# Patient Record
Sex: FEMALE | Race: BLACK OR AFRICAN AMERICAN | NOT HISPANIC OR LATINO | URBAN - METROPOLITAN AREA
[De-identification: names, ages, dates, MRNs, and addresses within clinical notes are randomized per-mention and may not be internally consistent; named-entity substitution may affect disease eponyms.]

---

## 2016-12-13 RX ORDER — INSULIN GLARGINE 100 [IU]/ML
33 INJECTION, SOLUTION SUBCUTANEOUS
Qty: 1 | Refills: 0 | DISCHARGE
Start: 2016-12-13 | End: 2016-12-27

## 2016-12-13 RX ORDER — INSULIN GLARGINE 100 [IU]/ML
16 INJECTION, SOLUTION SUBCUTANEOUS
Qty: 1 | Refills: 0 | DISCHARGE
Start: 2016-12-13 | End: 2016-12-27

## 2017-02-03 ENCOUNTER — OUTPATIENT (OUTPATIENT)
Dept: OUTPATIENT SERVICES | Facility: HOSPITAL | Age: 72
LOS: 1 days | End: 2017-02-03
Payer: MEDICARE

## 2017-02-03 DIAGNOSIS — I62.01 NONTRAUMATIC ACUTE SUBDURAL HEMORRHAGE: Chronic | ICD-10-CM

## 2017-02-03 DIAGNOSIS — Z96.651 PRESENCE OF RIGHT ARTIFICIAL KNEE JOINT: Chronic | ICD-10-CM

## 2017-02-03 PROCEDURE — 72141 MRI NECK SPINE W/O DYE: CPT | Mod: 26

## 2017-02-03 PROCEDURE — 71020: CPT | Mod: 26

## 2017-02-03 PROCEDURE — 72141 MRI NECK SPINE W/O DYE: CPT

## 2017-02-03 PROCEDURE — 71046 X-RAY EXAM CHEST 2 VIEWS: CPT

## 2017-03-07 ENCOUNTER — OUTPATIENT (OUTPATIENT)
Dept: OUTPATIENT SERVICES | Facility: HOSPITAL | Age: 72
LOS: 1 days | End: 2017-03-07
Payer: MEDICARE

## 2017-03-07 DIAGNOSIS — Z96.651 PRESENCE OF RIGHT ARTIFICIAL KNEE JOINT: Chronic | ICD-10-CM

## 2017-03-07 DIAGNOSIS — I62.01 NONTRAUMATIC ACUTE SUBDURAL HEMORRHAGE: Chronic | ICD-10-CM

## 2017-03-07 PROCEDURE — 72148 MRI LUMBAR SPINE W/O DYE: CPT

## 2017-03-07 PROCEDURE — 72148 MRI LUMBAR SPINE W/O DYE: CPT | Mod: 26

## 2017-03-28 ENCOUNTER — APPOINTMENT (OUTPATIENT)
Dept: ORTHOPEDIC SURGERY | Facility: CLINIC | Age: 72
End: 2017-03-28

## 2017-03-28 ENCOUNTER — OUTPATIENT (OUTPATIENT)
Dept: OUTPATIENT SERVICES | Facility: HOSPITAL | Age: 72
LOS: 1 days | End: 2017-03-28
Payer: MEDICARE

## 2017-03-28 VITALS
DIASTOLIC BLOOD PRESSURE: 80 MMHG | WEIGHT: 170 LBS | HEIGHT: 65 IN | SYSTOLIC BLOOD PRESSURE: 138 MMHG | BODY MASS INDEX: 28.32 KG/M2

## 2017-03-28 DIAGNOSIS — M54.5 LOW BACK PAIN: ICD-10-CM

## 2017-03-28 DIAGNOSIS — M43.05: ICD-10-CM

## 2017-03-28 DIAGNOSIS — I62.01 NONTRAUMATIC ACUTE SUBDURAL HEMORRHAGE: Chronic | ICD-10-CM

## 2017-03-28 DIAGNOSIS — Z96.651 PRESENCE OF RIGHT ARTIFICIAL KNEE JOINT: Chronic | ICD-10-CM

## 2017-03-28 PROCEDURE — 72082 X-RAY EXAM ENTIRE SPI 2/3 VW: CPT | Mod: 26

## 2017-03-28 PROCEDURE — 72100 X-RAY EXAM L-S SPINE 2/3 VWS: CPT | Mod: 26

## 2017-03-28 PROCEDURE — 72082 X-RAY EXAM ENTIRE SPI 2/3 VW: CPT

## 2017-03-28 PROCEDURE — 72100 X-RAY EXAM L-S SPINE 2/3 VWS: CPT

## 2017-03-28 PROCEDURE — 72050 X-RAY EXAM NECK SPINE 4/5VWS: CPT | Mod: 26

## 2017-03-28 PROCEDURE — 72050 X-RAY EXAM NECK SPINE 4/5VWS: CPT

## 2017-03-29 ENCOUNTER — APPOINTMENT (OUTPATIENT)
Dept: INTERNAL MEDICINE | Facility: CLINIC | Age: 72
End: 2017-03-29

## 2017-03-31 ENCOUNTER — INPATIENT (INPATIENT)
Facility: HOSPITAL | Age: 72
LOS: 4 days | Discharge: HOME | DRG: 191 | End: 2017-04-05
Attending: INTERNAL MEDICINE | Admitting: INTERNAL MEDICINE
Payer: MEDICARE

## 2017-03-31 VITALS
SYSTOLIC BLOOD PRESSURE: 175 MMHG | RESPIRATION RATE: 18 BRPM | TEMPERATURE: 98 F | HEART RATE: 86 BPM | DIASTOLIC BLOOD PRESSURE: 79 MMHG | OXYGEN SATURATION: 100 %

## 2017-03-31 DIAGNOSIS — I62.01 NONTRAUMATIC ACUTE SUBDURAL HEMORRHAGE: Chronic | ICD-10-CM

## 2017-03-31 DIAGNOSIS — Z96.651 PRESENCE OF RIGHT ARTIFICIAL KNEE JOINT: Chronic | ICD-10-CM

## 2017-03-31 PROCEDURE — 99285 EMERGENCY DEPT VISIT HI MDM: CPT | Mod: 25

## 2017-03-31 PROCEDURE — 71020: CPT | Mod: 26

## 2017-03-31 PROCEDURE — 93010 ELECTROCARDIOGRAM REPORT: CPT

## 2017-03-31 RX ORDER — IPRATROPIUM/ALBUTEROL SULFATE 18-103MCG
3 AEROSOL WITH ADAPTER (GRAM) INHALATION ONCE
Qty: 0 | Refills: 0 | Status: COMPLETED | OUTPATIENT
Start: 2017-03-31 | End: 2017-03-31

## 2017-03-31 RX ORDER — SODIUM CHLORIDE 9 MG/ML
1000 INJECTION INTRAMUSCULAR; INTRAVENOUS; SUBCUTANEOUS ONCE
Qty: 0 | Refills: 0 | Status: COMPLETED | OUTPATIENT
Start: 2017-03-31 | End: 2017-03-31

## 2017-03-31 RX ADMIN — Medication 125 MILLIGRAM(S): at 22:36

## 2017-03-31 RX ADMIN — SODIUM CHLORIDE 2000 MILLILITER(S): 9 INJECTION INTRAMUSCULAR; INTRAVENOUS; SUBCUTANEOUS at 22:36

## 2017-03-31 RX ADMIN — Medication 3 MILLILITER(S): at 22:10

## 2017-03-31 NOTE — ED PROVIDER NOTE - OBJECTIVE STATEMENT
70 yo female smoker in the past, with h/o asthma, HTN, DM, in the ER c/o heavy cough x 3 days, c/o asthma exacerbation since yesterday, worse today. Pt denies fever, chills, denies night sweats, denies CP. Pt called  EMS tonight as her chest was very tight, received nebulizer treatment in the ambulance and feels better.

## 2017-03-31 NOTE — ED ADULT NURSE NOTE - CHPI ED SYMPTOMS NEG
no headache/no diaphoresis/no chills/no shortness of breath/no fever/no chest pain/no hemoptysis/no edema/no body aches

## 2017-03-31 NOTE — ED PROVIDER NOTE - MEDICAL DECISION MAKING DETAILS
70 yo female with h/oHTN, DM, CAD, asthma, HLD, in the Er with persistent productive cough x 3 days, chills, and now wheezing and chest tightness. afebrile, VSS, peak flow -150, no pneumonia on CXR, not much improvement after IV steroids and duonebs treatments. case discussed with pt's PMD , admission recommended for further management.

## 2017-03-31 NOTE — ED ADULT NURSE NOTE - OBJECTIVE STATEMENT
h pt c/o cough for two day, productive with whitish sputum, denies fever, chills, denies hx of intubation, speaking in full sentences. noted mild bilat wheezing.

## 2017-03-31 NOTE — ED ADULT NURSE NOTE - PSH
Acute subdural hematoma    History of knee replacement, total, right    Other postprocedural status  S/P atrial septal defect closure, surgical  Status post tubal ligation  S/P tubal ligation

## 2017-03-31 NOTE — ED ADULT NURSE NOTE - PMH
Asthma  Asthma  Atherosclerosis of coronary artery  NOn obstructive  Atrial fibrillation  s/p ablation in 2013  Depression    Essential hypertension  HTN (hypertension)  Hyperlipidemia  Hyperlipidemia  Persistent ostium secundum  s/p repair (1989)  Subdural hemorrhage  s/p bakari hole (2013)  Type 2 diabetes mellitus  DM (diabetes mellitus)

## 2017-03-31 NOTE — ED PROVIDER NOTE - ATTENDING CONTRIBUTION TO CARE
71 yof pw cough x 3 days w/ sob.  no fc, no cp.  currently feels improved after nebs en route with EMS.  denies leg swelling.  no other complaints.  no hx of PE or recent immobilization/trauma.    agree w/ PA, rrr, speaking in full sentences, no retraction noted, soft abd, will continue w/ nebulizer, steroids, consider magnesium, xray    pt w/ mild improvement but still feels tight w/ breathing, will admit for continued respiratory care and monitoring.

## 2017-04-01 DIAGNOSIS — E11.9 TYPE 2 DIABETES MELLITUS WITHOUT COMPLICATIONS: ICD-10-CM

## 2017-04-01 DIAGNOSIS — E78.5 HYPERLIPIDEMIA, UNSPECIFIED: ICD-10-CM

## 2017-04-01 DIAGNOSIS — J44.1 CHRONIC OBSTRUCTIVE PULMONARY DISEASE WITH (ACUTE) EXACERBATION: ICD-10-CM

## 2017-04-01 DIAGNOSIS — R63.8 OTHER SYMPTOMS AND SIGNS CONCERNING FOOD AND FLUID INTAKE: ICD-10-CM

## 2017-04-01 DIAGNOSIS — D64.9 ANEMIA, UNSPECIFIED: ICD-10-CM

## 2017-04-01 DIAGNOSIS — Z29.9 ENCOUNTER FOR PROPHYLACTIC MEASURES, UNSPECIFIED: ICD-10-CM

## 2017-04-01 LAB
ALBUMIN SERPL ELPH-MCNC: 3.7 G/DL — SIGNIFICANT CHANGE UP (ref 3.4–5)
ALP SERPL-CCNC: 155 U/L — HIGH (ref 40–120)
ALT FLD-CCNC: 23 U/L — SIGNIFICANT CHANGE UP (ref 12–42)
ANION GAP SERPL CALC-SCNC: 14 MMOL/L — SIGNIFICANT CHANGE UP (ref 9–16)
ANION GAP SERPL CALC-SCNC: 15 MMOL/L — SIGNIFICANT CHANGE UP (ref 9–16)
APPEARANCE UR: CLEAR — SIGNIFICANT CHANGE UP
AST SERPL-CCNC: 20 U/L — SIGNIFICANT CHANGE UP (ref 15–37)
BASOPHILS NFR BLD AUTO: 0.2 % — SIGNIFICANT CHANGE UP (ref 0–2)
BASOPHILS NFR BLD AUTO: 0.6 % — SIGNIFICANT CHANGE UP (ref 0–2)
BILIRUB SERPL-MCNC: 0.1 MG/DL — LOW (ref 0.2–1.2)
BILIRUB UR-MCNC: NEGATIVE — SIGNIFICANT CHANGE UP
BUN SERPL-MCNC: 12 MG/DL — SIGNIFICANT CHANGE UP (ref 7–23)
BUN SERPL-MCNC: 9 MG/DL — SIGNIFICANT CHANGE UP (ref 7–23)
CALCIUM SERPL-MCNC: 8.8 MG/DL — SIGNIFICANT CHANGE UP (ref 8.5–10.5)
CALCIUM SERPL-MCNC: 9.2 MG/DL — SIGNIFICANT CHANGE UP (ref 8.5–10.5)
CHLORIDE SERPL-SCNC: 104 MMOL/L — SIGNIFICANT CHANGE UP (ref 96–108)
CHLORIDE SERPL-SCNC: 104 MMOL/L — SIGNIFICANT CHANGE UP (ref 96–108)
CHOLEST SERPL-MCNC: 196 MG/DL — SIGNIFICANT CHANGE UP
CO2 SERPL-SCNC: 16 MMOL/L — LOW (ref 22–31)
CO2 SERPL-SCNC: 22 MMOL/L — SIGNIFICANT CHANGE UP (ref 22–31)
COLOR SPEC: YELLOW — SIGNIFICANT CHANGE UP
CREAT SERPL-MCNC: 0.7 MG/DL — SIGNIFICANT CHANGE UP (ref 0.5–1.3)
CREAT SERPL-MCNC: 0.78 MG/DL — SIGNIFICANT CHANGE UP (ref 0.5–1.3)
DIFF PNL FLD: NEGATIVE — SIGNIFICANT CHANGE UP
EOSINOPHIL NFR BLD AUTO: 0.2 % — SIGNIFICANT CHANGE UP (ref 0–6)
EOSINOPHIL NFR BLD AUTO: 7.3 % — HIGH (ref 0–6)
GLUCOSE SERPL-MCNC: 216 MG/DL — HIGH (ref 70–99)
GLUCOSE SERPL-MCNC: 387 MG/DL — HIGH (ref 70–99)
GLUCOSE UR QL: >=1000
HBA1C BLD-MCNC: 9 % — HIGH (ref 4.8–5.6)
HCT VFR BLD CALC: 32.4 % — LOW (ref 34.5–45)
HCT VFR BLD CALC: 33.1 % — LOW (ref 34.5–45)
HDLC SERPL-MCNC: 103 MG/DL — SIGNIFICANT CHANGE UP
HGB BLD-MCNC: 10.8 G/DL — LOW (ref 11.5–15.5)
HGB BLD-MCNC: 10.8 G/DL — LOW (ref 11.5–15.5)
KETONES UR-MCNC: 15 MG/DL
LEUKOCYTE ESTERASE UR-ACNC: NEGATIVE — SIGNIFICANT CHANGE UP
LIPID PNL WITH DIRECT LDL SERPL: 71 MG/DL — SIGNIFICANT CHANGE UP
LYMPHOCYTES # BLD AUTO: 13.3 % — SIGNIFICANT CHANGE UP (ref 13–44)
LYMPHOCYTES # BLD AUTO: 19 % — SIGNIFICANT CHANGE UP (ref 13–44)
MAGNESIUM SERPL-MCNC: 1.9 MG/DL — SIGNIFICANT CHANGE UP (ref 1.6–2.4)
MCHC RBC-ENTMCNC: 28.1 PG — SIGNIFICANT CHANGE UP (ref 27–34)
MCHC RBC-ENTMCNC: 28.8 PG — SIGNIFICANT CHANGE UP (ref 27–34)
MCHC RBC-ENTMCNC: 32.6 G/DL — SIGNIFICANT CHANGE UP (ref 32–36)
MCHC RBC-ENTMCNC: 33.3 G/DL — SIGNIFICANT CHANGE UP (ref 32–36)
MCV RBC AUTO: 86.2 FL — SIGNIFICANT CHANGE UP (ref 80–100)
MCV RBC AUTO: 86.4 FL — SIGNIFICANT CHANGE UP (ref 80–100)
MONOCYTES NFR BLD AUTO: 2.3 % — SIGNIFICANT CHANGE UP (ref 2–14)
MONOCYTES NFR BLD AUTO: 4.2 % — SIGNIFICANT CHANGE UP (ref 2–14)
NEUTROPHILS NFR BLD AUTO: 68.9 % — SIGNIFICANT CHANGE UP (ref 43–77)
NEUTROPHILS NFR BLD AUTO: 84 % — HIGH (ref 43–77)
NITRITE UR-MCNC: NEGATIVE — SIGNIFICANT CHANGE UP
PH UR: 5 — SIGNIFICANT CHANGE UP (ref 4–8)
PLATELET # BLD AUTO: 195 K/UL — SIGNIFICANT CHANGE UP (ref 150–400)
PLATELET # BLD AUTO: 205 K/UL — SIGNIFICANT CHANGE UP (ref 150–400)
POTASSIUM SERPL-MCNC: 3.7 MMOL/L — SIGNIFICANT CHANGE UP (ref 3.5–5.3)
POTASSIUM SERPL-MCNC: 5 MMOL/L — SIGNIFICANT CHANGE UP (ref 3.5–5.3)
POTASSIUM SERPL-SCNC: 3.7 MMOL/L — SIGNIFICANT CHANGE UP (ref 3.5–5.3)
POTASSIUM SERPL-SCNC: 5 MMOL/L — SIGNIFICANT CHANGE UP (ref 3.5–5.3)
PROT SERPL-MCNC: 7.3 G/DL — SIGNIFICANT CHANGE UP (ref 6.4–8.2)
PROT UR-MCNC: NEGATIVE MG/DL — SIGNIFICANT CHANGE UP
RAPID RVP RESULT: DETECTED
RBC # BLD: 3.75 M/UL — LOW (ref 3.8–5.2)
RBC # BLD: 3.84 M/UL — SIGNIFICANT CHANGE UP (ref 3.8–5.2)
RBC # FLD: 14.8 % — SIGNIFICANT CHANGE UP (ref 10.3–16.9)
RBC # FLD: 14.9 % — SIGNIFICANT CHANGE UP (ref 10.3–16.9)
RV+EV RNA SPEC QL NAA+PROBE: DETECTED
SODIUM SERPL-SCNC: 135 MMOL/L — SIGNIFICANT CHANGE UP (ref 135–145)
SODIUM SERPL-SCNC: 140 MMOL/L — SIGNIFICANT CHANGE UP (ref 135–145)
SP GR SPEC: 1.01 — SIGNIFICANT CHANGE UP (ref 1–1.03)
TOTAL CHOLESTEROL/HDL RATIO MEASUREMENT: 1.9 RATIO — SIGNIFICANT CHANGE UP
TRIGL SERPL-MCNC: 112 MG/DL — SIGNIFICANT CHANGE UP
TSH SERPL-MCNC: 0.79 UIU/ML — SIGNIFICANT CHANGE UP (ref 0.35–4.94)
UROBILINOGEN FLD QL: 0.2 E.U./DL — SIGNIFICANT CHANGE UP
WBC # BLD: 5.1 K/UL — SIGNIFICANT CHANGE UP (ref 3.8–10.5)
WBC # BLD: 6.5 K/UL — SIGNIFICANT CHANGE UP (ref 3.8–10.5)
WBC # FLD AUTO: 5.1 K/UL — SIGNIFICANT CHANGE UP (ref 3.8–10.5)
WBC # FLD AUTO: 6.5 K/UL — SIGNIFICANT CHANGE UP (ref 3.8–10.5)

## 2017-04-01 PROCEDURE — 93010 ELECTROCARDIOGRAM REPORT: CPT

## 2017-04-01 RX ORDER — IPRATROPIUM/ALBUTEROL SULFATE 18-103MCG
3 AEROSOL WITH ADAPTER (GRAM) INHALATION EVERY 4 HOURS
Qty: 0 | Refills: 0 | Status: DISCONTINUED | OUTPATIENT
Start: 2017-04-01 | End: 2017-04-05

## 2017-04-01 RX ORDER — ACETAMINOPHEN 500 MG
650 TABLET ORAL EVERY 6 HOURS
Qty: 0 | Refills: 0 | Status: DISCONTINUED | OUTPATIENT
Start: 2017-04-01 | End: 2017-04-05

## 2017-04-01 RX ORDER — AZITHROMYCIN 500 MG/1
500 TABLET, FILM COATED ORAL EVERY 24 HOURS
Qty: 0 | Refills: 0 | Status: DISCONTINUED | OUTPATIENT
Start: 2017-04-01 | End: 2017-04-03

## 2017-04-01 RX ORDER — AMLODIPINE BESYLATE 2.5 MG/1
10 TABLET ORAL DAILY
Qty: 0 | Refills: 0 | Status: DISCONTINUED | OUTPATIENT
Start: 2017-04-01 | End: 2017-04-05

## 2017-04-01 RX ORDER — ATORVASTATIN CALCIUM 80 MG/1
20 TABLET, FILM COATED ORAL AT BEDTIME
Qty: 0 | Refills: 0 | Status: DISCONTINUED | OUTPATIENT
Start: 2017-04-01 | End: 2017-04-05

## 2017-04-01 RX ORDER — INSULIN LISPRO 100/ML
25 VIAL (ML) SUBCUTANEOUS
Qty: 0 | Refills: 0 | Status: DISCONTINUED | OUTPATIENT
Start: 2017-04-01 | End: 2017-04-02

## 2017-04-01 RX ORDER — INSULIN GLARGINE 100 [IU]/ML
34 INJECTION, SOLUTION SUBCUTANEOUS AT BEDTIME
Qty: 0 | Refills: 0 | Status: DISCONTINUED | OUTPATIENT
Start: 2017-04-01 | End: 2017-04-01

## 2017-04-01 RX ORDER — ACETAMINOPHEN 500 MG
650 TABLET ORAL EVERY 6 HOURS
Qty: 0 | Refills: 0 | Status: DISCONTINUED | OUTPATIENT
Start: 2017-04-01 | End: 2017-04-01

## 2017-04-01 RX ORDER — LISINOPRIL 2.5 MG/1
20 TABLET ORAL DAILY
Qty: 0 | Refills: 0 | Status: DISCONTINUED | OUTPATIENT
Start: 2017-04-01 | End: 2017-04-05

## 2017-04-01 RX ORDER — INSULIN LISPRO 100/ML
VIAL (ML) SUBCUTANEOUS
Qty: 0 | Refills: 0 | Status: DISCONTINUED | OUTPATIENT
Start: 2017-04-01 | End: 2017-04-01

## 2017-04-01 RX ORDER — FLUOXETINE HCL 10 MG
20 CAPSULE ORAL DAILY
Qty: 0 | Refills: 0 | Status: DISCONTINUED | OUTPATIENT
Start: 2017-04-01 | End: 2017-04-02

## 2017-04-01 RX ORDER — IPRATROPIUM/ALBUTEROL SULFATE 18-103MCG
3 AEROSOL WITH ADAPTER (GRAM) INHALATION ONCE
Qty: 0 | Refills: 0 | Status: COMPLETED | OUTPATIENT
Start: 2017-04-01 | End: 2017-04-01

## 2017-04-01 RX ORDER — INSULIN LISPRO 100/ML
VIAL (ML) SUBCUTANEOUS
Qty: 0 | Refills: 0 | Status: DISCONTINUED | OUTPATIENT
Start: 2017-04-01 | End: 2017-04-02

## 2017-04-01 RX ORDER — MAGNESIUM SULFATE 500 MG/ML
1 VIAL (ML) INJECTION ONCE
Qty: 0 | Refills: 0 | Status: COMPLETED | OUTPATIENT
Start: 2017-04-01 | End: 2017-04-01

## 2017-04-01 RX ORDER — HEPARIN SODIUM 5000 [USP'U]/ML
5000 INJECTION INTRAVENOUS; SUBCUTANEOUS EVERY 8 HOURS
Qty: 0 | Refills: 0 | Status: DISCONTINUED | OUTPATIENT
Start: 2017-04-01 | End: 2017-04-05

## 2017-04-01 RX ORDER — LANOLIN ALCOHOL/MO/W.PET/CERES
3 CREAM (GRAM) TOPICAL AT BEDTIME
Qty: 0 | Refills: 0 | Status: DISCONTINUED | OUTPATIENT
Start: 2017-04-01 | End: 2017-04-05

## 2017-04-01 RX ORDER — INSULIN GLARGINE 100 [IU]/ML
34 INJECTION, SOLUTION SUBCUTANEOUS AT BEDTIME
Qty: 0 | Refills: 0 | Status: DISCONTINUED | OUTPATIENT
Start: 2017-04-01 | End: 2017-04-02

## 2017-04-01 RX ORDER — INSULIN LISPRO 100/ML
15 VIAL (ML) SUBCUTANEOUS
Qty: 0 | Refills: 0 | Status: DISCONTINUED | OUTPATIENT
Start: 2017-04-01 | End: 2017-04-02

## 2017-04-01 RX ADMIN — AZITHROMYCIN 255 MILLIGRAM(S): 500 TABLET, FILM COATED ORAL at 04:17

## 2017-04-01 RX ADMIN — Medication 650 MILLIGRAM(S): at 03:58

## 2017-04-01 RX ADMIN — Medication 650 MILLIGRAM(S): at 19:36

## 2017-04-01 RX ADMIN — Medication 25 UNIT(S): at 13:23

## 2017-04-01 RX ADMIN — Medication 3 MILLILITER(S): at 09:19

## 2017-04-01 RX ADMIN — HEPARIN SODIUM 5000 UNIT(S): 5000 INJECTION INTRAVENOUS; SUBCUTANEOUS at 07:14

## 2017-04-01 RX ADMIN — Medication 8: at 08:59

## 2017-04-01 RX ADMIN — Medication 40 MILLIGRAM(S): at 14:52

## 2017-04-01 RX ADMIN — Medication 8: at 22:13

## 2017-04-01 RX ADMIN — Medication 3 MILLILITER(S): at 22:11

## 2017-04-01 RX ADMIN — Medication 3 MILLILITER(S): at 18:16

## 2017-04-01 RX ADMIN — Medication 3 MILLILITER(S): at 13:23

## 2017-04-01 RX ADMIN — Medication 15 UNIT(S): at 13:22

## 2017-04-01 RX ADMIN — Medication 100 MILLIGRAM(S): at 21:05

## 2017-04-01 RX ADMIN — Medication 40 MILLIGRAM(S): at 22:13

## 2017-04-01 RX ADMIN — Medication 12: at 18:17

## 2017-04-01 RX ADMIN — LISINOPRIL 20 MILLIGRAM(S): 2.5 TABLET ORAL at 07:14

## 2017-04-01 RX ADMIN — Medication 40 MILLIGRAM(S): at 07:42

## 2017-04-01 RX ADMIN — Medication 3 MILLILITER(S): at 00:01

## 2017-04-01 RX ADMIN — INSULIN GLARGINE 34 UNIT(S): 100 INJECTION, SOLUTION SUBCUTANEOUS at 22:13

## 2017-04-01 RX ADMIN — Medication 650 MILLIGRAM(S): at 04:58

## 2017-04-01 RX ADMIN — Medication 3 MILLIGRAM(S): at 23:11

## 2017-04-01 RX ADMIN — Medication 650 MILLIGRAM(S): at 20:30

## 2017-04-01 RX ADMIN — Medication 25 UNIT(S): at 18:16

## 2017-04-01 RX ADMIN — AMLODIPINE BESYLATE 10 MILLIGRAM(S): 2.5 TABLET ORAL at 07:14

## 2017-04-01 RX ADMIN — Medication 100 GRAM(S): at 01:24

## 2017-04-01 RX ADMIN — ATORVASTATIN CALCIUM 20 MILLIGRAM(S): 80 TABLET, FILM COATED ORAL at 22:12

## 2017-04-01 RX ADMIN — Medication: at 13:18

## 2017-04-01 NOTE — DIETITIAN INITIAL EVALUATION ADULT. - NS AS NUTRI INTERV ED CONTENT
Discussed high sugar foods/beverages that should be avoided while receiving steroids/Priority modifications

## 2017-04-01 NOTE — H&P ADULT - NSHPPHYSICALEXAM_GEN_ALL_CORE
General: WN/WD, NAD  Respiratory: No accessory muscle use. Speaking in full sentence. CTAB, no wheezes, rales or rhonchi  CV: Surgical scar at sternum, RRR, S1S2, no murmurs, rubs or gallops  Abdominal: Soft, NT, ND +BS  Extremities: No edema, + peripheral pulses. No clubbing or cyanosis  Neurology: A&Ox3, no focal deficit

## 2017-04-01 NOTE — H&P ADULT - ASSESSMENT
72yo woman former smoker with COPD, DMT2 here with cough, wheezing being admitted for COPD exacerbation. 70yo woman former smoker with COPD, DMT2 (Hga1c 9.0 on admission) here with cough, wheezing being admitted for COPD exacerbation.

## 2017-04-01 NOTE — H&P ADULT - NSHPSOCIALHISTORY_GEN_ALL_CORE
Former smoker, less than 10 pack year  Lives at home with  who has Alzheimer's  No EtOH or illicit drug

## 2017-04-01 NOTE — PROGRESS NOTE ADULT - PROBLEM SELECTOR PLAN 1
Baseline peak flow normally in mid 200s. s/p Duonebs and IV Solumedrol in ED. reports being around sick people. Triggered likely by + RVP   - Monitor Peak Flow  - Solumedrol 40 q8, will de-escalate tomorrow   - Azithromycin x 5 days (Day 1/5)

## 2017-04-01 NOTE — PROGRESS NOTE ADULT - PROBLEM SELECTOR PLAN 2
Poorly controlled DM (Hga1c 9.0) on Lantus 34u in AM, Humalog 18u premeal  - c/w Lantus 34u qhs and will start home pre-meal   - MISS, FSBG 4 x daily

## 2017-04-01 NOTE — DIETITIAN INITIAL EVALUATION ADULT. - OTHER INFO
Pt is a 72y/o woman admitted w/ asthma exacerbation. Hx: DM, COPD, Asthma. Pt denies SOB w/ meals. No current N/V/D/C or pain. Pt missing top dentures but states she has no difficulties chewing or swallowing. Pt declined softer foods. PO >75% of trays. Skin intact. NKFA. Pt states she follows a DM diet at home but still consumes juice almost daily. HgA1c noted to be 9.

## 2017-04-01 NOTE — H&P ADULT - HISTORY OF PRESENT ILLNESS
72yo  overweight woman with asthma and COPD (not on home O2, never intubated), poorly controlled DM T2 (Hga1c 8.1 in Jan 2016) presents with worsening cough x 3 days with wheezing. Pt reports feeling malaise last week, followed by dyspnea, chest tightness and cough productive of green sputum. No relief with home nebulizer. She visited a friend in the hospital on Tuesday. Denies any fever/chills, chest pain, N/V/D, abdominal pain, leg swelling.   Last asthma exacerbation was 3 months ago, treated outpatient. Chart review revealed a thallium stress test in 1/2016 that was normal with preserved LVEF without regional wall motion abnormalities. The EKG stress test was also normal.    ED course: VSS. Peak flow 150cc. CXR with no infiltrates. Given Duoneb twice, Solumedrol 125 x 1, Mg 1g, and 1L NS bolus.

## 2017-04-01 NOTE — H&P ADULT - FAMILY HISTORY
Father  Still living? No  Family history of ischemic heart disease, Age at diagnosis: Age Unknown     Mother  Still living? No  Family history of hypertension, Age at diagnosis: Age Unknown

## 2017-04-01 NOTE — H&P ADULT - PROBLEM SELECTOR PLAN 1
Elderly female with peak flow normally in mid 200s, now at 200s s/p nebulizer in ED being admitted for mild COPD exacerbation. Pt received Solumedrol in ED. No signs of infection, but reports sick contact  - f/u RVP sent  - Monitor Peak Flow  - Solumedrol 40 q8, can be de-escalated tomorrow if clinically better  - Azithromycin x 5 days

## 2017-04-01 NOTE — H&P ADULT - PROBLEM SELECTOR PLAN 3
Pt with known anemia, reviewed outpatient record from Jan 2016. TSH, B12, folate normal, Hg 10.7 -> 11.3, now Hg 10.8, no sign of active bleeding, HD stable  - Monitor CBC

## 2017-04-01 NOTE — H&P ADULT - NSHPLABSRESULTS_GEN_ALL_CORE
10.8   6.5   )-----------( 195      ( 01 Apr 2017 00:46 )             32.4     01 Apr 2017 00:46    140    |  104    |  9      ----------------------------<  216    3.7     |  22     |  0.78     Ca    8.8        01 Apr 2017 00:46    TPro  7.3    /  Alb  3.7    /  TBili  0.1    /  DBili  x      /  AST  20     /  ALT  23     /  AlkPhos  155    01 Apr 2017 00:46    01 Apr 2017 00:46    140    |  104    |  9      ----------------------------<  216    3.7     |  22     |  0.78     Ca    8.8        01 Apr 2017 00:46    TPro  7.3    /  Alb  3.7    /  TBili  0.1    /  DBili  x      /  AST  20     /  ALT  23     /  AlkPhos  155    01 Apr 2017 00:46    CXR: Final report pending

## 2017-04-01 NOTE — H&P ADULT - ATTENDING COMMENTS
above reviewed in detail. patient examined  asthma exacerbation in the setting of respiratory viral infection. patient with underlying diabetes.  continue iv steroids. continue zithromax. Blood sugar control, adjust insulin

## 2017-04-01 NOTE — H&P ADULT - PROBLEM SELECTOR PLAN 2
Poorly controlled DMT2 (Hga1c 8.1 in Jan 2016) on Lantus 34u in AM, Humalog 18u premeal  - Will continue home Lantus and MISS and adjust, may need additional coverage with steroid   - Carb consistent diet Poorly controlled DMT2 (Hga1c 8.1 in Jan 2016) on Lantus 34u in AM, Humalog 18u premeal  - Hga1c 9.0 on admission  - Will continue home Lantus and MISS and adjust, may need additional coverage with steroid   - Carb consistent diet

## 2017-04-01 NOTE — H&P ADULT - NSHPREVIEWOFSYSTEMS_GEN_ALL_CORE
Constitutional: feeling poorly and feeling tired, but no fever and no chills.   Eyes: no eyesight problems, no eye pain, eyes not red, no purulent discharge from the eyes, no dryness of the eyes and no itching of the eyes.   ENT: no earache, no hearing loss, no nosebleeds, no nasal discharge, no sore throat and no hoarseness.   Cardiovascular: the heart rate was not slow, the heart rate was not fast, no chest pain, no palpitations, no intermittent leg claudication and no extremity edema.   Respiratory: shortness of breath and shortness of breath during exertion, cough. No orthopnea, no wheezing and no PND.   Gastrointestinal: no abdominal pain, no constipation, no heartburn, no vomiting, no diarrhea and no melena.   Genitourinary: no nocturia, no dysuria, no incontinence and no urinary hesitancy.   Musculoskeletal: chronic joint pain. No joint swelling, no arthralgias, no joint stiffness, no limb pain and no limb swelling.   Integumentary: no skin lesions, no skin wound, no itching, no change in a mole, no dry skin and no unusual growth on the skin.   Neurological: no confusion, no convulsions, no dizziness, no fainting, no limb weakness and no difficulty walking.   Psychiatric: not suicidal, no sleep disturbances, no anxiety, no depression, no personality change and no emotional problems.   Endocrine: no proptosis, no hot flashes, no muscle weakness, no erectile dysfunction, no deepening of the voice and no feelings of weakness.   Hematologic/Lymphatic: no tendency for easy bleeding, no tendency for easy bruising, no swollen glands and no swollen glands in the neck.

## 2017-04-02 LAB
ANION GAP SERPL CALC-SCNC: 14 MMOL/L — SIGNIFICANT CHANGE UP (ref 9–16)
BUN SERPL-MCNC: 22 MG/DL — SIGNIFICANT CHANGE UP (ref 7–23)
CALCIUM SERPL-MCNC: 9.4 MG/DL — SIGNIFICANT CHANGE UP (ref 8.5–10.5)
CHLORIDE SERPL-SCNC: 102 MMOL/L — SIGNIFICANT CHANGE UP (ref 96–108)
CO2 SERPL-SCNC: 19 MMOL/L — LOW (ref 22–31)
CREAT SERPL-MCNC: 0.78 MG/DL — SIGNIFICANT CHANGE UP (ref 0.5–1.3)
GLUCOSE SERPL-MCNC: 413 MG/DL — HIGH (ref 70–99)
HCT VFR BLD CALC: 32.1 % — LOW (ref 34.5–45)
HGB BLD-MCNC: 10.6 G/DL — LOW (ref 11.5–15.5)
MAGNESIUM SERPL-MCNC: 2.1 MG/DL — SIGNIFICANT CHANGE UP (ref 1.6–2.4)
MCHC RBC-ENTMCNC: 28 PG — SIGNIFICANT CHANGE UP (ref 27–34)
MCHC RBC-ENTMCNC: 33 G/DL — SIGNIFICANT CHANGE UP (ref 32–36)
MCV RBC AUTO: 84.7 FL — SIGNIFICANT CHANGE UP (ref 80–100)
PLATELET # BLD AUTO: 248 K/UL — SIGNIFICANT CHANGE UP (ref 150–400)
POTASSIUM SERPL-MCNC: 4.3 MMOL/L — SIGNIFICANT CHANGE UP (ref 3.5–5.3)
POTASSIUM SERPL-SCNC: 4.3 MMOL/L — SIGNIFICANT CHANGE UP (ref 3.5–5.3)
RBC # BLD: 3.79 M/UL — LOW (ref 3.8–5.2)
RBC # FLD: 15.2 % — SIGNIFICANT CHANGE UP (ref 10.3–16.9)
SODIUM SERPL-SCNC: 135 MMOL/L — SIGNIFICANT CHANGE UP (ref 135–145)
WBC # BLD: 9.9 K/UL — SIGNIFICANT CHANGE UP (ref 3.8–10.5)
WBC # FLD AUTO: 9.9 K/UL — SIGNIFICANT CHANGE UP (ref 3.8–10.5)

## 2017-04-02 RX ORDER — INSULIN LISPRO 100/ML
VIAL (ML) SUBCUTANEOUS
Qty: 0 | Refills: 0 | Status: DISCONTINUED | OUTPATIENT
Start: 2017-04-02 | End: 2017-04-05

## 2017-04-02 RX ORDER — ZOLPIDEM TARTRATE 10 MG/1
5 TABLET ORAL AT BEDTIME
Qty: 0 | Refills: 0 | Status: DISCONTINUED | OUTPATIENT
Start: 2017-04-02 | End: 2017-04-05

## 2017-04-02 RX ORDER — INSULIN LISPRO 100/ML
30 VIAL (ML) SUBCUTANEOUS
Qty: 0 | Refills: 0 | Status: DISCONTINUED | OUTPATIENT
Start: 2017-04-02 | End: 2017-04-05

## 2017-04-02 RX ORDER — INSULIN GLARGINE 100 [IU]/ML
10 INJECTION, SOLUTION SUBCUTANEOUS EVERY MORNING
Qty: 0 | Refills: 0 | Status: DISCONTINUED | OUTPATIENT
Start: 2017-04-02 | End: 2017-04-05

## 2017-04-02 RX ORDER — INSULIN GLARGINE 100 [IU]/ML
10 INJECTION, SOLUTION SUBCUTANEOUS AT BEDTIME
Qty: 0 | Refills: 0 | Status: DISCONTINUED | OUTPATIENT
Start: 2017-04-02 | End: 2017-04-02

## 2017-04-02 RX ORDER — INSULIN GLARGINE 100 [IU]/ML
45 INJECTION, SOLUTION SUBCUTANEOUS AT BEDTIME
Qty: 0 | Refills: 0 | Status: DISCONTINUED | OUTPATIENT
Start: 2017-04-02 | End: 2017-04-05

## 2017-04-02 RX ORDER — INSULIN LISPRO 100/ML
VIAL (ML) SUBCUTANEOUS
Qty: 0 | Refills: 0 | Status: DISCONTINUED | OUTPATIENT
Start: 2017-04-02 | End: 2017-04-02

## 2017-04-02 RX ORDER — INSULIN LISPRO 100/ML
25 VIAL (ML) SUBCUTANEOUS
Qty: 0 | Refills: 0 | Status: DISCONTINUED | OUTPATIENT
Start: 2017-04-02 | End: 2017-04-05

## 2017-04-02 RX ORDER — SODIUM CHLORIDE 9 MG/ML
1000 INJECTION INTRAMUSCULAR; INTRAVENOUS; SUBCUTANEOUS
Qty: 0 | Refills: 0 | Status: DISCONTINUED | OUTPATIENT
Start: 2017-04-02 | End: 2017-04-05

## 2017-04-02 RX ADMIN — Medication 8: at 22:05

## 2017-04-02 RX ADMIN — Medication 3 MILLILITER(S): at 22:04

## 2017-04-02 RX ADMIN — LISINOPRIL 20 MILLIGRAM(S): 2.5 TABLET ORAL at 06:42

## 2017-04-02 RX ADMIN — Medication 100 MILLIGRAM(S): at 21:19

## 2017-04-02 RX ADMIN — Medication 30 UNIT(S): at 18:07

## 2017-04-02 RX ADMIN — Medication 3 MILLILITER(S): at 09:08

## 2017-04-02 RX ADMIN — Medication 25 UNIT(S): at 09:05

## 2017-04-02 RX ADMIN — Medication 40 MILLIGRAM(S): at 15:26

## 2017-04-02 RX ADMIN — Medication 650 MILLIGRAM(S): at 05:12

## 2017-04-02 RX ADMIN — Medication 650 MILLIGRAM(S): at 23:04

## 2017-04-02 RX ADMIN — HEPARIN SODIUM 5000 UNIT(S): 5000 INJECTION INTRAVENOUS; SUBCUTANEOUS at 22:07

## 2017-04-02 RX ADMIN — AZITHROMYCIN 255 MILLIGRAM(S): 500 TABLET, FILM COATED ORAL at 04:12

## 2017-04-02 RX ADMIN — Medication 10: at 13:21

## 2017-04-02 RX ADMIN — Medication 12: at 18:08

## 2017-04-02 RX ADMIN — Medication 650 MILLIGRAM(S): at 16:04

## 2017-04-02 RX ADMIN — SODIUM CHLORIDE 100 MILLILITER(S): 9 INJECTION INTRAMUSCULAR; INTRAVENOUS; SUBCUTANEOUS at 10:55

## 2017-04-02 RX ADMIN — Medication 650 MILLIGRAM(S): at 22:04

## 2017-04-02 RX ADMIN — Medication 3 MILLILITER(S): at 13:24

## 2017-04-02 RX ADMIN — Medication 650 MILLIGRAM(S): at 04:12

## 2017-04-02 RX ADMIN — ATORVASTATIN CALCIUM 20 MILLIGRAM(S): 80 TABLET, FILM COATED ORAL at 22:04

## 2017-04-02 RX ADMIN — INSULIN GLARGINE 45 UNIT(S): 100 INJECTION, SOLUTION SUBCUTANEOUS at 22:05

## 2017-04-02 RX ADMIN — Medication 10: at 09:05

## 2017-04-02 RX ADMIN — AMLODIPINE BESYLATE 10 MILLIGRAM(S): 2.5 TABLET ORAL at 06:42

## 2017-04-02 RX ADMIN — Medication 40 MILLIGRAM(S): at 06:43

## 2017-04-02 RX ADMIN — Medication 3 MILLILITER(S): at 04:48

## 2017-04-02 RX ADMIN — Medication 650 MILLIGRAM(S): at 15:38

## 2017-04-02 RX ADMIN — Medication 3 MILLILITER(S): at 18:08

## 2017-04-02 RX ADMIN — Medication 25 UNIT(S): at 13:21

## 2017-04-02 RX ADMIN — ZOLPIDEM TARTRATE 5 MILLIGRAM(S): 10 TABLET ORAL at 22:04

## 2017-04-03 LAB
-  AMPICILLIN/SULBACTAM: SIGNIFICANT CHANGE UP
-  AMPICILLIN: SIGNIFICANT CHANGE UP
-  CEFAZOLIN: SIGNIFICANT CHANGE UP
-  CEFTRIAXONE: SIGNIFICANT CHANGE UP
-  CIPROFLOXACIN: SIGNIFICANT CHANGE UP
-  GENTAMICIN: SIGNIFICANT CHANGE UP
-  NITROFURANTOIN: SIGNIFICANT CHANGE UP
-  PIPERACILLIN/TAZOBACTAM: SIGNIFICANT CHANGE UP
-  TOBRAMYCIN: SIGNIFICANT CHANGE UP
-  TRIMETHOPRIM/SULFAMETHOXAZOLE: SIGNIFICANT CHANGE UP
ANION GAP SERPL CALC-SCNC: 11 MMOL/L — SIGNIFICANT CHANGE UP (ref 9–16)
BUN SERPL-MCNC: 20 MG/DL — SIGNIFICANT CHANGE UP (ref 7–23)
CALCIUM SERPL-MCNC: 8.3 MG/DL — LOW (ref 8.5–10.5)
CHLORIDE SERPL-SCNC: 105 MMOL/L — SIGNIFICANT CHANGE UP (ref 96–108)
CO2 SERPL-SCNC: 21 MMOL/L — LOW (ref 22–31)
CREAT SERPL-MCNC: 0.81 MG/DL — SIGNIFICANT CHANGE UP (ref 0.5–1.3)
CULTURE RESULTS: SIGNIFICANT CHANGE UP
GLUCOSE SERPL-MCNC: 212 MG/DL — HIGH (ref 70–99)
HCT VFR BLD CALC: 29.8 % — LOW (ref 34.5–45)
HGB BLD-MCNC: 9.8 G/DL — LOW (ref 11.5–15.5)
MAGNESIUM SERPL-MCNC: 2 MG/DL — SIGNIFICANT CHANGE UP (ref 1.6–2.4)
MCHC RBC-ENTMCNC: 27.7 PG — SIGNIFICANT CHANGE UP (ref 27–34)
MCHC RBC-ENTMCNC: 32.9 G/DL — SIGNIFICANT CHANGE UP (ref 32–36)
MCV RBC AUTO: 84.2 FL — SIGNIFICANT CHANGE UP (ref 80–100)
METHOD TYPE: SIGNIFICANT CHANGE UP
ORGANISM # SPEC MICROSCOPIC CNT: SIGNIFICANT CHANGE UP
ORGANISM # SPEC MICROSCOPIC CNT: SIGNIFICANT CHANGE UP
PLATELET # BLD AUTO: 225 K/UL — SIGNIFICANT CHANGE UP (ref 150–400)
POTASSIUM SERPL-MCNC: 3.3 MMOL/L — LOW (ref 3.5–5.3)
POTASSIUM SERPL-SCNC: 3.3 MMOL/L — LOW (ref 3.5–5.3)
RBC # BLD: 3.54 M/UL — LOW (ref 3.8–5.2)
RBC # FLD: 15.1 % — SIGNIFICANT CHANGE UP (ref 10.3–16.9)
SODIUM SERPL-SCNC: 137 MMOL/L — SIGNIFICANT CHANGE UP (ref 135–145)
SPECIMEN SOURCE: SIGNIFICANT CHANGE UP
WBC # BLD: 10 K/UL — SIGNIFICANT CHANGE UP (ref 3.8–10.5)
WBC # FLD AUTO: 10 K/UL — SIGNIFICANT CHANGE UP (ref 3.8–10.5)

## 2017-04-03 PROCEDURE — 71250 CT THORAX DX C-: CPT | Mod: 26

## 2017-04-03 RX ORDER — AZITHROMYCIN 500 MG/1
500 TABLET, FILM COATED ORAL DAILY
Qty: 0 | Refills: 0 | Status: DISCONTINUED | OUTPATIENT
Start: 2017-04-04 | End: 2017-04-05

## 2017-04-03 RX ADMIN — Medication 25 UNIT(S): at 13:19

## 2017-04-03 RX ADMIN — Medication 3 MILLILITER(S): at 21:55

## 2017-04-03 RX ADMIN — Medication 3 MILLILITER(S): at 05:39

## 2017-04-03 RX ADMIN — LISINOPRIL 20 MILLIGRAM(S): 2.5 TABLET ORAL at 05:58

## 2017-04-03 RX ADMIN — INSULIN GLARGINE 10 UNIT(S): 100 INJECTION, SOLUTION SUBCUTANEOUS at 09:09

## 2017-04-03 RX ADMIN — AZITHROMYCIN 255 MILLIGRAM(S): 500 TABLET, FILM COATED ORAL at 03:49

## 2017-04-03 RX ADMIN — Medication 3 MILLILITER(S): at 09:09

## 2017-04-03 RX ADMIN — INSULIN GLARGINE 45 UNIT(S): 100 INJECTION, SOLUTION SUBCUTANEOUS at 21:57

## 2017-04-03 RX ADMIN — Medication 3 MILLILITER(S): at 13:20

## 2017-04-03 RX ADMIN — Medication 40 MILLIGRAM(S): at 05:59

## 2017-04-03 RX ADMIN — Medication 30 UNIT(S): at 18:03

## 2017-04-03 RX ADMIN — Medication 4: at 13:19

## 2017-04-03 RX ADMIN — Medication 2: at 09:08

## 2017-04-03 RX ADMIN — ZOLPIDEM TARTRATE 5 MILLIGRAM(S): 10 TABLET ORAL at 21:55

## 2017-04-03 RX ADMIN — Medication 30 UNIT(S): at 09:08

## 2017-04-03 RX ADMIN — Medication 40 MILLIGRAM(S): at 18:55

## 2017-04-03 RX ADMIN — Medication 100 MILLIGRAM(S): at 03:49

## 2017-04-03 RX ADMIN — ATORVASTATIN CALCIUM 20 MILLIGRAM(S): 80 TABLET, FILM COATED ORAL at 21:56

## 2017-04-03 RX ADMIN — SODIUM CHLORIDE 100 MILLILITER(S): 9 INJECTION INTRAMUSCULAR; INTRAVENOUS; SUBCUTANEOUS at 21:56

## 2017-04-03 RX ADMIN — HEPARIN SODIUM 5000 UNIT(S): 5000 INJECTION INTRAVENOUS; SUBCUTANEOUS at 05:58

## 2017-04-03 RX ADMIN — Medication 3 MILLILITER(S): at 18:05

## 2017-04-03 RX ADMIN — AMLODIPINE BESYLATE 10 MILLIGRAM(S): 2.5 TABLET ORAL at 05:58

## 2017-04-03 RX ADMIN — Medication 2: at 18:04

## 2017-04-03 NOTE — PROGRESS NOTE ADULT - PROBLEM SELECTOR PLAN 2
Poorly controlled DM (Hga1c 9.0) on Lantus 45u in AM, Humalog 18u premeal  - c/w Lantus 45u qhs and will start home pre-meal   - MISS, FSBG 4 x daily Poorly controlled DM (Hga1c 9.0) on Lantus 45u in AM, Humalog 18u premeal  - c/w Lantus 45u at night and 10u morning and will start home pre-meal   - MISS, FSBG 4 x daily

## 2017-04-03 NOTE — PROGRESS NOTE ADULT - PROBLEM SELECTOR PLAN 1
Baseline peak flow normally in mid 200s. s/p Duonebs and IV Solumedrol in ED. reports being around sick people. Triggered likely by + RVP   - Monitor Peak Flow  - Solumedrol 40 q12, will de-escalate tomorrow   - Azithromycin x 5 days (Day 3/5)

## 2017-04-03 NOTE — PROGRESS NOTE ADULT - ATTENDING COMMENTS
above reviewed in detail, patient examined, all data reviewed and interpreted, discussed with patient and family  asthma exacerbation with respiratory viral infection  still wheezing and bronchospasm  iv solumedrol  continue zithromax, switch to oral zithromax  sugar is poorly controlled, adjusted insulin
above reviewed, all data reviewed and interpreted.  still bronchospasm  chest pain  steroid taper  nebulizer therapy  antibiotherapy  all medication reviewed and adjusted  blood sugar still high but improved will continue to adjust insulin

## 2017-04-04 LAB
ANION GAP SERPL CALC-SCNC: 12 MMOL/L — SIGNIFICANT CHANGE UP (ref 9–16)
BUN SERPL-MCNC: 16 MG/DL — SIGNIFICANT CHANGE UP (ref 7–23)
CALCIUM SERPL-MCNC: 8.7 MG/DL — SIGNIFICANT CHANGE UP (ref 8.5–10.5)
CHLORIDE SERPL-SCNC: 109 MMOL/L — HIGH (ref 96–108)
CO2 SERPL-SCNC: 21 MMOL/L — LOW (ref 22–31)
CREAT SERPL-MCNC: 0.76 MG/DL — SIGNIFICANT CHANGE UP (ref 0.5–1.3)
GLUCOSE SERPL-MCNC: 168 MG/DL — HIGH (ref 70–99)
HCT VFR BLD CALC: 31.6 % — LOW (ref 34.5–45)
HGB BLD-MCNC: 10.5 G/DL — LOW (ref 11.5–15.5)
MAGNESIUM SERPL-MCNC: 2 MG/DL — SIGNIFICANT CHANGE UP (ref 1.6–2.4)
MCHC RBC-ENTMCNC: 27.9 PG — SIGNIFICANT CHANGE UP (ref 27–34)
MCHC RBC-ENTMCNC: 33.2 G/DL — SIGNIFICANT CHANGE UP (ref 32–36)
MCV RBC AUTO: 84 FL — SIGNIFICANT CHANGE UP (ref 80–100)
PHOSPHATE SERPL-MCNC: 3.7 MG/DL — SIGNIFICANT CHANGE UP (ref 2.5–4.5)
PLATELET # BLD AUTO: 223 K/UL — SIGNIFICANT CHANGE UP (ref 150–400)
POTASSIUM SERPL-MCNC: 3.5 MMOL/L — SIGNIFICANT CHANGE UP (ref 3.5–5.3)
POTASSIUM SERPL-SCNC: 3.5 MMOL/L — SIGNIFICANT CHANGE UP (ref 3.5–5.3)
RBC # BLD: 3.76 M/UL — LOW (ref 3.8–5.2)
RBC # FLD: 15.2 % — SIGNIFICANT CHANGE UP (ref 10.3–16.9)
SODIUM SERPL-SCNC: 142 MMOL/L — SIGNIFICANT CHANGE UP (ref 135–145)
WBC # BLD: 8.3 K/UL — SIGNIFICANT CHANGE UP (ref 3.8–10.5)
WBC # FLD AUTO: 8.3 K/UL — SIGNIFICANT CHANGE UP (ref 3.8–10.5)

## 2017-04-04 RX ORDER — POTASSIUM CHLORIDE 20 MEQ
40 PACKET (EA) ORAL ONCE
Qty: 0 | Refills: 0 | Status: COMPLETED | OUTPATIENT
Start: 2017-04-04 | End: 2017-04-04

## 2017-04-04 RX ADMIN — Medication 2: at 09:20

## 2017-04-04 RX ADMIN — ZOLPIDEM TARTRATE 5 MILLIGRAM(S): 10 TABLET ORAL at 22:32

## 2017-04-04 RX ADMIN — Medication 2: at 22:15

## 2017-04-04 RX ADMIN — Medication 6: at 13:18

## 2017-04-04 RX ADMIN — Medication 650 MILLIGRAM(S): at 15:56

## 2017-04-04 RX ADMIN — Medication 40 MILLIEQUIVALENT(S): at 09:21

## 2017-04-04 RX ADMIN — ATORVASTATIN CALCIUM 20 MILLIGRAM(S): 80 TABLET, FILM COATED ORAL at 22:15

## 2017-04-04 RX ADMIN — Medication 650 MILLIGRAM(S): at 06:34

## 2017-04-04 RX ADMIN — AMLODIPINE BESYLATE 10 MILLIGRAM(S): 2.5 TABLET ORAL at 05:29

## 2017-04-04 RX ADMIN — INSULIN GLARGINE 45 UNIT(S): 100 INJECTION, SOLUTION SUBCUTANEOUS at 22:33

## 2017-04-04 RX ADMIN — Medication 30 UNIT(S): at 09:21

## 2017-04-04 RX ADMIN — Medication 650 MILLIGRAM(S): at 14:56

## 2017-04-04 RX ADMIN — Medication 650 MILLIGRAM(S): at 05:34

## 2017-04-04 RX ADMIN — Medication 100 MILLIGRAM(S): at 05:28

## 2017-04-04 RX ADMIN — Medication 650 MILLIGRAM(S): at 22:31

## 2017-04-04 RX ADMIN — Medication 3 MILLILITER(S): at 11:59

## 2017-04-04 RX ADMIN — Medication 3 MILLILITER(S): at 17:14

## 2017-04-04 RX ADMIN — Medication 100 MILLIGRAM(S): at 11:53

## 2017-04-04 RX ADMIN — Medication 3 MILLILITER(S): at 22:14

## 2017-04-04 RX ADMIN — INSULIN GLARGINE 10 UNIT(S): 100 INJECTION, SOLUTION SUBCUTANEOUS at 07:44

## 2017-04-04 RX ADMIN — LISINOPRIL 20 MILLIGRAM(S): 2.5 TABLET ORAL at 05:29

## 2017-04-04 RX ADMIN — Medication 4: at 18:30

## 2017-04-04 RX ADMIN — Medication 3 MILLILITER(S): at 05:29

## 2017-04-04 RX ADMIN — Medication 40 MILLIGRAM(S): at 05:29

## 2017-04-04 RX ADMIN — Medication 25 UNIT(S): at 13:18

## 2017-04-04 RX ADMIN — AZITHROMYCIN 500 MILLIGRAM(S): 500 TABLET, FILM COATED ORAL at 05:29

## 2017-04-04 RX ADMIN — Medication 3 MILLILITER(S): at 09:21

## 2017-04-04 RX ADMIN — Medication 30 UNIT(S): at 18:31

## 2017-04-04 NOTE — PROGRESS NOTE ADULT - PROBLEM SELECTOR PLAN 1
Baseline peak flow normally in mid 200s. Likely trigger + RVP   - Monitor Peak Flow  - Prednisone 40mg daily, Azithromycin 500 mg once daily x 5 days (day 4/5)

## 2017-04-04 NOTE — PROGRESS NOTE ADULT - SUBJECTIVE AND OBJECTIVE BOX
INTERVAL HPI/OVERNIGHT EVENTS:  Patient seen and examined at bedside. No o/n events, patient resting comfortably. Complains of pain at former site of IV on right wrist which is improving. Patient denies fever, chills, dizziness, weakness, CP, palpitations, SOB, cough, N/V/D/C, dysuria, changes in bowel movements, LE edema.    VITAL SIGNS:  T(F): 98.3  HR: 73  BP: 157/83  RR: 19  SpO2: 96%  Wt(kg): --    PHYSICAL EXAM:    Constitutional: WDWN, NAD,   Eyes: PERRL, EOMI, sclera non-icteric  Neck: supple, no masses, no JVD  Respiratory: CTA b/l, good air entry b/l, no wheezing, rhonchi, rales, with normal respiratory effort and no intercostal retractions  Cardiovascular: RRR, normal S1S2, no M/R/G  Gastrointestinal: soft, NTND, no masses palpable, BS normal in all four quadrants, no HSM  Extremities: WWM, no c/c/e  Neurological: AAOx3  Skin: Normal temperature    MEDICATIONS  (STANDING):  heparin  Injectable 5000Unit(s) SubCutaneous every 8 hours  ALBUTerol/ipratropium for Nebulization 3milliLiter(s) Nebulizer every 4 hours  lisinopril 20milliGRAM(s) Oral daily  atorvastatin 20milliGRAM(s) Oral at bedtime  amLODIPine   Tablet 10milliGRAM(s) Oral daily  azithromycin  IVPB 500milliGRAM(s) IV Intermittent every 24 hours  sodium chloride 0.9%. 1000milliLiter(s) IV Continuous <Continuous>  insulin glargine Injectable (LANTUS) 45Unit(s) SubCutaneous at bedtime  insulin lispro Injectable (HumaLOG) 30Unit(s) SubCutaneous before breakfast  insulin lispro Injectable (HumaLOG) 25Unit(s) SubCutaneous before lunch  insulin lispro Injectable (HumaLOG) 30Unit(s) SubCutaneous before dinner  zolpidem 5milliGRAM(s) Oral at bedtime  methylPREDNISolone sodium succinate Injectable 40milliGRAM(s) IV Push every 12 hours  insulin glargine Injectable (LANTUS) 10Unit(s) SubCutaneous every morning  insulin lispro (HumaLOG) corrective regimen sliding scale  SubCutaneous Before meals and at bedtime    MEDICATIONS  (PRN):  guaiFENesin    Syrup 100milliGRAM(s) Oral every 6 hours PRN Cough  acetaminophen   Tablet. 650milliGRAM(s) Oral every 6 hours PRN Moderate Pain (4 - 6)  melatonin 3milliGRAM(s) Oral at bedtime PRN Insomnia      Allergies    adenosine (Unknown)    Intolerances    aspirin (Unknown)      LABS:                        9.8    10.0  )-----------( 225      ( 2017 06:19 )             29.8     2017 06:10    137    |  105    |  20     ----------------------------<  212    3.3     |  21     |  0.81     Ca    8.3        2017 06:10  Mg     2.0       2017 06:10        Urinalysis Basic - ( 2017 13:35 )    Color: Yellow / Appearance: Clear / S.010 / pH: x  Gluc: x / Ketone: 15 mg/dL  / Bili: NEGATIVE / Urobili: 0.2 E.U./dL   Blood: x / Protein: NEGATIVE mg/dL / Nitrite: NEGATIVE   Leuk Esterase: NEGATIVE / RBC: x / WBC x   Sq Epi: x / Non Sq Epi: x / Bacteria: x        RADIOLOGY & ADDITIONAL TESTS:
INTERVAL HPI/OVERNIGHT EVENTS:  no overnight events. patient feeling better, still with non-productive cough. speaking in full sentences. ROS otherwise negative     VITAL SIGNS:  T(F): 98.4  HR: 74  BP: 164/97  RR: 18  SpO2: 95%  Wt(kg): --    PHYSICAL EXAM:    Constitutional: WDWN, NAD,   Eyes: PERRL, EOMI, sclera non-icteric  Neck: supple, no masses, no JVD  Respiratory: CTA b/l, good air entry b/l with minimal expiratory wheezing. normal respiratory effort  Cardiovascular: RRR, normal S1S2, no M/R/G  Gastrointestinal: soft, NTND, no masses palpable, BS normal in all four quadrants, no HSM  Extremities: WWM, no c/c/e  Neurological: AAOx3  Skin: Normal temperature    MEDICATIONS  (STANDING):  heparin  Injectable 5000Unit(s) SubCutaneous every 8 hours  ALBUTerol/ipratropium for Nebulization 3milliLiter(s) Nebulizer every 4 hours  lisinopril 20milliGRAM(s) Oral daily  atorvastatin 20milliGRAM(s) Oral at bedtime  amLODIPine   Tablet 10milliGRAM(s) Oral daily  sodium chloride 0.9%. 1000milliLiter(s) IV Continuous <Continuous>  insulin glargine Injectable (LANTUS) 45Unit(s) SubCutaneous at bedtime  insulin lispro Injectable (HumaLOG) 30Unit(s) SubCutaneous before breakfast  insulin lispro Injectable (HumaLOG) 25Unit(s) SubCutaneous before lunch  insulin lispro Injectable (HumaLOG) 30Unit(s) SubCutaneous before dinner  zolpidem 5milliGRAM(s) Oral at bedtime  insulin glargine Injectable (LANTUS) 10Unit(s) SubCutaneous every morning  insulin lispro (HumaLOG) corrective regimen sliding scale  SubCutaneous Before meals and at bedtime  azithromycin   Tablet 500milliGRAM(s) Oral daily  predniSONE   Tablet 40milliGRAM(s) Oral daily  potassium chloride    Tablet ER 40milliEquivalent(s) Oral once    MEDICATIONS  (PRN):  guaiFENesin    Syrup 100milliGRAM(s) Oral every 6 hours PRN Cough  acetaminophen   Tablet. 650milliGRAM(s) Oral every 6 hours PRN Moderate Pain (4 - 6)  melatonin 3milliGRAM(s) Oral at bedtime PRN Insomnia      Allergies    adenosine (Unknown)    Intolerances    aspirin (Unknown)      LABS:                        10.5   8.3   )-----------( 223      ( 04 Apr 2017 07:13 )             31.6     04 Apr 2017 07:13    142    |  109    |  16     ----------------------------<  168    3.5     |  21     |  0.76     Ca    8.7        04 Apr 2017 07:13  Phos  3.7       04 Apr 2017 07:13  Mg     2.0       04 Apr 2017 07:13            RADIOLOGY & ADDITIONAL TESTS:
INTERVAL HPI/OVERNIGHT EVENTS: patient seen and examined. patient still with cough. respiratory distress has improved.     VITAL SIGNS:  T(F): 98.8  HR: 94  BP: 138/84  RR: 18  SpO2: 98%    PHYSICAL EXAM:    Constitutional: no acute respiratory distress, talking in full sentences   Head: NC/AT  Eyes: PERRL, EOMI, clear conjunctiva  ENT: no nasal discharge; uvula midline, no oropharyngeal erythema or exudates; MMM  Neck: supple; no JVD or thyromegaly  Respiratory: good air movement no wheezing or rhonchi   Cardiac: +S1/S2; RRR; no M/R/G; PMI non-displaced  Gastrointestinal: soft, NT/ND; no rebound or guarding; +BSx4  Extremities: WWP, no clubbing or cyanosis; no peripheral edema  Musculoskeletal: NROM x4; no joint swelling, tenderness or erythema  Vascular: 2+ radial, femoral, DP/PT pulses B/Lscars  Neurologic: AAOx3; CNII-XII grossly intact; no focal deficits    MEDICATIONS  (STANDING):  heparin  Injectable 5000Unit(s) SubCutaneous every 8 hours  ALBUTerol/ipratropium for Nebulization 3milliLiter(s) Nebulizer every 4 hours  methylPREDNISolone sodium succinate Injectable 40milliGRAM(s) IV Push every 8 hours  lisinopril 20milliGRAM(s) Oral daily  atorvastatin 20milliGRAM(s) Oral at bedtime  amLODIPine   Tablet 10milliGRAM(s) Oral daily  FLUoxetine 20milliGRAM(s) Oral daily  azithromycin  IVPB 500milliGRAM(s) IV Intermittent every 24 hours  insulin glargine Injectable (LANTUS) 34Unit(s) SubCutaneous at bedtime  insulin lispro (HumaLOG) corrective regimen sliding scale  SubCutaneous Before meals and at bedtime    MEDICATIONS  (PRN):  acetaminophen   Tablet. 650milliGRAM(s) Oral every 6 hours PRN Mild Pain (1 - 3)      Allergies    adenosine (Unknown)    Intolerances    aspirin (Unknown)      LABS:                        10.8   5.1   )-----------( 205      ( 01 Apr 2017 08:14 )             33.1     01 Apr 2017 08:14    135    |  104    |  12     ----------------------------<  387    5.0     |  16     |  0.70     Ca    9.2        01 Apr 2017 08:14  Mg     1.9       01 Apr 2017 08:14    TPro  7.3    /  Alb  3.7    /  TBili  0.1    /  DBili  x      /  AST  20     /  ALT  23     /  AlkPhos  155    01 Apr 2017 00:46          RADIOLOGY & ADDITIONAL TESTS:

## 2017-04-04 NOTE — PROGRESS NOTE ADULT - PROBLEM SELECTOR PLAN 2
Poorly controlled DM (Hga1c 9.0) on Lantus 45u in AM, Humalog 18u premeal. FSBG improved since admission.   - c/w Lantus 45u at night and 10u morning and will start home pre-meal   - MISS, FSBG 4 x daily Poorly controlled DM (Hga1c 9.0) on Lantus 45u in AM, Humalog 18u premeal. FSBG improved since admission.   - c/w Lantus 45u at night and 10u morning   - c/w Humalog 30/25/30 u premeal   - MISS, FSBG 4 x daily

## 2017-04-05 ENCOUNTER — TRANSCRIPTION ENCOUNTER (OUTPATIENT)
Age: 72
End: 2017-04-05

## 2017-04-05 ENCOUNTER — RX RENEWAL (OUTPATIENT)
Age: 72
End: 2017-04-05

## 2017-04-05 VITALS
TEMPERATURE: 100 F | HEART RATE: 98 BPM | SYSTOLIC BLOOD PRESSURE: 145 MMHG | DIASTOLIC BLOOD PRESSURE: 82 MMHG | RESPIRATION RATE: 16 BRPM | OXYGEN SATURATION: 97 %

## 2017-04-05 PROCEDURE — 84100 ASSAY OF PHOSPHORUS: CPT

## 2017-04-05 PROCEDURE — 85025 COMPLETE CBC W/AUTO DIFF WBC: CPT

## 2017-04-05 PROCEDURE — 80048 BASIC METABOLIC PNL TOTAL CA: CPT

## 2017-04-05 PROCEDURE — 81003 URINALYSIS AUTO W/O SCOPE: CPT

## 2017-04-05 PROCEDURE — 86901 BLOOD TYPING SEROLOGIC RH(D): CPT

## 2017-04-05 PROCEDURE — 80053 COMPREHEN METABOLIC PANEL: CPT

## 2017-04-05 PROCEDURE — 87633 RESP VIRUS 12-25 TARGETS: CPT

## 2017-04-05 PROCEDURE — 83735 ASSAY OF MAGNESIUM: CPT

## 2017-04-05 PROCEDURE — 96374 THER/PROPH/DIAG INJ IV PUSH: CPT

## 2017-04-05 PROCEDURE — 83036 HEMOGLOBIN GLYCOSYLATED A1C: CPT

## 2017-04-05 PROCEDURE — 93005 ELECTROCARDIOGRAM TRACING: CPT

## 2017-04-05 PROCEDURE — 86900 BLOOD TYPING SEROLOGIC ABO: CPT

## 2017-04-05 PROCEDURE — 94640 AIRWAY INHALATION TREATMENT: CPT

## 2017-04-05 PROCEDURE — 87186 SC STD MICRODIL/AGAR DIL: CPT

## 2017-04-05 PROCEDURE — 86850 RBC ANTIBODY SCREEN: CPT

## 2017-04-05 PROCEDURE — 80061 LIPID PANEL: CPT

## 2017-04-05 PROCEDURE — 71046 X-RAY EXAM CHEST 2 VIEWS: CPT

## 2017-04-05 PROCEDURE — 87798 DETECT AGENT NOS DNA AMP: CPT

## 2017-04-05 PROCEDURE — 87581 M.PNEUMON DNA AMP PROBE: CPT

## 2017-04-05 PROCEDURE — 84443 ASSAY THYROID STIM HORMONE: CPT

## 2017-04-05 PROCEDURE — 85027 COMPLETE CBC AUTOMATED: CPT

## 2017-04-05 PROCEDURE — 71250 CT THORAX DX C-: CPT

## 2017-04-05 PROCEDURE — 87486 CHLMYD PNEUM DNA AMP PROBE: CPT

## 2017-04-05 PROCEDURE — 87086 URINE CULTURE/COLONY COUNT: CPT

## 2017-04-05 PROCEDURE — 36415 COLL VENOUS BLD VENIPUNCTURE: CPT

## 2017-04-05 PROCEDURE — 99285 EMERGENCY DEPT VISIT HI MDM: CPT | Mod: 25

## 2017-04-05 RX ORDER — AZITHROMYCIN 500 MG/1
1 TABLET, FILM COATED ORAL
Qty: 2 | Refills: 0 | OUTPATIENT
Start: 2017-04-05 | End: 2017-04-07

## 2017-04-05 RX ADMIN — Medication 4: at 09:03

## 2017-04-05 RX ADMIN — Medication 40 MILLIGRAM(S): at 06:18

## 2017-04-05 RX ADMIN — Medication 4: at 13:16

## 2017-04-05 RX ADMIN — Medication 100 MILLIGRAM(S): at 13:17

## 2017-04-05 RX ADMIN — Medication 100 MILLIGRAM(S): at 07:41

## 2017-04-05 RX ADMIN — Medication 3 MILLILITER(S): at 05:41

## 2017-04-05 RX ADMIN — INSULIN GLARGINE 10 UNIT(S): 100 INJECTION, SOLUTION SUBCUTANEOUS at 07:39

## 2017-04-05 RX ADMIN — Medication 3 MILLILITER(S): at 09:03

## 2017-04-05 RX ADMIN — Medication 30 UNIT(S): at 09:03

## 2017-04-05 RX ADMIN — Medication 650 MILLIGRAM(S): at 05:42

## 2017-04-05 RX ADMIN — Medication 3 MILLILITER(S): at 13:16

## 2017-04-05 RX ADMIN — AMLODIPINE BESYLATE 10 MILLIGRAM(S): 2.5 TABLET ORAL at 06:18

## 2017-04-05 RX ADMIN — AZITHROMYCIN 500 MILLIGRAM(S): 500 TABLET, FILM COATED ORAL at 06:18

## 2017-04-05 RX ADMIN — Medication 25 UNIT(S): at 13:16

## 2017-04-05 RX ADMIN — Medication 650 MILLIGRAM(S): at 06:23

## 2017-04-05 RX ADMIN — LISINOPRIL 20 MILLIGRAM(S): 2.5 TABLET ORAL at 06:18

## 2017-04-05 RX ADMIN — Medication 650 MILLIGRAM(S): at 00:00

## 2017-04-05 NOTE — CONSULT NOTE ADULT - SUBJECTIVE AND OBJECTIVE BOX
Patient is a 71y old  Female who presents with a chief complaint of Wheezing and cough (05 Apr 2017 08:13)        HPI:  70yo  overweight woman with asthma and COPD (not on home O2, never intubated), poorly controlled DM T2 (Hga1c 8.1 in Jan 2016) presents with worsening cough x 3 days with wheezing. Pt reports feeling malaise last week, followed by dyspnea, chest tightness and cough productive of green sputum. No relief with home nebulizer. She visited a friend in the hospital on Tuesday. Denies any fever/chills, chest pain, N/V/D, abdominal pain, leg swelling.   Last asthma exacerbation was 3 months ago, treated outpatient. Chart review revealed a thallium stress test in 1/2016 that was normal with preserved LVEF without regional wall motion abnormalities. The EKG stress test was also normal.    Numbness in UE/ LE with weakness- no tingling in feet with radicular symptoms in the LE    ED course: VSS. Peak flow 150cc. CXR with no infiltrates. Given Duoneb twice, Solumedrol 125 x 1, Mg 1g, and 1L NS bolus. (01 Apr 2017 02:07)      Allergies  adenosine (Unknown)  aspirin (Unknown)      Health Issues  ASTHMA EXACERBATION ATTACKS  No h/o HF  Family history of hypertension (Mother)  Family history of ischemic heart disease (Father)  Handoff  MEWS Score  Depression  Asthma  Type 2 diabetes mellitus  Hyperlipidemia  Essential hypertension  Atrial fibrillation  Atherosclerosis of coronary artery  Subdural hemorrhage  Persistent ostium secundum  COPD exacerbation  Asthma exacerbation attacks  Anemia  Prophylactic measure  Nutrition, metabolism, and development symptoms  Hyperlipidemia  Type 2 diabetes mellitus  COPD exacerbation  Acute subdural hematoma  History of knee replacement, total, right  Other postprocedural status  Status post tubal ligation  ASTHMA  90+  Hyperlipidemia  Type 2 diabetes mellitus        FAMILY HISTORY:  Family history of hypertension (Mother)  Family history of ischemic heart disease (Father): Family history of coronary artery disease in mother      MEDICATIONS  (STANDING):  heparin  Injectable 5000Unit(s) SubCutaneous every 8 hours  ALBUTerol/ipratropium for Nebulization 3milliLiter(s) Nebulizer every 4 hours  lisinopril 20milliGRAM(s) Oral daily  atorvastatin 20milliGRAM(s) Oral at bedtime  amLODIPine   Tablet 10milliGRAM(s) Oral daily  sodium chloride 0.9%. 1000milliLiter(s) IV Continuous <Continuous>  insulin glargine Injectable (LANTUS) 45Unit(s) SubCutaneous at bedtime  insulin lispro Injectable (HumaLOG) 30Unit(s) SubCutaneous before breakfast  insulin lispro Injectable (HumaLOG) 25Unit(s) SubCutaneous before lunch  insulin lispro Injectable (HumaLOG) 30Unit(s) SubCutaneous before dinner  zolpidem 5milliGRAM(s) Oral at bedtime  insulin glargine Injectable (LANTUS) 10Unit(s) SubCutaneous every morning  insulin lispro (HumaLOG) corrective regimen sliding scale  SubCutaneous Before meals and at bedtime  azithromycin   Tablet 500milliGRAM(s) Oral daily  predniSONE   Tablet 40milliGRAM(s) Oral daily    MEDICATIONS  (PRN):  guaiFENesin    Syrup 100milliGRAM(s) Oral every 6 hours PRN Cough  acetaminophen   Tablet. 650milliGRAM(s) Oral every 6 hours PRN Moderate Pain (4 - 6)  melatonin 3milliGRAM(s) Oral at bedtime PRN Insomnia      PAST MEDICAL & SURGICAL HISTORY:  Depression  Asthma: Asthma  Type 2 diabetes mellitus: DM (diabetes mellitus)  Hyperlipidemia: Hyperlipidemia  Essential hypertension: HTN (hypertension)  Atrial fibrillation: s/p ablation in 2013  Atherosclerosis of coronary artery: NOn obstructive  Subdural hemorrhage: s/p bakari hole (2013)  Persistent ostium secundum: s/p repair (1989)  Acute subdural hematoma  History of knee replacement, total, right  Other postprocedural status: S/P atrial septal defect closure, surgical  Status post tubal ligation: S/P tubal ligation      Labs                          10.5   8.3   )-----------( 223      ( 04 Apr 2017 07:13 )             31.6     04-04    142  |  109<H>  |  16  ----------------------------<  168<H>  3.5   |  21<L>  |  0.76    Ca    8.7      04 Apr 2017 07:13  Phos  3.7     04-04  Mg     2.0     04-04        Radiology:    Physical Exam    MENTAL STATUS  -Level of Consciousness- awake    Orientation- person, place time  Language- aphasia/ dysarthria  Memory- recent and remote      Cranial Nerve 1- 12  Pupils- equal and reactive  Eye movements- full  Facial - no asymmetry   Lower CN-nl    Gait and Station- unsteady    MOTOR  Upper- mild hand weakness  Lower- no foot drop    Reflexes- decreased    Sensation- decreased     Cerebellar-intact    vascular -no bruits    Assessment- Cervical and Lumbar radiculopathy    Plan - EMG and NCV as OP

## 2017-04-05 NOTE — DISCHARGE NOTE ADULT - PLAN OF CARE
Prednisone 40mg and reduce by 5mg every other day You were admitted for a COPD exacerbation. You improved after Steroids, Advair, Azithromax, and Duoneb therapy. Please continue Prednisone taper. I sent 5 and 10 mg tablets of Prednisone to your pharmacy. Please continue with 40mg of prednisone (4 tablets) starting tomorrow, then reduce by 5mg EVERY OTHER DAY. Please also continue Azithromax for 2 more additional days. Please follow up with Dr. Cabrera within 2-3 weeks Your sugars were elevated due to the steroid use. This should normalize once you're off the steroids. Please continue your home regimen Please continue your home regimen and follow up with Dr. Cabrera within 2-3 weeks

## 2017-04-05 NOTE — DISCHARGE NOTE ADULT - ADDITIONAL INSTRUCTIONS
I sent 5 and 10 mg tablets of Prednisone to your pharmacy. Please continue with 40mg of prednisone (4 tablets) starting tomorrow, then reduce by 5mg EVERY OTHER DAY. Please also continue Azithromax for 2 more additional days. Please follow up with Dr. Cabrera within 2-3 weeks

## 2017-04-05 NOTE — DISCHARGE NOTE ADULT - CARE PLAN
Principal Discharge DX:	COPD exacerbation  Goal:	Prednisone 40mg and reduce by 5mg every other day  Instructions for follow-up, activity and diet:	You were admitted for a COPD exacerbation. You improved after Steroids, Advair, Azithromax, and Duoneb therapy. Please continue Prednisone taper. I sent 5 and 10 mg tablets of Prednisone to your pharmacy. Please continue with 40mg of prednisone (4 tablets) starting tomorrow, then reduce by 5mg EVERY OTHER DAY. Please also continue Azithromax for 2 more additional days. Please follow up with Dr. Cabrera within 2-3 weeks  Secondary Diagnosis:	Type 2 diabetes mellitus  Instructions for follow-up, activity and diet:	Your sugars were elevated due to the steroid use. This should normalize once you're off the steroids. Please continue your home regimen  Secondary Diagnosis:	Hyperlipidemia  Instructions for follow-up, activity and diet:	Please continue your home regimen and follow up with Dr. Cabrera within 2-3 weeks

## 2017-04-05 NOTE — DISCHARGE NOTE ADULT - PATIENT PORTAL LINK FT
“You can access the Follow[facility] Patient Portal, offered by [facility], by registering with the following website: [email or web address]”

## 2017-04-07 ENCOUNTER — OTHER (OUTPATIENT)
Age: 72
End: 2017-04-07

## 2017-04-07 DIAGNOSIS — Z87.891 PERSONAL HISTORY OF NICOTINE DEPENDENCE: ICD-10-CM

## 2017-04-07 DIAGNOSIS — F32.9 MAJOR DEPRESSIVE DISORDER, SINGLE EPISODE, UNSPECIFIED: ICD-10-CM

## 2017-04-07 DIAGNOSIS — J44.1 CHRONIC OBSTRUCTIVE PULMONARY DISEASE WITH (ACUTE) EXACERBATION: ICD-10-CM

## 2017-04-07 DIAGNOSIS — J45.909 UNSPECIFIED ASTHMA, UNCOMPLICATED: ICD-10-CM

## 2017-04-07 DIAGNOSIS — J45.901 UNSPECIFIED ASTHMA WITH (ACUTE) EXACERBATION: ICD-10-CM

## 2017-04-07 DIAGNOSIS — Z79.4 LONG TERM (CURRENT) USE OF INSULIN: ICD-10-CM

## 2017-04-07 DIAGNOSIS — E11.65 TYPE 2 DIABETES MELLITUS WITH HYPERGLYCEMIA: ICD-10-CM

## 2017-04-07 DIAGNOSIS — I48.91 UNSPECIFIED ATRIAL FIBRILLATION: ICD-10-CM

## 2017-04-07 DIAGNOSIS — I25.10 ATHEROSCLEROTIC HEART DISEASE OF NATIVE CORONARY ARTERY WITHOUT ANGINA PECTORIS: ICD-10-CM

## 2017-04-07 DIAGNOSIS — J22 UNSPECIFIED ACUTE LOWER RESPIRATORY INFECTION: ICD-10-CM

## 2017-04-07 DIAGNOSIS — D64.9 ANEMIA, UNSPECIFIED: ICD-10-CM

## 2017-04-07 DIAGNOSIS — Z88.6 ALLERGY STATUS TO ANALGESIC AGENT: ICD-10-CM

## 2017-04-07 DIAGNOSIS — I10 ESSENTIAL (PRIMARY) HYPERTENSION: ICD-10-CM

## 2017-04-07 DIAGNOSIS — E78.5 HYPERLIPIDEMIA, UNSPECIFIED: ICD-10-CM

## 2017-04-10 ENCOUNTER — OTHER (OUTPATIENT)
Age: 72
End: 2017-04-10

## 2017-04-14 ENCOUNTER — OTHER (OUTPATIENT)
Age: 72
End: 2017-04-14

## 2017-04-16 ENCOUNTER — INPATIENT (INPATIENT)
Facility: HOSPITAL | Age: 72
LOS: 3 days | Discharge: HOME HEALTH | DRG: 690 | End: 2017-04-20
Attending: INTERNAL MEDICINE | Admitting: INTERNAL MEDICINE
Payer: MEDICARE

## 2017-04-16 VITALS
WEIGHT: 175.71 LBS | TEMPERATURE: 99 F | SYSTOLIC BLOOD PRESSURE: 149 MMHG | RESPIRATION RATE: 16 BRPM | OXYGEN SATURATION: 98 % | DIASTOLIC BLOOD PRESSURE: 75 MMHG | HEART RATE: 74 BPM

## 2017-04-16 DIAGNOSIS — I62.01 NONTRAUMATIC ACUTE SUBDURAL HEMORRHAGE: Chronic | ICD-10-CM

## 2017-04-16 DIAGNOSIS — Z96.651 PRESENCE OF RIGHT ARTIFICIAL KNEE JOINT: Chronic | ICD-10-CM

## 2017-04-16 LAB
ALBUMIN SERPL ELPH-MCNC: 4.1 G/DL — SIGNIFICANT CHANGE UP (ref 3.4–5)
ALP SERPL-CCNC: 115 U/L — SIGNIFICANT CHANGE UP (ref 40–120)
ALT FLD-CCNC: 37 U/L — SIGNIFICANT CHANGE UP (ref 12–42)
ANION GAP SERPL CALC-SCNC: 11 MMOL/L — SIGNIFICANT CHANGE UP (ref 9–16)
APPEARANCE UR: SIGNIFICANT CHANGE UP
AST SERPL-CCNC: 31 U/L — SIGNIFICANT CHANGE UP (ref 15–37)
BACTERIA # UR AUTO: (no result) /HPF
BASOPHILS NFR BLD AUTO: 0.3 % — SIGNIFICANT CHANGE UP (ref 0–2)
BILIRUB SERPL-MCNC: 0.7 MG/DL — SIGNIFICANT CHANGE UP (ref 0.2–1.2)
BILIRUB UR-MCNC: NEGATIVE — SIGNIFICANT CHANGE UP
BUN SERPL-MCNC: 31 MG/DL — HIGH (ref 7–23)
CALCIUM SERPL-MCNC: 9.4 MG/DL — SIGNIFICANT CHANGE UP (ref 8.5–10.5)
CHLORIDE SERPL-SCNC: 104 MMOL/L — SIGNIFICANT CHANGE UP (ref 96–108)
CO2 SERPL-SCNC: 21 MMOL/L — LOW (ref 22–31)
COLOR SPEC: YELLOW — SIGNIFICANT CHANGE UP
CREAT SERPL-MCNC: 1.01 MG/DL — SIGNIFICANT CHANGE UP (ref 0.5–1.3)
DIFF PNL FLD: NEGATIVE — SIGNIFICANT CHANGE UP
EOSINOPHIL NFR BLD AUTO: 0.5 % — SIGNIFICANT CHANGE UP (ref 0–6)
GLUCOSE SERPL-MCNC: 188 MG/DL — HIGH (ref 70–99)
GLUCOSE UR QL: NEGATIVE — SIGNIFICANT CHANGE UP
HCT VFR BLD CALC: 38.3 % — SIGNIFICANT CHANGE UP (ref 34.5–45)
HGB BLD-MCNC: 13.1 G/DL — SIGNIFICANT CHANGE UP (ref 11.5–15.5)
KETONES UR-MCNC: NEGATIVE — SIGNIFICANT CHANGE UP
LEUKOCYTE ESTERASE UR-ACNC: (no result)
LYMPHOCYTES # BLD AUTO: 32.8 % — SIGNIFICANT CHANGE UP (ref 13–44)
MCHC RBC-ENTMCNC: 28.4 PG — SIGNIFICANT CHANGE UP (ref 27–34)
MCHC RBC-ENTMCNC: 34.2 G/DL — SIGNIFICANT CHANGE UP (ref 32–36)
MCV RBC AUTO: 83.1 FL — SIGNIFICANT CHANGE UP (ref 80–100)
MONOCYTES NFR BLD AUTO: 6.2 % — SIGNIFICANT CHANGE UP (ref 2–14)
NEUTROPHILS NFR BLD AUTO: 60.2 % — SIGNIFICANT CHANGE UP (ref 43–77)
NITRITE UR-MCNC: POSITIVE
PH UR: 5.5 — SIGNIFICANT CHANGE UP (ref 4–8)
PLATELET # BLD AUTO: 326 K/UL — SIGNIFICANT CHANGE UP (ref 150–400)
POTASSIUM SERPL-MCNC: 4.1 MMOL/L — SIGNIFICANT CHANGE UP (ref 3.5–5.3)
POTASSIUM SERPL-SCNC: 4.1 MMOL/L — SIGNIFICANT CHANGE UP (ref 3.5–5.3)
PROT SERPL-MCNC: 8.2 G/DL — SIGNIFICANT CHANGE UP (ref 6.4–8.2)
PROT UR-MCNC: NEGATIVE MG/DL — SIGNIFICANT CHANGE UP
RBC # BLD: 4.61 M/UL — SIGNIFICANT CHANGE UP (ref 3.8–5.2)
RBC # FLD: 15 % — SIGNIFICANT CHANGE UP (ref 10.3–16.9)
RBC CASTS # UR COMP ASSIST: < 5 /HPF — SIGNIFICANT CHANGE UP
SODIUM SERPL-SCNC: 136 MMOL/L — SIGNIFICANT CHANGE UP (ref 135–145)
SP GR SPEC: 1.01 — SIGNIFICANT CHANGE UP (ref 1–1.03)
UROBILINOGEN FLD QL: 0.2 E.U./DL — SIGNIFICANT CHANGE UP
WBC # BLD: 8.8 K/UL — SIGNIFICANT CHANGE UP (ref 3.8–10.5)
WBC # FLD AUTO: 8.8 K/UL — SIGNIFICANT CHANGE UP (ref 3.8–10.5)
WBC UR QL: (no result) /HPF

## 2017-04-16 PROCEDURE — 74176 CT ABD & PELVIS W/O CONTRAST: CPT | Mod: 26

## 2017-04-16 PROCEDURE — 99285 EMERGENCY DEPT VISIT HI MDM: CPT

## 2017-04-16 RX ORDER — SODIUM CHLORIDE 9 MG/ML
1000 INJECTION INTRAMUSCULAR; INTRAVENOUS; SUBCUTANEOUS
Qty: 0 | Refills: 0 | Status: DISCONTINUED | OUTPATIENT
Start: 2017-04-16 | End: 2017-04-16

## 2017-04-16 RX ORDER — HEPARIN SODIUM 5000 [USP'U]/ML
5000 INJECTION INTRAVENOUS; SUBCUTANEOUS EVERY 8 HOURS
Qty: 0 | Refills: 0 | Status: DISCONTINUED | OUTPATIENT
Start: 2017-04-16 | End: 2017-04-20

## 2017-04-16 RX ORDER — CEFTRIAXONE 500 MG/1
2 INJECTION, POWDER, FOR SOLUTION INTRAMUSCULAR; INTRAVENOUS ONCE
Qty: 0 | Refills: 0 | Status: COMPLETED | OUTPATIENT
Start: 2017-04-16 | End: 2017-04-16

## 2017-04-16 RX ORDER — SODIUM CHLORIDE 9 MG/ML
1000 INJECTION INTRAMUSCULAR; INTRAVENOUS; SUBCUTANEOUS
Qty: 0 | Refills: 0 | Status: DISCONTINUED | OUTPATIENT
Start: 2017-04-16 | End: 2017-04-20

## 2017-04-16 RX ORDER — FLUOXETINE HCL 10 MG
1 CAPSULE ORAL
Qty: 0 | Refills: 0 | COMMUNITY

## 2017-04-16 RX ORDER — SODIUM CHLORIDE 9 MG/ML
3 INJECTION INTRAMUSCULAR; INTRAVENOUS; SUBCUTANEOUS ONCE
Qty: 0 | Refills: 0 | Status: COMPLETED | OUTPATIENT
Start: 2017-04-16 | End: 2017-04-16

## 2017-04-16 RX ADMIN — SODIUM CHLORIDE 3 MILLILITER(S): 9 INJECTION INTRAMUSCULAR; INTRAVENOUS; SUBCUTANEOUS at 19:55

## 2017-04-16 RX ADMIN — SODIUM CHLORIDE 125 MILLILITER(S): 9 INJECTION INTRAMUSCULAR; INTRAVENOUS; SUBCUTANEOUS at 21:01

## 2017-04-16 RX ADMIN — CEFTRIAXONE 100 GRAM(S): 500 INJECTION, POWDER, FOR SOLUTION INTRAMUSCULAR; INTRAVENOUS at 20:57

## 2017-04-16 NOTE — H&P ADULT - PROBLEM SELECTOR PLAN 1
Pt now voiding without high post-void residual (80 cc on bladder scan).  Likely was retaining in setting increased abdominal girth.  Uncontrolled type 2 DM (last A1C 9.0) is also a predisposition for UTI and pseudomonal risk factor.  Afebrile, does not meet SIRS.  Last urine cx grew Klebsiella pneumoniae sensitive.  However, will need to cover for pseudomonas until urine cx obtained  -zosyn 3.375 q 6 for 7 d (tx as complicated UTI due to anatomic issue)  -f.u urine cx  -blood cx  -monitor outpt and residuals  -workup for abdominal distention below

## 2017-04-16 NOTE — ED ADULT TRIAGE NOTE - CHIEF COMPLAINT QUOTE
Pt with PMH of asthma, copd and DM presents c/o painful ans scant urination, Rt flank pain and abdom swelling for the past two days. She denies fevers or chills.

## 2017-04-16 NOTE — ED ADULT NURSE NOTE - OBJECTIVE STATEMENT
2 weeks , Urge of urination but no urine yesterday. no  fevers. + swatting. d/ c from hospital 1 week ago from Asthma, Vomited x 1 yesterday , No ABD pain LBM hard 72 y/o female patient c/o right lower back pain right lower ABD pain started 2 weeks ago . Have  Urge of urination but no normal urination till  yesterday. No  fevers. + sweating. Pt was d/ c from hospital 1 week ago for Asthma, Vomited x 1 yesterday , No ABD pain at this time LBM hard. COPD / Asthma history SOB as usual per patient, No CP, no dizziness , general weakness.

## 2017-04-16 NOTE — H&P ADULT - NSHPLABSRESULTS_GEN_ALL_CORE
CBC Full  -  ( 2017 20:07 )  WBC Count : 8.8 K/uL  Hemoglobin : 13.1 g/dL  Hematocrit : 38.3 %  Platelet Count - Automated : 326 K/uL  Mean Cell Volume : 83.1 fL  Mean Cell Hemoglobin : 28.4 pg  Mean Cell Hemoglobin Concentration : 34.2 g/dL  Auto Neutrophil # : x  Auto Lymphocyte # : x  Auto Monocyte # : x  Auto Eosinophil # : x  Auto Basophil # : x  Auto Neutrophil % : 60.2 %  Auto Lymphocyte % : 32.8 %  Auto Monocyte % : 6.2 %  Auto Eosinophil % : 0.5 %  Auto Basophil % : 0.3 %        136  |  104  |  31<H>  ----------------------------<  188<H>  4.1   |  21<L>  |  1.01    Ca    9.4      2017 20:07    TPro  8.2  /  Alb  4.1  /  TBili  0.7  /  DBili  x   /  AST  31  /  ALT  37  /  AlkPhos  115      Urinalysis Basic - ( 2017 19:45 )    Color: Yellow / Appearance: SL CLOUDY / S.015 / pH: x  Gluc: x / Ketone: NEGATIVE  / Bili: NEGATIVE / Urobili: 0.2 E.U./dL   Blood: x / Protein: NEGATIVE mg/dL / Nitrite: POSITIVE   Leuk Esterase: Moderate / RBC: < 5 /HPF / WBC Many /HPF   Sq Epi: x / Non Sq Epi: x / Bacteria: Many /HPF    CT a/p no cont  IMPRESSION:  1.  0.2 cm nonobstructing left renal stone.  2.  Hepatic steatosis.  3.  Cholelithiasis.

## 2017-04-16 NOTE — H&P ADULT - NSHPSOCIALHISTORY_GEN_ALL_CORE
former smoker (yrs ago for 3-4 yrs, smoked 1 pack per week)  rare ETOH use  no drug use  lives w/   uses walker.  has HHA to help bath

## 2017-04-16 NOTE — H&P ADULT - PROBLEM SELECTOR PLAN 6
uncontrolled diabetes with nausea at baseline.  vomited once yesterday.  Pt may have developed gastroparesis 2/2 DM.  -can start reglan if needed  -will dose 25 U lantus (80% of home lantus), ISS, FSG qid, diabetes education

## 2017-04-16 NOTE — H&P ADULT - ASSESSMENT
71 F PMH COPD, HTN, Afib, CAD, DM2, depression, subdural hemorrhage s/p bakari hole p/w 71 F PMH COPD, HTN, Afib, CAD, DM2, depression, subdural hemorrhage s/p bakari hole p/w difficulty urinating 2/2 UTI, likely in setting of increased abdominal girth. 71 F PMH asthma, HTN, Afib, CAD, DM2, depression, subdural hemorrhage s/p bakari hole p/w difficulty urinating 2/2 UTI, likely in setting of increased abdominal girth.

## 2017-04-16 NOTE — H&P ADULT - PROBLEM SELECTOR PLAN 3
Baseline Cre usually 0.7.  Pt has been taking in fluids but not much other PO intake.  Hb also concentrated and appears slightly dehydrated.  CT scan a/p did not show obstructive stone or hydro.  Likely prerenal vs. intrinsic 2/2 UTI.   -will trend BMP  -check urine electrolytes  -gentle IVF in setting of CHF (last EF 40-45%)  -hold lisinopril until Cre improves

## 2017-04-16 NOTE — H&P ADULT - NSHPPHYSICALEXAM_GEN_ALL_CORE
General:  NAD, lying in bed comfortably  HEENT:  no scleral icterus, MM slightly dry, no JVD  CV:  RRR, no murmurs, rubs, or gallops  Lungs:  CTAB  Abdomen:  soft, slighly distended, puffy, suprapubic tenderness, no CVA tenderness  Extremities:  no edema, wwp

## 2017-04-16 NOTE — ED PROVIDER NOTE - OBJECTIVE STATEMENT
72 yo female with hx of COPD DM HTN Afib recent dc for copd exacerbation 11 days ago now with right flank pain and poor oral intake x 2 days no chills slight nausea dec po intake - no vomiting or diarrhea no sob or cough no leg swelling or calf tenderness

## 2017-04-16 NOTE — H&P ADULT - PROBLEM SELECTOR PLAN 2
unclear etiology at this point, as abdominal swelling started before starting prednisone taper.  May be related to her baseline nausea.  She may have diabetic gastroparesis.  Other possibility is metabolic slowing due to age.  Diff also included hepatic steatosis, though pt does not have any decompensation with ascites on exam.  Does not drink ETOH.  TSH wnl.  -will check hep panel and work as below for hepatic steatosis  -will check B12, folate to check if pt may be deficient from gastroparesis  -verify if pt had prior EGD-can be done outpt if not

## 2017-04-16 NOTE — ED ADULT NURSE NOTE - PT NEEDS ASSIST
yes Bi-Rhombic Flap Text: The defect edges were debeveled with a #15 scalpel blade.  Given the location of the defect and the proximity to free margins a bi-rhombic flap was deemed most appropriate.  Using a sterile surgical marker, an appropriate rhombic flap was drawn incorporating the defect. The area thus outlined was incised deep to adipose tissue with a #15 scalpel blade.  The skin margins were undermined to an appropriate distance in all directions utilizing iris scissors.

## 2017-04-16 NOTE — ED PROVIDER NOTE - MEDICAL DECISION MAKING DETAILS
72 yo female with afib HTN DM with right flank pain malaise poor oral intake  recent admission will admit for iv abx and iv hydration

## 2017-04-16 NOTE — H&P ADULT - PROBLEM SELECTOR PLAN 4
Unclear etiology at this point.  Hep C neg on prior adm.  Lipid panel unremarkable on last adm.  Could be related to her abdominal distention.  -will check Hepatitis A IgG, Hepatitis B surface antigen, surface antibody, and core antibody.  -Plasma iron, ferritin, and total iron binding capacity, Serum gammaglobulin level, antinuclear antibody, antismooth muscle antibody, and anti-liver/kidney microsomal antibody-1

## 2017-04-16 NOTE — H&P ADULT - PROBLEM SELECTOR PLAN 5
no h.o intubations.  admitted earlier this month for asthma exacerbation.  absolutely declines using advair, as a doctor a long time ago told her it can cause death.  -c/w prednisone taper  -ventolin prn  -Symbicort would be an option after prednisone taper, as pt unwilling to use advair

## 2017-04-16 NOTE — H&P ADULT - HISTORY OF PRESENT ILLNESS
71 F PMH COPD, HTN, Afib, CAD, DM2, depression, subdural hemorrhage s/p bakari hole p/w      In the ED, VSS.  UA positive.  Hb 13.1 from baseline around 10.  CT a/p revealed non-obstructive 0.3 cm renal stone, hepatic steatosis.  Admitted for IV atx for UTI. 71 F PMH COPD, HTN, Afib, CAD, DM2, depression, subdural hemorrhage s/p bakari hole p/w difficulty urinating and heaviness around bladder for the past 2 d.  Reports subjective fever and chills at home.  Was un    In the ED, VSS.  UA positive.  Hb 13.1 from baseline around 10.  CT a/p revealed non-obstructive 0.3 cm renal stone, hepatic steatosis.  Admitted for IV atx for UTI. 71 F PMH COPD, HTN, Afib, CAD, DM2, depression, subdural hemorrhage s/p bakari hole p/w difficulty urinating and heaviness around bladder for the past 2 d.  Reports subjective fever and chills at home.  Was unable to urinate yesterday all day when she drank lemon water.  Now reports she is able to urinate.  Denies any dysuria.  Worried about her belly getting bigger in the past few weeks before starting her prednisone taper, which she was dc'd on April 5, 2017 for a COPD exacerbation.  Has had difficulty wearing her pants.  Has a BM every other day and denies straining or constipation.  Reports that she had an episode of shaking sweats last week, call EMS, was told her FSG and VS were stable.  Reports feeling nauseous at baseline and vomiting yesterday.   In the ED, VSS.  UA positive.  Hb 13.1 from baseline around 10.  CT a/p revealed non-obstructive 0.3 cm renal stone, hepatic steatosis.  Admitted for IV atx for UTI. 71 F PMH COPD, HTN, Afib, CAD, DM2, depression, subdural hemorrhage s/p bakari hole p/w difficulty urinating and heaviness around bladder for the past 2 d.  Reports subjective fever and chills at home.  Was unable to urinate yesterday all day when she drank lemon water.  Now reports she is able to urinate.  Denies any dysuria.  Worried about her belly getting bigger in the past few weeks before starting her prednisone taper, which she was dc'd on April 5, 2017 for a COPD exacerbation.  Has had difficulty wearing her pants.  Has a BM every other day and denies straining or constipation.  Reports that she had an episode of shaking sweats last week, call EMS, was told her FSG and VS were stable.  Reports feeling nauseous at baseline and vomiting yesterday.   In the ED, VSS.  UA positive.  Hb 13.1 from baseline around 10.  CT a/p revealed non-obstructive 0.3 cm renal stone, hepatic steatosis.  Was given 2 g ceftiaxone IV x 1 and started on NS at 125 cc/hr.  Admitted for IV atx for UTI. 71 F PMH COPD, HTN, Afib s/p ablation 2 yrs ago, ASD s/p repair, CAD, DM2, depression, subdural hemorrhage s/p bakari hole p/w difficulty urinating and heaviness around bladder for the past 2 d.  Reports subjective fever and chills at home.  Was unable to urinate yesterday all day when she drank lemon water.  Now reports she is able to urinate.  Denies any dysuria.  Worried about her belly getting bigger in the past few weeks before starting her prednisone taper, which she was dc'd on April 5, 2017 for a COPD exacerbation.  Has had difficulty wearing her pants.  Has a BM every other day and denies straining or constipation.  Reports that she had an episode of shaking sweats last week, call EMS, was told her FSG and VS were stable.  Reports feeling nauseous at baseline and vomiting yesterday.   In the ED, VSS.  UA positive.  Hb 13.1 from baseline around 10.  CT a/p revealed non-obstructive 0.3 cm renal stone, hepatic steatosis.  Was given 2 g ceftiaxone IV x 1 and started on NS at 125 cc/hr.  Admitted for IV atx for UTI. 71 F PMH asthma, HTN, Afib s/p ablation 2 yrs ago, ASD s/p repair, CAD, DM2, depression, subdural hemorrhage s/p bakari hole p/w difficulty urinating and heaviness around bladder for the past 2 d.  Reports subjective fever and chills at home.  Was unable to urinate yesterday all day when she drank lemon water.  Now reports she is able to urinate.  Denies any dysuria.  Worried about her belly getting bigger in the past few weeks before starting her prednisone taper, which she was dc'd on April 5, 2017 for a COPD exacerbation.  Has had difficulty wearing her pants.  Has a BM every other day and denies straining or constipation.  Reports that she had an episode of shaking sweats last week, call EMS, was told her FSG and VS were stable.  Reports feeling nauseous at baseline and vomiting yesterday.   In the ED, VSS.  UA positive.  Hb 13.1 from baseline around 10.  CT a/p revealed non-obstructive 0.3 cm renal stone, hepatic steatosis.  Was given 2 g ceftiaxone IV x 1 and started on NS at 125 cc/hr.  Admitted for IV atx for UTI.

## 2017-04-17 DIAGNOSIS — K76.0 FATTY (CHANGE OF) LIVER, NOT ELSEWHERE CLASSIFIED: ICD-10-CM

## 2017-04-17 DIAGNOSIS — R19.8 OTHER SPECIFIED SYMPTOMS AND SIGNS INVOLVING THE DIGESTIVE SYSTEM AND ABDOMEN: ICD-10-CM

## 2017-04-17 DIAGNOSIS — Z29.9 ENCOUNTER FOR PROPHYLACTIC MEASURES, UNSPECIFIED: ICD-10-CM

## 2017-04-17 DIAGNOSIS — N17.9 ACUTE KIDNEY FAILURE, UNSPECIFIED: ICD-10-CM

## 2017-04-17 DIAGNOSIS — I10 ESSENTIAL (PRIMARY) HYPERTENSION: ICD-10-CM

## 2017-04-17 DIAGNOSIS — E78.5 HYPERLIPIDEMIA, UNSPECIFIED: ICD-10-CM

## 2017-04-17 DIAGNOSIS — N30.00 ACUTE CYSTITIS WITHOUT HEMATURIA: ICD-10-CM

## 2017-04-17 DIAGNOSIS — R63.8 OTHER SYMPTOMS AND SIGNS CONCERNING FOOD AND FLUID INTAKE: ICD-10-CM

## 2017-04-17 DIAGNOSIS — E11.8 TYPE 2 DIABETES MELLITUS WITH UNSPECIFIED COMPLICATIONS: ICD-10-CM

## 2017-04-17 DIAGNOSIS — J45.909 UNSPECIFIED ASTHMA, UNCOMPLICATED: ICD-10-CM

## 2017-04-17 LAB
24R-OH-CALCIDIOL SERPL-MCNC: 17.9 NG/ML — LOW (ref 30–100)
ANION GAP SERPL CALC-SCNC: 8 MMOL/L — LOW (ref 9–16)
BASOPHILS NFR BLD AUTO: 0.8 % — SIGNIFICANT CHANGE UP (ref 0–2)
BUN SERPL-MCNC: 23 MG/DL — SIGNIFICANT CHANGE UP (ref 7–23)
CALCIUM SERPL-MCNC: 9 MG/DL — SIGNIFICANT CHANGE UP (ref 8.5–10.5)
CALCIUM SERPL-MCNC: 9.9 MG/DL — SIGNIFICANT CHANGE UP (ref 8.4–10.5)
CHLORIDE SERPL-SCNC: 104 MMOL/L — SIGNIFICANT CHANGE UP (ref 96–108)
CO2 SERPL-SCNC: 24 MMOL/L — SIGNIFICANT CHANGE UP (ref 22–31)
CREAT ?TM UR-MCNC: 31.2 MG/DL — SIGNIFICANT CHANGE UP
CREAT SERPL-MCNC: 0.89 MG/DL — SIGNIFICANT CHANGE UP (ref 0.5–1.3)
EOSINOPHIL NFR BLD AUTO: 1.7 % — SIGNIFICANT CHANGE UP (ref 0–6)
FERRITIN SERPL-MCNC: 18.7 NG/ML — SIGNIFICANT CHANGE UP (ref 8–252)
FOLATE SERPL-MCNC: 17 NG/ML — SIGNIFICANT CHANGE UP (ref 4.8–24.2)
GLUCOSE SERPL-MCNC: 192 MG/DL — HIGH (ref 70–99)
HAV IGM SER-ACNC: SIGNIFICANT CHANGE UP
HBV CORE IGM SER-ACNC: SIGNIFICANT CHANGE UP
HBV SURFACE AB SER-ACNC: SIGNIFICANT CHANGE UP
HBV SURFACE AG SER-ACNC: SIGNIFICANT CHANGE UP
HCT VFR BLD CALC: 37.4 % — SIGNIFICANT CHANGE UP (ref 34.5–45)
HCV AB S/CO SERPL IA: 0.11 S/CO — SIGNIFICANT CHANGE UP
HCV AB SERPL-IMP: SIGNIFICANT CHANGE UP
HGB BLD-MCNC: 12.6 G/DL — SIGNIFICANT CHANGE UP (ref 11.5–15.5)
IRON SATN MFR SERPL: 139 UG/DL — SIGNIFICANT CHANGE UP (ref 50–170)
IRON SATN MFR SERPL: 26 % — SIGNIFICANT CHANGE UP (ref 20–38)
LYMPHOCYTES # BLD AUTO: 36.8 % — SIGNIFICANT CHANGE UP (ref 13–44)
MAGNESIUM SERPL-MCNC: 2.1 MG/DL — SIGNIFICANT CHANGE UP (ref 1.6–2.4)
MCHC RBC-ENTMCNC: 28.5 PG — SIGNIFICANT CHANGE UP (ref 27–34)
MCHC RBC-ENTMCNC: 33.7 G/DL — SIGNIFICANT CHANGE UP (ref 32–36)
MCV RBC AUTO: 84.6 FL — SIGNIFICANT CHANGE UP (ref 80–100)
MONOCYTES NFR BLD AUTO: 6.9 % — SIGNIFICANT CHANGE UP (ref 2–14)
NEUTROPHILS NFR BLD AUTO: 53.8 % — SIGNIFICANT CHANGE UP (ref 43–77)
OSMOLALITY UR: 372 MOSMOL/KG — SIGNIFICANT CHANGE UP (ref 100–650)
PLATELET # BLD AUTO: 318 K/UL — SIGNIFICANT CHANGE UP (ref 150–400)
POTASSIUM SERPL-MCNC: 4.1 MMOL/L — SIGNIFICANT CHANGE UP (ref 3.5–5.3)
POTASSIUM SERPL-SCNC: 4.1 MMOL/L — SIGNIFICANT CHANGE UP (ref 3.5–5.3)
PTH-INTACT FLD-MCNC: 77 PG/ML — HIGH (ref 15–65)
RBC # BLD: 4.42 M/UL — SIGNIFICANT CHANGE UP (ref 3.8–5.2)
RBC # BLD: 4.42 M/UL — SIGNIFICANT CHANGE UP (ref 3.8–5.2)
RBC # FLD: 15.5 % — SIGNIFICANT CHANGE UP (ref 10.3–16.9)
RETICS/RBC NFR: 1.3 % — SIGNIFICANT CHANGE UP (ref 0.5–2.5)
SODIUM SERPL-SCNC: 136 MMOL/L — SIGNIFICANT CHANGE UP (ref 135–145)
SODIUM UR-SCNC: 84 MMOL/L — SIGNIFICANT CHANGE UP
TIBC SERPL-MCNC: 530 UG/DL — HIGH (ref 250–450)
TRANSFERRIN SERPL-MCNC: 412 MG/DL — HIGH (ref 200–360)
VIT B12 SERPL-MCNC: 495 PG/ML — SIGNIFICANT CHANGE UP (ref 243–894)
WBC # BLD: 8.3 K/UL — SIGNIFICANT CHANGE UP (ref 3.8–10.5)
WBC # FLD AUTO: 8.3 K/UL — SIGNIFICANT CHANGE UP (ref 3.8–10.5)

## 2017-04-17 PROCEDURE — 71020: CPT | Mod: 26

## 2017-04-17 RX ORDER — LISINOPRIL 2.5 MG/1
20 TABLET ORAL DAILY
Qty: 0 | Refills: 0 | Status: DISCONTINUED | OUTPATIENT
Start: 2017-04-17 | End: 2017-04-17

## 2017-04-17 RX ORDER — DEXTROSE 50 % IN WATER 50 %
1 SYRINGE (ML) INTRAVENOUS ONCE
Qty: 0 | Refills: 0 | Status: DISCONTINUED | OUTPATIENT
Start: 2017-04-17 | End: 2017-04-20

## 2017-04-17 RX ORDER — SODIUM CHLORIDE 9 MG/ML
1000 INJECTION, SOLUTION INTRAVENOUS
Qty: 0 | Refills: 0 | Status: DISCONTINUED | OUTPATIENT
Start: 2017-04-17 | End: 2017-04-20

## 2017-04-17 RX ORDER — LISINOPRIL 2.5 MG/1
10 TABLET ORAL DAILY
Qty: 0 | Refills: 0 | Status: DISCONTINUED | OUTPATIENT
Start: 2017-04-17 | End: 2017-04-18

## 2017-04-17 RX ORDER — INSULIN LISPRO 100/ML
VIAL (ML) SUBCUTANEOUS
Qty: 0 | Refills: 0 | Status: DISCONTINUED | OUTPATIENT
Start: 2017-04-17 | End: 2017-04-17

## 2017-04-17 RX ORDER — ATORVASTATIN CALCIUM 80 MG/1
20 TABLET, FILM COATED ORAL AT BEDTIME
Qty: 0 | Refills: 0 | Status: DISCONTINUED | OUTPATIENT
Start: 2017-04-17 | End: 2017-04-20

## 2017-04-17 RX ORDER — ZOLPIDEM TARTRATE 10 MG/1
5 TABLET ORAL AT BEDTIME
Qty: 0 | Refills: 0 | Status: DISCONTINUED | OUTPATIENT
Start: 2017-04-17 | End: 2017-04-20

## 2017-04-17 RX ORDER — INSULIN GLARGINE 100 [IU]/ML
25 INJECTION, SOLUTION SUBCUTANEOUS AT BEDTIME
Qty: 0 | Refills: 0 | Status: DISCONTINUED | OUTPATIENT
Start: 2017-04-17 | End: 2017-04-17

## 2017-04-17 RX ORDER — ASPIRIN/CALCIUM CARB/MAGNESIUM 324 MG
81 TABLET ORAL DAILY
Qty: 0 | Refills: 0 | Status: DISCONTINUED | OUTPATIENT
Start: 2017-04-17 | End: 2017-04-20

## 2017-04-17 RX ORDER — INSULIN LISPRO 100/ML
VIAL (ML) SUBCUTANEOUS
Qty: 0 | Refills: 0 | Status: DISCONTINUED | OUTPATIENT
Start: 2017-04-17 | End: 2017-04-20

## 2017-04-17 RX ORDER — DEXTROSE 50 % IN WATER 50 %
12.5 SYRINGE (ML) INTRAVENOUS ONCE
Qty: 0 | Refills: 0 | Status: DISCONTINUED | OUTPATIENT
Start: 2017-04-17 | End: 2017-04-20

## 2017-04-17 RX ORDER — GLUCAGON INJECTION, SOLUTION 0.5 MG/.1ML
1 INJECTION, SOLUTION SUBCUTANEOUS ONCE
Qty: 0 | Refills: 0 | Status: DISCONTINUED | OUTPATIENT
Start: 2017-04-17 | End: 2017-04-20

## 2017-04-17 RX ORDER — PIPERACILLIN AND TAZOBACTAM 4; .5 G/20ML; G/20ML
3.38 INJECTION, POWDER, LYOPHILIZED, FOR SOLUTION INTRAVENOUS EVERY 6 HOURS
Qty: 0 | Refills: 0 | Status: DISCONTINUED | OUTPATIENT
Start: 2017-04-17 | End: 2017-04-18

## 2017-04-17 RX ORDER — INSULIN GLARGINE 100 [IU]/ML
30 INJECTION, SOLUTION SUBCUTANEOUS AT BEDTIME
Qty: 0 | Refills: 0 | Status: DISCONTINUED | OUTPATIENT
Start: 2017-04-17 | End: 2017-04-20

## 2017-04-17 RX ORDER — DEXTROSE 50 % IN WATER 50 %
25 SYRINGE (ML) INTRAVENOUS ONCE
Qty: 0 | Refills: 0 | Status: DISCONTINUED | OUTPATIENT
Start: 2017-04-17 | End: 2017-04-20

## 2017-04-17 RX ORDER — TRAMADOL HYDROCHLORIDE 50 MG/1
50 TABLET ORAL THREE TIMES A DAY
Qty: 0 | Refills: 0 | Status: DISCONTINUED | OUTPATIENT
Start: 2017-04-17 | End: 2017-04-20

## 2017-04-17 RX ORDER — AMLODIPINE BESYLATE 2.5 MG/1
10 TABLET ORAL DAILY
Qty: 0 | Refills: 0 | Status: DISCONTINUED | OUTPATIENT
Start: 2017-04-17 | End: 2017-04-20

## 2017-04-17 RX ORDER — ALBUTEROL 90 UG/1
2 AEROSOL, METERED ORAL EVERY 6 HOURS
Qty: 0 | Refills: 0 | Status: DISCONTINUED | OUTPATIENT
Start: 2017-04-17 | End: 2017-04-20

## 2017-04-17 RX ORDER — ACETAMINOPHEN 500 MG
650 TABLET ORAL EVERY 6 HOURS
Qty: 0 | Refills: 0 | Status: DISCONTINUED | OUTPATIENT
Start: 2017-04-17 | End: 2017-04-20

## 2017-04-17 RX ORDER — SENNA PLUS 8.6 MG/1
2 TABLET ORAL AT BEDTIME
Qty: 0 | Refills: 0 | Status: DISCONTINUED | OUTPATIENT
Start: 2017-04-17 | End: 2017-04-20

## 2017-04-17 RX ADMIN — PIPERACILLIN AND TAZOBACTAM 200 GRAM(S): 4; .5 INJECTION, POWDER, LYOPHILIZED, FOR SOLUTION INTRAVENOUS at 01:27

## 2017-04-17 RX ADMIN — TRAMADOL HYDROCHLORIDE 50 MILLIGRAM(S): 50 TABLET ORAL at 23:30

## 2017-04-17 RX ADMIN — SODIUM CHLORIDE 80 MILLILITER(S): 9 INJECTION INTRAMUSCULAR; INTRAVENOUS; SUBCUTANEOUS at 11:21

## 2017-04-17 RX ADMIN — Medication 2: at 09:13

## 2017-04-17 RX ADMIN — INSULIN GLARGINE 25 UNIT(S): 100 INJECTION, SOLUTION SUBCUTANEOUS at 02:18

## 2017-04-17 RX ADMIN — Medication 10 MILLIGRAM(S): at 06:11

## 2017-04-17 RX ADMIN — HEPARIN SODIUM 5000 UNIT(S): 5000 INJECTION INTRAVENOUS; SUBCUTANEOUS at 22:28

## 2017-04-17 RX ADMIN — Medication 8: at 12:49

## 2017-04-17 RX ADMIN — ZOLPIDEM TARTRATE 5 MILLIGRAM(S): 10 TABLET ORAL at 22:44

## 2017-04-17 RX ADMIN — PIPERACILLIN AND TAZOBACTAM 200 GRAM(S): 4; .5 INJECTION, POWDER, LYOPHILIZED, FOR SOLUTION INTRAVENOUS at 06:10

## 2017-04-17 RX ADMIN — Medication 2: at 18:03

## 2017-04-17 RX ADMIN — HEPARIN SODIUM 5000 UNIT(S): 5000 INJECTION INTRAVENOUS; SUBCUTANEOUS at 14:38

## 2017-04-17 RX ADMIN — ZOLPIDEM TARTRATE 5 MILLIGRAM(S): 10 TABLET ORAL at 01:27

## 2017-04-17 RX ADMIN — ATORVASTATIN CALCIUM 20 MILLIGRAM(S): 80 TABLET, FILM COATED ORAL at 22:27

## 2017-04-17 RX ADMIN — PIPERACILLIN AND TAZOBACTAM 200 GRAM(S): 4; .5 INJECTION, POWDER, LYOPHILIZED, FOR SOLUTION INTRAVENOUS at 18:04

## 2017-04-17 RX ADMIN — Medication 4: at 22:26

## 2017-04-17 RX ADMIN — PIPERACILLIN AND TAZOBACTAM 200 GRAM(S): 4; .5 INJECTION, POWDER, LYOPHILIZED, FOR SOLUTION INTRAVENOUS at 11:21

## 2017-04-17 RX ADMIN — INSULIN GLARGINE 30 UNIT(S): 100 INJECTION, SOLUTION SUBCUTANEOUS at 22:29

## 2017-04-17 RX ADMIN — TRAMADOL HYDROCHLORIDE 50 MILLIGRAM(S): 50 TABLET ORAL at 22:31

## 2017-04-17 RX ADMIN — HEPARIN SODIUM 5000 UNIT(S): 5000 INJECTION INTRAVENOUS; SUBCUTANEOUS at 06:11

## 2017-04-17 RX ADMIN — LISINOPRIL 10 MILLIGRAM(S): 2.5 TABLET ORAL at 16:41

## 2017-04-17 RX ADMIN — Medication 81 MILLIGRAM(S): at 11:21

## 2017-04-17 RX ADMIN — SENNA PLUS 2 TABLET(S): 8.6 TABLET ORAL at 22:29

## 2017-04-17 RX ADMIN — AMLODIPINE BESYLATE 10 MILLIGRAM(S): 2.5 TABLET ORAL at 06:11

## 2017-04-17 RX ADMIN — SODIUM CHLORIDE 80 MILLILITER(S): 9 INJECTION INTRAMUSCULAR; INTRAVENOUS; SUBCUTANEOUS at 01:29

## 2017-04-17 NOTE — PROGRESS NOTE ADULT - PROBLEM SELECTOR PLAN 3
Baseline Cre usually 0.7.  Pt has been taking in fluids but not much other PO intake.  Hb also concentrated and appears slightly dehydrated.  CT scan a/p did not show obstructive stone or hydro.  Likely prerenal vs. intrinsic 2/2 UTI.   -will trend BMP  -check urine electrolytes  -gentle IVF in setting of CHF (last EF 40-45%)  -hold lisinopril until Cre improves Resolved - Baseline Cr 0.7.  Pt has been taking in fluids but not much other PO intake.  Hb also concentrated and appears slightly dehydrated.  CT scan a/p did not show obstructive stone or hydro.  Likely prerenal vs. intrinsic 2/2 UTI.   - c/w IVF in setting of CHF (last EF 40-45%)  - holding Lisinopril in the setting of infection - pt normotensive at this time.

## 2017-04-17 NOTE — DIETITIAN INITIAL EVALUATION ADULT. - NS AS NUTRI INTERV ED CONTENT
Encouraged increased intake, as tolerated, to better meet estimated nutrient needs.  Encouraged small, frequent meal to promote oral intake and tolerance.

## 2017-04-17 NOTE — PROGRESS NOTE ADULT - PROBLEM SELECTOR PLAN 2
unclear etiology at this point, as abdominal swelling started before starting prednisone taper.  May be related to her baseline nausea.  She may have diabetic gastroparesis.  Other possibility is metabolic slowing due to age.  Diff also included hepatic steatosis, though pt does not have any decompensation with ascites on exam.  Does not drink ETOH.  TSH wnl.  -will check hep panel and work as below for hepatic steatosis  -will check B12, folate to check if pt may be deficient from gastroparesis  -verify if pt had prior EGD-can be done outpt if not Unclear etiology at this point, w/o fluid shift, abd exam otherwise unremarkable. CT Abd/Pelvis unremarkable for any intra-abdominal pathology, but showing hepatic steatosis, cholelithiasis, and a 2mm renal stone. Pt w/ hx of hyperlipidemia, hepatic steatosis, and cholelithiasis - suggestive of obesity.

## 2017-04-17 NOTE — DIETITIAN INITIAL EVALUATION ADULT. - OTHER INFO
Pt admitted with difficulty urinating secondary to UTI.  Pt with DM2 with possibly associated gastroparesis.  Pt endorses fair appetite at present; consuming <50% of meals.  Pt denies GI distress; no pain.  NKFA.  Skin: intact.

## 2017-04-17 NOTE — PROGRESS NOTE ADULT - ASSESSMENT
72 y/o F w/ PMHx of asthma, HTN, Afib, CAD, DM2, depression, and subdural hemorrhage s/p bakari hole p/w difficulty urinating and abdominal distension, with suspected UTI.

## 2017-04-17 NOTE — PROGRESS NOTE ADULT - PROBLEM SELECTOR PLAN 4
Unclear etiology at this point.  Hep C neg on prior adm.  Lipid panel unremarkable on last adm.  Could be related to her abdominal distention.  -will check Hepatitis A IgG, Hepatitis B surface antigen, surface antibody, and core antibody.  -Plasma iron, ferritin, and total iron binding capacity, Serum gammaglobulin level, antinuclear antibody, antismooth muscle antibody, and anti-liver/kidney microsomal antibody-1 Unclear etiology at this point.  Hep C neg on prior adm.  Lipid panel unremarkable on last adm.  Could be related to her abdominal distention. Hepatitis panel negative for infectious etiology. Iron panel wnl except elevated Transferrin.    - f/u serum gammaglobulin level, antinuclear antibody, antismooth muscle antibody, and anti-liver/kidney microsomal antibody-1

## 2017-04-17 NOTE — PROGRESS NOTE ADULT - PROBLEM SELECTOR PLAN 5
no h.o intubations.  admitted earlier this month for asthma exacerbation.  absolutely declines using advair, as a doctor a long time ago told her it can cause death.  -c/w prednisone taper  -ventolin prn  -Symbicort would be an option after prednisone taper, as pt unwilling to use advair Pt w/o any h/o intubations, however, multuple admission for asthma exacerbation - most recently earlier this month. Pt refusing Advair stating her doctor for a long time o told her it can cause death.  - c/w Prednisone taper  - c/w Ventolin prn  - Will consider Symbicort as pt is refusing Advair

## 2017-04-17 NOTE — PROGRESS NOTE ADULT - PROBLEM SELECTOR PLAN 1
Pt now voiding without high post-void residual (80 cc on bladder scan).  Likely was retaining in setting increased abdominal girth.  Uncontrolled type 2 DM (last A1C 9.0) is also a predisposition for UTI and pseudomonal risk factor.  Afebrile, does not meet SIRS.  Last urine cx grew Klebsiella pneumoniae sensitive.  However, will need to cover for pseudomonas until urine cx obtained  -zosyn 3.375 q 6 for 7 d (tx as complicated UTI due to anatomic issue)  -f.u urine cx  -blood cx  -monitor outpt and residuals  -workup for abdominal distention below Pt initially presented w/ urinary retention and suprapubic discomfort. Found to have high post-void residual (80 cc on bladder scan). Pt w/ hx of uncontrolled type 2 DM (last A1C 9.0) predisposing to UTI and pseudomonal risk factor. Pt w/ subjective chills at home, however, afebrile thus far. Pt's past urine cx grew Klebsiella pneumoniae sensitive.  However, will need to cover for pseudomonas until urine cx obtained  - c/w Zosyn 3.375 q6h 2/7 days (tx as complicated UTI due to anatomic issue)  - f/u Urine cx - growing > 100K GNR  - f/u Blood cx - NGTD  - Will c/t monitor for urine outpt and residuals

## 2017-04-17 NOTE — PROGRESS NOTE ADULT - PROBLEM SELECTOR PLAN 9
cardiac, diabetic diet.  replete lytes prn -  #FLUIDS: c/w gentle hydration w./ NS    #ELECTROLYTES: Replete electrolytes PRN    #NUTRITION: c/w cardiac, diabetic diet w/ Glucerna BID

## 2017-04-17 NOTE — PROGRESS NOTE ADULT - PROBLEM SELECTOR PLAN 6
uncontrolled diabetes with nausea at baseline.  vomited once yesterday.  Pt may have developed gastroparesis 2/2 DM.  -can start reglan if needed  -will dose 25 U lantus (80% of home lantus), ISS, FSG qid, diabetes education Uncontrolled diabetes with nausea at baseline.  HgbA1c > 10.   - Will dose 25U lantus (80% of home lantus), ISS, FSG qid, diabetes education

## 2017-04-17 NOTE — PROGRESS NOTE ADULT - PROBLEM SELECTOR PLAN 7
does not meet sepsis criteria.   -will hold lisinopril due to BEBE and restart amlodipine Normotensive overnight.   - Will hold lisinopril due to BEBE and restart amlodipine

## 2017-04-17 NOTE — PROGRESS NOTE ADULT - SUBJECTIVE AND OBJECTIVE BOX
OVERNIGHT EVENTS: AFSANEH    SUBJECTIVE: Denied any CP, SOB, N/V.     VITAL SIGNS:   Vital Signs Last 24 Hrs  T(F): 98, Max: 98.6 (04-16 @ 19:24)  HR: 74 (71 - 78)  BP: 128/72 (128/72 - 149/75)  RR: 15 (15 - 17)  SpO2: 98% (97% - 98%)    PHYSICAL EXAM:  General:  NAD, WDWN  HEENT: NC/AT, PERRL-A, EOMI, no scleral icterus, MMM, no JVD  CV:  RRR, S1, S2  Lungs: CTAB, no wheezes, no rhonchi  Abdomen: soft, mildly distended, +BS, no rebound, no guarding, +ve suprapubic tenderness, no CVA tenderness  Extremities: WWP, no edema    MEDICATIONS  (STANDING):  piperacillin/tazobactam IVPB. 3.375Gram(s) IV Intermittent every 6 hours  aspirin enteric coated 81milliGRAM(s) Oral daily  atorvastatin 20milliGRAM(s) Oral at bedtime  amLODIPine   Tablet 10milliGRAM(s) Oral daily  insulin glargine Injectable (LANTUS) 25Unit(s) SubCutaneous at bedtime  insulin lispro (HumaLOG) corrective regimen sliding scale  SubCutaneous Before meals and at bedtime  heparin  Injectable 5000Unit(s) SubCutaneous every 8 hours  sodium chloride 0.9%. 1000milliLiter(s) IV Continuous <Continuous>    MEDICATIONS  (PRN):  zolpidem 5milliGRAM(s) Oral at bedtime PRN Insomnia  ALBUTerol    90 MICROgram(s) HFA Inhaler 2Puff(s) Inhalation every 6 hours PRN Shortness of Breath and/or Wheezing    ALLERGIES: adenosine (Unknown)  aspirin (Unknown)    INTOLERANCES: aspirin (Unknown)    LABS:   ( 2017 07:09 )                        12.6   8.3   )-----------( 318                 37.4     136  |  104  |  23  ----------------------------<  192<H>  4.1   |  24  |  0.89    Ca    9.0      2017 07:09  Mg     2.1     04-17    TPro  8.2  /  Alb  4.1  /  TBili  0.7  /  DBili  x   /  AST  31  /  ALT  37  /  AlkPhos  115  04-16    Urinalysis Basic - ( 2017 19:45 )    Color: Yellow / Appearance: SL CLOUDY / S.015 / pH: x  Gluc: x / Ketone: NEGATIVE  / Bili: NEGATIVE / Urobili: 0.2 E.U./dL   Blood: x / Protein: NEGATIVE mg/dL / Nitrite: POSITIVE   Leuk Esterase: Moderate / RBC: < 5 /HPF / WBC Many /HPF   Sq Epi: x / Non Sq Epi: x / Bacteria: Many /HPF      CT-Abdomen/Pelvis: 1.  0.2 cm nonobstructing left renal stone. 2.  Hepatic steatosis. 3.  Cholelithiasis.

## 2017-04-18 ENCOUNTER — TRANSCRIPTION ENCOUNTER (OUTPATIENT)
Age: 72
End: 2017-04-18

## 2017-04-18 DIAGNOSIS — E21.3 HYPERPARATHYROIDISM, UNSPECIFIED: ICD-10-CM

## 2017-04-18 LAB
-  AMPICILLIN/SULBACTAM: SIGNIFICANT CHANGE UP
-  AMPICILLIN: SIGNIFICANT CHANGE UP
-  CEFAZOLIN: SIGNIFICANT CHANGE UP
-  CEFTRIAXONE: SIGNIFICANT CHANGE UP
-  CIPROFLOXACIN: SIGNIFICANT CHANGE UP
-  GENTAMICIN: SIGNIFICANT CHANGE UP
-  NITROFURANTOIN: SIGNIFICANT CHANGE UP
-  PIPERACILLIN/TAZOBACTAM: SIGNIFICANT CHANGE UP
-  TOBRAMYCIN: SIGNIFICANT CHANGE UP
-  TRIMETHOPRIM/SULFAMETHOXAZOLE: SIGNIFICANT CHANGE UP
ANION GAP SERPL CALC-SCNC: 8 MMOL/L — LOW (ref 9–16)
BUN SERPL-MCNC: 17 MG/DL — SIGNIFICANT CHANGE UP (ref 7–23)
CALCIUM SERPL-MCNC: 8.4 MG/DL — LOW (ref 8.5–10.5)
CHLORIDE SERPL-SCNC: 109 MMOL/L — HIGH (ref 96–108)
CO2 SERPL-SCNC: 24 MMOL/L — SIGNIFICANT CHANGE UP (ref 22–31)
CREAT SERPL-MCNC: 0.82 MG/DL — SIGNIFICANT CHANGE UP (ref 0.5–1.3)
CULTURE RESULTS: SIGNIFICANT CHANGE UP
GLUCOSE SERPL-MCNC: 162 MG/DL — HIGH (ref 70–99)
HCT VFR BLD CALC: 34.4 % — LOW (ref 34.5–45)
HGB BLD-MCNC: 11.3 G/DL — LOW (ref 11.5–15.5)
IGG SERPL-MCNC: 972 MG/DL — SIGNIFICANT CHANGE UP (ref 767–1590)
IGG1 SER-MCNC: 467 MG/DL — SIGNIFICANT CHANGE UP (ref 341–894)
IGG2 SER-MCNC: 409 MG/DL — SIGNIFICANT CHANGE UP (ref 171–632)
IGG3 SER-MCNC: 61.4 MG/DL — SIGNIFICANT CHANGE UP (ref 18.4–106)
IGG4 SER-MCNC: 62.9 MG/DL — SIGNIFICANT CHANGE UP (ref 2.4–121)
MAGNESIUM SERPL-MCNC: 1.8 MG/DL — SIGNIFICANT CHANGE UP (ref 1.6–2.4)
MCHC RBC-ENTMCNC: 27.9 PG — SIGNIFICANT CHANGE UP (ref 27–34)
MCHC RBC-ENTMCNC: 32.8 G/DL — SIGNIFICANT CHANGE UP (ref 32–36)
MCV RBC AUTO: 84.9 FL — SIGNIFICANT CHANGE UP (ref 80–100)
METHOD TYPE: SIGNIFICANT CHANGE UP
ORGANISM # SPEC MICROSCOPIC CNT: SIGNIFICANT CHANGE UP
ORGANISM # SPEC MICROSCOPIC CNT: SIGNIFICANT CHANGE UP
PHOSPHATE SERPL-MCNC: 3.2 MG/DL — SIGNIFICANT CHANGE UP (ref 2.5–4.5)
PLATELET # BLD AUTO: 253 K/UL — SIGNIFICANT CHANGE UP (ref 150–400)
POTASSIUM SERPL-MCNC: 4.1 MMOL/L — SIGNIFICANT CHANGE UP (ref 3.5–5.3)
POTASSIUM SERPL-SCNC: 4.1 MMOL/L — SIGNIFICANT CHANGE UP (ref 3.5–5.3)
RBC # BLD: 4.05 M/UL — SIGNIFICANT CHANGE UP (ref 3.8–5.2)
RBC # FLD: 15.4 % — SIGNIFICANT CHANGE UP (ref 10.3–16.9)
SMOOTH MUSCLE AB SER-ACNC: SIGNIFICANT CHANGE UP
SODIUM SERPL-SCNC: 141 MMOL/L — SIGNIFICANT CHANGE UP (ref 135–145)
SPECIMEN SOURCE: SIGNIFICANT CHANGE UP
WBC # BLD: 6.6 K/UL — SIGNIFICANT CHANGE UP (ref 3.8–10.5)
WBC # FLD AUTO: 6.6 K/UL — SIGNIFICANT CHANGE UP (ref 3.8–10.5)

## 2017-04-18 RX ORDER — LISINOPRIL 2.5 MG/1
20 TABLET ORAL DAILY
Qty: 0 | Refills: 0 | Status: DISCONTINUED | OUTPATIENT
Start: 2017-04-19 | End: 2017-04-20

## 2017-04-18 RX ORDER — MAGNESIUM SULFATE 500 MG/ML
1 VIAL (ML) INJECTION ONCE
Qty: 0 | Refills: 0 | Status: COMPLETED | OUTPATIENT
Start: 2017-04-18 | End: 2017-04-18

## 2017-04-18 RX ORDER — LISINOPRIL 2.5 MG/1
10 TABLET ORAL ONCE
Qty: 0 | Refills: 0 | Status: COMPLETED | OUTPATIENT
Start: 2017-04-18 | End: 2017-04-18

## 2017-04-18 RX ORDER — DOCUSATE SODIUM 100 MG
100 CAPSULE ORAL DAILY
Qty: 0 | Refills: 0 | Status: DISCONTINUED | OUTPATIENT
Start: 2017-04-18 | End: 2017-04-20

## 2017-04-18 RX ORDER — PIPERACILLIN AND TAZOBACTAM 4; .5 G/20ML; G/20ML
4.5 INJECTION, POWDER, LYOPHILIZED, FOR SOLUTION INTRAVENOUS EVERY 6 HOURS
Qty: 0 | Refills: 0 | Status: DISCONTINUED | OUTPATIENT
Start: 2017-04-18 | End: 2017-04-18

## 2017-04-18 RX ORDER — CHOLECALCIFEROL (VITAMIN D3) 125 MCG
1000 CAPSULE ORAL DAILY
Qty: 0 | Refills: 0 | Status: DISCONTINUED | OUTPATIENT
Start: 2017-04-18 | End: 2017-04-20

## 2017-04-18 RX ORDER — CIPROFLOXACIN LACTATE 400MG/40ML
VIAL (ML) INTRAVENOUS
Qty: 0 | Refills: 0 | Status: DISCONTINUED | OUTPATIENT
Start: 2017-04-19 | End: 2017-04-20

## 2017-04-18 RX ADMIN — INSULIN GLARGINE 30 UNIT(S): 100 INJECTION, SOLUTION SUBCUTANEOUS at 22:19

## 2017-04-18 RX ADMIN — Medication 100 GRAM(S): at 09:45

## 2017-04-18 RX ADMIN — Medication 1000 UNIT(S): at 11:17

## 2017-04-18 RX ADMIN — HEPARIN SODIUM 5000 UNIT(S): 5000 INJECTION INTRAVENOUS; SUBCUTANEOUS at 22:13

## 2017-04-18 RX ADMIN — Medication 100 MILLIGRAM(S): at 16:53

## 2017-04-18 RX ADMIN — LISINOPRIL 10 MILLIGRAM(S): 2.5 TABLET ORAL at 09:33

## 2017-04-18 RX ADMIN — PIPERACILLIN AND TAZOBACTAM 200 GRAM(S): 4; .5 INJECTION, POWDER, LYOPHILIZED, FOR SOLUTION INTRAVENOUS at 00:10

## 2017-04-18 RX ADMIN — TRAMADOL HYDROCHLORIDE 50 MILLIGRAM(S): 50 TABLET ORAL at 03:35

## 2017-04-18 RX ADMIN — Medication 2: at 09:12

## 2017-04-18 RX ADMIN — Medication 81 MILLIGRAM(S): at 11:16

## 2017-04-18 RX ADMIN — PIPERACILLIN AND TAZOBACTAM 200 GRAM(S): 4; .5 INJECTION, POWDER, LYOPHILIZED, FOR SOLUTION INTRAVENOUS at 18:10

## 2017-04-18 RX ADMIN — Medication 2: at 22:12

## 2017-04-18 RX ADMIN — Medication 650 MILLIGRAM(S): at 02:20

## 2017-04-18 RX ADMIN — PIPERACILLIN AND TAZOBACTAM 200 GRAM(S): 4; .5 INJECTION, POWDER, LYOPHILIZED, FOR SOLUTION INTRAVENOUS at 11:17

## 2017-04-18 RX ADMIN — PIPERACILLIN AND TAZOBACTAM 200 GRAM(S): 4; .5 INJECTION, POWDER, LYOPHILIZED, FOR SOLUTION INTRAVENOUS at 06:32

## 2017-04-18 RX ADMIN — TRAMADOL HYDROCHLORIDE 50 MILLIGRAM(S): 50 TABLET ORAL at 19:50

## 2017-04-18 RX ADMIN — Medication 2: at 17:45

## 2017-04-18 RX ADMIN — TRAMADOL HYDROCHLORIDE 50 MILLIGRAM(S): 50 TABLET ORAL at 14:02

## 2017-04-18 RX ADMIN — HEPARIN SODIUM 5000 UNIT(S): 5000 INJECTION INTRAVENOUS; SUBCUTANEOUS at 14:07

## 2017-04-18 RX ADMIN — SENNA PLUS 2 TABLET(S): 8.6 TABLET ORAL at 22:14

## 2017-04-18 RX ADMIN — SODIUM CHLORIDE 80 MILLILITER(S): 9 INJECTION INTRAMUSCULAR; INTRAVENOUS; SUBCUTANEOUS at 02:21

## 2017-04-18 RX ADMIN — TRAMADOL HYDROCHLORIDE 50 MILLIGRAM(S): 50 TABLET ORAL at 02:35

## 2017-04-18 RX ADMIN — ZOLPIDEM TARTRATE 5 MILLIGRAM(S): 10 TABLET ORAL at 23:10

## 2017-04-18 RX ADMIN — Medication 650 MILLIGRAM(S): at 01:17

## 2017-04-18 RX ADMIN — HEPARIN SODIUM 5000 UNIT(S): 5000 INJECTION INTRAVENOUS; SUBCUTANEOUS at 06:31

## 2017-04-18 RX ADMIN — ATORVASTATIN CALCIUM 20 MILLIGRAM(S): 80 TABLET, FILM COATED ORAL at 22:13

## 2017-04-18 RX ADMIN — Medication 6: at 12:55

## 2017-04-18 RX ADMIN — TRAMADOL HYDROCHLORIDE 50 MILLIGRAM(S): 50 TABLET ORAL at 20:45

## 2017-04-18 RX ADMIN — TRAMADOL HYDROCHLORIDE 50 MILLIGRAM(S): 50 TABLET ORAL at 11:53

## 2017-04-18 RX ADMIN — LISINOPRIL 10 MILLIGRAM(S): 2.5 TABLET ORAL at 06:32

## 2017-04-18 RX ADMIN — AMLODIPINE BESYLATE 10 MILLIGRAM(S): 2.5 TABLET ORAL at 06:31

## 2017-04-18 NOTE — PROGRESS NOTE ADULT - PROBLEM SELECTOR PLAN 3
Unclear etiology at this point.  Hep C neg on prior adm.  Lipid panel unremarkable on last adm.  Could be related to her abdominal distention. Hepatitis panel negative for infectious etiology. Iron panel wnl except elevated Transferrin.    - f/u serum gammaglobulin level, antinuclear antibody, antismooth muscle antibody, and anti-liver/kidney microsomal antibody-1 Pt w/o any h/o intubations, however, multuple admission for asthma exacerbation - most recently earlier this month. Pt refusing Advair stating her doctor for a long time o told her it can cause death.  - c/w Prednisone taper  - c/w Ventolin prn  - Will consider Symbicort as pt is refusing Advair

## 2017-04-18 NOTE — PROGRESS NOTE ADULT - PROBLEM SELECTOR PLAN 7
c/w home Statin Normotensive overnight.   - Restarted Lisinopril now that BEBE resolved  - c/w Amlodipine daily

## 2017-04-18 NOTE — PROGRESS NOTE ADULT - PROBLEM SELECTOR PROBLEM 6
Essential hypertension Type 2 diabetes mellitus with complication, with long-term current use of insulin

## 2017-04-18 NOTE — DISCHARGE NOTE ADULT - PROVIDER TOKENS
TOKEN:'[id number]' TOKEN:'[id number]',FREE:[LAST:[[facility] - Dr. Chuyita Cueto],PHONE:[phone]:[(   [phone]    -],ADDRESS:[[facility] [address] [place] [address]]]

## 2017-04-18 NOTE — PROGRESS NOTE ADULT - PROBLEM SELECTOR PLAN 5
Uncontrolled diabetes with nausea at baseline.  HgbA1c > 10.   - Will dose 25U lantus (80% of home lantus), ISS, FSG qid, diabetes education Unclear etiology at this point.  Hep C neg on prior adm.  Lipid panel unremarkable on last adm.  Could be related to her abdominal distention. Hepatitis panel negative for infectious etiology. Iron panel wnl except elevated Transferrin.    - f/u serum gammaglobulin level, antinuclear antibody, antismooth muscle antibody, and anti-liver/kidney microsomal antibody-1

## 2017-04-18 NOTE — PROGRESS NOTE ADULT - PROBLEM SELECTOR PLAN 4
Pt w/o any h/o intubations, however, multuple admission for asthma exacerbation - most recently earlier this month. Pt refusing Advair stating her doctor for a long time o told her it can cause death.  - c/w Prednisone taper  - c/w Ventolin prn  - Will consider Symbicort as pt is refusing Advair Resolved - Baseline Cr 0.7.  Pt has been taking in fluids but not much other PO intake.  Hb also concentrated and appears slightly dehydrated.  CT scan a/p did not show obstructive stone or hydro.  FeNa of 1.8 suggestive of intrinsic 2/2 UTI and 2mm renal stone seen on CT Abd/Pelvis.  - c/w IVF in setting of CHF (last EF 40-45%)  - Restarted home Lisinopril - pt normotensive at this time.

## 2017-04-18 NOTE — PROGRESS NOTE ADULT - PROBLEM SELECTOR PLAN 6
Normotensive overnight.   - Restarted Lisinopril now that BEBE resolved  - c/w Amlodipine daily Uncontrolled diabetes with nausea at baseline.  HgbA1c > 10.   - Will dose 25U lantus (80% of home lantus), ISS, FSG qid, diabetes education

## 2017-04-18 NOTE — PROGRESS NOTE ADULT - PROBLEM SELECTOR PLAN 9
-  #FLUIDS: None    #ELECTROLYTES: Replete electrolytes PRN    #NUTRITION: c/w cardiac, diabetic diet w/ Glucerna BID, started Vit D supplement

## 2017-04-18 NOTE — DISCHARGE NOTE ADULT - PLAN OF CARE
Improvement of your symptoms You presented to us with urinary symptoms, and were found to have an UTI. Your symptoms improved with IV antibiotics. You need to take antibiotics for few more days as instructed for further management. You have a small stone in your kidney. There is no intervention necessary at this time, and we expect the stone to pass on its own. You have stones in your gall bladder, there is not intervention necessary at this time, and we expect the stones to pass on it's own. You have elevated levels of a hormone called parathyroid hormone. You were also found with low levels of vitamin D. We suspect that your hormone level was high as a result of your vitamin D deficiency. We started you on vitamin D supplement. Please continue taking Vitamin D and follow up with your primary care doctor for further evaluation. You have enlarged and fatty liver. We recommend that you increase your daily activity and avoid fatty foods, and follow up with your primary care doctor for further evaluation. You presented to us with urinary symptoms, and were found to have an UTI. Your symptoms improved with IV antibiotics. You need to take antibiotics for few more days as instructed for further management. Please take ciprofloxacin 500mg one tab twice a day for a total of 7 more days. Please follow up with your primary care provider Dr. Cueto at your scheduled appointment on 5/3/17. You have a small stone in your kidney. There is no intervention necessary at this time, and we expect the stone to pass on its own. You may discuss the benefit of establishing care with a urologist when you see your primary care doctor on 5/3/17.

## 2017-04-18 NOTE — DISCHARGE NOTE ADULT - ADDITIONAL INSTRUCTIONS
Please follow up with your primary care physician (Dr. Chuyita Cueto) at [facility] at [address] [facility] at your scheduled appointment on 5/3/17.

## 2017-04-18 NOTE — DISCHARGE NOTE ADULT - HOSPITAL COURSE
Pt is a 70 y/o F w/ a PMHx of asthma, HTN, Afib, CAD, DM2, depression, and subdural hemorrhage s/p bakari hole who initially presented on 4/16 w/ difficulty urinating and increased abdominal distension. Pt was admitted for acute cystitis, and was continued w/ Zosyn for UTI. Pt had a CT abd/pelvis which was remarkable for hepatic steatosis, cholelithiasis, and a 2mm renal stone. Pt's labs were remarkable for vitamin D deficiency and elevated PTH levels. Pt's urine culture was remarkable for pan-sensitive Klebsiella pneumoniae, upon which pt's abx was changed to (...). At this time, pt is being discharged to home with the instruction to follow up with pt's PCP and complete a (...) day antibiotics course. Pt is a 72 y/o F w/ a PMHx of asthma, HTN, Afib, CAD, DM2, depression, and subdural hemorrhage s/p bakari hole who initially presented on 4/16 w/ difficulty urinating and increased abdominal distension. Pt was admitted for acute cystitis, and was continued w/ Zosyn for UTI. Pt had a CT abd/pelvis which was remarkable for hepatic steatosis, cholelithiasis, and a 2mm renal stone. Pt's labs were remarkable for vitamin D deficiency and elevated PTH levels. She was started on vitamin D supplementation Pt's urine culture was remarkable for pan-sensitive Klebsiella pneumoniae, upon which pt's abx was changed to IV ciprofloxacin x1 day and will be transitioned to oral ciprofloxacin when she is discharged from the hospital. At this time, pt is being discharged to home with the instruction to follow up with pt's PCP Dr. Cueto (at [facility]) and complete a 7 day antibiotics course with cipro 500mg PO BID. She has an appointment scheduled at [facility] with Dr. Cueto on 5/3/17. She will also be discharged with a prescription for atrovent per pt request she does not want to continue fluticasone-salmeterol; as well as a prescription for vitamin D 2000 units daily.

## 2017-04-18 NOTE — PROGRESS NOTE ADULT - PROBLEM SELECTOR PLAN 8
Pt w/ elevated PTH levels - calcium and phos wnl - however, 25-Vit D is low  - f/u thyroid U/S  - f/u Repeat Phos  - Started Vit D supplement c/w home Statin

## 2017-04-18 NOTE — PROGRESS NOTE ADULT - PROBLEM SELECTOR PLAN 2
Resolved - Baseline Cr 0.7.  Pt has been taking in fluids but not much other PO intake.  Hb also concentrated and appears slightly dehydrated.  CT scan a/p did not show obstructive stone or hydro.  FeNa of 1.8 suggestive of intrinsic 2/2 UTI and 2mm renal stone seen on CT Abd/Pelvis.  - c/w IVF in setting of CHF (last EF 40-45%)  - Restarted home Lisinopril - pt normotensive at this time. Pt w/ elevated PTH levels - calcium and phos wnl - however, 25-Vit D is low  - f/u thyroid U/S  - f/u Repeat Phos  - Started Vit D supplement

## 2017-04-18 NOTE — DISCHARGE NOTE ADULT - PATIENT PORTAL LINK FT
“You can access the Follow[facility] Patient Portal, offered by [facility], by registering with the following website: [email or web address]”

## 2017-04-18 NOTE — DISCHARGE NOTE ADULT - MEDICATION SUMMARY - MEDICATIONS TO STOP TAKING
I will STOP taking the medications listed below when I get home from the hospital:    fluticasone-salmeterol 100 mcg-50 mcg inhalation powder  -- 1 puff(s) inhaled 2 times a day    predniSONE 10 mg oral tablet  -- Take 40mg (4 tablets) once a day  Reduce by 5mg EVERY OTHER day     -- It is very important that you take or use this exactly as directed.  Do not skip doses or discontinue unless directed by your doctor.  Obtain medical advice before taking any non-prescription drugs as some may affect the action of this medication.  Take with food or milk.    predniSONE 5 mg oral tablet  -- 1 tab(s) by mouth once a day  -- It is very important that you take or use this exactly as directed.  Do not skip doses or discontinue unless directed by your doctor.  Obtain medical advice before taking any non-prescription drugs as some may affect the action of this medication.  Take with food or milk.

## 2017-04-18 NOTE — PROGRESS NOTE ADULT - PROBLEM SELECTOR PLAN 1
Pt initially presented w/ urinary retention and suprapubic discomfort. Found to have high post-void residual (80 cc on bladder scan). Pt w/ hx of uncontrolled type 2 DM (last A1C 9.0) predisposing to UTI and pseudomonal risk factor. Pt w/ subjective chills at home, however, afebrile thus far. Pt's past urine cx grew Klebsiella pneumoniae sensitive.  However, will need to cover for pseudomonas until urine cx obtained  - c/w Zosyn 3.375 q6h 3/7 days (tx as complicated UTI due to anatomic issue)  - f/u Urine cx - growing > 100K GNR - will follow sensitivity today  - f/u Blood cx - NGTD  - Will c/t monitor for urine outpt and residuals

## 2017-04-18 NOTE — DISCHARGE NOTE ADULT - CARE PLAN
Principal Discharge DX:	Acute cystitis without hematuria  Goal:	Improvement of your symptoms  Instructions for follow-up, activity and diet:	You presented to us with urinary symptoms, and were found to have an UTI. Your symptoms improved with IV antibiotics. You need to take antibiotics for few more days as instructed for further management.  Secondary Diagnosis:	Hepatic steatosis  Instructions for follow-up, activity and diet:	You have enlarged and fatty liver. We recommend that you increase your daily activity and avoid fatty foods, and follow up with your primary care doctor for further evaluation.  Secondary Diagnosis:	Hyperparathyroidism  Instructions for follow-up, activity and diet:	You have elevated levels of a hormone called parathyroid hormone. You were also found with low levels of vitamin D. We suspect that your hormone level was high as a result of your vitamin D deficiency. We started you on vitamin D supplement. Please continue taking Vitamin D and follow up with your primary care doctor for further evaluation.  Secondary Diagnosis:	Cholelithiasis  Instructions for follow-up, activity and diet:	You have stones in your gall bladder, there is not intervention necessary at this time, and we expect the stones to pass on it's own.  Secondary Diagnosis:	Kidney stone  Instructions for follow-up, activity and diet:	You have a small stone in your kidney. There is no intervention necessary at this time, and we expect the stone to pass on its own. Principal Discharge DX:	Acute cystitis without hematuria  Goal:	Improvement of your symptoms  Instructions for follow-up, activity and diet:	You presented to us with urinary symptoms, and were found to have an UTI. Your symptoms improved with IV antibiotics. You need to take antibiotics for few more days as instructed for further management. Please take ciprofloxacin 500mg one tab twice a day for a total of 7 more days. Please follow up with your primary care provider Dr. Cueto at your scheduled appointment on 5/3/17.  Secondary Diagnosis:	Hepatic steatosis  Instructions for follow-up, activity and diet:	You have enlarged and fatty liver. We recommend that you increase your daily activity and avoid fatty foods, and follow up with your primary care doctor for further evaluation.  Secondary Diagnosis:	Hyperparathyroidism  Instructions for follow-up, activity and diet:	You have elevated levels of a hormone called parathyroid hormone. You were also found with low levels of vitamin D. We suspect that your hormone level was high as a result of your vitamin D deficiency. We started you on vitamin D supplement. Please continue taking Vitamin D and follow up with your primary care doctor for further evaluation.  Secondary Diagnosis:	Cholelithiasis  Instructions for follow-up, activity and diet:	You have stones in your gall bladder, there is not intervention necessary at this time, and we expect the stones to pass on it's own.  Secondary Diagnosis:	Kidney stone  Instructions for follow-up, activity and diet:	You have a small stone in your kidney. There is no intervention necessary at this time, and we expect the stone to pass on its own. You may discuss the benefit of establishing care with a urologist when you see your primary care doctor on 5/3/17.

## 2017-04-18 NOTE — DISCHARGE NOTE ADULT - MEDICATION SUMMARY - MEDICATIONS TO TAKE
I will START or STAY ON the medications listed below when I get home from the hospital:    aspirin 81 mg oral delayed release tablet  -- 1 tab(s) by mouth once a day  -- Indication: For Essential hypertension    lisinopril 20 mg oral tablet  -- 1 tab(s) by mouth once a day  -- Indication: For Essential hypertension    Lantus 100 units/mL subcutaneous solution  -- 34 unit(s) subcutaneous once a day (at bedtime)  -- Indication: For Type 2 diabetes mellitus with complication, with long-term current use of insulin    atorvastatin 20 mg oral tablet  -- 1 tab(s) by mouth once a day (at bedtime)  -- Indication: For Hyperlipidemia, unspecified hyperlipidemia type    Ambien 5 mg oral tablet  -- 1 tab(s) by mouth once a day (at bedtime), As Needed Insomnia  -- Indication: For Insomnia    ProAir HFA 90 mcg/inh inhalation aerosol  -- 2 puff(s) inhaled 4 times a day, As Needed  -- Indication: For Uncomplicated asthma, unspecified asthma severity    Atrovent HFA 17 mcg/inh inhalation aerosol  -- 2 puff(s) inhaled every 6 hours  -- For inhalation only.  It is very important that you take or use this exactly as directed.  Do not skip doses or discontinue unless directed by your doctor.    -- Indication: For Uncomplicated asthma, unspecified asthma severity    amLODIPine 10 mg oral tablet  -- 1 tab(s) by mouth once a day  -- Indication: For Essential hypertension    Cipro 500 mg oral tablet  -- 1 tab(s) by mouth every 12 hours  -- Indication: For Urinary tract infection    Vitamin D3 2000 intl units oral capsule  -- 1 cap(s) by mouth once a day  -- Indication: For Vitamin D deficiency

## 2017-04-18 NOTE — PROGRESS NOTE ADULT - SUBJECTIVE AND OBJECTIVE BOX
OVERNIGHT EVENTS:    VITAL SIGNS:   Vital Signs Last 24 Hrs  T(F): 97.7, Max: 99.2 (04-17 @ 18:02)  HR: 62 (62 - 77)  BP: 144/82 (132/84 - 156/78)  RR: 14 (14 - 18)  SpO2: 96% (96% - 100%)  Wt(kg): --    CAPILLARY BLOOD GLUCOSE  153 (07:30)  213 (20:50)  179 (16:51)  345 (12:22)      I&O's Summary      PHYSICAL EXAM:  Constitutional: well appearing, WDWN, NAD  HEENT: NCAT, PEERL, sclera non-icteric, no conjunctival pallor  Neck: supple, no JVD  Respiratory: CTA b/l, no wheezes, No rales, No rhonchi  Cardiovascular: RRR, normal s1/s2, no MRG  Gastrointestinal: soft, NTND, +BS, no guarding, no organomegaly  Extremities: WWP, DP/radial pulses 2+ b/l, no edema  Neurological: AAOx 3, responds to commands, moves all extremities, CN 2-12 intact  Musculoskeletal: 5/5 strength throughout    MEDICATIONS  (STANDING):  heparin  Injectable 5000Unit(s) SubCutaneous every 8 hours  sodium chloride 0.9%. 1000milliLiter(s) IV Continuous <Continuous>  piperacillin/tazobactam IVPB. 3.375Gram(s) IV Intermittent every 6 hours  aspirin enteric coated 81milliGRAM(s) Oral daily  atorvastatin 20milliGRAM(s) Oral at bedtime  amLODIPine   Tablet 10milliGRAM(s) Oral daily  dextrose 5%. 1000milliLiter(s) IV Continuous <Continuous>  dextrose 50% Injectable 12.5Gram(s) IV Push once  dextrose 50% Injectable 25Gram(s) IV Push once  dextrose 50% Injectable 25Gram(s) IV Push once  insulin glargine Injectable (LANTUS) 30Unit(s) SubCutaneous at bedtime  insulin lispro (HumaLOG) corrective regimen sliding scale  SubCutaneous Before meals and at bedtime  lisinopril 10milliGRAM(s) Oral daily  senna 2Tablet(s) Oral at bedtime  cholecalciferol 1000Unit(s) Oral daily    MEDICATIONS  (PRN):  zolpidem 5milliGRAM(s) Oral at bedtime PRN Insomnia  ALBUTerol    90 MICROgram(s) HFA Inhaler 2Puff(s) Inhalation every 6 hours PRN Shortness of Breath and/or Wheezing  dextrose Gel 1Dose(s) Oral once PRN Blood Glucose LESS THAN 70 milliGRAM(s)/deciliter  glucagon  Injectable 1milliGRAM(s) IntraMuscular once PRN Glucose LESS THAN 70 milligrams/deciliter  traMADol 50milliGRAM(s) Oral three times a day PRN Severe Pain (7 - 10)  acetaminophen   Tablet. 650milliGRAM(s) Oral every 6 hours PRN Moderate Pain (4 - 6)      ALLERGIES: adenosine (Unknown)  aspirin (Unknown)      INTOLERANCES: aspirin (Unknown)    LABS:                        11.3   6.6   )-----------( 253      ( 2017 07:35 )             34.4         136  |  104  |  23  ----------------------------<  192<H>  4.1   |  24  |  0.89    Ca    9.0      2017 07:09  Mg     2.1         TPro  8.2  /  Alb  4.1  /  TBili  0.7  /  DBili  x   /  AST  31  /  ALT  37  /  AlkPhos  115  -      Urinalysis Basic - ( 2017 19:45 )    Color: Yellow / Appearance: SL CLOUDY / S.015 / pH: x  Gluc: x / Ketone: NEGATIVE  / Bili: NEGATIVE / Urobili: 0.2 E.U./dL   Blood: x / Protein: NEGATIVE mg/dL / Nitrite: POSITIVE   Leuk Esterase: Moderate / RBC: < 5 /HPF / WBC Many /HPF   Sq Epi: x / Non Sq Epi: x / Bacteria: Many /HPF      RADIOLOGY & ADDITIONAL TESTS: OVERNIGHT EVENTS: AFSANEH    SUBJECTIVE: NAD, denied any CP, SOB. Complained of back generalized body ache.     VITAL SIGNS:  Vital Signs Last 24 Hrs  T(F): 97.7, Max: 99.2 (04-17 @ 18:02)  HR: 62 (62 - 77)  BP: 144/82 (132/84 - 156/78)  RR: 14 (14 - 18)  SpO2: 96% (96% - 100%)    CAPILLARY BLOOD GLUCOSE  153 (07:30)  213 (20:50)  179 (16:51)  345 (12:22)    PHYSICAL EXAM:  General:  NAD, WDWN  HEENT: NC/AT, PERRL-A, no scleral icterus, MMM, no JVD  CV:  RRR, S1, S2, 2+ b/l radial and DP pulses  Lungs: CTAB, no wheezes, no rhonchi  Abdomen: soft, obese, mildly distended, +BS, no rebound, no guarding, no suprapubic tenderness, no CVA tenderness  Extremities: WWP, no edema    MEDICATIONS  (STANDING):  heparin  Injectable 5000Unit(s) SubCutaneous every 8 hours  sodium chloride 0.9%. 1000milliLiter(s) IV Continuous <Continuous>  piperacillin/tazobactam IVPB. 3.375Gram(s) IV Intermittent every 6 hours  aspirin enteric coated 81milliGRAM(s) Oral daily  atorvastatin 20milliGRAM(s) Oral at bedtime  amLODIPine   Tablet 10milliGRAM(s) Oral daily  insulin glargine Injectable (LANTUS) 30Unit(s) SubCutaneous at bedtime  insulin lispro (HumaLOG) corrective regimen sliding scale  SubCutaneous Before meals and at bedtime  lisinopril 10milliGRAM(s) Oral daily  senna 2Tablet(s) Oral at bedtime  cholecalciferol 1000Unit(s) Oral daily    MEDICATIONS  (PRN):  zolpidem 5milliGRAM(s) Oral at bedtime PRN Insomnia  ALBUTerol    90 MICROgram(s) HFA Inhaler 2Puff(s) Inhalation every 6 hours PRN Shortness of Breath and/or Wheezing  traMADol 50milliGRAM(s) Oral three times a day PRN Severe Pain (7 - 10)  acetaminophen   Tablet. 650milliGRAM(s) Oral every 6 hours PRN Moderate Pain (4 - 6)    ALLERGIES: adenosine (Unknown)  aspirin (Unknown)    INTOLERANCES: aspirin (Unknown)    LABS: ( 18 Apr 2017 07:35 )                        11.3   6.6   )-----------( 253                   34.4

## 2017-04-18 NOTE — PROGRESS NOTE ADULT - ATTENDING COMMENTS
above reviewed. patient examined, all data reviewed and interpreted.  pyelonephritis  asthma exacerbation  diabetes  continue IV zosyn  iv fluid  blood sugar control   steroid taper  nebulizer therapy  awaiting culture results
above reviewed, patient examined , data reviewed and interpreted  pyelonephritis  renal colic  diabetes type1  asthma exacerbation  continue IV antibiotics. switch to IV cipro  nebulizer therapy   evaluation  blood sugar control

## 2017-04-18 NOTE — DISCHARGE NOTE ADULT - CARE PROVIDER_API CALL
Brea Cabrera), Medicine  [address] [address]  Phone: [phone]  Fax: [phone] Brea Cabrera), Medicine  [address] [address]  Phone: [phone]  Fax: [phone]    [facility] - Dr. Chuyita Cueto,   [address] [place] [address]  Phone: [phone]  Fax: (   )    -

## 2017-04-19 LAB
ANION GAP SERPL CALC-SCNC: 11 MMOL/L — SIGNIFICANT CHANGE UP (ref 9–16)
BUN SERPL-MCNC: 14 MG/DL — SIGNIFICANT CHANGE UP (ref 7–23)
CALCIUM SERPL-MCNC: 8.9 MG/DL — SIGNIFICANT CHANGE UP (ref 8.5–10.5)
CHLORIDE SERPL-SCNC: 106 MMOL/L — SIGNIFICANT CHANGE UP (ref 96–108)
CO2 SERPL-SCNC: 22 MMOL/L — SIGNIFICANT CHANGE UP (ref 22–31)
CREAT SERPL-MCNC: 0.78 MG/DL — SIGNIFICANT CHANGE UP (ref 0.5–1.3)
GLUCOSE SERPL-MCNC: 181 MG/DL — HIGH (ref 70–99)
HCT VFR BLD CALC: 36.6 % — SIGNIFICANT CHANGE UP (ref 34.5–45)
HGB BLD-MCNC: 11.9 G/DL — SIGNIFICANT CHANGE UP (ref 11.5–15.5)
MAGNESIUM SERPL-MCNC: 1.9 MG/DL — SIGNIFICANT CHANGE UP (ref 1.6–2.4)
MCHC RBC-ENTMCNC: 27.7 PG — SIGNIFICANT CHANGE UP (ref 27–34)
MCHC RBC-ENTMCNC: 32.5 G/DL — SIGNIFICANT CHANGE UP (ref 32–36)
MCV RBC AUTO: 85.1 FL — SIGNIFICANT CHANGE UP (ref 80–100)
PHOSPHATE SERPL-MCNC: 3 MG/DL — SIGNIFICANT CHANGE UP (ref 2.5–4.5)
PLATELET # BLD AUTO: 279 K/UL — SIGNIFICANT CHANGE UP (ref 150–400)
POTASSIUM SERPL-MCNC: 4 MMOL/L — SIGNIFICANT CHANGE UP (ref 3.5–5.3)
POTASSIUM SERPL-SCNC: 4 MMOL/L — SIGNIFICANT CHANGE UP (ref 3.5–5.3)
RBC # BLD: 4.3 M/UL — SIGNIFICANT CHANGE UP (ref 3.8–5.2)
RBC # FLD: 15.3 % — SIGNIFICANT CHANGE UP (ref 10.3–16.9)
SODIUM SERPL-SCNC: 139 MMOL/L — SIGNIFICANT CHANGE UP (ref 135–145)
WBC # BLD: 7.6 K/UL — SIGNIFICANT CHANGE UP (ref 3.8–10.5)
WBC # FLD AUTO: 7.6 K/UL — SIGNIFICANT CHANGE UP (ref 3.8–10.5)

## 2017-04-19 RX ORDER — CIPROFLOXACIN LACTATE 400MG/40ML
400 VIAL (ML) INTRAVENOUS EVERY 12 HOURS
Qty: 0 | Refills: 0 | Status: DISCONTINUED | OUTPATIENT
Start: 2017-04-19 | End: 2017-04-20

## 2017-04-19 RX ORDER — CIPROFLOXACIN LACTATE 400MG/40ML
400 VIAL (ML) INTRAVENOUS ONCE
Qty: 0 | Refills: 0 | Status: COMPLETED | OUTPATIENT
Start: 2017-04-19 | End: 2017-04-19

## 2017-04-19 RX ADMIN — SENNA PLUS 2 TABLET(S): 8.6 TABLET ORAL at 21:24

## 2017-04-19 RX ADMIN — LISINOPRIL 20 MILLIGRAM(S): 2.5 TABLET ORAL at 06:44

## 2017-04-19 RX ADMIN — INSULIN GLARGINE 30 UNIT(S): 100 INJECTION, SOLUTION SUBCUTANEOUS at 21:23

## 2017-04-19 RX ADMIN — AMLODIPINE BESYLATE 10 MILLIGRAM(S): 2.5 TABLET ORAL at 06:41

## 2017-04-19 RX ADMIN — Medication 5 MILLIGRAM(S): at 06:44

## 2017-04-19 RX ADMIN — HEPARIN SODIUM 5000 UNIT(S): 5000 INJECTION INTRAVENOUS; SUBCUTANEOUS at 06:43

## 2017-04-19 RX ADMIN — TRAMADOL HYDROCHLORIDE 50 MILLIGRAM(S): 50 TABLET ORAL at 16:33

## 2017-04-19 RX ADMIN — HEPARIN SODIUM 5000 UNIT(S): 5000 INJECTION INTRAVENOUS; SUBCUTANEOUS at 13:49

## 2017-04-19 RX ADMIN — SODIUM CHLORIDE 80 MILLILITER(S): 9 INJECTION INTRAMUSCULAR; INTRAVENOUS; SUBCUTANEOUS at 10:49

## 2017-04-19 RX ADMIN — Medication 1000 UNIT(S): at 11:32

## 2017-04-19 RX ADMIN — Medication 2: at 09:24

## 2017-04-19 RX ADMIN — Medication 6: at 11:58

## 2017-04-19 RX ADMIN — Medication 200 MILLIGRAM(S): at 00:28

## 2017-04-19 RX ADMIN — TRAMADOL HYDROCHLORIDE 50 MILLIGRAM(S): 50 TABLET ORAL at 01:04

## 2017-04-19 RX ADMIN — TRAMADOL HYDROCHLORIDE 50 MILLIGRAM(S): 50 TABLET ORAL at 14:24

## 2017-04-19 RX ADMIN — Medication 81 MILLIGRAM(S): at 11:32

## 2017-04-19 RX ADMIN — ZOLPIDEM TARTRATE 5 MILLIGRAM(S): 10 TABLET ORAL at 23:54

## 2017-04-19 RX ADMIN — Medication 100 MILLIGRAM(S): at 11:32

## 2017-04-19 RX ADMIN — Medication 200 MILLIGRAM(S): at 17:32

## 2017-04-19 RX ADMIN — TRAMADOL HYDROCHLORIDE 50 MILLIGRAM(S): 50 TABLET ORAL at 22:13

## 2017-04-19 RX ADMIN — Medication 4: at 17:41

## 2017-04-19 RX ADMIN — ATORVASTATIN CALCIUM 20 MILLIGRAM(S): 80 TABLET, FILM COATED ORAL at 21:23

## 2017-04-19 RX ADMIN — HEPARIN SODIUM 5000 UNIT(S): 5000 INJECTION INTRAVENOUS; SUBCUTANEOUS at 21:24

## 2017-04-19 RX ADMIN — Medication 6: at 21:24

## 2017-04-19 RX ADMIN — TRAMADOL HYDROCHLORIDE 50 MILLIGRAM(S): 50 TABLET ORAL at 02:00

## 2017-04-19 RX ADMIN — Medication 650 MILLIGRAM(S): at 19:40

## 2017-04-19 RX ADMIN — TRAMADOL HYDROCHLORIDE 50 MILLIGRAM(S): 50 TABLET ORAL at 22:40

## 2017-04-19 RX ADMIN — Medication 650 MILLIGRAM(S): at 19:09

## 2017-04-19 NOTE — PROGRESS NOTE ADULT - PROBLEM SELECTOR PLAN 5
Uncontrolled diabetes with nausea at baseline.  HgbA1c > 10.   - Will dose 25U lantus (80% of home lantus), ISS, FSG qid, diabetes education

## 2017-04-19 NOTE — PROGRESS NOTE ADULT - ASSESSMENT
72yo F w/ PMH asthma, HTN, Afib, CAD, DM2, depression, and subdural hemorrhage s/p bakari hole p/w difficulty urinating and abdominal distension, with UTI. Now improving and transitioned from zosyn to ciprofloxacin.

## 2017-04-19 NOTE — PROGRESS NOTE ADULT - PROBLEM SELECTOR PLAN 3
Pt w/o any h/o intubations, however, multuple admission for asthma exacerbation - most recently earlier this month. Pt refusing Advair stating her doctor for a long time o told her it can cause death.  - no longer on prednisone  - c/w Ventolin prn

## 2017-04-19 NOTE — PROGRESS NOTE ADULT - PROBLEM SELECTOR PLAN 2
Rule out; pt with low vitamin D and elevated PTH  - Started Vit D supplementation  - monitor calcium/phos  - will need re-checked as outpatient after VDD corrected

## 2017-04-19 NOTE — PROGRESS NOTE ADULT - PROBLEM SELECTOR PLAN 4
Unclear etiology at this point.  Hep C neg on prior adm.  Lipid panel unremarkable on last adm.  Could be related to her abdominal distention. Hepatitis panel negative for infectious etiology. Iron panel wnl except elevated Transferrin.    - f/u serum gammaglobulin level, antinuclear antibody, antismooth muscle antibody, and anti-liver/kidney microsomal antibody-1

## 2017-04-19 NOTE — PROGRESS NOTE ADULT - SUBJECTIVE AND OBJECTIVE BOX
OVERNIGHT EVENTS:    SUBJECTIVE / INTERVAL HPI: Patient seen and examined at bedside.     VITAL SIGNS:  Vital Signs Last 24 Hrs  T(C): 35.6, Max: 36.9 (04-18 @ 09:36)  T(F): 96, Max: 98.4 (04-18 @ 09:36)  HR: 90 (60 - 90)  BP: 140/75 (132/83 - 168/72)  BP(mean): --  RR: 16 (14 - 16)  SpO2: 96% (96% - 99%)    PHYSICAL EXAM:    General: WDWN  HEENT: NC/AT; PERRL, anicteric sclera; MMM  Neck: supple  Cardiovascular: +S1/S2; RRR  Respiratory: CTA B/L; no W/R/R  Gastrointestinal: soft, NT/ND; +BSx4  Extremities: WWP; no edema, clubbing or cyanosis  Vascular: 2+ radial, DP/PT pulses B/L  Neurological: AAOx3; no focal deficits    MEDICATIONS:  MEDICATIONS  (STANDING):  heparin  Injectable 5000Unit(s) SubCutaneous every 8 hours  sodium chloride 0.9%. 1000milliLiter(s) IV Continuous <Continuous>  aspirin enteric coated 81milliGRAM(s) Oral daily  atorvastatin 20milliGRAM(s) Oral at bedtime  amLODIPine   Tablet 10milliGRAM(s) Oral daily  dextrose 5%. 1000milliLiter(s) IV Continuous <Continuous>  dextrose 50% Injectable 12.5Gram(s) IV Push once  dextrose 50% Injectable 25Gram(s) IV Push once  dextrose 50% Injectable 25Gram(s) IV Push once  insulin glargine Injectable (LANTUS) 30Unit(s) SubCutaneous at bedtime  insulin lispro (HumaLOG) corrective regimen sliding scale  SubCutaneous Before meals and at bedtime  senna 2Tablet(s) Oral at bedtime  cholecalciferol 1000Unit(s) Oral daily  lisinopril 20milliGRAM(s) Oral daily  docusate sodium 100milliGRAM(s) Oral daily  ciprofloxacin   IVPB  IV Intermittent   ciprofloxacin   IVPB 400milliGRAM(s) IV Intermittent every 12 hours    MEDICATIONS  (PRN):  zolpidem 5milliGRAM(s) Oral at bedtime PRN Insomnia  ALBUTerol    90 MICROgram(s) HFA Inhaler 2Puff(s) Inhalation every 6 hours PRN Shortness of Breath and/or Wheezing  dextrose Gel 1Dose(s) Oral once PRN Blood Glucose LESS THAN 70 milliGRAM(s)/deciliter  glucagon  Injectable 1milliGRAM(s) IntraMuscular once PRN Glucose LESS THAN 70 milligrams/deciliter  traMADol 50milliGRAM(s) Oral three times a day PRN Severe Pain (7 - 10)  acetaminophen   Tablet. 650milliGRAM(s) Oral every 6 hours PRN Moderate Pain (4 - 6)      ALLERGIES:  Allergies    adenosine (Unknown)    Intolerances    aspirin (Unknown)      LABS:                        11.9   7.6   )-----------( 279      ( 19 Apr 2017 08:04 )             36.6     04-19    139  |  106  |  14  ----------------------------<  181<H>  4.0   |  22  |  0.78    Ca    8.9      19 Apr 2017 08:04  Phos  3.0     04-19  Mg     1.9     04-19          CAPILLARY BLOOD GLUCOSE  186 (19 Apr 2017 07:28)      RADIOLOGY & ADDITIONAL TESTS: Reviewed.    ASSESSMENT:    PLAN: OVERNIGHT EVENTS: no acute events overnight.    SUBJECTIVE / INTERVAL HPI: Patient seen and examined at bedside. No new complaints this morning other than still feeling a bit achy in her back.    VITAL SIGNS:  Vital Signs Last 24 Hrs  T(C): 35.6, Max: 36.9 (04-18 @ 09:36)  T(F): 96, Max: 98.4 (04-18 @ 09:36)  HR: 90 (60 - 90)  BP: 140/75 (132/83 - 168/72)  BP(mean): --  RR: 16 (14 - 16)  SpO2: 96% (96% - 99%)    PHYSICAL EXAM:    General: WDWN female resting comfortably in bed; NAD  HEENT: NC/AT; PERRL, anicteric sclera; MMM  Neck: supple, no JVD  Cardiovascular: +S1/S2; RRR  Respiratory: CTA B/L; no W/R/R  Gastrointestinal: obese and soft, NT/ND; +BSx4  Genitourinary: no CVAT  Extremities: WWP; no edema  Vascular: 2+ radial, DP/PT pulses B/L  Neurological: A&O; conversive and follows commands    MEDICATIONS:  MEDICATIONS  (STANDING):  heparin  Injectable 5000Unit(s) SubCutaneous every 8 hours  sodium chloride 0.9%. 1000milliLiter(s) IV Continuous <Continuous>  aspirin enteric coated 81milliGRAM(s) Oral daily  atorvastatin 20milliGRAM(s) Oral at bedtime  amLODIPine   Tablet 10milliGRAM(s) Oral daily  dextrose 5%. 1000milliLiter(s) IV Continuous <Continuous>  dextrose 50% Injectable 12.5Gram(s) IV Push once  dextrose 50% Injectable 25Gram(s) IV Push once  dextrose 50% Injectable 25Gram(s) IV Push once  insulin glargine Injectable (LANTUS) 30Unit(s) SubCutaneous at bedtime  insulin lispro (HumaLOG) corrective regimen sliding scale  SubCutaneous Before meals and at bedtime  senna 2Tablet(s) Oral at bedtime  cholecalciferol 1000Unit(s) Oral daily  lisinopril 20milliGRAM(s) Oral daily  docusate sodium 100milliGRAM(s) Oral daily  ciprofloxacin   IVPB  IV Intermittent   ciprofloxacin   IVPB 400milliGRAM(s) IV Intermittent every 12 hours    MEDICATIONS  (PRN):  zolpidem 5milliGRAM(s) Oral at bedtime PRN Insomnia  ALBUTerol    90 MICROgram(s) HFA Inhaler 2Puff(s) Inhalation every 6 hours PRN Shortness of Breath and/or Wheezing  dextrose Gel 1Dose(s) Oral once PRN Blood Glucose LESS THAN 70 milliGRAM(s)/deciliter  glucagon  Injectable 1milliGRAM(s) IntraMuscular once PRN Glucose LESS THAN 70 milligrams/deciliter  traMADol 50milliGRAM(s) Oral three times a day PRN Severe Pain (7 - 10)  acetaminophen   Tablet. 650milliGRAM(s) Oral every 6 hours PRN Moderate Pain (4 - 6)      ALLERGIES:  Allergies    adenosine (Unknown)    Intolerances    aspirin (Unknown)      LABS:                        11.9   7.6   )-----------( 279      ( 19 Apr 2017 08:04 )             36.6     04-19    139  |  106  |  14  ----------------------------<  181<H>  4.0   |  22  |  0.78    Ca    8.9      19 Apr 2017 08:04  Phos  3.0     04-19  Mg     1.9     04-19          CAPILLARY BLOOD GLUCOSE  186 (19 Apr 2017 07:28)      RADIOLOGY & ADDITIONAL TESTS: Reviewed.    ASSESSMENT:    PLAN:

## 2017-04-19 NOTE — PROGRESS NOTE ADULT - PROBLEM SELECTOR PLAN 1
Improving now with Ucx resulting to sensitive klebsiella; off zosyn  - transitioned from zosyn to ciprofloxacin 400mg IV q12h (day 4/7 - tx complicated UTI)   - bcx (NGTD)  - monitor UOP

## 2017-04-20 VITALS
SYSTOLIC BLOOD PRESSURE: 143 MMHG | OXYGEN SATURATION: 98 % | DIASTOLIC BLOOD PRESSURE: 82 MMHG | TEMPERATURE: 99 F | RESPIRATION RATE: 18 BRPM | HEART RATE: 69 BPM

## 2017-04-20 PROCEDURE — 83735 ASSAY OF MAGNESIUM: CPT

## 2017-04-20 PROCEDURE — 83970 ASSAY OF PARATHORMONE: CPT

## 2017-04-20 PROCEDURE — 36415 COLL VENOUS BLD VENIPUNCTURE: CPT

## 2017-04-20 PROCEDURE — 82570 ASSAY OF URINE CREATININE: CPT

## 2017-04-20 PROCEDURE — 87186 SC STD MICRODIL/AGAR DIL: CPT

## 2017-04-20 PROCEDURE — 85025 COMPLETE CBC W/AUTO DIFF WBC: CPT

## 2017-04-20 PROCEDURE — 71046 X-RAY EXAM CHEST 2 VIEWS: CPT

## 2017-04-20 PROCEDURE — 84100 ASSAY OF PHOSPHORUS: CPT

## 2017-04-20 PROCEDURE — 96374 THER/PROPH/DIAG INJ IV PUSH: CPT

## 2017-04-20 PROCEDURE — 84300 ASSAY OF URINE SODIUM: CPT

## 2017-04-20 PROCEDURE — 81001 URINALYSIS AUTO W/SCOPE: CPT

## 2017-04-20 PROCEDURE — 82787 IGG 1 2 3 OR 4 EACH: CPT

## 2017-04-20 PROCEDURE — 82306 VITAMIN D 25 HYDROXY: CPT

## 2017-04-20 PROCEDURE — 80048 BASIC METABOLIC PNL TOTAL CA: CPT

## 2017-04-20 PROCEDURE — 86255 FLUORESCENT ANTIBODY SCREEN: CPT

## 2017-04-20 PROCEDURE — 80074 ACUTE HEPATITIS PANEL: CPT

## 2017-04-20 PROCEDURE — 82746 ASSAY OF FOLIC ACID SERUM: CPT

## 2017-04-20 PROCEDURE — 99285 EMERGENCY DEPT VISIT HI MDM: CPT | Mod: 25

## 2017-04-20 PROCEDURE — 83550 IRON BINDING TEST: CPT

## 2017-04-20 PROCEDURE — 82607 VITAMIN B-12: CPT

## 2017-04-20 PROCEDURE — 97161 PT EVAL LOW COMPLEX 20 MIN: CPT

## 2017-04-20 PROCEDURE — 84466 ASSAY OF TRANSFERRIN: CPT

## 2017-04-20 PROCEDURE — 80053 COMPREHEN METABOLIC PANEL: CPT

## 2017-04-20 PROCEDURE — 85045 AUTOMATED RETICULOCYTE COUNT: CPT

## 2017-04-20 PROCEDURE — 85027 COMPLETE CBC AUTOMATED: CPT

## 2017-04-20 PROCEDURE — 87086 URINE CULTURE/COLONY COUNT: CPT

## 2017-04-20 PROCEDURE — 82310 ASSAY OF CALCIUM: CPT

## 2017-04-20 PROCEDURE — 86706 HEP B SURFACE ANTIBODY: CPT

## 2017-04-20 PROCEDURE — 83935 ASSAY OF URINE OSMOLALITY: CPT

## 2017-04-20 PROCEDURE — 82728 ASSAY OF FERRITIN: CPT

## 2017-04-20 PROCEDURE — 74176 CT ABD & PELVIS W/O CONTRAST: CPT

## 2017-04-20 RX ORDER — MOXIFLOXACIN HYDROCHLORIDE TABLETS, 400 MG 400 MG/1
1 TABLET, FILM COATED ORAL
Qty: 14 | Refills: 0 | OUTPATIENT
Start: 2017-04-20 | End: 2017-04-27

## 2017-04-20 RX ORDER — CHOLECALCIFEROL (VITAMIN D3) 125 MCG
1 CAPSULE ORAL
Qty: 30 | Refills: 0
Start: 2017-04-20 | End: 2017-05-20

## 2017-04-20 RX ORDER — IPRATROPIUM BROMIDE 0.2 MG/ML
2 SOLUTION, NON-ORAL INHALATION
Qty: 1 | Refills: 0 | OUTPATIENT
Start: 2017-04-20

## 2017-04-20 RX ADMIN — TRAMADOL HYDROCHLORIDE 50 MILLIGRAM(S): 50 TABLET ORAL at 06:24

## 2017-04-20 RX ADMIN — Medication 100 MILLIGRAM(S): at 11:32

## 2017-04-20 RX ADMIN — AMLODIPINE BESYLATE 10 MILLIGRAM(S): 2.5 TABLET ORAL at 06:24

## 2017-04-20 RX ADMIN — TRAMADOL HYDROCHLORIDE 50 MILLIGRAM(S): 50 TABLET ORAL at 15:15

## 2017-04-20 RX ADMIN — Medication 2: at 07:23

## 2017-04-20 RX ADMIN — TRAMADOL HYDROCHLORIDE 50 MILLIGRAM(S): 50 TABLET ORAL at 07:18

## 2017-04-20 RX ADMIN — Medication 1000 UNIT(S): at 11:32

## 2017-04-20 RX ADMIN — LISINOPRIL 20 MILLIGRAM(S): 2.5 TABLET ORAL at 06:24

## 2017-04-20 RX ADMIN — TRAMADOL HYDROCHLORIDE 50 MILLIGRAM(S): 50 TABLET ORAL at 14:32

## 2017-04-20 RX ADMIN — Medication 81 MILLIGRAM(S): at 11:32

## 2017-04-20 RX ADMIN — Medication 200 MILLIGRAM(S): at 06:24

## 2017-04-20 RX ADMIN — Medication 6: at 12:57

## 2017-04-20 RX ADMIN — HEPARIN SODIUM 5000 UNIT(S): 5000 INJECTION INTRAVENOUS; SUBCUTANEOUS at 13:02

## 2017-04-20 RX ADMIN — HEPARIN SODIUM 5000 UNIT(S): 5000 INJECTION INTRAVENOUS; SUBCUTANEOUS at 06:24

## 2017-04-20 NOTE — PHYSICAL THERAPY INITIAL EVALUATION ADULT - ADDITIONAL COMMENTS
Patient lives with spouse who has AD but is functionally independent. Patient receives 5 hours/ 5x/ week. Patient states she ambulates with rollator and uses wheelchair for community ambulation. Patient requires assist with food shopping and showering which the aide assists with.

## 2017-04-20 NOTE — PHYSICAL THERAPY INITIAL EVALUATION ADULT - PERTINENT HX OF CURRENT PROBLEM, REHAB EVAL
Patient is a 71 year old female p/w difficulty urinating and heaviness around bladder for the past 2 days.

## 2017-04-24 DIAGNOSIS — Z79.52 LONG TERM (CURRENT) USE OF SYSTEMIC STEROIDS: ICD-10-CM

## 2017-04-24 DIAGNOSIS — N39.0 URINARY TRACT INFECTION, SITE NOT SPECIFIED: ICD-10-CM

## 2017-04-24 DIAGNOSIS — R33.9 RETENTION OF URINE, UNSPECIFIED: ICD-10-CM

## 2017-04-24 DIAGNOSIS — E86.0 DEHYDRATION: ICD-10-CM

## 2017-04-24 DIAGNOSIS — Z79.4 LONG TERM (CURRENT) USE OF INSULIN: ICD-10-CM

## 2017-04-24 DIAGNOSIS — K76.0 FATTY (CHANGE OF) LIVER, NOT ELSEWHERE CLASSIFIED: ICD-10-CM

## 2017-04-24 DIAGNOSIS — K80.20 CALCULUS OF GALLBLADDER WITHOUT CHOLECYSTITIS WITHOUT OBSTRUCTION: ICD-10-CM

## 2017-04-24 DIAGNOSIS — B96.1 KLEBSIELLA PNEUMONIAE [K. PNEUMONIAE] AS THE CAUSE OF DISEASES CLASSIFIED ELSEWHERE: ICD-10-CM

## 2017-04-24 DIAGNOSIS — N30.00 ACUTE CYSTITIS WITHOUT HEMATURIA: ICD-10-CM

## 2017-04-24 DIAGNOSIS — I10 ESSENTIAL (PRIMARY) HYPERTENSION: ICD-10-CM

## 2017-04-24 DIAGNOSIS — E55.9 VITAMIN D DEFICIENCY, UNSPECIFIED: ICD-10-CM

## 2017-04-24 DIAGNOSIS — I48.91 UNSPECIFIED ATRIAL FIBRILLATION: ICD-10-CM

## 2017-04-24 DIAGNOSIS — Z79.82 LONG TERM (CURRENT) USE OF ASPIRIN: ICD-10-CM

## 2017-04-24 DIAGNOSIS — E78.5 HYPERLIPIDEMIA, UNSPECIFIED: ICD-10-CM

## 2017-04-24 DIAGNOSIS — E21.3 HYPERPARATHYROIDISM, UNSPECIFIED: ICD-10-CM

## 2017-04-24 DIAGNOSIS — I25.10 ATHEROSCLEROTIC HEART DISEASE OF NATIVE CORONARY ARTERY WITHOUT ANGINA PECTORIS: ICD-10-CM

## 2017-04-24 DIAGNOSIS — E11.65 TYPE 2 DIABETES MELLITUS WITH HYPERGLYCEMIA: ICD-10-CM

## 2017-04-24 DIAGNOSIS — N20.0 CALCULUS OF KIDNEY: ICD-10-CM

## 2017-04-24 DIAGNOSIS — J45.909 UNSPECIFIED ASTHMA, UNCOMPLICATED: ICD-10-CM

## 2017-04-24 DIAGNOSIS — F32.9 MAJOR DEPRESSIVE DISORDER, SINGLE EPISODE, UNSPECIFIED: ICD-10-CM

## 2017-04-24 DIAGNOSIS — Z79.01 LONG TERM (CURRENT) USE OF ANTICOAGULANTS: ICD-10-CM

## 2017-04-30 ENCOUNTER — INPATIENT (INPATIENT)
Facility: HOSPITAL | Age: 72
LOS: 2 days | Discharge: HOME | DRG: 552 | End: 2017-05-03
Attending: INTERNAL MEDICINE | Admitting: INTERNAL MEDICINE
Payer: MEDICARE

## 2017-04-30 VITALS
TEMPERATURE: 98 F | RESPIRATION RATE: 17 BRPM | OXYGEN SATURATION: 983 % | SYSTOLIC BLOOD PRESSURE: 144 MMHG | HEART RATE: 101 BPM | DIASTOLIC BLOOD PRESSURE: 75 MMHG

## 2017-04-30 DIAGNOSIS — I25.10 ATHEROSCLEROTIC HEART DISEASE OF NATIVE CORONARY ARTERY WITHOUT ANGINA PECTORIS: ICD-10-CM

## 2017-04-30 DIAGNOSIS — I48.91 UNSPECIFIED ATRIAL FIBRILLATION: ICD-10-CM

## 2017-04-30 DIAGNOSIS — I62.01 NONTRAUMATIC ACUTE SUBDURAL HEMORRHAGE: Chronic | ICD-10-CM

## 2017-04-30 DIAGNOSIS — I10 ESSENTIAL (PRIMARY) HYPERTENSION: ICD-10-CM

## 2017-04-30 DIAGNOSIS — E11.9 TYPE 2 DIABETES MELLITUS WITHOUT COMPLICATIONS: ICD-10-CM

## 2017-04-30 DIAGNOSIS — M54.5 LOW BACK PAIN: ICD-10-CM

## 2017-04-30 DIAGNOSIS — R63.8 OTHER SYMPTOMS AND SIGNS CONCERNING FOOD AND FLUID INTAKE: ICD-10-CM

## 2017-04-30 DIAGNOSIS — Z29.9 ENCOUNTER FOR PROPHYLACTIC MEASURES, UNSPECIFIED: ICD-10-CM

## 2017-04-30 DIAGNOSIS — J45.909 UNSPECIFIED ASTHMA, UNCOMPLICATED: ICD-10-CM

## 2017-04-30 DIAGNOSIS — Z96.651 PRESENCE OF RIGHT ARTIFICIAL KNEE JOINT: Chronic | ICD-10-CM

## 2017-04-30 LAB
APPEARANCE UR: CLEAR — SIGNIFICANT CHANGE UP
BILIRUB UR-MCNC: NEGATIVE — SIGNIFICANT CHANGE UP
COLOR SPEC: YELLOW — SIGNIFICANT CHANGE UP
DIFF PNL FLD: NEGATIVE — SIGNIFICANT CHANGE UP
GLUCOSE UR QL: NEGATIVE — SIGNIFICANT CHANGE UP
KETONES UR-MCNC: NEGATIVE — SIGNIFICANT CHANGE UP
LEUKOCYTE ESTERASE UR-ACNC: (no result)
NITRITE UR-MCNC: NEGATIVE — SIGNIFICANT CHANGE UP
PH UR: 6 — SIGNIFICANT CHANGE UP (ref 5–8)
PROT UR-MCNC: NEGATIVE MG/DL — SIGNIFICANT CHANGE UP
SP GR SPEC: 1.02 — SIGNIFICANT CHANGE UP (ref 1–1.03)
UROBILINOGEN FLD QL: 0.2 E.U./DL — SIGNIFICANT CHANGE UP

## 2017-04-30 PROCEDURE — 93010 ELECTROCARDIOGRAM REPORT: CPT

## 2017-04-30 PROCEDURE — 99285 EMERGENCY DEPT VISIT HI MDM: CPT | Mod: 25

## 2017-04-30 PROCEDURE — 74176 CT ABD & PELVIS W/O CONTRAST: CPT | Mod: 26

## 2017-04-30 PROCEDURE — 72110 X-RAY EXAM L-2 SPINE 4/>VWS: CPT | Mod: 26

## 2017-04-30 RX ORDER — INSULIN GLARGINE 100 [IU]/ML
16 INJECTION, SOLUTION SUBCUTANEOUS AT BEDTIME
Qty: 0 | Refills: 0 | Status: DISCONTINUED | OUTPATIENT
Start: 2017-04-30 | End: 2017-05-02

## 2017-04-30 RX ORDER — SODIUM CHLORIDE 9 MG/ML
1000 INJECTION, SOLUTION INTRAVENOUS
Qty: 0 | Refills: 0 | Status: DISCONTINUED | OUTPATIENT
Start: 2017-04-30 | End: 2017-05-02

## 2017-04-30 RX ORDER — TIOTROPIUM BROMIDE 18 UG/1
1 CAPSULE ORAL; RESPIRATORY (INHALATION) DAILY
Qty: 0 | Refills: 0 | Status: DISCONTINUED | OUTPATIENT
Start: 2017-04-30 | End: 2017-05-03

## 2017-04-30 RX ORDER — INSULIN LISPRO 100/ML
VIAL (ML) SUBCUTANEOUS
Qty: 0 | Refills: 0 | Status: DISCONTINUED | OUTPATIENT
Start: 2017-04-30 | End: 2017-05-02

## 2017-04-30 RX ORDER — MORPHINE SULFATE 50 MG/1
2 CAPSULE, EXTENDED RELEASE ORAL ONCE
Qty: 0 | Refills: 0 | Status: DISCONTINUED | OUTPATIENT
Start: 2017-04-30 | End: 2017-04-30

## 2017-04-30 RX ORDER — ACETAMINOPHEN 500 MG
650 TABLET ORAL EVERY 6 HOURS
Qty: 0 | Refills: 0 | Status: DISCONTINUED | OUTPATIENT
Start: 2017-04-30 | End: 2017-05-03

## 2017-04-30 RX ORDER — LISINOPRIL 2.5 MG/1
20 TABLET ORAL DAILY
Qty: 0 | Refills: 0 | Status: DISCONTINUED | OUTPATIENT
Start: 2017-04-30 | End: 2017-05-03

## 2017-04-30 RX ORDER — ATORVASTATIN CALCIUM 80 MG/1
20 TABLET, FILM COATED ORAL AT BEDTIME
Qty: 0 | Refills: 0 | Status: DISCONTINUED | OUTPATIENT
Start: 2017-04-30 | End: 2017-05-03

## 2017-04-30 RX ORDER — ONDANSETRON 8 MG/1
4 TABLET, FILM COATED ORAL ONCE
Qty: 0 | Refills: 0 | Status: COMPLETED | OUTPATIENT
Start: 2017-04-30 | End: 2017-04-30

## 2017-04-30 RX ORDER — BUDESONIDE AND FORMOTEROL FUMARATE DIHYDRATE 160; 4.5 UG/1; UG/1
2 AEROSOL RESPIRATORY (INHALATION)
Qty: 0 | Refills: 0 | Status: DISCONTINUED | OUTPATIENT
Start: 2017-04-30 | End: 2017-05-03

## 2017-04-30 RX ORDER — AMLODIPINE BESYLATE 2.5 MG/1
10 TABLET ORAL DAILY
Qty: 0 | Refills: 0 | Status: DISCONTINUED | OUTPATIENT
Start: 2017-04-30 | End: 2017-05-03

## 2017-04-30 RX ORDER — DEXTROSE 50 % IN WATER 50 %
12.5 SYRINGE (ML) INTRAVENOUS ONCE
Qty: 0 | Refills: 0 | Status: DISCONTINUED | OUTPATIENT
Start: 2017-04-30 | End: 2017-05-02

## 2017-04-30 RX ORDER — HEPARIN SODIUM 5000 [USP'U]/ML
5000 INJECTION INTRAVENOUS; SUBCUTANEOUS EVERY 8 HOURS
Qty: 0 | Refills: 0 | Status: DISCONTINUED | OUTPATIENT
Start: 2017-04-30 | End: 2017-05-03

## 2017-04-30 RX ORDER — GABAPENTIN 400 MG/1
300 CAPSULE ORAL AT BEDTIME
Qty: 0 | Refills: 0 | Status: DISCONTINUED | OUTPATIENT
Start: 2017-04-30 | End: 2017-05-03

## 2017-04-30 RX ORDER — ONDANSETRON 8 MG/1
4 TABLET, FILM COATED ORAL EVERY 6 HOURS
Qty: 0 | Refills: 0 | Status: DISCONTINUED | OUTPATIENT
Start: 2017-04-30 | End: 2017-05-03

## 2017-04-30 RX ORDER — CHOLECALCIFEROL (VITAMIN D3) 125 MCG
2000 CAPSULE ORAL DAILY
Qty: 0 | Refills: 0 | Status: DISCONTINUED | OUTPATIENT
Start: 2017-04-30 | End: 2017-05-03

## 2017-04-30 RX ORDER — DEXTROSE 50 % IN WATER 50 %
25 SYRINGE (ML) INTRAVENOUS ONCE
Qty: 0 | Refills: 0 | Status: DISCONTINUED | OUTPATIENT
Start: 2017-04-30 | End: 2017-05-02

## 2017-04-30 RX ORDER — ZOLPIDEM TARTRATE 10 MG/1
5 TABLET ORAL AT BEDTIME
Qty: 0 | Refills: 0 | Status: DISCONTINUED | OUTPATIENT
Start: 2017-04-30 | End: 2017-04-30

## 2017-04-30 RX ORDER — ALBUTEROL 90 UG/1
2 AEROSOL, METERED ORAL EVERY 6 HOURS
Qty: 0 | Refills: 0 | Status: DISCONTINUED | OUTPATIENT
Start: 2017-04-30 | End: 2017-05-03

## 2017-04-30 RX ORDER — ZALEPLON 10 MG
5 CAPSULE ORAL AT BEDTIME
Qty: 0 | Refills: 0 | Status: DISCONTINUED | OUTPATIENT
Start: 2017-04-30 | End: 2017-05-03

## 2017-04-30 RX ORDER — DEXTROSE 50 % IN WATER 50 %
1 SYRINGE (ML) INTRAVENOUS ONCE
Qty: 0 | Refills: 0 | Status: DISCONTINUED | OUTPATIENT
Start: 2017-04-30 | End: 2017-05-02

## 2017-04-30 RX ORDER — TRAMADOL HYDROCHLORIDE 50 MG/1
50 TABLET ORAL EVERY 12 HOURS
Qty: 0 | Refills: 0 | Status: DISCONTINUED | OUTPATIENT
Start: 2017-04-30 | End: 2017-05-03

## 2017-04-30 RX ORDER — ASPIRIN/CALCIUM CARB/MAGNESIUM 324 MG
81 TABLET ORAL DAILY
Qty: 0 | Refills: 0 | Status: DISCONTINUED | OUTPATIENT
Start: 2017-04-30 | End: 2017-05-03

## 2017-04-30 RX ORDER — GLUCAGON INJECTION, SOLUTION 0.5 MG/.1ML
1 INJECTION, SOLUTION SUBCUTANEOUS ONCE
Qty: 0 | Refills: 0 | Status: DISCONTINUED | OUTPATIENT
Start: 2017-04-30 | End: 2017-05-02

## 2017-04-30 RX ORDER — CEFTRIAXONE 500 MG/1
1 INJECTION, POWDER, FOR SOLUTION INTRAMUSCULAR; INTRAVENOUS ONCE
Qty: 0 | Refills: 0 | Status: COMPLETED | OUTPATIENT
Start: 2017-04-30 | End: 2017-04-30

## 2017-04-30 RX ADMIN — MORPHINE SULFATE 2 MILLIGRAM(S): 50 CAPSULE, EXTENDED RELEASE ORAL at 01:49

## 2017-04-30 RX ADMIN — ONDANSETRON 4 MILLIGRAM(S): 8 TABLET, FILM COATED ORAL at 01:49

## 2017-04-30 RX ADMIN — TIOTROPIUM BROMIDE 1 CAPSULE(S): 18 CAPSULE ORAL; RESPIRATORY (INHALATION) at 17:19

## 2017-04-30 RX ADMIN — BUDESONIDE AND FORMOTEROL FUMARATE DIHYDRATE 2 PUFF(S): 160; 4.5 AEROSOL RESPIRATORY (INHALATION) at 17:18

## 2017-04-30 RX ADMIN — AMLODIPINE BESYLATE 10 MILLIGRAM(S): 2.5 TABLET ORAL at 11:45

## 2017-04-30 RX ADMIN — Medication 4: at 21:17

## 2017-04-30 RX ADMIN — INSULIN GLARGINE 16 UNIT(S): 100 INJECTION, SOLUTION SUBCUTANEOUS at 21:17

## 2017-04-30 RX ADMIN — ATORVASTATIN CALCIUM 20 MILLIGRAM(S): 80 TABLET, FILM COATED ORAL at 21:16

## 2017-04-30 RX ADMIN — CEFTRIAXONE 100 GRAM(S): 500 INJECTION, POWDER, FOR SOLUTION INTRAMUSCULAR; INTRAVENOUS at 05:36

## 2017-04-30 RX ADMIN — Medication 1 TABLET(S): at 11:45

## 2017-04-30 RX ADMIN — LISINOPRIL 20 MILLIGRAM(S): 2.5 TABLET ORAL at 11:45

## 2017-04-30 RX ADMIN — Medication 81 MILLIGRAM(S): at 11:45

## 2017-04-30 RX ADMIN — HEPARIN SODIUM 5000 UNIT(S): 5000 INJECTION INTRAVENOUS; SUBCUTANEOUS at 13:45

## 2017-04-30 RX ADMIN — ALBUTEROL 2 PUFF(S): 90 AEROSOL, METERED ORAL at 22:34

## 2017-04-30 RX ADMIN — Medication 10: at 17:30

## 2017-04-30 RX ADMIN — GABAPENTIN 300 MILLIGRAM(S): 400 CAPSULE ORAL at 21:20

## 2017-04-30 RX ADMIN — HEPARIN SODIUM 5000 UNIT(S): 5000 INJECTION INTRAVENOUS; SUBCUTANEOUS at 21:17

## 2017-04-30 RX ADMIN — TRAMADOL HYDROCHLORIDE 50 MILLIGRAM(S): 50 TABLET ORAL at 15:23

## 2017-04-30 RX ADMIN — TRAMADOL HYDROCHLORIDE 50 MILLIGRAM(S): 50 TABLET ORAL at 16:40

## 2017-04-30 RX ADMIN — Medication 2000 UNIT(S): at 13:36

## 2017-04-30 NOTE — H&P ADULT - PROBLEM SELECTOR PLAN 2
Hb A1C 9.0. Patient reports she is prescribed Lantus 34u and Humalog 15, 15, 25 pre-meal but has been taking half of the prescribed doses because of symptomatic hyperglycemia. Hb A1C 9.0. Patient reports she is prescribed Lantus 34u and Humalog 15, 15, 25 pre-meal but has been taking half of the prescribed doses because of symptomatic hyperglycemia.  -will monitor FS throughout the day and dose bedtime Lantus as needed  -c/w Gabapentin for peripheral neuropathy

## 2017-04-30 NOTE — H&P ADULT - NSHPLABSRESULTS_GEN_ALL_CORE
.  LABS:                         11.5   6.1   )-----------( 284      ( 2017 01:56 )             34.2         139  |  104  |  15  ----------------------------<  57<L>  3.9   |  26  |  0.80    Ca    9.0      2017 03:00    TPro  7.4  /  Alb  3.6  /  TBili  0.6  /  DBili  x   /  AST  33  /  ALT  37  /  AlkPhos  107  04-30    PT/INR - ( 2017 01:56 )   PT: 11.2 sec;   INR: 1.01          PTT - ( 2017 01:56 )  PTT:30.3 sec  Urinalysis Basic - ( 2017 03:09 )    Color: Yellow / Appearance: Clear / S.020 / pH: x  Gluc: x / Ketone: NEGATIVE  / Bili: NEGATIVE / Urobili: 0.2 E.U./dL   Blood: x / Protein: NEGATIVE mg/dL / Nitrite: NEGATIVE   Leuk Esterase: Small / RBC: < 5 /HPF / WBC < 5 /HPF   Sq Epi: x / Non Sq Epi: Moderate /HPF / Bacteria: Present /HPF    CT abdomen/pelvis no contrast: No hydronephrosis. No obstructing renal or ureteral calculi. Cholelithiasis without surrounding inflammatory change.    RADIOLOGY, EKG & ADDITIONAL TESTS: Reviewed.

## 2017-04-30 NOTE — H&P ADULT - HISTORY OF PRESENT ILLNESS
72yo F with PMHx asthma, HTN, Afib s/p ablation 2 yrs ago not on AC, ASD s/p repair, CAD, IDDM, depression, subdural hemorrhage s/p bakari hole, recently admitted 4/16/17 - 4/20/17 for UTI presents with lower back pain. Patient reports back pain ongoing for the past few months, described as lower back pain on the right side of the spine in the lumbar area, sharp, stabbing, non-radiating, 6-10 out of 10 in severity, with no exacerbating factors and somewhat relieved by Tramadol. Patient feels symptoms are related to the kidney stone that she was told she had on the prior admission. Patient was recently here 10 days ago, diagnosed with a Klebsiella UTI and had a CT a/p which showed 0.2cm non-obstructing left renal stone. Additionally, patient reports chills, states she is always cold, and is also nauseous with decreased appetite. Denies fever, abdominal pain, vomiting, diarrhea, dyrusia, increased frequency, CP, SOB, sick contacts or recent travel. At baseline, could walk 1/2 block with walker before she gets SOB.     In the ED, VS: 98.0 144/75 101 17 98% RA  Given Ceftriaxone 1g IV, Morphine 2mg IV, Zofran 4mg IV

## 2017-04-30 NOTE — H&P ADULT - ASSESSMENT
72yo F with PMHx asthma, HTN, Afib s/p ablation 2 yrs ago not on AC, ASD s/p repair, CAD, IDDM, depression, subdural hemorrhage s/p bakari hole, recently admitted 4/16/17 - 4/20/17 for UTI presents with lower back pain.

## 2017-04-30 NOTE — H&P ADULT - PROBLEM SELECTOR PLAN 1
Back pain in the right iliac crest region, appears chronic in nature. Likely musculoskeletal vs. 2/2 disc herniation as seen on recent MRI. Followed by Dr. Sandra. Unlikely "flank pain" 2/2 UTI/pyelo/kidney stones as CT does not show any kidney stones, patient completed course of Abx, UA not indicative of infection, and no urinary symptoms.   -Xray L-spine  -Tramadol PRN and Tylenol PRN for back pain  -will continue to follow with Dr. Sandra

## 2017-04-30 NOTE — H&P ADULT - PROBLEM SELECTOR PLAN 6
Asthma/COPD. No signs of exacerbation. Lungs CTA, saturating well on RA.  -c/w Albuterol HFA PRN Asthma/COPD. No signs of exacerbation. Lungs CTA, saturating well on RA.  -c/w Symbicort, Spiriva, Albuterol HFA PRN

## 2017-04-30 NOTE — H&P ADULT - NSHPREVIEWOFSYSTEMS_GEN_ALL_CORE
CONSTITUTIONAL: No weakness, fevers + chills  EYES/ENT: No visual changes;  No vertigo or throat pain   NECK: No pain or stiffness  RESPIRATORY: No cough, wheezing, hemoptysis; No shortness of breath  CARDIOVASCULAR: No chest pain or palpitations  GASTROINTESTINAL: No abdominal or epigastric pain. No nausea, + vomiting, or hematemesis; No diarrhea or constipation. No melena or hematochezia.  GENITOURINARY: No dysuria, frequency or hematuria  NEUROLOGICAL: No numbness or weakness  SKIN: No itching, burning, rashes, or lesions   All other review of systems is negative unless indicated above.

## 2017-04-30 NOTE — H&P ADULT - NSHPSOCIALHISTORY_GEN_ALL_CORE
Patient lives at home with .   Walks with a walker.   Denies alcohol use, tobacco use or illicit drug use.

## 2017-04-30 NOTE — ED PROVIDER NOTE - PROGRESS NOTE DETAILS
pt with persistent R flank pain. spoke with Dr. Cabrera - recommend repeat CT no acute abnormalities on CT - pt with pain does not feel comfortable going home. will give ceftriaxone as urine culture sensitive to it during last admission. initial metabolic panel extremely abnormal - lab was difficult to obtain - repeat lab sent electrolytes WNL.  pt ate sandwich.    pt with persistent R flank pain. spoke with Dr. Cabrera - recommend repeat CT

## 2017-04-30 NOTE — ED ADULT NURSE REASSESSMENT NOTE - NS ED NURSE REASSESS COMMENT FT1
Received patient from [facility]. Patient sleeping with family at bedside. Patient admitted, awaiting bed and sign out. Patient receiving IV abx.

## 2017-04-30 NOTE — ED ADULT NURSE NOTE - OBJECTIVE STATEMENT
73 y/o female with hx of kidney stones, asthma, and HTN was BIBA for increased right flank pain accompanied with nausea and decreased PO intake since last Thursday. Pt verbalized that she was evaluated recently and she was diagnose with a kidney stones and was told to come back to the ER if pain intensifies. Upon assessment, abdomen soft, no visible injuries noted, lung fields WNL, breathing is equal and unlabored, pulses palpable. Pt denies chest pain, dizziness, headache, blurred vision, slurred speech, nausea, vomiting, diarrhea, chills, LOC, SOB, weakness, dysuria, fatigue, recent travel, recent injury, and palpitations. Care in progress. Awaiting disposition

## 2017-04-30 NOTE — ED PROVIDER NOTE - OBJECTIVE STATEMENT
72F htn, dm, afib, SDH, high chol, asthma, c/o R flank pain. pt states has had intermittent pain since admission. was recently admitted for UTI, treated with cipro. no vomiting, +nausea. no fevers. no chest pain, no SOB. taking tramadol with only minimal relief. had CT during recent admission showing 0.2cm nonobstructing L renal stone. no dysuria.

## 2017-04-30 NOTE — H&P ADULT - NSHPPHYSICALEXAM_GEN_ALL_CORE
T(C): 37.1, Max: 37.1 (04-30 @ 10:12)  T(F): 98.8, Max: 98.8 (04-30 @ 10:12)  HR: 84 (84 - 101)  BP: 137/83 (133/83 - 144/75)  BP(mean): --  RR: 18 (17 - 18)  SpO2: 96% (96% - 983%)  Wt(kg): --    PHYSICAL EXAM:    Constitutional: WDWN resting comfortably in bed; NAD  Head: NC/AT  Eyes: PERRL, EOMI, anicteric sclera  ENT: no nasal discharge; uvula midline, no oropharyngeal erythema or exudates; MMM  Neck: supple; no JVD or thyromegaly  Respiratory: CTA B/L; no W/R/R, no retractions  Cardiac: +S1/S2; RRR; no M/R/G  Gastrointestinal: soft, NT/ND; no rebound or guarding; +BSx4  Genitourinary: normal external genitalia  Back: spine midline, no bony tenderness or step-offs; no CVAT B/L, pain to palpation in right iliac crest  Extremities: WWP, no clubbing or cyanosis; no peripheral edema  Musculoskeletal: NROM x4; no joint swelling, tenderness or erythema  Vascular: 2+ radial, DP/PT pulses B/L  Dermatologic: skin warm, dry and intact; no rashes, wounds, or scars  Lymphatic: no submandibular or cervical LAD  Neurologic: AAOx3; CNII-XII grossly intact; no focal deficits  Psychiatric: affect and characteristics of appearance, verbalizations, behaviors are appropriate

## 2017-05-01 ENCOUNTER — TRANSCRIPTION ENCOUNTER (OUTPATIENT)
Age: 72
End: 2017-05-01

## 2017-05-01 LAB
APPEARANCE UR: CLEAR — SIGNIFICANT CHANGE UP
BILIRUB UR-MCNC: NEGATIVE — SIGNIFICANT CHANGE UP
COLOR SPEC: SIGNIFICANT CHANGE UP
DIFF PNL FLD: NEGATIVE — SIGNIFICANT CHANGE UP
GLUCOSE UR QL: 250
HCT VFR BLD CALC: 33.3 % — LOW (ref 34.5–45)
HGB BLD-MCNC: 11.3 G/DL — LOW (ref 11.5–15.5)
KETONES UR-MCNC: (no result) MG/DL
LEUKOCYTE ESTERASE UR-ACNC: NEGATIVE — SIGNIFICANT CHANGE UP
MCHC RBC-ENTMCNC: 28.7 PG — SIGNIFICANT CHANGE UP (ref 27–34)
MCHC RBC-ENTMCNC: 33.9 G/DL — SIGNIFICANT CHANGE UP (ref 32–36)
MCV RBC AUTO: 84.5 FL — SIGNIFICANT CHANGE UP (ref 80–100)
NITRITE UR-MCNC: POSITIVE
PH UR: 5 — SIGNIFICANT CHANGE UP (ref 5–8)
PLATELET # BLD AUTO: 267 K/UL — SIGNIFICANT CHANGE UP (ref 150–400)
PROT UR-MCNC: (no result) MG/DL
RBC # BLD: 3.94 M/UL — SIGNIFICANT CHANGE UP (ref 3.8–5.2)
RBC # FLD: 14.5 % — SIGNIFICANT CHANGE UP (ref 10.3–16.9)
SP GR SPEC: 1.02 — SIGNIFICANT CHANGE UP (ref 1–1.03)
UROBILINOGEN FLD QL: 2 E.U./DL
WBC # BLD: 4.7 K/UL — SIGNIFICANT CHANGE UP (ref 3.8–10.5)
WBC # FLD AUTO: 4.7 K/UL — SIGNIFICANT CHANGE UP (ref 3.8–10.5)

## 2017-05-01 RX ORDER — TRAMADOL HYDROCHLORIDE 50 MG/1
50 TABLET ORAL ONCE
Qty: 0 | Refills: 0 | Status: DISCONTINUED | OUTPATIENT
Start: 2017-05-01 | End: 2017-05-01

## 2017-05-01 RX ORDER — ZOLPIDEM TARTRATE 10 MG/1
5 TABLET ORAL AT BEDTIME
Qty: 0 | Refills: 0 | Status: DISCONTINUED | OUTPATIENT
Start: 2017-05-01 | End: 2017-05-03

## 2017-05-01 RX ORDER — PHENAZOPYRIDINE HCL 100 MG
100 TABLET ORAL EVERY 8 HOURS
Qty: 0 | Refills: 0 | Status: DISCONTINUED | OUTPATIENT
Start: 2017-05-01 | End: 2017-05-03

## 2017-05-01 RX ADMIN — TRAMADOL HYDROCHLORIDE 50 MILLIGRAM(S): 50 TABLET ORAL at 02:20

## 2017-05-01 RX ADMIN — BUDESONIDE AND FORMOTEROL FUMARATE DIHYDRATE 2 PUFF(S): 160; 4.5 AEROSOL RESPIRATORY (INHALATION) at 18:34

## 2017-05-01 RX ADMIN — ATORVASTATIN CALCIUM 20 MILLIGRAM(S): 80 TABLET, FILM COATED ORAL at 21:55

## 2017-05-01 RX ADMIN — HEPARIN SODIUM 5000 UNIT(S): 5000 INJECTION INTRAVENOUS; SUBCUTANEOUS at 21:54

## 2017-05-01 RX ADMIN — Medication 1 TABLET(S): at 11:43

## 2017-05-01 RX ADMIN — Medication 4: at 18:34

## 2017-05-01 RX ADMIN — Medication: at 13:10

## 2017-05-01 RX ADMIN — Medication 6: at 21:54

## 2017-05-01 RX ADMIN — TRAMADOL HYDROCHLORIDE 50 MILLIGRAM(S): 50 TABLET ORAL at 01:50

## 2017-05-01 RX ADMIN — Medication 81 MILLIGRAM(S): at 11:43

## 2017-05-01 RX ADMIN — AMLODIPINE BESYLATE 10 MILLIGRAM(S): 2.5 TABLET ORAL at 06:56

## 2017-05-01 RX ADMIN — HEPARIN SODIUM 5000 UNIT(S): 5000 INJECTION INTRAVENOUS; SUBCUTANEOUS at 14:49

## 2017-05-01 RX ADMIN — INSULIN GLARGINE 16 UNIT(S): 100 INJECTION, SOLUTION SUBCUTANEOUS at 21:54

## 2017-05-01 RX ADMIN — TRAMADOL HYDROCHLORIDE 50 MILLIGRAM(S): 50 TABLET ORAL at 14:50

## 2017-05-01 RX ADMIN — TRAMADOL HYDROCHLORIDE 50 MILLIGRAM(S): 50 TABLET ORAL at 18:40

## 2017-05-01 RX ADMIN — Medication 100 MILLIGRAM(S): at 14:49

## 2017-05-01 RX ADMIN — LISINOPRIL 20 MILLIGRAM(S): 2.5 TABLET ORAL at 06:56

## 2017-05-01 RX ADMIN — Medication 2000 UNIT(S): at 11:43

## 2017-05-01 RX ADMIN — BUDESONIDE AND FORMOTEROL FUMARATE DIHYDRATE 2 PUFF(S): 160; 4.5 AEROSOL RESPIRATORY (INHALATION) at 06:58

## 2017-05-01 RX ADMIN — HEPARIN SODIUM 5000 UNIT(S): 5000 INJECTION INTRAVENOUS; SUBCUTANEOUS at 06:58

## 2017-05-01 NOTE — DIETITIAN INITIAL EVALUATION ADULT. - NS AS NUTRI INTERV ED CONTENT
Purpose of the nutrition education/Nutrition relationship to health/disease/admits to nutrition education  past admissions

## 2017-05-01 NOTE — DIETITIAN INITIAL EVALUATION ADULT. - OTHER INFO
risk - high , po < 50 ,pain comes and goes, no n/v/d/c , skin - intact , no noted food allergys  weight hx vague , pta - no diet followed strictly
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
## 2017-05-01 NOTE — CONSULT NOTE ADULT - SUBJECTIVE AND OBJECTIVE BOX
Patient is a 72y old  Female who presents with a chief complaint of right-sided lower back pain (2017 10:34)      HPI:  70yo F with PMHx asthma, HTN, Afib s/p ablation 2 yrs ago not on AC, ASD s/p repair, CAD, IDDM, depression, subdural hemorrhage s/p bakari hole, recently admitted 17 - 17 for UTI presents with lower back pain. Patient reports back pain ongoing for the past few months, described as lower back pain on the right side of the spine in the lumbar area, sharp, stabbing, non-radiating, 6-10 out of 10 in severity, with no exacerbating factors and somewhat relieved by Tramadol. Patient feels symptoms are related to the kidney stone that she was told she had on the prior admission. Patient was recently here 10 days ago, diagnosed with a Klebsiella UTI and had a CT a/p which showed 0.2cm non-obstructing left renal stone. Additionally, patient reports chills, states she is always cold, and is also nauseous with decreased appetite. Denies fever, abdominal pain, vomiting, diarrhea, dyrusia, increased frequency, CP, SOB, sick contacts or recent travel. At baseline, could walk 1/2 block with walker before she gets SOB.     In the ED, VS: 98.0 144/75 101 17 98% RA  Given Ceftriaxone 1g IV, Morphine 2mg IV, Zofran 4mg IV (2017 10:34)      PAST MEDICAL & SURGICAL HISTORY:  Depression  Asthma: Asthma  Type 2 diabetes mellitus: DM (diabetes mellitus)  Hyperlipidemia: Hyperlipidemia  Essential hypertension: HTN (hypertension)  Atrial fibrillation: s/p ablation in   Atherosclerosis of coronary artery: NOn obstructive  Subdural hemorrhage: s/p bakari hole ()  Persistent ostium secundum: s/p repair ()  Acute subdural hematoma  History of knee replacement, total, right  Other postprocedural status: S/P atrial septal defect closure, surgical  Status post tubal ligation: S/P tubal ligation      MEDICATIONS  (STANDING):  aspirin enteric coated 81milliGRAM(s) Oral daily  lisinopril 20milliGRAM(s) Oral daily  atorvastatin 20milliGRAM(s) Oral at bedtime  amLODIPine   Tablet 10milliGRAM(s) Oral daily  multivitamin 1Tablet(s) Oral daily  cholecalciferol 2000Unit(s) Oral daily  insulin lispro (HumaLOG) corrective regimen sliding scale  SubCutaneous Before meals and at bedtime  dextrose 5%. 1000milliLiter(s) IV Continuous <Continuous>  dextrose 50% Injectable 12.5Gram(s) IV Push once  dextrose 50% Injectable 25Gram(s) IV Push once  dextrose 50% Injectable 25Gram(s) IV Push once  heparin  Injectable 5000Unit(s) SubCutaneous every 8 hours  gabapentin 300milliGRAM(s) Oral at bedtime  tiotropium 18 MICROgram(s) Capsule 1Capsule(s) Inhalation daily  buDESOnide 160 MICROgram(s)/formoterol 4.5 MICROgram(s) Inhaler 2Puff(s) Inhalation two times a day  insulin glargine Injectable (LANTUS) 16Unit(s) SubCutaneous at bedtime    MEDICATIONS  (PRN):  ondansetron Injectable 4milliGRAM(s) IV Push every 6 hours PRN Nausea and/or Vomiting  ALBUTerol    90 MICROgram(s) HFA Inhaler 2Puff(s) Inhalation every 6 hours PRN Shortness of Breath and/or Wheezing  zaleplon 5milliGRAM(s) Oral at bedtime PRN Insomnia  dextrose Gel 1Dose(s) Oral once PRN Blood Glucose LESS THAN 70 milliGRAM(s)/deciliter  glucagon  Injectable 1milliGRAM(s) IntraMuscular once PRN Glucose LESS THAN 70 milligrams/deciliter  acetaminophen   Tablet. 650milliGRAM(s) Oral every 6 hours PRN Moderate Pain (4 - 6)  traMADol 50milliGRAM(s) Oral every 12 hours PRN Severe Pain (7 - 10)  phenazopyridine 100milliGRAM(s) Oral every 8 hours PRN dysuria      Social History: lives with spouse in an elevator accessible apartment building, no home care services    Functional Level Prior to Admission: reports AD independent, walks with a walker    FAMILY HISTORY:  Family history of hypertension (Mother)  Family history of ischemic heart disease (Father): Family history of coronary artery disease in mother      CBC Full  -  ( 2017 01:56 )  WBC Count : 6.1 K/uL  Hemoglobin : 11.5 g/dL  Hematocrit : 34.2 %  Platelet Count - Automated : 284 K/uL  Mean Cell Volume : 84.2 fL  Mean Cell Hemoglobin : 28.3 pg  Mean Cell Hemoglobin Concentration : 33.6 g/dL  Auto Neutrophil # : x  Auto Lymphocyte # : x  Auto Monocyte # : x  Auto Eosinophil # : x  Auto Basophil # : x  Auto Neutrophil % : 59.2 %  Auto Lymphocyte % : 27.2 %  Auto Monocyte % : 7.3 %  Auto Eosinophil % : 5.6 %  Auto Basophil % : 0.7 %          139  |  104  |  15  ----------------------------<  57<L>  3.9   |  26  |  0.80    Ca    9.0      2017 03:00    TPro  7.4  /  Alb  3.6  /  TBili  0.6  /  DBili  x   /  AST  33  /  ALT  37  /  AlkPhos  107        Urinalysis Basic - ( 2017 03:09 )    Color: Yellow / Appearance: Clear / S.020 / pH: x  Gluc: x / Ketone: NEGATIVE  / Bili: NEGATIVE / Urobili: 0.2 E.U./dL   Blood: x / Protein: NEGATIVE mg/dL / Nitrite: NEGATIVE   Leuk Esterase: Small / RBC: < 5 /HPF / WBC < 5 /HPF   Sq Epi: x / Non Sq Epi: Moderate /HPF / Bacteria: Present /HPF          Radiology:        Vital Signs Last 24 Hrs  T(C): 36.8, Max: 37.1 ( @ 10:12)  T(F): 98.3, Max: 98.8 ( @ 10:12)  HR: 65 (65 - 84)  BP: 129/77 (129/77 - 143/87)  BP(mean): --  RR: 18 (16 - 18)  SpO2: 95% (95% - 98%)    REVIEW OF SYSTEMS:    CONSTITUTIONAL: No fever, weight loss, or fatigue  EYES: No eye pain, visual disturbances, or discharge  ENMT:  No difficulty hearing, tinnitus, vertigo; No sinus or throat pain  NECK: No pain or stiffness  BREASTS: No pain, masses, or nipple discharge  RESPIRATORY: No cough, wheezing, chills or hemoptysis; No shortness of breath  CARDIOVASCULAR: No chest pain, palpitations, dizziness, or leg swelling  GASTROINTESTINAL: No abdominal or epigastric pain. No nausea, vomiting, or hematemesis; No diarrhea or constipation. No melena or hematochezia.  GENITOURINARY: No dysuria, frequency, hematuria, or incontinence  NEUROLOGICAL: No headaches, memory loss, loss of strength, numbness, or tremors  SKIN: No itching, burning, rashes, or lesions   LYMPH NODES: No enlarged glands  ENDOCRINE: No heat or cold intolerance; No hair loss  MUSCULOSKELETAL: low back pain , right sciatica  PSYCHIATRIC: No depression, anxiety, mood swings, or difficulty sleeping  HEME/LYMPH: No easy bruising, or bleeding gums  ALLERGY AND IMMUNOLOGIC: No hives or eczema      Physical Exam: WDWN AA woman lying in bed, c/o intermittent Right sciatica/ numbness right leg    HEENT: normocephalic/ atraumatic, anicteric    Neck: supple, negative JVD, negative carotid bruits,    Chest: CTA bilaterally, neg wheeze, rhonchi, rales, crackles, egophany    Cardiovascular: regular rate and rhythm, neg murmurs/rubs/gallops    Abdomen: soft, non tender, negative rebound/guarding, non distended, normal bowel sounds, neg hepatosplenomegaly    Extremities: WWP, neg cyanosis/clubbing/edema, negative calf tenderness to palpation, negative Erika's sign      Spine: normal curvature, neg ecchymosis, roxana L4-S1 paraspinal tenderness, neg spasms, neg straight leg raise/ Lasegue's/ Kris's sign        Neurologic Exam:    Alert and oriented to person, place, date/year, speech fluent w/o dysarthria, repetition intact, comprehension intact,     Cranial Nerves:     II:                       pupils equal, round and reactive to light, visual fields intact   III/ IV/VI:             extraocular movements intact, neg nystagmus, ptosis  V:                      facial sensation intact, V1-3 normal  VII:                     face symmetric, no droop, normal eye closure and smile  VIII:                    hearing intact to finger rub bilaterally  IX/ X:                  soft palate rise symmetrical  XI:                      head turning, shoulder shrug normal  XII:                     tongue midline    Motor Exam:    Bilateral UE:        5/5 /intrinsics                            5/5 biceps/triceps/wrist extensors-flexors/deltoid                            negative pronator drift      Bilateral LE:        4+/5 hip flexors/adductors/abductors                            5/5 quadriceps/hamstrings                            5/5 dorsiflexors/plantar flexors/invertors-evertors    Sensory: intact to LT/PP in all UE/LE dermatomes    DTR:       =  biceps/     triceps/     brachioradialis                =  patella/   medial hamstring/    ankle                 neg clonus                 neg Babinski                 neg Hoffmans    Finger to Nose: wnl    Heel to Shin:      wnl    Rapid Alternating movements:  wnl    Joint Position Sense:  intact    Romberg: NT    Tandem Walking: NT    Gait:  NT        PM&R Impression:    1) Lumbar myofascial pain/ lumbar DDD/ spinal stenosis  2) deconditioned  3) gait dysfunction      Recommendations:    1) Physical therapy focusing on therapeutic exercises, bed mobility/transfer out of bed evaluation, progressive ambulation with assistive devices.    2) Disposition Plan: d/c home, f/u as outpatient 1 week

## 2017-05-01 NOTE — DISCHARGE NOTE ADULT - MEDICATION SUMMARY - MEDICATIONS TO TAKE
I will START or STAY ON the medications listed below when I get home from the hospital:    aspirin 81 mg oral delayed release tablet  -- 1 tab(s) by mouth once a day  -- Indication: For Atherosclerosis of coronary artery    lisinopril 20 mg oral tablet  -- 1 tab(s) by mouth once a day  -- Indication: For Essential hypertension    gabapentin 300 mg oral capsule  -- 1 cap(s) by mouth once a day (at bedtime)  -- Indication: For Peripheral neuropahy     HumaLOG 100 units/mL subcutaneous solution  --  subcutaneous 3 times a day  -- Indication: For Diabetes     Lantus 100 units/mL subcutaneous solution  -- 16 unit(s) subcutaneous once a day (at bedtime)  -- Indication: For Diabetes     atorvastatin 20 mg oral tablet  -- 1 tab(s) by mouth once a day (at bedtime)  -- Indication: For Atherosclerosis of coronary artery    Ambien 5 mg oral tablet  -- 1 tab(s) by mouth once a day (at bedtime), As Needed Insomnia  -- Indication: For Insomnia    ProAir HFA 90 mcg/inh inhalation aerosol  -- 2 puff(s) inhaled 4 times a day, As Needed  -- Indication: For Asthma     Spiriva 18 mcg inhalation capsule  -- 1 cap(s) inhaled once a day  -- Indication: For Asthma    Symbicort 160 mcg-4.5 mcg/inh inhalation aerosol  -- 2 puff(s) inhaled 2 times a day  -- Indication: For Asthma    amLODIPine 10 mg oral tablet  -- 1 tab(s) by mouth once a day  -- Indication: For Hypertension     multivitamin  -- 1 tab(s) by mouth once a day  -- Indication: For Vitamin    Vitamin D3 2000 intl units oral capsule  -- 1 cap(s) by mouth once a day  -- Indication: For Vitamin

## 2017-05-01 NOTE — DISCHARGE NOTE ADULT - PLAN OF CARE
Please continue your Symbicort, Spiriva, and albuterol inhaler as needed. Please continue aspirin and lipitor. Please continue lisinopril and norvasc Treatment of symptoms You presented with right sided lower back pain. You underwent a CT scan which as negative for kidney stones. Your spine XR was negative for fracture. You had your urine tested, which was negative for infection. You were seen by Dr. Sandra and Dr. Amador, who concluded you have lumbar radiculopathy (pinched nerve in lumbar spine) that is likely causing your symptoms. Continue to use tylenol and tramadol at home for back pain. Please make an appointment to follow up with Dr. Sandra in 1 week. Please make an appointment to follow up with Dr. Amador within 3-4 weeks. Please continue your home insulin regimen and follow up with Dr. Cabrera in 3-4 weeks. Please continue lisinopril and norvasc. You presented with right sided lower back pain. You underwent a CT scan which as negative for kidney stones. Your spine XR was negative for fracture. You had your urine tested, which was negative for infection. You were seen by Dr. Sandra and Dr. Amador, who concluded you have lumbar radiculopathy (pinched nerve in lumbar spine) that is likely causing your symptoms. Continue to use tylenol and tramadol at home for back pain. Please make an appointment to follow up with Dr. Sandra in 1 week. Please make an appointment to follow up with Dr. Amador within 3-4 weeks. You will likely have MRI as an outpatient. Please continue aspirin and Lipitor. Please continue lisinopril and Norvasc. You presented with right sided lower back pain. You underwent a CT scan which was negative for kidney stones. Your spine XR was negative for fracture. You had your urine tested, which was negative for infection. You were seen by Dr. Sandra and Dr. Amador, who concluded you have lumbar radiculopathy (pinched nerve in lumbar spine) that is likely causing your symptoms. Continue to use tylenol and tramadol at home for back pain. Please make an appointment to follow up with Dr. Sandra in 1 week. Please make an appointment to follow up with Dr. Amador within 3-4 weeks. You will likely have MRI as an outpatient.

## 2017-05-01 NOTE — CONSULT NOTE ADULT - ASSESSMENT
70yo F with PMHx asthma, HTN, Afib s/p ablation 2 yrs ago not on AC, ASD s/p repair, CAD, IDDM, depression, subdural hemorrhage s/p bakari hole, recently admitted 4/16/17 - 4/20/17 for UTI presents with lower back pain.    Problem/Plan - 1:  ·  Problem: Low back pain.  Plan: Back pain in the right iliac crest region, appears chronic in nature. Likely musculoskeletal vs. 2/2 disc herniation as seen on recent MRI. Followed by Dr. Sandra. Unlikely "flank pain" 2/2 UTI/pyelo/kidney stones as CT does not show any kidney stones, patient completed course of Abx, UA not indicative of infection, and no urinary symptoms.   -Xray L-spine  -Tramadol PRN and Tylenol PRN for back pain

## 2017-05-01 NOTE — DISCHARGE NOTE ADULT - PATIENT PORTAL LINK FT
“You can access the [facility] Patient Portal, offered by [facility], by registering with the following website: [email or web address]”

## 2017-05-01 NOTE — DISCHARGE NOTE ADULT - PROVIDER TOKENS
TOKEN:'[id number]:'[id number] TOKEN:'[id number]:'[id number]:'[id number]'

## 2017-05-01 NOTE — CONSULT NOTE ADULT - CONSULT REASON
PM&R evaluation: low back pain, patient is well known to me as outpatient with h/o low back pain/ right sciatica, h/o lumbar degenerative disc disease/spinal stenosis

## 2017-05-01 NOTE — PROGRESS NOTE ADULT - SUBJECTIVE AND OBJECTIVE BOX
INTERVAL HPI/OVERNIGHT EVENTS:  Patient seen and examined at bedside. No o/n events, patient resting comfortably. No complaints at this time. Patient denies fever, chills, dizziness, weakness, CP, palpitations, SOB, cough, N/V/D/C, dysuria, changes in bowel movements, LE edema.    VITAL SIGNS:  T(F): 98.3  HR: 65  BP: 129/77  RR: 18  SpO2: 95%  Wt(kg): --    PHYSICAL EXAM:    Constitutional: WDWN, NAD,   Eyes: PERRL, EOMI, sclera non-icteric  Neck: supple, no masses, no JVD  Respiratory: CTA b/l, good air entry b/l, no wheezing, rhonchi, rales, with normal respiratory effort and no intercostal retractions  Cardiovascular: RRR, normal S1S2, no M/R/G  Gastrointestinal: soft, NTND, no masses palpable, BS normal in all four quadrants, no HSM  Extremities: WWM, no c/c/e  Neurological: AAOx3  Skin: Normal temperature    MEDICATIONS  (STANDING):  aspirin enteric coated 81milliGRAM(s) Oral daily  lisinopril 20milliGRAM(s) Oral daily  atorvastatin 20milliGRAM(s) Oral at bedtime  amLODIPine   Tablet 10milliGRAM(s) Oral daily  multivitamin 1Tablet(s) Oral daily  cholecalciferol 2000Unit(s) Oral daily  insulin lispro (HumaLOG) corrective regimen sliding scale  SubCutaneous Before meals and at bedtime  dextrose 5%. 1000milliLiter(s) IV Continuous <Continuous>  dextrose 50% Injectable 12.5Gram(s) IV Push once  dextrose 50% Injectable 25Gram(s) IV Push once  dextrose 50% Injectable 25Gram(s) IV Push once  heparin  Injectable 5000Unit(s) SubCutaneous every 8 hours  gabapentin 300milliGRAM(s) Oral at bedtime  tiotropium 18 MICROgram(s) Capsule 1Capsule(s) Inhalation daily  buDESOnide 160 MICROgram(s)/formoterol 4.5 MICROgram(s) Inhaler 2Puff(s) Inhalation two times a day  insulin glargine Injectable (LANTUS) 16Unit(s) SubCutaneous at bedtime    MEDICATIONS  (PRN):  ondansetron Injectable 4milliGRAM(s) IV Push every 6 hours PRN Nausea and/or Vomiting  ALBUTerol    90 MICROgram(s) HFA Inhaler 2Puff(s) Inhalation every 6 hours PRN Shortness of Breath and/or Wheezing  zaleplon 5milliGRAM(s) Oral at bedtime PRN Insomnia  dextrose Gel 1Dose(s) Oral once PRN Blood Glucose LESS THAN 70 milliGRAM(s)/deciliter  glucagon  Injectable 1milliGRAM(s) IntraMuscular once PRN Glucose LESS THAN 70 milligrams/deciliter  acetaminophen   Tablet. 650milliGRAM(s) Oral every 6 hours PRN Moderate Pain (4 - 6)  traMADol 50milliGRAM(s) Oral every 12 hours PRN Severe Pain (7 - 10)      Allergies    adenosine (Unknown)    Intolerances        LABS:                        11.5   6.1   )-----------( 284      ( 2017 01:56 )             34.2         139  |  104  |  15  ----------------------------<  57<L>  3.9   |  26  |  0.80    Ca    9.0      2017 03:00    TPro  7.4  /  Alb  3.6  /  TBili  0.6  /  DBili  x   /  AST  33  /  ALT  37  /  AlkPhos  107  04-30    PT/INR - ( 2017 01:56 )   PT: 11.2 sec;   INR: 1.01          PTT - ( 2017 01:56 )  PTT:30.3 sec  Urinalysis Basic - ( 2017 03:09 )    Color: Yellow / Appearance: Clear / S.020 / pH: x  Gluc: x / Ketone: NEGATIVE  / Bili: NEGATIVE / Urobili: 0.2 E.U./dL   Blood: x / Protein: NEGATIVE mg/dL / Nitrite: NEGATIVE   Leuk Esterase: Small / RBC: < 5 /HPF / WBC < 5 /HPF   Sq Epi: x / Non Sq Epi: Moderate /HPF / Bacteria: Present /HPF        RADIOLOGY & ADDITIONAL TESTS: INTERVAL HPI/OVERNIGHT EVENTS:  Patient seen and examined at bedside. No o/n events, patient resting comfortably. She complains of dysuria and urinary retention this AM. She also notes she feels unsteady on her feet/      VITAL SIGNS:  T(F): 98.3  HR: 65  BP: 129/77  RR: 18  SpO2: 95%  Wt(kg): --    PHYSICAL EXAM:    Constitutional: WDWN, NAD,   Eyes: PERRL, EOMI, sclera non-icteric  Neck: supple, no masses, no JVD  Respiratory: CTA b/l, good air entry b/l, no wheezing, rhonchi, rales, with normal respiratory effort and no intercostal retractions  Cardiovascular: RRR, normal S1S2, no M/R/G  Gastrointestinal: soft, NTND, no masses palpable, BS normal in all four quadrants, no HSM  Extremities: WWM, no c/c/e  Neurological: AAOx3  Skin: Normal temperature    MEDICATIONS  (STANDING):  aspirin enteric coated 81milliGRAM(s) Oral daily  lisinopril 20milliGRAM(s) Oral daily  atorvastatin 20milliGRAM(s) Oral at bedtime  amLODIPine   Tablet 10milliGRAM(s) Oral daily  multivitamin 1Tablet(s) Oral daily  cholecalciferol 2000Unit(s) Oral daily  insulin lispro (HumaLOG) corrective regimen sliding scale  SubCutaneous Before meals and at bedtime  dextrose 5%. 1000milliLiter(s) IV Continuous <Continuous>  dextrose 50% Injectable 12.5Gram(s) IV Push once  dextrose 50% Injectable 25Gram(s) IV Push once  dextrose 50% Injectable 25Gram(s) IV Push once  heparin  Injectable 5000Unit(s) SubCutaneous every 8 hours  gabapentin 300milliGRAM(s) Oral at bedtime  tiotropium 18 MICROgram(s) Capsule 1Capsule(s) Inhalation daily  buDESOnide 160 MICROgram(s)/formoterol 4.5 MICROgram(s) Inhaler 2Puff(s) Inhalation two times a day  insulin glargine Injectable (LANTUS) 16Unit(s) SubCutaneous at bedtime    MEDICATIONS  (PRN):  ondansetron Injectable 4milliGRAM(s) IV Push every 6 hours PRN Nausea and/or Vomiting  ALBUTerol    90 MICROgram(s) HFA Inhaler 2Puff(s) Inhalation every 6 hours PRN Shortness of Breath and/or Wheezing  zaleplon 5milliGRAM(s) Oral at bedtime PRN Insomnia  dextrose Gel 1Dose(s) Oral once PRN Blood Glucose LESS THAN 70 milliGRAM(s)/deciliter  glucagon  Injectable 1milliGRAM(s) IntraMuscular once PRN Glucose LESS THAN 70 milligrams/deciliter  acetaminophen   Tablet. 650milliGRAM(s) Oral every 6 hours PRN Moderate Pain (4 - 6)  traMADol 50milliGRAM(s) Oral every 12 hours PRN Severe Pain (7 - 10)      Allergies    adenosine (Unknown)    Intolerances        LABS:                        11.5   6.1   )-----------( 284      ( 2017 01:56 )             34.2         139  |  104  |  15  ----------------------------<  57<L>  3.9   |  26  |  0.80    Ca    9.0      2017 03:00    TPro  7.4  /  Alb  3.6  /  TBili  0.6  /  DBili  x   /  AST  33  /  ALT  37  /  AlkPhos  107  04-30    PT/INR - ( 2017 01:56 )   PT: 11.2 sec;   INR: 1.01          PTT - ( 2017 01:56 )  PTT:30.3 sec  Urinalysis Basic - ( 2017 03:09 )    Color: Yellow / Appearance: Clear / S.020 / pH: x  Gluc: x / Ketone: NEGATIVE  / Bili: NEGATIVE / Urobili: 0.2 E.U./dL   Blood: x / Protein: NEGATIVE mg/dL / Nitrite: NEGATIVE   Leuk Esterase: Small / RBC: < 5 /HPF / WBC < 5 /HPF   Sq Epi: x / Non Sq Epi: Moderate /HPF / Bacteria: Present /HPF        RADIOLOGY & ADDITIONAL TESTS: INTERVAL HPI/OVERNIGHT EVENTS:  Patient seen and examined at bedside. No o/n events, patient resting comfortably. She complains of dysuria and urinary retention this AM. She also notes she feels unsteady on her feet. She still has mild lower R sided back pain.      VITAL SIGNS:  T(F): 98.3  HR: 65  BP: 129/77  RR: 18  SpO2: 95%  Wt(kg): --    PHYSICAL EXAM:    Constitutional: WDWN resting comfortably in bed; NAD  Head: NC/AT  HEENT: PERRL, EOMI, MMM   Respiratory: CTA B/L; no W/R/R, no retractions  Cardiac: +S1/S2; RRR; no M/R/G  Gastrointestinal: soft, NT/ND; no rebound or guarding; +BS  Back: spine midline, no bony tenderness or step-offs; no CVAT B/L, pain to palpation in right iliac crest  Extremities: WWP, no clubbing or cyanosis; no peripheral edema  Musculoskeletal: NROM x4; no joint swelling, tenderness or erythema  Vascular: 2+ radial, DP/PT pulses B/L  Neurologic: AAOx3; CNII-XII grossly intact; no focal deficits    MEDICATIONS  (STANDING):  aspirin enteric coated 81milliGRAM(s) Oral daily  lisinopril 20milliGRAM(s) Oral daily  atorvastatin 20milliGRAM(s) Oral at bedtime  amLODIPine   Tablet 10milliGRAM(s) Oral daily  multivitamin 1Tablet(s) Oral daily  cholecalciferol 2000Unit(s) Oral daily  insulin lispro (HumaLOG) corrective regimen sliding scale  SubCutaneous Before meals and at bedtime  dextrose 5%. 1000milliLiter(s) IV Continuous <Continuous>  dextrose 50% Injectable 12.5Gram(s) IV Push once  dextrose 50% Injectable 25Gram(s) IV Push once  dextrose 50% Injectable 25Gram(s) IV Push once  heparin  Injectable 5000Unit(s) SubCutaneous every 8 hours  gabapentin 300milliGRAM(s) Oral at bedtime  tiotropium 18 MICROgram(s) Capsule 1Capsule(s) Inhalation daily  buDESOnide 160 MICROgram(s)/formoterol 4.5 MICROgram(s) Inhaler 2Puff(s) Inhalation two times a day  insulin glargine Injectable (LANTUS) 16Unit(s) SubCutaneous at bedtime    MEDICATIONS  (PRN):  ondansetron Injectable 4milliGRAM(s) IV Push every 6 hours PRN Nausea and/or Vomiting  ALBUTerol    90 MICROgram(s) HFA Inhaler 2Puff(s) Inhalation every 6 hours PRN Shortness of Breath and/or Wheezing  zaleplon 5milliGRAM(s) Oral at bedtime PRN Insomnia  dextrose Gel 1Dose(s) Oral once PRN Blood Glucose LESS THAN 70 milliGRAM(s)/deciliter  glucagon  Injectable 1milliGRAM(s) IntraMuscular once PRN Glucose LESS THAN 70 milligrams/deciliter  acetaminophen   Tablet. 650milliGRAM(s) Oral every 6 hours PRN Moderate Pain (4 - 6)  traMADol 50milliGRAM(s) Oral every 12 hours PRN Severe Pain (7 - 10)      Allergies    adenosine (Unknown)    Intolerances        LABS:                        11.5   6.1   )-----------( 284      ( 2017 01:56 )             34.2         139  |  104  |  15  ----------------------------<  57<L>  3.9   |  26  |  0.80    Ca    9.0      2017 03:00    TPro  7.4  /  Alb  3.6  /  TBili  0.6  /  DBili  x   /  AST  33  /  ALT  37  /  AlkPhos  107  04-30    PT/INR - ( 2017 01:56 )   PT: 11.2 sec;   INR: 1.01          PTT - ( 2017 01:56 )  PTT:30.3 sec  Urinalysis Basic - ( 2017 03:09 )    Color: Yellow / Appearance: Clear / S.020 / pH: x  Gluc: x / Ketone: NEGATIVE  / Bili: NEGATIVE / Urobili: 0.2 E.U./dL   Blood: x / Protein: NEGATIVE mg/dL / Nitrite: NEGATIVE   Leuk Esterase: Small / RBC: < 5 /HPF / WBC < 5 /HPF   Sq Epi: x / Non Sq Epi: Moderate /HPF / Bacteria: Present /HPF INTERVAL HPI/OVERNIGHT EVENTS:  Patient seen and examined at bedside. No o/n events, patient resting comfortably. She complains of dysuria and urinary retention this AM. She also notes she feels unsteady on her feet. She still has mild lower R sided back pain.      VITAL SIGNS:  T(F): 98.3  HR: 65  BP: 129/77  RR: 18  SpO2: 95%  Wt(kg): --    PHYSICAL EXAM:    Constitutional: WDWN resting comfortably in bed; NAD  Head: NC/AT  HEENT: PERRL, EOMI, MMM   Respiratory: CTA B/L; no W/R/R, no retractions  Cardiac: +S1/S2; RRR; no M/R/G  Gastrointestinal: soft, NT/ND; no rebound or guarding; +BS, no suprapubic tenderness  Back: spine midline, no bony tenderness or step-offs; no CVAT B/L, pain to palpation in right iliac crest  Extremities: WWP, no clubbing or cyanosis; no peripheral edema  Musculoskeletal: NROM x4; no joint swelling, tenderness or erythema  Vascular: 2+ radial, DP/PT pulses B/L  Neurologic: AAOx3; CNII-XII grossly intact; no focal deficits    MEDICATIONS  (STANDING):  aspirin enteric coated 81milliGRAM(s) Oral daily  lisinopril 20milliGRAM(s) Oral daily  atorvastatin 20milliGRAM(s) Oral at bedtime  amLODIPine   Tablet 10milliGRAM(s) Oral daily  multivitamin 1Tablet(s) Oral daily  cholecalciferol 2000Unit(s) Oral daily  insulin lispro (HumaLOG) corrective regimen sliding scale  SubCutaneous Before meals and at bedtime  dextrose 5%. 1000milliLiter(s) IV Continuous <Continuous>  dextrose 50% Injectable 12.5Gram(s) IV Push once  dextrose 50% Injectable 25Gram(s) IV Push once  dextrose 50% Injectable 25Gram(s) IV Push once  heparin  Injectable 5000Unit(s) SubCutaneous every 8 hours  gabapentin 300milliGRAM(s) Oral at bedtime  tiotropium 18 MICROgram(s) Capsule 1Capsule(s) Inhalation daily  buDESOnide 160 MICROgram(s)/formoterol 4.5 MICROgram(s) Inhaler 2Puff(s) Inhalation two times a day  insulin glargine Injectable (LANTUS) 16Unit(s) SubCutaneous at bedtime    MEDICATIONS  (PRN):  ondansetron Injectable 4milliGRAM(s) IV Push every 6 hours PRN Nausea and/or Vomiting  ALBUTerol    90 MICROgram(s) HFA Inhaler 2Puff(s) Inhalation every 6 hours PRN Shortness of Breath and/or Wheezing  zaleplon 5milliGRAM(s) Oral at bedtime PRN Insomnia  dextrose Gel 1Dose(s) Oral once PRN Blood Glucose LESS THAN 70 milliGRAM(s)/deciliter  glucagon  Injectable 1milliGRAM(s) IntraMuscular once PRN Glucose LESS THAN 70 milligrams/deciliter  acetaminophen   Tablet. 650milliGRAM(s) Oral every 6 hours PRN Moderate Pain (4 - 6)  traMADol 50milliGRAM(s) Oral every 12 hours PRN Severe Pain (7 - 10)      Allergies    adenosine (Unknown)    Intolerances        LABS:                        11.5   6.1   )-----------( 284      ( 2017 01:56 )             34.2         139  |  104  |  15  ----------------------------<  57<L>  3.9   |  26  |  0.80    Ca    9.0      2017 03:00    TPro  7.4  /  Alb  3.6  /  TBili  0.6  /  DBili  x   /  AST  33  /  ALT  37  /  AlkPhos  107  04-30    PT/INR - ( 2017 01:56 )   PT: 11.2 sec;   INR: 1.01          PTT - ( 2017 01:56 )  PTT:30.3 sec  Urinalysis Basic - ( 2017 03:09 )    Color: Yellow / Appearance: Clear / S.020 / pH: x  Gluc: x / Ketone: NEGATIVE  / Bili: NEGATIVE / Urobili: 0.2 E.U./dL   Blood: x / Protein: NEGATIVE mg/dL / Nitrite: NEGATIVE   Leuk Esterase: Small / RBC: < 5 /HPF / WBC < 5 /HPF   Sq Epi: x / Non Sq Epi: Moderate /HPF / Bacteria: Present /HPF
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
## 2017-05-01 NOTE — PROGRESS NOTE ADULT - PROBLEM SELECTOR PLAN 1
Back pain in the right iliac crest region, appears chronic in nature. Likely musculoskeletal vs. 2/2 disc herniation as seen on recent MRI. Followed by Dr. Sandra. Unlikely "flank pain" 2/2 UTI/pyelo/kidney stones as CT does not show any kidney stones, patient completed course of Abx, UA not indicative of infection.  -f/u Xray L-spine  -  -Tramadol PRN and Tylenol PRN for back pain  -will continue to follow with Dr. Sandra Back pain in the right iliac crest region, appears chronic in nature. Likely musculoskeletal vs. 2/2 disc herniation as seen on recent MRI. Followed by Dr. Sandra. Unlikely "flank pain" 2/2 UTI/pyelo/kidney stones as CT does not show any kidney stones, patient completed course of Abx, UA not indicative of infection.  -f/u Xray L-spine  -given dysuria and negative UA, will give pyrimidine   -Tramadol PRN and Tylenol PRN for back pain  -will continue to follow with Dr. Sandra Back pain in the right iliac crest region, appears chronic in nature. Likely musculoskeletal vs. 2/2 disc herniation as seen on recent MRI. Followed by Dr. Sandra. Unlikely "flank pain" 2/2 UTI/pyelo/kidney stones as CT does not show any kidney stones, patient completed course of Abx, UA not indicative of infection.  -f/u Xray L-spine  -given dysuria, will give pyrimidine and repeat UA and UCx.  -Tramadol PRN and Tylenol PRN for back pain  -MRI spine if possible and consult Dr. Amador, per Dr. Cabrera  -will continue to follow with Dr. Sandra

## 2017-05-01 NOTE — PHYSICAL THERAPY INITIAL EVALUATION ADULT - PERTINENT HX OF CURRENT PROBLEM, REHAB EVAL
Pt. is a 72 y.o. female admitted with increased R sided low back pain, chills, nausea, decreased appetite.

## 2017-05-01 NOTE — DIETITIAN INITIAL EVALUATION ADULT. - NS AS NUTRI INTERV MEALS SNACK
continue glucerna bid secondary to po fluctuation    8 oz 220 kcal , 9.9 gr / protein ,  29.3 gr / cho

## 2017-05-01 NOTE — DISCHARGE NOTE ADULT - CARE PLAN
Principal Discharge DX:	Low back pain  Goal:	Treatment of symptoms  Secondary Diagnosis:	Type 2 diabetes mellitus  Secondary Diagnosis:	Essential hypertension  Instructions for follow-up, activity and diet:	Please continue lisinopril and norvasc  Secondary Diagnosis:	Atherosclerosis of coronary artery  Instructions for follow-up, activity and diet:	Please continue aspirin and lipitor.  Secondary Diagnosis:	Asthma  Instructions for follow-up, activity and diet:	Please continue your Symbicort, Spiriva, and albuterol inhaler as needed. Principal Discharge DX:	Low back pain  Goal:	Treatment of symptoms  Instructions for follow-up, activity and diet:	You presented with right sided lower back pain. You underwent a CT scan which as negative for kidney stones. Your spine XR was negative for fracture. You had your urine tested, which was negative for infection. You were seen by Dr. Sandra and Dr. Amador, who concluded you have lumbar radiculopathy (pinched nerve in lumbar spine) that is likely causing your symptoms. Continue to use tylenol and tramadol at home for back pain. Please make an appointment to follow up with Dr. Sandra in 1 week. Please make an appointment to follow up with Dr. Amador within 3-4 weeks.  Secondary Diagnosis:	Type 2 diabetes mellitus  Instructions for follow-up, activity and diet:	Please continue your home insulin regimen and follow up with Dr. Cabrera in 3-4 weeks.  Secondary Diagnosis:	Essential hypertension  Instructions for follow-up, activity and diet:	Please continue lisinopril and norvasc.  Secondary Diagnosis:	Atherosclerosis of coronary artery  Instructions for follow-up, activity and diet:	Please continue aspirin and lipitor.  Secondary Diagnosis:	Asthma  Instructions for follow-up, activity and diet:	Please continue your Symbicort, Spiriva, and albuterol inhaler as needed. Principal Discharge DX:	Low back pain  Goal:	Treatment of symptoms  Instructions for follow-up, activity and diet:	You presented with right sided lower back pain. You underwent a CT scan which as negative for kidney stones. Your spine XR was negative for fracture. You had your urine tested, which was negative for infection. You were seen by Dr. Sandra and Dr. Amador, who concluded you have lumbar radiculopathy (pinched nerve in lumbar spine) that is likely causing your symptoms. Continue to use tylenol and tramadol at home for back pain. Please make an appointment to follow up with Dr. Sandra in 1 week. Please make an appointment to follow up with Dr. Amador within 3-4 weeks. You will likely have MRI as an outpatient.  Secondary Diagnosis:	Type 2 diabetes mellitus  Instructions for follow-up, activity and diet:	Please continue your home insulin regimen and follow up with Dr. Cabrera in 3-4 weeks.  Secondary Diagnosis:	Essential hypertension  Instructions for follow-up, activity and diet:	Please continue lisinopril and norvasc.  Secondary Diagnosis:	Atherosclerosis of coronary artery  Instructions for follow-up, activity and diet:	Please continue aspirin and lipitor.  Secondary Diagnosis:	Asthma  Instructions for follow-up, activity and diet:	Please continue your Symbicort, Spiriva, and albuterol inhaler as needed. Principal Discharge DX:	Low back pain  Goal:	Treatment of symptoms  Instructions for follow-up, activity and diet:	You presented with right sided lower back pain. You underwent a CT scan which was negative for kidney stones. Your spine XR was negative for fracture. You had your urine tested, which was negative for infection. You were seen by Dr. Sandra and Dr. Amador, who concluded you have lumbar radiculopathy (pinched nerve in lumbar spine) that is likely causing your symptoms. Continue to use tylenol and tramadol at home for back pain. Please make an appointment to follow up with Dr. Sandra in 1 week. Please make an appointment to follow up with Dr. Amador within 3-4 weeks. You will likely have MRI as an outpatient.  Secondary Diagnosis:	Type 2 diabetes mellitus  Instructions for follow-up, activity and diet:	Please continue your home insulin regimen and follow up with Dr. Cabrera in 3-4 weeks.  Secondary Diagnosis:	Essential hypertension  Instructions for follow-up, activity and diet:	Please continue lisinopril and Norvasc.  Secondary Diagnosis:	Atherosclerosis of coronary artery  Instructions for follow-up, activity and diet:	Please continue aspirin and Lipitor.  Secondary Diagnosis:	Asthma  Instructions for follow-up, activity and diet:	Please continue your Symbicort, Spiriva, and albuterol inhaler as needed.

## 2017-05-01 NOTE — DISCHARGE NOTE ADULT - ADDITIONAL INSTRUCTIONS
Please make an appointment to follow up with Dr. Sandra in 1 week. Make appointments to follow up with Lynda Cabrera and Perry in 3-4 weeks Please make an appointment to follow up with Dr. Sandra in 1 week. Make appointments to follow up with Lynda Cabrera and Perry in 3-4 weeks.

## 2017-05-01 NOTE — DISCHARGE NOTE ADULT - CARE PROVIDER_API CALL
Brea Cabrera), Medicine  [address] [address]  Phone: [phone]  Fax: [phone]    Manjinder Sandra), Medicine  [address] [address]  Phone: [phone]  Fax: [phone] Brea Cabrera), Medicine  [address] [address]  Phone: [phone]  Fax: [phone]    Majninder Sandra (MD), Medicine  [address] [address]  Phone: [phone]  Fax: [phone]    Keivn Amador), Electrodiagnostic Medicine; Neurology  [address] [place] [address]  Phone: [phone]  Fax: [phone]

## 2017-05-01 NOTE — DISCHARGE NOTE ADULT - INSTRUCTIONS
You should continue a diabetic cardiac diet as recommended by the american diabetes association. This includes a maximum of 2grams of salt (sodium) per day. Limit your carbohydrate intake including things like soda, juice, white rice, white grains/wheat, pasta.     Please  see the following website for more information:  http://www.diabetes.org/food-and-fitness/

## 2017-05-02 ENCOUNTER — APPOINTMENT (OUTPATIENT)
Dept: ORTHOPEDIC SURGERY | Facility: CLINIC | Age: 72
End: 2017-05-02

## 2017-05-02 LAB
ANION GAP SERPL CALC-SCNC: 10 MMOL/L — SIGNIFICANT CHANGE UP (ref 9–16)
BUN SERPL-MCNC: 12 MG/DL — SIGNIFICANT CHANGE UP (ref 7–23)
CALCIUM SERPL-MCNC: 8.7 MG/DL — SIGNIFICANT CHANGE UP (ref 8.5–10.5)
CHLORIDE SERPL-SCNC: 104 MMOL/L — SIGNIFICANT CHANGE UP (ref 96–108)
CO2 SERPL-SCNC: 21 MMOL/L — LOW (ref 22–31)
CREAT SERPL-MCNC: 0.72 MG/DL — SIGNIFICANT CHANGE UP (ref 0.5–1.3)
GLUCOSE SERPL-MCNC: 184 MG/DL — HIGH (ref 70–99)
HCT VFR BLD CALC: 32.3 % — LOW (ref 34.5–45)
HCT VFR BLD CALC: 34.3 % — LOW (ref 34.5–45)
HGB BLD-MCNC: 10.7 G/DL — LOW (ref 11.5–15.5)
HGB BLD-MCNC: 11.5 G/DL — SIGNIFICANT CHANGE UP (ref 11.5–15.5)
MAGNESIUM SERPL-MCNC: 1.7 MG/DL — SIGNIFICANT CHANGE UP (ref 1.6–2.4)
MCHC RBC-ENTMCNC: 27.9 PG — SIGNIFICANT CHANGE UP (ref 27–34)
MCHC RBC-ENTMCNC: 28.1 PG — SIGNIFICANT CHANGE UP (ref 27–34)
MCHC RBC-ENTMCNC: 33.1 G/DL — SIGNIFICANT CHANGE UP (ref 32–36)
MCHC RBC-ENTMCNC: 33.5 G/DL — SIGNIFICANT CHANGE UP (ref 32–36)
MCV RBC AUTO: 83.9 FL — SIGNIFICANT CHANGE UP (ref 80–100)
MCV RBC AUTO: 84.1 FL — SIGNIFICANT CHANGE UP (ref 80–100)
PLATELET # BLD AUTO: 255 K/UL — SIGNIFICANT CHANGE UP (ref 150–400)
PLATELET # BLD AUTO: 286 K/UL — SIGNIFICANT CHANGE UP (ref 150–400)
POTASSIUM SERPL-MCNC: 4 MMOL/L — SIGNIFICANT CHANGE UP (ref 3.5–5.3)
POTASSIUM SERPL-SCNC: 4 MMOL/L — SIGNIFICANT CHANGE UP (ref 3.5–5.3)
RBC # BLD: 3.84 M/UL — SIGNIFICANT CHANGE UP (ref 3.8–5.2)
RBC # BLD: 4.09 M/UL — SIGNIFICANT CHANGE UP (ref 3.8–5.2)
RBC # FLD: 14.4 % — SIGNIFICANT CHANGE UP (ref 10.3–16.9)
RBC # FLD: 14.7 % — SIGNIFICANT CHANGE UP (ref 10.3–16.9)
SODIUM SERPL-SCNC: 135 MMOL/L — SIGNIFICANT CHANGE UP (ref 135–145)
WBC # BLD: 4 K/UL — SIGNIFICANT CHANGE UP (ref 3.8–10.5)
WBC # BLD: 4.4 K/UL — SIGNIFICANT CHANGE UP (ref 3.8–10.5)
WBC # FLD AUTO: 4 K/UL — SIGNIFICANT CHANGE UP (ref 3.8–10.5)
WBC # FLD AUTO: 4.4 K/UL — SIGNIFICANT CHANGE UP (ref 3.8–10.5)

## 2017-05-02 RX ORDER — GLUCAGON INJECTION, SOLUTION 0.5 MG/.1ML
1 INJECTION, SOLUTION SUBCUTANEOUS ONCE
Qty: 0 | Refills: 0 | Status: DISCONTINUED | OUTPATIENT
Start: 2017-05-02 | End: 2017-05-03

## 2017-05-02 RX ORDER — DOCUSATE SODIUM 100 MG
200 CAPSULE ORAL ONCE
Qty: 0 | Refills: 0 | Status: COMPLETED | OUTPATIENT
Start: 2017-05-02 | End: 2017-05-02

## 2017-05-02 RX ORDER — SODIUM CHLORIDE 9 MG/ML
1000 INJECTION, SOLUTION INTRAVENOUS
Qty: 0 | Refills: 0 | Status: DISCONTINUED | OUTPATIENT
Start: 2017-05-02 | End: 2017-05-03

## 2017-05-02 RX ORDER — DEXTROSE 50 % IN WATER 50 %
12.5 SYRINGE (ML) INTRAVENOUS ONCE
Qty: 0 | Refills: 0 | Status: DISCONTINUED | OUTPATIENT
Start: 2017-05-02 | End: 2017-05-03

## 2017-05-02 RX ORDER — DEXTROSE 50 % IN WATER 50 %
25 SYRINGE (ML) INTRAVENOUS ONCE
Qty: 0 | Refills: 0 | Status: DISCONTINUED | OUTPATIENT
Start: 2017-05-02 | End: 2017-05-03

## 2017-05-02 RX ORDER — INSULIN LISPRO 100/ML
VIAL (ML) SUBCUTANEOUS
Qty: 0 | Refills: 0 | Status: DISCONTINUED | OUTPATIENT
Start: 2017-05-02 | End: 2017-05-03

## 2017-05-02 RX ORDER — DEXTROSE 50 % IN WATER 50 %
1 SYRINGE (ML) INTRAVENOUS ONCE
Qty: 0 | Refills: 0 | Status: DISCONTINUED | OUTPATIENT
Start: 2017-05-02 | End: 2017-05-03

## 2017-05-02 RX ORDER — INSULIN LISPRO 100/ML
2 VIAL (ML) SUBCUTANEOUS ONCE
Qty: 0 | Refills: 0 | Status: COMPLETED | OUTPATIENT
Start: 2017-05-02 | End: 2017-05-02

## 2017-05-02 RX ORDER — INSULIN GLARGINE 100 [IU]/ML
20 INJECTION, SOLUTION SUBCUTANEOUS AT BEDTIME
Qty: 0 | Refills: 0 | Status: DISCONTINUED | OUTPATIENT
Start: 2017-05-02 | End: 2017-05-03

## 2017-05-02 RX ADMIN — AMLODIPINE BESYLATE 10 MILLIGRAM(S): 2.5 TABLET ORAL at 06:47

## 2017-05-02 RX ADMIN — ATORVASTATIN CALCIUM 20 MILLIGRAM(S): 80 TABLET, FILM COATED ORAL at 21:38

## 2017-05-02 RX ADMIN — HEPARIN SODIUM 5000 UNIT(S): 5000 INJECTION INTRAVENOUS; SUBCUTANEOUS at 13:30

## 2017-05-02 RX ADMIN — Medication 2 UNIT(S): at 09:44

## 2017-05-02 RX ADMIN — HEPARIN SODIUM 5000 UNIT(S): 5000 INJECTION INTRAVENOUS; SUBCUTANEOUS at 21:38

## 2017-05-02 RX ADMIN — INSULIN GLARGINE 20 UNIT(S): 100 INJECTION, SOLUTION SUBCUTANEOUS at 22:10

## 2017-05-02 RX ADMIN — Medication 6: at 12:36

## 2017-05-02 RX ADMIN — TRAMADOL HYDROCHLORIDE 50 MILLIGRAM(S): 50 TABLET ORAL at 02:57

## 2017-05-02 RX ADMIN — ZOLPIDEM TARTRATE 5 MILLIGRAM(S): 10 TABLET ORAL at 00:01

## 2017-05-02 RX ADMIN — ALBUTEROL 2 PUFF(S): 90 AEROSOL, METERED ORAL at 17:52

## 2017-05-02 RX ADMIN — TIOTROPIUM BROMIDE 1 CAPSULE(S): 18 CAPSULE ORAL; RESPIRATORY (INHALATION) at 12:40

## 2017-05-02 RX ADMIN — ALBUTEROL 2 PUFF(S): 90 AEROSOL, METERED ORAL at 02:58

## 2017-05-02 RX ADMIN — Medication 1 TABLET(S): at 12:36

## 2017-05-02 RX ADMIN — Medication 4: at 22:09

## 2017-05-02 RX ADMIN — TRAMADOL HYDROCHLORIDE 50 MILLIGRAM(S): 50 TABLET ORAL at 21:38

## 2017-05-02 RX ADMIN — Medication 2000 UNIT(S): at 12:36

## 2017-05-02 RX ADMIN — BUDESONIDE AND FORMOTEROL FUMARATE DIHYDRATE 2 PUFF(S): 160; 4.5 AEROSOL RESPIRATORY (INHALATION) at 17:49

## 2017-05-02 RX ADMIN — TRAMADOL HYDROCHLORIDE 50 MILLIGRAM(S): 50 TABLET ORAL at 03:57

## 2017-05-02 RX ADMIN — Medication 650 MILLIGRAM(S): at 16:15

## 2017-05-02 RX ADMIN — HEPARIN SODIUM 5000 UNIT(S): 5000 INJECTION INTRAVENOUS; SUBCUTANEOUS at 06:48

## 2017-05-02 RX ADMIN — LISINOPRIL 20 MILLIGRAM(S): 2.5 TABLET ORAL at 06:48

## 2017-05-02 RX ADMIN — Medication 200 MILLIGRAM(S): at 15:15

## 2017-05-02 RX ADMIN — Medication 81 MILLIGRAM(S): at 12:36

## 2017-05-02 RX ADMIN — BUDESONIDE AND FORMOTEROL FUMARATE DIHYDRATE 2 PUFF(S): 160; 4.5 AEROSOL RESPIRATORY (INHALATION) at 06:48

## 2017-05-02 RX ADMIN — Medication 650 MILLIGRAM(S): at 15:15

## 2017-05-02 RX ADMIN — Medication 2: at 17:49

## 2017-05-02 RX ADMIN — TRAMADOL HYDROCHLORIDE 50 MILLIGRAM(S): 50 TABLET ORAL at 22:38

## 2017-05-02 NOTE — PROGRESS NOTE ADULT - SUBJECTIVE AND OBJECTIVE BOX
INTERVAL HPI/OVERNIGHT EVENTS:  Patient seen and examined at bedside. No o/n events, patient resting comfortably. No complaints at this time. Patient denies fever, chills, dizziness, weakness, CP, palpitations, SOB, cough, N/V/D/C, dysuria, changes in bowel movements, LE edema.    VITAL SIGNS:  T(F): 98.9  HR: 66  BP: 158/87  RR: 20  SpO2: 96%  Wt(kg): --    PHYSICAL EXAM:    Constitutional: WDWN, NAD,   Eyes: PERRL, EOMI, sclera non-icteric  Neck: supple, no masses, no JVD  Respiratory: CTA b/l, good air entry b/l, no wheezing, rhonchi, rales, with normal respiratory effort and no intercostal retractions  Cardiovascular: RRR, normal S1S2, no M/R/G  Gastrointestinal: soft, NTND, no masses palpable, BS normal in all four quadrants, no HSM  Extremities: WWM, no c/c/e  Neurological: AAOx3  Skin: Normal temperature    MEDICATIONS  (STANDING):  aspirin enteric coated 81milliGRAM(s) Oral daily  lisinopril 20milliGRAM(s) Oral daily  atorvastatin 20milliGRAM(s) Oral at bedtime  amLODIPine   Tablet 10milliGRAM(s) Oral daily  multivitamin 1Tablet(s) Oral daily  cholecalciferol 2000Unit(s) Oral daily  insulin lispro (HumaLOG) corrective regimen sliding scale  SubCutaneous Before meals and at bedtime  dextrose 5%. 1000milliLiter(s) IV Continuous <Continuous>  dextrose 50% Injectable 12.5Gram(s) IV Push once  dextrose 50% Injectable 25Gram(s) IV Push once  dextrose 50% Injectable 25Gram(s) IV Push once  heparin  Injectable 5000Unit(s) SubCutaneous every 8 hours  gabapentin 300milliGRAM(s) Oral at bedtime  tiotropium 18 MICROgram(s) Capsule 1Capsule(s) Inhalation daily  buDESOnide 160 MICROgram(s)/formoterol 4.5 MICROgram(s) Inhaler 2Puff(s) Inhalation two times a day  insulin glargine Injectable (LANTUS) 16Unit(s) SubCutaneous at bedtime    MEDICATIONS  (PRN):  ondansetron Injectable 4milliGRAM(s) IV Push every 6 hours PRN Nausea and/or Vomiting  ALBUTerol    90 MICROgram(s) HFA Inhaler 2Puff(s) Inhalation every 6 hours PRN Shortness of Breath and/or Wheezing  zaleplon 5milliGRAM(s) Oral at bedtime PRN Insomnia  dextrose Gel 1Dose(s) Oral once PRN Blood Glucose LESS THAN 70 milliGRAM(s)/deciliter  glucagon  Injectable 1milliGRAM(s) IntraMuscular once PRN Glucose LESS THAN 70 milligrams/deciliter  acetaminophen   Tablet. 650milliGRAM(s) Oral every 6 hours PRN Moderate Pain (4 - 6)  traMADol 50milliGRAM(s) Oral every 12 hours PRN Severe Pain (7 - 10)  phenazopyridine 100milliGRAM(s) Oral every 8 hours PRN dysuria  zolpidem 5milliGRAM(s) Oral at bedtime PRN Insomnia      Allergies    adenosine (Unknown)    Intolerances        LABS:                        10.7   4.4   )-----------( 255      ( 02 May 2017 03:14 )             32.3             Urinalysis Basic - ( 01 May 2017 23:09 )    Color: Noreen / Appearance: Clear / S.025 / pH: x  Gluc: x / Ketone: Trace mg/dL  / Bili: NEGATIVE / Urobili: 2.0 E.U./dL   Blood: x / Protein: Trace mg/dL / Nitrite: POSITIVE   Leuk Esterase: NEGATIVE / RBC: x / WBC < 5 /HPF   Sq Epi: x / Non Sq Epi: Few /HPF / Bacteria: Present /HPF        RADIOLOGY & ADDITIONAL TESTS: INTERVAL HPI/OVERNIGHT EVENTS:  Patient seen and examined at bedside. No o/n events, patient resting comfortably. She has unchanged lower back pain. She states dysuria has resolved. She denies fevers, chills, chest pain, dyspnea, abdominal pain.     VITAL SIGNS:  T(F): 98.9  HR: 66  BP: 158/87  RR: 20  SpO2: 96%  Wt(kg): --    PHYSICAL EXAM:    Constitutional: WDWN resting comfortably in bed; NAD  Head: NC/AT  HEENT: PERRL, EOMI, MMM   Respiratory: CTA B/L; no W/R/R, no retractions  Cardiac: +S1/S2; RRR; no M/R/G  Gastrointestinal: soft, NT/ND; no rebound or guarding; +BS, no suprapubic tenderness  Back: spine midline, no bony tenderness or step-offs; no CVAT B/L, pain to palpation in right iliac crest  Extremities: WWP, no clubbing or cyanosis; no peripheral edema  Musculoskeletal: NROM x4; no joint swelling, tenderness or erythema  Vascular: 2+ radial, DP/PT pulses B/L  Neurologic: AAOx3; CNII-XII grossly intact; no focal deficits    MEDICATIONS  (STANDING):  aspirin enteric coated 81milliGRAM(s) Oral daily  lisinopril 20milliGRAM(s) Oral daily  atorvastatin 20milliGRAM(s) Oral at bedtime  amLODIPine   Tablet 10milliGRAM(s) Oral daily  multivitamin 1Tablet(s) Oral daily  cholecalciferol 2000Unit(s) Oral daily  insulin lispro (HumaLOG) corrective regimen sliding scale  SubCutaneous Before meals and at bedtime  dextrose 5%. 1000milliLiter(s) IV Continuous <Continuous>  dextrose 50% Injectable 12.5Gram(s) IV Push once  dextrose 50% Injectable 25Gram(s) IV Push once  dextrose 50% Injectable 25Gram(s) IV Push once  heparin  Injectable 5000Unit(s) SubCutaneous every 8 hours  gabapentin 300milliGRAM(s) Oral at bedtime  tiotropium 18 MICROgram(s) Capsule 1Capsule(s) Inhalation daily  buDESOnide 160 MICROgram(s)/formoterol 4.5 MICROgram(s) Inhaler 2Puff(s) Inhalation two times a day  insulin glargine Injectable (LANTUS) 16Unit(s) SubCutaneous at bedtime    MEDICATIONS  (PRN):  ondansetron Injectable 4milliGRAM(s) IV Push every 6 hours PRN Nausea and/or Vomiting  ALBUTerol    90 MICROgram(s) HFA Inhaler 2Puff(s) Inhalation every 6 hours PRN Shortness of Breath and/or Wheezing  zaleplon 5milliGRAM(s) Oral at bedtime PRN Insomnia  dextrose Gel 1Dose(s) Oral once PRN Blood Glucose LESS THAN 70 milliGRAM(s)/deciliter  glucagon  Injectable 1milliGRAM(s) IntraMuscular once PRN Glucose LESS THAN 70 milligrams/deciliter  acetaminophen   Tablet. 650milliGRAM(s) Oral every 6 hours PRN Moderate Pain (4 - 6)  traMADol 50milliGRAM(s) Oral every 12 hours PRN Severe Pain (7 - 10)  phenazopyridine 100milliGRAM(s) Oral every 8 hours PRN dysuria  zolpidem 5milliGRAM(s) Oral at bedtime PRN Insomnia      Allergies    adenosine (Unknown)    Intolerances        LABS:                        10.7   4.4   )-----------( 255      ( 02 May 2017 03:14 )             32.3             Urinalysis Basic - ( 01 May 2017 23:09 )    Color: Noreen / Appearance: Clear / S.025 / pH: x  Gluc: x / Ketone: Trace mg/dL  / Bili: NEGATIVE / Urobili: 2.0 E.U./dL   Blood: x / Protein: Trace mg/dL / Nitrite: POSITIVE   Leuk Esterase: NEGATIVE / RBC: x / WBC < 5 /HPF   Sq Epi: x / Non Sq Epi: Few /HPF / Bacteria: Present /HPF        RADIOLOGY & ADDITIONAL TESTS: INTERVAL HPI/OVERNIGHT EVENTS:  Patient seen and examined at bedside. No o/n events, patient resting comfortably. She has unchanged lower back pain. She states dysuria has resolved. She denies fevers, chills, chest pain, dyspnea, abdominal pain.     VITAL SIGNS:  T(F): 98.9  HR: 66  BP: 158/87  RR: 20  SpO2: 96%  Wt(kg): --    PHYSICAL EXAM:    Constitutional: WDWN resting comfortably in bed; NAD  Head: NC/AT  HEENT: PERRL, EOMI, MMM   Respiratory: CTA B/L; no W/R/R, no retractions  Cardiac: +S1/S2; RRR; no M/R/G  Gastrointestinal: soft, NT/ND; no rebound or guarding; +BS, no suprapubic tenderness  Back: spine midline, no bony tenderness or step-offs; no CVAT B/L, pain to palpation in right iliac crest  Extremities: WWP, no clubbing or cyanosis; no peripheral edema  Musculoskeletal: NROM x4; no joint swelling, tenderness or erythema  Vascular: 2+ radial, DP/PT pulses B/L  Neurologic: AAOx3; CNII-XII grossly intact; no focal deficits    MEDICATIONS  (STANDING):  aspirin enteric coated 81milliGRAM(s) Oral daily  lisinopril 20milliGRAM(s) Oral daily  atorvastatin 20milliGRAM(s) Oral at bedtime  amLODIPine   Tablet 10milliGRAM(s) Oral daily  multivitamin 1Tablet(s) Oral daily  cholecalciferol 2000Unit(s) Oral daily  insulin lispro (HumaLOG) corrective regimen sliding scale  SubCutaneous Before meals and at bedtime  dextrose 5%. 1000milliLiter(s) IV Continuous <Continuous>  dextrose 50% Injectable 12.5Gram(s) IV Push once  dextrose 50% Injectable 25Gram(s) IV Push once  dextrose 50% Injectable 25Gram(s) IV Push once  heparin  Injectable 5000Unit(s) SubCutaneous every 8 hours  gabapentin 300milliGRAM(s) Oral at bedtime  tiotropium 18 MICROgram(s) Capsule 1Capsule(s) Inhalation daily  buDESOnide 160 MICROgram(s)/formoterol 4.5 MICROgram(s) Inhaler 2Puff(s) Inhalation two times a day  insulin glargine Injectable (LANTUS) 16Unit(s) SubCutaneous at bedtime    MEDICATIONS  (PRN):  ondansetron Injectable 4milliGRAM(s) IV Push every 6 hours PRN Nausea and/or Vomiting  ALBUTerol    90 MICROgram(s) HFA Inhaler 2Puff(s) Inhalation every 6 hours PRN Shortness of Breath and/or Wheezing  zaleplon 5milliGRAM(s) Oral at bedtime PRN Insomnia  dextrose Gel 1Dose(s) Oral once PRN Blood Glucose LESS THAN 70 milliGRAM(s)/deciliter  glucagon  Injectable 1milliGRAM(s) IntraMuscular once PRN Glucose LESS THAN 70 milligrams/deciliter  acetaminophen   Tablet. 650milliGRAM(s) Oral every 6 hours PRN Moderate Pain (4 - 6)  traMADol 50milliGRAM(s) Oral every 12 hours PRN Severe Pain (7 - 10)  phenazopyridine 100milliGRAM(s) Oral every 8 hours PRN dysuria  zolpidem 5milliGRAM(s) Oral at bedtime PRN Insomnia      Allergies    adenosine (Unknown)    Intolerances        LABS:                        10.7   4.4   )-----------( 255      ( 02 May 2017 03:14 )             32.3             Urinalysis Basic - ( 01 May 2017 23:09 )    Color: Noreen / Appearance: Clear / S.025 / pH: x  Gluc: x / Ketone: Trace mg/dL  / Bili: NEGATIVE / Urobili: 2.0 E.U./dL   Blood: x / Protein: Trace mg/dL / Nitrite: POSITIVE   Leuk Esterase: NEGATIVE / RBC: x / WBC < 5 /HPF   Sq Epi: x / Non Sq Epi: Few /HPF / Bacteria: Present /HPF

## 2017-05-02 NOTE — PROGRESS NOTE ADULT - PROBLEM SELECTOR PLAN 1
Back pain in the right iliac crest region, appears chronic in nature. Likely musculoskeletal vs. 2/2 disc bulging as seen on recent MRI. Followed by Dr. Sandra. Unlikely "flank pain" 2/2 UTI/pyelo/kidney stones as CT does not show any kidney stones, patient completed course of Abx, UA not indicative of infection.  -given dysuria yesterday, and given pyrimidine. Repeat UA with <5 wbc, pos nitrite, negative LE. Now asymptomatic.   -Tramadol PRN and Tylenol PRN for back pain  -MRI spine per Dr. Cabrera  -Seen by Dr. Amador, who recommends pain control and rehab  -will continue to follow with Dr. Sandra

## 2017-05-02 NOTE — CONSULT NOTE ADULT - SUBJECTIVE AND OBJECTIVE BOX
Patient is a 72y old  Female who presents with a chief complaint of right-sided lower back pain (01 May 2017 11:06)        HPI:  72yo F with PMHx asthma, HTN, Afib s/p ablation 2 yrs ago not on AC, ASD s/p repair, CAD, IDDM, depression, subdural hemorrhage s/p bakari hole, recently admitted 4/16/17 - 4/20/17 for UTI presents with lower back pain. Patient reports back pain ongoing for the past few months, described as lower back pain on the right side of the spine in the lumbar area, sharp, stabbing, non-radiating, 6-10 out of 10 in severity, with no exacerbating factors and somewhat relieved by Tramadol. Patient feels symptoms are related to the kidney stone that she was told she had on the prior admission. Patient was recently here 10 days ago, diagnosed with a Klebsiella UTI and had a CT a/p which showed 0.2cm non-obstructing left renal stone. Additionally, patient reports chills, states she is always cold, and is also nauseous with decreased appetite. Denies fever, abdominal pain, vomiting, diarrhea, dyrusia, increased frequency, CP, SOB, sick contacts or recent travel. At baseline, could walk 1/2 block with walker before she gets SOB.     Back pain and radicular symptoms in the LE    In the ED, VS: 98.0 144/75 101 17 98% RA  Given Ceftriaxone 1g IV, Morphine 2mg IV, Zofran 4mg IV (30 Apr 2017 10:34)      Allergies  adenosine (Unknown)      Health Issues  FLANK PAIN  No h/o HF  Family history of hypertension (Mother)  Family history of ischemic heart disease (Father)  Handoff  MEWS Score  Depression  Asthma  Type 2 diabetes mellitus  Hyperlipidemia  Essential hypertension  Atrial fibrillation  Atherosclerosis of coronary artery  Subdural hemorrhage  Persistent ostium secundum  Low back pain  Flank pain  Need for prophylactic measure  Nutrition, metabolism, and development symptoms  Asthma  Atrial fibrillation  Atherosclerosis of coronary artery  Essential hypertension  Type 2 diabetes mellitus  Low back pain  Acute subdural hematoma  History of knee replacement, total, right  Other postprocedural status  Status post tubal ligation  RIGHT FLANK PAIN  9  Asthma  Atherosclerosis of coronary artery  Essential hypertension  Type 2 diabetes mellitus        FAMILY HISTORY:  Family history of hypertension (Mother)  Family history of ischemic heart disease (Father): Family history of coronary artery disease in mother      MEDICATIONS  (STANDING):  aspirin enteric coated 81milliGRAM(s) Oral daily  lisinopril 20milliGRAM(s) Oral daily  atorvastatin 20milliGRAM(s) Oral at bedtime  amLODIPine   Tablet 10milliGRAM(s) Oral daily  multivitamin 1Tablet(s) Oral daily  cholecalciferol 2000Unit(s) Oral daily  insulin lispro (HumaLOG) corrective regimen sliding scale  SubCutaneous Before meals and at bedtime  dextrose 5%. 1000milliLiter(s) IV Continuous <Continuous>  dextrose 50% Injectable 12.5Gram(s) IV Push once  dextrose 50% Injectable 25Gram(s) IV Push once  dextrose 50% Injectable 25Gram(s) IV Push once  heparin  Injectable 5000Unit(s) SubCutaneous every 8 hours  gabapentin 300milliGRAM(s) Oral at bedtime  tiotropium 18 MICROgram(s) Capsule 1Capsule(s) Inhalation daily  buDESOnide 160 MICROgram(s)/formoterol 4.5 MICROgram(s) Inhaler 2Puff(s) Inhalation two times a day  insulin glargine Injectable (LANTUS) 16Unit(s) SubCutaneous at bedtime    MEDICATIONS  (PRN):  ondansetron Injectable 4milliGRAM(s) IV Push every 6 hours PRN Nausea and/or Vomiting  ALBUTerol    90 MICROgram(s) HFA Inhaler 2Puff(s) Inhalation every 6 hours PRN Shortness of Breath and/or Wheezing  zaleplon 5milliGRAM(s) Oral at bedtime PRN Insomnia  dextrose Gel 1Dose(s) Oral once PRN Blood Glucose LESS THAN 70 milliGRAM(s)/deciliter  glucagon  Injectable 1milliGRAM(s) IntraMuscular once PRN Glucose LESS THAN 70 milligrams/deciliter  acetaminophen   Tablet. 650milliGRAM(s) Oral every 6 hours PRN Moderate Pain (4 - 6)  traMADol 50milliGRAM(s) Oral every 12 hours PRN Severe Pain (7 - 10)  phenazopyridine 100milliGRAM(s) Oral every 8 hours PRN dysuria  zolpidem 5milliGRAM(s) Oral at bedtime PRN Insomnia      PAST MEDICAL & SURGICAL HISTORY:  Depression  Asthma: Asthma  Type 2 diabetes mellitus: DM (diabetes mellitus)  Hyperlipidemia: Hyperlipidemia  Essential hypertension: HTN (hypertension)  Atrial fibrillation: s/p ablation in 2013  Atherosclerosis of coronary artery: NOn obstructive  Subdural hemorrhage: s/p bakari hole (2013)  Persistent ostium secundum: s/p repair (1989)  Acute subdural hematoma  History of knee replacement, total, right  Other postprocedural status: S/P atrial septal defect closure, surgical  Status post tubal ligation: S/P tubal ligation      Labs                          10.7   4.4   )-----------( 255      ( 02 May 2017 03:14 )             32.3             Radiology:    Physical Exam    MENTAL STATUS  -Level of Consciousness- awake    Orientation- person, place time  Language- aphasia/ dysarthria  Memory- recent and remote      Cranial Nerve 1- 12  Pupils- equal and reactive  Eye movements-full  Facial - no asymmetry   Lower CN-nl    Gait and Station-no foot drop    MOTOR  Upper-nl  Lower-nl    Reflexes- decreased    Sensation- decreased    Cerebellar- no tremors    vascular -    Assessment- Lumbar radiculopathy    Plan Rehab, Pain

## 2017-05-02 NOTE — PROGRESS NOTE ADULT - SUBJECTIVE AND OBJECTIVE BOX
Physical Medicine and Rehabilitation Progress Note:    Patient is a 72y old  Female who presents with a chief complaint of right-sided lower back pain (01 May 2017 11:06)      HPI:  70yo F with PMHx asthma, HTN, Afib s/p ablation 2 yrs ago not on AC, ASD s/p repair, CAD, IDDM, depression, subdural hemorrhage s/p bakari hole, recently admitted 4/16/17 - 4/20/17 for UTI presents with lower back pain. Patient reports back pain ongoing for the past few months, described as lower back pain on the right side of the spine in the lumbar area, sharp, stabbing, non-radiating, 6-10 out of 10 in severity, with no exacerbating factors and somewhat relieved by Tramadol. Patient feels symptoms are related to the kidney stone that she was told she had on the prior admission. Patient was recently here 10 days ago, diagnosed with a Klebsiella UTI and had a CT a/p which showed 0.2cm non-obstructing left renal stone. Additionally, patient reports chills, states she is always cold, and is also nauseous with decreased appetite. Denies fever, abdominal pain, vomiting, diarrhea, dyrusia, increased frequency, CP, SOB, sick contacts or recent travel. At baseline, could walk 1/2 block with walker before she gets SOB.     In the ED, VS: 98.0 144/75 101 17 98% RA  Given Ceftriaxone 1g IV, Morphine 2mg IV, Zofran 4mg IV (30 Apr 2017 10:34)                            11.5   4.0   )-----------( 286      ( 02 May 2017 08:01 )             34.3       05-02    135  |  104  |  12  ----------------------------<  184<H>  4.0   |  21<L>  |  0.72    Ca    8.7      02 May 2017 08:01  Mg     1.7     05-02      Vital Signs Last 24 Hrs  T(C): 37.3, Max: 37.3 (05-02 @ 09:20)  T(F): 99.2, Max: 99.2 (05-02 @ 09:20)  HR: 88 (61 - 88)  BP: 132/76 (125/74 - 158/87)  BP(mean): --  RR: 16 (16 - 20)  SpO2: 98% (96% - 98%)    MEDICATIONS  (STANDING):  aspirin enteric coated 81milliGRAM(s) Oral daily  lisinopril 20milliGRAM(s) Oral daily  atorvastatin 20milliGRAM(s) Oral at bedtime  amLODIPine   Tablet 10milliGRAM(s) Oral daily  multivitamin 1Tablet(s) Oral daily  cholecalciferol 2000Unit(s) Oral daily  heparin  Injectable 5000Unit(s) SubCutaneous every 8 hours  gabapentin 300milliGRAM(s) Oral at bedtime  tiotropium 18 MICROgram(s) Capsule 1Capsule(s) Inhalation daily  buDESOnide 160 MICROgram(s)/formoterol 4.5 MICROgram(s) Inhaler 2Puff(s) Inhalation two times a day  insulin glargine Injectable (LANTUS) 20Unit(s) SubCutaneous at bedtime  insulin lispro (HumaLOG) corrective regimen sliding scale  SubCutaneous Before meals and at bedtime  dextrose 5%. 1000milliLiter(s) IV Continuous <Continuous>  dextrose 50% Injectable 12.5Gram(s) IV Push once  dextrose 50% Injectable 25Gram(s) IV Push once  dextrose 50% Injectable 25Gram(s) IV Push once    MEDICATIONS  (PRN):  ondansetron Injectable 4milliGRAM(s) IV Push every 6 hours PRN Nausea and/or Vomiting  ALBUTerol    90 MICROgram(s) HFA Inhaler 2Puff(s) Inhalation every 6 hours PRN Shortness of Breath and/or Wheezing  zaleplon 5milliGRAM(s) Oral at bedtime PRN Insomnia  acetaminophen   Tablet. 650milliGRAM(s) Oral every 6 hours PRN Moderate Pain (4 - 6)  traMADol 50milliGRAM(s) Oral every 12 hours PRN Severe Pain (7 - 10)  phenazopyridine 100milliGRAM(s) Oral every 8 hours PRN dysuria  zolpidem 5milliGRAM(s) Oral at bedtime PRN Insomnia  dextrose Gel 1Dose(s) Oral once PRN Blood Glucose LESS THAN 70 milliGRAM(s)/deciliter  glucagon  Injectable 1milliGRAM(s) IntraMuscular once PRN Glucose LESS THAN 70 milligrams/deciliter    Currently Undergoing Physical Therapy at bedside.    Initial Functional Status Assessment:    Previous Level of Function:   · Ambulation Skills	needs device	  · Transfer Skills	needs device	  · ADL Skills	independent	  · Additional Comments	Pt. lives in an elevator building, no steps to enter, has been using a Rollator,	    Cognitive Status Examination:   · Orientation	oriented to person, place, time and situation	  · Level of Consciousness	alert	  · Follows Commands and Answers Questions	100% of the time	    Range of Motion Exam:   · Range of Motion Examination	bilateral upper extremity ROM was WFL (within functional limits); bilateral lower extremity ROM was WFL (within functional limits)	    Bed Mobility: Rolling/Turning:   · Level of [place]	TBA	    Bed Mobility: Scooting/Bridging:   · Level of [place]	TBA	    Bed Mobility: Sit to Supine:   · Level of [place]	TBA, pt. was left sitting.	    Bed Mobility: Supine to Sit:   · Level of [place]	TBA - pt. was received sitting edge of bed.	    Transfer: Sit to Stand:   · Level of [place]	supervision; contact guard	  · Weight-Bearing Restrictions	full weight-bearing	  · Assistive Device	rolling walker	    Transfer: Stand to Sit:   · Level of [place]	supervision	  · Physical Assist/Nonphysical Assist	1 person assist	  · Weight-Bearing Restrictions	full weight-bearing	  · Assistive Device	rolling walker	    Gait Skills:   · Level of [place]	supervision; contact guard	  · Physical Assist/Nonphysical Assist	1 person assist	  · Weight-Bearing Restrictions	full weight-bearing	  · Assistive Device	rolling walker	  · Gait Distance	75 feet; x2	    Gait Analysis:   · Gait Deviations Noted	decreased tricia; decreased step length	  · Impairments Contributing to Gait Deviations	mildly unsteady, L knee; pain	    Stair Negotiation:   · Level of [place]	TBA	    Balance Skills Assessment:   · Sitting Balance: Static	good balance	  · Sitting Balance: Dynamic	good balance	  · Sit-to-Stand Balance	good minus	  · Standing Balance: Static	good minus	  · Standing Balance: Dynamic	fair plus	    Sensory Examination:   Sensory Examination:  Grossly Intact:   · Gross Sensory Examination	Grossly Intact; to light touch throughout.	      Treatment Location:   · Comments	Strength: > 3+/5 throughout, grossly assessed against gravity.	    Clinical Impressions:   · Criteria for Skilled Therapeutic Interventions	impairments found; functional limitations in following categories	  · Impairments Found (describe specific impairments)	gait, locomotion, and balance; aerobic capacity/endurance	  · Functional Limitations in Following Categories (describe specific limitations)	home management; community/leisure	  · Rehab Potential	good, to achieve stated therapy goals	  · Therapy Frequency	2-3x/week	  · Predicted Duration of Therapy Intervention (days/wks)	Pt. will benefit from further PT follow up to improve gait, transfers, endurance, balance,	  · Anticipated Equipment Needs at Discharge	Pt. already owns a rollator	    PM&R Impression: as above    Disposition Plan Recommendations:  d/c home, home care services
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
## 2017-05-03 ENCOUNTER — APPOINTMENT (OUTPATIENT)
Dept: INTERNAL MEDICINE | Facility: CLINIC | Age: 72
End: 2017-05-03

## 2017-05-03 VITALS
SYSTOLIC BLOOD PRESSURE: 143 MMHG | OXYGEN SATURATION: 99 % | TEMPERATURE: 99 F | DIASTOLIC BLOOD PRESSURE: 79 MMHG | HEART RATE: 69 BPM | RESPIRATION RATE: 19 BRPM

## 2017-05-03 LAB
CULTURE RESULTS: SIGNIFICANT CHANGE UP
SPECIMEN SOURCE: SIGNIFICANT CHANGE UP

## 2017-05-03 PROCEDURE — 83690 ASSAY OF LIPASE: CPT

## 2017-05-03 PROCEDURE — 36415 COLL VENOUS BLD VENIPUNCTURE: CPT

## 2017-05-03 PROCEDURE — 96374 THER/PROPH/DIAG INJ IV PUSH: CPT

## 2017-05-03 PROCEDURE — 81001 URINALYSIS AUTO W/SCOPE: CPT

## 2017-05-03 PROCEDURE — 85027 COMPLETE CBC AUTOMATED: CPT

## 2017-05-03 PROCEDURE — 93005 ELECTROCARDIOGRAM TRACING: CPT

## 2017-05-03 PROCEDURE — 85610 PROTHROMBIN TIME: CPT

## 2017-05-03 PROCEDURE — 72110 X-RAY EXAM L-2 SPINE 4/>VWS: CPT

## 2017-05-03 PROCEDURE — 80053 COMPREHEN METABOLIC PANEL: CPT

## 2017-05-03 PROCEDURE — 85730 THROMBOPLASTIN TIME PARTIAL: CPT

## 2017-05-03 PROCEDURE — 96375 TX/PRO/DX INJ NEW DRUG ADDON: CPT

## 2017-05-03 PROCEDURE — 87086 URINE CULTURE/COLONY COUNT: CPT

## 2017-05-03 PROCEDURE — 85025 COMPLETE CBC W/AUTO DIFF WBC: CPT

## 2017-05-03 PROCEDURE — 97161 PT EVAL LOW COMPLEX 20 MIN: CPT

## 2017-05-03 PROCEDURE — 99285 EMERGENCY DEPT VISIT HI MDM: CPT | Mod: 25

## 2017-05-03 PROCEDURE — 83735 ASSAY OF MAGNESIUM: CPT

## 2017-05-03 PROCEDURE — 80048 BASIC METABOLIC PNL TOTAL CA: CPT

## 2017-05-03 PROCEDURE — 94640 AIRWAY INHALATION TREATMENT: CPT

## 2017-05-03 PROCEDURE — 74176 CT ABD & PELVIS W/O CONTRAST: CPT

## 2017-05-03 RX ORDER — ATORVASTATIN CALCIUM 80 MG/1
1 TABLET, FILM COATED ORAL
Qty: 30 | Refills: 0
Start: 2017-05-03 | End: 2017-06-02

## 2017-05-03 RX ADMIN — Medication 2: at 09:57

## 2017-05-03 RX ADMIN — Medication 4: at 14:15

## 2017-05-03 RX ADMIN — AMLODIPINE BESYLATE 10 MILLIGRAM(S): 2.5 TABLET ORAL at 06:59

## 2017-05-03 RX ADMIN — LISINOPRIL 20 MILLIGRAM(S): 2.5 TABLET ORAL at 06:59

## 2017-05-03 RX ADMIN — Medication 81 MILLIGRAM(S): at 12:35

## 2017-05-03 RX ADMIN — HEPARIN SODIUM 5000 UNIT(S): 5000 INJECTION INTRAVENOUS; SUBCUTANEOUS at 06:59

## 2017-05-03 RX ADMIN — BUDESONIDE AND FORMOTEROL FUMARATE DIHYDRATE 2 PUFF(S): 160; 4.5 AEROSOL RESPIRATORY (INHALATION) at 06:59

## 2017-05-03 RX ADMIN — HEPARIN SODIUM 5000 UNIT(S): 5000 INJECTION INTRAVENOUS; SUBCUTANEOUS at 14:15

## 2017-05-03 RX ADMIN — ZOLPIDEM TARTRATE 5 MILLIGRAM(S): 10 TABLET ORAL at 00:34

## 2017-05-03 RX ADMIN — Medication 2000 UNIT(S): at 15:02

## 2017-05-03 RX ADMIN — TIOTROPIUM BROMIDE 1 CAPSULE(S): 18 CAPSULE ORAL; RESPIRATORY (INHALATION) at 12:35

## 2017-05-03 RX ADMIN — Medication 1 TABLET(S): at 12:35

## 2017-05-03 RX ADMIN — TRAMADOL HYDROCHLORIDE 50 MILLIGRAM(S): 50 TABLET ORAL at 10:55

## 2017-05-03 RX ADMIN — TRAMADOL HYDROCHLORIDE 50 MILLIGRAM(S): 50 TABLET ORAL at 09:58

## 2017-05-03 RX ADMIN — ALBUTEROL 2 PUFF(S): 90 AEROSOL, METERED ORAL at 07:02

## 2017-05-03 NOTE — PROGRESS NOTE ADULT - PROBLEM SELECTOR PLAN 6
Asthma/COPD. No signs of exacerbation. Lungs CTA, saturating well on RA.  - c/w Symbicort, Spiriva, Albuterol HFA PRN
Asthma/COPD. No signs of exacerbation. Lungs CTA, saturating well on RA.  -c/w Symbicort, Spiriva, Albuterol HFA PRN
Asthma/COPD. No signs of exacerbation. Lungs CTA, saturating well on RA.  -c/w Symbicort, Spiriva, Albuterol HFA PRN

## 2017-05-03 NOTE — PROGRESS NOTE ADULT - PROBLEM SELECTOR PROBLEM 4
Atherosclerosis of coronary artery

## 2017-05-03 NOTE — PROGRESS NOTE ADULT - PROBLEM SELECTOR PLAN 7
- carb consistent diet  - replete lytes if Mg<2, K<4
-carb consistent diet  -replete lytes if Mg<2, K<4
-carb consistent diet  -replete lytes if Mg<2, K<4

## 2017-05-03 NOTE — PROGRESS NOTE ADULT - PROBLEM SELECTOR PLAN 5
Now in NSR s/p ablation 2 years ago. Not on AC or BB per patient.  - continue to monitor
s/p ablation. Not on AC or BB as per patient. In NSR  -will continue to monitor
s/p ablation. Not on AC or BB as per patient. In NSR  -will continue to monitor

## 2017-05-03 NOTE — PROGRESS NOTE ADULT - SUBJECTIVE AND OBJECTIVE BOX
Neurology Follow up note    Name  TJ MCMANUS    HPI:  70yo F with PMHx asthma, HTN, Afib s/p ablation 2 yrs ago not on AC, ASD s/p repair, CAD, IDDM, depression, subdural hemorrhage s/p bakari hole, recently admitted 4/16/17 - 4/20/17 for UTI presents with lower back pain. Patient reports back pain ongoing for the past few months, described as lower back pain on the right side of the spine in the lumbar area, sharp, stabbing, non-radiating, 6-10 out of 10 in severity, with no exacerbating factors and somewhat relieved by Tramadol. Patient feels symptoms are related to the kidney stone that she was told she had on the prior admission. Patient was recently here 10 days ago, diagnosed with a Klebsiella UTI and had a CT a/p which showed 0.2cm non-obstructing left renal stone. Additionally, patient reports chills, states she is always cold, and is also nauseous with decreased appetite. Denies fever, abdominal pain, vomiting, diarrhea, dyrusia, increased frequency, CP, SOB, sick contacts or recent travel. At baseline, could walk 1/2 block with walker before she gets SOB.     In the ED, VS: 98.0 144/75 101 17 98% RA  Given Ceftriaxone 1g IV, Morphine 2mg IV, Zofran 4mg IV (30 Apr 2017 10:34)      Interval History - back and radicular symptoms in the LE- no foot drop        REVIEW OF SYSTEMS    Vital Signs Last 24 Hrs  T(C): 37.1, Max: 37.4 (05-02 @ 20:47)  T(F): 98.8, Max: 99.3 (05-02 @ 20:47)  HR: 69 (66 - 69)  BP: 143/79 (124/73 - 152/72)  BP(mean): --  RR: 19 (16 - 20)  SpO2: 99% (96% - 99%)    Physical Exam-     Mental Status- awake and alert    Cranial Nerves-nl    Gait and station- no foot drop    Motor- bilateral LE weakness    Reflexes- decreased    Sensation- no sensory level    Coordination- no tremors    Vascular -    Medications  ondansetron Injectable 4milliGRAM(s) IV Push every 6 hours PRN  aspirin enteric coated 81milliGRAM(s) Oral daily  lisinopril 20milliGRAM(s) Oral daily  atorvastatin 20milliGRAM(s) Oral at bedtime  ALBUTerol    90 MICROgram(s) HFA Inhaler 2Puff(s) Inhalation every 6 hours PRN  amLODIPine   Tablet 10milliGRAM(s) Oral daily  multivitamin 1Tablet(s) Oral daily  cholecalciferol 2000Unit(s) Oral daily  zaleplon 5milliGRAM(s) Oral at bedtime PRN  acetaminophen   Tablet. 650milliGRAM(s) Oral every 6 hours PRN  traMADol 50milliGRAM(s) Oral every 12 hours PRN  heparin  Injectable 5000Unit(s) SubCutaneous every 8 hours  gabapentin 300milliGRAM(s) Oral at bedtime  tiotropium 18 MICROgram(s) Capsule 1Capsule(s) Inhalation daily  buDESOnide 160 MICROgram(s)/formoterol 4.5 MICROgram(s) Inhaler 2Puff(s) Inhalation two times a day  phenazopyridine 100milliGRAM(s) Oral every 8 hours PRN  zolpidem 5milliGRAM(s) Oral at bedtime PRN  insulin glargine Injectable (LANTUS) 20Unit(s) SubCutaneous at bedtime  insulin lispro (HumaLOG) corrective regimen sliding scale  SubCutaneous Before meals and at bedtime  dextrose 5%. 1000milliLiter(s) IV Continuous <Continuous>  dextrose Gel 1Dose(s) Oral once PRN  dextrose 50% Injectable 12.5Gram(s) IV Push once  dextrose 50% Injectable 25Gram(s) IV Push once  dextrose 50% Injectable 25Gram(s) IV Push once  glucagon  Injectable 1milliGRAM(s) IntraMuscular once PRN      Lab      Radiology    Assessment- Lumbar radiculopathy- cervical myelopathy    Plan- Rehab alone- avoid injections and surgery

## 2017-05-03 NOTE — PROGRESS NOTE ADULT - PROBLEM SELECTOR PLAN 2
HbA1c 9. On Lantus 34 units and pre-meal Humalog 15/15/25 at home, but has been halving doses 2/2 symptomatic hypoglycemia.  - c/w Lantus 20 units QHS, ISS  - c/w gabapentin for peripheral neuropathy
Hb A1C 9.0. Patient reports she is prescribed Lantus 34u and Humalog 15, 15, 25 pre-meal but has been taking half of the prescribed doses because of symptomatic hyperglycemia.  -lantus increased to 20U given elevated finger sticks. Continue ISS.   -c/w Gabapentin for peripheral neuropathy
Hb A1C 9.0. Patient reports she is prescribed Lantus 34u and Humalog 15, 15, 25 pre-meal but has been taking half of the prescribed doses because of symptomatic hyperglycemia.  -will monitor FS throughout the day and dose bedtime Lantus as needed  -c/w Gabapentin for peripheral neuropathy

## 2017-05-03 NOTE — PROGRESS NOTE ADULT - ASSESSMENT
71F PMH asthma, HTN, Afib s/p ablation 2 yrs ago not on AC, ASD s/p repair, CAD, IDDM, depression, subdural hemorrhage s/p bakari hole, recent admission in 4/2017 for UTI admitted 4/30 for lower back pain, likely 2/2 lumbar radiculopathy, ruled out for recurrent UTI, medically stable for discharge with 24 hour notice given on 5/2, currently appealing discharge.
72yo F with PMHx asthma, HTN, Afib s/p ablation 2 yrs ago not on AC, ASD s/p repair, CAD, IDDM, depression, subdural hemorrhage s/p bakari hole, recently admitted 4/16/17 - 4/20/17 for UTI presents with lower back pain.    Problem/Plan - 1:  ·  Problem: Low back pain.  Plan: Back pain in the right iliac crest region, appears chronic in nature. Likely musculoskeletal vs. 2/2 disc bulging as seen on recent MRI. Followed by Dr. Sandra. Unlikely "flank pain" 2/2 UTI/pyelo/kidney stones as CT does not show any kidney stones, patient completed course of Abx, UA not indicative of infection.  -given dysuria yesterday, and given pyrimidine. Repeat UA with <5 wbc, pos nitrite, negative LE. Now asymptomatic.   -Tramadol PRN and Tylenol PRN for back pain  -MRI spine per Dr. Cabrera  -Seen by Dr. Amador, who recommends pain control and rehab
70yo F with PMHx asthma, HTN, Afib s/p ablation 2 yrs ago not on AC, ASD s/p repair, CAD, IDDM, depression, subdural hemorrhage s/p bakari hole, recently admitted 4/16/17 - 4/20/17 for UTI presents with lower back pain.
72yo F with PMHx asthma, HTN, Afib s/p ablation 2 yrs ago not on AC, ASD s/p repair, CAD, IDDM, depression, subdural hemorrhage s/p bakari hole, recently admitted 4/16/17 - 4/20/17 for UTI presents with lower back pain.

## 2017-05-03 NOTE — PROGRESS NOTE ADULT - PROBLEM SELECTOR PLAN 1
2/2 lumbar radiculopathy per Dr. Sandra and Dr. Mendel. Despite recent UTI, unlikely flank pain 2/2 UTI or kidney stone as CT neg and UA and UCx neg x2.   - Tramadol PRN and Tylenol PRN for back pain  - medically ready for discharge with outpatient follow up with Dr. Sandra and Dr. Mendel

## 2017-05-03 NOTE — PROGRESS NOTE ADULT - SUBJECTIVE AND OBJECTIVE BOX
Overnight Events: AFSANEH    Subjective: Patient seen and examined at bedside. No complaints. Was scheduled for pickup at 10am today, but appealed discharge.    [OBJECTIVE]:    Vital Signs:  T(F): , Max: 99.3 (05-02 @ 20:47)  HR:  (66 - 69)  BP:  (124/73 - 152/72)  BP(mean): --  RR:  (16 - 20)  SpO2:  (96% - 99%) RA    CAPILLARY BLOOD GLUCOSE  245 (03 May 2017 12:58)    Physical Exam:  T(F): 98.8  HR: 69  BP: 143/79  RR: 19  SpO2: 99% RA    Constitutional: WDWN resting comfortably in bed; NAD  Head: NC/AT  HEENT: PERRL, EOMI, MMM   Respiratory: CTA B/L; no W/R/R, no retractions  Cardiac: +S1/S2; RRR; no M/R/G  Gastrointestinal: soft, NT/ND; no rebound or guarding; +BS, no suprapubic tenderness  Back: spine midline, no bony tenderness or step-offs; no CVAT B/L, pain to palpation in right iliac crest  Extremities: WWP, no clubbing or cyanosis; no peripheral edema  Musculoskeletal: NROM x4; no joint swelling, tenderness or erythema  Vascular: 2+ radial, DP/PT pulses B/L  Neurologic: AAOx3; CNII-XII grossly intact; no focal deficits    Medications:  MEDICATIONS  (STANDING):  aspirin enteric coated 81milliGRAM(s) Oral daily  lisinopril 20milliGRAM(s) Oral daily  atorvastatin 20milliGRAM(s) Oral at bedtime  amLODIPine   Tablet 10milliGRAM(s) Oral daily  multivitamin 1Tablet(s) Oral daily  cholecalciferol 2000Unit(s) Oral daily  heparin  Injectable 5000Unit(s) SubCutaneous every 8 hours  gabapentin 300milliGRAM(s) Oral at bedtime  tiotropium 18 MICROgram(s) Capsule 1Capsule(s) Inhalation daily  buDESOnide 160 MICROgram(s)/formoterol 4.5 MICROgram(s) Inhaler 2Puff(s) Inhalation two times a day  insulin glargine Injectable (LANTUS) 20Unit(s) SubCutaneous at bedtime  insulin lispro (HumaLOG) corrective regimen sliding scale  SubCutaneous Before meals and at bedtime  dextrose 5%. 1000milliLiter(s) IV Continuous <Continuous>  dextrose 50% Injectable 12.5Gram(s) IV Push once  dextrose 50% Injectable 25Gram(s) IV Push once  dextrose 50% Injectable 25Gram(s) IV Push once    MEDICATIONS  (PRN):  ondansetron Injectable 4milliGRAM(s) IV Push every 6 hours PRN Nausea and/or Vomiting  ALBUTerol    90 MICROgram(s) HFA Inhaler 2Puff(s) Inhalation every 6 hours PRN Shortness of Breath and/or Wheezing  zaleplon 5milliGRAM(s) Oral at bedtime PRN Insomnia  acetaminophen   Tablet. 650milliGRAM(s) Oral every 6 hours PRN Moderate Pain (4 - 6)  traMADol 50milliGRAM(s) Oral every 12 hours PRN Severe Pain (7 - 10)  phenazopyridine 100milliGRAM(s) Oral every 8 hours PRN dysuria  zolpidem 5milliGRAM(s) Oral at bedtime PRN Insomnia  dextrose Gel 1Dose(s) Oral once PRN Blood Glucose LESS THAN 70 milliGRAM(s)/deciliter  glucagon  Injectable 1milliGRAM(s) IntraMuscular once PRN Glucose LESS THAN 70 milligrams/deciliter      Allergies:  Allergies    adenosine (Unknown)    Intolerances        Labs:                        11.5   4.0   )-----------( 286      ( 02 May 2017 08:01 )             34.3     05-02    135  |  104  |  12  ----------------------------<  184<H>  4.0   |  21<L>  |  0.72    Ca    8.7      02 May 2017 08:01  Mg     1.7     05-02
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
## 2017-05-05 ENCOUNTER — OTHER (OUTPATIENT)
Age: 72
End: 2017-05-05

## 2017-05-08 DIAGNOSIS — Z98.51 TUBAL LIGATION STATUS: ICD-10-CM

## 2017-05-08 DIAGNOSIS — N20.0 CALCULUS OF KIDNEY: ICD-10-CM

## 2017-05-08 DIAGNOSIS — I10 ESSENTIAL (PRIMARY) HYPERTENSION: ICD-10-CM

## 2017-05-08 DIAGNOSIS — Z96.651 PRESENCE OF RIGHT ARTIFICIAL KNEE JOINT: ICD-10-CM

## 2017-05-08 DIAGNOSIS — I25.10 ATHEROSCLEROTIC HEART DISEASE OF NATIVE CORONARY ARTERY WITHOUT ANGINA PECTORIS: ICD-10-CM

## 2017-05-08 DIAGNOSIS — I48.91 UNSPECIFIED ATRIAL FIBRILLATION: ICD-10-CM

## 2017-05-08 DIAGNOSIS — E11.42 TYPE 2 DIABETES MELLITUS WITH DIABETIC POLYNEUROPATHY: ICD-10-CM

## 2017-05-08 DIAGNOSIS — J45.909 UNSPECIFIED ASTHMA, UNCOMPLICATED: ICD-10-CM

## 2017-05-08 DIAGNOSIS — F32.9 MAJOR DEPRESSIVE DISORDER, SINGLE EPISODE, UNSPECIFIED: ICD-10-CM

## 2017-05-08 DIAGNOSIS — M54.16 RADICULOPATHY, LUMBAR REGION: ICD-10-CM

## 2017-05-08 DIAGNOSIS — Z79.82 LONG TERM (CURRENT) USE OF ASPIRIN: ICD-10-CM

## 2017-05-08 DIAGNOSIS — Z87.440 PERSONAL HISTORY OF URINARY (TRACT) INFECTIONS: ICD-10-CM

## 2017-05-08 DIAGNOSIS — M54.9 DORSALGIA, UNSPECIFIED: ICD-10-CM

## 2017-05-13 PROBLEM — M43.05: Status: ACTIVE | Noted: 2017-05-13

## 2017-05-13 PROBLEM — M54.5 PAIN OF LUMBAR FACET JOINT: Status: ACTIVE | Noted: 2017-05-13

## 2017-05-17 ENCOUNTER — APPOINTMENT (OUTPATIENT)
Dept: ORTHOPEDIC SURGERY | Facility: CLINIC | Age: 72
End: 2017-05-17

## 2017-05-17 VITALS — WEIGHT: 170 LBS | BODY MASS INDEX: 28.32 KG/M2 | HEIGHT: 65 IN

## 2017-05-17 DIAGNOSIS — G89.29 LOW BACK PAIN: ICD-10-CM

## 2017-05-17 DIAGNOSIS — M48.02 SPINAL STENOSIS, CERVICAL REGION: ICD-10-CM

## 2017-05-17 DIAGNOSIS — M54.5 LOW BACK PAIN: ICD-10-CM

## 2017-05-18 PROBLEM — M54.5 CHRONIC BILATERAL LOW BACK PAIN WITHOUT SCIATICA: Status: ACTIVE | Noted: 2017-05-18

## 2017-05-18 PROBLEM — M48.02 DEGENERATIVE CERVICAL SPINAL STENOSIS: Status: ACTIVE | Noted: 2017-05-18

## 2017-05-22 ENCOUNTER — APPOINTMENT (OUTPATIENT)
Dept: INTERNAL MEDICINE | Facility: CLINIC | Age: 72
End: 2017-05-22

## 2017-05-22 VITALS
OXYGEN SATURATION: 98 % | SYSTOLIC BLOOD PRESSURE: 133 MMHG | HEART RATE: 72 BPM | WEIGHT: 156 LBS | HEIGHT: 65 IN | TEMPERATURE: 98.6 F | BODY MASS INDEX: 25.99 KG/M2 | RESPIRATION RATE: 16 BRPM | DIASTOLIC BLOOD PRESSURE: 74 MMHG

## 2017-05-22 DIAGNOSIS — L60.8 OTHER NAIL DISORDERS: ICD-10-CM

## 2017-05-22 DIAGNOSIS — J30.2 OTHER SEASONAL ALLERGIC RHINITIS: ICD-10-CM

## 2017-05-30 ENCOUNTER — OTHER (OUTPATIENT)
Age: 72
End: 2017-05-30

## 2017-05-30 LAB
ALBUMIN SERPL ELPH-MCNC: 4.8 G/DL
ALP BLD-CCNC: 111 U/L
ALT SERPL-CCNC: 20 U/L
ANION GAP SERPL CALC-SCNC: 17 MMOL/L
AST SERPL-CCNC: 26 U/L
BILIRUB SERPL-MCNC: 0.4 MG/DL
BUN SERPL-MCNC: 18 MG/DL
CALCIUM SERPL-MCNC: 9.8 MG/DL
CHLORIDE SERPL-SCNC: 106 MMOL/L
CO2 SERPL-SCNC: 18 MMOL/L
CREAT SERPL-MCNC: 0.98 MG/DL
GLUCOSE SERPL-MCNC: 120 MG/DL
HBA1C MFR BLD HPLC: 7.7 %
POTASSIUM SERPL-SCNC: 4.9 MMOL/L
PROT SERPL-MCNC: 7.4 G/DL
SODIUM SERPL-SCNC: 141 MMOL/L

## 2017-06-07 ENCOUNTER — RECORD ABSTRACTING (OUTPATIENT)
Age: 72
End: 2017-06-07

## 2017-06-07 DIAGNOSIS — Z72.3 LACK OF PHYSICAL EXERCISE: ICD-10-CM

## 2017-06-07 DIAGNOSIS — Z82.49 FAMILY HISTORY OF ISCHEMIC HEART DISEASE AND OTHER DISEASES OF THE CIRCULATORY SYSTEM: ICD-10-CM

## 2017-06-07 DIAGNOSIS — Z78.9 OTHER SPECIFIED HEALTH STATUS: ICD-10-CM

## 2017-06-07 DIAGNOSIS — F15.90 OTHER STIMULANT USE, UNSPECIFIED, UNCOMPLICATED: ICD-10-CM

## 2017-06-21 ENCOUNTER — EMERGENCY (EMERGENCY)
Facility: HOSPITAL | Age: 72
LOS: 1 days | Discharge: HOME | End: 2017-06-21
Attending: EMERGENCY MEDICINE | Admitting: EMERGENCY MEDICINE
Payer: MEDICARE

## 2017-06-21 VITALS
TEMPERATURE: 98 F | WEIGHT: 154.32 LBS | DIASTOLIC BLOOD PRESSURE: 70 MMHG | RESPIRATION RATE: 16 BRPM | OXYGEN SATURATION: 98 % | HEART RATE: 68 BPM | SYSTOLIC BLOOD PRESSURE: 115 MMHG

## 2017-06-21 DIAGNOSIS — M79.1 MYALGIA: ICD-10-CM

## 2017-06-21 DIAGNOSIS — E78.5 HYPERLIPIDEMIA, UNSPECIFIED: ICD-10-CM

## 2017-06-21 DIAGNOSIS — Z96.651 PRESENCE OF RIGHT ARTIFICIAL KNEE JOINT: Chronic | ICD-10-CM

## 2017-06-21 DIAGNOSIS — M25.562 PAIN IN LEFT KNEE: ICD-10-CM

## 2017-06-21 DIAGNOSIS — R26.2 DIFFICULTY IN WALKING, NOT ELSEWHERE CLASSIFIED: ICD-10-CM

## 2017-06-21 DIAGNOSIS — F41.9 ANXIETY DISORDER, UNSPECIFIED: ICD-10-CM

## 2017-06-21 DIAGNOSIS — I62.01 NONTRAUMATIC ACUTE SUBDURAL HEMORRHAGE: Chronic | ICD-10-CM

## 2017-06-21 DIAGNOSIS — I25.10 ATHEROSCLEROTIC HEART DISEASE OF NATIVE CORONARY ARTERY WITHOUT ANGINA PECTORIS: ICD-10-CM

## 2017-06-21 DIAGNOSIS — Z79.82 LONG TERM (CURRENT) USE OF ASPIRIN: ICD-10-CM

## 2017-06-21 DIAGNOSIS — M25.561 PAIN IN RIGHT KNEE: ICD-10-CM

## 2017-06-21 DIAGNOSIS — E11.9 TYPE 2 DIABETES MELLITUS WITHOUT COMPLICATIONS: ICD-10-CM

## 2017-06-21 DIAGNOSIS — J45.909 UNSPECIFIED ASTHMA, UNCOMPLICATED: ICD-10-CM

## 2017-06-21 DIAGNOSIS — M54.9 DORSALGIA, UNSPECIFIED: ICD-10-CM

## 2017-06-21 DIAGNOSIS — Z79.4 LONG TERM (CURRENT) USE OF INSULIN: ICD-10-CM

## 2017-06-21 PROCEDURE — 99283 EMERGENCY DEPT VISIT LOW MDM: CPT

## 2017-06-21 NOTE — ED PROVIDER NOTE - OBJECTIVE STATEMENT
orthopaedic pain reported particularly at knees + has Pain Medical Appt today but feels could not make it without breakthrough medications Denies acute change + baseline back pain , joint pain and ambulates with walker

## 2017-06-21 NOTE — ED PROVIDER NOTE - MEDICAL DECISION MAKING DETAILS
difficult to assess reasoning for visit + takes Q 12 oxycontin at home but only tramadol for break through out of meds ) + seeking breakthrough med here and such given + has pain appt today and after receiving  meds asked to be sent home and as no acute change realized such granted

## 2017-06-21 NOTE — ED PROVIDER NOTE - PHYSICAL EXAMINATION
moves extremities well but tenderness noted globally especially LE + R knee well healed surgical scar + able to independently SLR and PROM 0-> 120 at knees

## 2017-06-29 ENCOUNTER — EMERGENCY (EMERGENCY)
Facility: HOSPITAL | Age: 72
LOS: 1 days | Discharge: HOME | End: 2017-06-29
Admitting: EMERGENCY MEDICINE
Payer: MEDICARE

## 2017-06-29 VITALS
SYSTOLIC BLOOD PRESSURE: 123 MMHG | HEART RATE: 86 BPM | OXYGEN SATURATION: 100 % | TEMPERATURE: 98 F | RESPIRATION RATE: 16 BRPM | DIASTOLIC BLOOD PRESSURE: 70 MMHG | WEIGHT: 139.99 LBS

## 2017-06-29 VITALS
DIASTOLIC BLOOD PRESSURE: 78 MMHG | SYSTOLIC BLOOD PRESSURE: 115 MMHG | OXYGEN SATURATION: 99 % | TEMPERATURE: 99 F | HEART RATE: 79 BPM | RESPIRATION RATE: 18 BRPM

## 2017-06-29 DIAGNOSIS — M54.5 LOW BACK PAIN: ICD-10-CM

## 2017-06-29 DIAGNOSIS — I62.01 NONTRAUMATIC ACUTE SUBDURAL HEMORRHAGE: Chronic | ICD-10-CM

## 2017-06-29 DIAGNOSIS — E11.9 TYPE 2 DIABETES MELLITUS WITHOUT COMPLICATIONS: ICD-10-CM

## 2017-06-29 DIAGNOSIS — Z88.8 ALLERGY STATUS TO OTHER DRUGS, MEDICAMENTS AND BIOLOGICAL SUBSTANCES STATUS: ICD-10-CM

## 2017-06-29 DIAGNOSIS — M25.562 PAIN IN LEFT KNEE: ICD-10-CM

## 2017-06-29 DIAGNOSIS — M25.561 PAIN IN RIGHT KNEE: ICD-10-CM

## 2017-06-29 DIAGNOSIS — G89.29 OTHER CHRONIC PAIN: ICD-10-CM

## 2017-06-29 DIAGNOSIS — I10 ESSENTIAL (PRIMARY) HYPERTENSION: ICD-10-CM

## 2017-06-29 DIAGNOSIS — Z79.4 LONG TERM (CURRENT) USE OF INSULIN: ICD-10-CM

## 2017-06-29 DIAGNOSIS — Z96.651 PRESENCE OF RIGHT ARTIFICIAL KNEE JOINT: Chronic | ICD-10-CM

## 2017-06-29 DIAGNOSIS — M79.672 PAIN IN LEFT FOOT: ICD-10-CM

## 2017-06-29 DIAGNOSIS — M79.671 PAIN IN RIGHT FOOT: ICD-10-CM

## 2017-06-29 DIAGNOSIS — Z79.82 LONG TERM (CURRENT) USE OF ASPIRIN: ICD-10-CM

## 2017-06-29 DIAGNOSIS — E78.5 HYPERLIPIDEMIA, UNSPECIFIED: ICD-10-CM

## 2017-06-29 PROCEDURE — 99283 EMERGENCY DEPT VISIT LOW MDM: CPT

## 2017-06-29 PROCEDURE — 73630 X-RAY EXAM OF FOOT: CPT

## 2017-06-29 PROCEDURE — 73630 X-RAY EXAM OF FOOT: CPT | Mod: 26,LT

## 2017-06-29 PROCEDURE — 99283 EMERGENCY DEPT VISIT LOW MDM: CPT | Mod: 25

## 2017-06-29 NOTE — ED PROVIDER NOTE - PHYSICAL EXAMINATION
back: + tenderness to lower lumbar region. no deformity. no erythema or bruising. strength 5/5 b/l LE. distal sensation intact  knee: well healed scar to anterior right knee. FROM. no edema. no deformity. mild anterior tenderness. distal NVI. left knee: FROM. + anterior tenderness. no swelling or erythema. distal NVI. no deformity  foot: + callous to left foot with ? healing puncture wound. no erythema. no d/c. + tenderness. no FB palpable. distal NVI. remainder of foot and ankle non tender.

## 2017-06-29 NOTE — ED ADULT NURSE NOTE - OBJECTIVE STATEMENT
pt prestents to ED c/o bilateral Knee pain and R sided back pain. Pt states she takes oxycodone for pain but as ran out. Last taken last night.
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
## 2017-06-29 NOTE — ED PROVIDER NOTE - CARE PLAN
Principal Discharge DX:	Foot pain  Secondary Diagnosis:	Chronic knee pain  Secondary Diagnosis:	Chronic back pain

## 2017-06-29 NOTE — ED ADULT NURSE NOTE - CHPI ED SYMPTOMS NEG
no nausea/no decreased eating/drinking/no numbness/no tingling/no fever/no dizziness/no vomiting/no chills/no weakness

## 2017-06-29 NOTE — ED ADULT NURSE NOTE - MUSCULOSKELETAL WDL
Full range of motion of upper and lower extremities, no joint tenderness/swelling, pain with movement of knees

## 2017-06-29 NOTE — ED PROVIDER NOTE - MEDICAL DECISION MAKING DETAILS
chronic back and knee pain. pt reports out of pain meds. neurovascular intact. no evidence of infection. pain meds given in ED. foot pain after stepping in water. neurovascular intact. x-ray  + heel spur otherwise no acute pathology. d/w pt will need to obtain pain med rx from her pain mgt MD. f/u with pain mgt and podiatry.

## 2017-06-29 NOTE — ED ADULT TRIAGE NOTE - CHIEF COMPLAINT QUOTE
back pain , left knee pain since this morning back pain ,left foot,  left knee pain since this morning

## 2017-06-29 NOTE — ED ADULT NURSE NOTE - ED STAT RN HANDOFF DETAILS
patient received from night RN in stable condition. she states that her pain is relieved with the percocet. waiting for x-ray will continue to monitor

## 2017-06-29 NOTE — ED ADULT NURSE NOTE - PMH
Asthma  Asthma  Atherosclerosis of coronary artery  NOn obstructive  Atrial fibrillation  s/p ablation in 2013  Back pain    Depression    Essential hypertension  HTN (hypertension)  Hyperlipidemia  Hyperlipidemia  Persistent ostium secundum  s/p repair (1989)  Subdural hemorrhage  s/p bakari hole (2013)  Type 2 diabetes mellitus  DM (diabetes mellitus)

## 2017-07-04 ENCOUNTER — EMERGENCY (EMERGENCY)
Facility: HOSPITAL | Age: 72
LOS: 1 days | Discharge: HOME | End: 2017-07-04
Attending: EMERGENCY MEDICINE | Admitting: EMERGENCY MEDICINE
Payer: MEDICARE

## 2017-07-04 VITALS
RESPIRATION RATE: 18 BRPM | OXYGEN SATURATION: 99 % | TEMPERATURE: 99 F | HEART RATE: 85 BPM | SYSTOLIC BLOOD PRESSURE: 118 MMHG | DIASTOLIC BLOOD PRESSURE: 69 MMHG

## 2017-07-04 VITALS
RESPIRATION RATE: 18 BRPM | DIASTOLIC BLOOD PRESSURE: 79 MMHG | OXYGEN SATURATION: 96 % | HEART RATE: 71 BPM | TEMPERATURE: 98 F | SYSTOLIC BLOOD PRESSURE: 148 MMHG

## 2017-07-04 DIAGNOSIS — S39.92XA UNSPECIFIED INJURY OF LOWER BACK, INITIAL ENCOUNTER: ICD-10-CM

## 2017-07-04 DIAGNOSIS — I62.01 NONTRAUMATIC ACUTE SUBDURAL HEMORRHAGE: Chronic | ICD-10-CM
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
## 2017-07-04 DIAGNOSIS — J45.909 UNSPECIFIED ASTHMA, UNCOMPLICATED: ICD-10-CM

## 2017-07-04 DIAGNOSIS — I25.10 ATHEROSCLEROTIC HEART DISEASE OF NATIVE CORONARY ARTERY WITHOUT ANGINA PECTORIS: ICD-10-CM

## 2017-07-04 DIAGNOSIS — Z79.4 LONG TERM (CURRENT) USE OF INSULIN: ICD-10-CM

## 2017-07-04 DIAGNOSIS — M25.512 PAIN IN LEFT SHOULDER: ICD-10-CM

## 2017-07-04 DIAGNOSIS — E11.9 TYPE 2 DIABETES MELLITUS WITHOUT COMPLICATIONS: ICD-10-CM

## 2017-07-04 DIAGNOSIS — I10 ESSENTIAL (PRIMARY) HYPERTENSION: ICD-10-CM

## 2017-07-04 DIAGNOSIS — S39.012A STRAIN OF MUSCLE, FASCIA AND TENDON OF LOWER BACK, INITIAL ENCOUNTER: ICD-10-CM

## 2017-07-04 DIAGNOSIS — Z88.8 ALLERGY STATUS TO OTHER DRUGS, MEDICAMENTS AND BIOLOGICAL SUBSTANCES STATUS: ICD-10-CM

## 2017-07-04 DIAGNOSIS — Z96.651 PRESENCE OF RIGHT ARTIFICIAL KNEE JOINT: Chronic | ICD-10-CM

## 2017-07-04 DIAGNOSIS — M25.511 PAIN IN RIGHT SHOULDER: ICD-10-CM

## 2017-07-04 DIAGNOSIS — E78.5 HYPERLIPIDEMIA, UNSPECIFIED: ICD-10-CM
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
## 2017-07-04 PROCEDURE — 73030 X-RAY EXAM OF SHOULDER: CPT

## 2017-07-04 PROCEDURE — 99284 EMERGENCY DEPT VISIT MOD MDM: CPT

## 2017-07-04 PROCEDURE — 73030 X-RAY EXAM OF SHOULDER: CPT | Mod: 26,RT

## 2017-07-04 PROCEDURE — 71020: CPT | Mod: 26

## 2017-07-04 PROCEDURE — 99284 EMERGENCY DEPT VISIT MOD MDM: CPT | Mod: 25

## 2017-07-04 PROCEDURE — 71046 X-RAY EXAM CHEST 2 VIEWS: CPT

## 2017-07-04 PROCEDURE — 72100 X-RAY EXAM L-S SPINE 2/3 VWS: CPT | Mod: 26

## 2017-07-04 PROCEDURE — 72100 X-RAY EXAM L-S SPINE 2/3 VWS: CPT

## 2017-07-04 RX ORDER — IBUPROFEN 200 MG
600 TABLET ORAL ONCE
Qty: 0 | Refills: 0 | Status: COMPLETED | OUTPATIENT
Start: 2017-07-04 | End: 2017-07-04

## 2017-07-04 RX ADMIN — Medication 600 MILLIGRAM(S): at 23:17

## 2017-07-04 NOTE — ED PROVIDER NOTE - DIAGNOSTIC INTERPRETATION
CXR: no focal consolidation, normal bones, normal cardiac contour.  read by Dr. Lopez  lumbar xray: no fracture or dislocation read by Dr. Lopez  shoulder xray: no fracture or dislocation read by Dr. Lopez

## 2017-07-04 NOTE — ED PROVIDER NOTE - MUSCULOSKELETAL, MLM
Spine appears normal, range of motion is not limited, tenderness right shoulder posteriorly.  No visible swelling/contusion
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
## 2017-07-04 NOTE — ED ADULT NURSE NOTE - CHPI ED SYMPTOMS NEG
no weakness/no loss of consciousness/no tingling/no confusion/no bleeding/no deformity/no numbness/no abrasion/no fever/no vomiting
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
## 2017-07-06 ENCOUNTER — EMERGENCY (EMERGENCY)
Facility: HOSPITAL | Age: 72
LOS: 1 days | Discharge: HOME | End: 2017-07-06
Attending: EMERGENCY MEDICINE | Admitting: EMERGENCY MEDICINE
Payer: MEDICARE

## 2017-07-06 VITALS
OXYGEN SATURATION: 99 % | HEART RATE: 81 BPM | DIASTOLIC BLOOD PRESSURE: 81 MMHG | SYSTOLIC BLOOD PRESSURE: 137 MMHG | RESPIRATION RATE: 16 BRPM | TEMPERATURE: 99 F

## 2017-07-06 DIAGNOSIS — M25.562 PAIN IN LEFT KNEE: ICD-10-CM

## 2017-07-06 DIAGNOSIS — Z88.8 ALLERGY STATUS TO OTHER DRUGS, MEDICAMENTS AND BIOLOGICAL SUBSTANCES STATUS: ICD-10-CM

## 2017-07-06 DIAGNOSIS — I10 ESSENTIAL (PRIMARY) HYPERTENSION: ICD-10-CM

## 2017-07-06 DIAGNOSIS — Z79.899 OTHER LONG TERM (CURRENT) DRUG THERAPY: ICD-10-CM

## 2017-07-06 DIAGNOSIS — G89.29 OTHER CHRONIC PAIN: ICD-10-CM

## 2017-07-06 DIAGNOSIS — J45.909 UNSPECIFIED ASTHMA, UNCOMPLICATED: ICD-10-CM

## 2017-07-06 DIAGNOSIS — Z96.651 PRESENCE OF RIGHT ARTIFICIAL KNEE JOINT: Chronic | ICD-10-CM

## 2017-07-06 DIAGNOSIS — M54.9 DORSALGIA, UNSPECIFIED: ICD-10-CM

## 2017-07-06 DIAGNOSIS — E11.9 TYPE 2 DIABETES MELLITUS WITHOUT COMPLICATIONS: ICD-10-CM

## 2017-07-06 DIAGNOSIS — I25.10 ATHEROSCLEROTIC HEART DISEASE OF NATIVE CORONARY ARTERY WITHOUT ANGINA PECTORIS: ICD-10-CM

## 2017-07-06 DIAGNOSIS — Z79.4 LONG TERM (CURRENT) USE OF INSULIN: ICD-10-CM

## 2017-07-06 DIAGNOSIS — I62.01 NONTRAUMATIC ACUTE SUBDURAL HEMORRHAGE: Chronic | ICD-10-CM

## 2017-07-06 DIAGNOSIS — E78.5 HYPERLIPIDEMIA, UNSPECIFIED: ICD-10-CM

## 2017-07-06 DIAGNOSIS — Z79.82 LONG TERM (CURRENT) USE OF ASPIRIN: ICD-10-CM

## 2017-07-06 PROCEDURE — 99283 EMERGENCY DEPT VISIT LOW MDM: CPT

## 2017-07-06 NOTE — ED ADULT NURSE NOTE - CHPI ED SYMPTOMS NEG
no abrasion/no vomiting/no numbness/no loss of consciousness/no confusion/no tingling/no bleeding/no fever/no weakness/no deformity

## 2017-07-08 NOTE — ED PROVIDER NOTE - MEDICAL DECISION MAKING DETAILS
71 yo F with left knee pain x 1 week- atraumatic NVI  nl cap refill no evidence of cellulitis or deformity ? prog DJD-  I ordered an x ray- but pt eloped per RN  ambulated out of ED per

## 2017-07-08 NOTE — ED PROVIDER NOTE - OBJECTIVE STATEMENT
71 yo female with HTN DM CAD  Afib  chronic left knee pain and back pain - c/o of increased pain x 1 week no calf tenderness no sob or cp-  no recent injury or fall- no skin lesions or erythema noted per pt-

## 2017-08-16 ENCOUNTER — OTHER (OUTPATIENT)
Age: 72
End: 2017-08-16

## 2017-08-22 ENCOUNTER — APPOINTMENT (OUTPATIENT)
Dept: HEART AND VASCULAR | Facility: CLINIC | Age: 72
End: 2017-08-22

## 2017-08-23 ENCOUNTER — APPOINTMENT (OUTPATIENT)
Dept: INTERNAL MEDICINE | Facility: CLINIC | Age: 72
End: 2017-08-23
Payer: MEDICARE

## 2017-08-23 VITALS
HEART RATE: 70 BPM | TEMPERATURE: 98.7 F | BODY MASS INDEX: 23.66 KG/M2 | OXYGEN SATURATION: 98 % | HEIGHT: 65 IN | WEIGHT: 142 LBS | SYSTOLIC BLOOD PRESSURE: 106 MMHG | DIASTOLIC BLOOD PRESSURE: 64 MMHG | RESPIRATION RATE: 16 BRPM

## 2017-08-23 PROCEDURE — 36415 COLL VENOUS BLD VENIPUNCTURE: CPT

## 2017-08-23 PROCEDURE — 99214 OFFICE O/P EST MOD 30 MIN: CPT | Mod: 25

## 2017-08-28 ENCOUNTER — APPOINTMENT (OUTPATIENT)
Dept: INTERNAL MEDICINE | Facility: CLINIC | Age: 72
End: 2017-08-28

## 2017-08-29 ENCOUNTER — OTHER (OUTPATIENT)
Age: 72
End: 2017-08-29

## 2017-08-29 LAB
ADJUSTED MITOGEN: >10 IU/ML
ADJUSTED TB AG: 0.01 IU/ML
ANION GAP SERPL CALC-SCNC: 19 MMOL/L
BUN SERPL-MCNC: 24 MG/DL
CALCIUM SERPL-MCNC: 9.1 MG/DL
CHLORIDE SERPL-SCNC: 103 MMOL/L
CO2 SERPL-SCNC: 17 MMOL/L
CREAT SERPL-MCNC: 1.21 MG/DL
GLUCOSE SERPL-MCNC: 227 MG/DL
HBA1C MFR BLD HPLC: 8.3 %
HIV1+2 AB SPEC QL IA.RAPID: NONREACTIVE
M TB IFN-G BLD-IMP: NEGATIVE
POTASSIUM SERPL-SCNC: 4.4 MMOL/L
QUANTIFERON GOLD NIL: 0.04 IU/ML
SODIUM SERPL-SCNC: 139 MMOL/L

## 2017-09-19 ENCOUNTER — OTHER (OUTPATIENT)
Age: 72
End: 2017-09-19

## 2017-10-04 ENCOUNTER — EMERGENCY (EMERGENCY)
Facility: HOSPITAL | Age: 72
LOS: 1 days | Discharge: HOME | End: 2017-10-04
Attending: EMERGENCY MEDICINE | Admitting: EMERGENCY MEDICINE
Payer: MEDICARE

## 2017-10-04 VITALS
WEIGHT: 160.06 LBS | RESPIRATION RATE: 16 BRPM | SYSTOLIC BLOOD PRESSURE: 137 MMHG | HEART RATE: 56 BPM | DIASTOLIC BLOOD PRESSURE: 77 MMHG | OXYGEN SATURATION: 96 %

## 2017-10-04 DIAGNOSIS — E11.649 TYPE 2 DIABETES MELLITUS WITH HYPOGLYCEMIA WITHOUT COMA: ICD-10-CM

## 2017-10-04 DIAGNOSIS — Z96.651 PRESENCE OF RIGHT ARTIFICIAL KNEE JOINT: Chronic | ICD-10-CM

## 2017-10-04 DIAGNOSIS — Z79.899 OTHER LONG TERM (CURRENT) DRUG THERAPY: ICD-10-CM

## 2017-10-04 DIAGNOSIS — J45.909 UNSPECIFIED ASTHMA, UNCOMPLICATED: ICD-10-CM

## 2017-10-04 DIAGNOSIS — Z79.82 LONG TERM (CURRENT) USE OF ASPIRIN: ICD-10-CM

## 2017-10-04 DIAGNOSIS — E78.5 HYPERLIPIDEMIA, UNSPECIFIED: ICD-10-CM

## 2017-10-04 DIAGNOSIS — I62.01 NONTRAUMATIC ACUTE SUBDURAL HEMORRHAGE: Chronic | ICD-10-CM

## 2017-10-04 DIAGNOSIS — I10 ESSENTIAL (PRIMARY) HYPERTENSION: ICD-10-CM

## 2017-10-04 LAB
APPEARANCE UR: CLEAR — SIGNIFICANT CHANGE UP
BASOPHILS NFR BLD AUTO: 0.6 % — SIGNIFICANT CHANGE UP (ref 0–2)
BILIRUB UR-MCNC: NEGATIVE — SIGNIFICANT CHANGE UP
COLOR SPEC: YELLOW — SIGNIFICANT CHANGE UP
DIFF PNL FLD: NEGATIVE — SIGNIFICANT CHANGE UP
EOSINOPHIL NFR BLD AUTO: 2.2 % — SIGNIFICANT CHANGE UP (ref 0–6)
GLUCOSE UR QL: 100
HCT VFR BLD CALC: 36.1 % — SIGNIFICANT CHANGE UP (ref 34.5–45)
HGB BLD-MCNC: 12.1 G/DL — SIGNIFICANT CHANGE UP (ref 11.5–15.5)
KETONES UR-MCNC: NEGATIVE — SIGNIFICANT CHANGE UP
LEUKOCYTE ESTERASE UR-ACNC: (no result)
LYMPHOCYTES # BLD AUTO: 13.9 % — SIGNIFICANT CHANGE UP (ref 13–44)
MCHC RBC-ENTMCNC: 29.8 PG — SIGNIFICANT CHANGE UP (ref 27–34)
MCHC RBC-ENTMCNC: 33.5 G/DL — SIGNIFICANT CHANGE UP (ref 32–36)
MCV RBC AUTO: 88.9 FL — SIGNIFICANT CHANGE UP (ref 80–100)
MONOCYTES NFR BLD AUTO: 5.9 % — SIGNIFICANT CHANGE UP (ref 2–14)
NEUTROPHILS NFR BLD AUTO: 77.4 % — HIGH (ref 43–77)
NITRITE UR-MCNC: NEGATIVE — SIGNIFICANT CHANGE UP
PH UR: 7 — SIGNIFICANT CHANGE UP (ref 5–8)
PLATELET # BLD AUTO: 230 K/UL — SIGNIFICANT CHANGE UP (ref 150–400)
PROT UR-MCNC: NEGATIVE MG/DL — SIGNIFICANT CHANGE UP
RBC # BLD: 4.06 M/UL — SIGNIFICANT CHANGE UP (ref 3.8–5.2)
RBC # FLD: 13.3 % — SIGNIFICANT CHANGE UP (ref 10.3–16.9)
SP GR SPEC: <=1.005 — SIGNIFICANT CHANGE UP (ref 1–1.03)
UROBILINOGEN FLD QL: 0.2 E.U./DL — SIGNIFICANT CHANGE UP
WBC # BLD: 6.8 K/UL — SIGNIFICANT CHANGE UP (ref 3.8–10.5)
WBC # FLD AUTO: 6.8 K/UL — SIGNIFICANT CHANGE UP (ref 3.8–10.5)

## 2017-10-04 PROCEDURE — 93010 ELECTROCARDIOGRAM REPORT: CPT

## 2017-10-04 PROCEDURE — 99284 EMERGENCY DEPT VISIT MOD MDM: CPT | Mod: 25

## 2017-10-04 NOTE — ED ADULT TRIAGE NOTE - CHIEF COMPLAINT QUOTE
hx DM, c/o hypoglycemia, pt states she took her insulin but didn't eat. in filed FS 37, given one amp dextrose, in triage FS 88.  pt denies chest pain, no SOB

## 2017-10-04 NOTE — ED ADULT NURSE NOTE - OBJECTIVE STATEMENT
73 y/o female BIBEMS for hypoglycemia, as per EMS report on the field FS 37 and 1 amp of d50 given, FS on ER 88 1 cup of orange juice and a peanut butter and jelly sandwich given.  Pt A&O x3. Pending initial evaluation.

## 2017-10-04 NOTE — ED PROVIDER NOTE - OBJECTIVE STATEMENT
72F hx htn, dm, afib, BIBEMS for hypoglycemia.  per pt's family pt seemed confused and foggy tonight. states she was slow to answer questions and not moving.  upon EMS arrival pt FS 37.  pt given amp D50 with improvement in symptoms. pt drank juice and ate a sandwich after being triaged.  per family pt is now back to baseline.  pt states she took her usual lantus 15U at 3PM. states did not eat dinner tonight, states she was not hungry. no chest pain. no SOb, no n/v/d. no fevers.

## 2017-10-04 NOTE — ED PROVIDER NOTE - PROGRESS NOTE DETAILS
no further hypoglycemia while in ED, FS at 12hrs after lantus 102.  recommend f/u with PMD. advised to check FS throughout the morning today.  steady gait, discharged with  and daughter at bedside.  I have discussed the discharge plan with the patient. The patient agrees with the plan, as discussed.  The patient understands Emergency Department diagnosis is a preliminary diagnosis often based on limited information and that the patient must adhere to the follow-up plan as discussed.  The patient understands that if the symptoms worsen the patient may return to the Emergency Department at any time for further evaluation and treatment.

## 2017-10-05 VITALS
OXYGEN SATURATION: 97 % | SYSTOLIC BLOOD PRESSURE: 130 MMHG | TEMPERATURE: 98 F | HEART RATE: 96 BPM | RESPIRATION RATE: 18 BRPM | DIASTOLIC BLOOD PRESSURE: 80 MMHG

## 2017-10-05 LAB
ALBUMIN SERPL ELPH-MCNC: 4.2 G/DL — SIGNIFICANT CHANGE UP (ref 3.3–5)
ALP SERPL-CCNC: 95 U/L — SIGNIFICANT CHANGE UP (ref 40–120)
ALT FLD-CCNC: 16 U/L — SIGNIFICANT CHANGE UP (ref 10–45)
ANION GAP SERPL CALC-SCNC: 15 MMOL/L — SIGNIFICANT CHANGE UP (ref 5–17)
AST SERPL-CCNC: 22 U/L — SIGNIFICANT CHANGE UP (ref 10–40)
BILIRUB SERPL-MCNC: 0.3 MG/DL — SIGNIFICANT CHANGE UP (ref 0.2–1.2)
BUN SERPL-MCNC: 10 MG/DL — SIGNIFICANT CHANGE UP (ref 7–23)
CALCIUM SERPL-MCNC: 10 MG/DL — SIGNIFICANT CHANGE UP (ref 8.4–10.5)
CHLORIDE SERPL-SCNC: 102 MMOL/L — SIGNIFICANT CHANGE UP (ref 96–108)
CO2 SERPL-SCNC: 22 MMOL/L — SIGNIFICANT CHANGE UP (ref 22–31)
CREAT SERPL-MCNC: 0.7 MG/DL — SIGNIFICANT CHANGE UP (ref 0.5–1.3)
GLUCOSE SERPL-MCNC: 149 MG/DL — HIGH (ref 70–99)
MAGNESIUM SERPL-MCNC: 1.8 MG/DL — SIGNIFICANT CHANGE UP (ref 1.6–2.6)
POTASSIUM SERPL-MCNC: 4.5 MMOL/L — SIGNIFICANT CHANGE UP (ref 3.5–5.3)
POTASSIUM SERPL-SCNC: 4.5 MMOL/L — SIGNIFICANT CHANGE UP (ref 3.5–5.3)
PROT SERPL-MCNC: 7.2 G/DL — SIGNIFICANT CHANGE UP (ref 6–8.3)
SODIUM SERPL-SCNC: 139 MMOL/L — SIGNIFICANT CHANGE UP (ref 135–145)

## 2017-10-05 PROCEDURE — 99284 EMERGENCY DEPT VISIT MOD MDM: CPT | Mod: 25

## 2017-10-05 PROCEDURE — 83735 ASSAY OF MAGNESIUM: CPT

## 2017-10-05 PROCEDURE — 85025 COMPLETE CBC W/AUTO DIFF WBC: CPT

## 2017-10-05 PROCEDURE — 70450 CT HEAD/BRAIN W/O DYE: CPT | Mod: 26

## 2017-10-05 PROCEDURE — 80053 COMPREHEN METABOLIC PANEL: CPT

## 2017-10-05 PROCEDURE — 81001 URINALYSIS AUTO W/SCOPE: CPT

## 2017-10-05 PROCEDURE — 36415 COLL VENOUS BLD VENIPUNCTURE: CPT

## 2017-10-05 PROCEDURE — 70450 CT HEAD/BRAIN W/O DYE: CPT

## 2017-10-05 PROCEDURE — 93005 ELECTROCARDIOGRAM TRACING: CPT

## 2017-10-10 ENCOUNTER — APPOINTMENT (OUTPATIENT)
Dept: INTERNAL MEDICINE | Facility: CLINIC | Age: 72
End: 2017-10-10
Payer: MEDICARE

## 2017-10-10 VITALS
HEIGHT: 65 IN | DIASTOLIC BLOOD PRESSURE: 80 MMHG | HEART RATE: 87 BPM | SYSTOLIC BLOOD PRESSURE: 145 MMHG | WEIGHT: 142 LBS | OXYGEN SATURATION: 98 % | RESPIRATION RATE: 14 BRPM | BODY MASS INDEX: 23.66 KG/M2 | TEMPERATURE: 98.6 F

## 2017-10-10 PROCEDURE — 99214 OFFICE O/P EST MOD 30 MIN: CPT | Mod: 25

## 2017-10-10 PROCEDURE — 36415 COLL VENOUS BLD VENIPUNCTURE: CPT

## 2017-10-12 ENCOUNTER — EMERGENCY (EMERGENCY)
Facility: HOSPITAL | Age: 72
LOS: 1 days | Discharge: HOME | End: 2017-10-12
Attending: EMERGENCY MEDICINE | Admitting: EMERGENCY MEDICINE
Payer: MEDICARE

## 2017-10-12 VITALS
SYSTOLIC BLOOD PRESSURE: 115 MMHG | RESPIRATION RATE: 16 BRPM | TEMPERATURE: 98 F | OXYGEN SATURATION: 98 % | HEART RATE: 85 BPM | DIASTOLIC BLOOD PRESSURE: 76 MMHG

## 2017-10-12 VITALS
HEART RATE: 81 BPM | SYSTOLIC BLOOD PRESSURE: 100 MMHG | RESPIRATION RATE: 16 BRPM | OXYGEN SATURATION: 99 % | TEMPERATURE: 98 F | DIASTOLIC BLOOD PRESSURE: 65 MMHG

## 2017-10-12 DIAGNOSIS — Z79.4 LONG TERM (CURRENT) USE OF INSULIN: ICD-10-CM
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
## 2017-10-12 DIAGNOSIS — Z79.82 LONG TERM (CURRENT) USE OF ASPIRIN: ICD-10-CM

## 2017-10-12 DIAGNOSIS — I10 ESSENTIAL (PRIMARY) HYPERTENSION: ICD-10-CM

## 2017-10-12 DIAGNOSIS — I62.01 NONTRAUMATIC ACUTE SUBDURAL HEMORRHAGE: Chronic | ICD-10-CM

## 2017-10-12 DIAGNOSIS — E11.9 TYPE 2 DIABETES MELLITUS WITHOUT COMPLICATIONS: ICD-10-CM
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
## 2017-10-12 DIAGNOSIS — J45.909 UNSPECIFIED ASTHMA, UNCOMPLICATED: ICD-10-CM

## 2017-10-12 DIAGNOSIS — Z79.899 OTHER LONG TERM (CURRENT) DRUG THERAPY: ICD-10-CM

## 2017-10-12 DIAGNOSIS — Z88.8 ALLERGY STATUS TO OTHER DRUGS, MEDICAMENTS AND BIOLOGICAL SUBSTANCES: ICD-10-CM

## 2017-10-12 DIAGNOSIS — R07.89 OTHER CHEST PAIN: ICD-10-CM

## 2017-10-12 DIAGNOSIS — Z96.651 PRESENCE OF RIGHT ARTIFICIAL KNEE JOINT: Chronic | ICD-10-CM

## 2017-10-12 DIAGNOSIS — E78.5 HYPERLIPIDEMIA, UNSPECIFIED: ICD-10-CM
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
## 2017-10-12 LAB
ALBUMIN SERPL ELPH-MCNC: 4 G/DL
ALBUMIN SERPL ELPH-MCNC: 4.2 G/DL — SIGNIFICANT CHANGE UP (ref 3.3–5)
ALP BLD-CCNC: 93 U/L
ALP SERPL-CCNC: 91 U/L — SIGNIFICANT CHANGE UP (ref 40–120)
ALT FLD-CCNC: 21 U/L — SIGNIFICANT CHANGE UP (ref 10–45)
ALT SERPL-CCNC: 19 U/L
ANION GAP SERPL CALC-SCNC: 16 MMOL/L — SIGNIFICANT CHANGE UP (ref 5–17)
ANION GAP SERPL CALC-SCNC: 25 MMOL/L
APPEARANCE UR: CLEAR — SIGNIFICANT CHANGE UP
AST SERPL-CCNC: 23 U/L
AST SERPL-CCNC: 30 U/L — SIGNIFICANT CHANGE UP (ref 10–40)
BASOPHILS NFR BLD AUTO: 0.7 % — SIGNIFICANT CHANGE UP (ref 0–2)
BILIRUB SERPL-MCNC: 0.3 MG/DL
BILIRUB SERPL-MCNC: 0.4 MG/DL — SIGNIFICANT CHANGE UP (ref 0.2–1.2)
BILIRUB UR-MCNC: NEGATIVE — SIGNIFICANT CHANGE UP
BUN SERPL-MCNC: 10 MG/DL
BUN SERPL-MCNC: 9 MG/DL — SIGNIFICANT CHANGE UP (ref 7–23)
CALCIUM SERPL-MCNC: 9.4 MG/DL
CALCIUM SERPL-MCNC: 9.5 MG/DL — SIGNIFICANT CHANGE UP (ref 8.4–10.5)
CHLORIDE SERPL-SCNC: 100 MMOL/L — SIGNIFICANT CHANGE UP (ref 96–108)
CHLORIDE SERPL-SCNC: 102 MMOL/L
CO2 SERPL-SCNC: 15 MMOL/L
CO2 SERPL-SCNC: 23 MMOL/L — SIGNIFICANT CHANGE UP (ref 22–31)
COLOR SPEC: YELLOW — SIGNIFICANT CHANGE UP
CREAT SERPL-MCNC: 0.75 MG/DL — SIGNIFICANT CHANGE UP (ref 0.5–1.3)
CREAT SERPL-MCNC: 0.8 MG/DL
DIFF PNL FLD: NEGATIVE — SIGNIFICANT CHANGE UP
EOSINOPHIL NFR BLD AUTO: 2.3 % — SIGNIFICANT CHANGE UP (ref 0–6)
GLUCOSE SERPL-MCNC: 168 MG/DL
GLUCOSE SERPL-MCNC: 76 MG/DL — SIGNIFICANT CHANGE UP (ref 70–99)
GLUCOSE UR QL: NEGATIVE — SIGNIFICANT CHANGE UP
HCT VFR BLD CALC: 38.6 % — SIGNIFICANT CHANGE UP (ref 34.5–45)
HGB BLD-MCNC: 12.9 G/DL — SIGNIFICANT CHANGE UP (ref 11.5–15.5)
KETONES UR-MCNC: NEGATIVE — SIGNIFICANT CHANGE UP
LEUKOCYTE ESTERASE UR-ACNC: NEGATIVE — SIGNIFICANT CHANGE UP
LYMPHOCYTES # BLD AUTO: 21.4 % — SIGNIFICANT CHANGE UP (ref 13–44)
MAGNESIUM SERPL-MCNC: 1.9 MG/DL
MCHC RBC-ENTMCNC: 29.4 PG — SIGNIFICANT CHANGE UP (ref 27–34)
MCHC RBC-ENTMCNC: 33.4 G/DL — SIGNIFICANT CHANGE UP (ref 32–36)
MCV RBC AUTO: 87.9 FL — SIGNIFICANT CHANGE UP (ref 80–100)
MONOCYTES NFR BLD AUTO: 7.5 % — SIGNIFICANT CHANGE UP (ref 2–14)
NEUTROPHILS NFR BLD AUTO: 68.1 % — SIGNIFICANT CHANGE UP (ref 43–77)
NITRITE UR-MCNC: NEGATIVE — SIGNIFICANT CHANGE UP
PH UR: 6 — SIGNIFICANT CHANGE UP (ref 5–8)
PLATELET # BLD AUTO: 272 K/UL — SIGNIFICANT CHANGE UP (ref 150–400)
POTASSIUM SERPL-MCNC: 4.6 MMOL/L — SIGNIFICANT CHANGE UP (ref 3.5–5.3)
POTASSIUM SERPL-SCNC: 4.6 MMOL/L — SIGNIFICANT CHANGE UP (ref 3.5–5.3)
POTASSIUM SERPL-SCNC: 4.9 MMOL/L
PROT SERPL-MCNC: 7.2 G/DL
PROT SERPL-MCNC: 7.4 G/DL — SIGNIFICANT CHANGE UP (ref 6–8.3)
PROT UR-MCNC: NEGATIVE MG/DL — SIGNIFICANT CHANGE UP
RBC # BLD: 4.39 M/UL — SIGNIFICANT CHANGE UP (ref 3.8–5.2)
RBC # FLD: 13.3 % — SIGNIFICANT CHANGE UP (ref 10.3–16.9)
SODIUM SERPL-SCNC: 139 MMOL/L — SIGNIFICANT CHANGE UP (ref 135–145)
SODIUM SERPL-SCNC: 142 MMOL/L
SP GR SPEC: <=1.005 — SIGNIFICANT CHANGE UP (ref 1–1.03)
TROPONIN T SERPL-MCNC: 0.06 NG/ML — CRITICAL HIGH (ref 0–0.01)
TROPONIN T SERPL-MCNC: 0.07 NG/ML — CRITICAL HIGH (ref 0–0.01)
UROBILINOGEN FLD QL: 0.2 E.U./DL — SIGNIFICANT CHANGE UP
VIT B12 SERPL-MCNC: 392 PG/ML
WBC # BLD: 8.6 K/UL — SIGNIFICANT CHANGE UP (ref 3.8–10.5)
WBC # FLD AUTO: 8.6 K/UL — SIGNIFICANT CHANGE UP (ref 3.8–10.5)

## 2017-10-12 PROCEDURE — 99285 EMERGENCY DEPT VISIT HI MDM: CPT | Mod: 25

## 2017-10-12 PROCEDURE — 84484 ASSAY OF TROPONIN QUANT: CPT

## 2017-10-12 PROCEDURE — 81003 URINALYSIS AUTO W/O SCOPE: CPT

## 2017-10-12 PROCEDURE — 80053 COMPREHEN METABOLIC PANEL: CPT

## 2017-10-12 PROCEDURE — 93005 ELECTROCARDIOGRAM TRACING: CPT | Mod: XU

## 2017-10-12 PROCEDURE — 71046 X-RAY EXAM CHEST 2 VIEWS: CPT

## 2017-10-12 PROCEDURE — 85025 COMPLETE CBC W/AUTO DIFF WBC: CPT

## 2017-10-12 PROCEDURE — 36415 COLL VENOUS BLD VENIPUNCTURE: CPT

## 2017-10-12 PROCEDURE — 71020: CPT | Mod: 26

## 2017-10-12 PROCEDURE — 99284 EMERGENCY DEPT VISIT MOD MDM: CPT | Mod: 25

## 2017-10-12 PROCEDURE — 51701 INSERT BLADDER CATHETER: CPT

## 2017-10-12 PROCEDURE — 82962 GLUCOSE BLOOD TEST: CPT

## 2017-10-12 PROCEDURE — 93010 ELECTROCARDIOGRAM REPORT: CPT

## 2017-10-12 RX ORDER — IBUPROFEN 200 MG
600 TABLET ORAL ONCE
Qty: 0 | Refills: 0 | Status: COMPLETED | OUTPATIENT
Start: 2017-10-12 | End: 2017-10-12

## 2017-10-12 RX ADMIN — Medication 600 MILLIGRAM(S): at 14:26

## 2017-10-12 NOTE — ED ADULT NURSE REASSESSMENT NOTE - NS ED NURSE REASSESS COMMENT FT1
Bladder scan showed urine in bladder, patient straight catheterized, 800 ml retrieved.
Pt assisted to the bathroom x 2 but she is unable to urinate. Primary RN aware. Fall and safety precautions maintained.

## 2017-10-12 NOTE — ED PROVIDER NOTE - OBJECTIVE STATEMENT
73yo F hx of afib, CAD, HTN, HLD, DM, asthma, hx subdural s/p intervention, here today with fall last night. Unclear if tripped, pt denies syncope, but isnt sure why she fell.  witnessed, endorses no LOC, but also states did not notice any tripping, unclear to him why she fell. Sugars have been checked bid at home by sons, had recent ed visit for hypoglycemia, no further episodes.  alone when this happened, struggled to get pt up but was able to. HHA came this am, heard story, rec come to the ED as pt states having L chest pain where she fell. No other complaints. HHA states that pt getting 5 hrs a day, however  also frail, feels like pt not safe, needs more hours. Was rejected for 24/7 care, her pcp actively appealing. feels like she manages very well however when hha there,  also in agreement, thinks just needs more hours. sons come by twice a day but too much time when they are both alone.

## 2017-10-12 NOTE — ED PROVIDER NOTE - MEDICAL DECISION MAKING DETAILS
here w/ trauma to L chest wall and chest pain. troponin stable x2, has been that high in past, EKG with no ischemic changes. UA neg. CXR with no rib fx or PTX. pt ambulatory in ED. UA neg for infection. case management involved, sent letter as I do believe pt will benefit from more HHA hours at home, and GOC consistent with keeping pt home as much as possible. DC home in NAD with strict return precautions given.

## 2017-10-12 NOTE — ED ADULT NURSE NOTE - OBJECTIVE STATEMENT
71 y/o female c/o right knee pain s/p mechanical fall at home witnessed by her  today. Patient reports she fell onto the right side, unable to bear weight right leg or walk. Denies anti coagulant use. Denies LOC, dizziness. No bruising, active bleeding, obvious deformity to site.

## 2017-10-12 NOTE — ED ADULT TRIAGE NOTE - CHIEF COMPLAINT QUOTE
Pt witnessed fall last noght at home, denies LOC, c/o R knee pain, hx R TKR. Pt witnessed fall last night at home, denies LOC, c/o R knee pain, hx R TKR. Pt reports only taking Aspirin and no other ACs.

## 2017-10-14 ENCOUNTER — EMERGENCY (EMERGENCY)
Facility: HOSPITAL | Age: 72
LOS: 1 days | Discharge: HOME | End: 2017-10-14
Attending: EMERGENCY MEDICINE | Admitting: EMERGENCY MEDICINE
Payer: MEDICARE

## 2017-10-14 VITALS
HEART RATE: 80 BPM | OXYGEN SATURATION: 97 % | WEIGHT: 134.92 LBS | TEMPERATURE: 98 F | SYSTOLIC BLOOD PRESSURE: 108 MMHG | DIASTOLIC BLOOD PRESSURE: 64 MMHG | RESPIRATION RATE: 20 BRPM

## 2017-10-14 VITALS
OXYGEN SATURATION: 97 % | DIASTOLIC BLOOD PRESSURE: 77 MMHG | SYSTOLIC BLOOD PRESSURE: 112 MMHG | TEMPERATURE: 99 F | HEART RATE: 96 BPM | RESPIRATION RATE: 18 BRPM

## 2017-10-14 DIAGNOSIS — Z96.651 PRESENCE OF RIGHT ARTIFICIAL KNEE JOINT: Chronic | ICD-10-CM

## 2017-10-14 DIAGNOSIS — E78.5 HYPERLIPIDEMIA, UNSPECIFIED: ICD-10-CM

## 2017-10-14 DIAGNOSIS — I10 ESSENTIAL (PRIMARY) HYPERTENSION: ICD-10-CM

## 2017-10-14 DIAGNOSIS — Z79.899 OTHER LONG TERM (CURRENT) DRUG THERAPY: ICD-10-CM

## 2017-10-14 DIAGNOSIS — R53.1 WEAKNESS: ICD-10-CM

## 2017-10-14 DIAGNOSIS — J45.909 UNSPECIFIED ASTHMA, UNCOMPLICATED: ICD-10-CM

## 2017-10-14 DIAGNOSIS — E11.649 TYPE 2 DIABETES MELLITUS WITH HYPOGLYCEMIA WITHOUT COMA: ICD-10-CM

## 2017-10-14 DIAGNOSIS — Z79.82 LONG TERM (CURRENT) USE OF ASPIRIN: ICD-10-CM

## 2017-10-14 DIAGNOSIS — Z98.890 OTHER SPECIFIED POSTPROCEDURAL STATES: ICD-10-CM

## 2017-10-14 DIAGNOSIS — Z79.4 LONG TERM (CURRENT) USE OF INSULIN: ICD-10-CM

## 2017-10-14 DIAGNOSIS — I62.01 NONTRAUMATIC ACUTE SUBDURAL HEMORRHAGE: Chronic | ICD-10-CM

## 2017-10-14 LAB
ALBUMIN SERPL ELPH-MCNC: 3.4 G/DL — SIGNIFICANT CHANGE UP (ref 3.3–5)
ALP SERPL-CCNC: 75 U/L — SIGNIFICANT CHANGE UP (ref 40–120)
ALT FLD-CCNC: 18 U/L — SIGNIFICANT CHANGE UP (ref 10–45)
ANION GAP SERPL CALC-SCNC: 16 MMOL/L — SIGNIFICANT CHANGE UP (ref 5–17)
APPEARANCE UR: (no result)
AST SERPL-CCNC: 23 U/L — SIGNIFICANT CHANGE UP (ref 10–40)
BASOPHILS NFR BLD AUTO: 0.5 % — SIGNIFICANT CHANGE UP (ref 0–2)
BILIRUB SERPL-MCNC: 0.4 MG/DL — SIGNIFICANT CHANGE UP (ref 0.2–1.2)
BILIRUB UR-MCNC: NEGATIVE — SIGNIFICANT CHANGE UP
BUN SERPL-MCNC: 15 MG/DL — SIGNIFICANT CHANGE UP (ref 7–23)
CALCIUM SERPL-MCNC: 9.3 MG/DL — SIGNIFICANT CHANGE UP (ref 8.4–10.5)
CHLORIDE SERPL-SCNC: 103 MMOL/L — SIGNIFICANT CHANGE UP (ref 96–108)
CO2 SERPL-SCNC: 20 MMOL/L — LOW (ref 22–31)
COLOR SPEC: YELLOW — SIGNIFICANT CHANGE UP
CREAT SERPL-MCNC: 0.66 MG/DL — SIGNIFICANT CHANGE UP (ref 0.5–1.3)
DIFF PNL FLD: (no result)
EOSINOPHIL NFR BLD AUTO: 0.8 % — SIGNIFICANT CHANGE UP (ref 0–6)
GLUCOSE SERPL-MCNC: 61 MG/DL — LOW (ref 70–99)
GLUCOSE UR QL: NEGATIVE — SIGNIFICANT CHANGE UP
HCT VFR BLD CALC: 37.1 % — SIGNIFICANT CHANGE UP (ref 34.5–45)
HGB BLD-MCNC: 13 G/DL — SIGNIFICANT CHANGE UP (ref 11.5–15.5)
KETONES UR-MCNC: NEGATIVE — SIGNIFICANT CHANGE UP
LEUKOCYTE ESTERASE UR-ACNC: (no result)
LYMPHOCYTES # BLD AUTO: 22.6 % — SIGNIFICANT CHANGE UP (ref 13–44)
MCHC RBC-ENTMCNC: 30.4 PG — SIGNIFICANT CHANGE UP (ref 27–34)
MCHC RBC-ENTMCNC: 35 G/DL — SIGNIFICANT CHANGE UP (ref 32–36)
MCV RBC AUTO: 86.7 FL — SIGNIFICANT CHANGE UP (ref 80–100)
MONOCYTES NFR BLD AUTO: 4.7 % — SIGNIFICANT CHANGE UP (ref 2–14)
NEUTROPHILS NFR BLD AUTO: 71.4 % — SIGNIFICANT CHANGE UP (ref 43–77)
NITRITE UR-MCNC: NEGATIVE — SIGNIFICANT CHANGE UP
PH UR: 6 — SIGNIFICANT CHANGE UP (ref 5–8)
PLATELET # BLD AUTO: 234 K/UL — SIGNIFICANT CHANGE UP (ref 150–400)
POTASSIUM SERPL-MCNC: 4 MMOL/L — SIGNIFICANT CHANGE UP (ref 3.5–5.3)
POTASSIUM SERPL-SCNC: 4 MMOL/L — SIGNIFICANT CHANGE UP (ref 3.5–5.3)
PROT SERPL-MCNC: 6.8 G/DL — SIGNIFICANT CHANGE UP (ref 6–8.3)
PROT UR-MCNC: NEGATIVE MG/DL — SIGNIFICANT CHANGE UP
RBC # BLD: 4.28 M/UL — SIGNIFICANT CHANGE UP (ref 3.8–5.2)
RBC # FLD: 13.7 % — SIGNIFICANT CHANGE UP (ref 10.3–16.9)
SODIUM SERPL-SCNC: 139 MMOL/L — SIGNIFICANT CHANGE UP (ref 135–145)
SP GR SPEC: 1.01 — SIGNIFICANT CHANGE UP (ref 1–1.03)
UROBILINOGEN FLD QL: 1 E.U./DL — SIGNIFICANT CHANGE UP
WBC # BLD: 8.8 K/UL — SIGNIFICANT CHANGE UP (ref 3.8–10.5)
WBC # FLD AUTO: 8.8 K/UL — SIGNIFICANT CHANGE UP (ref 3.8–10.5)

## 2017-10-14 PROCEDURE — 36415 COLL VENOUS BLD VENIPUNCTURE: CPT

## 2017-10-14 PROCEDURE — 80053 COMPREHEN METABOLIC PANEL: CPT

## 2017-10-14 PROCEDURE — 99284 EMERGENCY DEPT VISIT MOD MDM: CPT | Mod: 25

## 2017-10-14 PROCEDURE — 81001 URINALYSIS AUTO W/SCOPE: CPT

## 2017-10-14 PROCEDURE — 87086 URINE CULTURE/COLONY COUNT: CPT

## 2017-10-14 PROCEDURE — 71010: CPT | Mod: 26

## 2017-10-14 PROCEDURE — 85025 COMPLETE CBC W/AUTO DIFF WBC: CPT

## 2017-10-14 PROCEDURE — 82962 GLUCOSE BLOOD TEST: CPT

## 2017-10-14 PROCEDURE — 71045 X-RAY EXAM CHEST 1 VIEW: CPT

## 2017-10-14 PROCEDURE — 99284 EMERGENCY DEPT VISIT MOD MDM: CPT

## 2017-10-14 NOTE — ED PROVIDER NOTE - PHYSICAL EXAMINATION
VITAL SIGNS: I have reviewed nursing notes and confirm.  CONSTITUTIONAL: Well-developed; well-nourished; in no acute distress.  SKIN: Agree with RN documentation regarding decubitus evaluation. Remainder of skin exam is warm and dry, no acute rash.  HEAD: Normocephalic; atraumatic.  EYES: PERRL, EOM intact; conjunctiva and sclera clear.  ENT: No nasal discharge; airway clear.  NECK: Supple; non tender.  CARD: S1, S2 normal; no murmurs, gallops, or rubs. Regular rate and rhythm.  RESP: No wheezes, rales or rhonchi.  ABD: Normal bowel sounds; soft; non-distended; non-tender  EXT: Normal ROM. No clubbing, cyanosis or edema, non-ttp, no erythema/warmth  LYMPH: No acute cervical adenopathy.  NEURO: Alert, oriented. Grossly unremarkable. CN 2-12 intact b/l, 5/5 strength all ext, no sensory deficits, gait shuffling though intact, speech intact  PSYCH: Cooperative, appropriate.

## 2017-10-14 NOTE — ED ADULT NURSE NOTE - NS PRO PASSIVE SMOKE EXP
received patient ambulatory, alert and oriented x4, accompanied by her young daughter. Patient states that she woke up this morning, 7/31/17, complaining of  headache and chest tightness and was advised by her PCP over the phone  to seek medical attention at an Urgent care center or emergency room .Patient states that the headache persists but the chest tightness is no longer present.
Unknown

## 2017-10-14 NOTE — ED PROVIDER NOTE - MEDICAL DECISION MAKING DETAILS
+ hypogylcemia, corrected in ED, ambulating around ED walking to and from bathroom several times steadily and unassisted, states that she feels well. Established plan for juice/food/rest at home should she feel hypoglycemic again, to f/u with PMD regarding insulin regimen, not to skip meals, and to call 911 should sugar fall below 60 again with symptoms.

## 2017-10-14 NOTE — ED PROVIDER NOTE - OBJECTIVE STATEMENT
73yo F hx of afib, CAD, HTN, HLD, DM, asthma, hx subdural s/p intervention, visited [facility] yesterday for fall w/negative workup, also visited about 1 wk ago for hypoglycemia/fatigue, now p/w global fatigue, no new trauma. No fever/chills. Denies pain of any kind. No urinary sx or CP/SOB/palpitations.

## 2017-10-15 LAB
CULTURE RESULTS: SIGNIFICANT CHANGE UP
SPECIMEN SOURCE: SIGNIFICANT CHANGE UP

## 2017-11-27 ENCOUNTER — APPOINTMENT (OUTPATIENT)
Dept: INTERNAL MEDICINE | Facility: CLINIC | Age: 72
End: 2017-11-27
Payer: MEDICARE

## 2017-11-27 VITALS
DIASTOLIC BLOOD PRESSURE: 88 MMHG | TEMPERATURE: 98 F | BODY MASS INDEX: 20.33 KG/M2 | RESPIRATION RATE: 14 BRPM | WEIGHT: 122 LBS | OXYGEN SATURATION: 98 % | HEIGHT: 65 IN | SYSTOLIC BLOOD PRESSURE: 124 MMHG | HEART RATE: 106 BPM

## 2017-11-27 DIAGNOSIS — R63.4 ABNORMAL WEIGHT LOSS: ICD-10-CM

## 2017-11-27 PROCEDURE — 36415 COLL VENOUS BLD VENIPUNCTURE: CPT

## 2017-11-27 PROCEDURE — 99214 OFFICE O/P EST MOD 30 MIN: CPT | Mod: 25

## 2017-11-30 ENCOUNTER — OTHER (OUTPATIENT)
Age: 72
End: 2017-11-30

## 2017-11-30 LAB
BASOPHILS # BLD AUTO: 0.09 K/UL
BASOPHILS NFR BLD AUTO: 1.4 %
EOSINOPHIL # BLD AUTO: 0.21 K/UL
EOSINOPHIL NFR BLD AUTO: 3.4
HBA1C MFR BLD HPLC: 6.1 %
HCT VFR BLD CALC: 40.9 %
HGB BLD-MCNC: 13.1 G/DL
IMM GRANULOCYTES NFR BLD AUTO: 0.2 %
LYMPHOCYTES # BLD AUTO: 1.78 K/UL
LYMPHOCYTES NFR BLD AUTO: 28.4 %
MAN DIFF?: NORMAL
MCHC RBC-ENTMCNC: 29.8 PG
MCHC RBC-ENTMCNC: 32 GM/DL
MCV RBC AUTO: 93.2 FL
MONOCYTES # BLD AUTO: 0.5 K/UL
MONOCYTES NFR BLD AUTO: 8 %
NEUTROPHILS # BLD AUTO: 3.67 K/UL
NEUTROPHILS NFR BLD AUTO: 58.6 %
PLATELET # BLD AUTO: 277 K/UL
RBC # BLD: 4.39 M/UL
RBC # FLD: 14 %
TSH SERPL-ACNC: 0.85 UIU/ML
VIT B12 SERPL-MCNC: 318 PG/ML
WBC # FLD AUTO: 6.26 K/UL

## 2017-12-06 LAB
ALBUMIN SERPL ELPH-MCNC: 4.4 G/DL
ALP BLD-CCNC: 86 U/L
ALT SERPL-CCNC: 11 U/L
ANION GAP SERPL CALC-SCNC: 20 MMOL/L
AST SERPL-CCNC: 17 U/L
BILIRUB SERPL-MCNC: 0.5 MG/DL
BUN SERPL-MCNC: 15 MG/DL
CALCIUM SERPL-MCNC: 10 MG/DL
CHLORIDE SERPL-SCNC: 98 MMOL/L
CO2 SERPL-SCNC: 20 MMOL/L
CREAT SERPL-MCNC: 1.02 MG/DL
GLUCOSE SERPL-MCNC: 154 MG/DL
POTASSIUM SERPL-SCNC: 4.7 MMOL/L
PROT SERPL-MCNC: 7.2 G/DL
SODIUM SERPL-SCNC: 138 MMOL/L

## 2017-12-19 ENCOUNTER — OTHER (OUTPATIENT)
Age: 72
End: 2017-12-19

## 2017-12-29 ENCOUNTER — FORM ENCOUNTER (OUTPATIENT)
Age: 72
End: 2017-12-29

## 2017-12-30 ENCOUNTER — OUTPATIENT (OUTPATIENT)
Dept: OUTPATIENT SERVICES | Facility: HOSPITAL | Age: 72
LOS: 1 days | End: 2017-12-30
Payer: MEDICARE

## 2017-12-30 DIAGNOSIS — Z96.651 PRESENCE OF RIGHT ARTIFICIAL KNEE JOINT: Chronic | ICD-10-CM

## 2017-12-30 DIAGNOSIS — I62.01 NONTRAUMATIC ACUTE SUBDURAL HEMORRHAGE: Chronic | ICD-10-CM

## 2017-12-30 PROCEDURE — 70450 CT HEAD/BRAIN W/O DYE: CPT

## 2017-12-30 PROCEDURE — 70450 CT HEAD/BRAIN W/O DYE: CPT | Mod: 26

## 2018-01-24 ENCOUNTER — APPOINTMENT (OUTPATIENT)
Dept: INTERNAL MEDICINE | Facility: CLINIC | Age: 73
End: 2018-01-24
Payer: MEDICARE

## 2018-01-24 VITALS
SYSTOLIC BLOOD PRESSURE: 126 MMHG | RESPIRATION RATE: 14 BRPM | BODY MASS INDEX: 21.33 KG/M2 | WEIGHT: 128 LBS | OXYGEN SATURATION: 98 % | TEMPERATURE: 98.8 F | HEART RATE: 90 BPM | HEIGHT: 65 IN | DIASTOLIC BLOOD PRESSURE: 78 MMHG

## 2018-01-24 PROCEDURE — 99214 OFFICE O/P EST MOD 30 MIN: CPT

## 2018-01-26 ENCOUNTER — OTHER (OUTPATIENT)
Age: 73
End: 2018-01-26

## 2018-02-01 ENCOUNTER — EMERGENCY (EMERGENCY)
Facility: HOSPITAL | Age: 73
LOS: 1 days | Discharge: HOME | End: 2018-02-01
Attending: EMERGENCY MEDICINE | Admitting: EMERGENCY MEDICINE
Payer: MEDICARE

## 2018-02-01 DIAGNOSIS — Z96.651 PRESENCE OF RIGHT ARTIFICIAL KNEE JOINT: Chronic | ICD-10-CM

## 2018-02-01 DIAGNOSIS — E11.9 TYPE 2 DIABETES MELLITUS WITHOUT COMPLICATIONS: ICD-10-CM

## 2018-02-01 DIAGNOSIS — Z79.899 OTHER LONG TERM (CURRENT) DRUG THERAPY: ICD-10-CM

## 2018-02-01 DIAGNOSIS — E78.5 HYPERLIPIDEMIA, UNSPECIFIED: ICD-10-CM

## 2018-02-01 DIAGNOSIS — Z88.8 ALLERGY STATUS TO OTHER DRUGS, MEDICAMENTS AND BIOLOGICAL SUBSTANCES STATUS: ICD-10-CM

## 2018-02-01 DIAGNOSIS — Z79.4 LONG TERM (CURRENT) USE OF INSULIN: ICD-10-CM

## 2018-02-01 DIAGNOSIS — I10 ESSENTIAL (PRIMARY) HYPERTENSION: ICD-10-CM

## 2018-02-01 DIAGNOSIS — G89.29 OTHER CHRONIC PAIN: ICD-10-CM

## 2018-02-01 DIAGNOSIS — J45.909 UNSPECIFIED ASTHMA, UNCOMPLICATED: ICD-10-CM

## 2018-02-01 DIAGNOSIS — R06.02 SHORTNESS OF BREATH: ICD-10-CM

## 2018-02-01 DIAGNOSIS — I25.10 ATHEROSCLEROTIC HEART DISEASE OF NATIVE CORONARY ARTERY WITHOUT ANGINA PECTORIS: ICD-10-CM

## 2018-02-01 DIAGNOSIS — Z96.651 PRESENCE OF RIGHT ARTIFICIAL KNEE JOINT: ICD-10-CM

## 2018-02-01 DIAGNOSIS — I62.01 NONTRAUMATIC ACUTE SUBDURAL HEMORRHAGE: Chronic | ICD-10-CM

## 2018-02-01 DIAGNOSIS — M25.561 PAIN IN RIGHT KNEE: ICD-10-CM

## 2018-02-01 PROCEDURE — 99283 EMERGENCY DEPT VISIT LOW MDM: CPT

## 2018-02-02 VITALS
HEART RATE: 99 BPM | SYSTOLIC BLOOD PRESSURE: 148 MMHG | OXYGEN SATURATION: 97 % | WEIGHT: 119.93 LBS | RESPIRATION RATE: 18 BRPM | TEMPERATURE: 98 F | DIASTOLIC BLOOD PRESSURE: 80 MMHG

## 2018-02-02 PROCEDURE — 71046 X-RAY EXAM CHEST 2 VIEWS: CPT

## 2018-02-02 PROCEDURE — 71046 X-RAY EXAM CHEST 2 VIEWS: CPT | Mod: 26

## 2018-02-02 PROCEDURE — 73560 X-RAY EXAM OF KNEE 1 OR 2: CPT

## 2018-02-02 PROCEDURE — 73560 X-RAY EXAM OF KNEE 1 OR 2: CPT | Mod: 26,RT

## 2018-02-02 PROCEDURE — 99284 EMERGENCY DEPT VISIT MOD MDM: CPT | Mod: 25

## 2018-02-02 NOTE — ED PROVIDER NOTE - ATTENDING CONTRIBUTION TO CARE
72 yof pw R knee pain though no trauma noted, hx of remote R tka.  pt states asthma flare w/ sob.    agree w/ PA, nad, clear speech, no tachypnea, speaking in full sentences, no deformity noted, moving all extremities, ambulating in ED

## 2018-02-02 NOTE — ED PROVIDER NOTE - PHYSICAL EXAMINATION
gait steady + age indeterminate right knee surgical scar + ROM 0-125 no overt instability skin intact compartments soft pulses 2+ + sensation intact LT calves supple cap refill brisk

## 2018-02-02 NOTE — ED ADULT TRIAGE NOTE - CHIEF COMPLAINT QUOTE
pt BIBA complaining of right knee pain denies new trauma/ injury. Per EMS pt complained of SOB enroute hx asthma

## 2018-02-02 NOTE — ED PROVIDER NOTE - CPE EDP SKIN NORM
Controlled Substance Refill Request for ZOLPIDEM 5 MG  Problem List Complete:  No     PROVIDER TO CONSIDER COMPLETION OF PROBLEM LIST AND OVERVIEW/CONTROLLED SUBSTANCE AGREEMENT    Medication Detail      Disp Refills Start End TIMOTHY   zolpidem (AMBIEN) 5 MG tablet 30 tablet 0 12/13/2016  No   Sig: Take 0.5-1 tablets (2.5-5 mg) by mouth nightly as needed for sleep . Add'l refills to be approved by PCP         Last Office Visit with [facility] primary care provider: 3-24-17    Future Office visit:     Controlled substance agreement on file: No.     Processing:  ?   checked in past 6 months?  No, route to RN    
[facility]     Andrew Nair   
Walked RX to [facility]  [place]     
normal...

## 2018-02-02 NOTE — ED ADULT NURSE NOTE - OBJECTIVE STATEMENT
Received pt AxO3 family member at bedside. AxO3 in no apparent distress. Seen and eexamined by MD. Xray done. Reevaluated by MD. No external injury noted. NO SOB> No CP>

## 2018-02-02 NOTE — ED PROVIDER NOTE - MEDICAL DECISION MAKING DETAILS
no overt acute process realized + breathing easily and knee with out acute change realized +  here to support home

## 2018-02-02 NOTE — ED PROVIDER NOTE - OBJECTIVE STATEMENT
71yo F hx of afib, CAD, HTN, HLD, DM, asthma remote R TKA complains of pain at knee albeit no trauma and to ED for evaluation on route with EMS states asthma " kicking up " with sob no CP

## 2018-02-08 ENCOUNTER — APPOINTMENT (OUTPATIENT)
Dept: NEUROLOGY | Facility: CLINIC | Age: 73
End: 2018-02-08
Payer: MEDICARE

## 2018-02-08 VITALS
HEART RATE: 93 BPM | TEMPERATURE: 98.8 F | HEIGHT: 67 IN | SYSTOLIC BLOOD PRESSURE: 121 MMHG | BODY MASS INDEX: 18.83 KG/M2 | OXYGEN SATURATION: 100 % | WEIGHT: 120 LBS | DIASTOLIC BLOOD PRESSURE: 76 MMHG

## 2018-02-08 PROCEDURE — 99205 OFFICE O/P NEW HI 60 MIN: CPT

## 2018-02-08 RX ORDER — HYDROCHLOROTHIAZIDE 25 MG/1
25 TABLET ORAL TWICE DAILY
Refills: 0 | Status: DISCONTINUED | COMMUNITY
End: 2018-02-08

## 2018-02-15 ENCOUNTER — EMERGENCY (EMERGENCY)
Facility: HOSPITAL | Age: 73
LOS: 1 days | Discharge: HOME | End: 2018-02-15
Admitting: EMERGENCY MEDICINE
Payer: MEDICARE

## 2018-02-15 VITALS
OXYGEN SATURATION: 100 % | SYSTOLIC BLOOD PRESSURE: 116 MMHG | WEIGHT: 130.73 LBS | DIASTOLIC BLOOD PRESSURE: 74 MMHG | HEART RATE: 104 BPM | TEMPERATURE: 99 F | RESPIRATION RATE: 18 BRPM

## 2018-02-15 DIAGNOSIS — Z88.8 ALLERGY STATUS TO OTHER DRUGS, MEDICAMENTS AND BIOLOGICAL SUBSTANCES STATUS: ICD-10-CM

## 2018-02-15 DIAGNOSIS — Z79.4 LONG TERM (CURRENT) USE OF INSULIN: ICD-10-CM

## 2018-02-15 DIAGNOSIS — L03.012 CELLULITIS OF LEFT FINGER: ICD-10-CM

## 2018-02-15 DIAGNOSIS — Z96.651 PRESENCE OF RIGHT ARTIFICIAL KNEE JOINT: Chronic | ICD-10-CM

## 2018-02-15 DIAGNOSIS — Z79.2 LONG TERM (CURRENT) USE OF ANTIBIOTICS: ICD-10-CM

## 2018-02-15 DIAGNOSIS — M79.645 PAIN IN LEFT FINGER(S): ICD-10-CM

## 2018-02-15 DIAGNOSIS — E11.9 TYPE 2 DIABETES MELLITUS WITHOUT COMPLICATIONS: ICD-10-CM

## 2018-02-15 DIAGNOSIS — I62.01 NONTRAUMATIC ACUTE SUBDURAL HEMORRHAGE: Chronic | ICD-10-CM

## 2018-02-15 DIAGNOSIS — I10 ESSENTIAL (PRIMARY) HYPERTENSION: ICD-10-CM

## 2018-02-15 DIAGNOSIS — Z79.82 LONG TERM (CURRENT) USE OF ASPIRIN: ICD-10-CM

## 2018-02-15 PROCEDURE — 99284 EMERGENCY DEPT VISIT MOD MDM: CPT | Mod: 25

## 2018-02-15 PROCEDURE — 87205 SMEAR GRAM STAIN: CPT

## 2018-02-15 PROCEDURE — 11730 AVULSION NAIL PLATE SIMPLE 1: CPT | Mod: LT

## 2018-02-15 PROCEDURE — 99283 EMERGENCY DEPT VISIT LOW MDM: CPT

## 2018-02-15 PROCEDURE — 87075 CULTR BACTERIA EXCEPT BLOOD: CPT

## 2018-02-15 PROCEDURE — 87070 CULTURE OTHR SPECIMN AEROBIC: CPT

## 2018-02-15 RX ORDER — DICLOXACILLIN SODIUM 500 MG/1
1 CAPSULE ORAL
Qty: 28 | Refills: 0
Start: 2018-02-15 | End: 2018-02-21

## 2018-02-15 NOTE — ED PROVIDER NOTE - MEDICAL DECISION MAKING DETAILS
73 y/o female with paronychia on left distal finger. Does not penetrate through - no felon. Dr. Guillory consulted and I&D. Will start on abx and fu with him on Tuesday.

## 2018-02-16 LAB
GRAM STN FLD: SIGNIFICANT CHANGE UP
SPECIMEN SOURCE: SIGNIFICANT CHANGE UP

## 2018-02-18 LAB
CULTURE RESULTS: NO GROWTH — SIGNIFICANT CHANGE UP
SPECIMEN SOURCE: SIGNIFICANT CHANGE UP

## 2018-02-22 ENCOUNTER — APPOINTMENT (OUTPATIENT)
Dept: NEUROLOGY | Facility: CLINIC | Age: 73
End: 2018-02-22
Payer: MEDICARE

## 2018-02-22 PROCEDURE — 95816 EEG AWAKE AND DROWSY: CPT

## 2018-02-23 ENCOUNTER — APPOINTMENT (OUTPATIENT)
Dept: NEUROLOGY | Facility: CLINIC | Age: 73
End: 2018-02-23

## 2018-02-23 PROCEDURE — 95953: CPT

## 2018-02-28 ENCOUNTER — APPOINTMENT (OUTPATIENT)
Dept: INTERNAL MEDICINE | Facility: CLINIC | Age: 73
End: 2018-02-28
Payer: MEDICARE

## 2018-02-28 VITALS
DIASTOLIC BLOOD PRESSURE: 83 MMHG | SYSTOLIC BLOOD PRESSURE: 134 MMHG | HEIGHT: 67 IN | OXYGEN SATURATION: 98 % | BODY MASS INDEX: 18.83 KG/M2 | HEART RATE: 109 BPM | WEIGHT: 120 LBS | TEMPERATURE: 98.7 F

## 2018-02-28 DIAGNOSIS — M79.646 PAIN IN UNSPECIFIED FINGER(S): ICD-10-CM

## 2018-02-28 PROCEDURE — 99214 OFFICE O/P EST MOD 30 MIN: CPT

## 2018-04-25 ENCOUNTER — EMERGENCY (EMERGENCY)
Facility: HOSPITAL | Age: 73
LOS: 1 days | Discharge: HOME | End: 2018-04-25
Attending: EMERGENCY MEDICINE | Admitting: EMERGENCY MEDICINE
Payer: MEDICARE

## 2018-04-25 ENCOUNTER — APPOINTMENT (OUTPATIENT)
Dept: INTERNAL MEDICINE | Facility: CLINIC | Age: 73
End: 2018-04-25
Payer: MEDICARE

## 2018-04-25 VITALS
HEART RATE: 73 BPM | OXYGEN SATURATION: 100 % | RESPIRATION RATE: 16 BRPM | TEMPERATURE: 99 F | SYSTOLIC BLOOD PRESSURE: 130 MMHG | DIASTOLIC BLOOD PRESSURE: 70 MMHG

## 2018-04-25 VITALS
SYSTOLIC BLOOD PRESSURE: 114 MMHG | RESPIRATION RATE: 16 BRPM | OXYGEN SATURATION: 98 % | TEMPERATURE: 98 F | HEART RATE: 69 BPM | DIASTOLIC BLOOD PRESSURE: 64 MMHG

## 2018-04-25 VITALS
OXYGEN SATURATION: 96 % | SYSTOLIC BLOOD PRESSURE: 137 MMHG | HEART RATE: 84 BPM | HEIGHT: 67 IN | DIASTOLIC BLOOD PRESSURE: 82 MMHG | WEIGHT: 141 LBS | BODY MASS INDEX: 22.13 KG/M2 | TEMPERATURE: 98.6 F

## 2018-04-25 DIAGNOSIS — Z87.891 PERSONAL HISTORY OF NICOTINE DEPENDENCE: ICD-10-CM

## 2018-04-25 DIAGNOSIS — Z79.82 LONG TERM (CURRENT) USE OF ASPIRIN: ICD-10-CM

## 2018-04-25 DIAGNOSIS — Z96.651 PRESENCE OF RIGHT ARTIFICIAL KNEE JOINT: Chronic | ICD-10-CM

## 2018-04-25 DIAGNOSIS — E78.5 HYPERLIPIDEMIA, UNSPECIFIED: ICD-10-CM

## 2018-04-25 DIAGNOSIS — E11.9 TYPE 2 DIABETES MELLITUS WITHOUT COMPLICATIONS: ICD-10-CM

## 2018-04-25 DIAGNOSIS — R07.89 OTHER CHEST PAIN: ICD-10-CM

## 2018-04-25 DIAGNOSIS — Z79.2 LONG TERM (CURRENT) USE OF ANTIBIOTICS: ICD-10-CM

## 2018-04-25 DIAGNOSIS — I62.01 NONTRAUMATIC ACUTE SUBDURAL HEMORRHAGE: Chronic | ICD-10-CM

## 2018-04-25 DIAGNOSIS — I10 ESSENTIAL (PRIMARY) HYPERTENSION: ICD-10-CM

## 2018-04-25 DIAGNOSIS — Z88.8 ALLERGY STATUS TO OTHER DRUGS, MEDICAMENTS AND BIOLOGICAL SUBSTANCES STATUS: ICD-10-CM

## 2018-04-25 DIAGNOSIS — J45.909 UNSPECIFIED ASTHMA, UNCOMPLICATED: ICD-10-CM

## 2018-04-25 DIAGNOSIS — Z79.899 OTHER LONG TERM (CURRENT) DRUG THERAPY: ICD-10-CM

## 2018-04-25 DIAGNOSIS — Z79.4 LONG TERM (CURRENT) USE OF INSULIN: ICD-10-CM

## 2018-04-25 LAB
ALBUMIN SERPL ELPH-MCNC: 4.5 G/DL — SIGNIFICANT CHANGE UP (ref 3.3–5)
ALP SERPL-CCNC: 164 U/L — HIGH (ref 40–120)
ALT FLD-CCNC: 10 U/L — SIGNIFICANT CHANGE UP (ref 10–45)
ANION GAP SERPL CALC-SCNC: 13 MMOL/L — SIGNIFICANT CHANGE UP (ref 5–17)
APTT BLD: 21.8 SEC — LOW (ref 27.5–37.4)
AST SERPL-CCNC: 17 U/L — SIGNIFICANT CHANGE UP (ref 10–40)
BASOPHILS NFR BLD AUTO: 1.1 % — SIGNIFICANT CHANGE UP (ref 0–2)
BILIRUB SERPL-MCNC: 0.3 MG/DL — SIGNIFICANT CHANGE UP (ref 0.2–1.2)
BUN SERPL-MCNC: 12 MG/DL — SIGNIFICANT CHANGE UP (ref 7–23)
CALCIUM SERPL-MCNC: 9.1 MG/DL — SIGNIFICANT CHANGE UP (ref 8.4–10.5)
CHLORIDE SERPL-SCNC: 103 MMOL/L — SIGNIFICANT CHANGE UP (ref 96–108)
CK MB CFR SERPL CALC: 2.7 NG/ML — SIGNIFICANT CHANGE UP (ref 0–6.7)
CK SERPL-CCNC: 170 U/L — SIGNIFICANT CHANGE UP (ref 25–170)
CO2 SERPL-SCNC: 22 MMOL/L — SIGNIFICANT CHANGE UP (ref 22–31)
CREAT SERPL-MCNC: 0.7 MG/DL — SIGNIFICANT CHANGE UP (ref 0.5–1.3)
EOSINOPHIL NFR BLD AUTO: 5.3 % — SIGNIFICANT CHANGE UP (ref 0–6)
GLUCOSE SERPL-MCNC: 130 MG/DL — HIGH (ref 70–99)
HCT VFR BLD CALC: 34.9 % — SIGNIFICANT CHANGE UP (ref 34.5–45)
HGB BLD-MCNC: 11.6 G/DL — SIGNIFICANT CHANGE UP (ref 11.5–15.5)
INR BLD: 0.94 — SIGNIFICANT CHANGE UP (ref 0.88–1.16)
LIDOCAIN IGE QN: 39 U/L — SIGNIFICANT CHANGE UP (ref 7–60)
LYMPHOCYTES # BLD AUTO: 31.4 % — SIGNIFICANT CHANGE UP (ref 13–44)
MCHC RBC-ENTMCNC: 28.3 PG — SIGNIFICANT CHANGE UP (ref 27–34)
MCHC RBC-ENTMCNC: 33.2 G/DL — SIGNIFICANT CHANGE UP (ref 32–36)
MCV RBC AUTO: 85.1 FL — SIGNIFICANT CHANGE UP (ref 80–100)
MONOCYTES NFR BLD AUTO: 8.4 % — SIGNIFICANT CHANGE UP (ref 2–14)
NEUTROPHILS NFR BLD AUTO: 53.8 % — SIGNIFICANT CHANGE UP (ref 43–77)
PLATELET # BLD AUTO: 249 K/UL — SIGNIFICANT CHANGE UP (ref 150–400)
POTASSIUM SERPL-MCNC: 4.5 MMOL/L — SIGNIFICANT CHANGE UP (ref 3.5–5.3)
POTASSIUM SERPL-SCNC: 4.5 MMOL/L — SIGNIFICANT CHANGE UP (ref 3.5–5.3)
PROT SERPL-MCNC: 7.6 G/DL — SIGNIFICANT CHANGE UP (ref 6–8.3)
PROTHROM AB SERPL-ACNC: 10.4 SEC — SIGNIFICANT CHANGE UP (ref 9.8–12.7)
RBC # BLD: 4.1 M/UL — SIGNIFICANT CHANGE UP (ref 3.8–5.2)
RBC # FLD: 13.7 % — SIGNIFICANT CHANGE UP (ref 10.3–16.9)
SODIUM SERPL-SCNC: 138 MMOL/L — SIGNIFICANT CHANGE UP (ref 135–145)
TROPONIN T SERPL-MCNC: <0.01 NG/ML — SIGNIFICANT CHANGE UP (ref 0–0.01)
WBC # BLD: 6.4 K/UL — SIGNIFICANT CHANGE UP (ref 3.8–10.5)
WBC # FLD AUTO: 6.4 K/UL — SIGNIFICANT CHANGE UP (ref 3.8–10.5)

## 2018-04-25 PROCEDURE — 85730 THROMBOPLASTIN TIME PARTIAL: CPT

## 2018-04-25 PROCEDURE — 93000 ELECTROCARDIOGRAM COMPLETE: CPT

## 2018-04-25 PROCEDURE — 83690 ASSAY OF LIPASE: CPT

## 2018-04-25 PROCEDURE — 85025 COMPLETE CBC W/AUTO DIFF WBC: CPT

## 2018-04-25 PROCEDURE — 71045 X-RAY EXAM CHEST 1 VIEW: CPT | Mod: 26

## 2018-04-25 PROCEDURE — 82550 ASSAY OF CK (CPK): CPT

## 2018-04-25 PROCEDURE — 93010 ELECTROCARDIOGRAM REPORT: CPT

## 2018-04-25 PROCEDURE — 82553 CREATINE MB FRACTION: CPT

## 2018-04-25 PROCEDURE — 36415 COLL VENOUS BLD VENIPUNCTURE: CPT

## 2018-04-25 PROCEDURE — 93005 ELECTROCARDIOGRAM TRACING: CPT

## 2018-04-25 PROCEDURE — 84484 ASSAY OF TROPONIN QUANT: CPT

## 2018-04-25 PROCEDURE — 99214 OFFICE O/P EST MOD 30 MIN: CPT | Mod: 25

## 2018-04-25 PROCEDURE — 99285 EMERGENCY DEPT VISIT HI MDM: CPT | Mod: 25,GC

## 2018-04-25 PROCEDURE — 85610 PROTHROMBIN TIME: CPT

## 2018-04-25 PROCEDURE — 71045 X-RAY EXAM CHEST 1 VIEW: CPT

## 2018-04-25 PROCEDURE — 80053 COMPREHEN METABOLIC PANEL: CPT

## 2018-04-25 PROCEDURE — 99283 EMERGENCY DEPT VISIT LOW MDM: CPT | Mod: 25

## 2018-04-25 NOTE — ED ADULT NURSE NOTE - PMH
Asthma  Asthma  Atherosclerosis of coronary artery  NOn obstructive  Atrial fibrillation  s/p ablation in 2013  Back pain    Depression    Epigastric pain    Essential hypertension  HTN (hypertension)  Hyperlipidemia  Hyperlipidemia  Persistent ostium secundum  s/p repair (1989)  Subdural hemorrhage  s/p bakari hole (2013)  Type 2 diabetes mellitus  DM (diabetes mellitus)

## 2018-04-25 NOTE — ED ADULT NURSE NOTE - CAS DISCH CONDITION
1) Take ibuprofen 200mg tablet, take 3 tablets every 6-8 hours as needed for pain  2) Take macrobid as prescribed  3) Follow up with your OBGYN in 2-3 days if you cannot make an appointment Follow up with OB/GYN clinic in 2-3 days, call [phone] for an appointment   4) You were given a copy of your results, please show them to your doctor for review.  5) return to the ED for worsening pain, nausea, vomiting, burning with urination, fever greater than 100.4, chest pain, shortness of breath, diarrhea, vaginal bleeding, blood in stool, or if you have any other new, worsening or concerning symptoms.
Stable

## 2018-04-25 NOTE — ED PROVIDER NOTE - OBJECTIVE STATEMENT
Pt is a 74yo F w/ PMH asthma, HTN, Afib s/p ablation 2 yrs ago, ASD s/p repair, CAD, DM2, depression, subdural hemorrhage presenting with intermittent left-side chest pain for 2 weeks. The pain is located just below the left Pt is a 72yo F w/ PMH asthma, HTN, Afib s/p ablation 2 yrs ago, ASD s/p repair, CAD, DM2, depression, subdural hemorrhage presenting with intermittent left-side chest pain for 2 weeks. The pain is located just below the left breast and involves the left shoulder as well. She states that she also gets intermittent numbness of the left shoulder. Her symptoms improve with use of her albuterol inhaler. They resolve typically within 2-3 minutes. She reports constant chronic nausea that is unchanged by this chest pain, and dyspnea on exertion that is unchanged from her usual level of dyspnea. She sleeps comfortably on 2 pillows at home. She denies any dizziness or LOC, cough, congestion, abdominal pain, LE edema, or difficulty walking with her rollator. She went to see Dr. Cueto, her PMD, in her office today for routine follow up, and was told to go to the ED for evaluation of her chest pain. She used to have a cardiologist, but has not followed with one recently.

## 2018-04-25 NOTE — ED ADULT NURSE NOTE - OBJECTIVE STATEMENT
epigastric pain with nausea for a few weeks - intermittent - went to MD and sent here for evaluation

## 2018-04-25 NOTE — ED PROVIDER NOTE - PROGRESS NOTE DETAILS
Initial troponin negative. Spoke with patient's PCP, agreed ACS suspicion is low given past caths and nuclear stress. As pain has been present for 2 weeks, unlikely that second troponin will change. Will discharge home with referral for cardiology for further outpatient workup.

## 2018-04-25 NOTE — ED ADULT TRIAGE NOTE - CHIEF COMPLAINT QUOTE
Pt BIBA from doctor's office c/o epigastric pain for two weeks. Hx of HTN, hypercholesterol, and CABG

## 2018-04-25 NOTE — ED PROVIDER NOTE - ATTENDING CONTRIBUTION TO CARE
74 y/o f with PMH of HTN, HLD, DM presents to ED with two week hx of left sided chest pain with radiation to arm with associated symptoms of shortness of breath.  Symptoms worse with exertion.  Pt sent from Rom's office for further evaluation.  In ED pt denies cp.  EKG nonspecific.  Will get cardiac enzymes and ? nuclear stress. CTA coronary most likely will be nondiagnostic with high calcium score.

## 2018-04-25 NOTE — ED PROVIDER NOTE - MEDICAL DECISION MAKING DETAILS
72yo F w/ PMH memory loss, asthma, HTN, Afib s/p ablation 2 yrs ago, ASD s/p repair, CAD, DM2, depression, subdural hemorrhage presenting with intermittent left-side chest pain for 2 weeks. Prior cardiac catheterization from 2014 with luminal irregularities and 50% ostial lesion of LAD. Nuclear stress test 12/2016 WNL. EKG unchanged from prior. CXR clear. Will check cardiac enzymes.

## 2018-05-01 ENCOUNTER — APPOINTMENT (OUTPATIENT)
Dept: INTERNAL MEDICINE | Facility: CLINIC | Age: 73
End: 2018-05-01

## 2018-05-03 ENCOUNTER — APPOINTMENT (OUTPATIENT)
Dept: HEART AND VASCULAR | Facility: CLINIC | Age: 73
End: 2018-05-03
Payer: MEDICARE

## 2018-05-03 VITALS
DIASTOLIC BLOOD PRESSURE: 60 MMHG | BODY MASS INDEX: 22.29 KG/M2 | SYSTOLIC BLOOD PRESSURE: 124 MMHG | OXYGEN SATURATION: 98 % | WEIGHT: 142 LBS | TEMPERATURE: 98.3 F | HEART RATE: 63 BPM | HEIGHT: 67 IN

## 2018-05-03 PROCEDURE — 99214 OFFICE O/P EST MOD 30 MIN: CPT | Mod: 25

## 2018-05-03 PROCEDURE — 93000 ELECTROCARDIOGRAM COMPLETE: CPT

## 2018-05-03 RX ORDER — AMLODIPINE BESYLATE 5 MG/1
TABLET ORAL
Refills: 0 | Status: DISCONTINUED | COMMUNITY
End: 2018-05-03

## 2018-05-03 RX ORDER — ACETAMINOPHEN 500 MG
500 TABLET ORAL
Refills: 0 | Status: DISCONTINUED | COMMUNITY
End: 2018-05-03

## 2018-05-08 ENCOUNTER — APPOINTMENT (OUTPATIENT)
Dept: INTERNAL MEDICINE | Facility: CLINIC | Age: 73
End: 2018-05-08
Payer: MEDICARE

## 2018-05-08 VITALS
HEART RATE: 65 BPM | DIASTOLIC BLOOD PRESSURE: 77 MMHG | OXYGEN SATURATION: 100 % | SYSTOLIC BLOOD PRESSURE: 132 MMHG | BODY MASS INDEX: 22.29 KG/M2 | HEIGHT: 67 IN | TEMPERATURE: 98.4 F | WEIGHT: 142 LBS

## 2018-05-08 PROCEDURE — 99214 OFFICE O/P EST MOD 30 MIN: CPT | Mod: 25

## 2018-05-08 PROCEDURE — 36415 COLL VENOUS BLD VENIPUNCTURE: CPT

## 2018-05-10 LAB — HBA1C MFR BLD HPLC: 6.2 %

## 2018-05-22 ENCOUNTER — APPOINTMENT (OUTPATIENT)
Dept: HEART AND VASCULAR | Facility: CLINIC | Age: 73
End: 2018-05-22
Payer: MEDICARE

## 2018-05-22 VITALS — BODY MASS INDEX: 22.29 KG/M2 | WEIGHT: 142 LBS | HEIGHT: 67 IN

## 2018-05-22 PROCEDURE — 93015 CV STRESS TEST SUPVJ I&R: CPT

## 2018-05-22 PROCEDURE — 78452 HT MUSCLE IMAGE SPECT MULT: CPT

## 2018-05-22 PROCEDURE — A9500: CPT

## 2018-05-22 PROCEDURE — 93306 TTE W/DOPPLER COMPLETE: CPT

## 2018-05-31 ENCOUNTER — APPOINTMENT (OUTPATIENT)
Dept: HEART AND VASCULAR | Facility: CLINIC | Age: 73
End: 2018-05-31

## 2018-06-25 ENCOUNTER — APPOINTMENT (OUTPATIENT)
Dept: NEUROLOGY | Facility: CLINIC | Age: 73
End: 2018-06-25
Payer: MEDICARE

## 2018-06-25 PROCEDURE — 96118: CPT

## 2018-06-25 PROCEDURE — 90791 PSYCH DIAGNOSTIC EVALUATION: CPT

## 2018-06-27 ENCOUNTER — OTHER (OUTPATIENT)
Age: 73
End: 2018-06-27

## 2018-06-28 ENCOUNTER — APPOINTMENT (OUTPATIENT)
Dept: HEART AND VASCULAR | Facility: CLINIC | Age: 73
End: 2018-06-28
Payer: MEDICARE

## 2018-06-28 VITALS
DIASTOLIC BLOOD PRESSURE: 70 MMHG | HEIGHT: 67 IN | WEIGHT: 130 LBS | BODY MASS INDEX: 20.4 KG/M2 | SYSTOLIC BLOOD PRESSURE: 152 MMHG | HEART RATE: 60 BPM

## 2018-06-28 PROCEDURE — 99214 OFFICE O/P EST MOD 30 MIN: CPT

## 2018-07-10 ENCOUNTER — OUTPATIENT (OUTPATIENT)
Dept: OUTPATIENT SERVICES | Facility: HOSPITAL | Age: 73
LOS: 1 days | Discharge: HOME | End: 2018-07-10
Payer: MEDICARE

## 2018-07-10 DIAGNOSIS — I62.01 NONTRAUMATIC ACUTE SUBDURAL HEMORRHAGE: Chronic | ICD-10-CM

## 2018-07-10 DIAGNOSIS — Z96.651 PRESENCE OF RIGHT ARTIFICIAL KNEE JOINT: Chronic | ICD-10-CM

## 2018-07-10 PROCEDURE — C1764: CPT

## 2018-07-10 PROCEDURE — 33282: CPT

## 2018-07-10 NOTE — PROGRESS NOTE ADULT - SUBJECTIVE AND OBJECTIVE BOX
EPS Progress Note    S: 74 yo f with history of atrial fibrillation, s/p ablation by Dr. Ramirez presents for ILR implant     MEDICATIONS  (STANDING):                 General:  NAD         Chest:  CTA B/L       Cardiac:  RRR      s1/s2         Abdomen:  +BS      Soft      Non-Tender      Non-Distended       Extremities:  no edema      Labs:                                                     Assessment/Plan:  74 yo f with history of atrial fibrillation, s/p ablation by Dr. Ramirez presents for ILR implant EPS Progress Note    S: 74 yo f with history of atrial fibrillation, s/p ablation by Dr. Ramirez presents for ILR implant     MEDICATIONS  (home):  · 	atorvastatin 20 mg oral tablet: Last Dose Taken:  , 1 tab(s) orally once a day (at bedtime)  · 	Lantus 100 units/mL subcutaneous solution: 16 unit(s) subcutaneous once a day (at bedtime)  · 	aspirin 81 mg oral delayed release tablet: Last Dose Taken:  , 1 tab(s) orally once a day  · 	ProAir HFA 90 mcg/inh inhalation aerosol: 2 puff(s) inhaled 4 times a day, As Needed  · 	amLODIPine 10 mg oral tablet: Last Dose Taken:  , 1 tab(s) orally once a day  · 	lisinopril 20 mg oral tablet: 1 tab(s) orally once a day  · 	Ambien 5 mg oral tablet: Last Dose Taken:  , 1 tab(s) orally once a day (at bedtime), As Needed Insomnia  · 	multivitamin: 1 tab(s) orally once a day  · 	HumaLOG 100 units/mL subcutaneous solution:  subcutaneous 3 times a day               General:  NAD         Chest:  CTA B/L       Cardiac:  RRR      s1/s2         Abdomen:  +BS      Soft      Non-Tender      Non-Distended       Extremities:  no edema      Labs:                                             Assessment/Plan:  74 yo f with history of atrial fibrillation, s/p ablation by Dr. Ramirez presents for ILR implant  will discharge home today if stable.

## 2018-07-26 PROBLEM — J30.2 SEASONAL ALLERGIES: Status: ACTIVE | Noted: 2017-05-22

## 2018-07-30 ENCOUNTER — OTHER (OUTPATIENT)
Age: 73
End: 2018-07-30

## 2018-07-30 ENCOUNTER — APPOINTMENT (OUTPATIENT)
Dept: HEART AND VASCULAR | Facility: CLINIC | Age: 73
End: 2018-07-30

## 2018-08-01 ENCOUNTER — RX RENEWAL (OUTPATIENT)
Age: 73
End: 2018-08-01

## 2018-08-26 ENCOUNTER — OTHER (OUTPATIENT)
Age: 73
End: 2018-08-26

## 2018-08-28 ENCOUNTER — OTHER (OUTPATIENT)
Age: 73
End: 2018-08-28

## 2018-09-05 ENCOUNTER — APPOINTMENT (OUTPATIENT)
Dept: HEART AND VASCULAR | Facility: CLINIC | Age: 73
End: 2018-09-05
Payer: MEDICARE

## 2018-09-05 VITALS
SYSTOLIC BLOOD PRESSURE: 126 MMHG | BODY MASS INDEX: 21.97 KG/M2 | WEIGHT: 140 LBS | HEART RATE: 90 BPM | HEIGHT: 67 IN | DIASTOLIC BLOOD PRESSURE: 68 MMHG

## 2018-09-05 PROCEDURE — 93291 INTERROG DEV EVAL SCRMS IP: CPT

## 2018-10-01 NOTE — ED ADULT TRIAGE NOTE - MEANS OF ARRIVAL
He has an appt 10/5/18 and thought francois wanted him to have labs drawn before his appt .  pls call pt to let him know if he needs labs
wheelchair

## 2018-10-15 ENCOUNTER — APPOINTMENT (OUTPATIENT)
Dept: INTERNAL MEDICINE | Facility: CLINIC | Age: 73
End: 2018-10-15
Payer: MEDICARE

## 2018-10-15 VITALS
SYSTOLIC BLOOD PRESSURE: 147 MMHG | TEMPERATURE: 98.7 F | OXYGEN SATURATION: 99 % | DIASTOLIC BLOOD PRESSURE: 88 MMHG | HEART RATE: 77 BPM | HEIGHT: 67 IN | BODY MASS INDEX: 21.97 KG/M2 | WEIGHT: 140 LBS

## 2018-10-15 VITALS — SYSTOLIC BLOOD PRESSURE: 132 MMHG | DIASTOLIC BLOOD PRESSURE: 80 MMHG

## 2018-10-15 PROCEDURE — 36415 COLL VENOUS BLD VENIPUNCTURE: CPT

## 2018-10-15 PROCEDURE — 99214 OFFICE O/P EST MOD 30 MIN: CPT | Mod: 25

## 2018-10-15 NOTE — ASSESSMENT
[FreeTextEntry1] : 74 yo female here for f/u\par \par 1) DM2 - chronic, stable, c/w lantus 15u qhs and metformin, counseled diet.  check a1c and cmp today.\par 2) HTN -c hronic, stable, c/w amlodipine, check cmp today\par 3) afib - s/p ablation with ILR in place, counseled to f/u cards.  no a/c given hx SDH.\par 4) HPL - chronic, stable, c/w statin, refilled today, check lipids\par 5) insomnia - chronic, stable, c/w trazodone, refilled today

## 2018-10-15 NOTE — HISTORY OF PRESENT ILLNESS
[de-identified] : 74 yo female with hx HTN, HPL, DM2, afib s/p ablation, ILR in place, SDH (a/c held) here for f/u.  Reports she feels generally well.  States she has been sleeping better on trazodone.  REports she does not eat particularly healthy diet but is compliant with lantus 15u at night and lantus.  reports  compliance with all other medications.

## 2018-10-15 NOTE — PHYSICAL EXAM
[No Acute Distress] : no acute distress [Normal Sclera/Conjunctiva] : normal sclera/conjunctiva [EOMI] : extraocular movements intact [Normal Outer Ear/Nose] : the outer ears and nose were normal in appearance [No JVD] : no jugular venous distention [Supple] : supple [No Respiratory Distress] : no respiratory distress  [Clear to Auscultation] : lungs were clear to auscultation bilaterally [No Accessory Muscle Use] : no accessory muscle use [Normal Rate] : normal rate  [Regular Rhythm] : with a regular rhythm [Normal S1, S2] : normal S1 and S2 [No Edema] : there was no peripheral edema [Grossly Normal Strength/Tone] : grossly normal strength/tone [No Rash] : no rash [Coordination Grossly Intact] : coordination grossly intact [No Focal Deficits] : no focal deficits [Alert and Oriented x3] : oriented to person, place, and time

## 2018-10-15 NOTE — REVIEW OF SYSTEMS
[Fever] : no fever [Chest Pain] : no chest pain [Shortness Of Breath] : no shortness of breath [Abdominal Pain] : no abdominal pain

## 2018-10-19 ENCOUNTER — OTHER (OUTPATIENT)
Age: 73
End: 2018-10-19

## 2018-10-19 LAB
ALBUMIN SERPL ELPH-MCNC: 4.3 G/DL
ALP BLD-CCNC: 119 U/L
ALT SERPL-CCNC: 17 U/L
ANION GAP SERPL CALC-SCNC: 16 MMOL/L
AST SERPL-CCNC: 19 U/L
BILIRUB SERPL-MCNC: 0.6 MG/DL
BUN SERPL-MCNC: 14 MG/DL
CALCIUM SERPL-MCNC: 9.1 MG/DL
CHLORIDE SERPL-SCNC: 102 MMOL/L
CHOLEST SERPL-MCNC: 160 MG/DL
CHOLEST/HDLC SERPL: 1.6 RATIO
CO2 SERPL-SCNC: 23 MMOL/L
CREAT SERPL-MCNC: 0.9 MG/DL
GLUCOSE SERPL-MCNC: 317 MG/DL
HBA1C MFR BLD HPLC: 9 %
HDLC SERPL-MCNC: 103 MG/DL
LDLC SERPL CALC-MCNC: 44 MG/DL
POTASSIUM SERPL-SCNC: 4.7 MMOL/L
PROT SERPL-MCNC: 7 G/DL
SODIUM SERPL-SCNC: 141 MMOL/L
TRIGL SERPL-MCNC: 67 MG/DL

## 2018-11-21 ENCOUNTER — APPOINTMENT (OUTPATIENT)
Dept: INTERNAL MEDICINE | Facility: CLINIC | Age: 73
End: 2018-11-21
Payer: MEDICARE

## 2018-11-21 VITALS
WEIGHT: 153 LBS | TEMPERATURE: 98.4 F | OXYGEN SATURATION: 98 % | BODY MASS INDEX: 30.04 KG/M2 | HEIGHT: 60 IN | DIASTOLIC BLOOD PRESSURE: 78 MMHG | HEART RATE: 76 BPM | SYSTOLIC BLOOD PRESSURE: 125 MMHG

## 2018-11-21 DIAGNOSIS — F32.9 MAJOR DEPRESSIVE DISORDER, SINGLE EPISODE, UNSPECIFIED: ICD-10-CM

## 2018-11-21 DIAGNOSIS — R11.10 VOMITING, UNSPECIFIED: ICD-10-CM

## 2018-11-21 PROCEDURE — 99214 OFFICE O/P EST MOD 30 MIN: CPT | Mod: 25

## 2018-11-21 PROCEDURE — 36415 COLL VENOUS BLD VENIPUNCTURE: CPT

## 2018-11-23 ENCOUNTER — OTHER (OUTPATIENT)
Age: 73
End: 2018-11-23

## 2018-11-23 LAB
ALBUMIN SERPL ELPH-MCNC: 4.5 G/DL
ALP BLD-CCNC: 76 U/L
ALT SERPL-CCNC: 16 U/L
ANION GAP SERPL CALC-SCNC: 15 MMOL/L
AST SERPL-CCNC: 20 U/L
BASOPHILS # BLD AUTO: 0.04 K/UL
BASOPHILS NFR BLD AUTO: 0.6 %
BILIRUB SERPL-MCNC: 0.4 MG/DL
BUN SERPL-MCNC: 10 MG/DL
CALCIUM SERPL-MCNC: 9.3 MG/DL
CHLORIDE SERPL-SCNC: 105 MMOL/L
CO2 SERPL-SCNC: 20 MMOL/L
CREAT SERPL-MCNC: 0.9 MG/DL
EOSINOPHIL # BLD AUTO: 0.56 K/UL
EOSINOPHIL NFR BLD AUTO: 8.8 %
GLUCOSE SERPL-MCNC: 112 MG/DL
HCT VFR BLD CALC: 34.7 %
HGB BLD-MCNC: 11.6 G/DL
IMM GRANULOCYTES NFR BLD AUTO: 0.2 %
LYMPHOCYTES # BLD AUTO: 2.07 K/UL
LYMPHOCYTES NFR BLD AUTO: 32.4 %
MAN DIFF?: NORMAL
MCHC RBC-ENTMCNC: 29.3 PG
MCHC RBC-ENTMCNC: 33.4 GM/DL
MCV RBC AUTO: 87.6 FL
MONOCYTES # BLD AUTO: 0.44 K/UL
MONOCYTES NFR BLD AUTO: 6.9 %
NEUTROPHILS # BLD AUTO: 3.27 K/UL
NEUTROPHILS NFR BLD AUTO: 51.1 %
PLATELET # BLD AUTO: 203 K/UL
POTASSIUM SERPL-SCNC: 4.8 MMOL/L
PROT SERPL-MCNC: 6.9 G/DL
RBC # BLD: 3.96 M/UL
RBC # FLD: 15 %
SODIUM SERPL-SCNC: 140 MMOL/L
WBC # FLD AUTO: 6.39 K/UL

## 2018-12-17 ENCOUNTER — RX RENEWAL (OUTPATIENT)
Age: 73
End: 2018-12-17

## 2019-01-02 ENCOUNTER — OTHER (OUTPATIENT)
Age: 74
End: 2019-01-02

## 2019-01-08 ENCOUNTER — OTHER (OUTPATIENT)
Age: 74
End: 2019-01-08

## 2019-01-17 ENCOUNTER — APPOINTMENT (OUTPATIENT)
Dept: GASTROENTEROLOGY | Facility: CLINIC | Age: 74
End: 2019-01-17
Payer: MEDICARE

## 2019-01-17 VITALS
SYSTOLIC BLOOD PRESSURE: 150 MMHG | RESPIRATION RATE: 16 BRPM | WEIGHT: 154 LBS | HEART RATE: 108 BPM | DIASTOLIC BLOOD PRESSURE: 90 MMHG | BODY MASS INDEX: 30.08 KG/M2 | OXYGEN SATURATION: 98 %

## 2019-01-17 DIAGNOSIS — R11.2 NAUSEA WITH VOMITING, UNSPECIFIED: ICD-10-CM

## 2019-01-17 PROCEDURE — 99204 OFFICE O/P NEW MOD 45 MIN: CPT

## 2019-01-17 NOTE — REASON FOR VISIT
[Initial Consultation] : an initial consultation for the evaluation of [Nausea] : nausea [Vomiting] : vomiting [Spouse] : spouse

## 2019-02-12 ENCOUNTER — APPOINTMENT (OUTPATIENT)
Dept: GASTROENTEROLOGY | Facility: HOSPITAL | Age: 74
End: 2019-02-12

## 2019-02-20 ENCOUNTER — OTHER (OUTPATIENT)
Age: 74
End: 2019-02-20

## 2019-02-20 ENCOUNTER — APPOINTMENT (OUTPATIENT)
Dept: INTERNAL MEDICINE | Facility: CLINIC | Age: 74
End: 2019-02-20
Payer: MEDICARE

## 2019-02-20 VITALS
SYSTOLIC BLOOD PRESSURE: 128 MMHG | BODY MASS INDEX: 31.22 KG/M2 | WEIGHT: 159 LBS | HEIGHT: 60 IN | OXYGEN SATURATION: 96 % | DIASTOLIC BLOOD PRESSURE: 83 MMHG | HEART RATE: 87 BPM | TEMPERATURE: 98.8 F

## 2019-02-20 DIAGNOSIS — M79.2 NEURALGIA AND NEURITIS, UNSPECIFIED: ICD-10-CM

## 2019-02-20 PROCEDURE — 36415 COLL VENOUS BLD VENIPUNCTURE: CPT

## 2019-02-20 PROCEDURE — 99214 OFFICE O/P EST MOD 30 MIN: CPT | Mod: 25

## 2019-02-22 ENCOUNTER — OTHER (OUTPATIENT)
Age: 74
End: 2019-02-22

## 2019-02-22 LAB
ALBUMIN SERPL ELPH-MCNC: 4.8 G/DL
ALP BLD-CCNC: 139 U/L
ALT SERPL-CCNC: 13 U/L
ANION GAP SERPL CALC-SCNC: 24 MMOL/L
AST SERPL-CCNC: 14 U/L
BILIRUB SERPL-MCNC: 0.2 MG/DL
BUN SERPL-MCNC: 11 MG/DL
CALCIUM SERPL-MCNC: 9.7 MG/DL
CHLORIDE SERPL-SCNC: 105 MMOL/L
CO2 SERPL-SCNC: 13 MMOL/L
CREAT SERPL-MCNC: 0.78 MG/DL
GLUCOSE SERPL-MCNC: 250 MG/DL
HBA1C MFR BLD HPLC: 7.5 %
POTASSIUM SERPL-SCNC: 4.6 MMOL/L
PROT SERPL-MCNC: 7.3 G/DL
SODIUM SERPL-SCNC: 142 MMOL/L

## 2019-03-06 ENCOUNTER — APPOINTMENT (OUTPATIENT)
Dept: HEART AND VASCULAR | Facility: CLINIC | Age: 74
End: 2019-03-06
Payer: MEDICARE

## 2019-03-06 VITALS
DIASTOLIC BLOOD PRESSURE: 71 MMHG | HEIGHT: 60 IN | HEART RATE: 97 BPM | WEIGHT: 150 LBS | SYSTOLIC BLOOD PRESSURE: 142 MMHG | BODY MASS INDEX: 29.45 KG/M2

## 2019-03-06 PROCEDURE — 93291 INTERROG DEV EVAL SCRMS IP: CPT

## 2019-03-06 PROCEDURE — 99213 OFFICE O/P EST LOW 20 MIN: CPT | Mod: 25

## 2019-03-19 NOTE — PHYSICAL EXAM
[General Appearance - Well Developed] : well developed [Normal Appearance] : normal appearance [Well Groomed] : well groomed [General Appearance - Well Nourished] : well nourished [No Deformities] : no deformities [General Appearance - In No Acute Distress] : no acute distress [Heart Rate And Rhythm] : heart rate and rhythm were normal [Heart Sounds] : normal S1 and S2 [Respiration, Rhythm And Depth] : normal respiratory rhythm and effort [Exaggerated Use Of Accessory Muscles For Inspiration] : no accessory muscle use [Clean] : clean [Dry] : dry [Well-Healed] : well-healed [] : no ischemic changes [Normal Oral Mucosa] : normal oral mucosa [Normal Jugular Venous V Waves Present] : normal jugular venous V waves present [Abnormal Walk] : normal gait [Gait - Sufficient For Exercise Testing] : the gait was sufficient for exercise testing [Normal Oropharynx] : normal oropharynx [Palpable Crepitus] : no palpable crepitus [Foul Odor] : no foul smell [Bleeding] : no active bleeding [Serosanguineous Drainage] : no serosanquineous drainage [Purulent Drainage] : no purulent drainage [Erythema] : not erythematous [Serous Drainage] : no serous drainage [Warm] : not warm [Tender] : not tender [Fluctuant] : not fluctuant [Indurated] : not indurated [FreeTextEntry1] : mid L chest

## 2019-03-19 NOTE — DISCUSSION/SUMMARY
[FreeTextEntry1] : Ms. Ambrosio is a 73 year-old female with hypertension, hyperlipidemia, ASD s/p surgical repair, type II DM (insulin-dependent), atrial fibrillation s/p PVI, and spontaneous SDH (AC now being held) s/p ILR , who presents for follow up. ILR incision sit is well healed.  Interrogation reveals no recurrent afib since implant  She will remain off oral anticoagulation.  If she has recurrent events she may be a candidate for a WATCHMAN procedure.  She has atypical R sided chest pain that is likely musculoskeletal in nature and I have given her a referral for a cardiologist as she should have routine follow up.  She will utilize remote monitoring and follow up with Dr. Salas in 4-6 months.  SHe knows to call with any questions or concerns.

## 2019-03-19 NOTE — HISTORY OF PRESENT ILLNESS
[FreeTextEntry1] : Ms. Ambrosio is a 73 year-old female with hypertension, hyperlipidemia, ASD s/p surgical repair, type II DM (insulin-dependent), atrial fibrillation s/p PVI, and spontaneous SDH (AC now being held) s/p ILR , who presents for follow up.\par \par  She had an ILR implanted for surveillance of afib as anticoagulation is being held secondary to history of a subdural hematoma.  Since device implant she has been doing well.  She denies any MCDONNELL palpitations, syncope, near syncope.  She does complain of a R sided chest pain that is worse with movement of her arm.  she does not follow up with her cardiologist.  This is non exertional.  No other associated symptoms.  No device related complaints.

## 2019-03-19 NOTE — PROCEDURE
[de-identified] : medtronic reveal LINQ [id number]\par 7/10/18\par good battery \par good sensing \par no events or afib

## 2019-03-21 ENCOUNTER — APPOINTMENT (OUTPATIENT)
Age: 74
End: 2019-03-21
Payer: MEDICARE

## 2019-03-21 VITALS
HEART RATE: 97 BPM | SYSTOLIC BLOOD PRESSURE: 130 MMHG | OXYGEN SATURATION: 97 % | BODY MASS INDEX: 30.43 KG/M2 | HEIGHT: 60 IN | RESPIRATION RATE: 16 BRPM | DIASTOLIC BLOOD PRESSURE: 80 MMHG | WEIGHT: 155 LBS

## 2019-03-21 PROCEDURE — 99214 OFFICE O/P EST MOD 30 MIN: CPT

## 2019-03-21 NOTE — PHYSICAL EXAM
[General Appearance - Alert] : alert [General Appearance - Well Nourished] : well nourished [Outer Ear] : the ears and nose were normal in appearance [Neck Appearance] : the appearance of the neck was normal [Respiration, Rhythm And Depth] : normal respiratory rhythm and effort [Heart Rate And Rhythm] : heart rate was normal and rhythm regular [Edema] : there was no peripheral edema [Bowel Sounds] : normal bowel sounds [Abdomen Soft] : soft [Abdomen Tenderness] : non-tender [Abnormal Walk] : normal gait [Skin Color & Pigmentation] : normal skin color and pigmentation [] : no rash [Oriented To Time, Place, And Person] : oriented to person, place, and time

## 2019-03-22 NOTE — ASSESSMENT
[FreeTextEntry1] : 74 yo female with hx HTN, HPL, DM2, afib s/p ablation, ILR in place, SDH (a/c held) here for f/u. \par \par Nausea and vomiting, significantly resolved\par -encouraged patient to return to clinic if symptoms arise again\par \par Colon cancer screening\par -Patient agreed to cologuard, order placed\par -Instructions given to patient on this test\par \par F/U when cologuard results are retrieved\par \par Noreen Gil NP \par

## 2019-03-22 NOTE — HISTORY OF PRESENT ILLNESS
[de-identified] : 72 yo female with hx HTN, HLD, DM2, afib s/p ablation, ILR in place, SDH (a/c held) here for f/u. \par \par 3/21/19\par Pt was suffering from vomiting which has subsided. She still has occasional nausea. Denies all other GI symptoms at this time. She has not had any symptoms of vomiting for a month now. Overall is feeling well and denies any weight change.\par Has not had any testing done since last visit and refuses any invasive testing at this time. \par Colonoscopy done last in 2014, does not want colonoscopy at this time but is willing to do a stool test. \par \par Previous hx:\par Pt was suffering from vomiting and nausea, mainly nausea that she had for years. Prior vomiting episode was in November 2018 which occurred 3 times within a 3 week span. No hematochezia at that time. During this time was recommended to patient to get gastric emptying study and EGD.

## 2019-04-01 ENCOUNTER — OTHER (OUTPATIENT)
Age: 74
End: 2019-04-01

## 2019-05-08 ENCOUNTER — MOBILE ON CALL (OUTPATIENT)
Age: 74
End: 2019-05-08

## 2019-05-21 ENCOUNTER — APPOINTMENT (OUTPATIENT)
Dept: INTERNAL MEDICINE | Facility: CLINIC | Age: 74
End: 2019-05-21
Payer: MEDICARE

## 2019-05-21 VITALS
WEIGHT: 164 LBS | OXYGEN SATURATION: 100 % | HEART RATE: 98 BPM | DIASTOLIC BLOOD PRESSURE: 88 MMHG | BODY MASS INDEX: 32.2 KG/M2 | HEIGHT: 60 IN | SYSTOLIC BLOOD PRESSURE: 144 MMHG

## 2019-05-21 PROCEDURE — 99214 OFFICE O/P EST MOD 30 MIN: CPT | Mod: 25

## 2019-05-21 PROCEDURE — 36415 COLL VENOUS BLD VENIPUNCTURE: CPT

## 2019-05-23 ENCOUNTER — OTHER (OUTPATIENT)
Age: 74
End: 2019-05-23

## 2019-05-23 LAB
ALBUMIN SERPL ELPH-MCNC: 4.8 G/DL
ALP BLD-CCNC: 130 U/L
ALT SERPL-CCNC: 14 U/L
ANION GAP SERPL CALC-SCNC: 19 MMOL/L
AST SERPL-CCNC: 19 U/L
BILIRUB SERPL-MCNC: 0.3 MG/DL
BUN SERPL-MCNC: 11 MG/DL
CALCIUM SERPL-MCNC: 9.6 MG/DL
CHLORIDE SERPL-SCNC: 106 MMOL/L
CO2 SERPL-SCNC: 15 MMOL/L
CREAT SERPL-MCNC: 0.67 MG/DL
ESTIMATED AVERAGE GLUCOSE: 183 MG/DL
GLUCOSE SERPL-MCNC: 196 MG/DL
HBA1C MFR BLD HPLC: 8 %
POTASSIUM SERPL-SCNC: 4.2 MMOL/L
PROT SERPL-MCNC: 7.7 G/DL
SODIUM SERPL-SCNC: 140 MMOL/L

## 2019-05-29 ENCOUNTER — INPATIENT (INPATIENT)
Facility: HOSPITAL | Age: 74
LOS: 1 days | Discharge: HOME HEALTH | DRG: 313 | End: 2019-05-31
Attending: INTERNAL MEDICINE | Admitting: INTERNAL MEDICINE
Payer: MEDICARE

## 2019-05-29 ENCOUNTER — APPOINTMENT (OUTPATIENT)
Dept: HEART AND VASCULAR | Facility: CLINIC | Age: 74
End: 2019-05-29
Payer: MEDICARE

## 2019-05-29 VITALS
TEMPERATURE: 99 F | DIASTOLIC BLOOD PRESSURE: 78 MMHG | SYSTOLIC BLOOD PRESSURE: 128 MMHG | HEART RATE: 84 BPM | RESPIRATION RATE: 18 BRPM | OXYGEN SATURATION: 99 %

## 2019-05-29 VITALS
DIASTOLIC BLOOD PRESSURE: 68 MMHG | TEMPERATURE: 99.5 F | WEIGHT: 165.99 LBS | HEART RATE: 108 BPM | BODY MASS INDEX: 27.65 KG/M2 | OXYGEN SATURATION: 98 % | SYSTOLIC BLOOD PRESSURE: 130 MMHG | HEIGHT: 65 IN

## 2019-05-29 DIAGNOSIS — I62.01 NONTRAUMATIC ACUTE SUBDURAL HEMORRHAGE: Chronic | ICD-10-CM

## 2019-05-29 DIAGNOSIS — Z96.651 PRESENCE OF RIGHT ARTIFICIAL KNEE JOINT: Chronic | ICD-10-CM

## 2019-05-29 LAB
ALBUMIN SERPL ELPH-MCNC: 4.6 G/DL — SIGNIFICANT CHANGE UP (ref 3.3–5)
ALP SERPL-CCNC: 164 U/L — HIGH (ref 40–120)
ALT FLD-CCNC: 15 U/L — SIGNIFICANT CHANGE UP (ref 10–45)
ANION GAP SERPL CALC-SCNC: 15 MMOL/L — SIGNIFICANT CHANGE UP (ref 5–17)
AST SERPL-CCNC: 19 U/L — SIGNIFICANT CHANGE UP (ref 10–40)
BASOPHILS # BLD AUTO: 0.05 K/UL — SIGNIFICANT CHANGE UP (ref 0–0.2)
BASOPHILS NFR BLD AUTO: 0.8 % — SIGNIFICANT CHANGE UP (ref 0–2)
BILIRUB SERPL-MCNC: 0.4 MG/DL — SIGNIFICANT CHANGE UP (ref 0.2–1.2)
BUN SERPL-MCNC: 11 MG/DL — SIGNIFICANT CHANGE UP (ref 7–23)
CALCIUM SERPL-MCNC: 9.7 MG/DL — SIGNIFICANT CHANGE UP (ref 8.4–10.5)
CHLORIDE SERPL-SCNC: 102 MMOL/L — SIGNIFICANT CHANGE UP (ref 96–108)
CO2 SERPL-SCNC: 21 MMOL/L — LOW (ref 22–31)
CREAT SERPL-MCNC: 0.76 MG/DL — SIGNIFICANT CHANGE UP (ref 0.5–1.3)
EOSINOPHIL # BLD AUTO: 0.25 K/UL — SIGNIFICANT CHANGE UP (ref 0–0.5)
EOSINOPHIL NFR BLD AUTO: 3.8 % — SIGNIFICANT CHANGE UP (ref 0–6)
GLUCOSE SERPL-MCNC: 431 MG/DL — HIGH (ref 70–99)
HCT VFR BLD CALC: 32.5 % — LOW (ref 34.5–45)
HGB BLD-MCNC: 10.7 G/DL — LOW (ref 11.5–15.5)
IMM GRANULOCYTES NFR BLD AUTO: 0.8 % — SIGNIFICANT CHANGE UP (ref 0–1.5)
LYMPHOCYTES # BLD AUTO: 1.96 K/UL — SIGNIFICANT CHANGE UP (ref 1–3.3)
LYMPHOCYTES # BLD AUTO: 29.6 % — SIGNIFICANT CHANGE UP (ref 13–44)
MCHC RBC-ENTMCNC: 28.5 PG — SIGNIFICANT CHANGE UP (ref 27–34)
MCHC RBC-ENTMCNC: 32.9 GM/DL — SIGNIFICANT CHANGE UP (ref 32–36)
MCV RBC AUTO: 86.7 FL — SIGNIFICANT CHANGE UP (ref 80–100)
MONOCYTES # BLD AUTO: 0.46 K/UL — SIGNIFICANT CHANGE UP (ref 0–0.9)
MONOCYTES NFR BLD AUTO: 6.9 % — SIGNIFICANT CHANGE UP (ref 2–14)
NEUTROPHILS # BLD AUTO: 3.86 K/UL — SIGNIFICANT CHANGE UP (ref 1.8–7.4)
NEUTROPHILS NFR BLD AUTO: 58.1 % — SIGNIFICANT CHANGE UP (ref 43–77)
NRBC # BLD: 0 /100 WBCS — SIGNIFICANT CHANGE UP (ref 0–0)
PLATELET # BLD AUTO: 244 K/UL — SIGNIFICANT CHANGE UP (ref 150–400)
POTASSIUM SERPL-MCNC: 4.1 MMOL/L — SIGNIFICANT CHANGE UP (ref 3.5–5.3)
POTASSIUM SERPL-SCNC: 4.1 MMOL/L — SIGNIFICANT CHANGE UP (ref 3.5–5.3)
PROT SERPL-MCNC: 7.4 G/DL — SIGNIFICANT CHANGE UP (ref 6–8.3)
RBC # BLD: 3.75 M/UL — LOW (ref 3.8–5.2)
RBC # FLD: 14.5 % — SIGNIFICANT CHANGE UP (ref 10.3–14.5)
SODIUM SERPL-SCNC: 138 MMOL/L — SIGNIFICANT CHANGE UP (ref 135–145)
TROPONIN T SERPL-MCNC: <0.01 NG/ML — SIGNIFICANT CHANGE UP (ref 0–0.01)
WBC # BLD: 6.63 K/UL — SIGNIFICANT CHANGE UP (ref 3.8–10.5)
WBC # FLD AUTO: 6.63 K/UL — SIGNIFICANT CHANGE UP (ref 3.8–10.5)

## 2019-05-29 PROCEDURE — 71046 X-RAY EXAM CHEST 2 VIEWS: CPT | Mod: 26

## 2019-05-29 PROCEDURE — 99215 OFFICE O/P EST HI 40 MIN: CPT

## 2019-05-29 PROCEDURE — 99285 EMERGENCY DEPT VISIT HI MDM: CPT | Mod: 25

## 2019-05-29 PROCEDURE — 93000 ELECTROCARDIOGRAM COMPLETE: CPT

## 2019-05-29 RX ORDER — ZOLPIDEM TARTRATE 10 MG/1
5 TABLET ORAL AT BEDTIME
Refills: 0 | Status: DISCONTINUED | OUTPATIENT
Start: 2019-05-29 | End: 2019-05-30

## 2019-05-29 RX ORDER — DEXTROSE 50 % IN WATER 50 %
12.5 SYRINGE (ML) INTRAVENOUS ONCE
Refills: 0 | Status: DISCONTINUED | OUTPATIENT
Start: 2019-05-29 | End: 2019-05-31

## 2019-05-29 RX ORDER — INSULIN GLARGINE 100 [IU]/ML
10 INJECTION, SOLUTION SUBCUTANEOUS EVERY MORNING
Refills: 0 | Status: DISCONTINUED | OUTPATIENT
Start: 2019-05-29 | End: 2019-05-30

## 2019-05-29 RX ORDER — LISINOPRIL 2.5 MG/1
20 TABLET ORAL DAILY
Refills: 0 | Status: DISCONTINUED | OUTPATIENT
Start: 2019-05-29 | End: 2019-05-31

## 2019-05-29 RX ORDER — INSULIN LISPRO 100/ML
VIAL (ML) SUBCUTANEOUS
Refills: 0 | Status: DISCONTINUED | OUTPATIENT
Start: 2019-05-29 | End: 2019-05-31

## 2019-05-29 RX ORDER — DEXTROSE 50 % IN WATER 50 %
25 SYRINGE (ML) INTRAVENOUS ONCE
Refills: 0 | Status: DISCONTINUED | OUTPATIENT
Start: 2019-05-29 | End: 2019-05-31

## 2019-05-29 RX ORDER — BUDESONIDE AND FORMOTEROL FUMARATE DIHYDRATE 160; 4.5 UG/1; UG/1
2 AEROSOL RESPIRATORY (INHALATION)
Refills: 0 | Status: DISCONTINUED | OUTPATIENT
Start: 2019-05-29 | End: 2019-05-31

## 2019-05-29 RX ORDER — GLUCAGON INJECTION, SOLUTION 0.5 MG/.1ML
1 INJECTION, SOLUTION SUBCUTANEOUS ONCE
Refills: 0 | Status: DISCONTINUED | OUTPATIENT
Start: 2019-05-29 | End: 2019-05-31

## 2019-05-29 RX ORDER — TIOTROPIUM BROMIDE 18 UG/1
1 CAPSULE ORAL; RESPIRATORY (INHALATION) DAILY
Refills: 0 | Status: DISCONTINUED | OUTPATIENT
Start: 2019-05-29 | End: 2019-05-31

## 2019-05-29 RX ORDER — DEXTROSE 50 % IN WATER 50 %
15 SYRINGE (ML) INTRAVENOUS ONCE
Refills: 0 | Status: DISCONTINUED | OUTPATIENT
Start: 2019-05-29 | End: 2019-05-31

## 2019-05-29 RX ORDER — GABAPENTIN 400 MG/1
300 CAPSULE ORAL AT BEDTIME
Refills: 0 | Status: DISCONTINUED | OUTPATIENT
Start: 2019-05-29 | End: 2019-05-31

## 2019-05-29 RX ORDER — ASPIRIN/CALCIUM CARB/MAGNESIUM 324 MG
325 TABLET ORAL ONCE
Refills: 0 | Status: COMPLETED | OUTPATIENT
Start: 2019-05-29 | End: 2019-05-29

## 2019-05-29 RX ORDER — ATORVASTATIN CALCIUM 80 MG/1
20 TABLET, FILM COATED ORAL AT BEDTIME
Refills: 0 | Status: DISCONTINUED | OUTPATIENT
Start: 2019-05-29 | End: 2019-05-31

## 2019-05-29 RX ORDER — INSULIN GLARGINE 100 [IU]/ML
25 INJECTION, SOLUTION SUBCUTANEOUS AT BEDTIME
Refills: 0 | Status: DISCONTINUED | OUTPATIENT
Start: 2019-05-29 | End: 2019-05-31

## 2019-05-29 RX ORDER — AMLODIPINE BESYLATE 2.5 MG/1
10 TABLET ORAL DAILY
Refills: 0 | Status: DISCONTINUED | OUTPATIENT
Start: 2019-05-29 | End: 2019-05-31

## 2019-05-29 RX ORDER — ALBUTEROL 90 UG/1
2 AEROSOL, METERED ORAL EVERY 6 HOURS
Refills: 0 | Status: DISCONTINUED | OUTPATIENT
Start: 2019-05-29 | End: 2019-05-31

## 2019-05-29 RX ORDER — ASPIRIN/CALCIUM CARB/MAGNESIUM 324 MG
81 TABLET ORAL DAILY
Refills: 0 | Status: DISCONTINUED | OUTPATIENT
Start: 2019-05-29 | End: 2019-05-31

## 2019-05-29 RX ORDER — SODIUM CHLORIDE 9 MG/ML
1000 INJECTION, SOLUTION INTRAVENOUS
Refills: 0 | Status: DISCONTINUED | OUTPATIENT
Start: 2019-05-29 | End: 2019-05-31

## 2019-05-29 RX ORDER — CHOLECALCIFEROL (VITAMIN D3) 125 MCG
400 CAPSULE ORAL DAILY
Refills: 0 | Status: DISCONTINUED | OUTPATIENT
Start: 2019-05-29 | End: 2019-05-31

## 2019-05-29 RX ADMIN — Medication 325 MILLIGRAM(S): at 20:43

## 2019-05-29 NOTE — ASSESSMENT
[FreeTextEntry1] : chest pain in an insulin dependant diabetic some atypical features will send to ED for troponin if negative will do ccta in am \par htn controlled\par afib not on ac very high risk for stroke will refer to eps for possible watchman device had loop recorder placed to evaluate for AF and need for watchman \par fu after testing

## 2019-05-29 NOTE — H&P ADULT - NSHPLABSRESULTS_GEN_ALL_CORE
Troponin T, Serum (05.29.19 @ 20:02)    Troponin T, Serum: <0.01: Reference interval for troponin T is </= 0.01 ng/mL which includes the  99th percentile of a healthy population. Troponin T results are not  interchangeable with troponin I results. ng/mL

## 2019-05-29 NOTE — H&P ADULT - NSICDXPASTSURGICALHX_GEN_ALL_CORE_FT
PAST SURGICAL HISTORY:  Acute subdural hematoma     History of knee replacement, total, right     Other postprocedural status S/P atrial septal defect closure, surgical    Status post tubal ligation S/P tubal ligation

## 2019-05-29 NOTE — H&P ADULT - ASSESSMENT
Patient is a 74 year old female with PMHx of ASD repair, PAF S/P RFA, S/p ILR, Subdural hematoma s/p [place] hole evacuation, not a candidate for oral AC, DM, HTN, HLD who presents to [facility] ED with complaints of chest pain. Patient is experiencing episodes of left chest wall pain which is worse when exertion and relieved by rest for the past few months. Patient is now getting pain at rest. She is also experiencing SOB when she walks. There is no orthopnea, pnd, dizziness, le edema, syncope. In ED 12 lead EKG reveals SR with inverted T waves in v5-v6. Troponin negative x1. She is being admitted to R/O ACS    Impression  1. Chest Pain, No ACS  2. H/o PAF S/P  RFA  3. H/O Subdural hematoma s/p [place] hole evacuation  4. HTN  5. HLD  6. DM  7. Abnormal EKG  8. H/O ASD Repair  9. Asthma    Recommendations  1. Serial Trops  2. CCTA in AM  3. Check 2D echo if not done as out patient  4. Check Labs  5. Check BNP  6. Hgb 10.7 repeat in AM  7. No AC 2/2 hx of subdural  8. Insulin Coverage  9. Not on BB likely 2/2 Asthma

## 2019-05-29 NOTE — H&P ADULT - NSICDXFAMILYHX_GEN_ALL_CORE_FT
FAMILY HISTORY:  Father  Still living? No  Family history of ischemic heart disease, Age at diagnosis: Age Unknown    Mother  Still living? No  Family history of hypertension, Age at diagnosis: Age Unknown

## 2019-05-29 NOTE — ED ADULT NURSE NOTE - NSIMPLEMENTINTERV_GEN_ALL_ED
Implemented All Fall Risk Interventions:  [place] to call system. Call bell, personal items and telephone within reach. Instruct patient to call for assistance. Room bathroom lighting operational. Non-slip footwear when patient is off stretcher. Physically safe environment: no spills, clutter or unnecessary equipment. Stretcher in lowest position, wheels locked, appropriate side rails in place. Provide visual cue, wrist band, yellow gown, etc. Monitor gait and stability. Monitor for mental status changes and reorient to person, place, and time. Review medications for side effects contributing to fall risk. Reinforce activity limits and safety measures with patient and family.

## 2019-05-29 NOTE — H&P ADULT - RS GEN PE MLT RESP DETAILS PC
no subcutaneous emphysema/no chest wall tenderness/airway patent/no rales/breath sounds equal/no rhonchi

## 2019-05-29 NOTE — ED PROVIDER NOTE - OBJECTIVE STATEMENT
74F pmh asthma, HTN, Afib s/p ablation, 1990's ASD s/p repair, CAD, DM2, depression, subdural hemorrhage p/w intermittent L sided cp, philip, cp on exertion, rad to neck & L arm for past month. Seen by her cardiologist today and sent to ED for eval. No n/v, no sob at rest. Last episode of CP was at arrival to ED, 5/10, resolved spontaneously. Seen by Dr. Allen today, sent to ed for eval ACS, trop, CCTA in am.

## 2019-05-29 NOTE — H&P ADULT - HISTORY OF PRESENT ILLNESS
Patient is a 74 year old female with PMHx of ASD repair, PAF S/P RFA, S/p ILR, Subdural hematoma s/p [place] hole evacuation, not a candidate for oral AC, DM, HTN, HLD who presents to [facility] ED with complaints of chest pain. Patient is experiencing episodes of left chest wall pain which is worse when exertion and relieved by rest for the past few months. Patient is now getting pain at rest. She is also experiencing SOB when she walks. There is no orthopnea, pnd, dizziness, le edema, syncope. In ED 12 lead EKG reveals SR with inverted T waves in v5-v6. Troponin negative x1. She is being admitted to R/O ACS

## 2019-05-29 NOTE — PROCEDURE
[Lexiscan] : Lexiscan [Tc-99m, sestamibi-Cardiolite, to 40mCi, dose-Radionuclides-Gatheredtable code--v.1-Active-Trade] : Tc-99m, sestamibi-Cardiolite, to 40mCi, dose-Radionuclides-Gatheredtable code--v.1-Active-Trade [Normal] : no significant [de-identified] : Dr. Willy Santos     Performing Provider: Dr. Willy Santos [de-identified] : CP, DM, HTN, Abnormal ECG.     Gender: Female [de-identified] : Khoi Sun [de-identified] : Given via the IV route.    1 Day Protocol. [de-identified] : Given via the IV route [de-identified] : 9.5 [de-identified] : 28.7 [de-identified] : 61 [de-identified] : 69 [de-identified] : 130/70 [FreeTextEntry1] : image quality is good\par resting ef 66%\par post stress ef 69%\par prior study NA\par artifacts None\par blood pressure remained stable at 120/70 mmHg heart rate increased to 103 bpm

## 2019-05-29 NOTE — ED PROVIDER NOTE - DIAGNOSTIC INTERPRETATION
Chest x-ray interpreted by ER Physician Dr. Ramon  Findings: heart size normal, no infiltrates, lungs fully expanded, no e/o pna. Discussed w/ Rads, agrees likely atelectasis on R Lower lobe.

## 2019-05-29 NOTE — H&P ADULT - NSICDXPASTMEDICALHX_GEN_ALL_CORE_FT
PAST MEDICAL HISTORY:  Asthma Asthma    Atherosclerosis of coronary artery NOn obstructive    Atrial fibrillation s/p ablation in 2013    Depression     Essential hypertension HTN (hypertension)    Hyperlipidemia Hyperlipidemia    Persistent ostium secundum s/p repair (1989)    Subdural hemorrhage s/p bakari hole (2013)    Type 2 diabetes mellitus DM (diabetes mellitus)

## 2019-05-29 NOTE — HISTORY OF PRESENT ILLNESS
[FreeTextEntry1] : 73 f with chest pain history of asd repaired surgically IDDM atrial fibrillation s/p ablation by dr nia iverson ac due to spontaneous SDH Loop recorder per patient cp started a month ago left sided substernal comes and go worse when she walks feels like someone is sticking something in her chest lasts five minutes worse when she takes a breath in will occur at rest and with exertion \par \par ecg sb septal infarct old no change from 2017\par normal nuclear stress test 5/22/2018\par

## 2019-05-29 NOTE — H&P ADULT - MUSCULOSKELETAL
detailed exam no joint swelling/no joint erythema/no joint warmth/no calf tenderness/ROM intact/normal strength

## 2019-05-29 NOTE — ED PROVIDER NOTE - CLINICAL SUMMARY MEDICAL DECISION MAKING FREE TEXT BOX
74F pmh asthma, HTN, Afib s/p ablation, 1990's ASD s/p repair, CAD, DM2, depression, subdural hemorrhage p/w intermittent L sided cp, philip, cp on exertion, rad to neck & L arm for past month. Seen by her cardiologist today and sent to ED for eval. No n/v, no sob at rest. Last episode of CP was at arrival to ED, 5/10, resolved spontaneously. Exam w/o acute findings. EKG notes lateral TWI V5/V6 new compared w/ prior EKG. Seen by Dr. Allen today, sent to ed for eval ACS, trop, CCTA in am. 74F pmh asthma, HTN, Afib s/p ablation, 1990's ASD s/p repair, CAD, DM2, depression, subdural hemorrhage p/w intermittent L sided cp, philip, cp on exertion, rad to neck & L arm for past month. Seen by her cardiologist today and sent to ED for eval. No n/v, no sob at rest. Last episode of CP was at arrival to ED, 5/10, resolved spontaneously. Exam w/o acute findings. EKG notes lateral TWI V5/V6 new compared w/ prior EKG. Seen by Dr. Allen today, sent to ed for eval ACS, trop, CCTA in am. Discussed w/ Trey, recs admit. Given is leonidas's outpt, he recs admit under her name.

## 2019-05-29 NOTE — ED ADULT NURSE NOTE - CHPI ED NUR SYMPTOMS NEG
no vomiting/no congestion/no dizziness/no fever/no shortness of breath/no nausea/no syncope/no chills/no diaphoresis/no back pain

## 2019-05-29 NOTE — PHYSICAL EXAM
[General Appearance - Well Developed] : well developed [Normal Appearance] : normal appearance [Well Groomed] : well groomed [General Appearance - Well Nourished] : well nourished [No Deformities] : no deformities [General Appearance - In No Acute Distress] : no acute distress [Normal Conjunctiva] : the conjunctiva exhibited no abnormalities [Eyelids - No Xanthelasma] : the eyelids demonstrated no xanthelasmas [Normal Oral Mucosa] : normal oral mucosa [No Oral Pallor] : no oral pallor [No Oral Cyanosis] : no oral cyanosis [Normal Jugular Venous A Waves Present] : normal jugular venous A waves present [Normal Jugular Venous V Waves Present] : normal jugular venous V waves present [No Jugular Venous Marinelli A Waves] : no jugular venous marinelli A waves [Respiration, Rhythm And Depth] : normal respiratory rhythm and effort [Exaggerated Use Of Accessory Muscles For Inspiration] : no accessory muscle use [Auscultation Breath Sounds / Voice Sounds] : lungs were clear to auscultation bilaterally [Heart Rate And Rhythm] : heart rate and rhythm were normal [Heart Sounds] : normal S1 and S2 [Murmurs] : no murmurs present [Abdomen Soft] : soft [Abdomen Tenderness] : non-tender [Abdomen Mass (___ Cm)] : no abdominal mass palpated [Abnormal Walk] : normal gait [Gait - Sufficient For Exercise Testing] : the gait was sufficient for exercise testing [Nail Clubbing] : no clubbing of the fingernails [Cyanosis, Localized] : no localized cyanosis [Petechial Hemorrhages (___cm)] : no petechial hemorrhages [] : no rash [No Venous Stasis] : no venous stasis [Skin Color & Pigmentation] : normal skin color and pigmentation [Skin Lesions] : no skin lesions [No Skin Ulcers] : no skin ulcer [No Xanthoma] : no  xanthoma was observed [Oriented To Time, Place, And Person] : oriented to person, place, and time [Affect] : the affect was normal [Mood] : the mood was normal [No Anxiety] : not feeling anxious

## 2019-05-29 NOTE — ED ADULT TRIAGE NOTE - ARRIVAL INFO ADDITIONAL COMMENTS
pt c.o chest pain for "several months". as per ems, fs was "high" on glucometer. fs in triage 427 mg/dl. pt denies any chest pain at this time, denies any sob, cough, fever/chills, dizziness. states she takes lantus for DM.

## 2019-05-29 NOTE — H&P ADULT - GASTROINTESTINAL DETAILS
bowel sounds normal/soft/nontender/no distention/no guarding/no organomegaly/no masses palpable/no bruit/no rebound tenderness/no rigidity

## 2019-05-30 DIAGNOSIS — E11.9 TYPE 2 DIABETES MELLITUS WITHOUT COMPLICATIONS: ICD-10-CM

## 2019-05-30 DIAGNOSIS — E78.5 HYPERLIPIDEMIA, UNSPECIFIED: ICD-10-CM

## 2019-05-30 DIAGNOSIS — I10 ESSENTIAL (PRIMARY) HYPERTENSION: ICD-10-CM

## 2019-05-30 DIAGNOSIS — R07.9 CHEST PAIN, UNSPECIFIED: ICD-10-CM

## 2019-05-30 DIAGNOSIS — I48.91 UNSPECIFIED ATRIAL FIBRILLATION: ICD-10-CM

## 2019-05-30 DIAGNOSIS — J45.909 UNSPECIFIED ASTHMA, UNCOMPLICATED: ICD-10-CM

## 2019-05-30 LAB
ANION GAP SERPL CALC-SCNC: 14 MMOL/L — SIGNIFICANT CHANGE UP (ref 5–17)
BUN SERPL-MCNC: 13 MG/DL — SIGNIFICANT CHANGE UP (ref 7–23)
CALCIUM SERPL-MCNC: 9 MG/DL — SIGNIFICANT CHANGE UP (ref 8.4–10.5)
CHLORIDE SERPL-SCNC: 104 MMOL/L — SIGNIFICANT CHANGE UP (ref 96–108)
CHOLEST SERPL-MCNC: 132 MG/DL — SIGNIFICANT CHANGE UP (ref 10–199)
CO2 SERPL-SCNC: 21 MMOL/L — LOW (ref 22–31)
CREAT SERPL-MCNC: 0.83 MG/DL — SIGNIFICANT CHANGE UP (ref 0.5–1.3)
CRP SERPL-MCNC: 0.48 MG/DL — HIGH (ref 0–0.4)
ERYTHROCYTE [SEDIMENTATION RATE] IN BLOOD: 5 MM/HR — SIGNIFICANT CHANGE UP
GLUCOSE BLDC GLUCOMTR-MCNC: 243 MG/DL — HIGH (ref 70–99)
GLUCOSE BLDC GLUCOMTR-MCNC: 300 MG/DL — HIGH (ref 70–99)
GLUCOSE BLDC GLUCOMTR-MCNC: 310 MG/DL — HIGH (ref 70–99)
GLUCOSE BLDC GLUCOMTR-MCNC: 318 MG/DL — HIGH (ref 70–99)
GLUCOSE BLDC GLUCOMTR-MCNC: 323 MG/DL — HIGH (ref 70–99)
GLUCOSE BLDC GLUCOMTR-MCNC: 346 MG/DL — HIGH (ref 70–99)
GLUCOSE SERPL-MCNC: 356 MG/DL — HIGH (ref 70–99)
HBA1C BLD-MCNC: 8.6 % — HIGH (ref 4–5.6)
HCT VFR BLD CALC: 30.9 % — LOW (ref 34.5–45)
HDLC SERPL-MCNC: 77 MG/DL — SIGNIFICANT CHANGE UP
HGB BLD-MCNC: 10.1 G/DL — LOW (ref 11.5–15.5)
LIPID PNL WITH DIRECT LDL SERPL: 40 MG/DL — SIGNIFICANT CHANGE UP
MCHC RBC-ENTMCNC: 28.9 PG — SIGNIFICANT CHANGE UP (ref 27–34)
MCHC RBC-ENTMCNC: 32.7 GM/DL — SIGNIFICANT CHANGE UP (ref 32–36)
MCV RBC AUTO: 88.3 FL — SIGNIFICANT CHANGE UP (ref 80–100)
NRBC # BLD: 0 /100 WBCS — SIGNIFICANT CHANGE UP (ref 0–0)
NT-PROBNP SERPL-SCNC: 94 PG/ML — SIGNIFICANT CHANGE UP (ref 0–300)
PLATELET # BLD AUTO: 206 K/UL — SIGNIFICANT CHANGE UP (ref 150–400)
POTASSIUM SERPL-MCNC: 4.1 MMOL/L — SIGNIFICANT CHANGE UP (ref 3.5–5.3)
POTASSIUM SERPL-SCNC: 4.1 MMOL/L — SIGNIFICANT CHANGE UP (ref 3.5–5.3)
RBC # BLD: 3.5 M/UL — LOW (ref 3.8–5.2)
RBC # FLD: 14.6 % — HIGH (ref 10.3–14.5)
SODIUM SERPL-SCNC: 139 MMOL/L — SIGNIFICANT CHANGE UP (ref 135–145)
TOTAL CHOLESTEROL/HDL RATIO MEASUREMENT: 1.7 RATIO — LOW (ref 3.3–7.1)
TRIGL SERPL-MCNC: 74 MG/DL — SIGNIFICANT CHANGE UP (ref 10–149)
TROPONIN T SERPL-MCNC: <0.01 NG/ML — SIGNIFICANT CHANGE UP (ref 0–0.01)
TSH SERPL-MCNC: 1.26 UIU/ML — SIGNIFICANT CHANGE UP (ref 0.35–4.94)
WBC # BLD: 5.96 K/UL — SIGNIFICANT CHANGE UP (ref 3.8–10.5)
WBC # FLD AUTO: 5.96 K/UL — SIGNIFICANT CHANGE UP (ref 3.8–10.5)

## 2019-05-30 PROCEDURE — 93016 CV STRESS TEST SUPVJ ONLY: CPT

## 2019-05-30 PROCEDURE — 93018 CV STRESS TEST I&R ONLY: CPT

## 2019-05-30 PROCEDURE — 93306 TTE W/DOPPLER COMPLETE: CPT | Mod: 26

## 2019-05-30 PROCEDURE — 78452 HT MUSCLE IMAGE SPECT MULT: CPT | Mod: 26

## 2019-05-30 PROCEDURE — 99222 1ST HOSP IP/OBS MODERATE 55: CPT

## 2019-05-30 PROCEDURE — 75571 CT HRT W/O DYE W/CA TEST: CPT | Mod: 26

## 2019-05-30 RX ORDER — ZALEPLON 10 MG
5 CAPSULE ORAL AT BEDTIME
Refills: 0 | Status: DISCONTINUED | OUTPATIENT
Start: 2019-05-30 | End: 2019-05-31

## 2019-05-30 RX ORDER — INSULIN LISPRO 100/ML
7 VIAL (ML) SUBCUTANEOUS
Refills: 0 | Status: DISCONTINUED | OUTPATIENT
Start: 2019-05-30 | End: 2019-05-31

## 2019-05-30 RX ADMIN — BUDESONIDE AND FORMOTEROL FUMARATE DIHYDRATE 2 PUFF(S): 160; 4.5 AEROSOL RESPIRATORY (INHALATION) at 17:03

## 2019-05-30 RX ADMIN — LISINOPRIL 20 MILLIGRAM(S): 2.5 TABLET ORAL at 05:24

## 2019-05-30 RX ADMIN — Medication 4: at 06:52

## 2019-05-30 RX ADMIN — INSULIN GLARGINE 25 UNIT(S): 100 INJECTION, SOLUTION SUBCUTANEOUS at 22:36

## 2019-05-30 RX ADMIN — Medication 81 MILLIGRAM(S): at 17:03

## 2019-05-30 RX ADMIN — Medication 4: at 11:55

## 2019-05-30 RX ADMIN — TIOTROPIUM BROMIDE 1 CAPSULE(S): 18 CAPSULE ORAL; RESPIRATORY (INHALATION) at 17:03

## 2019-05-30 RX ADMIN — Medication 7 UNIT(S): at 17:25

## 2019-05-30 RX ADMIN — Medication 2: at 17:03

## 2019-05-30 RX ADMIN — Medication 5 MILLIGRAM(S): at 22:35

## 2019-05-30 RX ADMIN — ATORVASTATIN CALCIUM 20 MILLIGRAM(S): 80 TABLET, FILM COATED ORAL at 22:35

## 2019-05-30 RX ADMIN — Medication 1 TABLET(S): at 17:03

## 2019-05-30 RX ADMIN — Medication 400 UNIT(S): at 17:03

## 2019-05-30 RX ADMIN — INSULIN GLARGINE 25 UNIT(S): 100 INJECTION, SOLUTION SUBCUTANEOUS at 01:06

## 2019-05-30 RX ADMIN — Medication 4: at 22:36

## 2019-05-30 RX ADMIN — AMLODIPINE BESYLATE 10 MILLIGRAM(S): 2.5 TABLET ORAL at 05:24

## 2019-05-30 RX ADMIN — GABAPENTIN 300 MILLIGRAM(S): 400 CAPSULE ORAL at 22:35

## 2019-05-30 NOTE — PROGRESS NOTE ADULT - ASSESSMENT
Patient is a 74 year old female with PMHx of ASD repair, PAF S/P RFA, S/p ILR, Subdural hematoma s/p [place] hole evacuation, not a candidate for oral AC, DM, HTN, HLD who presents to [facility] ED with complaints of chest pain. Patient is experiencing episodes of left chest wall pain which is worse when exertion and relieved by rest for the past few months. Patient is now getting pain at rest. She is also experiencing SOB when she walks. There is no orthopnea, pnd, dizziness, le edema, syncope. In ED 12 lead EKG reveals SR with inverted T waves in v5-v6. Troponin negative x1. She is being admitted to R/O ACS

## 2019-05-30 NOTE — PROGRESS NOTE ADULT - PROBLEM SELECTOR PLAN 6
-On Tiotropium 18mcg, Albuterol PRN and Budesonide/Fermoterol 160/4.5mcg BID.    -DVT:  ambulatory\  -Diet: DASH    Dispo:  likely D/C in tomorrow is stable.

## 2019-05-30 NOTE — PROGRESS NOTE ADULT - PROBLEM SELECTOR PLAN 5
-HgAIc >8., BG >300  -Dr. Ocasio consulted. Started on Lispro 7 units TID with meals.  -Continue Lantus 25 units SQ QHS and FS qid.

## 2019-05-30 NOTE — PROGRESS NOTE ADULT - SUBJECTIVE AND OBJECTIVE BOX
Interventional Cardiology PA Adult Progress Note    Subjective Assessment: Pt seen, examined, found NAD, HD stable and VSS. Pt denies CP, SOB or palpitations at this time    Neg 12 point ROS  	  MEDICATIONS:  amLODIPine   Tablet 10 milliGRAM(s) Oral daily  lisinopril 20 milliGRAM(s) Oral daily  ALBUTerol    90 MICROgram(s) HFA Inhaler 2 Puff(s) Inhalation every 6 hours PRN  buDESOnide 160 MICROgram(s)/formoterol 4.5 MICROgram(s) Inhaler 2 Puff(s) Inhalation two times a day  tiotropium 18 MICROgram(s) Capsule 1 Capsule(s) Inhalation daily  gabapentin 300 milliGRAM(s) Oral at bedtime  zaleplon 5 milliGRAM(s) Oral at bedtime PRN  atorvastatin 20 milliGRAM(s) Oral at bedtime  dextrose 40% Gel 15 Gram(s) Oral once PRN  dextrose 50% Injectable 12.5 Gram(s) IV Push once  dextrose 50% Injectable 25 Gram(s) IV Push once  dextrose 50% Injectable 25 Gram(s) IV Push once  glucagon  Injectable 1 milliGRAM(s) IntraMuscular once PRN  insulin glargine Injectable (LANTUS) 25 Unit(s) SubCutaneous at bedtime  insulin lispro (HumaLOG) corrective regimen sliding scale   SubCutaneous Before meals and at bedtime  insulin lispro Injectable (HumaLOG) 7 Unit(s) SubCutaneous three times a day with meals    aspirin enteric coated 81 milliGRAM(s) Oral daily  cholecalciferol 400 Unit(s) Oral daily  dextrose 5%. 1000 milliLiter(s) IV Continuous <Continuous>  multivitamin 1 Tablet(s) Oral daily  	    [PHYSICAL EXAM:  TELEMETRY:  T(C): 36.8 (05-30-19 @ 12:50), Max: 37.2 (05-29-19 @ 19:14)  HR: 88 (05-30-19 @ 17:24) (56 - 94)  BP: 136/67 (05-30-19 @ 17:24) (119/80 - 154/73)  RR: 16 (05-30-19 @ 17:24) (14 - 18)  SpO2: 96% (05-30-19 @ 11:47) (96% - 99%)  Wt(kg): --  I&O's Summary    29 May 2019 07:01  -  30 May 2019 07:00  --------------------------------------------------------  IN: 150 mL / OUT: 300 mL / NET: -150 mL      Height (cm): 170.18 (05-29 @ 23:48)  Weight (kg): 76.9 (05-29 @ 23:48)  BMI (kg/m2): 26.6 (05-29 @ 23:48)  BSA (m2): 1.88 (05-29 @ 23:48)  [place]:  Central/PICC/Mid Line:                                         Appearance: Normal	  Neck: Supple, - JVD;- Carotid Bruit   Cardiovascular: S1S2,  RRR  Respiratory: CTA B/L, no RRW B/L   Gastrointestinal:  Soft, Non-tender, + BS	  Skin: No rashes, No ecchymoses, No cyanosis  Extremities: Normal range of motion, No clubbing, cyanosis or edema  Vascular: Peripheral pulses palpable 2+ bilaterally  Neurologic: Non-focal  Psychiatry: A & O x 3, Mood & affect appropriate    LABS:	 	  CARDIAC MARKERS:                        10.1   5.96  )-----------( 206      ( 30 May 2019 07:32 )             30.9     05-30    139  |  104  |  13  ----------------------------<  356<H>  4.1   |  21<L>  |  0.83    Ca    9.0      30 May 2019 07:32    TPro  7.4  /  Alb  4.6  /  TBili  0.4  /  DBili  x   /  AST  19  /  ALT  15  /  AlkPhos  164<H>  05-29    proBNP: Serum Pro-Brain Natriuretic Peptide: 94 pg/mL (05-30 @ 07:32)    Lipid Profile:   HgA1c: Hemoglobin A1C, Whole Blood: 8.6 % (05-30 @ 07:32)    TSH: Thyroid Stimulating Hormone, Serum: 1.256 uIU/mL (05-30 @ 07:32)        ASSESSMENT/PLAN: 	        DVT ppx:  Dispo:

## 2019-05-30 NOTE — PROGRESS NOTE ADULT - PROBLEM SELECTOR PLAN 1
R/O ACS with negative CE. CP free  -ECHO 5/30/19:  Mild AV thickening,  EF 55-60%.   -NST 5/30/19:  Myocardial perfusion imaging is normal. Overall left ventricular systolic function is normal without regional wall motion abnormalities, EF 63%.   The EKG stress test is normal.  -CCTA cancelled, unable to control HR with Metoprolol ('s), due to history of Asthma.  -Continue current cardiac medication ASA 81mg daily, Lisinopril 20mg daily, Norvasc 10mg daily and Lipitor 20mg daily

## 2019-05-30 NOTE — CONSULT NOTE ADULT - SUBJECTIVE AND OBJECTIVE BOX
HPI: 74yFemale    PMH & Surgical Hx:CHEST PAIN  Complex Care  Family history of hypertension (Mother)  Family history of ischemic heart disease (Father)  Handoff  MEWS Score  Epigastric pain  Back pain  Depression  Asthma  Type 2 diabetes mellitus  Hyperlipidemia  Essential hypertension  Atrial fibrillation  Atherosclerosis of coronary artery  Subdural hemorrhage  Persistent ostium secundum  Chest pain, unspecified, other  Asthma  Type 2 diabetes mellitus  Hyperlipidemia  Essential hypertension  Atrial fibrillation  Chest pain, unspecified, other  Acute subdural hematoma  History of knee replacement, total, right  Other postprocedural status  Status post tubal ligation  CHEST PAIN  90+      FH:  DM:  Thyroid:  Autoimmune:  Other:    SH:  Smoking:  Etoh:  Recreational Drugs:  Social Life:    Current Meds:  ALBUTerol    90 MICROgram(s) HFA Inhaler 2 Puff(s) Inhalation every 6 hours PRN  amLODIPine   Tablet 10 milliGRAM(s) Oral daily  aspirin enteric coated 81 milliGRAM(s) Oral daily  atorvastatin 20 milliGRAM(s) Oral at bedtime  buDESOnide 160 MICROgram(s)/formoterol 4.5 MICROgram(s) Inhaler 2 Puff(s) Inhalation two times a day  cholecalciferol 400 Unit(s) Oral daily  dextrose 40% Gel 15 Gram(s) Oral once PRN  dextrose 5%. 1000 milliLiter(s) IV Continuous <Continuous>  dextrose 50% Injectable 12.5 Gram(s) IV Push once  dextrose 50% Injectable 25 Gram(s) IV Push once  dextrose 50% Injectable 25 Gram(s) IV Push once  gabapentin 300 milliGRAM(s) Oral at bedtime  glucagon  Injectable 1 milliGRAM(s) IntraMuscular once PRN  insulin glargine Injectable (LANTUS) 25 Unit(s) SubCutaneous at bedtime  insulin lispro (HumaLOG) corrective regimen sliding scale   SubCutaneous Before meals and at bedtime  insulin lispro Injectable (HumaLOG) 7 Unit(s) SubCutaneous three times a day with meals  lisinopril 20 milliGRAM(s) Oral daily  multivitamin 1 Tablet(s) Oral daily  tiotropium 18 MICROgram(s) Capsule 1 Capsule(s) Inhalation daily  zaleplon 5 milliGRAM(s) Oral at bedtime PRN      Allergies:  adenosine (Unknown)      ROS:  Denies the following except as indicated.    General: weight loss/weight gain, decreased appetite, fatigue  Eyes: Blurry vision, double vision, visual changes  ENT: Throat pain, changes in voice,   CV: palpitations, SOB, CP, cough  GI: NVD, difficulty swallowing, abdominal pain  : polyuria, dysuria  Endo: abnormal menses, temperature intolerance, decreased libido  MSK: weakness, joint pain  Skin: rash, dryness, diaphoresis  Heme: Easy bruising,bleeding  Neuro: HA, dizziness, lightheadedness, numbness tingling  Psych: Anxiety, Depression    Vital Signs Last 24 Hrs  T(C): 36.7 (30 May 2019 22:01), Max: 37 (29 May 2019 23:37)  T(F): 98 (30 May 2019 22:01), Max: 98.6 (29 May 2019 23:37)  HR: 88 (30 May 2019 17:24) (56 - 94)  BP: 136/67 (30 May 2019 17:24) (119/80 - 154/73)  BP(mean): 93 (29 May 2019 23:37) (93 - 93)  RR: 16 (30 May 2019 17:24) (14 - 18)  SpO2: 96% (30 May 2019 11:47) (96% - 98%)  Height (cm): 170.18 (05-29 @ 23:48)  Weight (kg): 76.9 (05-29 @ 23:48)  BMI (kg/m2): 26.6 (05-29 @ 23:48)    Constitutional: wn/wd in NAD.   HEENT: NCAT, MMM, OP clear, EOMI, , no proptosis or lid retraction  Neck: no thyromegaly or palpable thyroid nodules   Respiratory: lungs CTAB.  Cardiovascular: regular rhythm, normal S1 and S2, no audible murmurs, no peripheral edema  GI: soft, NT/ND, no masses/HSM appreciated.  Neurology: no tremors, DTR 2+  Skin: no visible rashes/lesions  Psychiatric: AAO x 3, normal affect/mood.  Ext: radial pulses intact, DP pulses intact, extremities warm, no cyanosis, clubbing or edema.       LABS:                        10.1   5.96  )-----------( 206      ( 30 May 2019 07:32 )             30.9     05-30    139  |  104  |  13  ----------------------------<  356<H>  4.1   |  21<L>  |  0.83    Ca    9.0      30 May 2019 07:32    TPro  7.4  /  Alb  4.6  /  TBili  0.4  /  DBili  x   /  AST  19  /  ALT  15  /  AlkPhos  164<H>  05-29        Hemoglobin A1C, Whole Blood: 8.6 (05-30 @ 07:32)    Thyroid Stimulating Hormone, Serum: 1.256 (05-30 @ 07:32)    Cholesterol, Serum: 132 mg/dL (05-30-19 @ 07:32)  HDL Cholesterol, Serum: 77 mg/dL (05-30-19 @ 07:32)  Triglycerides, Serum: 74 mg/dL (05-30-19 @ 07:32)  Direct LDL: 40 mg/dL (05-30-19 @ 07:32)      CAPILLARY BLOOD GLUCOSE      POCT Blood Glucose.: 300 mg/dL (30 May 2019 21:15)  POCT Blood Glucose.: 243 mg/dL (30 May 2019 16:52)  POCT Blood Glucose.: 310 mg/dL (30 May 2019 11:39)  POCT Blood Glucose.: 346 mg/dL (30 May 2019 06:46)  POCT Blood Glucose.: 318 mg/dL (30 May 2019 00:07)      Echocardiogram:   EXAM:  ECHOCARDIOGRAM (CARDIOL)                          PROCEDURE DATE:  05/30/2019          INTERPRETATION:  Patient Height: 170.0 cm  Patient Weight: 76.0 kg  Heart Rate: 60 bpm  Systolic Pressure: 165 mmHg  Diastolic Pressure: 74 mmHg  BSA: 1.9m^2  Interpretation Summary  The left atrial size is normal. Right atrial size is normal.There is mild   aortic valve thickening. No aortic regurgitation noted. No   hemodynamically   significant valvular aortic stenosis.  There is mild mitral valve   thickening.   There is trace mitral regurgitation.Structurally normal tricuspid valve.   There   is trace to mild tricuspid regurgitation.  The pulmonary artery systolic   pressure is estimated to be 34 mmHg.The pulmonic valve is not well   visualized.   There is trace pulmonic regurgitation.  The right ventricle is normal in   size   and function.There is mild concentric left ventricular hypertrophy. Poor   endocardial delineation - Probably normal left ventricular wall motion.   The   left ventricular ejection fraction is estimated to be 50-55%  No aortic   root   dilatation.There is no pericardial effusion.  Procedure Details  A complete two-dimensional transthoracic echocardiogram was performed (2D,  M-mode, spectral and color flow doppler).  Study Quality: Fair.  Left Ventricle  There is mild concentric left ventricular hypertrophy.  Probably normal left ventricular wall motion.  The left ventricular ejection fraction is estimated to be 50-55%  Left Atrium  The left atrial size is normal.  Right Atrium  Right atrial size is normal.  Right Ventricle  The right ventricle is normal in size and function.  Aortic Valve  There is mild aortic valve thickening.  No aortic regurgitation noted.  No hemodynamically significant valvular aortic stenosis.  Mitral Valve  There is mild mitral valve thickening.  There is trace mitral regurgitation.  Tricuspid Valve  Structurally normal tricuspid valve.  There is trace to mild tricuspid regurgitation.  The pulmonary artery systolic pressure is estimated to be 34 mmHg.  Pulmonic Valve  The pulmonic valve is not well visualized.  There is trace pulmonic regurgitation.  Arteries and Venous System  No aortic root dilatation.  The inferior vena cava is normal in size (<2.1 cm) with normal inspiratory  collapse (>50%) consistent with normal right atrial pressure.  Pericardium / Pleura  There is no pericardial effusion.  Interpreting Physician:Devi Aguero MD electronically signed on 30-   17:43:49                "Thank you for the opportunity to participate in the care of this   patient."        DEVI AGUERO   This document has been electronically signed. May 30 2019  5:43PM               (05-30-19 @ 15:54)

## 2019-05-30 NOTE — PROGRESS NOTE ADULT - PROBLEM SELECTOR PLAN 2
-history of pAF s/p RFA, now in NSR  -not on any BBlockers due to history of Asthma.  -History of Subdural Hematoma, s/p Spray hole, not a candidate for AC.

## 2019-05-31 ENCOUNTER — TRANSCRIPTION ENCOUNTER (OUTPATIENT)
Age: 74
End: 2019-05-31

## 2019-05-31 VITALS — TEMPERATURE: 98 F

## 2019-05-31 LAB
ANION GAP SERPL CALC-SCNC: 13 MMOL/L — SIGNIFICANT CHANGE UP (ref 5–17)
BUN SERPL-MCNC: 12 MG/DL — SIGNIFICANT CHANGE UP (ref 7–23)
CALCIUM SERPL-MCNC: 8.7 MG/DL — SIGNIFICANT CHANGE UP (ref 8.4–10.5)
CHLORIDE SERPL-SCNC: 104 MMOL/L — SIGNIFICANT CHANGE UP (ref 96–108)
CO2 SERPL-SCNC: 21 MMOL/L — LOW (ref 22–31)
CREAT SERPL-MCNC: 0.83 MG/DL — SIGNIFICANT CHANGE UP (ref 0.5–1.3)
GLUCOSE BLDC GLUCOMTR-MCNC: 292 MG/DL — HIGH (ref 70–99)
GLUCOSE BLDC GLUCOMTR-MCNC: 297 MG/DL — HIGH (ref 70–99)
GLUCOSE BLDC GLUCOMTR-MCNC: 329 MG/DL — HIGH (ref 70–99)
GLUCOSE BLDC GLUCOMTR-MCNC: 356 MG/DL — HIGH (ref 70–99)
GLUCOSE SERPL-MCNC: 322 MG/DL — HIGH (ref 70–99)
HCT VFR BLD CALC: 31.3 % — LOW (ref 34.5–45)
HGB BLD-MCNC: 10.4 G/DL — LOW (ref 11.5–15.5)
MAGNESIUM SERPL-MCNC: 1.6 MG/DL — SIGNIFICANT CHANGE UP (ref 1.6–2.6)
MCHC RBC-ENTMCNC: 28.9 PG — SIGNIFICANT CHANGE UP (ref 27–34)
MCHC RBC-ENTMCNC: 33.2 GM/DL — SIGNIFICANT CHANGE UP (ref 32–36)
MCV RBC AUTO: 86.9 FL — SIGNIFICANT CHANGE UP (ref 80–100)
NRBC # BLD: 0 /100 WBCS — SIGNIFICANT CHANGE UP (ref 0–0)
PLATELET # BLD AUTO: 218 K/UL — SIGNIFICANT CHANGE UP (ref 150–400)
POTASSIUM SERPL-MCNC: 4.1 MMOL/L — SIGNIFICANT CHANGE UP (ref 3.5–5.3)
POTASSIUM SERPL-SCNC: 4.1 MMOL/L — SIGNIFICANT CHANGE UP (ref 3.5–5.3)
RBC # BLD: 3.6 M/UL — LOW (ref 3.8–5.2)
RBC # FLD: 14.4 % — SIGNIFICANT CHANGE UP (ref 10.3–14.5)
SODIUM SERPL-SCNC: 138 MMOL/L — SIGNIFICANT CHANGE UP (ref 135–145)
WBC # BLD: 5.21 K/UL — SIGNIFICANT CHANGE UP (ref 3.8–10.5)
WBC # FLD AUTO: 5.21 K/UL — SIGNIFICANT CHANGE UP (ref 3.8–10.5)

## 2019-05-31 PROCEDURE — 83880 ASSAY OF NATRIURETIC PEPTIDE: CPT

## 2019-05-31 PROCEDURE — 93017 CV STRESS TEST TRACING ONLY: CPT

## 2019-05-31 PROCEDURE — 71046 X-RAY EXAM CHEST 2 VIEWS: CPT

## 2019-05-31 PROCEDURE — 84443 ASSAY THYROID STIM HORMONE: CPT

## 2019-05-31 PROCEDURE — 83735 ASSAY OF MAGNESIUM: CPT

## 2019-05-31 PROCEDURE — 80061 LIPID PANEL: CPT

## 2019-05-31 PROCEDURE — 99238 HOSP IP/OBS DSCHRG MGMT 30/<: CPT

## 2019-05-31 PROCEDURE — 80048 BASIC METABOLIC PNL TOTAL CA: CPT

## 2019-05-31 PROCEDURE — 86140 C-REACTIVE PROTEIN: CPT

## 2019-05-31 PROCEDURE — 93306 TTE W/DOPPLER COMPLETE: CPT

## 2019-05-31 PROCEDURE — 78452 HT MUSCLE IMAGE SPECT MULT: CPT

## 2019-05-31 PROCEDURE — A9500: CPT

## 2019-05-31 PROCEDURE — 99285 EMERGENCY DEPT VISIT HI MDM: CPT

## 2019-05-31 PROCEDURE — 75571 CT HRT W/O DYE W/CA TEST: CPT

## 2019-05-31 PROCEDURE — 85027 COMPLETE CBC AUTOMATED: CPT

## 2019-05-31 PROCEDURE — 82962 GLUCOSE BLOOD TEST: CPT

## 2019-05-31 PROCEDURE — 85025 COMPLETE CBC W/AUTO DIFF WBC: CPT

## 2019-05-31 PROCEDURE — A9505: CPT

## 2019-05-31 PROCEDURE — 80053 COMPREHEN METABOLIC PANEL: CPT

## 2019-05-31 PROCEDURE — 36415 COLL VENOUS BLD VENIPUNCTURE: CPT

## 2019-05-31 PROCEDURE — 83036 HEMOGLOBIN GLYCOSYLATED A1C: CPT

## 2019-05-31 PROCEDURE — 85652 RBC SED RATE AUTOMATED: CPT

## 2019-05-31 PROCEDURE — 84484 ASSAY OF TROPONIN QUANT: CPT

## 2019-05-31 PROCEDURE — 94640 AIRWAY INHALATION TREATMENT: CPT

## 2019-05-31 RX ORDER — INSULIN LISPRO 100/ML
6 VIAL (ML) SUBCUTANEOUS
Qty: 0 | Refills: 3 | DISCHARGE
Start: 2019-05-31 | End: 2019-09-27

## 2019-05-31 RX ORDER — MAGNESIUM OXIDE 400 MG ORAL TABLET 241.3 MG
400 TABLET ORAL ONCE
Refills: 0 | Status: COMPLETED | OUTPATIENT
Start: 2019-05-31 | End: 2019-05-31

## 2019-05-31 RX ORDER — EMPAGLIFLOZIN 10 MG/1
1 TABLET, FILM COATED ORAL
Qty: 30 | Refills: 3
Start: 2019-05-31 | End: 2019-09-27

## 2019-05-31 RX ORDER — INSULIN GLARGINE 100 [IU]/ML
22 INJECTION, SOLUTION SUBCUTANEOUS
Qty: 0 | Refills: 3 | DISCHARGE
Start: 2019-05-31 | End: 2019-09-27

## 2019-05-31 RX ORDER — METFORMIN HYDROCHLORIDE 850 MG/1
1 TABLET ORAL
Qty: 60 | Refills: 3
Start: 2019-05-31 | End: 2019-09-27

## 2019-05-31 RX ORDER — INSULIN GLARGINE 100 [IU]/ML
33 INJECTION, SOLUTION SUBCUTANEOUS
Qty: 2 | Refills: 3
Start: 2019-05-31 | End: 2019-09-27

## 2019-05-31 RX ORDER — INSULIN LISPRO 100/ML
12 VIAL (ML) SUBCUTANEOUS
Qty: 2 | Refills: 3
Start: 2019-05-31 | End: 2019-09-27

## 2019-05-31 RX ORDER — INSULIN LISPRO 100/ML
0 VIAL (ML) SUBCUTANEOUS
Qty: 0 | Refills: 0 | DISCHARGE

## 2019-05-31 RX ADMIN — Medication 7 UNIT(S): at 11:22

## 2019-05-31 RX ADMIN — LISINOPRIL 20 MILLIGRAM(S): 2.5 TABLET ORAL at 06:11

## 2019-05-31 RX ADMIN — Medication 5: at 07:45

## 2019-05-31 RX ADMIN — Medication 81 MILLIGRAM(S): at 11:18

## 2019-05-31 RX ADMIN — Medication 4: at 11:17

## 2019-05-31 RX ADMIN — AMLODIPINE BESYLATE 10 MILLIGRAM(S): 2.5 TABLET ORAL at 06:11

## 2019-05-31 RX ADMIN — MAGNESIUM OXIDE 400 MG ORAL TABLET 400 MILLIGRAM(S): 241.3 TABLET ORAL at 07:47

## 2019-05-31 RX ADMIN — Medication 7 UNIT(S): at 07:45

## 2019-05-31 RX ADMIN — Medication 1 TABLET(S): at 11:17

## 2019-05-31 RX ADMIN — Medication 400 UNIT(S): at 11:18

## 2019-05-31 RX ADMIN — BUDESONIDE AND FORMOTEROL FUMARATE DIHYDRATE 2 PUFF(S): 160; 4.5 AEROSOL RESPIRATORY (INHALATION) at 11:18

## 2019-05-31 RX ADMIN — Medication 3: at 16:42

## 2019-05-31 RX ADMIN — Medication 7 UNIT(S): at 16:42

## 2019-05-31 RX ADMIN — TIOTROPIUM BROMIDE 1 CAPSULE(S): 18 CAPSULE ORAL; RESPIRATORY (INHALATION) at 11:18

## 2019-05-31 NOTE — DISCHARGE NOTE PROVIDER - NSDCCPCAREPLAN_GEN_ALL_CORE_FT
PRINCIPAL DISCHARGE DIAGNOSIS  Diagnosis: Chest pain, unspecified, other  Assessment and Plan of Treatment:       SECONDARY DISCHARGE DIAGNOSES  Diagnosis: HTN (hypertension)  Assessment and Plan of Treatment:     Diagnosis: HLD (hyperlipidemia)  Assessment and Plan of Treatment:     Diagnosis: Diabetes mellitus with hyperglycemia  Assessment and Plan of Treatment: PRINCIPAL DISCHARGE DIAGNOSIS  Diagnosis: Chest pain, unspecified, other  Assessment and Plan of Treatment: You had chest pain and had a normal Stress Test and Echocardiogram. Please follow up with Dr Santos (general cardiologist) in 1-2 weeks. There was concern from your family that the chest pain may be caused by the LOOP RECORDER that was implanted at [facility] in 7/2018. Please follow up with Dr LAMB (heart electrical doctor) to see if it is worthwhile to remove it. You have an appointment on 9/11/19 at 11:15am. Please call his office at [phone] to reschedule if you want it sooner.      SECONDARY DISCHARGE DIAGNOSES  Diagnosis: HTN (hypertension)  Assessment and Plan of Treatment: Continue taking AMLODIPINE 10mg daily and LISINOPRIL 20mg daily    Diagnosis: HLD (hyperlipidemia)  Assessment and Plan of Treatment: CONTINUE taking ATORVASTATIN 20mg daily    Diagnosis: Diabetes mellitus with hyperglycemia  Assessment and Plan of Treatment: TAKE LANTUS 33 units at bedtime. Take LISPRO (SHORT ACTING INSULIN) 12 units three times a day with meals ONLY WHEN YOU EAT FOOD. CONTINUE taking METFORMIN 1000mg twice a day. START taking JARDIANCE 10mg daily. Follow up with DR BRANDI LOPEZ in 1-2 weeks.

## 2019-05-31 NOTE — DISCHARGE NOTE PROVIDER - HOSPITAL COURSE
Patient is a 74 year old female with PMHx of ASD repair, PAF S/P RFA, S/p ILR, Subdural hematoma s/p [place] hole evacuation, not a candidate for oral AC, DM, HTN, HLD who presents to [facility] ED with complaints of chest pain. Patient is experiencing episodes of left chest wall pain which is worse when exertion and relieved by rest for the past few months. Patient is now getting pain at rest. She is also experiencing SOB when she walks. There is no orthopnea, pnd, dizziness, le edema, syncope. In ED 12 lead EKG reveals SR with inverted T waves in v5-v6. Pt  r/o for ACS with negative cardiac enzymes x 3.  NST done showed Patient is a 74 year old female with PMHx of ASD repair, PAF S/P RFA, S/p ILR, Subdural hematoma s/p [place] hole evacuation, not a candidate for oral AC, DM, HTN, HLD who presents to [facility] ED with complaints of chest pain. Patient is experiencing episodes of left chest wall pain which is worse when exertion and relieved by rest for the past few months. Patient is now getting pain at rest. She is also experiencing SOB when she walks. There is no orthopnea, pnd, dizziness, le edema, syncope. In ED 12 lead EKG reveals SR with inverted T waves in v5-v6; however, tt  r/o for ACS with negative cardiac enzymes x 3.      ECHO 5/30/19:  Mild AV thickening,  EF 55-60%.  -NST 5/30/19:  Myocardial perfusion imaging is normal. Overall left ventricular systolic function is normal without regional wall motion abnormalities, EF 63%.     The EKG stress test is normal.  CCTA cancelled, unable to control HR with Metoprolol ('s), due to history of Asthma.  Continue current cardiac medication ASA 81mg daily, Lisinopril 20mg daily, Norvasc 10mg daily and Lipitor 20mg daily.      Pt with history of paroxysmal Atrial fibrillation, s/p RFA, now in NSR.  Not on any BBlockers due to history of Asthma.    -History of Subdural Hematoma, s/p [place] hole, not a candidate for AC.          Problem/Plan - 3:    ·  Problem: Essential hypertension.  Plan: BP stable, continue Norvasc and Lisinopril.          Problem/Plan - 4:    ·  Problem: Hyperlipidemia.  Plan: -Continue Lipitor 20mg daily.          Problem/Plan - 5:    ·  Problem: Type 2 diabetes mellitus.  Plan: -HgAIc >8., BG >300    -Dr. Ocasio consulted. Started on Lispro 7 units TID with meals.    -Continue Lantus 25 units SQ QHS and FS qid.          Problem/Plan - 6:    Problem: Asthma. Plan: -On Tiotropium 18mcg, Albuterol PRN and Budesonide/Fermoterol 160/4.5mcg BID.        -DVT:  ambulatory\    -Diet: DASH Patient is a 74 year old female with PMHx of ASD repair, PAF S/P RFA, S/p ILR, Subdural hematoma s/p [place] hole evacuation, not a candidate for oral AC, DM, HTN, HLD who presents to [facility] ED with complaints of chest pain. Patient is experiencing episodes of left chest wall pain which is worse when exertion and relieved by rest for the past few months. Patient is now getting pain at rest. She is also experiencing SOB when she walks. There is no orthopnea, pnd, dizziness, le edema, syncope. In ED 12 lead EKG reveals SR with inverted T waves in v5-v6; however, tt  r/o for ACS with negative cardiac enzymes x 3.      ECHO 5/30/19:  Mild AV thickening,  EF 55-60%.  -NST 5/30/19:  Myocardial perfusion imaging is normal. Overall left ventricular systolic function is normal without regional wall motion abnormalities, EF 63%.     The EKG stress test is normal.  CCTA cancelled, unable to control HR with Metoprolol ('s), due to history of Asthma.  Continue current cardiac medication ASA 81mg daily, Lisinopril 20mg daily, Norvasc 10mg daily and Lipitor 20mg daily.      Pt with history of paroxysmal Atrial fibrillation, s/p RFA, now in NSR.  Not on any BBlockers due to history of Asthma.  History of Subdural Hematoma, s/p [place] hole, not a candidate for AC.  Pt with history of Diabetes, now with Hyperglycemia: Blood cl 74 year old female with PMHx of ASD repair, PAF S/P RFA, S/p ILR, Subdural hematoma s/p [place] hole evacuation, not a candidate for oral AC, DM, HTN, HLD who presents to [facility] ED with complaints of chest pain. Patient is experiencing episodes of left chest wall pain which is worse when exertion and relieved by rest for the past few months. Patient is now getting pain at rest. She is also experiencing SOB when she walks. There is no orthopnea, pnd, dizziness, le edema, syncope. In ED 12 lead EKG reveals SR with inverted T waves in v5-v6; however, tt  r/o for ACS with negative cardiac enzymes x 3.  ECHO 5/30/19:  Mild AV thickening,  EF 55-60%.  -NST 5/30/19:  Myocardial perfusion imaging is normal. Overall left ventricular systolic function is normal without regional wall motion abnormalities, EF 63%. The EKG stress test is normal.  CCTA cancelled, unable to control HR with Metoprolol ('s), due to history of Asthma.  Continue current cardiac medication ASA 81mg daily, Lisinopril 20mg daily, Norvasc 10mg daily and Lipitor 20mg daily.  Pt with history of paroxysmal Atrial fibrillation, s/p RFA, now in NSR.  Not on any BBlockers due to history of Asthma.  History of Subdural Hematoma, s/p [place] hole, not a candidate for AC.  Pt is to be discharged on Lantus 33 units at bedtime and Lispro 12 units three times a day before meals. Pt to be discharged on Lantus 33 qhs and Lispro 12 units TID before meals. Restarting Metformin 1000mg BID and starting Jardiance 10mg daily. 74 year old female with PMHx of ASD repair, PAF S/P RFA, S/p ILR, Subdural hematoma s/p [place] hole evacuation, not a candidate for oral AC, DM, HTN, HLD who presents to [facility] ED with complaints of chest pain. Patient is experiencing episodes of left chest wall pain which is worse when exertion and relieved by rest for the past few months. Patient is now getting pain at rest. She is also experiencing SOB when she walks. There is no orthopnea, pnd, dizziness, le edema, syncope. In ED 12 lead EKG reveals SR with inverted T waves in v5-v6; however, tt  r/o for ACS with negative cardiac enzymes x 3.  ECHO 5/30/19:  Mild AV thickening,  EF 55-60%.  -NST 5/30/19:  Myocardial perfusion imaging is normal. Overall left ventricular systolic function is normal without regional wall motion abnormalities, EF 63%. The EKG stress test is normal.  CCTA cancelled, unable to control HR with Metoprolol ('s), due to history of Asthma.  Continue current cardiac medication ASA 81mg daily, Lisinopril 20mg daily, Norvasc 10mg daily and Lipitor 20mg daily.  Pt with history of paroxysmal Atrial fibrillation, s/p RFA, now in NSR.  Not on any BBlockers due to history of Asthma.  History of Subdural Hematoma, s/p [place] hole, not a candidate for AC.  Pt is to be discharged on Lantus 33 units at bedtime and Lispro 12 units three times a day before meals. Pt to be discharged on Lantus 33 qhs and Lispro 12 units TID before meals. Restarting Metformin 1000mg BID and starting Jardiance 10mg daily. Pt was admitted with chest pain of  unknown origin. Pt's daughter was concerned that the chest pain could be caused by pt's loop recorder. EP consulted and stated pt can follow up with Dr Downing for possible removal in the near future. No signs of infection of loop recorder site. Pt stable for discharge as per Dr Box

## 2019-05-31 NOTE — DISCHARGE NOTE PROVIDER - PROVIDER TOKENS
PROVIDER:[TOKEN:[[id number]]],PROVIDER:[TOKEN:[[id number]]],PROVIDER:[TOKEN:[[id number]]]

## 2019-05-31 NOTE — PROGRESS NOTE ADULT - ATTENDING COMMENTS
A/P 74yFemale with hx of DM presenting for management of CAD with continued hyperglycemia.     1.  DM: type 2, uncontrolled, complicated.   would continue 33 units lantus at bedtime 12 units lispro TID with meals.   Continue lispro moderate dose scale with meals and at bedtime.   Continue consistent carb diet  FSG Goal 100-180    2.  Hyperlipidemia - goal LDL < 70  at goal, continue statin therapy    3.  Obesity - outpatient medical management.      For discharge:   continue current insulin regimen and restart home metformin.  can also start jardiance 10mg daily  Pt is advised to follow up with me at discharge by calling .

## 2019-05-31 NOTE — DISCHARGE NOTE PROVIDER - CARE PROVIDERS DIRECT ADDRESSES
[email or web address]

## 2019-05-31 NOTE — DISCHARGE NOTE PROVIDER - CARE PROVIDER_API CALL
Willy Santos (MD)  Cardiology; Internal Medicine  [address] [address]  Phone: [phone]  Fax: [phone]  Follow Up Time:     Donavon Downing (MD)  Cardiac Electrophysiology; Cardiology; Cardiovascular Disease  [address], [address] [place] [address]  Phone: [phone]  Fax: [phone]  Follow Up Time:     Iris Ocasio; PhD)  Internal Medicine  [address], [facility] [address]  [place] [address]  Phone: [phone]  Fax: [phone]  Follow Up Time:

## 2019-05-31 NOTE — DISCHARGE NOTE NURSING/CASE MANAGEMENT/SOCIAL WORK - NSDCDPATPORTLINK_GEN_ALL_CORE
You can access the [email or web address] Patient Portal, offered by [facility], by registering with the following website: [email or web address]

## 2019-05-31 NOTE — PROGRESS NOTE ADULT - SUBJECTIVE AND OBJECTIVE BOX
INTERVAL HPI/OVERNIGHT EVENTS:    Patient is a 74y old  Female who presents with a chief complaint of Chest Pain (31 May 2019 12:31)  Pt seen at bedside today  no acute overnight events  insulin administration reviewed.   pt eating well  denies nausea, vomiting, abdominal pain, SOB, chest pain palpitations, urinary sx, headache, dizziness, subjective fever, chills      Pt reports the following symptoms:    CONSTITUTIONAL:  Negative fever or chills, feels well, good appetite  EYES:  Negative  blurry vision or double vision  CARDIOVASCULAR:  Negative for chest pain or palpitations  RESPIRATORY:  Negative for cough, wheezing, or SOB   GASTROINTESTINAL:  Negative for nausea, vomiting, diarrhea, constipation, or abdominal pain  GENITOURINARY:  Negative frequency, urgency or dysuria  NEUROLOGIC:  No headache, confusion, dizziness, lightheadedness    MEDICATIONS  (STANDING):  amLODIPine   Tablet 10 milliGRAM(s) Oral daily  aspirin enteric coated 81 milliGRAM(s) Oral daily  atorvastatin 20 milliGRAM(s) Oral at bedtime  buDESOnide 160 MICROgram(s)/formoterol 4.5 MICROgram(s) Inhaler 2 Puff(s) Inhalation two times a day  cholecalciferol 400 Unit(s) Oral daily  dextrose 5%. 1000 milliLiter(s) (50 mL/Hr) IV Continuous <Continuous>  dextrose 50% Injectable 12.5 Gram(s) IV Push once  dextrose 50% Injectable 25 Gram(s) IV Push once  dextrose 50% Injectable 25 Gram(s) IV Push once  gabapentin 300 milliGRAM(s) Oral at bedtime  insulin glargine Injectable (LANTUS) 25 Unit(s) SubCutaneous at bedtime  insulin lispro (HumaLOG) corrective regimen sliding scale   SubCutaneous Before meals and at bedtime  insulin lispro Injectable (HumaLOG) 7 Unit(s) SubCutaneous three times a day with meals  lisinopril 20 milliGRAM(s) Oral daily  multivitamin 1 Tablet(s) Oral daily  tiotropium 18 MICROgram(s) Capsule 1 Capsule(s) Inhalation daily    MEDICATIONS  (PRN):  ALBUTerol    90 MICROgram(s) HFA Inhaler 2 Puff(s) Inhalation every 6 hours PRN Bronchospasm  dextrose 40% Gel 15 Gram(s) Oral once PRN Blood Glucose LESS THAN 70 milliGRAM(s)/deciliter  glucagon  Injectable 1 milliGRAM(s) IntraMuscular once PRN Glucose LESS THAN 70 milligrams/deciliter  zaleplon 5 milliGRAM(s) Oral at bedtime PRN Insomnia      Past medical history reviewed  Family history reviewed  Social history reviewed    PHYSICAL EXAM  Vital Signs Last 24 Hrs  T(C): 36.9 (31 May 2019 10:25), Max: 37.3 (31 May 2019 06:13)  T(F): 98.5 (31 May 2019 10:25), Max: 99.1 (31 May 2019 06:13)  HR: 72 (31 May 2019 11:35) (62 - 88)  BP: 116/62 (31 May 2019 11:35) (115/74 - 136/67)  BP(mean): --  RR: 16 (31 May 2019 11:35) (16 - 16)  SpO2: 98% (31 May 2019 08:20) (98% - 98%)    Constitutional: wn/wd in NAD.   HEENT: NCAT, MMM, OP clear, EOMI, no proptosis or lid retraction  Neck: no thyromegaly or palpable thyroid nodules   Respiratory: lungs CTAB.  Cardiovascular: regular rhythm, normal S1 and S2, no audible murmurs, no peripheral edema  GI: soft, NT/ND, no masses/HSM appreciated.  Neurology: no tremors, DTR 2+  Skin: no visible rashes/lesions  Psychiatric: AAO x 3, normal affect/mood.    LABS:                        10.4   5.21  )-----------( 218      ( 31 May 2019 06:28 )             31.3     05-31    138  |  104  |  12  ----------------------------<  322<H>  4.1   |  21<L>  |  0.83    Ca    8.7      31 May 2019 06:28  Mg     1.6     05-31    TPro  7.4  /  Alb  4.6  /  TBili  0.4  /  DBili  x   /  AST  19  /  ALT  15  /  AlkPhos  164<H>  05-29        Thyroid Stimulating Hormone, Serum: 1.256 uIU/mL (05-30 @ 07:32)      HbA1C: 8.6 % (05-30 @ 07:32)    CAPILLARY BLOOD GLUCOSE      POCT Blood Glucose.: 329 mg/dL (31 May 2019 11:11)  POCT Blood Glucose.: 356 mg/dL (31 May 2019 07:40)  POCT Blood Glucose.: 297 mg/dL (31 May 2019 06:29)  POCT Blood Glucose.: 323 mg/dL (30 May 2019 22:34)  POCT Blood Glucose.: 300 mg/dL (30 May 2019 21:15)  POCT Blood Glucose.: 243 mg/dL (30 May 2019 16:52)      Direct LDL: 40 mg/dL (05-30-19 @ 07:32)

## 2019-06-04 DIAGNOSIS — I10 ESSENTIAL (PRIMARY) HYPERTENSION: ICD-10-CM

## 2019-06-04 DIAGNOSIS — Z79.4 LONG TERM (CURRENT) USE OF INSULIN: ICD-10-CM

## 2019-06-04 DIAGNOSIS — R07.9 CHEST PAIN, UNSPECIFIED: ICD-10-CM

## 2019-06-04 DIAGNOSIS — Z96.651 PRESENCE OF RIGHT ARTIFICIAL KNEE JOINT: ICD-10-CM

## 2019-06-04 DIAGNOSIS — Z79.82 LONG TERM (CURRENT) USE OF ASPIRIN: ICD-10-CM

## 2019-06-04 DIAGNOSIS — J45.909 UNSPECIFIED ASTHMA, UNCOMPLICATED: ICD-10-CM

## 2019-06-04 DIAGNOSIS — I25.10 ATHEROSCLEROTIC HEART DISEASE OF NATIVE CORONARY ARTERY WITHOUT ANGINA PECTORIS: ICD-10-CM

## 2019-06-04 DIAGNOSIS — Z98.890 OTHER SPECIFIED POSTPROCEDURAL STATES: ICD-10-CM

## 2019-06-04 DIAGNOSIS — F32.9 MAJOR DEPRESSIVE DISORDER, SINGLE EPISODE, UNSPECIFIED: ICD-10-CM

## 2019-06-04 DIAGNOSIS — I48.91 UNSPECIFIED ATRIAL FIBRILLATION: ICD-10-CM

## 2019-06-04 DIAGNOSIS — E11.65 TYPE 2 DIABETES MELLITUS WITH HYPERGLYCEMIA: ICD-10-CM

## 2019-06-04 DIAGNOSIS — Z88.8 ALLERGY STATUS TO OTHER DRUGS, MEDICAMENTS AND BIOLOGICAL SUBSTANCES: ICD-10-CM

## 2019-06-04 DIAGNOSIS — Z82.49 FAMILY HISTORY OF ISCHEMIC HEART DISEASE AND OTHER DISEASES OF THE CIRCULATORY SYSTEM: ICD-10-CM

## 2019-06-04 DIAGNOSIS — E78.5 HYPERLIPIDEMIA, UNSPECIFIED: ICD-10-CM

## 2019-06-07 ENCOUNTER — APPOINTMENT (OUTPATIENT)
Dept: HEART AND VASCULAR | Facility: CLINIC | Age: 74
End: 2019-06-07
Payer: MEDICARE

## 2019-06-07 VITALS
HEIGHT: 65 IN | DIASTOLIC BLOOD PRESSURE: 74 MMHG | SYSTOLIC BLOOD PRESSURE: 118 MMHG | WEIGHT: 167.99 LBS | TEMPERATURE: 98.9 F | HEART RATE: 94 BPM | OXYGEN SATURATION: 98 % | BODY MASS INDEX: 27.99 KG/M2

## 2019-06-07 PROCEDURE — 93000 ELECTROCARDIOGRAM COMPLETE: CPT

## 2019-06-07 PROCEDURE — 99214 OFFICE O/P EST MOD 30 MIN: CPT

## 2019-06-07 NOTE — ASSESSMENT
[FreeTextEntry1] : chest pain in an insulin dependant diabetic some atypical features normal nuclear stress test will add metoprolol\par htn controlled\par afib not on ac very high risk for stroke will refer to eps for possible watchman device had loop recorder placed to evaluate for AF and need for watchman \par fu in one week

## 2019-06-07 NOTE — PROCEDURE
[Lexiscan] : Lexiscan [Tc-99m, sestamibi-Cardiolite, to 40mCi, dose-Radionuclides-[employer] code--v.1-Active-Trade] : Tc-99m, sestamibi-Cardiolite, to 40mCi, dose-Radionuclides-[employer] code--v.1-Active-Trade [Normal] : no significant [de-identified] : Dr. Willy Santos     Performing Provider: Dr. Willy Santos [de-identified] : Khoi Sun [de-identified] : Given via the IV route.    1 Day Protocol. [de-identified] : CP, DM, HTN, Abnormal ECG.     Gender: Female [de-identified] : 9.5 [de-identified] : 28.7 [de-identified] : Given via the IV route [de-identified] : 61 [de-identified] : 130/70 [de-identified] : 69 [FreeTextEntry1] : image quality is good\par resting ef 66%\par post stress ef 69%\par prior study NA\par artifacts None\par blood pressure remained stable at 120/70 mmHg heart rate increased to 103 bpm

## 2019-06-07 NOTE — HISTORY OF PRESENT ILLNESS
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

## 2019-06-07 NOTE — PHYSICAL EXAM
[General Appearance - Well Developed] : well developed [Normal Appearance] : normal appearance [Well Groomed] : well groomed [General Appearance - Well Nourished] : well nourished [No Deformities] : no deformities [General Appearance - In No Acute Distress] : no acute distress [Normal Conjunctiva] : the conjunctiva exhibited no abnormalities [Eyelids - No Xanthelasma] : the eyelids demonstrated no xanthelasmas [Normal Oral Mucosa] : normal oral mucosa [No Oral Pallor] : no oral pallor [No Oral Cyanosis] : no oral cyanosis [Normal Jugular Venous A Waves Present] : normal jugular venous A waves present [Normal Jugular Venous V Waves Present] : normal jugular venous V waves present [No Jugular Venous Marinelli A Waves] : no jugular venous marinelli A waves [Respiration, Rhythm And Depth] : normal respiratory rhythm and effort [Exaggerated Use Of Accessory Muscles For Inspiration] : no accessory muscle use [Auscultation Breath Sounds / Voice Sounds] : lungs were clear to auscultation bilaterally [Heart Rate And Rhythm] : heart rate and rhythm were normal [Heart Sounds] : normal S1 and S2 [Murmurs] : no murmurs present [Abdomen Soft] : soft [Abdomen Tenderness] : non-tender [Abdomen Mass (___ Cm)] : no abdominal mass palpated [Abnormal Walk] : normal gait [Gait - Sufficient For Exercise Testing] : the gait was sufficient for exercise testing [Nail Clubbing] : no clubbing of the fingernails [Cyanosis, Localized] : no localized cyanosis [Petechial Hemorrhages (___cm)] : no petechial hemorrhages [Skin Color & Pigmentation] : normal skin color and pigmentation [No Venous Stasis] : no venous stasis [] : no rash [Skin Lesions] : no skin lesions [No Skin Ulcers] : no skin ulcer [No Xanthoma] : no  xanthoma was observed [Oriented To Time, Place, And Person] : oriented to person, place, and time [Affect] : the affect was normal [Mood] : the mood was normal [No Anxiety] : not feeling anxious

## 2019-06-12 ENCOUNTER — APPOINTMENT (OUTPATIENT)
Dept: HEART AND VASCULAR | Facility: CLINIC | Age: 74
End: 2019-06-12
Payer: MEDICARE

## 2019-06-12 VITALS
WEIGHT: 167 LBS | HEIGHT: 65 IN | BODY MASS INDEX: 27.82 KG/M2 | HEART RATE: 102 BPM | DIASTOLIC BLOOD PRESSURE: 71 MMHG | SYSTOLIC BLOOD PRESSURE: 134 MMHG

## 2019-06-12 PROCEDURE — 93291 INTERROG DEV EVAL SCRMS IP: CPT

## 2019-06-12 PROCEDURE — 99214 OFFICE O/P EST MOD 30 MIN: CPT | Mod: 25

## 2019-06-13 NOTE — PROCEDURE
[de-identified] : medtronic reveal LINQ [id number]\par 7/10/18\par good battery \par good sensing \par no events or afib

## 2019-06-13 NOTE — HISTORY OF PRESENT ILLNESS
[Palpitations] : no palpitations [SOB] : no dyspnea [Syncope] : no syncope [Chest Pain] : no chest pain or discomfort [Dizziness] : no dizziness [Pain at Site] : no pain at device site [Erythema at Site] : no erythema at device site [Swelling at Site] : no swelling at device site [FreeTextEntry1] : Ms. Ambrosio is a 74 year-old female with hypertension, hyperlipidemia, ASD s/p surgical repair, type II DM (insulin-dependent), atrial fibrillation s/p PVI, and spontaneous SDH (AC now being held) s/p ILR , who presents for follow up.\par \par  She had an ILR implanted for surveillance of afib as anticoagulation is being held secondary to history of a subdural hematoma.  Since device implant she has been doing well.  She denies any MCDONNELL palpitations, syncope, near syncope or chest pain. No device related complaints.

## 2019-06-13 NOTE — DISCUSSION/SUMMARY
[FreeTextEntry1] : Ms. Ambrosio is a 74 year-old female with hypertension, hyperlipidemia, ASD s/p surgical repair, type II DM , atrial fibrillation s/p PVI at OSH and spontaneous SDH (AC now being held) s/p ILR , who presents for follow up. ILR interrogation reveals no recurrent afib since implant  She will remain off oral anticoagulation.  We discussed that she is a candidate for a WATCHMAN procedure.  She has a history of a SDH and we discussed that she will need to be on coumadin for 6 weeks post WATCHMAN.  I discussed this with her cardiologist Dr. Santos who she is seeing this week.  She will set her up with a neurologist to see if she is cleared for short term anticoagulation use.  If so we can proceed with a TEO and CT scan (watchman protocol).  She knows to call with any questions or concerns.

## 2019-06-13 NOTE — PHYSICAL EXAM
[General Appearance - Well Developed] : well developed [Normal Appearance] : normal appearance [Well Groomed] : well groomed [General Appearance - Well Nourished] : well nourished [No Deformities] : no deformities [General Appearance - In No Acute Distress] : no acute distress [Heart Rate And Rhythm] : heart rate and rhythm were normal [Heart Sounds] : normal S1 and S2 [Respiration, Rhythm And Depth] : normal respiratory rhythm and effort [Exaggerated Use Of Accessory Muscles For Inspiration] : no accessory muscle use [Clean] : clean [Dry] : dry [Well-Healed] : well-healed [] : no ischemic changes [Normal Oral Mucosa] : normal oral mucosa [Normal Oropharynx] : normal oropharynx [Normal Jugular Venous V Waves Present] : normal jugular venous V waves present [Abnormal Walk] : normal gait [Gait - Sufficient For Exercise Testing] : the gait was sufficient for exercise testing [Foul Odor] : no foul smell [Palpable Crepitus] : no palpable crepitus [Bleeding] : no active bleeding [Serosanguineous Drainage] : no serosanquineous drainage [Purulent Drainage] : no purulent drainage [Warm] : not warm [Serous Drainage] : no serous drainage [Erythema] : not erythematous [Tender] : not tender [Fluctuant] : not fluctuant [Indurated] : not indurated [FreeTextEntry1] : mid L chest

## 2019-06-14 ENCOUNTER — APPOINTMENT (OUTPATIENT)
Dept: HEART AND VASCULAR | Facility: CLINIC | Age: 74
End: 2019-06-14
Payer: MEDICARE

## 2019-06-14 VITALS
WEIGHT: 164 LBS | DIASTOLIC BLOOD PRESSURE: 86 MMHG | SYSTOLIC BLOOD PRESSURE: 128 MMHG | HEIGHT: 65 IN | HEART RATE: 81 BPM | BODY MASS INDEX: 27.32 KG/M2 | OXYGEN SATURATION: 99 % | TEMPERATURE: 99.2 F

## 2019-06-14 DIAGNOSIS — E78.00 PURE HYPERCHOLESTEROLEMIA, UNSPECIFIED: ICD-10-CM

## 2019-06-14 PROCEDURE — 99214 OFFICE O/P EST MOD 30 MIN: CPT

## 2019-06-14 PROCEDURE — 93000 ELECTROCARDIOGRAM COMPLETE: CPT

## 2019-06-14 NOTE — HISTORY OF PRESENT ILLNESS
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

## 2019-06-14 NOTE — PROCEDURE
[Lexiscan] : Lexiscan [Tc-99m, sestamibi-Cardiolite, to 40mCi, dose-Radionuclides-[employer] code--v.1-Active-Trade] : Tc-99m, sestamibi-Cardiolite, to 40mCi, dose-Radionuclides-[employer] code--v.1-Active-Trade [Normal] : no significant [de-identified] : Khoi Sun [de-identified] : CP, DM, HTN, Abnormal ECG.     Gender: Female [de-identified] : Dr. Willy Santos     Performing Provider: Dr. Willy Santos [de-identified] : Given via the IV route.    1 Day Protocol. [de-identified] : 9.5 [de-identified] : Given via the IV route [de-identified] : 130/70 [de-identified] : 61 [de-identified] : 69 [de-identified] : 28.7 [FreeTextEntry1] : image quality is good\par resting ef 66%\par post stress ef 69%\par prior study NA\par artifacts None\par blood pressure remained stable at 120/70 mmHg heart rate increased to 103 bpm

## 2019-06-14 NOTE — PROCEDURE
[Lexiscan] : Lexiscan [Tc-99m, sestamibi-Cardiolite, to 40mCi, dose-Radionuclides-[employer] code--v.1-Active-Trade] : Tc-99m, sestamibi-Cardiolite, to 40mCi, dose-Radionuclides-[employer] code--v.1-Active-Trade [Normal] : no significant [de-identified] : Dr. Willy Santos     Performing Provider: Dr. Willy Santos [de-identified] : Khoi Sun [de-identified] : CP, DM, HTN, Abnormal ECG.     Gender: Female [de-identified] : 9.5 [de-identified] : Given via the IV route [de-identified] : Given via the IV route.    1 Day Protocol. [de-identified] : 28.7 [de-identified] : 130/70 [de-identified] : 69 [de-identified] : 61 [FreeTextEntry1] : image quality is good\par resting ef 66%\par post stress ef 69%\par prior study NA\par artifacts None\par blood pressure remained stable at 120/70 mmHg heart rate increased to 103 bpm

## 2019-06-14 NOTE — PHYSICAL EXAM
[General Appearance - Well Developed] : well developed [Well Groomed] : well groomed [Normal Appearance] : normal appearance [No Deformities] : no deformities [General Appearance - Well Nourished] : well nourished [Normal Conjunctiva] : the conjunctiva exhibited no abnormalities [General Appearance - In No Acute Distress] : no acute distress [Eyelids - No Xanthelasma] : the eyelids demonstrated no xanthelasmas [Normal Oral Mucosa] : normal oral mucosa [Normal Jugular Venous A Waves Present] : normal jugular venous A waves present [No Oral Pallor] : no oral pallor [No Oral Cyanosis] : no oral cyanosis [Normal Jugular Venous V Waves Present] : normal jugular venous V waves present [No Jugular Venous Marinelli A Waves] : no jugular venous marinelli A waves [Respiration, Rhythm And Depth] : normal respiratory rhythm and effort [Exaggerated Use Of Accessory Muscles For Inspiration] : no accessory muscle use [Auscultation Breath Sounds / Voice Sounds] : lungs were clear to auscultation bilaterally [Heart Rate And Rhythm] : heart rate and rhythm were normal [Heart Sounds] : normal S1 and S2 [Murmurs] : no murmurs present [Abdomen Tenderness] : non-tender [Abdomen Soft] : soft [Abdomen Mass (___ Cm)] : no abdominal mass palpated [Abnormal Walk] : normal gait [Gait - Sufficient For Exercise Testing] : the gait was sufficient for exercise testing [Nail Clubbing] : no clubbing of the fingernails [Petechial Hemorrhages (___cm)] : no petechial hemorrhages [Cyanosis, Localized] : no localized cyanosis [Skin Color & Pigmentation] : normal skin color and pigmentation [] : no rash [No Venous Stasis] : no venous stasis [No Skin Ulcers] : no skin ulcer [Skin Lesions] : no skin lesions [No Xanthoma] : no  xanthoma was observed [Oriented To Time, Place, And Person] : oriented to person, place, and time [Mood] : the mood was normal [Affect] : the affect was normal [No Anxiety] : not feeling anxious

## 2019-06-14 NOTE — ASSESSMENT
[FreeTextEntry1] : chest pain in an insulin dependant diabetic some atypical features normal nuclear stress test chest pain is better on metoprolol \par htn controlled\par afib being considered for watchman need Neuro clearance for short term anticoagulation\par fu in three months

## 2019-06-14 NOTE — PHYSICAL EXAM
[General Appearance - Well Developed] : well developed [Well Groomed] : well groomed [Normal Appearance] : normal appearance [General Appearance - Well Nourished] : well nourished [No Deformities] : no deformities [Normal Conjunctiva] : the conjunctiva exhibited no abnormalities [General Appearance - In No Acute Distress] : no acute distress [Eyelids - No Xanthelasma] : the eyelids demonstrated no xanthelasmas [Normal Oral Mucosa] : normal oral mucosa [No Oral Cyanosis] : no oral cyanosis [No Oral Pallor] : no oral pallor [Normal Jugular Venous A Waves Present] : normal jugular venous A waves present [No Jugular Venous Marinelli A Waves] : no jugular venous marinelli A waves [Normal Jugular Venous V Waves Present] : normal jugular venous V waves present [Respiration, Rhythm And Depth] : normal respiratory rhythm and effort [Exaggerated Use Of Accessory Muscles For Inspiration] : no accessory muscle use [Auscultation Breath Sounds / Voice Sounds] : lungs were clear to auscultation bilaterally [Heart Rate And Rhythm] : heart rate and rhythm were normal [Murmurs] : no murmurs present [Heart Sounds] : normal S1 and S2 [Abdomen Tenderness] : non-tender [Abdomen Soft] : soft [Abnormal Walk] : normal gait [Abdomen Mass (___ Cm)] : no abdominal mass palpated [Gait - Sufficient For Exercise Testing] : the gait was sufficient for exercise testing [Nail Clubbing] : no clubbing of the fingernails [Cyanosis, Localized] : no localized cyanosis [Petechial Hemorrhages (___cm)] : no petechial hemorrhages [Skin Color & Pigmentation] : normal skin color and pigmentation [] : no rash [No Venous Stasis] : no venous stasis [Skin Lesions] : no skin lesions [No Skin Ulcers] : no skin ulcer [No Xanthoma] : no  xanthoma was observed [Oriented To Time, Place, And Person] : oriented to person, place, and time [Mood] : the mood was normal [Affect] : the affect was normal [No Anxiety] : not feeling anxious

## 2019-07-16 ENCOUNTER — OTHER (OUTPATIENT)
Age: 74
End: 2019-07-16

## 2019-07-24 ENCOUNTER — APPOINTMENT (OUTPATIENT)
Dept: HEART AND VASCULAR | Facility: CLINIC | Age: 74
End: 2019-07-24
Payer: MEDICARE

## 2019-07-24 VITALS
HEART RATE: 73 BPM | SYSTOLIC BLOOD PRESSURE: 124 MMHG | OXYGEN SATURATION: 97 % | WEIGHT: 159.99 LBS | DIASTOLIC BLOOD PRESSURE: 60 MMHG | TEMPERATURE: 99.3 F | BODY MASS INDEX: 26.66 KG/M2 | HEIGHT: 64.96 IN

## 2019-07-24 DIAGNOSIS — Z87.891 PERSONAL HISTORY OF NICOTINE DEPENDENCE: ICD-10-CM

## 2019-07-24 DIAGNOSIS — R29.898 OTHER SYMPTOMS AND SIGNS INVOLVING THE MUSCULOSKELETAL SYSTEM: ICD-10-CM

## 2019-07-24 PROCEDURE — 93923 UPR/LXTR ART STDY 3+ LVLS: CPT

## 2019-07-24 PROCEDURE — 93978 VASCULAR STUDY: CPT

## 2019-07-24 PROCEDURE — 99214 OFFICE O/P EST MOD 30 MIN: CPT

## 2019-07-24 NOTE — ASSESSMENT
[FreeTextEntry1] : 74 F\par \par Assessment:\par 1. Insulin Dependent Diabetes\par 2. HTN - controlled\par 3. Afib - not on AC due to hx of subdural hematoma.  Seen by EP, possible candidate for WATCHMAN device. \par 4. SOB - normal nuc stress May 2018\par 5. Former Smoker\par 6. HLD\par \par Plan: \par - AAA screening given age and smoking history\par - FRANKLYN/PVR's given risk factors (DM, smoking hx, HTN/HLD) and questionable symptoms of leg fatigue.  Pt very deconditioned, able to walk only 1 block.  Distal pulses are palpable.  \par - cont high dose lipitor 40mg, aspirin 81mg\par - cont aggressive BP and sugar control\par

## 2019-07-24 NOTE — REASON FOR VISIT
[FreeTextEntry1] : 75 yo F with afib s/p ablation not on AC (on baby aspirin) due to remote history of subdural hematoma s/p right frontal bakari hole, s/p surgical ASD repair, IDDM, HTN, HLD, former smoker presents for vascular evaluation.  Referred by endocrinology Dr. Ocasio.  \par \par Pt with no known hx of PAD/CAD.   She is only able to walk ~ 1 block before getting SOB.  Notes some leg fatigue.  No leg pain, cramping, or burning with exertion.  No skin ulcerations/wounds.     Had normal nuclear stress May 2018.  No hx of aneurysms in family or sudden cardiac death.  Mother and grandmother  of heart disease ~ age 60.  \par \par MRA head/neck  showed normal carotid/vertebral arteries and normal intracranial vessels.\par \par FHx:  mother and grandmother both  ~ 60 of heart disease\par \par SHx: former smoker, quit many years ago

## 2019-07-24 NOTE — PHYSICAL EXAM
[Normal Oral Mucosa] : normal oral mucosa [Heart Rate And Rhythm] : heart rate and rhythm were normal [Heart Sounds] : normal S1 and S2 [Murmurs] : no murmurs present [Edema] : no peripheral edema present [Veins - Varicosity Changes] : no varicosital changes were noted in the lower extremities [] : no respiratory distress [Respiration, Rhythm And Depth] : normal respiratory rhythm and effort [Abdomen Soft] : soft [Abdomen Mass (___ Cm)] : no abdominal mass palpated [Cyanosis, Localized] : no localized cyanosis [FreeTextEntry1] : palpable DP/PT, pop, and fem artery bilaterally [No Venous Stasis] : no venous stasis [No Skin Ulcers] : no skin ulcer [Oriented To Time, Place, And Person] : oriented to person, place, and time

## 2019-08-07 ENCOUNTER — APPOINTMENT (OUTPATIENT)
Dept: GASTROENTEROLOGY | Facility: HOSPITAL | Age: 74
End: 2019-08-07

## 2019-08-07 ENCOUNTER — OUTPATIENT (OUTPATIENT)
Dept: OUTPATIENT SERVICES | Facility: HOSPITAL | Age: 74
LOS: 1 days | Discharge: HOME | End: 2019-08-07
Payer: MEDICARE

## 2019-08-07 DIAGNOSIS — Z96.651 PRESENCE OF RIGHT ARTIFICIAL KNEE JOINT: Chronic | ICD-10-CM

## 2019-08-07 DIAGNOSIS — I62.01 NONTRAUMATIC ACUTE SUBDURAL HEMORRHAGE: Chronic | ICD-10-CM

## 2019-08-07 LAB — GLUCOSE BLDC GLUCOMTR-MCNC: 112 MG/DL — HIGH (ref 70–99)

## 2019-08-07 PROCEDURE — 45378 DIAGNOSTIC COLONOSCOPY: CPT

## 2019-08-07 PROCEDURE — 82962 GLUCOSE BLOOD TEST: CPT

## 2019-08-07 PROCEDURE — G0121: CPT

## 2019-08-29 ENCOUNTER — APPOINTMENT (OUTPATIENT)
Dept: NEUROLOGY | Facility: CLINIC | Age: 74
End: 2019-08-29
Payer: MEDICARE

## 2019-08-29 VITALS
DIASTOLIC BLOOD PRESSURE: 85 MMHG | OXYGEN SATURATION: 97 % | HEIGHT: 64 IN | HEART RATE: 105 BPM | SYSTOLIC BLOOD PRESSURE: 134 MMHG | WEIGHT: 146 LBS | BODY MASS INDEX: 24.92 KG/M2

## 2019-08-29 PROCEDURE — 99215 OFFICE O/P EST HI 40 MIN: CPT

## 2019-08-29 NOTE — ASSESSMENT
[FreeTextEntry1] : No neurologic contraindication to short-term anticoagulation\par If there is an acute change in neurologic status, go to ER immediately for STAT head CT\par Return as needed

## 2019-08-29 NOTE — PHYSICAL EXAM
[FreeTextEntry1] : Gen: appears well, well-nourished, no acute distress\par \par MS: awake, alert, oriented, speech fluent, comprehension intact, good fund of knowledge, recent and remote memory intact, attention intact\par \par CN: PERRL, EOMI, visual fields full, facial strength and sensation intact and symmetric, palate elevation symmetric, tongue midline, no tongue atrophy or fasciculations\par \par Motor: normal bulk and tone, 5/5 strength throughout, no abnormal movements\par \par Sensory: light touch intact and symmetric throughout\par \par Reflexes: 1+ symmetric UE and patellar, absent achilles b/l\par \par Coordination: no dysmetria on finger to nose\par \par Gait: normal\par

## 2019-08-29 NOTE — CONSULT LETTER
[Dear  ___] : Dear  [unfilled], [Consult Letter:] : I had the pleasure of evaluating your patient, [unfilled]. [Please see my note below.] : Please see my note below. [Consult Closing:] : Thank you very much for allowing me to participate in the care of this patient.  If you have any questions, please do not hesitate to contact me. [Sincerely,] : Sincerely, [FreeTextEntry3] : Nestor Pereira M.D.\par Neurology, Electromyography and Neuromuscular Medicine\par [facility]\par \par  of Neurology\par [facility] [employer]

## 2019-08-29 NOTE — HISTORY OF PRESENT ILLNESS
[FreeTextEntry1] : Previously seen by Dr. Bruce for cognitive impairment \par Neuropsych testing showed likely vascular dementia\par Has a history of reportedly spontaneous R subdural hematoma requiring evacuation \par Deemed not a candidate for long term anticoagulation for Afib due to that\par Now considering watchman trial requiring short term 6 week anticoagulation \par \par Pt reports no recent falls, gait is stable at baseline, she uses walker when out on the street and nothing at home. \par \par 13 pt review of systems performed and reviewed with patient (General, Eyes, Ears, Cardiovascular, Respiratory, Gastrointestinal, Genitourinary, Musculoskeletal, Skin, Endocrine, Hematologic, Psychiatric, Neurologic)\par Past medical history, surgical history, social history, and family history reviewed with patient\par See scanned document for details

## 2019-09-11 ENCOUNTER — APPOINTMENT (OUTPATIENT)
Dept: HEART AND VASCULAR | Facility: CLINIC | Age: 74
End: 2019-09-11
Payer: MEDICARE

## 2019-09-11 VITALS
HEART RATE: 84 BPM | HEIGHT: 64 IN | DIASTOLIC BLOOD PRESSURE: 68 MMHG | WEIGHT: 146 LBS | SYSTOLIC BLOOD PRESSURE: 109 MMHG | BODY MASS INDEX: 24.92 KG/M2

## 2019-09-11 PROCEDURE — 99213 OFFICE O/P EST LOW 20 MIN: CPT | Mod: 25

## 2019-09-11 PROCEDURE — 93291 INTERROG DEV EVAL SCRMS IP: CPT

## 2019-09-13 ENCOUNTER — APPOINTMENT (OUTPATIENT)
Dept: HEART AND VASCULAR | Facility: CLINIC | Age: 74
End: 2019-09-13
Payer: MEDICARE

## 2019-09-13 VITALS
OXYGEN SATURATION: 99 % | DIASTOLIC BLOOD PRESSURE: 80 MMHG | WEIGHT: 137.99 LBS | BODY MASS INDEX: 23.56 KG/M2 | HEART RATE: 96 BPM | SYSTOLIC BLOOD PRESSURE: 120 MMHG | TEMPERATURE: 98.3 F | HEIGHT: 64 IN

## 2019-09-13 PROCEDURE — 99214 OFFICE O/P EST MOD 30 MIN: CPT

## 2019-09-13 NOTE — ASSESSMENT
[FreeTextEntry1] : chest pain in an insulin dependant diabetic some atypical features normal nuclear stress test chest pain is better on metoprolol \par htn controlled\par afib candidate for watchman she will have it done when she gets back from vacation\par confirm med list with daughter\par fu in four months

## 2019-09-13 NOTE — PHYSICAL EXAM
[General Appearance - Well Developed] : well developed [Normal Appearance] : normal appearance [Well Groomed] : well groomed [General Appearance - Well Nourished] : well nourished [No Deformities] : no deformities [General Appearance - In No Acute Distress] : no acute distress [Normal Conjunctiva] : the conjunctiva exhibited no abnormalities [Eyelids - No Xanthelasma] : the eyelids demonstrated no xanthelasmas [Normal Oral Mucosa] : normal oral mucosa [No Oral Pallor] : no oral pallor [No Oral Cyanosis] : no oral cyanosis [Normal Jugular Venous A Waves Present] : normal jugular venous A waves present [Normal Jugular Venous V Waves Present] : normal jugular venous V waves present [No Jugular Venous Marinelli A Waves] : no jugular venous marinelli A waves [Respiration, Rhythm And Depth] : normal respiratory rhythm and effort [Exaggerated Use Of Accessory Muscles For Inspiration] : no accessory muscle use [Auscultation Breath Sounds / Voice Sounds] : lungs were clear to auscultation bilaterally [Heart Rate And Rhythm] : heart rate and rhythm were normal [Heart Sounds] : normal S1 and S2 [Murmurs] : no murmurs present [Abdomen Soft] : soft [Abdomen Tenderness] : non-tender [Abdomen Mass (___ Cm)] : no abdominal mass palpated [Abnormal Walk] : normal gait [Gait - Sufficient For Exercise Testing] : the gait was sufficient for exercise testing [Nail Clubbing] : no clubbing of the fingernails [Cyanosis, Localized] : no localized cyanosis [Petechial Hemorrhages (___cm)] : no petechial hemorrhages [Skin Color & Pigmentation] : normal skin color and pigmentation [] : no rash [No Venous Stasis] : no venous stasis [Skin Lesions] : no skin lesions [No Skin Ulcers] : no skin ulcer [No Xanthoma] : no  xanthoma was observed [Oriented To Time, Place, And Person] : oriented to person, place, and time [Affect] : the affect was normal [Mood] : the mood was normal [No Anxiety] : not feeling anxious

## 2019-09-13 NOTE — PROCEDURE
[Lexiscan] : Lexiscan [Tc-99m, sestamibi-Cardiolite, to 40mCi, dose-Radionuclides-Wordeo code--v.1-Active-Trade] : Tc-99m, sestamibi-Cardiolite, to 40mCi, dose-Radionuclides-Wordeo code--v.1-Active-Trade [Normal] : no significant [de-identified] : Dr. Willy Santos     Performing Provider: Dr. Willy Santos [de-identified] : Khoi Sun [de-identified] : CP, DM, HTN, Abnormal ECG.     Gender: Female [de-identified] : Given via the IV route.    1 Day Protocol. [de-identified] : 9.5 [de-identified] : 28.7 [de-identified] : Given via the IV route [de-identified] : 69 [de-identified] : 130/70 [de-identified] : 61 [TWNoteComboBox1] : JIM [FreeTextEntry1] : image quality is good\par resting ef 66%\par post stress ef 69%\par prior study NA\par artifacts None\par blood pressure remained stable at 120/70 mmHg heart rate increased to 103 bpm

## 2019-09-16 ENCOUNTER — APPOINTMENT (OUTPATIENT)
Dept: INTERNAL MEDICINE | Facility: CLINIC | Age: 74
End: 2019-09-16

## 2019-09-25 ENCOUNTER — APPOINTMENT (OUTPATIENT)
Dept: INTERNAL MEDICINE | Facility: CLINIC | Age: 74
End: 2019-09-25

## 2019-10-11 ENCOUNTER — OTHER (OUTPATIENT)
Age: 74
End: 2019-10-11

## 2019-10-18 ENCOUNTER — OTHER (OUTPATIENT)
Age: 74
End: 2019-10-18

## 2019-10-21 NOTE — DISCUSSION/SUMMARY
[FreeTextEntry1] : Ms. Ambrosio is a 74 year-old female with hypertension, hyperlipidemia, ASD s/p surgical repair, type II DM , atrial fibrillation s/p PVI at OSH and spontaneous SDH (AC now being held) s/p ILR , who presents for follow up. ILR interrogation reveals no recurrent afib since implant   We discussed that she is a candidate for a WATCHMAN procedure.  She has a history of a SDH and has been cleared for short term anticoagulation post WATCHMAN.  She currently does not want to proceed with this but agrees to talk about this in more detail with her cardiologist to get another opinion.  If she agrees to proceed we will need a TEO and CT scan (watchman protocol).  She knows to call with any questions or concerns.

## 2019-10-21 NOTE — REVIEW OF SYSTEMS
[see HPI] : see HPI [Negative] : Gastrointestinal [Fever] : no fever [Chills] : no chills [Feeling Fatigued] : not feeling fatigued

## 2019-10-21 NOTE — PROCEDURE
[de-identified] : medtronic reveal LINQ [id number]\par 7/10/18\par good battery \par good sensing \par no events or afib

## 2019-10-21 NOTE — HISTORY OF PRESENT ILLNESS
[SOB] : no dyspnea [Palpitations] : no palpitations [Syncope] : no syncope [Dizziness] : no dizziness [Pain at Site] : no pain at device site [Chest Pain] : no chest pain or discomfort [Erythema at Site] : no erythema at device site [Swelling at Site] : no swelling at device site [FreeTextEntry1] : Ms. Ambrosio is a 74 year-old female with hypertension, hyperlipidemia, ASD s/p surgical repair, type II DM (insulin-dependent), atrial fibrillation s/p PVI, and spontaneous SDH (AC now being held) s/p ILR , who presents for follow up.\par \par  She had an ILR implanted for surveillance of afib as anticoagulation is being held secondary to history of a subdural hematoma.  Since device implant she has been doing well.  She denies any MCDONNELL palpitations, syncope, near syncope or chest pain. No device related complaints.    Since her last visit she saw a neurologist and was cleared for short term anticoagulation.

## 2019-10-21 NOTE — PHYSICAL EXAM
[General Appearance - Well Developed] : well developed [Normal Appearance] : normal appearance [Well Groomed] : well groomed [General Appearance - Well Nourished] : well nourished [No Deformities] : no deformities [General Appearance - In No Acute Distress] : no acute distress [Heart Rate And Rhythm] : heart rate and rhythm were normal [Heart Sounds] : normal S1 and S2 [Exaggerated Use Of Accessory Muscles For Inspiration] : no accessory muscle use [Respiration, Rhythm And Depth] : normal respiratory rhythm and effort [Clean] : clean [Dry] : dry [Well-Healed] : well-healed [] : no ischemic changes [Normal Oral Mucosa] : normal oral mucosa [Normal Oropharynx] : normal oropharynx [Normal Jugular Venous V Waves Present] : normal jugular venous V waves present [Abnormal Walk] : normal gait [Edema] : no peripheral edema present [Palpable Crepitus] : no palpable crepitus [Foul Odor] : no foul smell [Bleeding] : no active bleeding [Serosanguineous Drainage] : no serosanquineous drainage [Purulent Drainage] : no purulent drainage [Serous Drainage] : no serous drainage [Erythema] : not erythematous [Tender] : not tender [Warm] : not warm [Indurated] : not indurated [Fluctuant] : not fluctuant [FreeTextEntry1] : mid L chest

## 2019-10-23 ENCOUNTER — APPOINTMENT (OUTPATIENT)
Dept: INTERNAL MEDICINE | Facility: CLINIC | Age: 74
End: 2019-10-23
Payer: MEDICARE

## 2019-10-23 VITALS
DIASTOLIC BLOOD PRESSURE: 79 MMHG | WEIGHT: 139.5 LBS | HEIGHT: 64 IN | OXYGEN SATURATION: 99 % | HEART RATE: 99 BPM | TEMPERATURE: 98.9 F | SYSTOLIC BLOOD PRESSURE: 124 MMHG | BODY MASS INDEX: 23.82 KG/M2

## 2019-10-23 PROCEDURE — 99214 OFFICE O/P EST MOD 30 MIN: CPT | Mod: 25

## 2019-10-23 PROCEDURE — 36415 COLL VENOUS BLD VENIPUNCTURE: CPT

## 2019-10-24 ENCOUNTER — OTHER (OUTPATIENT)
Age: 74
End: 2019-10-24

## 2019-10-24 LAB
ALBUMIN SERPL ELPH-MCNC: 4.6 G/DL
ALP BLD-CCNC: 79 U/L
ALT SERPL-CCNC: 14 U/L
ANION GAP SERPL CALC-SCNC: 17 MMOL/L
AST SERPL-CCNC: 17 U/L
BASOPHILS # BLD AUTO: 0.06 K/UL
BASOPHILS NFR BLD AUTO: 0.9 %
BILIRUB SERPL-MCNC: 0.5 MG/DL
BUN SERPL-MCNC: 16 MG/DL
CALCIUM SERPL-MCNC: 10 MG/DL
CHLORIDE SERPL-SCNC: 103 MMOL/L
CHOLEST SERPL-MCNC: 196 MG/DL
CHOLEST/HDLC SERPL: 1.9 RATIO
CO2 SERPL-SCNC: 19 MMOL/L
CREAT SERPL-MCNC: 0.77 MG/DL
EOSINOPHIL # BLD AUTO: 0.26 K/UL
EOSINOPHIL NFR BLD AUTO: 3.8 %
ESTIMATED AVERAGE GLUCOSE: 103 MG/DL
GLUCOSE SERPL-MCNC: 115 MG/DL
HBA1C MFR BLD HPLC: 5.2 %
HCT VFR BLD CALC: 38.9 %
HDLC SERPL-MCNC: 105 MG/DL
HGB BLD-MCNC: 11.9 G/DL
IMM GRANULOCYTES NFR BLD AUTO: 0.4 %
LDLC SERPL CALC-MCNC: 70 MG/DL
LYMPHOCYTES # BLD AUTO: 1.91 K/UL
LYMPHOCYTES NFR BLD AUTO: 27.8 %
MAN DIFF?: NORMAL
MCHC RBC-ENTMCNC: 28.1 PG
MCHC RBC-ENTMCNC: 30.6 GM/DL
MCV RBC AUTO: 92 FL
MONOCYTES # BLD AUTO: 0.46 K/UL
MONOCYTES NFR BLD AUTO: 6.7 %
NEUTROPHILS # BLD AUTO: 4.14 K/UL
NEUTROPHILS NFR BLD AUTO: 60.4 %
PLATELET # BLD AUTO: 334 K/UL
POTASSIUM SERPL-SCNC: 4.4 MMOL/L
PROT SERPL-MCNC: 7.4 G/DL
RBC # BLD: 4.23 M/UL
RBC # FLD: 19.3 %
SODIUM SERPL-SCNC: 139 MMOL/L
TRIGL SERPL-MCNC: 107 MG/DL
WBC # FLD AUTO: 6.86 K/UL

## 2019-10-25 ENCOUNTER — OTHER (OUTPATIENT)
Age: 74
End: 2019-10-25

## 2019-10-28 ENCOUNTER — OTHER (OUTPATIENT)
Age: 74
End: 2019-10-28

## 2019-11-06 ENCOUNTER — OTHER (OUTPATIENT)
Age: 74
End: 2019-11-06

## 2019-11-07 ENCOUNTER — OTHER (OUTPATIENT)
Age: 74
End: 2019-11-07

## 2019-12-20 ENCOUNTER — OTHER (OUTPATIENT)
Age: 74
End: 2019-12-20

## 2020-01-22 ENCOUNTER — NON-APPOINTMENT (OUTPATIENT)
Age: 75
End: 2020-01-22

## 2020-01-22 ENCOUNTER — APPOINTMENT (OUTPATIENT)
Dept: HEART AND VASCULAR | Facility: CLINIC | Age: 75
End: 2020-01-22
Payer: MEDICARE

## 2020-01-22 VITALS
WEIGHT: 145 LBS | OXYGEN SATURATION: 99 % | TEMPERATURE: 99.3 F | DIASTOLIC BLOOD PRESSURE: 78 MMHG | HEART RATE: 94 BPM | BODY MASS INDEX: 24.75 KG/M2 | SYSTOLIC BLOOD PRESSURE: 150 MMHG | HEIGHT: 64 IN

## 2020-01-22 PROCEDURE — 99214 OFFICE O/P EST MOD 30 MIN: CPT

## 2020-01-22 PROCEDURE — 93000 ELECTROCARDIOGRAM COMPLETE: CPT

## 2020-01-22 NOTE — ASSESSMENT
[FreeTextEntry1] : chest pain in an insulin dependant diabetic some atypical features normal nuclear stress test chest pain is better on metoprolol \par htn controlled\par afib candidate for watchman she will have it done when she gets back from vacation\par cough has green sputum obtain cxr

## 2020-01-22 NOTE — HISTORY OF PRESENT ILLNESS
[FreeTextEntry1] : 73 f with chest pain history of asd repaired surgically IDDM atrial fibrillation s/p ablation by dr kramer no ac due to spontaneous SDH ILR coughing with green sputum no fever started few days \par \par ecg sb septal infarct old no change from 2017\par normal nuclear stress test 5/2019\par

## 2020-01-22 NOTE — PROCEDURE
[Lexiscan] : Lexiscan [Tc-99m, sestamibi-Cardiolite, to 40mCi, dose-Radionuclides-CENX code--v.1-Active-Trade] : Tc-99m, sestamibi-Cardiolite, to 40mCi, dose-Radionuclides-CENX code--v.1-Active-Trade [Normal] : no significant [de-identified] : Dr. Willy Santos     Performing Provider: Dr. Willy Santos [de-identified] : Khoi Sun [de-identified] : CP, DM, HTN, Abnormal ECG.     Gender: Female [de-identified] : Given via the IV route.    1 Day Protocol. [de-identified] : 9.5 [de-identified] : Given via the IV route [de-identified] : 28.7 [de-identified] : 61 [de-identified] : 69 [de-identified] : 130/70 [TWNoteComboBox1] : JIM [FreeTextEntry1] : image quality is good\par resting ef 66%\par post stress ef 69%\par prior study NA\par artifacts None\par blood pressure remained stable at 120/70 mmHg heart rate increased to 103 bpm

## 2020-01-26 ENCOUNTER — MOBILE ON CALL (OUTPATIENT)
Age: 75
End: 2020-01-26

## 2020-01-27 ENCOUNTER — APPOINTMENT (OUTPATIENT)
Dept: INTERNAL MEDICINE | Facility: CLINIC | Age: 75
End: 2020-01-27
Payer: MEDICARE

## 2020-01-27 ENCOUNTER — RX RENEWAL (OUTPATIENT)
Age: 75
End: 2020-01-27

## 2020-01-27 VITALS
WEIGHT: 150 LBS | HEIGHT: 64 IN | SYSTOLIC BLOOD PRESSURE: 140 MMHG | HEART RATE: 84 BPM | TEMPERATURE: 98.6 F | BODY MASS INDEX: 25.61 KG/M2 | DIASTOLIC BLOOD PRESSURE: 85 MMHG | OXYGEN SATURATION: 98 %

## 2020-01-27 PROCEDURE — 99214 OFFICE O/P EST MOD 30 MIN: CPT | Mod: 25

## 2020-01-27 PROCEDURE — 36415 COLL VENOUS BLD VENIPUNCTURE: CPT

## 2020-01-29 ENCOUNTER — OTHER (OUTPATIENT)
Age: 75
End: 2020-01-29

## 2020-01-29 LAB
ALBUMIN SERPL ELPH-MCNC: 4.3 G/DL
ALP BLD-CCNC: 112 U/L
ALT SERPL-CCNC: 11 U/L
ANION GAP SERPL CALC-SCNC: 18 MMOL/L
AST SERPL-CCNC: 15 U/L
BILIRUB SERPL-MCNC: 0.3 MG/DL
BUN SERPL-MCNC: 23 MG/DL
CALCIUM SERPL-MCNC: 9.5 MG/DL
CHLORIDE SERPL-SCNC: 105 MMOL/L
CO2 SERPL-SCNC: 20 MMOL/L
CREAT SERPL-MCNC: 0.85 MG/DL
ESTIMATED AVERAGE GLUCOSE: 140 MG/DL
GLUCOSE SERPL-MCNC: 138 MG/DL
HBA1C MFR BLD HPLC: 6.5 %
POTASSIUM SERPL-SCNC: 4.6 MMOL/L
PROT SERPL-MCNC: 7.4 G/DL
SODIUM SERPL-SCNC: 142 MMOL/L

## 2020-02-03 ENCOUNTER — RX RENEWAL (OUTPATIENT)
Age: 75
End: 2020-02-03

## 2020-02-11 ENCOUNTER — EMERGENCY (EMERGENCY)
Facility: HOSPITAL | Age: 75
LOS: 1 days | Discharge: HOME | End: 2020-02-11
Attending: EMERGENCY MEDICINE | Admitting: EMERGENCY MEDICINE
Payer: MEDICARE

## 2020-02-11 VITALS
RESPIRATION RATE: 18 BRPM | SYSTOLIC BLOOD PRESSURE: 120 MMHG | OXYGEN SATURATION: 98 % | TEMPERATURE: 99 F | HEART RATE: 94 BPM | DIASTOLIC BLOOD PRESSURE: 71 MMHG

## 2020-02-11 VITALS
OXYGEN SATURATION: 99 % | RESPIRATION RATE: 16 BRPM | DIASTOLIC BLOOD PRESSURE: 83 MMHG | HEART RATE: 116 BPM | SYSTOLIC BLOOD PRESSURE: 147 MMHG | WEIGHT: 149.91 LBS | TEMPERATURE: 99 F

## 2020-02-11 DIAGNOSIS — N76.4 ABSCESS OF VULVA: ICD-10-CM

## 2020-02-11 DIAGNOSIS — Z96.651 PRESENCE OF RIGHT ARTIFICIAL KNEE JOINT: Chronic | ICD-10-CM

## 2020-02-11 DIAGNOSIS — R10.2 PELVIC AND PERINEAL PAIN: ICD-10-CM

## 2020-02-11 DIAGNOSIS — I62.01 NONTRAUMATIC ACUTE SUBDURAL HEMORRHAGE: Chronic | ICD-10-CM

## 2020-02-11 LAB
ALBUMIN SERPL ELPH-MCNC: 3.7 G/DL — SIGNIFICANT CHANGE UP (ref 3.3–5)
ALP SERPL-CCNC: 99 U/L — SIGNIFICANT CHANGE UP (ref 40–120)
ALT FLD-CCNC: 13 U/L — SIGNIFICANT CHANGE UP (ref 10–45)
ANION GAP SERPL CALC-SCNC: 12 MMOL/L — SIGNIFICANT CHANGE UP (ref 5–17)
APPEARANCE UR: ABNORMAL
AST SERPL-CCNC: 16 U/L — SIGNIFICANT CHANGE UP (ref 10–40)
BASOPHILS # BLD AUTO: 0.03 K/UL — SIGNIFICANT CHANGE UP (ref 0–0.2)
BASOPHILS NFR BLD AUTO: 0.3 % — SIGNIFICANT CHANGE UP (ref 0–2)
BILIRUB SERPL-MCNC: 0.3 MG/DL — SIGNIFICANT CHANGE UP (ref 0.2–1.2)
BILIRUB UR-MCNC: NEGATIVE — SIGNIFICANT CHANGE UP
BUN SERPL-MCNC: 17 MG/DL — SIGNIFICANT CHANGE UP (ref 7–23)
CALCIUM SERPL-MCNC: 9.1 MG/DL — SIGNIFICANT CHANGE UP (ref 8.4–10.5)
CHLORIDE SERPL-SCNC: 106 MMOL/L — SIGNIFICANT CHANGE UP (ref 96–108)
CO2 SERPL-SCNC: 22 MMOL/L — SIGNIFICANT CHANGE UP (ref 22–31)
COLOR SPEC: YELLOW — SIGNIFICANT CHANGE UP
CREAT SERPL-MCNC: 0.82 MG/DL — SIGNIFICANT CHANGE UP (ref 0.5–1.3)
DIFF PNL FLD: ABNORMAL
EOSINOPHIL # BLD AUTO: 0.26 K/UL — SIGNIFICANT CHANGE UP (ref 0–0.5)
EOSINOPHIL NFR BLD AUTO: 2.9 % — SIGNIFICANT CHANGE UP (ref 0–6)
GLUCOSE SERPL-MCNC: 145 MG/DL — HIGH (ref 70–99)
GLUCOSE UR QL: 100
HCT VFR BLD CALC: 30.2 % — LOW (ref 34.5–45)
HGB BLD-MCNC: 9.8 G/DL — LOW (ref 11.5–15.5)
IMM GRANULOCYTES NFR BLD AUTO: 0.6 % — SIGNIFICANT CHANGE UP (ref 0–1.5)
KETONES UR-MCNC: NEGATIVE — SIGNIFICANT CHANGE UP
LEUKOCYTE ESTERASE UR-ACNC: ABNORMAL
LYMPHOCYTES # BLD AUTO: 1.56 K/UL — SIGNIFICANT CHANGE UP (ref 1–3.3)
LYMPHOCYTES # BLD AUTO: 17.2 % — SIGNIFICANT CHANGE UP (ref 13–44)
MCHC RBC-ENTMCNC: 24.9 PG — LOW (ref 27–34)
MCHC RBC-ENTMCNC: 32.5 GM/DL — SIGNIFICANT CHANGE UP (ref 32–36)
MCV RBC AUTO: 76.6 FL — LOW (ref 80–100)
MONOCYTES # BLD AUTO: 0.5 K/UL — SIGNIFICANT CHANGE UP (ref 0–0.9)
MONOCYTES NFR BLD AUTO: 5.5 % — SIGNIFICANT CHANGE UP (ref 2–14)
NEUTROPHILS # BLD AUTO: 6.65 K/UL — SIGNIFICANT CHANGE UP (ref 1.8–7.4)
NEUTROPHILS NFR BLD AUTO: 73.5 % — SIGNIFICANT CHANGE UP (ref 43–77)
NITRITE UR-MCNC: NEGATIVE — SIGNIFICANT CHANGE UP
NRBC # BLD: 0 /100 WBCS — SIGNIFICANT CHANGE UP (ref 0–0)
PH UR: 8 — SIGNIFICANT CHANGE UP (ref 5–8)
PLATELET # BLD AUTO: 287 K/UL — SIGNIFICANT CHANGE UP (ref 150–400)
POTASSIUM SERPL-MCNC: 4.3 MMOL/L — SIGNIFICANT CHANGE UP (ref 3.5–5.3)
POTASSIUM SERPL-SCNC: 4.3 MMOL/L — SIGNIFICANT CHANGE UP (ref 3.5–5.3)
PROT SERPL-MCNC: 7.3 G/DL — SIGNIFICANT CHANGE UP (ref 6–8.3)
PROT UR-MCNC: 30 MG/DL
RBC # BLD: 3.94 M/UL — SIGNIFICANT CHANGE UP (ref 3.8–5.2)
RBC # FLD: 18.8 % — HIGH (ref 10.3–14.5)
SODIUM SERPL-SCNC: 140 MMOL/L — SIGNIFICANT CHANGE UP (ref 135–145)
SP GR SPEC: 1.02 — SIGNIFICANT CHANGE UP (ref 1–1.03)
UROBILINOGEN FLD QL: 0.2 E.U./DL — SIGNIFICANT CHANGE UP
WBC # BLD: 9.05 K/UL — SIGNIFICANT CHANGE UP (ref 3.8–10.5)
WBC # FLD AUTO: 9.05 K/UL — SIGNIFICANT CHANGE UP (ref 3.8–10.5)

## 2020-02-11 PROCEDURE — 96374 THER/PROPH/DIAG INJ IV PUSH: CPT

## 2020-02-11 PROCEDURE — 81001 URINALYSIS AUTO W/SCOPE: CPT

## 2020-02-11 PROCEDURE — 99284 EMERGENCY DEPT VISIT MOD MDM: CPT | Mod: 25

## 2020-02-11 PROCEDURE — 87086 URINE CULTURE/COLONY COUNT: CPT

## 2020-02-11 PROCEDURE — 36415 COLL VENOUS BLD VENIPUNCTURE: CPT

## 2020-02-11 PROCEDURE — 80053 COMPREHEN METABOLIC PANEL: CPT

## 2020-02-11 PROCEDURE — 99284 EMERGENCY DEPT VISIT MOD MDM: CPT

## 2020-02-11 PROCEDURE — 85025 COMPLETE CBC W/AUTO DIFF WBC: CPT

## 2020-02-11 RX ORDER — ACETAMINOPHEN 500 MG
650 TABLET ORAL ONCE
Refills: 0 | Status: COMPLETED | OUTPATIENT
Start: 2020-02-11 | End: 2020-02-11

## 2020-02-11 RX ADMIN — Medication 100 MILLIGRAM(S): at 18:01

## 2020-02-11 RX ADMIN — Medication 650 MILLIGRAM(S): at 18:01

## 2020-02-11 NOTE — ED PROVIDER NOTE - PMH
Asthma  Asthma  Atherosclerosis of coronary artery  NOn obstructive  Atrial fibrillation  s/p ablation in 2013  Depression    Essential hypertension  HTN (hypertension)  Hyperlipidemia  Hyperlipidemia  Persistent ostium secundum  s/p repair (1989)  Subdural hemorrhage  s/p bakari hole (2013)  Type 2 diabetes mellitus  DM (diabetes mellitus) Type 2 diabetes mellitus with hyperglycemia, without long-term current use of insulin

## 2020-02-11 NOTE — ED ADULT NURSE NOTE - NSIMPLEMENTINTERV_GEN_ALL_ED
Implemented All Universal Safety Interventions:  [place] to call system. Call bell, personal items and telephone within reach. Instruct patient to call for assistance. Room bathroom lighting operational. Non-slip footwear when patient is off stretcher. Physically safe environment: no spills, clutter or unnecessary equipment. Stretcher in lowest position, wheels locked, appropriate side rails in place.

## 2020-02-11 NOTE — ED ADULT NURSE NOTE - OBJECTIVE STATEMENT
patient complains of pain in her vaginal area. she states " I got feces in there" denies fever, chills, dysuria. no acute distress patient complains of pain in her vaginal area. she states " I got feces in there" denies fever, chills, dysuria. no acute distress. abscess noted to left labia states it as like this for 2 days

## 2020-02-11 NOTE — ED PROVIDER NOTE - PHYSICAL EXAMINATION
Vitals reviewed  Gen: well appearing, nad, speaking in full sentences  Skin: wwp   HEENT: ncat, eomi, mmm  CV: rrr, no audible m/r/g  Resp: symmetrical expansion, ctab, no w/r/r  Abd: soft/nt  : small scabbed over pustule lateral L labia majora w/ indurated and tender L labia majora- no fluctuance or crepitus, no extending erythema;  no vaginal bleeding/discharge  Ext: FROM throughout, no peripheral edema  Neuro: alert/oriented, no focal deficits, steady gait Vitals reviewed  Gen: well appearing, nad, speaking in full sentences  Skin: wwp   HEENT: ncat, eomi, mmm  CV: rrr, no audible m/r/g  Resp: symmetrical expansion, ctab, no w/r/r  Abd: soft/nt  : small scabbed over pustule lateral L labia majora w/ 3x1cm area induration and tender L labia majora- no fluctuance or crepitus, no extending erythema;  no vaginal bleeding/discharge  Ext: FROM throughout, no peripheral edema  Neuro: alert/oriented, no focal deficits, steady gait

## 2020-02-11 NOTE — ED PROVIDER NOTE - OBJECTIVE STATEMENT
74 F pmh ASD repair, afib s/p ablation, SDH s/p bakari hole evacuation, dm, htn, hld p/w vaginal pain/swelling since Sunday.  She reports accidently scratching her vaginal area then started to experiencing pain/swelling on the L side, applied vasoline with improvement.  She denies any discharge from area, vaginal bleeding, rash/lesions, fever, chills, dizziness, chest pain, sob, abd pain, nvd, urinary symptoms

## 2020-02-11 NOTE — ED PROVIDER NOTE - ATTENDING CONTRIBUTION TO CARE
73 yo f w hx of DM, HTN, HLD, CAD presents to ED with concern for pelvic discomfort x several days.  Patient states she scratched the vaginal area and now feels pain and swelling.  She is applying vasoline without improvement.  No fever or chills, no urinary symptoms.  No vag bleeding or urinary symptoms.  On my face to face ED eval, patient is non toxic in appearance.  HRRR, lungs clear.  ABd soft, NT/ND.  + Scabbed over pustule with overlying induration to labia majora on left; induration measuring apx 3cm x 1 cm, no overlying crepitance, no fluctuance.  + Mild ttp over area.  No erythema.  Will given initial dose of IV abd in ED, send labs and dispo accordingly.

## 2020-02-11 NOTE — ED PROVIDER NOTE - PATIENT PORTAL LINK FT
You can access the FollowMyHealth Patient Portal offered by [facility] by registering at the following website: [email or web address]. By joining [employer]’s FollowMyHealth portal, you will also be able to view your health information using other applications (apps) compatible with our system.

## 2020-02-11 NOTE — ED PROVIDER NOTE - CLINICAL SUMMARY MEDICAL DECISION MAKING FREE TEXT BOX
74 F pmh ASD repair, afib s/p ablation, SDH s/p bakari hole evacuation, dm, htn, hld p/w vaginal pain/swelling since Sunday;  indurated L labia majora consistent with early abscess; no extending erythema/no crepitus. pt afebrile.  will obtain labs and give dose clindamycin, likely d/c with PO abx 74 F pmh ASD repair, afib s/p ablation, SDH s/p bakari hole evacuation, dm, htn, hld p/w vaginal pain/swelling since Sunday;  indurated L labia majora consistent with early abscess; no extending erythema/no crepitus. labs reviewed; no leukocytosis.  UA +LE nitrate negative and no urinary sxs; culture sent and will hold on tx as likely contaminated from abscess/skin sondra.  will d/c with clinda and outpt follow up.  educated on warm compresses and strict return parameters.  d/w attending

## 2020-02-12 LAB
CULTURE RESULTS: SIGNIFICANT CHANGE UP
SPECIMEN SOURCE: SIGNIFICANT CHANGE UP

## 2020-02-21 NOTE — ED PROVIDER NOTE - GASTROINTESTINAL [-], MLM
This is a recent snapshot of the patient's [facility] medical record.  For current drug dose and complete information and questions, call [phone] or In Basket pool, [facility] home [facility] ([id number])  CSN Number:  [id number]       no vomiting/no diarrhea

## 2020-02-24 ENCOUNTER — APPOINTMENT (OUTPATIENT)
Dept: INTERNAL MEDICINE | Facility: CLINIC | Age: 75
End: 2020-02-24
Payer: MEDICARE

## 2020-02-24 VITALS
SYSTOLIC BLOOD PRESSURE: 155 MMHG | HEIGHT: 64 IN | OXYGEN SATURATION: 100 % | HEART RATE: 94 BPM | WEIGHT: 150 LBS | DIASTOLIC BLOOD PRESSURE: 83 MMHG | BODY MASS INDEX: 25.61 KG/M2 | TEMPERATURE: 98.6 F

## 2020-02-24 DIAGNOSIS — L73.9 FOLLICULAR DISORDER, UNSPECIFIED: ICD-10-CM

## 2020-02-24 PROCEDURE — 36415 COLL VENOUS BLD VENIPUNCTURE: CPT

## 2020-02-24 PROCEDURE — 99214 OFFICE O/P EST MOD 30 MIN: CPT | Mod: 25

## 2020-02-25 LAB
BASOPHILS # BLD AUTO: 0.08 K/UL
BASOPHILS NFR BLD AUTO: 1.1 %
EOSINOPHIL # BLD AUTO: 0.37 K/UL
EOSINOPHIL NFR BLD AUTO: 5 %
HCT VFR BLD CALC: 36.8 %
HGB BLD-MCNC: 10.7 G/DL
IMM GRANULOCYTES NFR BLD AUTO: 0.3 %
LYMPHOCYTES # BLD AUTO: 2.04 K/UL
LYMPHOCYTES NFR BLD AUTO: 27.3 %
MAN DIFF?: NORMAL
MCHC RBC-ENTMCNC: 24.4 PG
MCHC RBC-ENTMCNC: 29.1 GM/DL
MCV RBC AUTO: 84 FL
MONOCYTES # BLD AUTO: 0.42 K/UL
MONOCYTES NFR BLD AUTO: 5.6 %
NEUTROPHILS # BLD AUTO: 4.53 K/UL
NEUTROPHILS NFR BLD AUTO: 60.7 %
PLATELET # BLD AUTO: 310 K/UL
RBC # BLD: 4.38 M/UL
RBC # FLD: 23.6 %
WBC # FLD AUTO: 7.46 K/UL

## 2020-03-12 ENCOUNTER — MED ADMIN CHARGE (OUTPATIENT)
Age: 75
End: 2020-03-12

## 2020-03-12 ENCOUNTER — APPOINTMENT (OUTPATIENT)
Dept: PULMONOLOGY | Facility: CLINIC | Age: 75
End: 2020-03-12
Payer: MEDICARE

## 2020-03-12 VITALS
HEART RATE: 95 BPM | DIASTOLIC BLOOD PRESSURE: 80 MMHG | HEIGHT: 64 IN | SYSTOLIC BLOOD PRESSURE: 120 MMHG | TEMPERATURE: 98.1 F | BODY MASS INDEX: 25.61 KG/M2 | WEIGHT: 150 LBS | OXYGEN SATURATION: 97 %

## 2020-03-12 PROCEDURE — 99204 OFFICE O/P NEW MOD 45 MIN: CPT | Mod: 25

## 2020-03-12 PROCEDURE — ZZZZZ: CPT

## 2020-03-12 NOTE — PHYSICAL EXAM
[No Acute Distress] : no acute distress [Normal Oropharynx] : normal oropharynx [Normal Appearance] : normal appearance [Normal Rate/Rhythm] : normal rate/rhythm [Normal S1, S2] : normal s1, s2 [No Murmurs] : no murmurs [No Resp Distress] : no resp distress [Clear to Auscultation Bilaterally] : clear to auscultation bilaterally [Benign] : benign [No Clubbing] : no clubbing [No Edema] : no edema [Normal Affect] : normal affect

## 2020-03-12 NOTE — ASSESSMENT
[FreeTextEntry1] : Data reviewed:\par \par Eos 370/ul 2/20\par \par Cardiac CT 5/2019 personally reviewed: small peripheral nodule subpleural R; atx or scar at bases\par \par Kai in lab 03/12/2020 : mild restriction w sig response to BD in FVC / couldn't do FENO\par \par Impression:\par Intermittent cough - stated hx asthma\par \par Plan:\par There is no evidence for asthma today. There is certainly no process that requires prednisone, which is what she wants. Her Pulmicort technique is fine - I watched her. I would recommend keeping her on the Pulmicort and observing her. She should not be on chronic steroids. I did give her a flu vaccine. She and her  were very courteous but she was clearly disappointed not to receive prednisone. Of course she's welcome to get a 2nd opinion. And she is welcome to follow w me for any worsening of symptoms.

## 2020-03-12 NOTE — CONSULT LETTER
[Dear  ___] : Dear  [unfilled], [( Thank you for referring [unfilled] for consultation for _____ )] : Thank you for referring [unfilled] for consultation for [unfilled] [Please see my note below.] : Please see my note below. [Consult Closing:] : Thank you very much for allowing me to participate in the care of this patient.  If you have any questions, please do not hesitate to contact me. [Sincerely,] : Sincerely, [FreeTextEntry2] : Chuyita Cueto, DO\par [address], [address]nd floor\[place] [address] [FreeTextEntry3] : Migdalia Sarabia MD, FCCP\par

## 2020-03-12 NOTE — HISTORY OF PRESENT ILLNESS
[TextBox_4] : 03/12/2020: Asked to evaluate patient by Dr Cueto for asthma. Here w . Previously under care of Dr Cabrera but he's away. Asthma since age 20. Under Dr Cabrera's care she was typically on prednisone more days than not, and she used albuterol as needed. Has been awhile since she was on prednisone. Dr Cueto gave her Pulmicort in Feb but she doesn't know if she's doing it right. She takes 180mcg daily. Takes Ventolin 3-4x a day. Yesterday had a bad cough. Has dyspnea and wheeze. Currently having nocturnal awakenings. Hospitalized for asthma but can't recall when - not in past year. No prednisone in past year.

## 2020-04-16 ENCOUNTER — APPOINTMENT (OUTPATIENT)
Dept: NEUROLOGY | Facility: CLINIC | Age: 75
End: 2020-04-16

## 2020-05-02 ENCOUNTER — RX RENEWAL (OUTPATIENT)
Age: 75
End: 2020-05-02

## 2020-05-13 ENCOUNTER — APPOINTMENT (OUTPATIENT)
Dept: HEART AND VASCULAR | Facility: CLINIC | Age: 75
End: 2020-05-13
Payer: MEDICARE

## 2020-05-13 PROCEDURE — 99442: CPT

## 2020-05-15 ENCOUNTER — LABORATORY RESULT (OUTPATIENT)
Age: 75
End: 2020-05-15

## 2020-05-15 ENCOUNTER — APPOINTMENT (OUTPATIENT)
Dept: HEART AND VASCULAR | Facility: CLINIC | Age: 75
End: 2020-05-15
Payer: MEDICARE

## 2020-05-15 ENCOUNTER — NON-APPOINTMENT (OUTPATIENT)
Age: 75
End: 2020-05-15

## 2020-05-15 VITALS
BODY MASS INDEX: 24.07 KG/M2 | HEART RATE: 143 BPM | WEIGHT: 140.99 LBS | HEIGHT: 64 IN | OXYGEN SATURATION: 98 % | DIASTOLIC BLOOD PRESSURE: 78 MMHG | TEMPERATURE: 99.2 F | SYSTOLIC BLOOD PRESSURE: 130 MMHG

## 2020-05-15 PROCEDURE — 99215 OFFICE O/P EST HI 40 MIN: CPT

## 2020-05-15 PROCEDURE — 93000 ELECTROCARDIOGRAM COMPLETE: CPT

## 2020-05-15 RX ORDER — ASPIRIN ENTERIC COATED TABLETS 81 MG 81 MG/1
81 TABLET, DELAYED RELEASE ORAL DAILY
Qty: 90 | Refills: 0 | Status: DISCONTINUED | COMMUNITY
Start: 2017-08-29 | End: 2020-05-15

## 2020-05-15 NOTE — HISTORY OF PRESENT ILLNESS
[FreeTextEntry1] : 75 f with chest pain history of asd repaired surgically IDDM atrial fibrillation s/p ablation by dr nia gregory due to spontaneous SDH ILR c/o shortness of breath for 1-2 weeks was off her metoprolol using her inhalers a lot \par \par ecg atrial tachycardia nsst changes \par normal nuclear stress test 5/2019\par

## 2020-05-15 NOTE — PHYSICAL EXAM
[General Appearance - Well Developed] : well developed [Normal Appearance] : normal appearance [Well Groomed] : well groomed [No Deformities] : no deformities [General Appearance - Well Nourished] : well nourished [General Appearance - In No Acute Distress] : no acute distress [Normal Conjunctiva] : the conjunctiva exhibited no abnormalities [Eyelids - No Xanthelasma] : the eyelids demonstrated no xanthelasmas [No Oral Pallor] : no oral pallor [Normal Oral Mucosa] : normal oral mucosa [Normal Jugular Venous A Waves Present] : normal jugular venous A waves present [No Oral Cyanosis] : no oral cyanosis [No Jugular Venous Marinelli A Waves] : no jugular venous marinelli A waves [Normal Jugular Venous V Waves Present] : normal jugular venous V waves present [Exaggerated Use Of Accessory Muscles For Inspiration] : no accessory muscle use [Respiration, Rhythm And Depth] : normal respiratory rhythm and effort [Auscultation Breath Sounds / Voice Sounds] : lungs were clear to auscultation bilaterally [Heart Rate And Rhythm] : heart rate and rhythm were normal [Abdomen Soft] : soft [Murmurs] : no murmurs present [Heart Sounds] : normal S1 and S2 [Abdomen Mass (___ Cm)] : no abdominal mass palpated [Abdomen Tenderness] : non-tender [Abnormal Walk] : normal gait [Nail Clubbing] : no clubbing of the fingernails [Gait - Sufficient For Exercise Testing] : the gait was sufficient for exercise testing [Cyanosis, Localized] : no localized cyanosis [Petechial Hemorrhages (___cm)] : no petechial hemorrhages [] : no rash [Skin Color & Pigmentation] : normal skin color and pigmentation [No Venous Stasis] : no venous stasis [No Xanthoma] : no  xanthoma was observed [No Skin Ulcers] : no skin ulcer [Skin Lesions] : no skin lesions [Oriented To Time, Place, And Person] : oriented to person, place, and time [Affect] : the affect was normal [Mood] : the mood was normal [No Anxiety] : not feeling anxious

## 2020-05-15 NOTE — ASSESSMENT
[FreeTextEntry1] : she is in atrial tachycardia resume toprol xl 25 mg po bid\par she will return monday if still in atach will cardiovert her\par home covid swab\par htn controlled\par will need echo and stress test when this has resolved\par \par time spent talking to daughter and EPS

## 2020-05-18 ENCOUNTER — APPOINTMENT (OUTPATIENT)
Dept: HEART AND VASCULAR | Facility: CLINIC | Age: 75
End: 2020-05-18
Payer: MEDICARE

## 2020-05-18 ENCOUNTER — LABORATORY RESULT (OUTPATIENT)
Age: 75
End: 2020-05-18

## 2020-05-18 LAB
ALBUMIN SERPL ELPH-MCNC: 4.4 G/DL
ALP BLD-CCNC: 97 U/L
ALT SERPL-CCNC: 17 U/L
ANION GAP SERPL CALC-SCNC: 19 MMOL/L
AST SERPL-CCNC: 35 U/L
BASOPHILS # BLD AUTO: 0 K/UL
BASOPHILS NFR BLD AUTO: 0 %
BILIRUB SERPL-MCNC: 0.4 MG/DL
BUN SERPL-MCNC: 22 MG/DL
CALCIUM SERPL-MCNC: 9.6 MG/DL
CHLORIDE SERPL-SCNC: 101 MMOL/L
CHOLEST SERPL-MCNC: 160 MG/DL
CHOLEST/HDLC SERPL: 1.5 RATIO
CO2 SERPL-SCNC: 20 MMOL/L
CREAT SERPL-MCNC: 0.8 MG/DL
EOSINOPHIL # BLD AUTO: 0.44 K/UL
EOSINOPHIL NFR BLD AUTO: 4.4 %
GLUCOSE SERPL-MCNC: 107 MG/DL
HCT VFR BLD CALC: 34.6 %
HDLC SERPL-MCNC: 105 MG/DL
HGB BLD-MCNC: 10.3 G/DL
LDLC SERPL CALC-MCNC: 43 MG/DL
LYMPHOCYTES # BLD AUTO: 0.89 K/UL
LYMPHOCYTES NFR BLD AUTO: 8.9 %
MAN DIFF?: NORMAL
MCHC RBC-ENTMCNC: 26 PG
MCHC RBC-ENTMCNC: 29.8 GM/DL
MCV RBC AUTO: 87.4 FL
MONOCYTES # BLD AUTO: 0.35 K/UL
MONOCYTES NFR BLD AUTO: 3.5 %
NEUTROPHILS # BLD AUTO: 8.36 K/UL
NEUTROPHILS NFR BLD AUTO: 83.2 %
NT-PROBNP SERPL-MCNC: 159 PG/ML
PLATELET # BLD AUTO: 340 K/UL
POTASSIUM SERPL-SCNC: 5.1 MMOL/L
PROT SERPL-MCNC: 8.8 G/DL
RBC # BLD: 3.96 M/UL
RBC # FLD: 20.2 %
SODIUM SERPL-SCNC: 140 MMOL/L
TRIGL SERPL-MCNC: 59 MG/DL
WBC # FLD AUTO: 10.05 K/UL

## 2020-05-18 PROCEDURE — 99214 OFFICE O/P EST MOD 30 MIN: CPT | Mod: 95

## 2020-05-20 ENCOUNTER — APPOINTMENT (OUTPATIENT)
Dept: INTERNAL MEDICINE | Facility: CLINIC | Age: 75
End: 2020-05-20
Payer: MEDICARE

## 2020-05-20 PROCEDURE — 99214 OFFICE O/P EST MOD 30 MIN: CPT | Mod: 95

## 2020-05-22 ENCOUNTER — APPOINTMENT (OUTPATIENT)
Dept: HEART AND VASCULAR | Facility: CLINIC | Age: 75
End: 2020-05-22
Payer: MEDICARE

## 2020-05-22 PROCEDURE — 99214 OFFICE O/P EST MOD 30 MIN: CPT | Mod: 95

## 2020-05-22 RX ORDER — HYDROXYZINE PAMOATE 50 MG/1
50 CAPSULE ORAL
Qty: 60 | Refills: 0 | Status: DISCONTINUED | COMMUNITY
Start: 2018-12-17 | End: 2020-05-22

## 2020-05-22 RX ORDER — HYDROXYZINE HYDROCHLORIDE 25 MG/1
25 TABLET ORAL
Qty: 30 | Refills: 0 | Status: DISCONTINUED | COMMUNITY
Start: 2019-05-03 | End: 2020-05-22

## 2020-05-22 RX ORDER — BUDESONIDE 90 UG/1
90 AEROSOL, POWDER RESPIRATORY (INHALATION)
Qty: 1 | Refills: 2 | Status: DISCONTINUED | COMMUNITY
Start: 2020-02-24 | End: 2020-05-22

## 2020-05-22 RX ORDER — DONEPEZIL HYDROCHLORIDE 5 MG/1
5 TABLET ORAL
Qty: 30 | Refills: 2 | Status: DISCONTINUED | COMMUNITY
Start: 2018-03-27 | End: 2020-05-22

## 2020-05-27 ENCOUNTER — APPOINTMENT (OUTPATIENT)
Dept: INTERNAL MEDICINE | Facility: CLINIC | Age: 75
End: 2020-05-27

## 2020-06-11 ENCOUNTER — APPOINTMENT (OUTPATIENT)
Dept: INTERNAL MEDICINE | Facility: CLINIC | Age: 75
End: 2020-06-11
Payer: MEDICARE
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
## 2020-06-11 PROCEDURE — 99213 OFFICE O/P EST LOW 20 MIN: CPT | Mod: 95

## 2020-06-12 ENCOUNTER — NON-APPOINTMENT (OUTPATIENT)
Age: 75
End: 2020-06-12

## 2020-06-12 ENCOUNTER — APPOINTMENT (OUTPATIENT)
Dept: HEART AND VASCULAR | Facility: CLINIC | Age: 75
End: 2020-06-12
Payer: MEDICARE

## 2020-06-12 VITALS
TEMPERATURE: 100 F | DIASTOLIC BLOOD PRESSURE: 78 MMHG | HEIGHT: 64 IN | WEIGHT: 142.99 LBS | HEART RATE: 85 BPM | BODY MASS INDEX: 24.41 KG/M2 | OXYGEN SATURATION: 97 % | SYSTOLIC BLOOD PRESSURE: 150 MMHG

## 2020-06-12 PROCEDURE — 36415 COLL VENOUS BLD VENIPUNCTURE: CPT

## 2020-06-12 PROCEDURE — 99214 OFFICE O/P EST MOD 30 MIN: CPT

## 2020-06-12 PROCEDURE — 93000 ELECTROCARDIOGRAM COMPLETE: CPT

## 2020-06-12 NOTE — PHYSICAL EXAM
[General Appearance - Well Developed] : well developed [Normal Appearance] : normal appearance [Well Groomed] : well groomed [General Appearance - Well Nourished] : well nourished [No Deformities] : no deformities [Normal Conjunctiva] : the conjunctiva exhibited no abnormalities [General Appearance - In No Acute Distress] : no acute distress [Eyelids - No Xanthelasma] : the eyelids demonstrated no xanthelasmas [Normal Oral Mucosa] : normal oral mucosa [No Oral Pallor] : no oral pallor [No Oral Cyanosis] : no oral cyanosis [Normal Jugular Venous A Waves Present] : normal jugular venous A waves present [No Jugular Venous Marinelli A Waves] : no jugular venous marinelli A waves [Normal Jugular Venous V Waves Present] : normal jugular venous V waves present [Respiration, Rhythm And Depth] : normal respiratory rhythm and effort [Exaggerated Use Of Accessory Muscles For Inspiration] : no accessory muscle use [Auscultation Breath Sounds / Voice Sounds] : lungs were clear to auscultation bilaterally [Heart Rate And Rhythm] : heart rate and rhythm were normal [Heart Sounds] : normal S1 and S2 [Murmurs] : no murmurs present [Abdomen Soft] : soft [Abdomen Tenderness] : non-tender [Abdomen Mass (___ Cm)] : no abdominal mass palpated [Abnormal Walk] : normal gait [Gait - Sufficient For Exercise Testing] : the gait was sufficient for exercise testing [Nail Clubbing] : no clubbing of the fingernails [Petechial Hemorrhages (___cm)] : no petechial hemorrhages [Cyanosis, Localized] : no localized cyanosis [] : no ischemic changes [Skin Color & Pigmentation] : normal skin color and pigmentation [No Skin Ulcers] : no skin ulcer [Skin Lesions] : no skin lesions [No Venous Stasis] : no venous stasis [No Xanthoma] : no  xanthoma was observed [Oriented To Time, Place, And Person] : oriented to person, place, and time [No Anxiety] : not feeling anxious [Mood] : the mood was normal [Affect] : the affect was normal

## 2020-06-12 NOTE — ASSESSMENT
[FreeTextEntry1] : no further atrial tach\par htn controlled\par will need echo and stress test still with exertional sob\par \par time spent talking to daughter \par \par fu after testing

## 2020-06-12 NOTE — PROCEDURE
[Lexiscan] : Lexiscan [Tc-99m, sestamibi-Cardiolite, to 40mCi, dose-Radionuclides-[facility]mg[facility] code--v.1-Active-Trade] : Tc-99m, sestamibi-Cardiolite, to 40mCi, dose-Radionuclides-Omg[employer] code--v.1-Active-Trade [Normal] : no significant [de-identified] : Dr. Willy Santos     Performing Provider: Dr. Willy Santos [de-identified] : CP, DM, HTN, Abnormal ECG.     Gender: Female [de-identified] : Khoi Sun [de-identified] : Given via the IV route [de-identified] : Given via the IV route.    1 Day Protocol. [de-identified] : 28.7 [de-identified] : 9.5 [de-identified] : 61 [de-identified] : 130/70 [TWNoteComboBox1] : JIM [de-identified] : 69 [FreeTextEntry1] : image quality is good\par resting ef 66%\par post stress ef 69%\par prior study NA\par artifacts None\par blood pressure remained stable at 120/70 mmHg heart rate increased to 103 bpm

## 2020-06-14 LAB
SARS-COV-2 IGG SERPL IA-ACNC: 21.7 INDEX
SARS-COV-2 IGG SERPL QL IA: POSITIVE

## 2020-06-24 ENCOUNTER — APPOINTMENT (OUTPATIENT)
Dept: HEART AND VASCULAR | Facility: CLINIC | Age: 75
End: 2020-06-24
Payer: MEDICARE

## 2020-06-24 ENCOUNTER — NON-APPOINTMENT (OUTPATIENT)
Age: 75
End: 2020-06-24

## 2020-06-24 VITALS
OXYGEN SATURATION: 98 % | DIASTOLIC BLOOD PRESSURE: 78 MMHG | SYSTOLIC BLOOD PRESSURE: 150 MMHG | BODY MASS INDEX: 24.58 KG/M2 | HEART RATE: 123 BPM | WEIGHT: 143.98 LBS | HEIGHT: 64 IN | TEMPERATURE: 99.5 F

## 2020-06-24 VITALS — WEIGHT: 142.99 LBS | BODY MASS INDEX: 24.41 KG/M2 | HEIGHT: 64 IN

## 2020-06-24 DIAGNOSIS — R94.31 ABNORMAL ELECTROCARDIOGRAM [ECG] [EKG]: ICD-10-CM

## 2020-06-24 PROCEDURE — 78452 HT MUSCLE IMAGE SPECT MULT: CPT

## 2020-06-24 PROCEDURE — ZZZZZ: CPT

## 2020-06-24 PROCEDURE — 99214 OFFICE O/P EST MOD 30 MIN: CPT

## 2020-06-24 PROCEDURE — A9500: CPT

## 2020-06-24 PROCEDURE — 93015 CV STRESS TEST SUPVJ I&R: CPT

## 2020-06-24 NOTE — HISTORY OF PRESENT ILLNESS
[FreeTextEntry1] : 75 F with chest pain history of asd repaired surgically IDDM atrial fibrillation s/p ablation by dr nia iverson ac due to spontaneous SDH ILR now with continued shortness of breath stress test today is normal is back in an atrial tachycardia \par \par ecg atrial tachycardia nsst changes \par normal nuclear stress test 6/2020\par

## 2020-06-24 NOTE — PROCEDURE
[Lexiscan] : Lexiscan [Tc-99m, sestamibi-Cardiolite, to 40mCi, dose-Radionuclides-[employer] code--v.1-Active-Trade] : Tc-99m, sestamibi-Cardiolite, to 40mCi, dose-Radionuclides-[employer] code--v.1-Active-Trade [Normal] : no significant [de-identified] : Dr. Willy Santos     Performing Provider: Dr. Willy Santos [de-identified] : Khoi Sun [de-identified] : CP, DM, HTN, Abnormal ECG.     Gender: Female [de-identified] : 9.5 [de-identified] : Given via the IV route.    1 Day Protocol. [de-identified] : Given via the IV route [de-identified] : 28.7 [de-identified] : 61 [de-identified] : 69 [TWNoteComboBox1] : JIM [de-identified] : 130/70 [FreeTextEntry1] : image quality is good\par resting ef 66%\par post stress ef 69%\par prior study NA\par artifacts None\par blood pressure remained stable at 120/70 mmHg heart rate increased to 103 bpm

## 2020-06-24 NOTE — ASSESSMENT
[FreeTextEntry1] : she is in atrial tachycardia resume toprol xl 50 mg po bid\par has leg edema spoke to EPS normal EF on stress testing needs cardioversion ? possible ablation \par htn controlled\par fu in one week \par \par time spent talking to daughter and EPS

## 2020-06-24 NOTE — PHYSICAL EXAM
[Normal Appearance] : normal appearance [General Appearance - Well Developed] : well developed [Well Groomed] : well groomed [General Appearance - Well Nourished] : well nourished [No Deformities] : no deformities [Normal Conjunctiva] : the conjunctiva exhibited no abnormalities [General Appearance - In No Acute Distress] : no acute distress [Normal Oral Mucosa] : normal oral mucosa [No Oral Pallor] : no oral pallor [Eyelids - No Xanthelasma] : the eyelids demonstrated no xanthelasmas [No Oral Cyanosis] : no oral cyanosis [Normal Jugular Venous A Waves Present] : normal jugular venous A waves present [No Jugular Venous Marinelli A Waves] : no jugular venous marinelli A waves [Normal Jugular Venous V Waves Present] : normal jugular venous V waves present [Respiration, Rhythm And Depth] : normal respiratory rhythm and effort [Exaggerated Use Of Accessory Muscles For Inspiration] : no accessory muscle use [Heart Rate And Rhythm] : heart rate and rhythm were normal [Heart Sounds] : normal S1 and S2 [Auscultation Breath Sounds / Voice Sounds] : lungs were clear to auscultation bilaterally [Murmurs] : no murmurs present [Abdomen Soft] : soft [Abdomen Mass (___ Cm)] : no abdominal mass palpated [Abdomen Tenderness] : non-tender [Gait - Sufficient For Exercise Testing] : the gait was sufficient for exercise testing [Abnormal Walk] : normal gait [Nail Clubbing] : no clubbing of the fingernails [Cyanosis, Localized] : no localized cyanosis [Petechial Hemorrhages (___cm)] : no petechial hemorrhages [Skin Color & Pigmentation] : normal skin color and pigmentation [] : no rash [No Venous Stasis] : no venous stasis [Skin Lesions] : no skin lesions [No Skin Ulcers] : no skin ulcer [Oriented To Time, Place, And Person] : oriented to person, place, and time [No Xanthoma] : no  xanthoma was observed [Affect] : the affect was normal [No Anxiety] : not feeling anxious [Mood] : the mood was normal [FreeTextEntry1] : left leg edema

## 2020-06-29 ENCOUNTER — APPOINTMENT (OUTPATIENT)
Dept: HEART AND VASCULAR | Facility: CLINIC | Age: 75
End: 2020-06-29
Payer: MEDICARE

## 2020-06-29 ENCOUNTER — NON-APPOINTMENT (OUTPATIENT)
Age: 75
End: 2020-06-29

## 2020-06-29 VITALS
BODY MASS INDEX: 23.9 KG/M2 | WEIGHT: 140 LBS | TEMPERATURE: 99 F | SYSTOLIC BLOOD PRESSURE: 144 MMHG | HEART RATE: 85 BPM | DIASTOLIC BLOOD PRESSURE: 87 MMHG | HEIGHT: 64 IN | OXYGEN SATURATION: 99 %

## 2020-06-29 PROCEDURE — 93000 ELECTROCARDIOGRAM COMPLETE: CPT | Mod: 59

## 2020-06-29 PROCEDURE — 93291 INTERROG DEV EVAL SCRMS IP: CPT

## 2020-06-29 PROCEDURE — 99214 OFFICE O/P EST MOD 30 MIN: CPT

## 2020-06-29 RX ORDER — INSULIN GLARGINE 100 [IU]/ML
100 INJECTION, SOLUTION SUBCUTANEOUS
Qty: 45 | Refills: 0 | Status: DISCONTINUED | COMMUNITY
Start: 2019-03-07 | End: 2020-06-29

## 2020-07-13 ENCOUNTER — APPOINTMENT (OUTPATIENT)
Dept: HEART AND VASCULAR | Facility: CLINIC | Age: 75
End: 2020-07-13
Payer: MEDICARE

## 2020-07-13 PROCEDURE — 99214 OFFICE O/P EST MOD 30 MIN: CPT | Mod: 95

## 2020-07-13 RX ORDER — TORSEMIDE 5 MG/1
5 TABLET ORAL
Qty: 30 | Refills: 2 | Status: DISCONTINUED | COMMUNITY
Start: 2020-07-13 | End: 2020-07-13

## 2020-07-14 RX ORDER — SITAGLIPTIN 100 MG/1
100 TABLET, FILM COATED ORAL
Qty: 90 | Refills: 3 | Status: DISCONTINUED | COMMUNITY
Start: 2020-05-22 | End: 2020-07-14

## 2020-07-14 NOTE — PROCEDURE
[de-identified] : medtronic reveal LINQ [id number]\par 7/10/18\par good battery \par good sensing \par episodes At \par rates 50-100s

## 2020-07-14 NOTE — HISTORY OF PRESENT ILLNESS
[Palpitations] : no palpitations [SOB] : no dyspnea [Syncope] : no syncope [Dizziness] : no dizziness [Chest Pain] : no chest pain or discomfort [Pain at Site] : no pain at device site [Erythema at Site] : no erythema at device site [Swelling at Site] : no swelling at device site [FreeTextEntry1] : Ms. Ambrosio is a 75 year-old female with hypertension, hyperlipidemia, ASD s/p surgical repair, type II DM (insulin-dependent), atrial fibrillation s/p PVI, and spontaneous SDH (AC now being held) s/p ILR , who was recently found to be in atrial tachycardia and presents for follow up.\par \par She had an ILR implanted for surveillance of afib as anticoagulation is being held secondary to history of a subdural hematoma.  Since device implant she has been doing well until recently when she noted more fatigue and MCDONNELL.  No palpitations, syncope, near syncope or chest pain. No device related complaints.    Since her last visit she saw Dr. Valenzuela and was found to be in atrial tachycardia.  Her Metoprolol was increased.

## 2020-07-14 NOTE — REVIEW OF SYSTEMS
[see HPI] : see HPI [Negative] : Heme/Lymph [Feeling Fatigued] : feeling fatigued [Fever] : no fever [Recent Weight Gain (___ Lbs)] : no recent weight gain [Chills] : no chills [Recent Weight Loss (___ Lbs)] : no recent weight loss

## 2020-07-14 NOTE — DISCUSSION/SUMMARY
[FreeTextEntry1] : Ms. Ambrosio is a 75 year-old female with hypertension, hyperlipidemia, ASD s/p surgical repair, type II DM (insulin-dependent), atrial fibrillation s/p PVI, and spontaneous SDH (AC now being held) s/p ILR , who was recently found to be in atrial tachycardia and presents for follow up.  She was in atrial tachycardia and is now in normal sinus rhythm.  We discussed that we can proceed with an EP study but can only do an ablation if it is a atrial tachycardia on the right side.  SHe is now in NSR and doing well.  WE will check a remote transmission in a few weeks and she will follow up with her cardiologist.  If she has more events we will consider proceeding with an EP study.  Of note in the past she was cleared for short term anticoagulation and was offered a WATCHMAN procedure, which she deferred.  She will follow up in 5-6 weeks or sooner if needed.  She knows to call with any questions or concerns.

## 2020-07-14 NOTE — PHYSICAL EXAM
[General Appearance - Well Developed] : well developed [Normal Appearance] : normal appearance [Well Groomed] : well groomed [General Appearance - Well Nourished] : well nourished [No Deformities] : no deformities [Heart Sounds] : normal S1 and S2 [Heart Rate And Rhythm] : heart rate and rhythm were normal [General Appearance - In No Acute Distress] : no acute distress [Respiration, Rhythm And Depth] : normal respiratory rhythm and effort [Edema] : no peripheral edema present [Clean] : clean [Exaggerated Use Of Accessory Muscles For Inspiration] : no accessory muscle use [Well-Healed] : well-healed [Dry] : dry [] : no ischemic changes [Normal Oral Mucosa] : normal oral mucosa [Normal Jugular Venous V Waves Present] : normal jugular venous V waves present [Abnormal Walk] : normal gait [Palpable Crepitus] : no palpable crepitus [Bleeding] : no active bleeding [Foul Odor] : no foul smell [Purulent Drainage] : no purulent drainage [Serosanguineous Drainage] : no serosanquineous drainage [Serous Drainage] : no serous drainage [Erythema] : not erythematous [Warm] : not warm [Tender] : not tender [Indurated] : not indurated [Fluctuant] : not fluctuant [FreeTextEntry1] : mid L chest

## 2020-07-22 ENCOUNTER — APPOINTMENT (OUTPATIENT)
Dept: HEART AND VASCULAR | Facility: CLINIC | Age: 75
End: 2020-07-22
Payer: MEDICARE

## 2020-07-22 PROCEDURE — 93306 TTE W/DOPPLER COMPLETE: CPT

## 2020-07-22 PROCEDURE — 93970 EXTREMITY STUDY: CPT

## 2020-07-22 RX ORDER — ASPIRIN 81 MG/1
81 TABLET, COATED ORAL
Qty: 90 | Refills: 0 | Status: DISCONTINUED | COMMUNITY
Start: 2020-02-03 | End: 2020-07-22

## 2020-07-22 NOTE — ED ADULT NURSE NOTE - CAS DISCH TRANSFER METHOD
Private car

## 2020-07-25 ENCOUNTER — INPATIENT (INPATIENT)
Facility: HOSPITAL | Age: 75
LOS: 1 days | Discharge: HOME | DRG: 176 | End: 2020-07-27
Attending: INTERNAL MEDICINE | Admitting: INTERNAL MEDICINE
Payer: MEDICARE

## 2020-07-25 VITALS
WEIGHT: 145.06 LBS | RESPIRATION RATE: 18 BRPM | SYSTOLIC BLOOD PRESSURE: 150 MMHG | TEMPERATURE: 98 F | DIASTOLIC BLOOD PRESSURE: 80 MMHG | HEIGHT: 67 IN | HEART RATE: 90 BPM | OXYGEN SATURATION: 98 %

## 2020-07-25 DIAGNOSIS — I25.10 ATHEROSCLEROTIC HEART DISEASE OF NATIVE CORONARY ARTERY WITHOUT ANGINA PECTORIS: ICD-10-CM

## 2020-07-25 DIAGNOSIS — Z96.651 PRESENCE OF RIGHT ARTIFICIAL KNEE JOINT: Chronic | ICD-10-CM

## 2020-07-25 DIAGNOSIS — I26.99 OTHER PULMONARY EMBOLISM WITHOUT ACUTE COR PULMONALE: ICD-10-CM

## 2020-07-25 DIAGNOSIS — Z86.79 PERSONAL HISTORY OF OTHER DISEASES OF THE CIRCULATORY SYSTEM: ICD-10-CM

## 2020-07-25 DIAGNOSIS — I80.10 PHLEBITIS AND THROMBOPHLEBITIS OF UNSPECIFIED FEMORAL VEIN: ICD-10-CM

## 2020-07-25 DIAGNOSIS — I82.409 ACUTE EMBOLISM AND THROMBOSIS OF UNSPECIFIED DEEP VEINS OF UNSPECIFIED LOWER EXTREMITY: ICD-10-CM

## 2020-07-25 DIAGNOSIS — I62.01 NONTRAUMATIC ACUTE SUBDURAL HEMORRHAGE: Chronic | ICD-10-CM

## 2020-07-25 DIAGNOSIS — J45.909 UNSPECIFIED ASTHMA, UNCOMPLICATED: ICD-10-CM

## 2020-07-25 DIAGNOSIS — E11.9 TYPE 2 DIABETES MELLITUS WITHOUT COMPLICATIONS: ICD-10-CM

## 2020-07-25 DIAGNOSIS — I26.94 MULTIPLE SUBSEGMENTAL PULMONARY EMBOLI WITHOUT ACUTE COR PULMONALE: ICD-10-CM

## 2020-07-25 DIAGNOSIS — I10 ESSENTIAL (PRIMARY) HYPERTENSION: ICD-10-CM

## 2020-07-25 LAB
GLUCOSE BLDC GLUCOMTR-MCNC: 107 MG/DL — HIGH (ref 70–99)
NT-PROBNP SERPL-SCNC: 324 PG/ML — HIGH (ref 0–300)
SARS-COV-2 RNA SPEC QL NAA+PROBE: SIGNIFICANT CHANGE UP

## 2020-07-25 PROCEDURE — 71045 X-RAY EXAM CHEST 1 VIEW: CPT | Mod: 26

## 2020-07-25 PROCEDURE — 99285 EMERGENCY DEPT VISIT HI MDM: CPT | Mod: 25

## 2020-07-25 PROCEDURE — 93971 EXTREMITY STUDY: CPT | Mod: 26,RT

## 2020-07-25 PROCEDURE — 99223 1ST HOSP IP/OBS HIGH 75: CPT | Mod: GC

## 2020-07-25 PROCEDURE — 71275 CT ANGIOGRAPHY CHEST: CPT | Mod: 26

## 2020-07-25 PROCEDURE — 93010 ELECTROCARDIOGRAM REPORT: CPT

## 2020-07-25 PROCEDURE — 99232 SBSQ HOSP IP/OBS MODERATE 35: CPT

## 2020-07-25 RX ORDER — INSULIN LISPRO 100/ML
3 VIAL (ML) SUBCUTANEOUS
Refills: 0 | Status: DISCONTINUED | OUTPATIENT
Start: 2020-07-25 | End: 2020-07-26

## 2020-07-25 RX ORDER — ENOXAPARIN SODIUM 100 MG/ML
65 INJECTION SUBCUTANEOUS ONCE
Refills: 0 | Status: COMPLETED | OUTPATIENT
Start: 2020-07-25 | End: 2020-07-25

## 2020-07-25 RX ORDER — INSULIN GLARGINE 100 [IU]/ML
33 INJECTION, SOLUTION SUBCUTANEOUS AT BEDTIME
Refills: 0 | Status: DISCONTINUED | OUTPATIENT
Start: 2020-07-25 | End: 2020-07-25

## 2020-07-25 RX ORDER — ENOXAPARIN SODIUM 100 MG/ML
65 INJECTION SUBCUTANEOUS EVERY 12 HOURS
Refills: 0 | Status: DISCONTINUED | OUTPATIENT
Start: 2020-07-26 | End: 2020-07-26

## 2020-07-25 RX ORDER — DEXTROSE 50 % IN WATER 50 %
25 SYRINGE (ML) INTRAVENOUS ONCE
Refills: 0 | Status: DISCONTINUED | OUTPATIENT
Start: 2020-07-25 | End: 2020-07-26

## 2020-07-25 RX ORDER — ACETAMINOPHEN 500 MG
650 TABLET ORAL EVERY 4 HOURS
Refills: 0 | Status: DISCONTINUED | OUTPATIENT
Start: 2020-07-25 | End: 2020-07-27

## 2020-07-25 RX ORDER — METOPROLOL TARTRATE 50 MG
50 TABLET ORAL DAILY
Refills: 0 | Status: DISCONTINUED | OUTPATIENT
Start: 2020-07-25 | End: 2020-07-27

## 2020-07-25 RX ORDER — BENZTROPINE MESYLATE 1 MG
1 TABLET ORAL
Refills: 0 | Status: DISCONTINUED | OUTPATIENT
Start: 2020-07-25 | End: 2020-07-27

## 2020-07-25 RX ORDER — HEPARIN SODIUM 5000 [USP'U]/ML
INJECTION INTRAVENOUS; SUBCUTANEOUS
Qty: 25000 | Refills: 0 | Status: DISCONTINUED | OUTPATIENT
Start: 2020-07-25 | End: 2020-07-25

## 2020-07-25 RX ORDER — INSULIN GLARGINE 100 [IU]/ML
25 INJECTION, SOLUTION SUBCUTANEOUS AT BEDTIME
Refills: 0 | Status: DISCONTINUED | OUTPATIENT
Start: 2020-07-25 | End: 2020-07-26

## 2020-07-25 RX ORDER — GLUCAGON INJECTION, SOLUTION 0.5 MG/.1ML
1 INJECTION, SOLUTION SUBCUTANEOUS ONCE
Refills: 0 | Status: DISCONTINUED | OUTPATIENT
Start: 2020-07-25 | End: 2020-07-27

## 2020-07-25 RX ORDER — DEXTROSE 50 % IN WATER 50 %
12.5 SYRINGE (ML) INTRAVENOUS ONCE
Refills: 0 | Status: DISCONTINUED | OUTPATIENT
Start: 2020-07-25 | End: 2020-07-26

## 2020-07-25 RX ORDER — INSULIN LISPRO 100/ML
VIAL (ML) SUBCUTANEOUS
Refills: 0 | Status: DISCONTINUED | OUTPATIENT
Start: 2020-07-25 | End: 2020-07-26

## 2020-07-25 RX ORDER — HEPARIN SODIUM 5000 [USP'U]/ML
5500 INJECTION INTRAVENOUS; SUBCUTANEOUS ONCE
Refills: 0 | Status: DISCONTINUED | OUTPATIENT
Start: 2020-07-25 | End: 2020-07-25

## 2020-07-25 RX ORDER — MIRTAZAPINE 45 MG/1
45 TABLET, ORALLY DISINTEGRATING ORAL DAILY
Refills: 0 | Status: DISCONTINUED | OUTPATIENT
Start: 2020-07-25 | End: 2020-07-27

## 2020-07-25 RX ORDER — SODIUM CHLORIDE 9 MG/ML
1000 INJECTION, SOLUTION INTRAVENOUS
Refills: 0 | Status: DISCONTINUED | OUTPATIENT
Start: 2020-07-25 | End: 2020-07-27

## 2020-07-25 RX ORDER — AMLODIPINE BESYLATE 2.5 MG/1
5 TABLET ORAL DAILY
Refills: 0 | Status: DISCONTINUED | OUTPATIENT
Start: 2020-07-26 | End: 2020-07-27

## 2020-07-25 RX ORDER — DEXTROSE 50 % IN WATER 50 %
15 SYRINGE (ML) INTRAVENOUS ONCE
Refills: 0 | Status: DISCONTINUED | OUTPATIENT
Start: 2020-07-25 | End: 2020-07-26

## 2020-07-25 RX ORDER — TRAZODONE HCL 50 MG
100 TABLET ORAL AT BEDTIME
Refills: 0 | Status: DISCONTINUED | OUTPATIENT
Start: 2020-07-25 | End: 2020-07-27

## 2020-07-25 RX ORDER — HEPARIN SODIUM 5000 [USP'U]/ML
1200 INJECTION INTRAVENOUS; SUBCUTANEOUS
Qty: 25000 | Refills: 0 | Status: DISCONTINUED | OUTPATIENT
Start: 2020-07-25 | End: 2020-07-25

## 2020-07-25 RX ADMIN — Medication 650 MILLIGRAM(S): at 17:54

## 2020-07-25 RX ADMIN — INSULIN GLARGINE 25 UNIT(S): 100 INJECTION, SOLUTION SUBCUTANEOUS at 22:30

## 2020-07-25 RX ADMIN — Medication 650 MILLIGRAM(S): at 18:46

## 2020-07-25 RX ADMIN — ENOXAPARIN SODIUM 65 MILLIGRAM(S): 100 INJECTION SUBCUTANEOUS at 16:15

## 2020-07-25 NOTE — ED ADULT NURSE NOTE - OBJECTIVE STATEMENT
75 y.o F a&ox4 BIBA c.o CP. pt reports 10/10 midsternal CP, radiating down L arm that woke her from her sleep at 10 am. reports episode of diarrhea during the pain. no pain currently. denies fevers, chills, cough, SOB, dizziness, numbness, tingling, weakness, n/v, abd pain. EKG complete. speaking in clear appropriate sentences airway patent. no signs of distress. placed on CCM, NSR.

## 2020-07-25 NOTE — H&P ADULT - PROBLEM SELECTOR PLAN 2
-on eliquis  -c/w lovenox Right femoral   -  -hold home eloquis  -c/w therapeutic dose lovenox Right femoral   -hold home eloquis  -c/w therapeutic dose lovenox H/o of right femoral DVT in June 2016. Presented to outpatient cardiology clinic on 7/23/2020 with right leg pain and started on eliquis. LE doppler showed new partially occlusive deep venous thrombus in right common femoral vein extending to distal femoral vein and profunda femoris, compared to U/S on [place] 10, 2016.  -hold home eliquis   -c/w therapeutic dose lovenox  -Vascular surgery consulted, appreciate recs H/o of right femoral DVT in June 2016. Presented to outpatient cardiology clinic on 7/23/2020 with right leg pain and started on eliquis. LE doppler showed new partially occlusive deep venous thrombus in right common femoral vein extending to distal femoral vein and profunda femoris, compared to U/S on [place] 10, 2016.  -hold home eliquis   -c/w therapeutic dose lovenox  -Vascular surgery consulted, appreciate recs    ADDENDUM: switched to heparin gtt, per vascular recs; if to undergo intervention, would need to coordinate  w/ pulm H/o of right femoral DVT in June 2016. Presented to outpatient cardiology clinic on 7/23/2020 with right leg pain and started on eliquis. LE doppler showed new partially occlusive deep venous thrombus in right common femoral vein extending to distal femoral vein and profunda femoris, compared to U/S on [place] 10, 2016.  -hold home eliquis   -c/w therapeutic dose lovenox  -Vascular surgery consulted, appreciate recs    ADDENDUM: reviewed doppler studies from 2016, NO DVT noted at that time; switched to heparin gtt, per vascular recs; if to undergo intervention, would need to coordinate  w/ pulm

## 2020-07-25 NOTE — CONSULT NOTE ADULT - PROBLEM SELECTOR RECOMMENDATION 9
While the relation between her initial symptoms and PE are unclear (pain was on contralateral side of emboli), she does have significant clot burden. Serological markers, EKG, and Ct scan are not suggestive of right heart strain. PA appears dilated, but this has been shown in prior CT chest and CT coronaries from years ago. Echocardiogram is still pending.  Currently she is hemodynamically stable and likely requires no additional intervention beyond anticoagulation. This likely does not illustrate Eliquis, given that this was only started days ago. Emboli likely came from the RLE, which seems to not have any inciting event aside from relative immobility. Eliquis can be used but would keep on heparin until echocardiogram is performed. If PASP is high, may consider a catheter mechanical thrombectomy. Would be wary of using tPA given her subdural hematoma hx, though this was back in 2013. Case has been discussed with [facility] interventional cardiologist (Dr. Candie Kerns).   Once PASP is determined and relatively low, can transition back to Eliquis and follow outpatient. PESI score 74; Class II, low risk    Recommend  - Echocardiogram for pulmonary pressures and RV function  - c/w heparin GTT for now; may transition back to Eliquis once echo performed and read.

## 2020-07-25 NOTE — H&P ADULT - PROBLEM SELECTOR PLAN 1
Submassive, no evidence of right heart strain on CTA. Started on eliquis 3 days ago for right femoral DVT.  -heparin ggt  -Echo requested Presented with  no evidence of right heart strain on CTA. Started on eliquis 3 days ago for right femoral DVT.  -heparin ggt  -Echo requested Presented with no evidence of right heart strain on CTA. Started on eliquis 2 days ago for right femoral DVT.  -lovenox therapeutic dose administered  -Echo requested for confirmation of no right heart strain and assessment of PA  -Pulm consulted, appreciate recs

## 2020-07-25 NOTE — H&P ADULT - PROBLEM SELECTOR PLAN 9
ADDENDUM: on monthly abilify injections per daughter; c/w cogentin, remeron; on trazodone for sleep (confirmed dose of 100mg )

## 2020-07-25 NOTE — H&P ADULT - NSICDXPASTMEDICALHX_GEN_ALL_CORE_FT
PAST MEDICAL HISTORY:  Asthma Asthma    Atherosclerosis of coronary artery NOn obstructive    Atrial fibrillation s/p ablation in 2013    Depression     Essential hypertension HTN (hypertension)    Hyperlipidemia Hyperlipidemia    Subdural hemorrhage s/p [place] hole (2013)    Type 2 diabetes mellitus DM (diabetes mellitus)

## 2020-07-25 NOTE — H&P ADULT - ATTENDING COMMENTS
patient seen and examined overnight  a/f acute PE and RLEXT DVTs; pt dxd w/ DVT by her cardiologist but found to have chest pain, brought to ED and dxd w/ PE , seen by pulm.     reviewed pertinent data, h&p    PE: eldely female, in NAD, awake ,alert, MMM, s1s2, lungs cta b/l; abdomen soft/nt/nd; +RLEXT edema (+2), larger than LLEXT.     1. PE/ DVT: s/p lovenox in ED, started on heparin gtt on RMF; followup ECHO, pulm and vascular recs re: interventions. monitor Hb and neuro status in setting of SDH hx.     rest of plan as above patient seen and examined overnight  a/f acute PE and RLEXT DVTs; pt dxd w/ DVT by her cardiologist but found to have chest pain, brought to ED and dxd w/ PE , seen by pulm.     reviewed pertinent data, h&p    PE: eldely female, in NAD, awake ,alert, MMM, s1s2, lungs cta b/l; abdomen soft/nt/nd; +RLEXT edema (+2), larger than LLEXT.     1. PE/ DVT: s/p lovenox in ED, started on heparin gtt on RMF; monitor PTT;  followup ECHO, pulm and vascular recs re: interventions. monitor Hb and neuro status in setting of SDH hx.     rest of plan as above

## 2020-07-25 NOTE — ED PROVIDER NOTE - PROGRESS NOTE DETAILS
discussed w Pulmonology , as no rt heart strain on CT and neg trop ok w floor and heparin initiation, pt with PE on CT , extensive, pt currently w no SOB or CP and reassuring vitals

## 2020-07-25 NOTE — H&P ADULT - ASSESSMENT
75yoF PMH ASD repair, CAD, pAF s/p ablation, subdural hematoma s/p [place] hole evacuation, DM, HTN, HLD presents to the ED with left sided chest pain, found to have right-sided submassive PE on CTA. 75yoF PMH ASD repair, CAD, pAF s/p ablation, subdural hematoma s/p [place] hole evacuation, DM, HTN, HLD presents to the ED with left sided chest pain, found to have right-sided PE on CTA.

## 2020-07-25 NOTE — CONSULT NOTE ADULT - ASSESSMENT
Assessment: 75y Female    Recommendations:  - Compression/elevation of RLE  - Plz obtain results of the previous duplex (intial diagnosis) to compare with inhouse exam  - Switch to heparin drip with therapeutic aptts at 4am (obtain vein access)  - Keep NPO past midnight for possible intervention in AM  - Vascular will follow   - discussed with Chief on call
75 yo F with PMHx of ASD repair, CAD, pAF s/p ablation, subdural hematoma s/p [place] hole evacuation (2013), DM, HTN, HLD presenting to the ED for complaints of left sided chest pain with radiation to the shoulder

## 2020-07-25 NOTE — H&P ADULT - PROBLEM SELECTOR PLAN 7
S/p [place] hole evacuation. Need to follow up with patient's daughter for more details. -c/w home lisinopril, amlodipine    ADDENDUM: per daughter pt on amlodipine, no longer on ACEi

## 2020-07-25 NOTE — PATIENT PROFILE ADULT - FALL HARM RISK
bones(Osteoporosis,prev fx,steroid use,metastatic bone ca)/coagulation(Bleeding disorder R/T clinical cond/anti-coags)/other/Ambulates with a cane or walker at [facility]

## 2020-07-25 NOTE — ED PROVIDER NOTE - MUSCULOSKELETAL, MLM
Spine appears normal, range of motion is not limited, no muscle or joint tenderness, DP and PT intact, edema RLE

## 2020-07-25 NOTE — PATIENT PROFILE ADULT - NSPROEXTENSIONSOFSELF_GEN_A_NUR
Medical alert necklace, 2 rings, 2 ear rings, 1 bracelet, shoes and clothing, 1 cell phone and 1 pocket book. No credit cards per patient.

## 2020-07-25 NOTE — H&P ADULT - PROBLEM SELECTOR PLAN 8
S/p [place] hole evacuation. Need to follow up with patient's daughter for more details.    ADDENDUM: per patient and patient's daughter, SDH was spontaneous not 2/2 trauma, was not on AC at the time. Fall precautions while on AC.

## 2020-07-25 NOTE — CHART NOTE - NSCHARTNOTEFT_GEN_A_CORE
Limited bedside TTE performed for segmental/subsegmental PE.    Grossly normal biventricular function.  PASP estimated to be ~28. dTAPSE>17 mm.   RV strain was not seen by me.  Please obtain official TTE and f/u attending read.    Umang Valentine MD PGY4

## 2020-07-25 NOTE — ED PROVIDER NOTE - OBJECTIVE STATEMENT
76 yo with CP , awoke w L sided chest pressure, radiating to left arm pressure like , awoke her from sleep about 6 am and lasted a few hrs now resolved, ho of CAD, DMII, athma, HTN, HLD, prior subdural hemorhage,   now resolved pain, no falls or trauma, no heavy lifting, no n/v/d/c , no urinary symptoms, no fevers, no cough, ho of " clot to her foot" w chronic rt leg swelling . 76 yo with CP , awoke w L sided chest pressure, radiating to left arm pressure like , awoke her from sleep about 6 am and lasted a few hrs now resolved, ho of CAD, DMII, asthma, HTN, HLD, prior subdural hemorrhage,   now resolved pain, no falls or trauma, no heavy lifting, no n/v/d/c , no urinary symptoms, no fevers, no cough, ho of " clot to her foot" w chronic rt leg swelling . 74 yo with CP , awoke w L sided chest pressure, radiating to left arm pressure like , awoke her from sleep about 6 am and lasted a few hrs now resolved, ho of CAD, DMII, asthma, HTN, HLD, prior subdural hemorrhage,   now resolved pain, no falls or trauma, no heavy lifting, no n/v/d/c , no urinary symptoms, no fevers, no cough, ho of DVT recently diagnosed Rt leg , on eliquis 10, given BID initially for 7 days.

## 2020-07-25 NOTE — H&P ADULT - HISTORY OF PRESENT ILLNESS
75yoF PMH ASD repair, CAD, pAF s/p ablation, subdural hematoma s/p [place] hole evacuation, DM, HTN, HLD presents to the ED with left sided chest pain. Of note, patient had a recent DVT in right lower extremity 7/23/20 that she saw her cardiologist Dr. Santos for and was put on eloquis. It is left sided, worse on inspiration, 10/10 pain.  No history of malignancy. Wells score 7.5. Patient had some diarrhea today and has right leg swelling and intermittent pain, denies SOB at rest or on exertion, wheezing or cough, f/c/n/v, headache, abdominal pain. CT PE showed segmental and subsegmental PE's in the right.

## 2020-07-25 NOTE — PATIENT PROFILE ADULT - VISION (WITH CORRECTIVE LENSES IF THE PATIENT USUALLY WEARS THEM):
Partially impaired: cannot see medication labels or newsprint, but can see obstacles in path, and the surrounding layout; can count fingers at arm's length/Wears glasses

## 2020-07-25 NOTE — CONSULT NOTE ADULT - ATTENDING COMMENTS
74 yr old lady with a H/O asthma/COPD, H/O ASD repair, CAD, Afib (paroxysmal)--not on anticoagulation, H/O sub-dural hematoma which was evacuated.  Started on Eliquis 3 days ago for Rt femoral thrombus. Woke up this AM with chest pain. EKG showed no change. CT-PE showed segmental and sub-segmental PE. PA 3.0 cm  Agree with evaluation. Pt is comfortable. She is not tachycardic. SpO2 98% on RA. Good breath sounds. Heart rate is regular. 1(+) pitting edema Rt side.   Impression:  Rt femoral DVT which most likely embolized to the PA. Currently she is stable. Agree with IV heparin for now. ECHO requested. If there is evidence of Rt heart strain, will consider other options. 74 yr old lady with a H/O asthma/COPD, H/O ASD repair, CAD, Afib (paroxysmal)--not on anticoagulation, H/O sub-dural hematoma which was evacuated.  Started on Eliquis 3 days ago for Rt femoral thrombus. Woke up this AM with chest pain. EKG showed no change. CT-PE showed segmental and sub-segmental PE. PA 3.0 cm  Agree with evaluation. Pt is comfortable. She is not tachycardic. SpO2 98% on RA. Good breath sounds. Heart rate is regular. 2(+) pitting edema Rt side and is larger than the left leg in diameter.  Impression:  Rt femoral DVT which most likely embolized to the PA. Currently she is stable. Agree with IV heparin for now. ECHO requested. If there is evidence of Rt heart strain, will consider other options.

## 2020-07-25 NOTE — H&P ADULT - PROBLEM SELECTOR PLAN 4
c/w home insulin and metformin Hold home metformin  -c/w home insulin  -Add A1c - Hold home metformin  - c/w home insulin  - f/u A1c - Hold home metformin  - c/w home insulin  - f/u A1c    ADDENDUM: spoke to patient's daughter, pt on metformin, jardiance, 22Units of lantus and takes 6Units premeal before dinner as pt does not eat much during the day; holding PO meds, c/w lantus (given 22Units prior to being made NPO per vascular recs, will half dose to 11 Units since NPO), ordered for 3 Units premeals, monitor glucose

## 2020-07-25 NOTE — ED PROVIDER NOTE - CLINICAL SUMMARY MEDICAL DECISION MAKING FREE TEXT BOX
76 yo with atypical CP, now resolved, non exertional , no acute appearing ischemic changes and neg initial trop, will need second, pt w recently diagnosed DVT, will CTA to eval for PE, pt with streaking on x-ray typical of atelectasis, DVT eval w U/S

## 2020-07-25 NOTE — CONSULT NOTE ADULT - SUBJECTIVE AND OBJECTIVE BOX
HPI:  75 yo F with PMHx of ASD repair, CAD, pAF s/p ablation, subdural hematoma s/p [place] hole evacuation, DM, HTN, HLD presenting to the ED for complaints of left sided chest pain with radiation to the shoulder. Of note, she was started on Eliquis on 7/23/2020 for a DVT in the right lower extremity.   She presents with the above complaints but without dyspnea both at rest and with exertion. She also has no wheezing, cough, or any other symptoms. A CT PE was performed in the context of a recently discovered blood clot, which showed segmental and subsegmental PE's in the right, for which we are consulted for.     All other components of the 10 point review of systems is negative aside from those mentioned above.    PAST MEDICAL & SURGICAL HISTORY:  Depression  Asthma: Asthma  Type 2 diabetes mellitus: DM (diabetes mellitus)  Hyperlipidemia: Hyperlipidemia  Essential hypertension: HTN (hypertension)  Atrial fibrillation: s/p ablation in 2013  Atherosclerosis of coronary artery: NOn obstructive  Subdural hemorrhage: s/p bakari hole (2013)  Persistent ostium secundum: s/p repair (1989)  Acute subdural hematoma  History of knee replacement, total, right  Other postprocedural status: S/P atrial septal defect closure, surgical  Status post tubal ligation: S/P tubal ligation      FAMILY HISTORY:  Family history of hypertension (Mother)  Family history of ischemic heart disease (Father): Family history of coronary artery disease in mother    Smoking history:  [ ] Current [x ] Former - <10py [ ] Never    Occupation:     VITAL SIGNS:  ICU Vital Signs Last 24 Hrs  T(C): 36.7 (25 Jul 2020 14:58), Max: 36.9 (25 Jul 2020 10:12)  T(F): 98.1 (25 Jul 2020 14:58), Max: 98.4 (25 Jul 2020 10:12)  HR: 65 (25 Jul 2020 14:58) (65 - 93)  BP: 168/80 (25 Jul 2020 14:58) (150/80 - 168/80)  BP(mean): --  ABP: --  ABP(mean): --  RR: 18 (25 Jul 2020 14:58) (16 - 18)  SpO2: 98% (25 Jul 2020 14:58) (98% - 98%)        CAPILLARY BLOOD GLUCOSE          PHYSICAL EXAM:  Constitutional: resting comfortably in bed, NAD  HEENT: NC/AT; PERRL, anicteric sclera; no oropharyngeal erythema or exudates; MMM  Neck: supple, no JVD  Respiratory: CTA B/L, no W/R/R; respirations appear non-labored, speaking full sentences  Cardiovascular: +S1/S2, RRR  Gastrointestinal: abdomen soft, NT/ND; +BS x4  Extremities: WWP; no clubbing, cyanosis or edema  Vascular: 2+ radial, DP/PT and femoral pulses B/L      MEDICATIONS:  MEDICATIONS  (STANDING):  heparin   Injectable 5500 Unit(s) IV Push once  heparin  Infusion.  Unit(s)/Hr (12 mL/Hr) IV Continuous <Continuous>    MEDICATIONS  (PRN):      ALLERGIES:  Allergies    adenosine (Unknown)    Intolerances        LABS:                        9.6    9.12  )-----------( 203      ( 25 Jul 2020 10:27 )             31.5     07-25    138  |  102  |  15  ----------------------------<  167<H>  4.6   |  22  |  0.69    Ca    9.4      25 Jul 2020 10:27    TPro  7.9  /  Alb  4.2  /  TBili  0.3  /  DBili  x   /  AST  17  /  ALT  13  /  AlkPhos  101  07-25    PT/INR - ( 25 Jul 2020 10:27 )   PT: 18.1 sec;   INR: 1.54          PTT - ( 25 Jul 2020 10:27 )  PTT:32.6 sec      RADIOLOGY & ADDITIONAL TESTS:   CT PE protocol reviewed: right sided segmental and subsegmetal PE's. Dilated PA HPI:  75 yo F with PMHx of ASD repair, CAD, pAF s/p ablation, subdural hematoma s/p [place] hole evacuation (2013), DM, HTN, HLD presenting to the ED for complaints of left sided chest pain with radiation to the shoulder. Of note, she was started on Eliquis on 7/23/2020 for a DVT in the right lower extremity.   She presents with the above complaints but without dyspnea both at rest and with exertion. She also has no wheezing, cough, or any other symptoms. A CT PE was performed in the context of a recently discovered blood clot, which showed segmental and subsegmental PE's in the right, for which we are consulted for.     All other components of the 10 point review of systems is negative aside from those mentioned above.    PAST MEDICAL & SURGICAL HISTORY:  Depression  Asthma: Asthma  Type 2 diabetes mellitus: DM (diabetes mellitus)  Hyperlipidemia: Hyperlipidemia  Essential hypertension: HTN (hypertension)  Atrial fibrillation: s/p ablation in 2013  Atherosclerosis of coronary artery: NOn obstructive  Subdural hemorrhage: s/p bakari hole (2013)  Persistent ostium secundum: s/p repair (1989)  Acute subdural hematoma  History of knee replacement, total, right  Other postprocedural status: S/P atrial septal defect closure, surgical  Status post tubal ligation: S/P tubal ligation      FAMILY HISTORY:  Family history of hypertension (Mother)  Family history of ischemic heart disease (Father): Family history of coronary artery disease in mother    Smoking history:  [ ] Current [x ] Former - <10py [ ] Never    Occupation:     VITAL SIGNS:  ICU Vital Signs Last 24 Hrs  T(C): 36.7 (25 Jul 2020 14:58), Max: 36.9 (25 Jul 2020 10:12)  T(F): 98.1 (25 Jul 2020 14:58), Max: 98.4 (25 Jul 2020 10:12)  HR: 65 (25 Jul 2020 14:58) (65 - 93)  BP: 168/80 (25 Jul 2020 14:58) (150/80 - 168/80)  BP(mean): --  ABP: --  ABP(mean): --  RR: 18 (25 Jul 2020 14:58) (16 - 18)  SpO2: 98% (25 Jul 2020 14:58) (98% - 98%)        CAPILLARY BLOOD GLUCOSE          PHYSICAL EXAM:  Constitutional: resting comfortably in bed, NAD  HEENT: NC/AT; PERRL, anicteric sclera; no oropharyngeal erythema or exudates; MMM  Neck: supple, no JVD  Respiratory: CTA B/L, no W/R/R; respirations appear non-labored, speaking full sentences  Cardiovascular: +S1/S2, RRR  Gastrointestinal: abdomen soft, NT/ND; +BS x4  Extremities: WWP; no clubbing, cyanosi; 2+ pitting edema of the RLE  Vascular: 2+ radial, DP/PT and femoral pulses B/L      MEDICATIONS:  MEDICATIONS  (STANDING):  heparin   Injectable 5500 Unit(s) IV Push once  heparin  Infusion.  Unit(s)/Hr (12 mL/Hr) IV Continuous <Continuous>    MEDICATIONS  (PRN):      ALLERGIES:  Allergies    adenosine (Unknown)    Intolerances        LABS:                        9.6    9.12  )-----------( 203      ( 25 Jul 2020 10:27 )             31.5     07-25    138  |  102  |  15  ----------------------------<  167<H>  4.6   |  22  |  0.69    Ca    9.4      25 Jul 2020 10:27    TPro  7.9  /  Alb  4.2  /  TBili  0.3  /  DBili  x   /  AST  17  /  ALT  13  /  AlkPhos  101  07-25    PT/INR - ( 25 Jul 2020 10:27 )   PT: 18.1 sec;   INR: 1.54          PTT - ( 25 Jul 2020 10:27 )  PTT:32.6 sec      RADIOLOGY & ADDITIONAL TESTS:   CT PE protocol reviewed: right sided segmental and subsegmetal PE's. Dilated PA

## 2020-07-25 NOTE — ED ADULT NURSE NOTE - CHPI ED NUR SYMPTOMS NEG
no diaphoresis/no shortness of breath/no back pain/no fever/no chills/no nausea/no dizziness/no congestion/no vomiting

## 2020-07-25 NOTE — CONSULT NOTE ADULT - SUBJECTIVE AND OBJECTIVE BOX
Attending:      HPI:  75yoF PMH ASD repair, CAD, pAF s/p ablation, subdural hematoma s/p [place] hole evacuation, DM, HTN, HLD presents to the ED with left sided chest pain. Of note, patient had a recent DVT in right lower extremity 7/23/20 that she saw her cardiologist Dr. Santos for and was put on eloquis. It is left sided, worse on inspiration, 10/10 pain.  No history of malignancy. Wells score 7.5. Patient had some diarrhea today and has right leg swelling and intermittent pain, denies SOB at rest or on exertion, wheezing or cough, f/c/n/v, headache, abdominal pain. CT PE showed segmental and subsegmental PE's in the right. (25 Jul 2020 17:13)      Vascular was consulted for acute RLE DVT diagnosed as outpatient 2 days ago and started on AC with Elqiuis BID. Symptoms were present since 7/13. She had an outpatient duplex for intial diagnosis and repeat duplex inhouse showed DVT extending for common femoral and profunda femoris vein. She initially came to the hospital today with R sided chest pain and in ED she was diagnosed with acute pulmonary embolism. No on LMWH - therapeutic dose - no access for hep drip administration.     Pt was seen at bedside. Poor historian. She admits to have chest pain and intermittent leg pain the past days. Denies SOB, abd pain, dizziness, lightheadedness. Sating well on room air.     PAST MEDICAL & SURGICAL HISTORY:  Depression  Asthma: Asthma  Type 2 diabetes mellitus: DM (diabetes mellitus)  Hyperlipidemia: Hyperlipidemia  Essential hypertension: HTN (hypertension)  Atrial fibrillation: s/p ablation in 2013  Atherosclerosis of coronary artery: NOn obstructive  Subdural hemorrhage: s/p bakari hole (2013)  Acute subdural hematoma  History of knee replacement, total, right  Other postprocedural status: S/P atrial septal defect closure, surgical  Status post tubal ligation: S/P tubal ligation      MEDICATIONS  (STANDING):  dextrose 5%. 1000 milliLiter(s) (50 mL/Hr) IV Continuous <Continuous>  dextrose 50% Injectable 12.5 Gram(s) IV Push once  dextrose 50% Injectable 25 Gram(s) IV Push once  dextrose 50% Injectable 25 Gram(s) IV Push once  insulin glargine Injectable (LANTUS) 25 Unit(s) SubCutaneous at bedtime  insulin lispro (HumaLOG) corrective regimen sliding scale   SubCutaneous Before meals and at bedtime  insulin lispro Injectable (HumaLOG) 3 Unit(s) SubCutaneous before breakfast  insulin lispro Injectable (HumaLOG) 3 Unit(s) SubCutaneous before lunch  insulin lispro Injectable (HumaLOG) 3 Unit(s) SubCutaneous before dinner    MEDICATIONS  (PRN):  acetaminophen   Tablet .. 650 milliGRAM(s) Oral every 4 hours PRN Mild Pain (1 - 3)  dextrose 40% Gel 15 Gram(s) Oral once PRN Blood Glucose LESS THAN 70 milliGRAM(s)/deciliter  glucagon  Injectable 1 milliGRAM(s) IntraMuscular once PRN Glucose LESS THAN 70 milligrams/deciliter      Allergies    adenosine (Unknown)    Intolerances        SOCIAL HISTORY:    FAMILY HISTORY:  Family history of hypertension (Mother)  Family history of ischemic heart disease (Father): Family history of coronary artery disease in mother      REVIEW OF SYSTEMS    negative except as above     Vital Signs Last 24 Hrs  T(C): 36.5 (25 Jul 2020 22:55), Max: 37.1 (25 Jul 2020 20:44)  T(F): 97.7 (25 Jul 2020 22:55), Max: 98.7 (25 Jul 2020 20:44)  HR: 52 (25 Jul 2020 22:55) (50 - 93)  BP: 145/79 (25 Jul 2020 22:55) (137/76 - 168/80)  BP(mean): --  RR: 16 (25 Jul 2020 22:55) (16 - 18)  SpO2: 96% (25 Jul 2020 22:55) (96% - 99%)    I&O's Summary      Physical Exam:  General: NAD, resting comfortably  Pulmonary: normal resp effort, RR WNL  Cardiovascular: NSR, vitals stable  Abdominal: soft, ND/NT  Extremities: WWP, normal strength, significant edema on RLE extending from thigh to calf, with tension on R thigh compared to the left, non tender to palpation.   Neuro: A/O x 3, CNs II-XII grossly intact, normal sensation, no focal deficits  Pulses: palpable distal pulses    Lines/drains/tubes:    LABS:                        9.6    9.12  )-----------( 203      ( 25 Jul 2020 10:27 )             31.5     07-25    138  |  102  |  15  ----------------------------<  167<H>  4.6   |  22  |  0.69    Ca    9.4      25 Jul 2020 10:27    TPro  7.9  /  Alb  4.2  /  TBili  0.3  /  DBili  x   /  AST  17  /  ALT  13  /  AlkPhos  101  07-25    PT/INR - ( 25 Jul 2020 10:27 )   PT: 18.1 sec;   INR: 1.54          PTT - ( 25 Jul 2020 10:27 )  PTT:32.6 sec    CAPILLARY BLOOD GLUCOSE      POCT Blood Glucose.: 107 mg/dL (25 Jul 2020 21:34)    LIVER FUNCTIONS - ( 25 Jul 2020 10:27 )  Alb: 4.2 g/dL / Pro: 7.9 g/dL / ALK PHOS: 101 U/L / ALT: 13 U/L / AST: 17 U/L / GGT: x             Cultures:      RADIOLOGY & ADDITIONAL STUDIES:

## 2020-07-25 NOTE — H&P ADULT - PROBLEM SELECTOR PLAN 6
c/w home lisinopril, amlodipine -c/w home lisinopril, amlodipine Never hospitalized or intubated.  -c/w home symbacort    ADDENDUM: on symbicort; ordered for michelle prn

## 2020-07-25 NOTE — ED PROVIDER NOTE - X-RAY INTERPRETATION
ER physician/left lower lung infiltrate vs atelectasis , heart shadow normal, no bony abnormalities , + sternal wires

## 2020-07-25 NOTE — H&P ADULT - PROBLEM SELECTOR PLAN 5
c/w home symbacort, spiriva Never hospitalized or intubated  -c/w home symbacort, spiriva Never hospitalized or intubated.  -c/w home symbacort, spiriva ADDENDUM: monitor H/H ADDENDUM: at baseline, denies rectal bleeding; monitor H/H, check iron studies

## 2020-07-25 NOTE — H&P ADULT - NSHPLABSRESULTS_GEN_ALL_CORE
< from: CT Angio Chest PE Protocol w/ IV Cont (07.25.20 @ 13:16) >    1.  Partially occlusive pulmonary thromboemboli in right main pulmonary artery and right upper lobe segmental and subsegmental branches. No evidence of right heart strain or pulmonary infarct.  2.  Since May 30, 2019, unchanged scattered bilateral linear opacities, possibly subsegmental atelectasis or fibrosis.    < end of copied text >

## 2020-07-25 NOTE — PATIENT PROFILE ADULT - STATED REASON FOR ADMISSION
Patient awoke with left sided chest pain today at 6am. Pain was 10/10, sharp and non-radiating. No pain of chest at this time. Pain level= 3/10, and achy, of right thigh at this time.

## 2020-07-25 NOTE — H&P ADULT - PROBLEM SELECTOR PLAN 3
-c/w statin Occlusion of coronary artery. No history of CABG, no history of PCI, at home takes ASA and eliquis, holding ASA and eliquis for now  -c/w statin Occlusion of coronary artery. No history of CABG, no history of PCI, at home takes ASA and eliquis, holding ASA and eliquis for now.  -c/w statin Occlusion of coronary artery. No history of CABG, no history of PCI, at home takes ASA and eliquis, holding ASA and eliquis for now.  -c/w statin    ADDENDUM: holding ASA per cards recs; started recently on increased dose of metoprolol per last cardiology note for atrial tachycardia Occlusion of coronary artery. No history of CABG, no history of PCI, at home takes ASA and eliquis, holding ASA and eliquis for now.  -c/w statin    ADDENDUM: holding ASA per cards recs; on metoprolol per cards note for atrial tachycardia

## 2020-07-25 NOTE — H&P ADULT - NSHPPHYSICALEXAM_GEN_ALL_CORE
.  VITAL SIGNS:  T(C): 36.7 (07-25-20 @ 14:58), Max: 36.9 (07-25-20 @ 10:12)  T(F): 98.1 (07-25-20 @ 14:58), Max: 98.4 (07-25-20 @ 10:12)  HR: 66 (07-25-20 @ 16:17) (65 - 93)  BP: 156/71 (07-25-20 @ 16:17) (150/80 - 168/80)  BP(mean): --  RR: 16 (07-25-20 @ 16:17) (16 - 18)  SpO2: 98% (07-25-20 @ 16:17) (98% - 98%)  Wt(kg): --    PHYSICAL EXAM:    Constitutional: NAD  ENT: no nasal discharge; uvula midline, no oropharyngeal erythema or exudates; MMM; no teeth  Neck: supple; no JVD  Respiratory: CTA B/L; no W/R/R, non-labored breathing, speaking full sentences  Cardiac: +S1/S2; RRR  Gastrointestinal: abdomen soft, NT/ND; no rebound or guarding; +BSx4  Extremities: no peripheral edema  Musculoskeletal: NROM x4; no joint swelling, tenderness or erythema  Vascular: 2+ radial, femoral, DP/PT pulses B/L  Neurologic: AAOx3  Psychiatric: affect and characteristics of appearance, verbalizations, behaviors are appropriate

## 2020-07-26 DIAGNOSIS — F25.9 SCHIZOAFFECTIVE DISORDER, UNSPECIFIED: ICD-10-CM

## 2020-07-26 DIAGNOSIS — D64.9 ANEMIA, UNSPECIFIED: ICD-10-CM

## 2020-07-26 LAB
A1C WITH ESTIMATED AVERAGE GLUCOSE RESULT: 6.1 % — HIGH (ref 4–5.6)
ALBUMIN SERPL ELPH-MCNC: 3.3 G/DL — SIGNIFICANT CHANGE UP (ref 3.3–5)
ALP SERPL-CCNC: 75 U/L — SIGNIFICANT CHANGE UP (ref 40–120)
ALT FLD-CCNC: 10 U/L — SIGNIFICANT CHANGE UP (ref 10–45)
ANION GAP SERPL CALC-SCNC: 9 MMOL/L — SIGNIFICANT CHANGE UP (ref 5–17)
APTT BLD: 100.2 SEC — HIGH (ref 27.5–35.5)
APTT BLD: 134.8 SEC — CRITICAL HIGH (ref 27.5–35.5)
APTT BLD: 144.2 SEC — CRITICAL HIGH (ref 27.5–35.5)
APTT BLD: 61.2 SEC — HIGH (ref 27.5–35.5)
AST SERPL-CCNC: 19 U/L — SIGNIFICANT CHANGE UP (ref 10–40)
BILIRUB SERPL-MCNC: 0.3 MG/DL — SIGNIFICANT CHANGE UP (ref 0.2–1.2)
BUN SERPL-MCNC: 13 MG/DL — SIGNIFICANT CHANGE UP (ref 7–23)
CALCIUM SERPL-MCNC: 8.8 MG/DL — SIGNIFICANT CHANGE UP (ref 8.4–10.5)
CHLORIDE SERPL-SCNC: 108 MMOL/L — SIGNIFICANT CHANGE UP (ref 96–108)
CO2 SERPL-SCNC: 23 MMOL/L — SIGNIFICANT CHANGE UP (ref 22–31)
CREAT SERPL-MCNC: 0.7 MG/DL — SIGNIFICANT CHANGE UP (ref 0.5–1.3)
ESTIMATED AVERAGE GLUCOSE: 128 MG/DL — HIGH (ref 68–114)
GLUCOSE BLDC GLUCOMTR-MCNC: 102 MG/DL — HIGH (ref 70–99)
GLUCOSE BLDC GLUCOMTR-MCNC: 167 MG/DL — HIGH (ref 70–99)
GLUCOSE BLDC GLUCOMTR-MCNC: 247 MG/DL — HIGH (ref 70–99)
GLUCOSE BLDC GLUCOMTR-MCNC: 91 MG/DL — SIGNIFICANT CHANGE UP (ref 70–99)
GLUCOSE SERPL-MCNC: 87 MG/DL — SIGNIFICANT CHANGE UP (ref 70–99)
HCT VFR BLD CALC: 29.5 % — LOW (ref 34.5–45)
HGB BLD-MCNC: 9.3 G/DL — LOW (ref 11.5–15.5)
INR BLD: 1.18 — HIGH (ref 0.88–1.16)
MAGNESIUM SERPL-MCNC: 1.6 MG/DL — SIGNIFICANT CHANGE UP (ref 1.6–2.6)
MCHC RBC-ENTMCNC: 22.8 PG — LOW (ref 27–34)
MCHC RBC-ENTMCNC: 31.5 GM/DL — LOW (ref 32–36)
MCV RBC AUTO: 72.3 FL — LOW (ref 80–100)
NRBC # BLD: 0 /100 WBCS — SIGNIFICANT CHANGE UP (ref 0–0)
PHOSPHATE SERPL-MCNC: 4.3 MG/DL — SIGNIFICANT CHANGE UP (ref 2.5–4.5)
PLATELET # BLD AUTO: 199 K/UL — SIGNIFICANT CHANGE UP (ref 150–400)
POTASSIUM SERPL-MCNC: 4.3 MMOL/L — SIGNIFICANT CHANGE UP (ref 3.5–5.3)
POTASSIUM SERPL-SCNC: 4.3 MMOL/L — SIGNIFICANT CHANGE UP (ref 3.5–5.3)
PROT SERPL-MCNC: 7.1 G/DL — SIGNIFICANT CHANGE UP (ref 6–8.3)
PROTHROM AB SERPL-ACNC: 14 SEC — HIGH (ref 10.6–13.6)
RBC # BLD: 4.08 M/UL — SIGNIFICANT CHANGE UP (ref 3.8–5.2)
RBC # FLD: 17.7 % — HIGH (ref 10.3–14.5)
SODIUM SERPL-SCNC: 140 MMOL/L — SIGNIFICANT CHANGE UP (ref 135–145)
WBC # BLD: 6.37 K/UL — SIGNIFICANT CHANGE UP (ref 3.8–10.5)
WBC # FLD AUTO: 6.37 K/UL — SIGNIFICANT CHANGE UP (ref 3.8–10.5)

## 2020-07-26 PROCEDURE — 74174 CTA ABD&PLVS W/CONTRAST: CPT | Mod: 26

## 2020-07-26 PROCEDURE — 99233 SBSQ HOSP IP/OBS HIGH 50: CPT | Mod: GC

## 2020-07-26 RX ORDER — TRAZODONE HCL 50 MG
0 TABLET ORAL
Qty: 0 | Refills: 0 | DISCHARGE

## 2020-07-26 RX ORDER — DEXTROSE 50 % IN WATER 50 %
12.5 SYRINGE (ML) INTRAVENOUS ONCE
Refills: 0 | Status: DISCONTINUED | OUTPATIENT
Start: 2020-07-26 | End: 2020-07-27

## 2020-07-26 RX ORDER — TIOTROPIUM BROMIDE 18 UG/1
1 CAPSULE ORAL; RESPIRATORY (INHALATION)
Qty: 0 | Refills: 0 | DISCHARGE

## 2020-07-26 RX ORDER — DEXTROSE 50 % IN WATER 50 %
25 SYRINGE (ML) INTRAVENOUS ONCE
Refills: 0 | Status: DISCONTINUED | OUTPATIENT
Start: 2020-07-26 | End: 2020-07-27

## 2020-07-26 RX ORDER — TIOTROPIUM BROMIDE 18 UG/1
1 CAPSULE ORAL; RESPIRATORY (INHALATION) DAILY
Refills: 0 | Status: DISCONTINUED | OUTPATIENT
Start: 2020-07-26 | End: 2020-07-27

## 2020-07-26 RX ORDER — IPRATROPIUM/ALBUTEROL SULFATE 18-103MCG
3 AEROSOL WITH ADAPTER (GRAM) INHALATION EVERY 6 HOURS
Refills: 0 | Status: DISCONTINUED | OUTPATIENT
Start: 2020-07-26 | End: 2020-07-26

## 2020-07-26 RX ORDER — DEXTROSE 50 % IN WATER 50 %
15 SYRINGE (ML) INTRAVENOUS ONCE
Refills: 0 | Status: DISCONTINUED | OUTPATIENT
Start: 2020-07-26 | End: 2020-07-27

## 2020-07-26 RX ORDER — METFORMIN HYDROCHLORIDE 850 MG/1
1 TABLET ORAL
Qty: 0 | Refills: 0 | DISCHARGE

## 2020-07-26 RX ORDER — INSULIN GLARGINE 100 [IU]/ML
22 INJECTION, SOLUTION SUBCUTANEOUS AT BEDTIME
Refills: 0 | Status: DISCONTINUED | OUTPATIENT
Start: 2020-07-26 | End: 2020-07-26

## 2020-07-26 RX ORDER — BUDESONIDE AND FORMOTEROL FUMARATE DIHYDRATE 160; 4.5 UG/1; UG/1
2 AEROSOL RESPIRATORY (INHALATION)
Refills: 0 | Status: DISCONTINUED | OUTPATIENT
Start: 2020-07-26 | End: 2020-07-27

## 2020-07-26 RX ORDER — HEPARIN SODIUM 5000 [USP'U]/ML
5000 INJECTION INTRAVENOUS; SUBCUTANEOUS EVERY 8 HOURS
Refills: 0 | Status: DISCONTINUED | OUTPATIENT
Start: 2020-07-26 | End: 2020-07-26

## 2020-07-26 RX ORDER — MAGNESIUM SULFATE 500 MG/ML
2 VIAL (ML) INJECTION ONCE
Refills: 0 | Status: COMPLETED | OUTPATIENT
Start: 2020-07-26 | End: 2020-07-26

## 2020-07-26 RX ORDER — AMLODIPINE BESYLATE 2.5 MG/1
1 TABLET ORAL
Qty: 0 | Refills: 0 | DISCHARGE

## 2020-07-26 RX ORDER — METOPROLOL TARTRATE 50 MG
0 TABLET ORAL
Qty: 0 | Refills: 0 | DISCHARGE

## 2020-07-26 RX ORDER — GABAPENTIN 400 MG/1
1 CAPSULE ORAL
Qty: 0 | Refills: 0 | DISCHARGE

## 2020-07-26 RX ORDER — BUDESONIDE AND FORMOTEROL FUMARATE DIHYDRATE 160; 4.5 UG/1; UG/1
2 AEROSOL RESPIRATORY (INHALATION)
Refills: 0 | Status: DISCONTINUED | OUTPATIENT
Start: 2020-07-26 | End: 2020-07-26

## 2020-07-26 RX ORDER — BENZTROPINE MESYLATE 1 MG
1 TABLET ORAL
Qty: 0 | Refills: 0 | DISCHARGE

## 2020-07-26 RX ORDER — HEPARIN SODIUM 5000 [USP'U]/ML
1000 INJECTION INTRAVENOUS; SUBCUTANEOUS
Qty: 25000 | Refills: 0 | Status: DISCONTINUED | OUTPATIENT
Start: 2020-07-26 | End: 2020-07-27

## 2020-07-26 RX ORDER — MIRTAZAPINE 45 MG/1
1 TABLET, ORALLY DISINTEGRATING ORAL
Qty: 0 | Refills: 0 | DISCHARGE

## 2020-07-26 RX ORDER — ALBUTEROL 90 UG/1
1 AEROSOL, METERED ORAL EVERY 4 HOURS
Refills: 0 | Status: DISCONTINUED | OUTPATIENT
Start: 2020-07-26 | End: 2020-07-27

## 2020-07-26 RX ORDER — BUDESONIDE AND FORMOTEROL FUMARATE DIHYDRATE 160; 4.5 UG/1; UG/1
2 AEROSOL RESPIRATORY (INHALATION)
Qty: 0 | Refills: 0 | DISCHARGE

## 2020-07-26 RX ORDER — INSULIN LISPRO 100/ML
VIAL (ML) SUBCUTANEOUS
Refills: 0 | Status: DISCONTINUED | OUTPATIENT
Start: 2020-07-26 | End: 2020-07-27

## 2020-07-26 RX ORDER — HEPARIN SODIUM 5000 [USP'U]/ML
1200 INJECTION INTRAVENOUS; SUBCUTANEOUS
Qty: 25000 | Refills: 0 | Status: DISCONTINUED | OUTPATIENT
Start: 2020-07-26 | End: 2020-07-26

## 2020-07-26 RX ORDER — INSULIN GLARGINE 100 [IU]/ML
11 INJECTION, SOLUTION SUBCUTANEOUS AT BEDTIME
Refills: 0 | Status: DISCONTINUED | OUTPATIENT
Start: 2020-07-26 | End: 2020-07-26

## 2020-07-26 RX ORDER — IPRATROPIUM/ALBUTEROL SULFATE 18-103MCG
3 AEROSOL WITH ADAPTER (GRAM) INHALATION
Refills: 0 | Status: DISCONTINUED | OUTPATIENT
Start: 2020-07-26 | End: 2020-07-27

## 2020-07-26 RX ADMIN — Medication 4: at 17:34

## 2020-07-26 RX ADMIN — BUDESONIDE AND FORMOTEROL FUMARATE DIHYDRATE 2 PUFF(S): 160; 4.5 AEROSOL RESPIRATORY (INHALATION) at 17:27

## 2020-07-26 RX ADMIN — Medication 1 MILLIGRAM(S): at 06:26

## 2020-07-26 RX ADMIN — Medication 100 MILLIGRAM(S): at 22:02

## 2020-07-26 RX ADMIN — Medication 650 MILLIGRAM(S): at 16:14

## 2020-07-26 RX ADMIN — Medication 50 GRAM(S): at 09:19

## 2020-07-26 RX ADMIN — HEPARIN SODIUM 10 UNIT(S)/HR: 5000 INJECTION INTRAVENOUS; SUBCUTANEOUS at 12:17

## 2020-07-26 RX ADMIN — HEPARIN SODIUM 12 UNIT(S)/HR: 5000 INJECTION INTRAVENOUS; SUBCUTANEOUS at 04:15

## 2020-07-26 RX ADMIN — Medication 100 MILLIGRAM(S): at 00:01

## 2020-07-26 RX ADMIN — Medication 650 MILLIGRAM(S): at 15:40

## 2020-07-26 RX ADMIN — Medication 50 MILLIGRAM(S): at 06:26

## 2020-07-26 RX ADMIN — AMLODIPINE BESYLATE 5 MILLIGRAM(S): 2.5 TABLET ORAL at 06:26

## 2020-07-26 RX ADMIN — Medication 2: at 22:29

## 2020-07-26 RX ADMIN — BUDESONIDE AND FORMOTEROL FUMARATE DIHYDRATE 2 PUFF(S): 160; 4.5 AEROSOL RESPIRATORY (INHALATION) at 06:37

## 2020-07-26 RX ADMIN — HEPARIN SODIUM 12 UNIT(S)/HR: 5000 INJECTION INTRAVENOUS; SUBCUTANEOUS at 04:16

## 2020-07-26 RX ADMIN — Medication 1 MILLIGRAM(S): at 17:26

## 2020-07-26 RX ADMIN — MIRTAZAPINE 45 MILLIGRAM(S): 45 TABLET, ORALLY DISINTEGRATING ORAL at 22:05

## 2020-07-26 NOTE — PROGRESS NOTE ADULT - PROBLEM SELECTOR PLAN 1
Presented with no evidence of right heart strain on CTA. Started on eliquis 2 days ago for right femoral DVT.  -Heparin drip therapeutic dose administered  -Echo preformed by cardiology fellow Kyle Lee for confirmation of no right heart strain and assessment of PA - No RV strain, normal LV function  Pulm consulted:  - transition to eliquis from heparin gtt if no additional interventions planned  - Should follow up with us in the outpatient setting in 2 weeks.   - OOB, PT, Incentive spirometer.   Per-vascular: Obtain CTA venogram (ordered) of abdomen/pelvis to assess for presence of clot burden proximal to the known femoral vein clot.  Vasculr/pulm - as for now, keep heparin until clear from vascular as well

## 2020-07-26 NOTE — PROGRESS NOTE ADULT - SUBJECTIVE AND OBJECTIVE BOX
*****ACCEPTANCE NOTE*****    HOSPITAL COURSE: 75yoF PMH ASD repair, CAD, pAF s/p ablation, subdural hematoma s/p [place] hole evacuation, DM, HTN, HLD presents to the ED with left sided chest pain. Of note, patient had a recent DVT in right lower extremity 7/23/20 that she saw her cardiologist Dr. Santos for and was put on Eliquis. Her pain is on the left sided, worse on inspiration, 10/10 pain. No history of malignancy. Wells score 6. Patient had some diarrhea today and has right leg swelling and intermittent pain, denies SOB at rest or exertion, wheezing or cough, f/c/n/v, headache, abdominal pain. In the ED CT PE showed segmental and subsegmental PE's in the right, patient started on heparin drip, No TPa was given due to history of intra carinal hemorrhage. LE doppler showed new partially occlusive deep venous thrombus in right common femoral vein extending to distal femoral vein and profunda femoris. Labs were significant for BNP of 324, neg trop.    SUBJECTIVE:  Patient seen and examined at bedside. Patient denies h/n/v/d, fever, chills, cp, palpitations, sob, abd pain, rashes, dysuria, and changes in BM.     Vital Signs Last 12 Hrs  T(F): 98.8 (07-26-20 @ 14:29), Max: 98.8 (07-26-20 @ 14:29)  HR: 56 (07-26-20 @ 14:29) (55 - 56)  BP: 126/77 (07-26-20 @ 14:29) (126/77 - 144/74)  BP(mean): --  RR: 17 (07-26-20 @ 14:29) (17 - 18)  SpO2: 98% (07-26-20 @ 14:29) (98% - 100%)  I&O's Summary    PHYSICAL EXAM:  Constitutional: NAD, comfortable in bed.  HEENT: NC/AT, PERRLA, EOMI, no conjunctival pallor or scleral icterus, MMM  Neck: Supple, no JVD  Respiratory: CTA B/L. No w/r/r.   Cardiovascular: RRR, normal S1 and S2, no m/r/g.   Gastrointestinal: +BS, soft NTND, no guarding or rebound tenderness, no palpable masses   Extremities: wwp; no cyanosis, clubbing. Edema +2 in R. leg.   Vascular: Pulses equal and strong throughout.   Neurological: AAOx3, no CN deficits, strength and sensation intact throughout.   Skin: No gross skin abnormalities or rashes    LABS:                        9.3    6.37  )-----------( 199      ( 26 Jul 2020 06:34 )             29.5     07-26    140  |  108  |  13  ----------------------------<  87  4.3   |  23  |  0.70    Ca    8.8      26 Jul 2020 06:34  Phos  4.3     07-26  Mg     1.6     07-26    TPro  7.1  /  Alb  3.3  /  TBili  0.3  /  DBili  x   /  AST  19  /  ALT  10  /  AlkPhos  75  07-26    PT/INR - ( 26 Jul 2020 10:36 )   PT: 14.0 sec;   INR: 1.18          PTT - ( 26 Jul 2020 10:36 )  PTT:144.2 sec        RADIOLOGY & ADDITIONAL TESTS:    MEDICATIONS  (STANDING):  ALBUTerol    90 MICROgram(s) HFA Inhaler 1 Puff(s) Inhalation every 4 hours  amLODIPine   Tablet 5 milliGRAM(s) Oral daily  benztropine 1 milliGRAM(s) Oral two times a day  budesonide  80 MICROgram(s)/formoterol 4.5 MICROgram(s) Inhaler 2 Puff(s) Inhalation two times a day  dextrose 5%. 1000 milliLiter(s) (50 mL/Hr) IV Continuous <Continuous>  dextrose 50% Injectable 12.5 Gram(s) IV Push once  dextrose 50% Injectable 25 Gram(s) IV Push once  dextrose 50% Injectable 25 Gram(s) IV Push once  heparin  Infusion 1000 Unit(s)/Hr (10 mL/Hr) IV Continuous <Continuous>  insulin glargine Injectable (LANTUS) 11 Unit(s) SubCutaneous at bedtime  insulin lispro (HumaLOG) corrective regimen sliding scale   SubCutaneous Before meals and at bedtime  insulin lispro Injectable (HumaLOG) 3 Unit(s) SubCutaneous before breakfast  insulin lispro Injectable (HumaLOG) 3 Unit(s) SubCutaneous before lunch  insulin lispro Injectable (HumaLOG) 3 Unit(s) SubCutaneous before dinner  metoprolol succinate ER 50 milliGRAM(s) Oral daily  mirtazapine 45 milliGRAM(s) Oral daily  tiotropium 18 MICROgram(s) Capsule 1 Capsule(s) Inhalation daily  traZODone 100 milliGRAM(s) Oral at bedtime    MEDICATIONS  (PRN):  acetaminophen   Tablet .. 650 milliGRAM(s) Oral every 4 hours PRN Mild Pain (1 - 3)  albuterol/ipratropium for Nebulization. 3 milliLiter(s) Nebulizer four times a day PRN Shortness of Breath and/or Wheezing  dextrose 40% Gel 15 Gram(s) Oral once PRN Blood Glucose LESS THAN 70 milliGRAM(s)/deciliter  glucagon  Injectable 1 milliGRAM(s) IntraMuscular once PRN Glucose LESS THAN 70 milligrams/deciliter

## 2020-07-26 NOTE — PROGRESS NOTE ADULT - ATTENDING COMMENTS
74 yr old lady with a H/O asthma/COPD, H/O ASD repair, CAD, Afib (paroxysmal)--not on anticoagulation, H/O sub-dural hematoma which was evacuated.  Started on Eliquis 3 days ago for Rt femoral thrombus. Woke up this AM with chest pain. EKG showed no change. CT-PE showed segmental and sub-segmental PE. PA 3.0 cm  Agree with evaluation. Pt is comfortable. She is not tachycardic, with HR 62/min and regular. SpO2 100% on RA at rest. Good breath sounds. Heart rate is regular. 2(+) pitting edema Rt side and is larger than the left leg in diameter. The patient walked from bed to bathroom today and did not experience dyspnea.  Impression:  Rt femoral DVT which most likely embolized to the PA. Currently she is stable.   We has been in touch with conventional cardiology (part of the PERT team).  Their input is deeply appreciated.  The patient had an echocardiogram done this morning.  There was no evidence of right ventricular failure.  There was no echocardiographic evidence of shift of the interventricular septum. TAPSE was normal at 19 mm.   Other markers of poor prognosis were checked.  The pro BNP is marginally elevated (in the 300s).  Troponin T is not elevated.  At this stage, there is no evidence of clinical worsening.  The patient has been observed for almost 24 hours.  I agree that no interventions such as mechanical thrombolysis or catheter directed thrombolytic therapy is indicated.  Of note the patient has a H/O subdural hematoma in the past.  Therefore the risks of these procedures outweigh the benefits.  Will continue to monitor the patient on anticoagulation for now and switch to oral meds.
Apprec Vascular and Pulm recs  F/up on CT venogram  C/w heparin for now  Rest as above

## 2020-07-26 NOTE — PROGRESS NOTE ADULT - PROBLEM SELECTOR PLAN 6
Never hospitalized or intubated.  -c/w home symbacort    ADDENDUM: on symbicort; ordered for michelle prn

## 2020-07-26 NOTE — PROGRESS NOTE ADULT - SUBJECTIVE AND OBJECTIVE BOX
INTERVAL HISTORY:  Patient seen and examined at bedside. Feels well with sustained improvement of symptoms.    VITAL SIGNS:  ICU Vital Signs Last 24 Hrs  T(C): 36.8 (26 Jul 2020 06:06), Max: 37.1 (25 Jul 2020 20:44)  T(F): 98.3 (26 Jul 2020 06:06), Max: 98.7 (25 Jul 2020 20:44)  HR: 55 (26 Jul 2020 06:06) (50 - 80)  BP: 144/74 (26 Jul 2020 06:06) (137/76 - 168/80)  BP(mean): --  ABP: --  ABP(mean): --  RR: 18 (26 Jul 2020 06:06) (16 - 18)  SpO2: 100% (26 Jul 2020 06:06) (96% - 100%)        CAPILLARY BLOOD GLUCOSE      POCT Blood Glucose.: 91 mg/dL (26 Jul 2020 11:37)      PHYSICAL EXAM:  Constitutional: resting comfortably in bed, NAD  HEENT: NC/AT; PERRL, anicteric sclera; no oropharyngeal erythema or exudates; MMM  Neck: supple, no JVD  Respiratory: CTA B/L, no W/R/R; respirations appear non-labored, speaking full sentences  Cardiovascular: +S1/S2, RRR  Gastrointestinal: abdomen soft, NT/ND; +BS x4  Extremities: WWP; no clubbing, cyanosis; unchanged RLE edema  Vascular: 2+ radial, DP/PT and femoral pulses B/L      MEDICATIONS:  MEDICATIONS  (STANDING):  ALBUTerol    90 MICROgram(s) HFA Inhaler 1 Puff(s) Inhalation every 4 hours  amLODIPine   Tablet 5 milliGRAM(s) Oral daily  benztropine 1 milliGRAM(s) Oral two times a day  budesonide  80 MICROgram(s)/formoterol 4.5 MICROgram(s) Inhaler 2 Puff(s) Inhalation two times a day  dextrose 5%. 1000 milliLiter(s) (50 mL/Hr) IV Continuous <Continuous>  dextrose 50% Injectable 12.5 Gram(s) IV Push once  dextrose 50% Injectable 25 Gram(s) IV Push once  dextrose 50% Injectable 25 Gram(s) IV Push once  heparin  Infusion 1000 Unit(s)/Hr (10 mL/Hr) IV Continuous <Continuous>  insulin glargine Injectable (LANTUS) 11 Unit(s) SubCutaneous at bedtime  insulin lispro (HumaLOG) corrective regimen sliding scale   SubCutaneous Before meals and at bedtime  insulin lispro Injectable (HumaLOG) 3 Unit(s) SubCutaneous before breakfast  insulin lispro Injectable (HumaLOG) 3 Unit(s) SubCutaneous before lunch  insulin lispro Injectable (HumaLOG) 3 Unit(s) SubCutaneous before dinner  metoprolol succinate ER 50 milliGRAM(s) Oral daily  mirtazapine 45 milliGRAM(s) Oral daily  tiotropium 18 MICROgram(s) Capsule 1 Capsule(s) Inhalation daily  traZODone 100 milliGRAM(s) Oral at bedtime    MEDICATIONS  (PRN):  acetaminophen   Tablet .. 650 milliGRAM(s) Oral every 4 hours PRN Mild Pain (1 - 3)  albuterol/ipratropium for Nebulization. 3 milliLiter(s) Nebulizer four times a day PRN Shortness of Breath and/or Wheezing  dextrose 40% Gel 15 Gram(s) Oral once PRN Blood Glucose LESS THAN 70 milliGRAM(s)/deciliter  glucagon  Injectable 1 milliGRAM(s) IntraMuscular once PRN Glucose LESS THAN 70 milligrams/deciliter      ALLERGIES:  Allergies    adenosine (Unknown)    Intolerances        LABS:                        9.3    6.37  )-----------( 199      ( 26 Jul 2020 06:34 )             29.5     07-26    140  |  108  |  13  ----------------------------<  87  4.3   |  23  |  0.70    Ca    8.8      26 Jul 2020 06:34  Phos  4.3     07-26  Mg     1.6     07-26    TPro  7.1  /  Alb  3.3  /  TBili  0.3  /  DBili  x   /  AST  19  /  ALT  10  /  AlkPhos  75  07-26    PT/INR - ( 26 Jul 2020 10:36 )   PT: 14.0 sec;   INR: 1.18          PTT - ( 26 Jul 2020 10:36 )  PTT:144.2 sec      RADIOLOGY & ADDITIONAL TESTS:   no new imaging

## 2020-07-26 NOTE — PROGRESS NOTE ADULT - PROBLEM SELECTOR PLAN 4
- Hold home metformin  - c/w home insulin  - f/u A1c    ADDENDUM: spoke to patient's daughter, pt on metformin, jardiance, 22Units of lantus and takes 6Units premeal before dinner as pt does not eat much during the day; holding PO meds, c/w lantus (given 22Units prior to being made NPO per vascular recs, will half dose to 11 Units since NPO), ordered for 3 Units premeals, monitor glucose

## 2020-07-26 NOTE — PROGRESS NOTE ADULT - ASSESSMENT
73 yo F with PMHx of ASD repair, CAD, pAF s/p ablation, subdural hematoma s/p [place] hole evacuation (2013), DM, HTN, HLD presenting to the ED for complaints of left sided chest pain with radiation to the shoulder

## 2020-07-26 NOTE — PROGRESS NOTE ADULT - ASSESSMENT
75yoF PMH ASD repair, CAD, pAF s/p ablation, subdural hematoma s/p [place] hole evacuation, DM, HTN, HLD presents to the ED with left sided chest pain, found to have right-sided PE on CTA and right leg DVT two days prior admission.

## 2020-07-26 NOTE — PROGRESS NOTE ADULT - PROBLEM SELECTOR PLAN 2
H/o of right femoral DVT in June 2016. Presented to outpatient cardiology clinic on 7/23/2020 with right leg pain and started on eliquis. LE doppler showed new partially occlusive deep venous thrombus in right common femoral vein extending to distal femoral vein and profunda femoris, compared to U/S on Lucille 10, 2016.  -hold home eliquis   -c/w therapeutic dose lovenox  -Vascular surgery consulted, as above    ADDENDUM: reviewed doppler studies from 2016, NO DVT noted at that time; switched to heparin gtt, per vascular recs; if to undergo intervention, would need to coordinate  w/ pulm

## 2020-07-26 NOTE — PROGRESS NOTE ADULT - PROBLEM SELECTOR PLAN 1
She continues to be asymptomatic and able to exert herself on room air without desaturation. Bedside echo showed no evidence of RV strain and PASP estimated of 28, and TAPSE >17. Serological markers and EKG showed no evidence of RV strain as well.   From the pulmonary standpoint, there is no need for additional intervention for her PE aside from anticoagulation. She can be transitioned to Eliquis, provided that there are no other vascular surgery interventions planned for her femoral thrombus.     Recommend  - transition to eliquis from heparin gtt if no additional interventions planned  - Should follow up with us in the outpatient setting in 2 weeks.   - OOB, PT, Incentive spirometer

## 2020-07-26 NOTE — PROGRESS NOTE ADULT - ASSESSMENT
Assessment: 75yF with PMH of CAD, pAF, HTN, HLD, T2DM presenting with left sided chest pain found to have right sided PE on CTA.    Recommendations:  1. Continue anticoagulation as well as compressing and elevating RLE.   2. Obtain CTA venogram (ordered) of abdomen/pelvis to assess for presence of clot burden proximal to the known femoral vein clot.  3. Vascular will continue to follow pending results of CT venogram today.

## 2020-07-26 NOTE — PROGRESS NOTE ADULT - PROBLEM SELECTOR PLAN 3
Occlusion of coronary artery. No history of CABG, no history of PCI, at home takes ASA and eliquis, holding ASA and eliquis for now.  -c/w statin    ADDENDUM: holding ASA per cards recs; on metoprolol per cards note for atrial tachycardia

## 2020-07-26 NOTE — PROGRESS NOTE ADULT - SUBJECTIVE AND OBJECTIVE BOX
Subjective: Ms. Ambrosio reports that she is feeling fine today. She has some discomfort in her legs but is able to move it without significant pain and feels that it has gotten better. She also notes she feels less like her leg is less swollen today. She denies any fevers, SOB, or chest pain.     amLODIPine   Tablet 5  heparin  Infusion 1000  metoprolol succinate ER 50      Allergies    adenosine (Unknown)    Intolerances        Vital Signs Last 24 Hrs  T(C): 36.8 (26 Jul 2020 06:06), Max: 37.1 (25 Jul 2020 20:44)  T(F): 98.3 (26 Jul 2020 06:06), Max: 98.7 (25 Jul 2020 20:44)  HR: 55 (26 Jul 2020 06:06) (50 - 80)  BP: 144/74 (26 Jul 2020 06:06) (137/76 - 168/80)  BP(mean): --  RR: 18 (26 Jul 2020 06:06) (16 - 18)  SpO2: 100% (26 Jul 2020 06:06) (96% - 100%)  I&O's Summary      Physical Exam  General: Elderly woman resting comfortably in NAD.  Pulmonary: Bilateral and equal chest rise. Normal respiratory effort.  Cardiovascular: NSR  Abdominal: Abdomen soft  Extremities: Normal strength of hip flexion and foot dorsiflexion/plantarflexion bilaterally. RLE appears mildly more edematous in comparison to left, extending from thigh to calf. No erythema noted of bilateral LLE. Legs are nontender to palpation bilaterally.  Pulses: Palpable DP/PT bilaterally.      LABS:                        9.3    6.37  )-----------( 199      ( 26 Jul 2020 06:34 )             29.5     07-26    140  |  108  |  13  ----------------------------<  87  4.3   |  23  |  0.70    Ca    8.8      26 Jul 2020 06:34  Phos  4.3     07-26  Mg     1.6     07-26    TPro  7.1  /  Alb  3.3  /  TBili  0.3  /  DBili  x   /  AST  19  /  ALT  10  /  AlkPhos  75  07-26    PT/INR - ( 26 Jul 2020 10:36 )   PT: 14.0 sec;   INR: 1.18          PTT - ( 26 Jul 2020 10:36 )  PTT:144.2 sec    Radiology and Additional Studies:

## 2020-07-27 ENCOUNTER — NON-APPOINTMENT (OUTPATIENT)
Age: 75
End: 2020-07-27

## 2020-07-27 ENCOUNTER — TRANSCRIPTION ENCOUNTER (OUTPATIENT)
Age: 75
End: 2020-07-27

## 2020-07-27 VITALS
TEMPERATURE: 98 F | HEART RATE: 58 BPM | RESPIRATION RATE: 18 BRPM | DIASTOLIC BLOOD PRESSURE: 62 MMHG | SYSTOLIC BLOOD PRESSURE: 105 MMHG | OXYGEN SATURATION: 100 %

## 2020-07-27 LAB
ANION GAP SERPL CALC-SCNC: 12 MMOL/L — SIGNIFICANT CHANGE UP (ref 5–17)
APTT BLD: 75.8 SEC — HIGH (ref 27.5–35.5)
BUN SERPL-MCNC: 13 MG/DL — SIGNIFICANT CHANGE UP (ref 7–23)
CALCIUM SERPL-MCNC: 9 MG/DL — SIGNIFICANT CHANGE UP (ref 8.4–10.5)
CHLORIDE SERPL-SCNC: 107 MMOL/L — SIGNIFICANT CHANGE UP (ref 96–108)
CO2 SERPL-SCNC: 24 MMOL/L — SIGNIFICANT CHANGE UP (ref 22–31)
CREAT SERPL-MCNC: 0.67 MG/DL — SIGNIFICANT CHANGE UP (ref 0.5–1.3)
GLUCOSE BLDC GLUCOMTR-MCNC: 147 MG/DL — HIGH (ref 70–99)
GLUCOSE BLDC GLUCOMTR-MCNC: 203 MG/DL — HIGH (ref 70–99)
GLUCOSE BLDC GLUCOMTR-MCNC: 247 MG/DL — HIGH (ref 70–99)
GLUCOSE SERPL-MCNC: 154 MG/DL — HIGH (ref 70–99)
HCT VFR BLD CALC: 30.9 % — LOW (ref 34.5–45)
HGB BLD-MCNC: 9.5 G/DL — LOW (ref 11.5–15.5)
MAGNESIUM SERPL-MCNC: 2.1 MG/DL — SIGNIFICANT CHANGE UP (ref 1.6–2.6)
MCHC RBC-ENTMCNC: 22.2 PG — LOW (ref 27–34)
MCHC RBC-ENTMCNC: 30.7 GM/DL — LOW (ref 32–36)
MCV RBC AUTO: 72.4 FL — LOW (ref 80–100)
NRBC # BLD: 0 /100 WBCS — SIGNIFICANT CHANGE UP (ref 0–0)
PLATELET # BLD AUTO: 214 K/UL — SIGNIFICANT CHANGE UP (ref 150–400)
POTASSIUM SERPL-MCNC: 4.2 MMOL/L — SIGNIFICANT CHANGE UP (ref 3.5–5.3)
POTASSIUM SERPL-SCNC: 4.2 MMOL/L — SIGNIFICANT CHANGE UP (ref 3.5–5.3)
RBC # BLD: 4.27 M/UL — SIGNIFICANT CHANGE UP (ref 3.8–5.2)
RBC # FLD: 18.1 % — HIGH (ref 10.3–14.5)
SODIUM SERPL-SCNC: 143 MMOL/L — SIGNIFICANT CHANGE UP (ref 135–145)
WBC # BLD: 4.75 K/UL — SIGNIFICANT CHANGE UP (ref 3.8–10.5)
WBC # FLD AUTO: 4.75 K/UL — SIGNIFICANT CHANGE UP (ref 3.8–10.5)

## 2020-07-27 PROCEDURE — 82962 GLUCOSE BLOOD TEST: CPT

## 2020-07-27 PROCEDURE — 80048 BASIC METABOLIC PNL TOTAL CA: CPT

## 2020-07-27 PROCEDURE — 93005 ELECTROCARDIOGRAM TRACING: CPT

## 2020-07-27 PROCEDURE — 83036 HEMOGLOBIN GLYCOSYLATED A1C: CPT

## 2020-07-27 PROCEDURE — 93971 EXTREMITY STUDY: CPT

## 2020-07-27 PROCEDURE — 36415 COLL VENOUS BLD VENIPUNCTURE: CPT

## 2020-07-27 PROCEDURE — 84484 ASSAY OF TROPONIN QUANT: CPT

## 2020-07-27 PROCEDURE — 85027 COMPLETE CBC AUTOMATED: CPT

## 2020-07-27 PROCEDURE — 85730 THROMBOPLASTIN TIME PARTIAL: CPT

## 2020-07-27 PROCEDURE — 99285 EMERGENCY DEPT VISIT HI MDM: CPT | Mod: 25

## 2020-07-27 PROCEDURE — 71045 X-RAY EXAM CHEST 1 VIEW: CPT

## 2020-07-27 PROCEDURE — 83880 ASSAY OF NATRIURETIC PEPTIDE: CPT

## 2020-07-27 PROCEDURE — 71275 CT ANGIOGRAPHY CHEST: CPT

## 2020-07-27 PROCEDURE — 93306 TTE W/DOPPLER COMPLETE: CPT

## 2020-07-27 PROCEDURE — 74174 CTA ABD&PLVS W/CONTRAST: CPT

## 2020-07-27 PROCEDURE — 94640 AIRWAY INHALATION TREATMENT: CPT

## 2020-07-27 PROCEDURE — 85025 COMPLETE CBC W/AUTO DIFF WBC: CPT

## 2020-07-27 PROCEDURE — 84100 ASSAY OF PHOSPHORUS: CPT

## 2020-07-27 PROCEDURE — 83735 ASSAY OF MAGNESIUM: CPT

## 2020-07-27 PROCEDURE — 87635 SARS-COV-2 COVID-19 AMP PRB: CPT

## 2020-07-27 PROCEDURE — 80053 COMPREHEN METABOLIC PANEL: CPT

## 2020-07-27 PROCEDURE — 85610 PROTHROMBIN TIME: CPT

## 2020-07-27 RX ORDER — APIXABAN 2.5 MG/1
10 TABLET, FILM COATED ORAL EVERY 12 HOURS
Refills: 0 | Status: DISCONTINUED | OUTPATIENT
Start: 2020-07-27 | End: 2020-07-27

## 2020-07-27 RX ORDER — APIXABAN 2.5 MG/1
1 TABLET, FILM COATED ORAL
Qty: 28 | Refills: 0
Start: 2020-07-27 | End: 2020-08-09

## 2020-07-27 RX ORDER — ALBUTEROL 90 UG/1
1 AEROSOL, METERED ORAL
Qty: 0 | Refills: 0 | DISCHARGE
Start: 2020-07-27

## 2020-07-27 RX ORDER — APIXABAN 2.5 MG/1
2 TABLET, FILM COATED ORAL
Qty: 12 | Refills: 0
Start: 2020-07-27 | End: 2020-07-29

## 2020-07-27 RX ORDER — ACETAMINOPHEN 500 MG
2 TABLET ORAL
Qty: 0 | Refills: 0 | DISCHARGE
Start: 2020-07-27

## 2020-07-27 RX ADMIN — Medication 50 MILLIGRAM(S): at 06:31

## 2020-07-27 RX ADMIN — APIXABAN 10 MILLIGRAM(S): 2.5 TABLET, FILM COATED ORAL at 12:08

## 2020-07-27 RX ADMIN — Medication 650 MILLIGRAM(S): at 16:30

## 2020-07-27 RX ADMIN — Medication 650 MILLIGRAM(S): at 15:32

## 2020-07-27 RX ADMIN — AMLODIPINE BESYLATE 5 MILLIGRAM(S): 2.5 TABLET ORAL at 06:31

## 2020-07-27 RX ADMIN — Medication 1 MILLIGRAM(S): at 06:31

## 2020-07-27 RX ADMIN — Medication 650 MILLIGRAM(S): at 09:30

## 2020-07-27 RX ADMIN — Medication 4: at 08:43

## 2020-07-27 RX ADMIN — BUDESONIDE AND FORMOTEROL FUMARATE DIHYDRATE 2 PUFF(S): 160; 4.5 AEROSOL RESPIRATORY (INHALATION) at 06:35

## 2020-07-27 RX ADMIN — Medication 4: at 12:08

## 2020-07-27 RX ADMIN — Medication 650 MILLIGRAM(S): at 08:44

## 2020-07-27 NOTE — PROGRESS NOTE ADULT - SUBJECTIVE AND OBJECTIVE BOX
Patient was seen and examined by me at bedside. I agree with resident's note, subjective, objective physical exam, assessment and plan with following modifications/additions.    Greater than 35 minutes spent on total encounter; more than 50% of the visit was spent counseling and/or coordinating care by the attending physician.    75yoF PMH ASD repair, CAD, pAF s/p ablation, subdural hematoma s/p [place] hole evacuation, DM, HTN, HLD presents to the ED with left sided chest pain, found to have right-sided PE on CTA and right leg DVT.   -No sig RLE clot burden needing intervention, no RH strain, ok for switch to noac for DVT/PE treatment, will ensure close PCP f/u  -Hx of CAD continue ASA  -Continue home DM rx on dc  -Dc home with HHA, preferred dc plan by patient    Yasir Delgado MD [phone]

## 2020-07-27 NOTE — DISCHARGE NOTE PROVIDER - CARE PROVIDER_API CALL
Chuyita Cueto  [place] - [address] [place] [address]  Phone: [phone]  Fax: [phone]  Follow Up Time: Chuyita Cueto  [facility] - [address]  [address] [address]  Phone: [phone]  Fax: [phone]  Follow Up Time:     Saúl Mustafa  [facility] - [address] [place]  [address] [place] [address]  Phone: [phone]  Fax: [phone]  Follow Up Time:     Hima Anderson  CARDIOLOGY  [address] [address]  Phone: [phone]  Fax: [phone]  Follow Up Time:

## 2020-07-27 NOTE — DISCHARGE NOTE PROVIDER - HOSPITAL COURSE
75yoF PMH ASD repair, CAD, pAF s/p ablation, subdural hematoma s/p [place] hole evacuation, DM, HTN, HLD presents to the ED with left sided chest pain. Of note, patient had a recent DVT in right lower extremity 7/23/20 found during clinic visit with her cardiologist Dr. Santos that started patient on Eliquis. Patient describes pain on the left sided, worse on inspiration, 10/10. No history of malignancy. Wells score 6. Patient had some diarrhea on admission, right leg swelling and intermittent pain. Patient denies SOB, wheezing or cough, f/c/n/v, headache, abdominal pain. In the ED CT PE showed segmental and subsegmental PE's on the right. patient started on heparin drip, No TPa was given due to history of intra carinal hemorrhage. LE doppler showed new partially occlusive deep venous thrombus in right common femoral vein extending to distal femoral vein and profunda femoris. CT venogram excluded thrombose proximal to common femoral. Patient was stable during her hospital course, not requiring oxygen support, saturating well on room air, chest pain was well controlled and patient ambulated well. Bedside echo showed no RV strain.         NEW MEDICATION: start Eliquis 10mg BID for 3 days (7-27 until 7/30) and then start with Eliquis 5mg BID.     FOLLOW UP EXAM/TEST: Right leg exam following right leg swelling    FOLLOW UP APPOINTMENT: Please follow up with you primary care provider

## 2020-07-27 NOTE — DISCHARGE NOTE PROVIDER - NSDCMRMEDTOKEN_GEN_ALL_CORE_FT
acetaminophen 325 mg oral tablet: 2 tab(s) orally every 4 hours, As needed, Mild Pain (1 - 3)  Albuterol (Eqv-ProAir HFA) 90 mcg/inh inhalation aerosol: 1 puff(s) inhaled every 4 hours  amLODIPine 5 mg oral tablet: 1 tab(s) orally once a day  apixaban 5 mg oral tablet: 2 tab(s) orally every 12 hours  apixaban 5 mg oral tablet: 1 tab(s) orally 2 times a day   atorvastatin 20 mg oral tablet: 1 tab(s) orally once a day (at bedtime)  benztropine 1 mg oral tablet: 1 tab(s) orally 2 times a day  HumaLOG KwikPen 100 units/mL injectable solution: 6 unit(s) injectable once a day  Lantus 100 units/mL subcutaneous solution: 22 unit(s) subcutaneous once a day (at bedtime)  metFORMIN 1000 mg oral tablet: 1 tab(s) orally 2 times a day  mirtazapine 45 mg oral tablet: 1 tab(s) orally once a day (at bedtime)  multivitamin: 1 tab(s) orally once a day  Symbicort 160 mcg-4.5 mcg/inh inhalation aerosol: 2 puff(s) inhaled 2 times a day  Toprol-XL 50 mg oral tablet, extended release: orally 2 times a day  traZODone 100 mg oral tablet: orally once a day (at bedtime)

## 2020-07-27 NOTE — DISCHARGE NOTE NURSING/CASE MANAGEMENT/SOCIAL WORK - PATIENT PORTAL LINK FT
You can access the FollowMyHealth Patient Portal offered by [facility] by registering at the following website: [email or web address]. By joining [facility]’s FollowMyHealth portal, you will also be able to view your health information using other applications (apps) compatible with our system.

## 2020-07-27 NOTE — DISCHARGE NOTE PROVIDER - NSFOLLOWUPCLINICS_GEN_ALL_ED_FT
[facility]  Pulmonary Medicine  [address] [place] [address]  Phone: [phone]  Fax: [phone]  Follow Up Time: 2 weeks

## 2020-07-27 NOTE — DISCHARGE NOTE PROVIDER - NSDCCPCAREPLAN_GEN_ALL_CORE_FT
PRINCIPAL DISCHARGE DIAGNOSIS  Diagnosis: Pulmonary embolism  Assessment and Plan of Treatment: You were hospitalized at [facility] for acute chest pain. You had chest CT scan that showed a blood clot in your lung, the marek is blocking blood supply for a segment of your right lung. The clot is not big enough to cause any respiratory problems, and you were able to breathe normally with no oxygen support. We looked at your heart with ultra sound machine called, Echocardiogram, in order to assess your heart function and to make sure the clot is no big and causing back flow of blood into your heart. You got treatment throw your vein with blood thinner called heparin, and you going to continue taking blood thinners to treat your blood clots in your lung and right leg. You will take 2 tablets of Eliquis twice a day for 3 days (until 7/30) and then you will start with one tablet of Eliquis twice a day. Please follow up with your primary care provider for physical examination for your leg swallowing in the next 2 weeks.      SECONDARY DISCHARGE DIAGNOSES  Diagnosis: DVT (deep venous thrombosis)  Assessment and Plan of Treatment: You have diagnosis of DVT (deep venous thrombosis), You had a CT scan for your venous system done, showing DVT in your right leg only. You were treated with blood thinners for that.    Diagnosis: History of subdural hematoma  Assessment and Plan of Treatment: You have History of subdural hematoma. Because there is a higher risk for having a new stroke from bleeding. You did not received a medication that actively breaks the clots, called TPa. You received a medication that prevents from new clots to be formed called Heparin and you will start a new medication at home called Eliquis.    Diagnosis: Essential hypertension  Assessment and Plan of Treatment: You have a diagnosis of Essential hypertension. You received your home medications    Diagnosis: Type 2 diabetes mellitus  Assessment and Plan of Treatment: You have a diagnosis of Type 2 diabetes mellitus. You received your home medications    Diagnosis: Asthma  Assessment and Plan of Treatment: You have a diagnosis of Asthma. You received your home medications

## 2020-07-27 NOTE — DISCHARGE NOTE PROVIDER - NSDCFUADDAPPT_GEN_ALL_CORE_FT
Please follow up with your primary care provider next appointment is on 7/29  Please follow up with Pulmonology in 2 weeks

## 2020-07-27 NOTE — CHART NOTE - NSCHARTNOTEFT_GEN_A_CORE
CT venogram reviewed by the team. Despite the distal EIV thrombus, there is no string indication for intervention (lysis-thrombectomy)     The recommendation is full anticoagulation for at least 3 months - transition to PO AC before discharge.   pulmonary embolism treatment per primary team.     Pt should follow up in one month with Dr. Retana at the office for repeat RLE vein duplex.     Vascular will sign off.

## 2020-07-27 NOTE — DISCHARGE NOTE PROVIDER - PROVIDER TOKENS
PROVIDER:[TOKEN:[[id number]]] PROVIDER:[TOKEN:[[id number]]],PROVIDER:[TOKEN:[[id number]]],PROVIDER:[TOKEN:[[id number]]]

## 2020-07-27 NOTE — DISCHARGE NOTE PROVIDER - NSDCFUSCHEDAPPT_GEN_ALL_CORE_FT
TJ MCMANUS ; 07/29/2020 ; [facility] [address] ; 09/11/2020 ; [facility] [address] TJ MCMANUS ; 07/29/2020 ; [facility] [address] ; 09/11/2020 ; [facility] [address]

## 2020-07-27 NOTE — DISCHARGE NOTE PROVIDER - CARE PROVIDERS DIRECT ADDRESSES
,[email or web address],DirectAddress_Unknown

## 2020-07-29 ENCOUNTER — APPOINTMENT (OUTPATIENT)
Dept: INTERNAL MEDICINE | Facility: CLINIC | Age: 75
End: 2020-07-29
Payer: MEDICARE

## 2020-07-29 VITALS
WEIGHT: 150.13 LBS | SYSTOLIC BLOOD PRESSURE: 130 MMHG | BODY MASS INDEX: 25.77 KG/M2 | TEMPERATURE: 98.3 F | DIASTOLIC BLOOD PRESSURE: 75 MMHG | HEART RATE: 97 BPM | OXYGEN SATURATION: 99 %

## 2020-07-29 PROCEDURE — 99496 TRANSJ CARE MGMT HIGH F2F 7D: CPT

## 2020-07-30 DIAGNOSIS — R07.9 CHEST PAIN, UNSPECIFIED: ICD-10-CM

## 2020-07-30 DIAGNOSIS — Z96.651 PRESENCE OF RIGHT ARTIFICIAL KNEE JOINT: ICD-10-CM

## 2020-07-30 DIAGNOSIS — Z79.01 LONG TERM (CURRENT) USE OF ANTICOAGULANTS: ICD-10-CM

## 2020-07-30 DIAGNOSIS — J44.9 CHRONIC OBSTRUCTIVE PULMONARY DISEASE, UNSPECIFIED: ICD-10-CM

## 2020-07-30 DIAGNOSIS — Z86.718 PERSONAL HISTORY OF OTHER VENOUS THROMBOSIS AND EMBOLISM: ICD-10-CM

## 2020-07-30 DIAGNOSIS — I10 ESSENTIAL (PRIMARY) HYPERTENSION: ICD-10-CM

## 2020-07-30 DIAGNOSIS — E11.9 TYPE 2 DIABETES MELLITUS WITHOUT COMPLICATIONS: ICD-10-CM

## 2020-07-30 DIAGNOSIS — I26.99 OTHER PULMONARY EMBOLISM WITHOUT ACUTE COR PULMONALE: ICD-10-CM

## 2020-07-30 DIAGNOSIS — Z87.74 PERSONAL HISTORY OF (CORRECTED) CONGENITAL MALFORMATIONS OF HEART AND CIRCULATORY SYSTEM: ICD-10-CM

## 2020-07-30 DIAGNOSIS — Z88.8 ALLERGY STATUS TO OTHER DRUGS, MEDICAMENTS AND BIOLOGICAL SUBSTANCES: ICD-10-CM

## 2020-07-30 DIAGNOSIS — I48.0 PAROXYSMAL ATRIAL FIBRILLATION: ICD-10-CM

## 2020-07-30 DIAGNOSIS — E78.5 HYPERLIPIDEMIA, UNSPECIFIED: ICD-10-CM

## 2020-07-30 DIAGNOSIS — I25.10 ATHEROSCLEROTIC HEART DISEASE OF NATIVE CORONARY ARTERY WITHOUT ANGINA PECTORIS: ICD-10-CM

## 2020-07-30 DIAGNOSIS — F25.9 SCHIZOAFFECTIVE DISORDER, UNSPECIFIED: ICD-10-CM

## 2020-07-30 DIAGNOSIS — D64.9 ANEMIA, UNSPECIFIED: ICD-10-CM

## 2020-07-30 DIAGNOSIS — I82.411 ACUTE EMBOLISM AND THROMBOSIS OF RIGHT FEMORAL VEIN: ICD-10-CM

## 2020-07-30 DIAGNOSIS — Z79.51 LONG TERM (CURRENT) USE OF INHALED STEROIDS: ICD-10-CM

## 2020-07-30 DIAGNOSIS — Z79.4 LONG TERM (CURRENT) USE OF INSULIN: ICD-10-CM

## 2020-07-30 RX ORDER — EMPAGLIFLOZIN 25 MG/1
25 TABLET, FILM COATED ORAL DAILY
Qty: 90 | Refills: 0 | Status: DISCONTINUED | COMMUNITY
Start: 2019-05-31 | End: 2020-07-30

## 2020-08-05 ENCOUNTER — NON-APPOINTMENT (OUTPATIENT)
Age: 75
End: 2020-08-05

## 2020-08-05 ENCOUNTER — APPOINTMENT (OUTPATIENT)
Dept: HEART AND VASCULAR | Facility: CLINIC | Age: 75
End: 2020-08-05
Payer: MEDICARE

## 2020-08-05 VITALS
SYSTOLIC BLOOD PRESSURE: 144 MMHG | OXYGEN SATURATION: 96 % | BODY MASS INDEX: 26.97 KG/M2 | WEIGHT: 157.98 LBS | TEMPERATURE: 98.9 F | HEART RATE: 86 BPM | DIASTOLIC BLOOD PRESSURE: 70 MMHG | HEIGHT: 64 IN

## 2020-08-05 DIAGNOSIS — R07.9 CHEST PAIN, UNSPECIFIED: ICD-10-CM

## 2020-08-05 DIAGNOSIS — R60.0 LOCALIZED EDEMA: ICD-10-CM

## 2020-08-05 PROCEDURE — 99215 OFFICE O/P EST HI 40 MIN: CPT

## 2020-08-05 NOTE — HISTORY OF PRESENT ILLNESS
[FreeTextEntry1] : 76 yo female with hx DM2, afib s/p ablation, asthma, SDH 30 years ago, here for initial evaluation after newly diagnosed DVT/PE on 7/22/2020 on eliquis.    \par \par Patient presented with RLE pain and pleuritic chest pain.  Noted to have extensive proximal RLE DVT and non occlusive right main PA PE.  Since dx chest discomfort has resolved.  Still has right thigh/calf pain and swelling.  No clear provoking event.  No hx of malignancy. CT abd/pel during admission revealed no mass/compression syndrome, thrombus noted in right external iliac vein.  \par \par FHx: Mother CAD\par \par \par \par

## 2020-08-05 NOTE — PHYSICAL EXAM
[General Appearance - Well Developed] : well developed [Normal Appearance] : normal appearance [Well Groomed] : well groomed [General Appearance - Well Nourished] : well nourished [General Appearance - In No Acute Distress] : no acute distress [No Deformities] : no deformities [Eyelids - No Xanthelasma] : the eyelids demonstrated no xanthelasmas [Normal Conjunctiva] : the conjunctiva exhibited no abnormalities [Normal Oral Mucosa] : normal oral mucosa [No Oral Pallor] : no oral pallor [Normal Jugular Venous A Waves Present] : normal jugular venous A waves present [No Oral Cyanosis] : no oral cyanosis [No Jugular Venous Marinelli A Waves] : no jugular venous marinelli A waves [Normal Jugular Venous V Waves Present] : normal jugular venous V waves present [Heart Rate And Rhythm] : heart rate and rhythm were normal [Heart Sounds] : normal S1 and S2 [Murmurs] : no murmurs present [Arterial Pulses Normal] : the arterial pulses were normal [Veins - Varicosity Changes] : no varicosital changes were noted in the lower extremities [Respiration, Rhythm And Depth] : normal respiratory rhythm and effort [Exaggerated Use Of Accessory Muscles For Inspiration] : no accessory muscle use [Auscultation Breath Sounds / Voice Sounds] : lungs were clear to auscultation bilaterally [Abdomen Tenderness] : non-tender [Abdomen Soft] : soft [Abdomen Mass (___ Cm)] : no abdominal mass palpated [Gait - Sufficient For Exercise Testing] : the gait was sufficient for exercise testing [Abnormal Walk] : normal gait [Cyanosis, Localized] : no localized cyanosis [Petechial Hemorrhages (___cm)] : no petechial hemorrhages [Nail Clubbing] : no clubbing of the fingernails [] : no ischemic changes [FreeTextEntry1] : 2+ pitting RLE edema up to thigh

## 2020-08-05 NOTE — ASSESSMENT
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  Indefinite AC recommended given hx of afib and unprovoked VTE. \par - no clear provoking factor. CT abd/pel unremarkable, no compression syndrome, no signs of malignancy.  \par - given advanced age will not pursue hypercoag workup\par - counseled on compression therapy\par - rtc 3 months

## 2020-08-14 ENCOUNTER — APPOINTMENT (OUTPATIENT)
Dept: HEART AND VASCULAR | Facility: CLINIC | Age: 75
End: 2020-08-14
Payer: MEDICARE

## 2020-08-14 VITALS
OXYGEN SATURATION: 90 % | WEIGHT: 157 LBS | HEIGHT: 64 IN | SYSTOLIC BLOOD PRESSURE: 136 MMHG | HEART RATE: 68 BPM | DIASTOLIC BLOOD PRESSURE: 72 MMHG | BODY MASS INDEX: 26.8 KG/M2

## 2020-08-14 PROCEDURE — 93000 ELECTROCARDIOGRAM COMPLETE: CPT

## 2020-08-14 PROCEDURE — 99214 OFFICE O/P EST MOD 30 MIN: CPT

## 2020-08-14 NOTE — ASSESSMENT
[FreeTextEntry1] : swelling in her right leg due to DVT on eliquis defer to vascular medicine \par in NSR on on toprol xl bid message left for daughter for med recc \par she has known chronic sob normal recent nuclear stress test\par htn is acceptable \par fu in three months \par \par

## 2020-08-14 NOTE — PROCEDURE
[Lexiscan] : Lexiscan [Tc-99m, sestamibi-Cardiolite, to 40mCi, dose-Radionuclides-ASOCS code--v.1-Active-Trade] : Tc-99m, sestamibi-Cardiolite, to 40mCi, dose-Radionuclides-ASOCS code--v.1-Active-Trade [Normal] : normal [de-identified] : Dr. Willy Santos     Performing Provider: Dr. Willy Santos [de-identified] : CP, DM, HTN, Abnormal ECG.     Gender: Female [de-identified] : Khoi Sun [de-identified] : Given via the IV route.    1 Day Protocol. [de-identified] : 9.5 [de-identified] : 28.7 [de-identified] : 61 [de-identified] : Given via the IV route [de-identified] : 69 [de-identified] : 130/70 [TWNoteComboBox1] : JIM [FreeTextEntry1] : image quality is good\par resting ef 66%\par post stress ef 69%\par prior study NA\par artifacts None\par blood pressure remained stable at 120/70 mmHg heart rate increased to 103 bpm

## 2020-08-14 NOTE — PHYSICAL EXAM
[General Appearance - Well Developed] : well developed [Normal Appearance] : normal appearance [Well Groomed] : well groomed [General Appearance - Well Nourished] : well nourished [No Deformities] : no deformities [General Appearance - In No Acute Distress] : no acute distress [Normal Conjunctiva] : the conjunctiva exhibited no abnormalities [Eyelids - No Xanthelasma] : the eyelids demonstrated no xanthelasmas [No Oral Pallor] : no oral pallor [Normal Oral Mucosa] : normal oral mucosa [No Oral Cyanosis] : no oral cyanosis [No Jugular Venous Marinelli A Waves] : no jugular venous marinelli A waves [Normal Jugular Venous V Waves Present] : normal jugular venous V waves present [Normal Jugular Venous A Waves Present] : normal jugular venous A waves present [Respiration, Rhythm And Depth] : normal respiratory rhythm and effort [Auscultation Breath Sounds / Voice Sounds] : lungs were clear to auscultation bilaterally [Exaggerated Use Of Accessory Muscles For Inspiration] : no accessory muscle use [Heart Sounds] : normal S1 and S2 [Heart Rate And Rhythm] : heart rate and rhythm were normal [Murmurs] : no murmurs present [Abdomen Soft] : soft [Abdomen Tenderness] : non-tender [Abdomen Mass (___ Cm)] : no abdominal mass palpated [Abnormal Walk] : normal gait [Gait - Sufficient For Exercise Testing] : the gait was sufficient for exercise testing [Nail Clubbing] : no clubbing of the fingernails [Petechial Hemorrhages (___cm)] : no petechial hemorrhages [Cyanosis, Localized] : no localized cyanosis [Skin Color & Pigmentation] : normal skin color and pigmentation [No Venous Stasis] : no venous stasis [] : no rash [Skin Lesions] : no skin lesions [No Xanthoma] : no  xanthoma was observed [No Skin Ulcers] : no skin ulcer [Mood] : the mood was normal [Oriented To Time, Place, And Person] : oriented to person, place, and time [Affect] : the affect was normal [No Anxiety] : not feeling anxious [FreeTextEntry1] : right leg edema

## 2020-08-14 NOTE — HISTORY OF PRESENT ILLNESS
[FreeTextEntry1] : 75 F with chest pain history of asd repaired surgically IDDM atrial fibrillation s/p ablation by dr kramer no ac due to spontaneous SDH ILR DVT/PE (possibly COVID related) on AC no other triggers now atrial tachycardia which manifested as sob no feels much better no further cp no sob\par \par \par ecg SB \par normal nuclear stress test 6/2020\par

## 2020-08-26 ENCOUNTER — LABORATORY RESULT (OUTPATIENT)
Age: 75
End: 2020-08-26

## 2020-08-26 ENCOUNTER — APPOINTMENT (OUTPATIENT)
Dept: HEMATOLOGY ONCOLOGY | Facility: CLINIC | Age: 75
End: 2020-08-26
Payer: MEDICARE

## 2020-08-26 VITALS
BODY MASS INDEX: 24.64 KG/M2 | TEMPERATURE: 98.9 F | WEIGHT: 157 LBS | DIASTOLIC BLOOD PRESSURE: 81 MMHG | SYSTOLIC BLOOD PRESSURE: 146 MMHG | HEART RATE: 57 BPM | OXYGEN SATURATION: 99 % | HEIGHT: 67 IN

## 2020-08-26 PROCEDURE — 36415 COLL VENOUS BLD VENIPUNCTURE: CPT

## 2020-08-26 PROCEDURE — 99204 OFFICE O/P NEW MOD 45 MIN: CPT | Mod: 25

## 2020-08-26 NOTE — HISTORY OF PRESENT ILLNESS
[de-identified] : 75 years old female history of multiple medical problems as well as subarachnoid hemorrhage requiring evacuation in the past.  Patient was found to have positive COVID antibodies in June 2020... Later on in July she was admitted to [facility] with a large femoral DVT, and she is currently on anticoagulation with Eliquis... She is here to assess the length of anticoagulation therapy...

## 2020-08-26 NOTE — ASSESSMENT
[FreeTextEntry1] : I discussed with patient in detail her condition... In view of history of subdural hematoma, her now being on anticoagulation requires special consideration... Patient understands all issues involved...\par \par At this point I would keep patient on anticoagulation for 6 months, until January 2021.\par \par I am going to do repeat blood work for CBC iron studies vitamin B12 which was low in the past, and lupus anticoagulants.... This will help determine the length of anticoagulation therapy...\par \par Discussed with patient in detail the need to be extremely careful and vigilant about not falling, and avoiding all trauma as she is high risk for bleed.

## 2020-09-03 ENCOUNTER — APPOINTMENT (OUTPATIENT)
Dept: PULMONOLOGY | Facility: CLINIC | Age: 75
End: 2020-09-03
Payer: MEDICARE

## 2020-09-03 VITALS
HEART RATE: 61 BPM | OXYGEN SATURATION: 98 % | HEIGHT: 67 IN | WEIGHT: 157 LBS | DIASTOLIC BLOOD PRESSURE: 70 MMHG | SYSTOLIC BLOOD PRESSURE: 120 MMHG | TEMPERATURE: 97.7 F | BODY MASS INDEX: 24.64 KG/M2

## 2020-09-03 DIAGNOSIS — R23.4 CHANGES IN SKIN TEXTURE: ICD-10-CM

## 2020-09-03 PROCEDURE — 36415 COLL VENOUS BLD VENIPUNCTURE: CPT

## 2020-09-03 PROCEDURE — 99215 OFFICE O/P EST HI 40 MIN: CPT | Mod: 25

## 2020-09-03 NOTE — PHYSICAL EXAM
[No Acute Distress] : no acute distress [Well Nourished] : well nourished [Normal Appearance] : normal appearance [Well Groomed] : well groomed [Well Developed] : well developed [No Focal Deficits] : no focal deficits [Normal Color/ Pigmentation] : normal color/ pigmentation [Normal Affect] : normal affect [Normal Mood] : normal mood [Oriented x3] : oriented x3

## 2020-09-03 NOTE — REASON FOR VISIT
[Follow-Up - From Hospitalization] : a follow-up visit after a recent hospitalization [Pulmonary Embolism] : pulmonary embolism [Other: _____] : [unfilled]

## 2020-09-04 LAB
CH50 SERPL-MCNC: 51 U/ML
DEPRECATED D DIMER PPP IA-ACNC: <150 NG/ML DDU
ENA SCL70 IGG SER IA-ACNC: <0.2 AL
RHEUMATOID FACT SER QL: <10 IU/ML

## 2020-09-08 LAB
ANA PAT FLD IF-IMP: ABNORMAL
ANA SER IF-ACNC: ABNORMAL

## 2020-09-11 ENCOUNTER — APPOINTMENT (OUTPATIENT)
Dept: HEART AND VASCULAR | Facility: CLINIC | Age: 75
End: 2020-09-11

## 2020-09-15 NOTE — PROCEDURE
[FreeTextEntry1] : EXAM: ECHO TTE WO CON COMP W DOPP PROCEDURE DATE: 07/26/2020 \par CONCLUSIONS: \par  1. Limited study obtained for evaluation of left ventricular function performed by cardiology fellow on call. \par  2. The right ventricle is not well visualized. \par  3. Abnormal septal motion seen due to abnormal conduction. Endocardium not well visualized. Left ventricular ejection fraction is 50-55% in limited views. \par  4. No pericardial effusion. \par  5. Recommend repeat complete study for a comprehensive evaluation of biventricular function and cardiac valve assessment. \par Right Ventricle: The right ventricle is not well visualized. \par \par EXAM: US DPLX LWR EXT VEINS LTD RT PROCEDURE DATE: 07/25/2020 \par Thrombus visualized in the right common femoral vein extending to distal femoral vein and profunda femoris. No thrombus identified in the saphenofemoral junction or popliteal vein or calf veins on grayscale and Doppler examination. Contralateral left common femoral vein is patent. Subcutaneous edema in right lower extremity \par IMPRESSION: Since Lucille 10, 2016, new partially occlusive deep venous thrombus in right common femoral vein extending to distal femoral vein and profunda femoris. \par \par EXAM: CT ANGIO CHEST PE PROTOCOL IC PROCEDURE DATE: 07/25/2020 \par Pulmonary arteries: Occlusive filling defect in right main pulmonary artery, right upper lobe segmental and subsegmental branches. No thrombus in left pulmonary artery or sagittal embolus. Unchanged main pulmonary artery, 3.0 cm. No evidence of right heart chain. \par Lungs and large airways: No focal consolidation. No evidence of pulmonary infarct. Since May 30, 2019, unchanged scattered bilateral linear opacities in both lower lobes, lingula, and right middle lobe, probably subsegmental atelectasis or fibrosis. \par Pleura: No pleural effusion. \par Mediastinum and hilar regions: No thoracic lymphadenopathy. \par Cardiovascular: Heart size is normal. No thoracic aortic aneurysm. \par Pericardial effusion: No pericardial effusion. \par Chest wall and lower neck: Loop recorder in left upper chest wall. \par Upper abdomen: Normal. \par Bones: Intact sternotomy wires. Mild degenerative changes. \par IMPRESSION: \par 1. Partially occlusive pulmonary thromboemboli in right main pulmonary artery and right upper lobe segmental and subsegmental branches. No evidence of right heart strain or pulmonary infarct. \par 2. Since May 30, 2019, unchanged scattered bilateral linear opacities, possibly subsegmental atelectasis or fibrosis.

## 2020-09-15 NOTE — REVIEW OF SYSTEMS
[SOB on Exertion] : sob on exertion [Arthralgias] : arthralgias [Diabetes] : diabetes [Negative] : Psychiatric [Fever] : no fever [Chills] : no chills [Cough] : no cough [Chest Tightness] : no chest tightness [Pleuritic Pain] : no pleuritic pain [Wheezing] : no wheezing [Chest Discomfort] : no chest discomfort [Palpitations] : no palpitations [Seasonal Allergies] : no seasonal allergies [Thyroid Problem] : no thyroid problem [TextBox_122] : h/o subdural hematoma s/p [place] hole procedure

## 2020-09-15 NOTE — ASSESSMENT
[FreeTextEntry1] : 9/1/20: \par It was a pleasure to see Esequiel today for hospital follow-up. Her respiratory issues are summarized:\par \par 1. Pulmonary embolism/DVT\par Esequiel was diagnosed with DVT in right leg 7/23/20 during visit with Dr. Santos, started on Eliquis. Prior to this, she states she did not have a history of any blood clots. She then presented to ED on 7/25/20 with chest pain.  CTA-PE demonstrated segmental and subsegmental PEs on right. tPA not given due to h/o intracranial bleed. Leg dopplers showed new partially occlusive DVT in right common femoral vein extending to distal femoral vein and profunda femoris. \par \par She was discharged on Eliquis and feels her breathing is at baseline; she will get short of breath walking 1/2 blocks, which is the same as it was prior to her diagnosis of PE. She continues with some soreness in the right leg. She reports no adverse reactions including bleeding on her Eliquis. \par \par She was evaluated by Dr. Payton Contreras on 8-26-20 who ordered a hypercoaguability workup. Of note, her anticardiolipin IgG was mildly elevated to 21. She notes that she has also been evaluated by Dr. Jony Wang who has done PFTs and a 6MWT. \par \par Plan:\par -Continue Eliquis\par -Check d-dimer\par -Order echo to assess RV and PASP (7/26 in-patient echo was done at bedside and RV was not well visualized)\par -There is still some right lower extremity swelling. We would like her to get compression stockings with 30 cmH2O pressure. \par -We will request copies of her recent PFT and 6MWT be sent to us for review.\par -We would recommend repeating beta-2-glycoprotein and anticardiolipin Abs in 3 months. \par \par 2. Skin thickening\par On exam today, the skin over the dorsum of her hands appears thickened and less mobile. She has no known history of scleroderma, and no known family history of scleroderma. She also complains of joint and stiffness in the hands.\par \par Plan:\par -We will check BRIAN, RF, CH50, and SCL70 today.\par \par 3. Asthma\par She has been diagnosed with asthma in the past and is on Symbicort 80-4.5 BID. She also has a remote smoking history. We will review her PFT from Dr. Wang's office. We have also discussed with Esequiel that she may continue follow-up of her asthma and her PE with Dr. Sarabia, whom she has seen before, if she would like. \par \par 4. Health maintenance\par She states she is up-to-date with her pneumococcal vaccination. \par \par Return to clinic: 3 months

## 2020-09-15 NOTE — CONSULT LETTER
[Consult Letter:] : I had the pleasure of evaluating your patient, [unfilled]. [( Thank you for referring [unfilled] for consultation for _____ )] : Thank you for referring [unfilled] for consultation for [unfilled] [Please see my note below.] : Please see my note below. [Consult Closing:] : Thank you very much for allowing me to participate in the care of this patient.  If you have any questions, please do not hesitate to contact me. [Sincerely,] : Sincerely, [Dear  ___] : Dear ~TABBY, [DrMerlyn  ___] : Dr. MACIAS [FreeTextEntry2] : Dr. Willy Santos [FreeTextEntry3] : Saúl Mustafa MD

## 2020-09-15 NOTE — HISTORY OF PRESENT ILLNESS
[TextBox_4] : Pt is a 76 yo F with PMH asthma/COPD, ASD repair, CAD, Afib (paroxysmal) s/p ablation, subdural hematoma s/p [place] hole evacuation, IDDM, HTN, HLD. \par \par Initial intake 9/3/20: Found to have DVT in right leg 7/23/20 during visit with Dr. Santos, started on Eliquis. Prior to this, she states she did not have a history of any blood clots. Then presented to ED on 7/25/20 with chest pain on left, worse with inspiration, pain 10/10. Wells score 6. CTA-PE with segmental and subsegmental PEs on right. tPA not given due to h/o intracranial bleed. Leg dopplers showed new partially occlusive DVT in right common femoral vein extending to distal femoral vein and profunda femoris. \par \par Echo 7/26/20: RV not well visualized. PASP was not estimated. \par \par She continues on Eliquis.  Currently denies chest pain, bleeding episodes on Eliquis, new leg pain or leg swelling. She can walk about 1/2 block, limited by SOB. This is stable as compared to before her PE diagnosis as per pt. Overall, she feels that she is at her baseline. \par \par Of note, pt had seen Dr. Sarabia in 03/2020 for evaluation of asthma. On Sybmicort 80-4.5 BID. \par Quit smoking 20-30 years ago, one pack would last her one week. \par

## 2020-09-15 NOTE — DISCUSSION/SUMMARY
[FreeTextEntry1] : 9-3-20\par -no pallor, no icterus\par -lateral lobe of thyroid palpable, no JVD sitting\par -skin thickening on dorsum of the hand\par -adequate air entry, few scattered inspiratory crackles, no wheezing or rhonchi \par -soft 1/6 systolic murmur LSB, P2 not loud or split \par -no cyanosis, grade 1 clubbing, no Raynaud's, no cords palpated, significant swelling still noted in the right leg (wearing  compression stocking)

## 2020-10-14 ENCOUNTER — FORM ENCOUNTER (OUTPATIENT)
Age: 75
End: 2020-10-14

## 2020-10-14 NOTE — PATIENT PROFILE ADULT. - MEDICATION HERBAL REMEDIES, PROFILE
----- Message from Terrence Burgos sent at 10/14/2020  4:46 PM CDT -----  Regarding: Refill  Type:  RX Refill Request    Who Called:  Pt   Refill or New Rx: Refill   RX Name and Strength: levothyroxine (SYNTHROID) 150 MCG table  How is the patient currently taking it? (ex. 1XDay): once daily   Is this a 30 day or 90 day RX: 30  day   Preferred Pharmacy with phone number:   [id number] - [place] - [facility] [facility] [address]  [facility] [place] [address]  Phone: [phone] Fax: [phone]  Local or Mail Order: local   Ordering Provider: puneet   Would the patient rather a call back or a response via [facility]? Call back   Best Call Back Number: [phone] (home)   Additional Information: The patient states that he dropped his current refill on the floor and needs temporary refill as soon as possible, please advise.         no

## 2020-10-15 ENCOUNTER — OUTPATIENT (OUTPATIENT)
Dept: OUTPATIENT SERVICES | Facility: HOSPITAL | Age: 75
LOS: 1 days | End: 2020-10-15
Payer: MEDICARE

## 2020-10-15 DIAGNOSIS — I62.01 NONTRAUMATIC ACUTE SUBDURAL HEMORRHAGE: Chronic | ICD-10-CM

## 2020-10-15 DIAGNOSIS — I26.99 OTHER PULMONARY EMBOLISM WITHOUT ACUTE COR PULMONALE: ICD-10-CM

## 2020-10-15 DIAGNOSIS — Z96.651 PRESENCE OF RIGHT ARTIFICIAL KNEE JOINT: Chronic | ICD-10-CM

## 2020-10-15 PROCEDURE — 93306 TTE W/DOPPLER COMPLETE: CPT | Mod: 26

## 2020-10-15 PROCEDURE — 93306 TTE W/DOPPLER COMPLETE: CPT

## 2020-10-19 ENCOUNTER — LABORATORY RESULT (OUTPATIENT)
Age: 75
End: 2020-10-19

## 2020-10-19 ENCOUNTER — APPOINTMENT (OUTPATIENT)
Dept: HEMATOLOGY ONCOLOGY | Facility: CLINIC | Age: 75
End: 2020-10-19
Payer: MEDICARE

## 2020-10-19 VITALS
WEIGHT: 164.4 LBS | BODY MASS INDEX: 25.8 KG/M2 | SYSTOLIC BLOOD PRESSURE: 145 MMHG | HEIGHT: 67 IN | DIASTOLIC BLOOD PRESSURE: 75 MMHG | OXYGEN SATURATION: 100 % | HEART RATE: 70 BPM | TEMPERATURE: 97.9 F

## 2020-10-19 LAB
ALBUMIN MFR SERPL ELPH: 50.3 %
ALBUMIN SERPL-MCNC: 3.9 G/DL
ALBUMIN/GLOB SERPL: 1 RATIO
ALPHA1 GLOB MFR SERPL ELPH: 3.5 %
ALPHA1 GLOB SERPL ELPH-MCNC: 0.3 G/DL
ALPHA2 GLOB MFR SERPL ELPH: 10.8 %
ALPHA2 GLOB SERPL ELPH-MCNC: 0.8 G/DL
APTT 2H P 1:4 NP PPP: 36.3 SEC
APTT 2H P INC PPP: 36.8 SEC
APTT IMM NP/PRE NP PPP: 36.3 SEC
APTT INV RATIO PPP: 38.2 SEC
B-GLOBULIN MFR SERPL ELPH: 14.5 %
B-GLOBULIN SERPL ELPH-MCNC: 1.1 G/DL
B2 GLYCOPROT1 IGA SERPL IA-ACNC: 7.2 SAU
B2 GLYCOPROT1 IGG SER-ACNC: <5 SGU
B2 GLYCOPROT1 IGM SER-ACNC: <5 SMU
BASOPHILS # BLD AUTO: 0.06 K/UL
BASOPHILS NFR BLD AUTO: 0.9 %
CARDIOLIPIN AB SER IA-ACNC: POSITIVE
CONFIRM: 67.1 SEC
DEPRECATED KAPPA LC FREE/LAMBDA SER: 1.34 RATIO
DRVVT 1H NP PPP: 36.1 SEC
DRVVT IMM 1:2 NP PPP: NORMAL
DRVVT SCREEN TO CONFIRM RATIO: 0.72 RATIO
EOSINOPHIL # BLD AUTO: 0.19 K/UL
EOSINOPHIL NFR BLD AUTO: 3 %
ERYTHROCYTE [SEDIMENTATION RATE] IN BLOOD BY WESTERGREN METHOD: 50 MM/HR
GAMMA GLOB FLD ELPH-MCNC: 1.6 G/DL
GAMMA GLOB MFR SERPL ELPH: 20.9 %
HCT VFR BLD CALC: 30.1 %
HGB BLD-MCNC: 9.1 G/DL
IGA SER QL IEP: 375 MG/DL
IGG SER QL IEP: 1598 MG/DL
IGM SER QL IEP: 79 MG/DL
IMM GRANULOCYTES NFR BLD AUTO: 0.3 %
INTERPRETATION SERPL IEP-IMP: NORMAL
KAPPA LC CSF-MCNC: 2.31 MG/DL
KAPPA LC SERPL-MCNC: 3.1 MG/DL
LYMPHOCYTES # BLD AUTO: 1.96 K/UL
LYMPHOCYTES NFR BLD AUTO: 30.5 %
M PROTEIN SPEC IFE-MCNC: NORMAL
MAN DIFF?: NORMAL
MCHC RBC-ENTMCNC: 23.1 PG
MCHC RBC-ENTMCNC: 30.2 GM/DL
MCV RBC AUTO: 76.4 FL
MONOCYTES # BLD AUTO: 0.56 K/UL
MONOCYTES NFR BLD AUTO: 8.7 %
NEUTROPHILS # BLD AUTO: 3.63 K/UL
NEUTROPHILS NFR BLD AUTO: 56.6 %
NPP NORMAL POOLED PLASMA: 34.7 SECS
PLATELET # BLD AUTO: 259 K/UL
PROT SERPL-MCNC: 7.7 G/DL
PROT SERPL-MCNC: 7.7 G/DL
RBC # BLD: 3.94 M/UL
RBC # FLD: 23.6 %
SCREEN DRVVT: 53.7 SEC
SILICA CLOTTING TIME INTERPRETATION: NORMAL
SILICA CLOTTING TIME S/C: 0.75 RATIO
WBC # FLD AUTO: 6.42 K/UL

## 2020-10-19 PROCEDURE — 99214 OFFICE O/P EST MOD 30 MIN: CPT | Mod: 25

## 2020-10-19 PROCEDURE — 36415 COLL VENOUS BLD VENIPUNCTURE: CPT

## 2020-10-19 PROCEDURE — 96372 THER/PROPH/DIAG INJ SC/IM: CPT

## 2020-10-19 RX ORDER — CYANOCOBALAMIN 1000 UG/ML
1000 INJECTION INTRAMUSCULAR; SUBCUTANEOUS
Qty: 0 | Refills: 0 | Status: COMPLETED | OUTPATIENT
Start: 2020-10-19

## 2020-10-19 RX ADMIN — CYANOCOBALAMIN 1 MCG/ML: 1000 INJECTION INTRAMUSCULAR; SUBCUTANEOUS at 00:00

## 2020-10-20 NOTE — ASSESSMENT
[FreeTextEntry1] : I discussed with patient in detail her condition... In view of history of subdural hematoma, her now being on anticoagulation requires special consideration... Patient understands all issues involved...\par \par At this point I would keep patient on anticoagulation for 6 months, until January 2021.\par \par Repeat blood work done including anticardiolipin antibodies...\par \par Patient was found to have anemia, she was referred back to GI since colonoscopy last year was poor prep... She was also referred back to PCP.\par \par Follow-up here 2 months or sooner if needed.\par \par 10/20/2020 patient was found to have TOMASA in intravenous iron was ordered

## 2020-10-20 NOTE — CONSULT LETTER
[Dear  ___] : Dear  [unfilled], [Consult Letter:] : I had the pleasure of evaluating your patient, [unfilled]. [Please see my note below.] : Please see my note below. [Consult Closing:] : Thank you very much for allowing me to participate in the care of this patient.  If you have any questions, please do not hesitate to contact me. [Sincerely,] : Sincerely, [FreeTextEntry3] : Payton Contreras MD\par

## 2020-10-20 NOTE — HISTORY OF PRESENT ILLNESS
[de-identified] : October 19, 2020 patient returns for follow-up... Her main complaint is inability to sleep well... Has not seen PCP in a while... Patient was found to have IgM anticardiolipin antibodies, and continues to be on Eliquis [de-identified] : 75 years old female history of multiple medical problems as well as subarachnoid hemorrhage requiring evacuation in the past.  Patient was found to have positive COVID antibodies in June 2020... Later on in July she was admitted to [facility] with a large femoral DVT, and she is currently on anticoagulation with Eliquis... She is here to assess the length of anticoagulation therapy...

## 2020-10-30 ENCOUNTER — EMERGENCY (EMERGENCY)
Facility: HOSPITAL | Age: 75
LOS: 1 days | Discharge: HOME | End: 2020-10-30
Attending: EMERGENCY MEDICINE | Admitting: EMERGENCY MEDICINE
Payer: MEDICARE

## 2020-10-30 VITALS
DIASTOLIC BLOOD PRESSURE: 76 MMHG | SYSTOLIC BLOOD PRESSURE: 142 MMHG | TEMPERATURE: 98 F | HEART RATE: 68 BPM | HEIGHT: 67 IN | WEIGHT: 149.91 LBS | OXYGEN SATURATION: 99 % | RESPIRATION RATE: 18 BRPM

## 2020-10-30 VITALS
RESPIRATION RATE: 18 BRPM | SYSTOLIC BLOOD PRESSURE: 139 MMHG | TEMPERATURE: 98 F | HEART RATE: 74 BPM | DIASTOLIC BLOOD PRESSURE: 69 MMHG | OXYGEN SATURATION: 100 %

## 2020-10-30 DIAGNOSIS — Z96.651 PRESENCE OF RIGHT ARTIFICIAL KNEE JOINT: Chronic | ICD-10-CM

## 2020-10-30 DIAGNOSIS — I62.01 NONTRAUMATIC ACUTE SUBDURAL HEMORRHAGE: Chronic | ICD-10-CM

## 2020-10-30 LAB
APPEARANCE UR: CLEAR — SIGNIFICANT CHANGE UP
BACTERIA # UR AUTO: ABNORMAL /HPF
BILIRUB UR-MCNC: NEGATIVE — SIGNIFICANT CHANGE UP
COLOR SPEC: YELLOW — SIGNIFICANT CHANGE UP
DIFF PNL FLD: NEGATIVE — SIGNIFICANT CHANGE UP
EPI CELLS # UR: SIGNIFICANT CHANGE UP /HPF (ref 0–5)
GLUCOSE UR QL: NEGATIVE — SIGNIFICANT CHANGE UP
KETONES UR-MCNC: NEGATIVE — SIGNIFICANT CHANGE UP
LEUKOCYTE ESTERASE UR-ACNC: ABNORMAL
NITRITE UR-MCNC: NEGATIVE — SIGNIFICANT CHANGE UP
PH UR: 7 — SIGNIFICANT CHANGE UP (ref 5–8)
PROT UR-MCNC: NEGATIVE MG/DL — SIGNIFICANT CHANGE UP
RBC CASTS # UR COMP ASSIST: < 5 /HPF — SIGNIFICANT CHANGE UP
SP GR SPEC: 1.01 — SIGNIFICANT CHANGE UP (ref 1–1.03)
UROBILINOGEN FLD QL: 0.2 E.U./DL — SIGNIFICANT CHANGE UP
WBC UR QL: ABNORMAL /HPF

## 2020-10-30 PROCEDURE — 93010 ELECTROCARDIOGRAM REPORT: CPT

## 2020-10-30 PROCEDURE — 80053 COMPREHEN METABOLIC PANEL: CPT

## 2020-10-30 PROCEDURE — 85610 PROTHROMBIN TIME: CPT

## 2020-10-30 PROCEDURE — 85730 THROMBOPLASTIN TIME PARTIAL: CPT

## 2020-10-30 PROCEDURE — 73551 X-RAY EXAM OF FEMUR 1: CPT

## 2020-10-30 PROCEDURE — 99284 EMERGENCY DEPT VISIT MOD MDM: CPT

## 2020-10-30 PROCEDURE — 73562 X-RAY EXAM OF KNEE 3: CPT

## 2020-10-30 PROCEDURE — 81001 URINALYSIS AUTO W/SCOPE: CPT

## 2020-10-30 PROCEDURE — 36415 COLL VENOUS BLD VENIPUNCTURE: CPT

## 2020-10-30 PROCEDURE — 73562 X-RAY EXAM OF KNEE 3: CPT | Mod: 26,RT

## 2020-10-30 PROCEDURE — 85025 COMPLETE CBC W/AUTO DIFF WBC: CPT

## 2020-10-30 PROCEDURE — 73551 X-RAY EXAM OF FEMUR 1: CPT | Mod: 26,RT

## 2020-10-30 PROCEDURE — 93005 ELECTROCARDIOGRAM TRACING: CPT

## 2020-10-30 PROCEDURE — 73502 X-RAY EXAM HIP UNI 2-3 VIEWS: CPT

## 2020-10-30 PROCEDURE — 73502 X-RAY EXAM HIP UNI 2-3 VIEWS: CPT | Mod: 26,RT

## 2020-10-30 RX ORDER — SODIUM CHLORIDE 9 MG/ML
500 INJECTION INTRAMUSCULAR; INTRAVENOUS; SUBCUTANEOUS ONCE
Refills: 0 | Status: DISCONTINUED | OUTPATIENT
Start: 2020-10-30 | End: 2020-11-03

## 2020-10-30 RX ORDER — ACETAMINOPHEN 500 MG
975 TABLET ORAL ONCE
Refills: 0 | Status: COMPLETED | OUTPATIENT
Start: 2020-10-30 | End: 2020-10-30

## 2020-10-30 RX ADMIN — Medication 975 MILLIGRAM(S): at 15:05

## 2020-10-30 NOTE — ED PROVIDER NOTE - CLINICAL SUMMARY MEDICAL DECISION MAKING FREE TEXT BOX
s/p fall w dizziness before, RLE pain s/p fall w dizziness before, RLE pain, states symptoms of dizziness resolving in ED. EKG w no acute ischemic process. no acute lab abnormalities. XR wnl, daughter called to pick pt up.

## 2020-10-30 NOTE — ED PROVIDER NOTE - PHYSICAL EXAMINATION

## 2020-10-30 NOTE — ED PROVIDER NOTE - OBJECTIVE STATEMENT
Requesting medication refill.  Please approve or deny this request.    Rx requested:  Requested Prescriptions     Pending Prescriptions Disp Refills    warfarin (COUMADIN) 5 MG tablet [Pharmacy Med Name: WARFARIN SODIUM 5 MG TABLET] 30 tablet 3     Sig: take 1 tablet by mouth once daily       Last Office Visit:   4/10/2019    Last Labs:      Next Visit Date:  Future Appointments   Date Time Provider José Manuel Snow   7/11/2019 10:30 AM Eleanor Harrell MD [address]   8/22/2019 10:45 AM Gabriela Monreal MD [address]   10/8/2019 10:15 AM Chapin Archer MD [address]
74 yo s/p fall, denies head injury but w R hip pain and RLE pain. still able to ambulate, stated felt dizzy getting up from bed. denies cp, Denies associated SOB, NVD, lightheaded, diaphoresis, palpitations, cough/rhinorrhea, black/bloody stool, LE pain/swelling, focal weakness/numbness,  abd pain, urinary complaints, f/c.

## 2020-10-30 NOTE — ED ADULT NURSE NOTE - NSIMPLEMENTINTERV_GEN_ALL_ED
Implemented All Fall Risk Interventions:  [place] to call system. Call bell, personal items and telephone within reach. Instruct patient to call for assistance. Room bathroom lighting operational. Non-slip footwear when patient is off stretcher. Physically safe environment: no spills, clutter or unnecessary equipment. Stretcher in lowest position, wheels locked, appropriate side rails in place. Provide visual cue, wrist band, yellow gown, etc. Monitor gait and stability. Monitor for mental status changes and reorient to person, place, and time. Review medications for side effects contributing to fall risk. Reinforce activity limits and safety measures with patient and family.

## 2020-10-30 NOTE — ED ADULT NURSE NOTE - PMH
Asthma  Asthma  Atherosclerosis of coronary artery  NOn obstructive  Atrial fibrillation  s/p ablation in 2013  Depression    Essential hypertension  HTN (hypertension)  Hyperlipidemia  Hyperlipidemia  Subdural hemorrhage  s/p [place] hole (2013)  Type 2 diabetes mellitus  DM (diabetes mellitus)

## 2020-11-03 DIAGNOSIS — Z79.899 OTHER LONG TERM (CURRENT) DRUG THERAPY: ICD-10-CM

## 2020-11-03 DIAGNOSIS — J45.909 UNSPECIFIED ASTHMA, UNCOMPLICATED: ICD-10-CM

## 2020-11-03 DIAGNOSIS — M25.551 PAIN IN RIGHT HIP: ICD-10-CM

## 2020-11-03 DIAGNOSIS — M79.604 PAIN IN RIGHT LEG: ICD-10-CM

## 2020-11-03 DIAGNOSIS — E11.9 TYPE 2 DIABETES MELLITUS WITHOUT COMPLICATIONS: ICD-10-CM

## 2020-11-03 DIAGNOSIS — Z88.8 ALLERGY STATUS TO OTHER DRUGS, MEDICAMENTS AND BIOLOGICAL SUBSTANCES: ICD-10-CM

## 2020-11-03 DIAGNOSIS — I10 ESSENTIAL (PRIMARY) HYPERTENSION: ICD-10-CM

## 2020-11-03 DIAGNOSIS — Z79.84 LONG TERM (CURRENT) USE OF ORAL HYPOGLYCEMIC DRUGS: ICD-10-CM

## 2020-11-03 DIAGNOSIS — E78.5 HYPERLIPIDEMIA, UNSPECIFIED: ICD-10-CM

## 2020-11-04 ENCOUNTER — APPOINTMENT (OUTPATIENT)
Age: 75
End: 2020-11-04

## 2020-11-04 ENCOUNTER — OUTPATIENT (OUTPATIENT)
Dept: OUTPATIENT SERVICES | Facility: HOSPITAL | Age: 75
LOS: 1 days | End: 2020-11-04
Payer: MEDICARE

## 2020-11-04 VITALS
TEMPERATURE: 98 F | HEIGHT: 67 IN | HEART RATE: 76 BPM | WEIGHT: 153 LBS | SYSTOLIC BLOOD PRESSURE: 134 MMHG | OXYGEN SATURATION: 96 % | RESPIRATION RATE: 17 BRPM | DIASTOLIC BLOOD PRESSURE: 78 MMHG

## 2020-11-04 VITALS
HEART RATE: 70 BPM | TEMPERATURE: 98 F | OXYGEN SATURATION: 96 % | RESPIRATION RATE: 16 BRPM | SYSTOLIC BLOOD PRESSURE: 138 MMHG | DIASTOLIC BLOOD PRESSURE: 74 MMHG

## 2020-11-04 DIAGNOSIS — I62.01 NONTRAUMATIC ACUTE SUBDURAL HEMORRHAGE: Chronic | ICD-10-CM

## 2020-11-04 DIAGNOSIS — D50.9 IRON DEFICIENCY ANEMIA, UNSPECIFIED: ICD-10-CM

## 2020-11-04 DIAGNOSIS — Z96.651 PRESENCE OF RIGHT ARTIFICIAL KNEE JOINT: Chronic | ICD-10-CM

## 2020-11-04 PROCEDURE — 96365 THER/PROPH/DIAG IV INF INIT: CPT

## 2020-11-04 RX ORDER — FERUMOXYTOL 510 MG/17ML
510 INJECTION INTRAVENOUS ONCE
Refills: 0 | Status: COMPLETED | OUTPATIENT
Start: 2020-11-04 | End: 2020-11-04

## 2020-11-04 RX ADMIN — FERUMOXYTOL 510 MILLIGRAM(S): 510 INJECTION INTRAVENOUS at 15:24

## 2020-11-04 RX ADMIN — FERUMOXYTOL 117 MILLIGRAM(S): 510 INJECTION INTRAVENOUS at 14:08

## 2020-11-09 ENCOUNTER — OUTPATIENT (OUTPATIENT)
Dept: OUTPATIENT SERVICES | Facility: HOSPITAL | Age: 75
LOS: 1 days | End: 2020-11-09
Payer: MEDICARE

## 2020-11-09 ENCOUNTER — APPOINTMENT (OUTPATIENT)
Age: 75
End: 2020-11-09

## 2020-11-09 VITALS
RESPIRATION RATE: 18 BRPM | TEMPERATURE: 98 F | HEART RATE: 62 BPM | SYSTOLIC BLOOD PRESSURE: 131 MMHG | OXYGEN SATURATION: 99 % | DIASTOLIC BLOOD PRESSURE: 72 MMHG

## 2020-11-09 VITALS
RESPIRATION RATE: 18 BRPM | WEIGHT: 162.92 LBS | OXYGEN SATURATION: 100 % | SYSTOLIC BLOOD PRESSURE: 127 MMHG | HEIGHT: 67 IN | HEART RATE: 58 BPM | TEMPERATURE: 98 F | DIASTOLIC BLOOD PRESSURE: 72 MMHG

## 2020-11-09 DIAGNOSIS — Z96.651 PRESENCE OF RIGHT ARTIFICIAL KNEE JOINT: Chronic | ICD-10-CM

## 2020-11-09 DIAGNOSIS — I62.01 NONTRAUMATIC ACUTE SUBDURAL HEMORRHAGE: Chronic | ICD-10-CM

## 2020-11-09 PROCEDURE — 96365 THER/PROPH/DIAG IV INF INIT: CPT

## 2020-11-09 RX ORDER — FERUMOXYTOL 510 MG/17ML
510 INJECTION INTRAVENOUS ONCE
Refills: 0 | Status: COMPLETED | OUTPATIENT
Start: 2020-11-09 | End: 2020-11-09

## 2020-11-09 RX ADMIN — FERUMOXYTOL 117 MILLIGRAM(S): 510 INJECTION INTRAVENOUS at 11:36

## 2020-11-09 RX ADMIN — FERUMOXYTOL 510 MILLIGRAM(S): 510 INJECTION INTRAVENOUS at 12:45

## 2020-11-12 ENCOUNTER — RX RENEWAL (OUTPATIENT)
Age: 75
End: 2020-11-12

## 2020-11-23 ENCOUNTER — LABORATORY RESULT (OUTPATIENT)
Age: 75
End: 2020-11-23

## 2020-11-23 ENCOUNTER — APPOINTMENT (OUTPATIENT)
Dept: HEMATOLOGY ONCOLOGY | Facility: CLINIC | Age: 75
End: 2020-11-23
Payer: MEDICARE

## 2020-11-23 VITALS
OXYGEN SATURATION: 98 % | WEIGHT: 160.2 LBS | DIASTOLIC BLOOD PRESSURE: 92 MMHG | HEART RATE: 74 BPM | TEMPERATURE: 97.9 F | HEIGHT: 67 IN | BODY MASS INDEX: 25.15 KG/M2 | SYSTOLIC BLOOD PRESSURE: 159 MMHG

## 2020-11-23 VITALS — SYSTOLIC BLOOD PRESSURE: 159 MMHG | DIASTOLIC BLOOD PRESSURE: 85 MMHG

## 2020-11-23 LAB
25(OH)D3 SERPL-MCNC: 38.3 NG/ML
ALBUMIN SERPL ELPH-MCNC: 4.3 G/DL
ALP BLD-CCNC: 95 U/L
ALT SERPL-CCNC: 13 U/L
ANION GAP SERPL CALC-SCNC: 13 MMOL/L
APTT IMM NP/PRE NP PPP: NORMAL
APTT INV RATIO PPP: 28.8 SEC
AST SERPL-CCNC: 18 U/L
B2 GLYCOPROT1 IGA SERPL IA-ACNC: 9.7 SAU
B2 GLYCOPROT1 IGG SER-ACNC: <5 SGU
B2 GLYCOPROT1 IGM SER-ACNC: <5 SMU
BASOPHILS # BLD AUTO: 0 K/UL
BASOPHILS NFR BLD AUTO: 0 %
BILIRUB SERPL-MCNC: 0.2 MG/DL
BUN SERPL-MCNC: 12 MG/DL
CALCIUM SERPL-MCNC: 9.2 MG/DL
CARDIOLIPIN AB SER IA-ACNC: POSITIVE
CHLORIDE SERPL-SCNC: 107 MMOL/L
CO2 SERPL-SCNC: 22 MMOL/L
CONFIRM: 47.3 SEC
CREAT SERPL-MCNC: 0.78 MG/DL
DRVVT IMM 1:2 NP PPP: NORMAL
DRVVT SCREEN TO CONFIRM RATIO: 0.68 RATIO
EOSINOPHIL # BLD AUTO: 0.25 K/UL
EOSINOPHIL NFR BLD AUTO: 4.4 %
ERYTHROCYTE [SEDIMENTATION RATE] IN BLOOD BY WESTERGREN METHOD: 5 MM/HR
ESTIMATED AVERAGE GLUCOSE: 174 MG/DL
FERRITIN SERPL-MCNC: 8 NG/ML
GLUCOSE SERPL-MCNC: 277 MG/DL
HAPTOGLOB SERPL-MCNC: 132 MG/DL
HBA1C MFR BLD HPLC: 7.7 %
HCT VFR BLD CALC: 29.2 %
HGB BLD-MCNC: 9 G/DL
IRON SATN MFR SERPL: 8 %
IRON SERPL-MCNC: 33 UG/DL
LDH SERPL-CCNC: 218 U/L
LYMPHOCYTES # BLD AUTO: 2.54 K/UL
LYMPHOCYTES NFR BLD AUTO: 44.2 %
MAN DIFF?: NORMAL
MCHC RBC-ENTMCNC: 24.5 PG
MCHC RBC-ENTMCNC: 30.8 GM/DL
MCV RBC AUTO: 79.6 FL
MONOCYTES # BLD AUTO: 0.1 K/UL
MONOCYTES NFR BLD AUTO: 1.8 %
NEUTROPHILS # BLD AUTO: 2.85 K/UL
NEUTROPHILS NFR BLD AUTO: 49.6 %
NPP NORMAL POOLED PLASMA: NORMAL SECS
PLATELET # BLD AUTO: 209 K/UL
POTASSIUM SERPL-SCNC: 4.2 MMOL/L
PROT SERPL-MCNC: 6.9 G/DL
RBC # BLD: 3.67 M/UL
RBC # FLD: 22.1 %
SARS-COV-2 IGG SERPL IA-ACNC: 110 INDEX
SARS-COV-2 IGG SERPL QL IA: POSITIVE
SCREEN DRVVT: 35.3 SEC
SILICA CLOTTING TIME INTERPRETATION: NORMAL
SILICA CLOTTING TIME S/C: 0.99 RATIO
SODIUM SERPL-SCNC: 142 MMOL/L
TIBC SERPL-MCNC: 437 UG/DL
UIBC SERPL-MCNC: 403 UG/DL
VIT B12 SERPL-MCNC: 228 PG/ML
WBC # FLD AUTO: 5.74 K/UL

## 2020-11-23 PROCEDURE — 99214 OFFICE O/P EST MOD 30 MIN: CPT | Mod: 25

## 2020-11-23 PROCEDURE — 36415 COLL VENOUS BLD VENIPUNCTURE: CPT

## 2020-11-24 LAB
BASOPHILS # BLD AUTO: 0.06 K/UL
BASOPHILS NFR BLD AUTO: 0.9 %
EOSINOPHIL # BLD AUTO: 0.81 K/UL
EOSINOPHIL NFR BLD AUTO: 13.1 %
ERYTHROCYTE [SEDIMENTATION RATE] IN BLOOD BY WESTERGREN METHOD: 19 MM/HR
HCT VFR BLD CALC: 37.4 %
HGB BLD-MCNC: 11.6 G/DL
LYMPHOCYTES # BLD AUTO: 1.64 K/UL
LYMPHOCYTES NFR BLD AUTO: 26.3 %
MAN DIFF?: NORMAL
MCHC RBC-ENTMCNC: 27.1 PG
MCHC RBC-ENTMCNC: 31 GM/DL
MCV RBC AUTO: 87.4 FL
MONOCYTES # BLD AUTO: 0.77 K/UL
MONOCYTES NFR BLD AUTO: 12.3 %
NEUTROPHILS # BLD AUTO: 2.73 K/UL
NEUTROPHILS NFR BLD AUTO: 43.9 %
PLATELET # BLD AUTO: 214 K/UL
RBC # BLD: 4.28 M/UL
RBC # FLD: 22.5 %
WBC # FLD AUTO: 6.22 K/UL

## 2020-11-30 NOTE — ASSESSMENT
[FreeTextEntry1] : I discussed with patient in detail her condition... In view of history of subdural hematoma, her now being on anticoagulation requires special consideration... Patient understands all issues involved...\par \par At this point I would keep patient on anticoagulation for 6 months, until January 2021.\par \par Repeat blood work done and her anemia is resolved!\par \par \par Patient received IM vitamin B12 which she tolerated well... Discussed with patient the need to take oral vitamin B12... Patient was given a referral to GI Dr. Bahena, and I reached out to Dr. Bahena also, to call patient in.\par \par Patient's blood pressure was poorly controlled, and she was advised to see her PCP Dr. Cueto... I also reached out to Dr. Cueto, to call patient and follow-up

## 2020-11-30 NOTE — HISTORY OF PRESENT ILLNESS
[de-identified] : 75 years old female history of multiple medical problems as well as subarachnoid hemorrhage requiring evacuation in the past.  Patient was found to have positive COVID antibodies in June 2020... Later on in July she was admitted to [facility] with a large femoral DVT, and she is currently on anticoagulation with Eliquis... She is here to assess the length of anticoagulation therapy... [de-identified] : October 19, 2020 patient returns for follow-up... Her main complaint is inability to sleep well... Has not seen PCP in a while... Patient was found to have IgM anticardiolipin antibodies, and continues to be on Eliquis\par \par November 23, 2020 patient returns for follow-up, she states she got IV iron x2

## 2020-12-02 ENCOUNTER — APPOINTMENT (OUTPATIENT)
Dept: HEART AND VASCULAR | Facility: CLINIC | Age: 75
End: 2020-12-02
Payer: MEDICARE

## 2020-12-02 VITALS
WEIGHT: 156 LBS | HEART RATE: 90 BPM | DIASTOLIC BLOOD PRESSURE: 74 MMHG | SYSTOLIC BLOOD PRESSURE: 160 MMHG | TEMPERATURE: 98.8 F | BODY MASS INDEX: 24.48 KG/M2 | HEIGHT: 67 IN | OXYGEN SATURATION: 100 %

## 2020-12-02 PROCEDURE — 99214 OFFICE O/P EST MOD 30 MIN: CPT

## 2020-12-02 PROCEDURE — 99072 ADDL SUPL MATRL&STAF TM PHE: CPT

## 2020-12-02 NOTE — ASSESSMENT
[FreeTextEntry1] : atrial tach on toprol xl bid\par htn uncontrolled her lisinopril was stopped unclear reason resume 10 mg daily\par on statin due to her history of DM\par fu in one month\par will call daughter to review meds\par \par \par

## 2020-12-02 NOTE — PROGRESS NOTE ADULT - PROBLEM/PLAN-10
Detail Level: Detailed Biopsy Type: H and E Biopsy Method: Dermablade Anesthesia Type: 1% lidocaine with epinephrine Anesthesia Volume In Cc: 0 Hemostasis: Electrocautery Wound Care: Mupirocin Price (Use Numbers Only, No Special Characters Or $): 957 Lab: [id number] Path Notes (To The Dermatopathologist): Please check for malignancy. Render Path Notes In Note?: No Consent: Written consent was obtained and risks were reviewed including but not limited to scarring, infection, bleeding, scabbing, incomplete removal, nerve damage and allergy to anesthesia. Post-Care Instructions: I reviewed with the patient in detail post-care instructions. Patient is to keep the biopsy site dry overnight, and then apply bacitracin twice daily until healed. Patient may apply hydrogen peroxide soaks to remove any crusting. Notification Instructions: Patient will be notified of biopsy results. However, patient instructed to call the office if not contacted within 2 weeks. Billing Type: Third-Party Bill DISPLAY PLAN FREE TEXT

## 2020-12-02 NOTE — PHYSICAL EXAM
[General Appearance - Well Developed] : well developed [Normal Appearance] : normal appearance [Well Groomed] : well groomed [General Appearance - Well Nourished] : well nourished [No Deformities] : no deformities [General Appearance - In No Acute Distress] : no acute distress [Normal Conjunctiva] : the conjunctiva exhibited no abnormalities [Eyelids - No Xanthelasma] : the eyelids demonstrated no xanthelasmas [Normal Oral Mucosa] : normal oral mucosa [No Oral Pallor] : no oral pallor [No Oral Cyanosis] : no oral cyanosis [Normal Jugular Venous A Waves Present] : normal jugular venous A waves present [Normal Jugular Venous V Waves Present] : normal jugular venous V waves present [No Jugular Venous Marinelli A Waves] : no jugular venous marinelli A waves [Respiration, Rhythm And Depth] : normal respiratory rhythm and effort [Exaggerated Use Of Accessory Muscles For Inspiration] : no accessory muscle use [Auscultation Breath Sounds / Voice Sounds] : lungs were clear to auscultation bilaterally [Heart Rate And Rhythm] : heart rate and rhythm were normal [Heart Sounds] : normal S1 and S2 [Murmurs] : no murmurs present [Abdomen Soft] : soft [Abdomen Tenderness] : non-tender [Abdomen Mass (___ Cm)] : no abdominal mass palpated [Abnormal Walk] : normal gait [Gait - Sufficient For Exercise Testing] : the gait was sufficient for exercise testing [Nail Clubbing] : no clubbing of the fingernails [Cyanosis, Localized] : no localized cyanosis [Petechial Hemorrhages (___cm)] : no petechial hemorrhages [Skin Color & Pigmentation] : normal skin color and pigmentation [] : no rash [No Venous Stasis] : no venous stasis [Skin Lesions] : no skin lesions [No Skin Ulcers] : no skin ulcer [No Xanthoma] : no  xanthoma was observed [Oriented To Time, Place, And Person] : oriented to person, place, and time [Affect] : the affect was normal [Mood] : the mood was normal [No Anxiety] : not feeling anxious [FreeTextEntry1] : right leg edema

## 2020-12-02 NOTE — HISTORY OF PRESENT ILLNESS
[FreeTextEntry1] : 75 F ASD repaired surgically IDDM atrial fibrillation s/p ablation by dr kramer  SDH ILR DVT/PE (possibly COVID related) on AC,  atrial tachycardia which manifested as sob controlled with BB here for follow up feels well no complaints \par \par \par ecg SB \par normal nuclear stress test 6/2020\par

## 2020-12-02 NOTE — PROCEDURE
[Lexiscan] : Lexiscan [Tc-99m, sestamibi-Cardiolite, to 40mCi, dose-Radionuclides-[employer] code--v.1-Active-Trade] : Tc-99m, sestamibi-Cardiolite, to 40mCi, dose-Radionuclides-[employer] code--v.1-Active-Trade [Normal] : no significant [de-identified] : Dr. Willy Santos     Performing Provider: Dr. Willy Santos [de-identified] : Khoi Sun [de-identified] : CP, DM, HTN, Abnormal ECG.     Gender: Female [de-identified] : Given via the IV route.    1 Day Protocol. [de-identified] : 9.5 [de-identified] : Given via the IV route [de-identified] : 28.7 [de-identified] : 61 [de-identified] : 130/70 [de-identified] : 69 [TWNoteComboBox1] : JIM [FreeTextEntry1] : image quality is good\par resting ef 66%\par post stress ef 69%\par prior study NA\par artifacts None\par blood pressure remained stable at 120/70 mmHg heart rate increased to 103 bpm

## 2020-12-07 ENCOUNTER — APPOINTMENT (OUTPATIENT)
Dept: INTERNAL MEDICINE | Facility: CLINIC | Age: 75
End: 2020-12-07
Payer: MEDICARE

## 2020-12-07 VITALS
DIASTOLIC BLOOD PRESSURE: 79 MMHG | SYSTOLIC BLOOD PRESSURE: 154 MMHG | BODY MASS INDEX: 24.64 KG/M2 | TEMPERATURE: 97.5 F | OXYGEN SATURATION: 97 % | HEART RATE: 58 BPM | HEIGHT: 67 IN | WEIGHT: 157 LBS

## 2020-12-07 PROCEDURE — 99072 ADDL SUPL MATRL&STAF TM PHE: CPT

## 2020-12-07 PROCEDURE — 99214 OFFICE O/P EST MOD 30 MIN: CPT | Mod: 25

## 2020-12-07 PROCEDURE — 96372 THER/PROPH/DIAG INJ SC/IM: CPT

## 2020-12-07 RX ORDER — CYANOCOBALAMIN 1000 UG/ML
1000 INJECTION INTRAMUSCULAR; SUBCUTANEOUS
Qty: 0 | Refills: 0 | Status: COMPLETED | OUTPATIENT
Start: 2020-12-07

## 2020-12-07 RX ADMIN — CYANOCOBALAMIN 0 MCG/ML: 1000 INJECTION, SOLUTION INTRAMUSCULAR at 00:00

## 2020-12-08 ENCOUNTER — APPOINTMENT (OUTPATIENT)
Dept: PULMONOLOGY | Facility: CLINIC | Age: 75
End: 2020-12-08
Payer: MEDICARE

## 2020-12-08 VITALS
HEART RATE: 75 BPM | RESPIRATION RATE: 12 BRPM | DIASTOLIC BLOOD PRESSURE: 82 MMHG | WEIGHT: 159 LBS | TEMPERATURE: 98.1 F | HEIGHT: 67 IN | SYSTOLIC BLOOD PRESSURE: 138 MMHG | BODY MASS INDEX: 24.96 KG/M2 | OXYGEN SATURATION: 98 %

## 2020-12-08 DIAGNOSIS — Z23 ENCOUNTER FOR IMMUNIZATION: ICD-10-CM

## 2020-12-08 PROCEDURE — 99072 ADDL SUPL MATRL&STAF TM PHE: CPT

## 2020-12-08 PROCEDURE — 99215 OFFICE O/P EST HI 40 MIN: CPT

## 2020-12-09 ENCOUNTER — NON-APPOINTMENT (OUTPATIENT)
Age: 75
End: 2020-12-09

## 2020-12-09 LAB
CENTROMERE IGG SER-ACNC: <0.2 CD:130001892
ENA SCL70 IGG SER IA-ACNC: <0.2 AL
NT-PROBNP SERPL-MCNC: 335 PG/ML

## 2020-12-09 NOTE — PROCEDURE
[FreeTextEntry1] : Labs 10/20/20:\par Positive COVID antibody\par Positive cardiolipin antibody level\par Negative Beta 2 Glycoprotein 1 IgG Ab and IgM ab and IgA ab\par Negative cardiolipin IgG, IgM, IgA\par CBC: Hgb 9.0\par \par Labs 9/4/20:\par Negative scleroderma antibodies\par Positive BRIAN: 1:160, SPECKLED\par Normal RF, normal hemolytic complement\par Negative D-dimer  \par Positive cardiolipin antibody level\par \par Echo 10/15/20\par CONCLUSIONS: \par  1. Normal left and right ventricular size and systolic function. \par  2. Mild symmetric left ventricular hypertrophy. \par  3. Grade II left ventricular diastolic dysfunction.\par  4. Biatrial enlargement.\par  5. Aortic sclerosis without significant stenosis. \par  6. No pericardial effusion.\par  7. Pulmonary artery systolic pressure is 35 mmHg. \par \par PFTs from 8/29/20 reviewed from Dr. Jony Wang's office. \par FVC: 1.61 L (63%) predicted pre-BD and 1.65 L (65%) post-BD\par FEV1: 1/06 L (54%) predicted pre-BD and 1.21 L (62%) predicted post-BD\par FEV1/FVC: 66% -> 73%\par TLC: 3.17 L (58%) \par DLCO: 69% \par Spirometry notable for moderately severe obstructive defect with acute response to BD. Lung volumes reveal moderately severe restrictive defect. DLCO slightly reduced. \par \par 6MWT 8/29/20: Resting O2 saturation 99%, heart rate 79.  Post exercise O2 96%, HR 97. No evidence of oxygen desaturation but does show reduced walking distance of only 88 meters with Casa scale 4.\par \par \par EXAM: ECHO TTE WO CON COMP W DOPP PROCEDURE DATE: 07/26/2020 \par CONCLUSIONS: \par  1. Limited study obtained for evaluation of left ventricular function performed by cardiology fellow on call. \par  2. The right ventricle is not well visualized. \par  3. Abnormal septal motion seen due to abnormal conduction. Endocardium not well visualized. Left ventricular ejection fraction is 50-55% in limited views. \par  4. No pericardial effusion. \par  5. Recommend repeat complete study for a comprehensive evaluation of biventricular function and cardiac valve assessment. \par Right Ventricle: The right ventricle is not well visualized. \par \par EXAM: US DPLX LWR EXT VEINS LTD RT PROCEDURE DATE: 07/25/2020 \par Thrombus visualized in the right common femoral vein extending to distal femoral vein and profunda femoris. No thrombus identified in the saphenofemoral junction or popliteal vein or calf veins on grayscale and Doppler examination. Contralateral left common femoral vein is patent. Subcutaneous edema in right lower extremity \par IMPRESSION: Since Lucille 10, 2016, new partially occlusive deep venous thrombus in right common femoral vein extending to distal femoral vein and profunda femoris. \par \par EXAM: CT ANGIO CHEST PE PROTOCOL IC PROCEDURE DATE: 07/25/2020 \par Pulmonary arteries: Occlusive filling defect in right main pulmonary artery, right upper lobe segmental and subsegmental branches. No thrombus in left pulmonary artery or sagittal embolus. Unchanged main pulmonary artery, 3.0 cm. No evidence of right heart chain. \par Lungs and large airways: No focal consolidation. No evidence of pulmonary infarct. Since May 30, 2019, unchanged scattered bilateral linear opacities in both lower lobes, lingula, and right middle lobe, probably subsegmental atelectasis or fibrosis. \par Pleura: No pleural effusion. \par Mediastinum and hilar regions: No thoracic lymphadenopathy. \par Cardiovascular: Heart size is normal. No thoracic aortic aneurysm. \par Pericardial effusion: No pericardial effusion. \par Chest wall and lower neck: Loop recorder in left upper chest wall. \par Upper abdomen: Normal. \par Bones: Intact sternotomy wires. Mild degenerative changes. \par IMPRESSION: \par 1. Partially occlusive pulmonary thromboemboli in right main pulmonary artery and right upper lobe segmental and subsegmental branches. No evidence of right heart strain or pulmonary infarct. \par 2. Since May 30, 2019, unchanged scattered bilateral linear opacities, possibly subsegmental atelectasis or fibrosis.

## 2020-12-09 NOTE — REVIEW OF SYSTEMS
[SOB on Exertion] : sob on exertion [Arthralgias] : arthralgias [Diabetes] : diabetes [Negative] : Psychiatric [Fever] : no fever [Chills] : no chills [Cough] : no cough [Chest Tightness] : no chest tightness [Pleuritic Pain] : no pleuritic pain [Wheezing] : no wheezing [Chest Discomfort] : no chest discomfort [Palpitations] : no palpitations [Seasonal Allergies] : no seasonal allergies [Thyroid Problem] : no thyroid problem [TextBox_122] : h/o subdural hematoma s/p Morrill hole procedure

## 2020-12-09 NOTE — HISTORY OF PRESENT ILLNESS
[TextBox_4] : Pt is a 74 yo F with PMH asthma/COPD, ASD repair, CAD, Afib (paroxysmal) s/p ablation, subdural hematoma s/p Nelson hole evacuation, IDDM, HTN, HLD. \par \par Initial intake 9/3/20: Found to have DVT in right leg 7/23/20 during visit with Dr. Santos, started on Eliquis. Prior to this, she states she did not have a history of any blood clots. Then presented to ED on 7/25/20 with chest pain on left, worse with inspiration, pain 10/10. Wells score 6. CTA-PE with segmental and subsegmental PEs on right. tPA not given due to h/o intracranial bleed. Leg dopplers showed new partially occlusive DVT in right common femoral vein extending to distal femoral vein and profunda femoris. \par \par Echo 7/26/20: RV not well visualized. PASP was not estimated. \par \par She continues on Eliquis.  Currently denies chest pain, bleeding episodes on Eliquis, new leg pain or leg swelling. She can walk about 1/2 block, limited by SOB. This is stable as compared to before her PE diagnosis as per pt. Overall, she feels that she is at her baseline. \par \par Of note, pt had seen Dr. Sarabia in 03/2020 for evaluation of asthma. On Sybmicort 80-4.5 BID. \par Quit smoking 20-30 years ago, one pack would last her one week. \par \par 12-8-20:\par Pernola states she is taking Albuterol every 6 hours and Symbicort as needed

## 2020-12-09 NOTE — DISCUSSION/SUMMARY
[FreeTextEntry1] : 12/8/20:\par -no pallor, no icterus\par -no cervical adenopathy\par -good air entry bilaterally, no wheezing, rhonchi or crackles \par -normal S1, S2, no murmurs, rubs, gallops \par -fingernails demonstrate grayish discoloration, there is some skin thickening on dorsum, skin is dry, no visible rash \par

## 2020-12-09 NOTE — ASSESSMENT
[FreeTextEntry1] : 12/8/20:\par It was a pleasure to see Esequiel today for follow-up. Her respiratory issues are summarized:\par \par 1. Pulmonary embolism/DVT\par Esequiel was diagnosed with DVT in right leg 7/23/20 during visit with Dr. Santos, started on Eliquis. Prior to this, she states she did not have a history of any blood clots. She then presented to ED on 7/25/20 with chest pain.  CTA-PE demonstrated segmental and subsegmental PEs on right. tPA not given due to h/o intracranial bleed. Leg dopplers showed new partially occlusive DVT in right common femoral vein extending to distal femoral vein and profunda femoris. \par \par She was discharged on Eliquis and feels her breathing is at baseline; she will get short of breath walking 1/2 blocks, which is the same as it was prior to her diagnosis of PE. She continues with some soreness in the right leg. She reports no adverse reactions including bleeding on her Eliquis. \par \par She was evaluated by Dr. Payton Contreras on 8-26-20 who ordered a hypercoaguability workup. Of note, her anticardiolipin IgG was mildly elevated to 21. D-dimer was normal on 9/3/20. Repeat Anticardiolipin IgG was normal. This was likely an acute phase reactant. Negative Beta 2 Glycoprotein 1 IgG Ab and IgM ab and IgA ab. PFTs from 8/29/20 reviewed from Dr. Jony Wang's office. FEV1/FVC 66%, FEV1 54% predicted pre-BD and 62% predicted post-BD and FVC 63% predicted pre-BD and 65% post-BD. TLC 58% predicted and DLCO 69% predicted. Spirometry notable for moderately severe obstructive defect with acute response to BD. Lung volumes reveal moderately severe restrictive defect. DLCO slightly reduced. 6MWT without evidence of oxygen desaturation but does show reduced walking distance of only 88 meters with Casa scale 4.  On 7/26 in-patient echo was done at bedside and RV was not well visualized). Echo done on 10/15/20 shows grade II left ventricular diastolic dysfunction. The pulmonary artery systolic pressure is borderline elevated, 35 mmHg. \par \par -Right lower extremity swelling has improved. We would still like her to get compression stockings with 30 cmH2O pressure. \par -We recommend she continue anticoagulation for a total of 1 year, July 2021\par - Today we will repeat D-dimer and pro-bnp\par \par 2. Skin thickening\par On exam today, the skin over the dorsum of her hands appears thickened and less mobile. She has no known history of scleroderma, and no known family history of scleroderma. She also complains of joint and stiffness in the hands. Labs 9/4/20 showed negative SCL70, Positive BRIAN: 1:160, SPECKLED, Normal RF, normal hemolytic complement.\par \par Plan:\par - Today we will repeat SCL70 and anti centomere ab.\par \par 3. Asthma\par She has been diagnosed with asthma in the past and is taking Symbicort 80-4.5. She also has a remote smoking history.  Esequiel states she is taking Albuterol every 6 hours and Symbicort as needed. We explained today that this is not correct and the Symbicort should be the inhaler taken twice a day. We reviewed her PFT from Dr. Wang's office done on 8/29/20. Spirometry notable for moderately severe obstructive defect with acute response to BD. FVC: 1.61 L (63%) predicted pre-BD and 1.65 L (65%) post-BD, FEV1: 1/06 L (54%) predicted pre-BD and 1.21 L (62%) predicted post-BD. Lung volumes reveal moderately severe restrictive defect\par TLC: 3.17 L (58%) . DLCO slightly reduced DLCO: 69%. We have also discussed with Esequiel that she may continue follow-up of her asthma and her PE with Dr. Sarabia, whom she has seen before, if she would like. \par \par Plan:\par - We discussed with Esequiel that she should take Symbicort 80-4.5, 2 puffs BID\par - Use albuterol PRN\par \par 4. R/O sleep apnea\par There is evidence of diastolic dysfunction on Esequiel's recent echo. We will order a diagnostic in lab sleep study to evaluate for sleep apnea.\par \par Plan:\par - We have ordered a diagnostic in lab sleep study\par \par 5. Health maintenance\par Flu vaccine up to date for [id number] season. She states she is up-to-date with her pneumococcal vaccination. \par \par Return to clinic: 3 months

## 2020-12-09 NOTE — PHYSICAL EXAM
[No Acute Distress] : no acute distress [Well Nourished] : well nourished [Well Groomed] : well groomed [Well Developed] : well developed [Normal Color/ Pigmentation] : normal color/ pigmentation [No Focal Deficits] : no focal deficits [Oriented x3] : oriented x3 [Normal Mood] : normal mood [Normal Affect] : normal affect [Normal Oropharynx] : normal oropharynx [Normal Appearance] : normal appearance [No Neck Mass] : no neck mass [Normal Rate/Rhythm] : normal rate/rhythm [Normal S1, S2] : normal s1, s2 [No Murmurs] : no murmurs [No Rubs] : no rubs [No Gallops] : no gallops [No Resp Distress] : no resp distress [Clear to Auscultation Bilaterally] : clear to auscultation bilaterally [No Abnormalities] : no abnormalities [Benign] : benign [Normal Gait] : normal gait [TextBox_11] : no pallor, no icterus [TextBox_105] : fingernails demonstrate grayish discoloration, there is some skin thickening on dorsum, skin is dry, no visible rash

## 2020-12-09 NOTE — CONSULT LETTER
[Dear  ___] : Dear ~TABBY, [Consult Letter:] : I had the pleasure of evaluating your patient, [unfilled]. [( Thank you for referring [unfilled] for consultation for _____ )] : Thank you for referring [unfilled] for consultation for [unfilled] [Please see my note below.] : Please see my note below. [Consult Closing:] : Thank you very much for allowing me to participate in the care of this patient.  If you have any questions, please do not hesitate to contact me. [Sincerely,] : Sincerely, [DrMerlyn  ___] : Dr. MACIAS [FreeTextEntry2] : Dr. Willy Santos [FreeTextEntry3] : Saúl Mustafa MD

## 2020-12-14 ENCOUNTER — APPOINTMENT (OUTPATIENT)
Dept: GASTROENTEROLOGY | Facility: CLINIC | Age: 75
End: 2020-12-14
Payer: MEDICARE

## 2020-12-14 VITALS
HEART RATE: 70 BPM | WEIGHT: 158 LBS | OXYGEN SATURATION: 99 % | SYSTOLIC BLOOD PRESSURE: 151 MMHG | DIASTOLIC BLOOD PRESSURE: 72 MMHG | BODY MASS INDEX: 24.8 KG/M2 | TEMPERATURE: 97.6 F | HEIGHT: 67 IN

## 2020-12-14 PROCEDURE — 99204 OFFICE O/P NEW MOD 45 MIN: CPT

## 2020-12-14 PROCEDURE — 99072 ADDL SUPL MATRL&STAF TM PHE: CPT

## 2020-12-17 DIAGNOSIS — Z11.59 ENCOUNTER FOR SCREENING FOR OTHER VIRAL DISEASES: ICD-10-CM

## 2020-12-21 ENCOUNTER — APPOINTMENT (OUTPATIENT)
Dept: INTERNAL MEDICINE | Facility: CLINIC | Age: 75
End: 2020-12-21
Payer: MEDICARE

## 2020-12-21 VITALS
OXYGEN SATURATION: 100 % | BODY MASS INDEX: 24.96 KG/M2 | TEMPERATURE: 98.1 F | SYSTOLIC BLOOD PRESSURE: 179 MMHG | HEIGHT: 67 IN | DIASTOLIC BLOOD PRESSURE: 88 MMHG | HEART RATE: 84 BPM | WEIGHT: 159 LBS

## 2020-12-21 PROCEDURE — 99072 ADDL SUPL MATRL&STAF TM PHE: CPT

## 2020-12-21 PROCEDURE — 96372 THER/PROPH/DIAG INJ SC/IM: CPT

## 2020-12-21 PROCEDURE — 99214 OFFICE O/P EST MOD 30 MIN: CPT | Mod: 25

## 2020-12-21 RX ADMIN — CYANOCOBALAMIN 0 MCG/ML: 1000 INJECTION, SOLUTION INTRAMUSCULAR at 00:00

## 2020-12-22 ENCOUNTER — APPOINTMENT (OUTPATIENT)
Dept: SLEEP CENTER | Facility: HOSPITAL | Age: 75
End: 2020-12-22

## 2020-12-22 ENCOUNTER — OUTPATIENT (OUTPATIENT)
Dept: OUTPATIENT SERVICES | Facility: HOSPITAL | Age: 75
LOS: 1 days | End: 2020-12-22
Payer: MEDICARE

## 2020-12-22 DIAGNOSIS — G47.33 OBSTRUCTIVE SLEEP APNEA (ADULT) (PEDIATRIC): ICD-10-CM

## 2020-12-22 DIAGNOSIS — I62.01 NONTRAUMATIC ACUTE SUBDURAL HEMORRHAGE: Chronic | ICD-10-CM

## 2020-12-22 DIAGNOSIS — Z96.651 PRESENCE OF RIGHT ARTIFICIAL KNEE JOINT: Chronic | ICD-10-CM

## 2020-12-22 PROCEDURE — 95810 POLYSOM 6/> YRS 4/> PARAM: CPT

## 2020-12-22 PROCEDURE — 95810 POLYSOM 6/> YRS 4/> PARAM: CPT | Mod: 26

## 2020-12-22 RX ORDER — CYANOCOBALAMIN 1000 UG/ML
1000 INJECTION INTRAMUSCULAR; SUBCUTANEOUS
Qty: 0 | Refills: 0 | Status: COMPLETED | OUTPATIENT
Start: 2020-12-21

## 2020-12-29 ENCOUNTER — NON-APPOINTMENT (OUTPATIENT)
Age: 75
End: 2020-12-29

## 2021-01-04 NOTE — ED ADULT NURSE REASSESSMENT NOTE - TEMPLATE LIST FOR HEAD TO TOE ASSESSMENT
Symptoms Opioid Pregnancy And Lactation Text: These medications can lead to premature delivery and should be avoided during pregnancy. These medications are also present in breast milk in small amounts.

## 2021-01-07 ENCOUNTER — APPOINTMENT (OUTPATIENT)
Dept: HEART AND VASCULAR | Facility: CLINIC | Age: 76
End: 2021-01-07
Payer: MEDICARE

## 2021-01-07 VITALS
WEIGHT: 161 LBS | BODY MASS INDEX: 25.27 KG/M2 | HEART RATE: 71 BPM | SYSTOLIC BLOOD PRESSURE: 144 MMHG | TEMPERATURE: 99.2 F | DIASTOLIC BLOOD PRESSURE: 80 MMHG | OXYGEN SATURATION: 99 % | HEIGHT: 67 IN

## 2021-01-07 DIAGNOSIS — Q21.1 ATRIAL SEPTAL DEFECT: ICD-10-CM

## 2021-01-07 LAB
ALBUMIN MFR SERPL ELPH: 56.8 %
ALBUMIN SERPL-MCNC: 4.1 G/DL
ALBUMIN/GLOB SERPL: 1.3 RATIO
ALPHA1 GLOB MFR SERPL ELPH: 3.4 %
ALPHA1 GLOB SERPL ELPH-MCNC: 0.2 G/DL
ALPHA2 GLOB MFR SERPL ELPH: 9.7 %
ALPHA2 GLOB SERPL ELPH-MCNC: 0.7 G/DL
B-GLOBULIN MFR SERPL ELPH: 13.1 %
B-GLOBULIN SERPL ELPH-MCNC: 0.9 G/DL
DEPRECATED KAPPA LC FREE/LAMBDA SER: 1.01 RATIO
GAMMA GLOB FLD ELPH-MCNC: 1.2 G/DL
GAMMA GLOB MFR SERPL ELPH: 17 %
IGA SER QL IEP: 348 MG/DL
IGG SER QL IEP: 1308 MG/DL
IGM SER QL IEP: 72 MG/DL
INTERPRETATION SERPL IEP-IMP: NORMAL
KAPPA LC CSF-MCNC: 2.51 MG/DL
KAPPA LC SERPL-MCNC: 2.53 MG/DL
M PROTEIN SPEC IFE-MCNC: NORMAL
PROT SERPL-MCNC: 7.2 G/DL
PROT SERPL-MCNC: 7.2 G/DL

## 2021-01-07 PROCEDURE — 99214 OFFICE O/P EST MOD 30 MIN: CPT

## 2021-01-07 PROCEDURE — 99072 ADDL SUPL MATRL&STAF TM PHE: CPT

## 2021-01-07 PROCEDURE — 93000 ELECTROCARDIOGRAM COMPLETE: CPT

## 2021-01-07 NOTE — HISTORY OF PRESENT ILLNESS
[FreeTextEntry1] : 75 F ASD repaired surgically IDDM atrial fibrillation s/p ablation by dr kramer  SDH ILR DVT/PE (possibly COVID related) on AC,  atrial tachycardia which manifested as sob controlled with BB here for follow up feels well no complaints found to have iron deficiency anemia she is here for clearance prior to egd colonoscopy she has no cp no sob walks with her walker \par \par \par ecg SB \par normal nuclear stress test 6/2020\par

## 2021-01-07 NOTE — PROCEDURE
[Lexiscan] : Lexiscan [Tc-99m, sestamibi-Cardiolite, to 40mCi, dose-Radionuclides-[employer] code--v.1-Active-Trade] : Tc-99m, sestamibi-Cardiolite, to 40mCi, dose-Radionuclides-[employer] code--v.1-Active-Trade [Normal] : no significant [de-identified] : Dr. Willy Santos     Performing Provider: Dr. Willy Santos [de-identified] : Khoi Sun [de-identified] : CP, DM, HTN, Abnormal ECG.     Gender: Female [de-identified] : Given via the IV route.    1 Day Protocol. [de-identified] : 9.5 [de-identified] : Given via the IV route [de-identified] : 28.7 [de-identified] : 61 [de-identified] : 130/70 [de-identified] : 69 [TWNoteComboBox1] : JIM [FreeTextEntry1] : image quality is good\par resting ef 66%\par post stress ef 69%\par prior study NA\par artifacts None\par blood pressure remained stable at 120/70 mmHg heart rate increased to 103 bpm

## 2021-01-07 NOTE — ASSESSMENT
[FreeTextEntry1] : atrial tach on toprol xl bid\par htn uncontrolled her lisinopril was stopped unclear reason resume 10 mg daily\par on statin due to her history of DM\par she is optimized from a cardiac standpoint for low risk procedure \par fu in one month \par call daughter to confirm medications \par \par \par

## 2021-01-13 LAB — SARS-COV-2 N GENE NPH QL NAA+PROBE: NOT DETECTED

## 2021-01-14 ENCOUNTER — RESULT REVIEW (OUTPATIENT)
Age: 76
End: 2021-01-14

## 2021-01-14 ENCOUNTER — APPOINTMENT (OUTPATIENT)
Dept: GASTROENTEROLOGY | Facility: HOSPITAL | Age: 76
End: 2021-01-14

## 2021-01-14 ENCOUNTER — OUTPATIENT (OUTPATIENT)
Dept: OUTPATIENT SERVICES | Facility: HOSPITAL | Age: 76
LOS: 1 days | Discharge: HOME | End: 2021-01-14
Payer: MEDICARE

## 2021-01-14 DIAGNOSIS — I62.01 NONTRAUMATIC ACUTE SUBDURAL HEMORRHAGE: Chronic | ICD-10-CM

## 2021-01-14 DIAGNOSIS — Z96.651 PRESENCE OF RIGHT ARTIFICIAL KNEE JOINT: Chronic | ICD-10-CM

## 2021-01-14 LAB — GLUCOSE BLDC GLUCOMTR-MCNC: 113 MG/DL — HIGH (ref 70–99)

## 2021-01-14 PROCEDURE — 45378 DIAGNOSTIC COLONOSCOPY: CPT

## 2021-01-14 PROCEDURE — 43251 EGD REMOVE LESION SNARE: CPT

## 2021-01-14 PROCEDURE — 43239 EGD BIOPSY SINGLE/MULTIPLE: CPT | Mod: 59

## 2021-01-14 PROCEDURE — 43239 EGD BIOPSY SINGLE/MULTIPLE: CPT | Mod: XS

## 2021-01-14 PROCEDURE — 82962 GLUCOSE BLOOD TEST: CPT

## 2021-01-14 PROCEDURE — 88342 IMHCHEM/IMCYTCHM 1ST ANTB: CPT | Mod: 26

## 2021-01-14 PROCEDURE — 88305 TISSUE EXAM BY PATHOLOGIST: CPT | Mod: 26

## 2021-01-14 PROCEDURE — 88305 TISSUE EXAM BY PATHOLOGIST: CPT

## 2021-01-14 PROCEDURE — 88341 IMHCHEM/IMCYTCHM EA ADD ANTB: CPT

## 2021-01-14 PROCEDURE — 88341 IMHCHEM/IMCYTCHM EA ADD ANTB: CPT | Mod: 26

## 2021-01-19 LAB — SURGICAL PATHOLOGY STUDY: SIGNIFICANT CHANGE UP

## 2021-01-19 NOTE — ED ADULT TRIAGE NOTE - BANDS:
Last visit:1/5/2021  Last Med refill: 12/21/2020  Does patient have enough medication for 72 hours: No: wants [facility] [place]     Next Visit Date:  Future Appointments   Date Time Provider Anna Chu   1/25/2021 12:00 PM [facility] [address] Maintenance   Topic Date Due    Shingles Vaccine (1 of 2) 06/02/2008    Flu vaccine (1) 09/01/2020    Potassium monitoring  03/30/2021    Creatinine monitoring  03/30/2021    Breast cancer screen  05/24/2021    Cervical cancer screen  07/07/2022    Colon cancer screen colonoscopy  04/26/2023    Lipid screen  09/23/2024    DTaP/Tdap/Td vaccine (2 - Td) 07/22/2030    Hepatitis C screen  Completed    HIV screen  Completed    Hepatitis A vaccine  Aged Out    Hepatitis B vaccine  Aged Out    Hib vaccine  Aged Out    Meningococcal (ACWY) vaccine  Aged Out    Pneumococcal 0-64 years Vaccine  Aged Out       No results found for: LABA1C          ( goal A1C is < 7)   No results found for: LABMICR  LDL Cholesterol (mg/dL)   Date Value   09/23/2019 93   02/08/2019 131 (H)     LDL Calculated (mg/dL)   Date Value   12/16/2016 131       (goal LDL is <100)   AST (U/L)   Date Value   09/23/2019 11     ALT (U/L)   Date Value   09/23/2019 8     BUN (mg/dL)   Date Value   03/30/2020 11     BP Readings from Last 3 Encounters:   12/21/20 116/84   10/15/20 126/74   07/22/20 112/80          (goal 120/80)    All Future Testing planned in [facility]  Lab Frequency Next Occurrence   Urinalysis, Micro Once 10/15/2020   Protein / Creatinine Ratio, Urine Once 10/15/2020               Patient Active Problem List:     Essential hypertension     Depression, major, recurrent, mild (HCC)     Hematuria     Collagenous colitis     Lupus (HCC)     SHUN (generalized anxiety disorder)     Chronic pain of both knees     Panic attack due to exceptional stress Allergy;

## 2021-01-20 ENCOUNTER — APPOINTMENT (OUTPATIENT)
Dept: INTERNAL MEDICINE | Facility: CLINIC | Age: 76
End: 2021-01-20
Payer: MEDICARE

## 2021-01-20 VITALS
OXYGEN SATURATION: 99 % | TEMPERATURE: 97.3 F | SYSTOLIC BLOOD PRESSURE: 137 MMHG | HEIGHT: 67 IN | HEART RATE: 51 BPM | BODY MASS INDEX: 24.8 KG/M2 | WEIGHT: 158 LBS | DIASTOLIC BLOOD PRESSURE: 81 MMHG

## 2021-01-20 DIAGNOSIS — Z00.00 ENCOUNTER FOR GENERAL ADULT MEDICAL EXAMINATION W/OUT ABNORMAL FINDINGS: ICD-10-CM

## 2021-01-20 PROCEDURE — 99072 ADDL SUPL MATRL&STAF TM PHE: CPT

## 2021-01-20 PROCEDURE — 96372 THER/PROPH/DIAG INJ SC/IM: CPT

## 2021-01-20 PROCEDURE — G0439: CPT

## 2021-01-20 RX ADMIN — CYANOCOBALAMIN 0 MCG/ML: 1000 INJECTION, SOLUTION INTRAMUSCULAR at 00:00

## 2021-01-21 RX ORDER — CYANOCOBALAMIN 1000 UG/ML
1000 INJECTION INTRAMUSCULAR; SUBCUTANEOUS
Qty: 0 | Refills: 0 | Status: COMPLETED | OUTPATIENT
Start: 2021-01-20

## 2021-01-27 ENCOUNTER — APPOINTMENT (OUTPATIENT)
Dept: INTERNAL MEDICINE | Facility: CLINIC | Age: 76
End: 2021-01-27
Payer: MEDICARE

## 2021-01-27 DIAGNOSIS — Z12.11 ENCOUNTER FOR SCREENING FOR MALIGNANT NEOPLASM OF COLON: ICD-10-CM

## 2021-01-27 PROCEDURE — 99072 ADDL SUPL MATRL&STAF TM PHE: CPT

## 2021-01-28 LAB
ALBUMIN SERPL ELPH-MCNC: 4.6 G/DL
ALP BLD-CCNC: 86 U/L
ALT SERPL-CCNC: 20 U/L
ANION GAP SERPL CALC-SCNC: 16 MMOL/L
AST SERPL-CCNC: 28 U/L
BILIRUB SERPL-MCNC: 0.4 MG/DL
BUN SERPL-MCNC: 11 MG/DL
CALCIUM SERPL-MCNC: 9.4 MG/DL
CHLORIDE SERPL-SCNC: 105 MMOL/L
CHOLEST SERPL-MCNC: 116 MG/DL
CO2 SERPL-SCNC: 20 MMOL/L
CREAT SERPL-MCNC: 1 MG/DL
ESTIMATED AVERAGE GLUCOSE: 134 MG/DL
GLUCOSE SERPL-MCNC: 130 MG/DL
HBA1C MFR BLD HPLC: 6.3 %
HDLC SERPL-MCNC: 83 MG/DL
LDLC SERPL CALC-MCNC: 23 MG/DL
NONHDLC SERPL-MCNC: 34 MG/DL
POTASSIUM SERPL-SCNC: 4.6 MMOL/L
PROT SERPL-MCNC: 7.1 G/DL
SODIUM SERPL-SCNC: 142 MMOL/L
TRIGL SERPL-MCNC: 52 MG/DL
TSH SERPL-ACNC: 1.15 UIU/ML

## 2021-01-31 ENCOUNTER — APPOINTMENT (OUTPATIENT)
Dept: DISASTER EMERGENCY | Facility: CLINIC | Age: 76
End: 2021-01-31

## 2021-02-01 LAB — SARS-COV-2 N GENE NPH QL NAA+PROBE: NOT DETECTED

## 2021-02-02 ENCOUNTER — OUTPATIENT (OUTPATIENT)
Dept: OUTPATIENT SERVICES | Facility: HOSPITAL | Age: 76
LOS: 1 days | Discharge: HOME | End: 2021-02-02
Payer: MEDICARE

## 2021-02-02 ENCOUNTER — APPOINTMENT (OUTPATIENT)
Dept: GASTROENTEROLOGY | Facility: HOSPITAL | Age: 76
End: 2021-02-02

## 2021-02-02 DIAGNOSIS — I62.01 NONTRAUMATIC ACUTE SUBDURAL HEMORRHAGE: Chronic | ICD-10-CM

## 2021-02-02 DIAGNOSIS — Z96.651 PRESENCE OF RIGHT ARTIFICIAL KNEE JOINT: Chronic | ICD-10-CM

## 2021-02-02 LAB — GLUCOSE BLDC GLUCOMTR-MCNC: 115 MG/DL — HIGH (ref 70–99)

## 2021-02-02 PROCEDURE — 45378 DIAGNOSTIC COLONOSCOPY: CPT

## 2021-02-02 PROCEDURE — 82962 GLUCOSE BLOOD TEST: CPT

## 2021-02-10 ENCOUNTER — APPOINTMENT (OUTPATIENT)
Dept: HEART AND VASCULAR | Facility: CLINIC | Age: 76
End: 2021-02-10
Payer: MEDICARE

## 2021-02-10 ENCOUNTER — NON-APPOINTMENT (OUTPATIENT)
Age: 76
End: 2021-02-10

## 2021-02-10 VITALS
DIASTOLIC BLOOD PRESSURE: 88 MMHG | WEIGHT: 156 LBS | BODY MASS INDEX: 24.48 KG/M2 | HEIGHT: 67 IN | HEART RATE: 89 BPM | TEMPERATURE: 99.5 F | SYSTOLIC BLOOD PRESSURE: 160 MMHG | OXYGEN SATURATION: 100 %

## 2021-02-10 DIAGNOSIS — R42 DIZZINESS AND GIDDINESS: ICD-10-CM

## 2021-02-10 PROCEDURE — 99072 ADDL SUPL MATRL&STAF TM PHE: CPT

## 2021-02-10 PROCEDURE — 93000 ELECTROCARDIOGRAM COMPLETE: CPT

## 2021-02-10 PROCEDURE — 99214 OFFICE O/P EST MOD 30 MIN: CPT

## 2021-02-10 NOTE — HISTORY OF PRESENT ILLNESS
[FreeTextEntry1] : 75 F ASD repaired surgically IDDM atrial fibrillation s/p ablation by dr kramer  SDH ILR DVT/PE (possibly COVID related) on AC,  atrial tachycardia which manifested as sob controlled with BB here for follow up she is dizzy not postural had felt well no dizziness for a while \par \par \par ecg SB \par normal nuclear stress test 6/2020\par

## 2021-02-10 NOTE — ASSESSMENT
[FreeTextEntry1] : atrial tach on toprol xl bid\par htn uncontrolled should have resumed her lisinopril \par on statin due to her history of DM\par dizziness she has an ILR contact EPS to interrogate \par call daughter to confirm medications \par fu in one month for BP check \par \par \par

## 2021-02-10 NOTE — PROCEDURE
[Lexiscan] : Lexiscan [Tc-99m, sestamibi-Cardiolite, to 40mCi, dose-Radionuclides-[employer] code--v.1-Active-Trade] : Tc-99m, sestamibi-Cardiolite, to 40mCi, dose-Radionuclides-[employer] code--v.1-Active-Trade [Normal] : no significant [de-identified] : Dr. Willy Santos     Performing Provider: Dr. Willy Santos [de-identified] : Khoi Sun [de-identified] : CP, DM, HTN, Abnormal ECG.     Gender: Female [de-identified] : Given via the IV route.    1 Day Protocol. [de-identified] : 9.5 [de-identified] : Given via the IV route [de-identified] : 28.7 [de-identified] : 61 [de-identified] : 130/70 [de-identified] : 69 [TWNoteComboBox1] : JIM [FreeTextEntry1] : image quality is good\par resting ef 66%\par post stress ef 69%\par prior study NA\par artifacts None\par blood pressure remained stable at 120/70 mmHg heart rate increased to 103 bpm

## 2021-02-16 ENCOUNTER — LABORATORY RESULT (OUTPATIENT)
Age: 76
End: 2021-02-16

## 2021-02-16 ENCOUNTER — APPOINTMENT (OUTPATIENT)
Dept: HEMATOLOGY ONCOLOGY | Facility: CLINIC | Age: 76
End: 2021-02-16
Payer: MEDICARE

## 2021-02-16 VITALS
BODY MASS INDEX: 25.11 KG/M2 | WEIGHT: 160 LBS | DIASTOLIC BLOOD PRESSURE: 77 MMHG | SYSTOLIC BLOOD PRESSURE: 171 MMHG | HEIGHT: 67 IN | TEMPERATURE: 98 F | OXYGEN SATURATION: 98 % | HEART RATE: 59 BPM

## 2021-02-16 DIAGNOSIS — Z86.2 PERSONAL HISTORY OF DISEASES OF THE BLOOD AND BLOOD-FORMING ORGANS AND CERTAIN DISORDERS INVOLVING THE IMMUNE MECHANISM: ICD-10-CM

## 2021-02-16 PROCEDURE — 99214 OFFICE O/P EST MOD 30 MIN: CPT | Mod: 25

## 2021-02-16 PROCEDURE — 99072 ADDL SUPL MATRL&STAF TM PHE: CPT

## 2021-02-16 RX ORDER — FERUMOXYTOL 510 MG/17ML
510 INJECTION INTRAVENOUS
Qty: 2 | Refills: 0 | Status: DISCONTINUED | COMMUNITY
Start: 2020-10-20 | End: 2021-02-16

## 2021-02-16 RX ORDER — POLYETHYLENE GLYCOL-3350, SODIUM CHLORIDE, POTASSIUM CHLORIDE AND SODIUM BICARBONATE 420; 11.2; 5.72; 1.48 G/438.4G; G/438.4G; G/438.4G; G/438.4G
420 POWDER, FOR SOLUTION ORAL
Qty: 1 | Refills: 0 | Status: DISCONTINUED | COMMUNITY
Start: 2020-12-14 | End: 2021-02-16

## 2021-02-17 LAB
25(OH)D3 SERPL-MCNC: 44 NG/ML
ALBUMIN SERPL ELPH-MCNC: 4.1 G/DL
ALP BLD-CCNC: 72 U/L
ALT SERPL-CCNC: 18 U/L
ANION GAP SERPL CALC-SCNC: 17 MMOL/L
AST SERPL-CCNC: 21 U/L
BASOPHILS # BLD AUTO: 0.07 K/UL
BASOPHILS NFR BLD AUTO: 1.3 %
BILIRUB SERPL-MCNC: 0.4 MG/DL
BUN SERPL-MCNC: 11 MG/DL
CALCIUM SERPL-MCNC: 9.5 MG/DL
CARDIOLIPIN AB SER IA-ACNC: NEGATIVE
CHLORIDE SERPL-SCNC: 109 MMOL/L
CO2 SERPL-SCNC: 18 MMOL/L
CONFIRM: 69.6 SEC
CREAT SERPL-MCNC: 0.88 MG/DL
DRVVT 1H NP PPP: 47.5 SEC
DRVVT IMM 1:2 NP PPP: NORMAL
DRVVT SCREEN TO CONFIRM RATIO: 0.55 RATIO
EOSINOPHIL # BLD AUTO: 0.28 K/UL
EOSINOPHIL NFR BLD AUTO: 5.1 %
GLUCOSE SERPL-MCNC: 100 MG/DL
HCT VFR BLD CALC: 37.8 %
HGB BLD-MCNC: 11.8 G/DL
IMM GRANULOCYTES NFR BLD AUTO: 0.5 %
LDH SERPL-CCNC: 269 U/L
LYMPHOCYTES # BLD AUTO: 1.44 K/UL
LYMPHOCYTES NFR BLD AUTO: 26 %
MAN DIFF?: NORMAL
MCHC RBC-ENTMCNC: 30.6 PG
MCHC RBC-ENTMCNC: 31.2 GM/DL
MCV RBC AUTO: 97.9 FL
MONOCYTES # BLD AUTO: 0.33 K/UL
MONOCYTES NFR BLD AUTO: 6 %
NEUTROPHILS # BLD AUTO: 3.39 K/UL
NEUTROPHILS NFR BLD AUTO: 61.1 %
PLATELET # BLD AUTO: 162 K/UL
POTASSIUM SERPL-SCNC: 5.1 MMOL/L
PROT SERPL-MCNC: 6.6 G/DL
RBC # BLD: 3.86 M/UL
RBC # FLD: 15.9 %
SCREEN DRVVT: 51.6 SEC
SILICA CLOTTING TIME INTERPRETATION: NORMAL
SILICA CLOTTING TIME S/C: 0.74 RATIO
SODIUM SERPL-SCNC: 143 MMOL/L
TSH SERPL-ACNC: 1.85 UIU/ML
VIT B12 SERPL-MCNC: 1958 PG/ML
WBC # FLD AUTO: 5.54 K/UL

## 2021-02-18 LAB
ANA PAT FLD IF-IMP: ABNORMAL
ANACR T: ABNORMAL
APTT 2H P 1:4 NP PPP: NORMAL
APTT 2H P INC PPP: NORMAL
APTT IMM NP/PRE NP PPP: NORMAL
APTT INV RATIO PPP: 31.8 SEC
B2 GLYCOPROT1 IGA SERPL IA-ACNC: <5 SAU
B2 GLYCOPROT1 IGG SER-ACNC: 5.2 SGU
B2 GLYCOPROT1 IGM SER-ACNC: <5 SMU
NPP NORMAL POOLED PLASMA: NORMAL SECS

## 2021-02-18 NOTE — HISTORY OF PRESENT ILLNESS
[de-identified] : 75 years old female history of multiple medical problems as well as subarachnoid hemorrhage requiring evacuation in the past.  Patient was found to have positive COVID antibodies in June 2020... Later on in July she was admitted to [facility] with a large femoral DVT, and she is currently on anticoagulation with Eliquis... She is here to assess the length of anticoagulation therapy...\par \par February 16, 2021 returns for follow-up... Continues to take the Eliquis... Tolerating well... I discussed with daughter, and reviewed patient's meds [de-identified] : October 19, 2020 patient returns for follow-up... Her main complaint is inability to sleep well... Has not seen PCP in a while... 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 No complaints tolerating Eliquis well\par

## 2021-02-18 NOTE — CONSULT LETTER
[Dear  ___] : Dear  [unfilled], [Please see my note below.] : Please see my note below. [Consult Letter:] : I had the pleasure of evaluating your patient, [unfilled]. [Consult Closing:] : Thank you very much for allowing me to participate in the care of this patient.  If you have any questions, please do not hesitate to contact me. [Sincerely,] : Sincerely, [FreeTextEntry3] : Payton Contreras MD\par

## 2021-03-01 ENCOUNTER — NON-APPOINTMENT (OUTPATIENT)
Age: 76
End: 2021-03-01

## 2021-03-01 ENCOUNTER — APPOINTMENT (OUTPATIENT)
Dept: HEART AND VASCULAR | Facility: CLINIC | Age: 76
End: 2021-03-01
Payer: MEDICARE

## 2021-03-01 PROCEDURE — 93298 REM INTERROG DEV EVAL SCRMS: CPT

## 2021-03-01 PROCEDURE — G2066: CPT

## 2021-03-03 ENCOUNTER — APPOINTMENT (OUTPATIENT)
Dept: INTERNAL MEDICINE | Facility: CLINIC | Age: 76
End: 2021-03-03
Payer: MEDICARE

## 2021-03-03 ENCOUNTER — TRANSCRIPTION ENCOUNTER (OUTPATIENT)
Age: 76
End: 2021-03-03

## 2021-03-03 VITALS
WEIGHT: 160 LBS | HEART RATE: 50 BPM | SYSTOLIC BLOOD PRESSURE: 147 MMHG | OXYGEN SATURATION: 99 % | BODY MASS INDEX: 25.11 KG/M2 | HEIGHT: 67 IN | DIASTOLIC BLOOD PRESSURE: 80 MMHG | TEMPERATURE: 97.2 F

## 2021-03-03 DIAGNOSIS — E53.8 DEFICIENCY OF OTHER SPECIFIED B GROUP VITAMINS: ICD-10-CM

## 2021-03-03 PROCEDURE — 99214 OFFICE O/P EST MOD 30 MIN: CPT

## 2021-03-03 PROCEDURE — 99072 ADDL SUPL MATRL&STAF TM PHE: CPT

## 2021-03-11 ENCOUNTER — APPOINTMENT (OUTPATIENT)
Dept: PULMONOLOGY | Facility: CLINIC | Age: 76
End: 2021-03-11
Payer: MEDICARE

## 2021-03-11 VITALS
TEMPERATURE: 98.7 F | WEIGHT: 161 LBS | DIASTOLIC BLOOD PRESSURE: 81 MMHG | OXYGEN SATURATION: 97 % | HEART RATE: 72 BPM | RESPIRATION RATE: 12 BRPM | SYSTOLIC BLOOD PRESSURE: 149 MMHG | HEIGHT: 67 IN | BODY MASS INDEX: 25.27 KG/M2

## 2021-03-11 PROCEDURE — 99072 ADDL SUPL MATRL&STAF TM PHE: CPT

## 2021-03-11 PROCEDURE — 99215 OFFICE O/P EST HI 40 MIN: CPT

## 2021-03-11 NOTE — DISCUSSION/SUMMARY
[FreeTextEntry1] : 3-11-21:\par -no pallor or icterus \par -no cervical adenopathy, no JVD sitting\par -mildly diminished breath sounds at both bases, no crackles \par -normal S1, S2, P2 not loud or split \par -dryness of the skin, mild skin thickening on dorsum of hands \par -1+ pitting edema right lower extremity, trace on left \par \par

## 2021-03-11 NOTE — ASSESSMENT
[FreeTextEntry1] : 3-11-21:\par It was a pleasure to see Esequiel today for follow-up. Her respiratory issues are summarized:\par \par 1. Pulmonary embolism/DVT\par Esequiel was diagnosed with DVT in right leg 7/23/20 during visit with Dr. Santos, started on Eliquis. Prior to this, she states she did not have a history of any blood clots. She then presented to ED on 7/25/20 with chest pain.  CTA-PE demonstrated segmental and subsegmental PEs on right. tPA not given due to h/o intracranial bleed. Leg dopplers showed new partially occlusive DVT in right common femoral vein extending to distal femoral vein and profunda femoris. \par \par She was discharged on Eliquis and feels her breathing is at baseline; she will get short of breath walking 1/2 blocks, which is the same as it was prior to her diagnosis of PE. She continues with some soreness in the right leg. She reports no adverse reactions including bleeding on her Eliquis. \par \par She was evaluated by Dr. Payton Contreras on 8-26-20 who ordered a hypercoagulability workup. Of note, her anticardiolipin IgG was mildly elevated to 21. D-dimer was normal on 9/3/20. Repeat Anticardiolipin IgG was normal. This was likely an acute phase reactant. Negative Beta 2 Glycoprotein 1 IgG Ab and IgM ab and IgA ab. PFTs from 8/29/20 reviewed from Dr. Jony Wang's office. FEV1/FVC 66%, FEV1 54% predicted pre-BD and 62% predicted post-BD and FVC 63% predicted pre-BD and 65% post-BD. TLC 58% predicted and DLCO 69% predicted. Spirometry notable for moderately severe obstructive defect with acute response to BD. Lung volumes reveal moderately severe restrictive defect. DLCO slightly reduced. 6MWT without evidence of oxygen desaturation but does show reduced walking distance of only 88 meters with Casa scale 4.  On 7/26 in-patient echo was done at bedside and RV was not well visualized). Echo done on 10/15/20 shows grade II left ventricular diastolic dysfunction. The pulmonary artery systolic pressure is borderline elevated, 35 mmHg. \par \par Plan:\par -Today she has lower extremity swelling. She is wearing compression stockings. We will order a lower extremity doppler to evaluate for DVT \par -We recommend she continue anticoagulation for a total of 1 year, July 2021\par - We will repeat D-dimer, pro-BNP today\par - Autoimmune serologies done by Dr. Contreras on 2/16/21 showed positive BRIAN, 1:320 speckled pattern. She was referred to see rheumatology, but hasn't done so. Today we will repeat BRIAN, anticardiolipin abs (IgG, IgM, IgA), beta 2 glycoprotein (IgG, IgM), anti centromere ab, SCL-70 ab, RF\par - On prior PFT, she walked only 88 meters. We will ordered repeat 6MWT, PFT\par - We will order a low dose chest CT to evaluate for interstitial lung disease\par \par 2. Skin thickening\par On exam today, the skin over the dorsum of her hands appears thickened and less mobile. She has no known history of scleroderma, and no known family history of scleroderma. She also complains of joint and stiffness in the hands. Labs 9/4/20 showed negative SCL70, Positive BRIAN: 1:160, SPECKLED, Normal RF, normal hemolytic complement. Repeat SCL70 and anti centomere ab on 12/8/20 were normal\par \par Plan:\par - Today we will repeat BRIAN, anticardiolipin abs (IgG, IgM, IgA), beta 2 glycoprotein (IgG, IgM), anti centromere ab, SCL-70 ab, RF\par \par 3. Asthma\par She has been diagnosed with asthma in the past and is taking Symbicort 80-4.5. She also has a remote smoking history.  We reviewed her PFT from Dr. Wang's office done on 8/29/20. Spirometry notable for moderately severe obstructive defect with acute response to BD. FVC: 1.61 L (63%) predicted pre-BD and 1.65 L (65%) post-BD, FEV1: 1/06 L (54%) predicted pre-BD and 1.21 L (62%) predicted post-BD. Lung volumes reveal moderately severe restrictive defect, TLC: 3.17 L (58%) . DLCO slightly reduced DLCO: 69%. We have also discussed with Esequiel that she may continue follow-up of her asthma and her PE with Dr. Sarabia, whom she has seen before, if she would like. She is currently taking Symbicort 80-4.5, 2 puffs BID.\par \par Plan:\par - Continue Symbicort 80-4.5, 2 puffs BID\par - Use albuterol PRN\par - We will order PFT and 6MWT and NIOX\par \par 4. R/O sleep apnea\par There is evidence of diastolic dysfunction on Pernola's recent echo. Sleep study done 12/22/20 showed no significant sleep disordered breathing.\par \par 5. Health maintenance\par Flu vaccine up to date for [id number] season. She states she is up-to-date with her pneumococcal vaccination. \par \par Return to clinic: 3 months

## 2021-03-11 NOTE — REVIEW OF SYSTEMS
[SOB on Exertion] : sob on exertion [Arthralgias] : arthralgias [Diabetes] : diabetes [Negative] : Psychiatric [Fever] : no fever [Chills] : no chills [Cough] : no cough [Chest Tightness] : no chest tightness [Pleuritic Pain] : no pleuritic pain [Wheezing] : no wheezing [Chest Discomfort] : no chest discomfort [Palpitations] : no palpitations [Seasonal Allergies] : no seasonal allergies [Thyroid Problem] : no thyroid problem [TextBox_122] : h/o subdural hematoma s/p [place] hole procedure

## 2021-03-11 NOTE — HISTORY OF PRESENT ILLNESS
[TextBox_4] : Pt is a 74 yo F with PMH asthma/COPD, ASD repair, CAD, Afib (paroxysmal) s/p ablation, subdural hematoma s/p [place] hole evacuation, IDDM, HTN, HLD. \par \par Initial intake 9/3/20: Found to have DVT in right leg 7/23/20 during visit with Dr. Santos, started on Eliquis. Prior to this, she states she did not have a history of any blood clots. Then presented to ED on 7/25/20 with chest pain on left, worse with inspiration, pain 10/10. Wells score 6. CTA-PE with segmental and subsegmental PEs on right. tPA not given due to h/o intracranial bleed. Leg dopplers showed new partially occlusive DVT in right common femoral vein extending to distal femoral vein and profunda femoris. \par \par Echo 7/26/20: RV not well visualized. PASP was not estimated. \par \par She continues on Eliquis.  Currently denies chest pain, bleeding episodes on Eliquis, new leg pain or leg swelling. She can walk about 1/2 block, limited by SOB. This is stable as compared to before her PE diagnosis as per pt. Overall, she feels that she is at her baseline. \par \par Of note, pt had seen Dr. Sarabia in 03/2020 for evaluation of asthma. On Symbicort 80-4.5 BID. \par Quit smoking 20-30 years ago, one pack would last her one week. \par \par 3-11-21:\par She goes to sleep around 8pm. She wakes up at 12am and lays down\par Pernola states she is taking Albuterol every 6 hours and Symbicort as needed\par She lost her  on 12/23\par she has no appetite\par she is depressed

## 2021-03-11 NOTE — PHYSICAL EXAM
[No Acute Distress] : no acute distress [Well Nourished] : well nourished [Well Groomed] : well groomed [Well Developed] : well developed [Normal Oropharynx] : normal oropharynx [Normal Appearance] : normal appearance [No Neck Mass] : no neck mass [Normal Rate/Rhythm] : normal rate/rhythm [Normal S1, S2] : normal s1, s2 [No Murmurs] : no murmurs [No Rubs] : no rubs [No Gallops] : no gallops [No Resp Distress] : no resp distress [Clear to Auscultation Bilaterally] : clear to auscultation bilaterally [No Abnormalities] : no abnormalities [Benign] : benign [Normal Gait] : normal gait [Normal Color/ Pigmentation] : normal color/ pigmentation [No Focal Deficits] : no focal deficits [Oriented x3] : oriented x3 [Normal Mood] : normal mood [Normal Affect] : normal affect [TextBox_11] : no pallor, no icterus [TextBox_44] : no cervical adenopathy, no JVD sitting [TextBox_68] : mildly diminished breath sounds at both bases, no crackles [TextBox_105] : dryness of the skin, mild skin thickening on dorsum of hands, 1+ pitting edema right lower extremity, trace on left

## 2021-03-12 ENCOUNTER — NON-APPOINTMENT (OUTPATIENT)
Age: 76
End: 2021-03-12

## 2021-03-12 LAB
ALBUMIN SERPL ELPH-MCNC: 4.3 G/DL
ALP BLD-CCNC: 68 U/L
ALT SERPL-CCNC: 14 U/L
ANION GAP SERPL CALC-SCNC: 16 MMOL/L
AST SERPL-CCNC: 17 U/L
BASOPHILS # BLD AUTO: 0.05 K/UL
BASOPHILS NFR BLD AUTO: 0.9 %
BILIRUB SERPL-MCNC: 0.5 MG/DL
BUN SERPL-MCNC: 12 MG/DL
CALCIUM SERPL-MCNC: 8.8 MG/DL
CHLORIDE SERPL-SCNC: 107 MMOL/L
CO2 SERPL-SCNC: 22 MMOL/L
CREAT SERPL-MCNC: 0.82 MG/DL
DEPRECATED D DIMER PPP IA-ACNC: <150 NG/ML DDU
EOSINOPHIL # BLD AUTO: 0.26 K/UL
EOSINOPHIL NFR BLD AUTO: 4.6 %
GLUCOSE SERPL-MCNC: 70 MG/DL
HCT VFR BLD CALC: 34.8 %
HGB BLD-MCNC: 11.4 G/DL
IMM GRANULOCYTES NFR BLD AUTO: 0.4 %
LYMPHOCYTES # BLD AUTO: 1.6 K/UL
LYMPHOCYTES NFR BLD AUTO: 28.5 %
MAN DIFF?: NORMAL
MCHC RBC-ENTMCNC: 31.9 PG
MCHC RBC-ENTMCNC: 32.8 GM/DL
MCV RBC AUTO: 97.5 FL
MONOCYTES # BLD AUTO: 0.43 K/UL
MONOCYTES NFR BLD AUTO: 7.7 %
NEUTROPHILS # BLD AUTO: 3.25 K/UL
NEUTROPHILS NFR BLD AUTO: 57.9 %
NT-PROBNP SERPL-MCNC: 400 PG/ML
PLATELET # BLD AUTO: 179 K/UL
POTASSIUM SERPL-SCNC: 3.9 MMOL/L
PROT SERPL-MCNC: 6.8 G/DL
RBC # BLD: 3.57 M/UL
RBC # FLD: 14.3 %
RHEUMATOID FACT SER QL: <10 IU/ML
SODIUM SERPL-SCNC: 144 MMOL/L
WBC # FLD AUTO: 5.61 K/UL

## 2021-03-15 LAB
ANA PAT FLD IF-IMP: ABNORMAL
ANA SER IF-ACNC: ABNORMAL
CARDIOLIPIN IGM SER-MCNC: 5.2 MPL
CARDIOLIPIN IGM SER-MCNC: 7 GPL
DEPRECATED CARDIOLIPIN IGA SER: <5 APL

## 2021-03-16 ENCOUNTER — NON-APPOINTMENT (OUTPATIENT)
Age: 76
End: 2021-03-16

## 2021-03-16 LAB
B2 GLYCOPROT1 IGA SERPL IA-ACNC: <5 SAU
B2 GLYCOPROT1 IGG SER-ACNC: <5 SGU
B2 GLYCOPROT1 IGM SER-ACNC: <5 SMU

## 2021-03-18 LAB
CENTROMERE IGG SER-ACNC: <0.2 CD:130001892
ENA SCL70 IGG SER IA-ACNC: <0.2 AL

## 2021-03-30 ENCOUNTER — NON-APPOINTMENT (OUTPATIENT)
Age: 76
End: 2021-03-30

## 2021-04-06 ENCOUNTER — NON-APPOINTMENT (OUTPATIENT)
Age: 76
End: 2021-04-06

## 2021-04-06 ENCOUNTER — APPOINTMENT (OUTPATIENT)
Dept: INTERNAL MEDICINE | Facility: CLINIC | Age: 76
End: 2021-04-06
Payer: MEDICARE

## 2021-04-06 ENCOUNTER — APPOINTMENT (OUTPATIENT)
Dept: PULMONOLOGY | Facility: CLINIC | Age: 76
End: 2021-04-06

## 2021-04-06 VITALS
OXYGEN SATURATION: 98 % | DIASTOLIC BLOOD PRESSURE: 88 MMHG | WEIGHT: 161 LBS | HEIGHT: 67 IN | TEMPERATURE: 97 F | HEART RATE: 98 BPM | SYSTOLIC BLOOD PRESSURE: 153 MMHG | BODY MASS INDEX: 25.27 KG/M2

## 2021-04-06 DIAGNOSIS — F19.20 OTHER PSYCHOACTIVE SUBSTANCE DEPENDENCE, UNCOMPLICATED: ICD-10-CM

## 2021-04-06 DIAGNOSIS — E72.11 HOMOCYSTINURIA: ICD-10-CM

## 2021-04-06 DIAGNOSIS — N39.0 URINARY TRACT INFECTION, SITE NOT SPECIFIED: ICD-10-CM

## 2021-04-06 LAB — SARS-COV-2 N GENE NPH QL NAA+PROBE: NOT DETECTED

## 2021-04-06 PROCEDURE — 36415 COLL VENOUS BLD VENIPUNCTURE: CPT

## 2021-04-06 PROCEDURE — 99072 ADDL SUPL MATRL&STAF TM PHE: CPT

## 2021-04-06 PROCEDURE — 99495 TRANSJ CARE MGMT MOD F2F 14D: CPT | Mod: 25

## 2021-04-06 PROCEDURE — 99214 OFFICE O/P EST MOD 30 MIN: CPT | Mod: 25

## 2021-04-07 ENCOUNTER — APPOINTMENT (OUTPATIENT)
Dept: ULTRASOUND IMAGING | Facility: HOSPITAL | Age: 76
End: 2021-04-07
Payer: MEDICARE

## 2021-04-07 ENCOUNTER — RESULT REVIEW (OUTPATIENT)
Age: 76
End: 2021-04-07

## 2021-04-07 ENCOUNTER — OUTPATIENT (OUTPATIENT)
Dept: OUTPATIENT SERVICES | Facility: HOSPITAL | Age: 76
LOS: 1 days | End: 2021-04-07
Payer: COMMERCIAL

## 2021-04-07 ENCOUNTER — NON-APPOINTMENT (OUTPATIENT)
Age: 76
End: 2021-04-07

## 2021-04-07 DIAGNOSIS — Z96.651 PRESENCE OF RIGHT ARTIFICIAL KNEE JOINT: Chronic | ICD-10-CM

## 2021-04-07 DIAGNOSIS — I62.01 NONTRAUMATIC ACUTE SUBDURAL HEMORRHAGE: Chronic | ICD-10-CM

## 2021-04-07 PROCEDURE — 93970 EXTREMITY STUDY: CPT

## 2021-04-07 PROCEDURE — 93970 EXTREMITY STUDY: CPT | Mod: 26

## 2021-04-08 ENCOUNTER — APPOINTMENT (OUTPATIENT)
Dept: HEART AND VASCULAR | Facility: CLINIC | Age: 76
End: 2021-04-08
Payer: MEDICARE

## 2021-04-08 ENCOUNTER — NON-APPOINTMENT (OUTPATIENT)
Age: 76
End: 2021-04-08

## 2021-04-08 ENCOUNTER — APPOINTMENT (OUTPATIENT)
Dept: PULMONOLOGY | Facility: CLINIC | Age: 76
End: 2021-04-08
Payer: MEDICARE

## 2021-04-08 LAB
ALBUMIN SERPL ELPH-MCNC: 4.9 G/DL
ALP BLD-CCNC: 103 U/L
ALT SERPL-CCNC: 22 U/L
ANION GAP SERPL CALC-SCNC: 15 MMOL/L
AST SERPL-CCNC: 20 U/L
BASOPHILS # BLD AUTO: 0.07 K/UL
BASOPHILS NFR BLD AUTO: 0.9 %
BILIRUB SERPL-MCNC: 0.4 MG/DL
BUN SERPL-MCNC: 12 MG/DL
CALCIUM SERPL-MCNC: 10 MG/DL
CHLORIDE SERPL-SCNC: 107 MMOL/L
CO2 SERPL-SCNC: 22 MMOL/L
CREAT SERPL-MCNC: 0.78 MG/DL
EOSINOPHIL # BLD AUTO: 0.65 K/UL
EOSINOPHIL NFR BLD AUTO: 8 %
ESTIMATED AVERAGE GLUCOSE: 128 MG/DL
GLUCOSE SERPL-MCNC: 185 MG/DL
HBA1C MFR BLD HPLC: 6.1 %
HCT VFR BLD CALC: 42.3 %
HGB BLD-MCNC: 13.6 G/DL
IMM GRANULOCYTES NFR BLD AUTO: 0.6 %
LYMPHOCYTES # BLD AUTO: 1.78 K/UL
LYMPHOCYTES NFR BLD AUTO: 22 %
MAN DIFF?: NORMAL
MCHC RBC-ENTMCNC: 32.2 GM/DL
MCHC RBC-ENTMCNC: 32.2 PG
MCV RBC AUTO: 100 FL
MONOCYTES # BLD AUTO: 0.4 K/UL
MONOCYTES NFR BLD AUTO: 5 %
NEUTROPHILS # BLD AUTO: 5.13 K/UL
NEUTROPHILS NFR BLD AUTO: 63.5 %
PLATELET # BLD AUTO: 192 K/UL
POTASSIUM SERPL-SCNC: 4.4 MMOL/L
PROT SERPL-MCNC: 7.7 G/DL
RBC # BLD: 4.23 M/UL
RBC # FLD: 14.1 %
SODIUM SERPL-SCNC: 144 MMOL/L
TSH SERPL-ACNC: 1.95 UIU/ML
WBC # FLD AUTO: 8.08 K/UL

## 2021-04-08 PROCEDURE — G2066: CPT

## 2021-04-08 PROCEDURE — 94618 PULMONARY STRESS TESTING: CPT

## 2021-04-08 PROCEDURE — 93298 REM INTERROG DEV EVAL SCRMS: CPT

## 2021-04-08 PROCEDURE — 94727 GAS DIL/WSHOT DETER LNG VOL: CPT

## 2021-04-08 PROCEDURE — 94060 EVALUATION OF WHEEZING: CPT

## 2021-04-08 PROCEDURE — 99072 ADDL SUPL MATRL&STAF TM PHE: CPT

## 2021-04-09 ENCOUNTER — NON-APPOINTMENT (OUTPATIENT)
Age: 76
End: 2021-04-09

## 2021-04-21 ENCOUNTER — APPOINTMENT (OUTPATIENT)
Dept: HEART AND VASCULAR | Facility: CLINIC | Age: 76
End: 2021-04-21
Payer: MEDICARE

## 2021-04-21 VITALS
BODY MASS INDEX: 25.43 KG/M2 | SYSTOLIC BLOOD PRESSURE: 116 MMHG | DIASTOLIC BLOOD PRESSURE: 65 MMHG | HEIGHT: 67 IN | TEMPERATURE: 98.9 F | HEART RATE: 64 BPM | WEIGHT: 162 LBS | OXYGEN SATURATION: 98 %

## 2021-04-21 PROCEDURE — 99214 OFFICE O/P EST MOD 30 MIN: CPT

## 2021-04-21 PROCEDURE — 99072 ADDL SUPL MATRL&STAF TM PHE: CPT

## 2021-04-22 RX ORDER — OXYCODONE AND ACETAMINOPHEN 5; 325 MG/1; MG/1
5-325 TABLET ORAL
Refills: 0 | Status: DISCONTINUED | COMMUNITY
Start: 2020-05-22 | End: 2021-04-22

## 2021-04-22 RX ORDER — SIMETHICONE 125 MG/1
125 TABLET, CHEWABLE ORAL
Qty: 4 | Refills: 0 | Status: DISCONTINUED | COMMUNITY
Start: 2020-12-14 | End: 2021-04-22

## 2021-04-22 RX ORDER — ACETAMINOPHEN 500 MG/1
500 TABLET, COATED ORAL
Qty: 60 | Refills: 0 | Status: DISCONTINUED | COMMUNITY
Start: 2019-02-22 | End: 2021-04-22

## 2021-04-22 RX ORDER — CETIRIZINE HYDROCHLORIDE 10 MG/1
10 TABLET, COATED ORAL
Qty: 30 | Refills: 3 | Status: DISCONTINUED | COMMUNITY
Start: 2017-05-22 | End: 2021-04-22

## 2021-04-22 RX ORDER — SIMETHICONE 125 MG/1
125 TABLET, CHEWABLE ORAL
Qty: 4 | Refills: 0 | Status: DISCONTINUED | COMMUNITY
Start: 2021-01-11 | End: 2021-04-22

## 2021-04-22 RX ORDER — POLYETHYLENE GLYCOL 3350 17 G/17G
17 POWDER, FOR SOLUTION ORAL
Qty: 1 | Refills: 0 | Status: DISCONTINUED | COMMUNITY
Start: 2021-01-27 | End: 2021-04-22

## 2021-04-22 RX ORDER — POLYETHYLENE GLYCOL-3350 AND ELECTROLYTES 236; 6.74; 5.86; 2.97; 22.74 G/274.31G; G/274.31G; G/274.31G; G/274.31G; G/274.31G
236 POWDER, FOR SOLUTION ORAL
Qty: 1 | Refills: 0 | Status: DISCONTINUED | COMMUNITY
Start: 2021-01-11 | End: 2021-04-22

## 2021-04-22 RX ORDER — ARIPIPRAZOLE 300 MG
300 KIT INTRAMUSCULAR
Qty: 1 | Refills: 0 | Status: DISCONTINUED | COMMUNITY
Start: 2019-03-07 | End: 2021-04-22

## 2021-04-22 NOTE — ASSESSMENT
[FreeTextEntry1] : atrial tach on toprol xl bid loop recorder shows two more episodes in april she has been sob should b on toprol 50 bid \par htn now controlled\par on statin due to her history of DM\par call daughter to confirm medications \par fu in three months \par \par \par

## 2021-04-22 NOTE — PROCEDURE
[Lexiscan] : Lexiscan [Tc-99m, sestamibi-Cardiolite, to 40mCi, dose-Radionuclides-NuLabel code--v.1-Active-Trade] : Tc-99m, sestamibi-Cardiolite, to 40mCi, dose-Radionuclides-NuLabel code--v.1-Active-Trade [Normal] : no significant [de-identified] : Dr. Willy Santos     Performing Provider: Dr. Willy Santos [de-identified] : Khoi Sun [de-identified] : CP, DM, HTN, Abnormal ECG.     Gender: Female [de-identified] : Given via the IV route.    1 Day Protocol. [de-identified] : 9.5 [de-identified] : Given via the IV route [de-identified] : 28.7 [de-identified] : 61 [de-identified] : 130/70 [de-identified] : 69 [TWNoteComboBox1] : JIM [FreeTextEntry1] : image quality is good\par resting ef 66%\par post stress ef 69%\par prior study NA\par artifacts None\par blood pressure remained stable at 120/70 mmHg heart rate increased to 103 bpm

## 2021-04-22 NOTE — HISTORY OF PRESENT ILLNESS
[FreeTextEntry1] : 76 F ASD repaired surgically IDDM atrial fibrillation s/p ablation by dr kramer  SDH ILR DVT/PE (possibly COVID related) on AC,  atrial tachycardia which manifested as sob controlled with BB two recent hospitalization one for "slow HR" at [facility] and another at [facility] for UTI she feels well today \par \par \par ecg SB \par normal nuclear stress test 6/2020\par

## 2021-05-12 NOTE — DISCHARGE NOTE ADULT - HOSPITAL COURSE
72 y/o F, former smoker, with COPD, DMII p/w cough, wheezing, admitted for COPD exacerbation. Patient improved after a course of Zithromax Prednisone, Duonebs, Advair. Patients peak flow increased daily. Patient to be discharged on a prednisone taper and an additional 2 days of Zithromax Patient HD stable, ready for discharge, instructed to f/u with Dr. Cabrera within 2-3 weeks Satisfactory

## 2021-05-17 ENCOUNTER — APPOINTMENT (OUTPATIENT)
Dept: HEART AND VASCULAR | Facility: CLINIC | Age: 76
End: 2021-05-17
Payer: MEDICARE

## 2021-05-17 ENCOUNTER — NON-APPOINTMENT (OUTPATIENT)
Age: 76
End: 2021-05-17

## 2021-05-17 PROCEDURE — G2066: CPT

## 2021-05-17 PROCEDURE — 93298 REM INTERROG DEV EVAL SCRMS: CPT

## 2021-05-20 ENCOUNTER — APPOINTMENT (OUTPATIENT)
Dept: PULMONOLOGY | Facility: CLINIC | Age: 76
End: 2021-05-20
Payer: MEDICARE

## 2021-05-20 VITALS
HEART RATE: 59 BPM | HEIGHT: 67 IN | OXYGEN SATURATION: 97 % | BODY MASS INDEX: 25.11 KG/M2 | WEIGHT: 160 LBS | TEMPERATURE: 98.2 F | DIASTOLIC BLOOD PRESSURE: 81 MMHG | SYSTOLIC BLOOD PRESSURE: 143 MMHG

## 2021-05-20 PROCEDURE — 99214 OFFICE O/P EST MOD 30 MIN: CPT

## 2021-05-20 NOTE — REASON FOR VISIT
[Follow-Up] : a follow-up visit [Pulmonary Embolism] : pulmonary embolism [Other: _____] : [unfilled] [Shortness of Breath] : shortness of breath

## 2021-05-25 NOTE — HISTORY OF PRESENT ILLNESS
[TextBox_4] : Pt is a 74 yo F with PMH asthma/COPD, ASD repair, CAD, Afib (paroxysmal) s/p ablation, subdural hematoma s/p [place] hole evacuation, IDDM, HTN, HLD. \par \par Initial intake 9/3/20: Found to have DVT in right leg 7/23/20 during visit with Dr. Santos, started on Eliquis. Prior to this, she states she did not have a history of any blood clots. Then presented to ED on 7/25/20 with chest pain on left, worse with inspiration, pain 10/10. Wells score 6. CTA-PE with segmental and subsegmental PEs on right. tPA not given due to h/o intracranial bleed. Leg dopplers showed new partially occlusive DVT in right common femoral vein extending to distal femoral vein and profunda femoris. \par \par Echo 7/26/20: RV not well visualized. PASP was not estimated. \par \par She continues on Eliquis.  Currently denies chest pain, bleeding episodes on Eliquis, new leg pain or leg swelling. She can walk about 1/2 block, limited by SOB. This is stable as compared to before her PE diagnosis as per pt. Overall, she feels that she is at her baseline. \par \par Of note, pt had seen Dr. Sarabia in 03/2020 for evaluation of asthma. On Symbicort 80-4.5 BID. \par Quit smoking 20-30 years ago, one pack would last her one week. \par \par 3-11-21:\par She goes to sleep around 8pm. She wakes up at 12am and lays down\par She is using Dulera 200mg bid. She was discharged on this from the hospital \par she was recently in [facility] for low heart rate.\par She lost her  on 12/23\par she has no appetite\par she is depressed

## 2021-05-25 NOTE — PHYSICAL EXAM
[No Acute Distress] : no acute distress [Well Nourished] : well nourished [Well Groomed] : well groomed [Well Developed] : well developed [Normal Oropharynx] : normal oropharynx [Normal Appearance] : normal appearance [No Neck Mass] : no neck mass [Normal Rate/Rhythm] : normal rate/rhythm [Normal S1, S2] : normal s1, s2 [No Murmurs] : no murmurs [No Rubs] : no rubs [No Gallops] : no gallops [No Resp Distress] : no resp distress [Clear to Auscultation Bilaterally] : clear to auscultation bilaterally [No Abnormalities] : no abnormalities [Benign] : benign [Normal Gait] : normal gait [Normal Color/ Pigmentation] : normal color/ pigmentation [No Focal Deficits] : no focal deficits [Oriented x3] : oriented x3 [Normal Mood] : normal mood [Normal Affect] : normal affect [No Edema] : no edema [TextBox_11] : mild pallor, no icterus [TextBox_44] : no cervical adenopathy, no JVD sitting [TextBox_68] : no wheezing, rhonchi or crackles [TextBox_105] : discoloration of the nails, blackish color more on right hand, no skin thickening, fungal infection of the nails, limitation of pronation and supination of both hands

## 2021-05-25 NOTE — CONSULT LETTER
[Dear  ___] : Dear ~TABBY, [Consult Letter:] : I had the pleasure of evaluating your patient, [unfilled]. [( Thank you for referring [unfilled] for consultation for _____ )] : Thank you for referring [unfilled] for consultation for [unfilled] [Please see my note below.] : Please see my note below. [Consult Closing:] : Thank you very much for allowing me to participate in the care of this patient.  If you have any questions, please do not hesitate to contact me. [Sincerely,] : Sincerely, [DrMerlyn  ___] : Dr. AMCIAS [FreeTextEntry2] : Dr. Willy Santos [FreeTextEntry3] : Saúl Mustafa MD

## 2021-05-25 NOTE — PROCEDURE
[FreeTextEntry1] : LE doppler 21\par IMPRESSION:\par No evidence of deep venous thrombosis in either lower extremity. Prior right common femoral vein DVT has resolved.\par \par PFT 21\par FVC: 1.89 L (74%)--> 2.08 L (81%) \par FEV1: 1.43 L (73%)--> 1.53 L (78%) \par FEV1/FVC: 76%--> 74%\par IQZ17-02%: 0.95 L/s (55%)--> 0.99 L/s (57%)\par TLC: 3.57 L (66%)\par RV/T%\par Mild restrictive lung defect. No significant bronchodilator response. Mildly decreased lung volumes\par elevated RV/TLC consistent with air trapping\par Patient could not hold her breath to do DLCO\par \par 6MWT 21 done using rolling walker chair\par Patient walked 110 meters during a 6 minute walk test.\par Resting O2 saturation was 100% on RA. Heart rate 103 bpm.\par End test O2 saturation was 99% on RA. Heart rate 111 bpm. Casa scale end of test 4 "somewhat severe"\par This signifies reduced walk distance, no significant desaturation\par \par Labs 10/20/20:\par Positive COVID antibody\par Positive cardiolipin antibody level\par Negative Beta 2 Glycoprotein 1 IgG Ab and IgM ab and IgA ab\par Negative cardiolipin IgG, IgM, IgA\par CBC: Hgb 9.0\par \par Labs 20:\par Negative scleroderma antibodies\par Positive BRIAN: 1:160, SPECKLED\par Normal RF, normal hemolytic complement\par Negative D-dimer  \par Positive cardiolipin antibody level\par \par Echo 10/15/20\par CONCLUSIONS: \par  1. Normal left and right ventricular size and systolic function. \par  2. Mild symmetric left ventricular hypertrophy. \par  3. Grade II left ventricular diastolic dysfunction.\par  4. Biatrial enlargement.\par  5. Aortic sclerosis without significant stenosis. \par  6. No pericardial effusion.\par  7. Pulmonary artery systolic pressure is 35 mmHg. \par \par PFTs from 20 reviewed from Dr. Jony Wang's office. \par FVC: 1.61 L (63%) predicted pre-BD and 1.65 L (65%) post-BD\par FEV1: 1/06 L (54%) predicted pre-BD and 1.21 L (62%) predicted post-BD\par FEV1/FVC: 66% -> 73%\par TLC: 3.17 L (58%) \par DLCO: 69% \par Spirometry notable for moderately severe obstructive defect with acute response to BD. Lung volumes reveal moderately severe restrictive defect. DLCO slightly reduced. \par \par 6MWT 20: Resting O2 saturation 99%, heart rate 79.  Post exercise O2 96%, HR 97. No evidence of oxygen desaturation but does show reduced walking distance of only 88 meters with Casa scale 4.\par \par \par EXAM: ECHO TTE WO CON COMP W DOPP PROCEDURE DATE: 2020 \par CONCLUSIONS: \par  1. Limited study obtained for evaluation of left ventricular function performed by cardiology fellow on call. \par  2. The right ventricle is not well visualized. \par  3. Abnormal septal motion seen due to abnormal conduction. Endocardium not well visualized. Left ventricular ejection fraction is 50-55% in limited views. \par  4. No pericardial effusion. \par  5. Recommend repeat complete study for a comprehensive evaluation of biventricular function and cardiac valve assessment. \par Right Ventricle: The right ventricle is not well visualized. \par \par EXAM: US DPLX LWR EXT VEINS LTD RT PROCEDURE DATE: 2020 \par Thrombus visualized in the right common femoral vein extending to distal femoral vein and profunda femoris. No thrombus identified in the saphenofemoral junction or popliteal vein or calf veins on grayscale and Doppler examination. Contralateral left common femoral vein is patent. Subcutaneous edema in right lower extremity \par IMPRESSION: Since Lucille 10, 2016, new partially occlusive deep venous thrombus in right common femoral vein extending to distal femoral vein and profunda femoris. \par \par EXAM: CT ANGIO CHEST PE PROTOCOL IC PROCEDURE DATE: 2020 \par Pulmonary arteries: Occlusive filling defect in right main pulmonary artery, right upper lobe segmental and subsegmental branches. No thrombus in left pulmonary artery or sagittal embolus. Unchanged main pulmonary artery, 3.0 cm. No evidence of right heart chain. \par Lungs and large airways: No focal consolidation. No evidence of pulmonary infarct. Since May 30, 2019, unchanged scattered bilateral linear opacities in both lower lobes, lingula, and right middle lobe, probably subsegmental atelectasis or fibrosis. \par Pleura: No pleural effusion. \par Mediastinum and hilar regions: No thoracic lymphadenopathy. \par Cardiovascular: Heart size is normal. No thoracic aortic aneurysm. \par Pericardial effusion: No pericardial effusion. \par Chest wall and lower neck: Loop recorder in left upper chest wall. \par Upper abdomen: Normal. \par Bones: Intact sternotomy wires. Mild degenerative changes. \par IMPRESSION: \par 1. Partially occlusive pulmonary thromboemboli in right main pulmonary artery and right upper lobe segmental and subsegmental branches. No evidence of right heart strain or pulmonary infarct. \par 2. Since May 30, 2019, unchanged scattered bilateral linear opacities, possibly subsegmental atelectasis or fibrosis.

## 2021-05-25 NOTE — REVIEW OF SYSTEMS
[SOB on Exertion] : sob on exertion [Diabetes] : diabetes [Arthralgias] : arthralgias [Negative] : Psychiatric [Fever] : no fever [Chills] : no chills [Cough] : no cough [Chest Tightness] : no chest tightness [Pleuritic Pain] : no pleuritic pain [Wheezing] : no wheezing [Chest Discomfort] : no chest discomfort [Palpitations] : no palpitations [Seasonal Allergies] : no seasonal allergies [Thyroid Problem] : no thyroid problem [TextBox_122] : h/o subdural hematoma s/p [place] hole procedure

## 2021-05-25 NOTE — ASSESSMENT
[FreeTextEntry1] : 5-20-21:\par It was a pleasure to see Esequiel today for follow-up. Her respiratory issues are summarized:\par \par 1. Pulmonary embolism/DVT\par Esequiel was diagnosed with DVT in right leg 7/23/20 during visit with Dr. Santos, started on Eliquis. Prior to this, she states she did not have a history of any blood clots. She then presented to ED on 7/25/20 with chest pain.  CTA-PE demonstrated segmental and subsegmental PEs on right. tPA not given due to h/o intracranial bleed. Leg dopplers showed new partially occlusive DVT in right common femoral vein extending to distal femoral vein and profunda femoris. \par \par She was discharged on Eliquis and feels her breathing is at baseline; she will get short of breath walking 1/2 blocks, which is the same as it was prior to her diagnosis of PE. She continues with some soreness in the right leg. She reports no adverse reactions including bleeding on her Eliquis. \par \par She was evaluated by Dr. Payton Contreras on 8-26-20 who ordered a hypercoagulability workup. Of note, her anticardiolipin IgG was mildly elevated to 21. D-dimer was normal on 9/3/20. Repeat Anticardiolipin IgG was normal. This was likely an acute phase reactant. Negative Beta 2 Glycoprotein 1 IgG Ab and IgM ab and IgA ab. PFTs from 8/29/20 reviewed from Dr. Jony Wang's office. FEV1/FVC 66%, FEV1 54% predicted pre-BD and 62% predicted post-BD and FVC 63% predicted pre-BD and 65% post-BD. TLC 58% predicted and DLCO 69% predicted. Spirometry notable for moderately severe obstructive defect with acute response to BD. Lung volumes reveal moderately severe restrictive defect. DLCO slightly reduced. 6MWT without evidence of oxygen desaturation but does show reduced walking distance of only 88 meters with Casa scale 4. \par \par PFT done 4/8/21 shows mild restrictive lung defect. No significant bronchodilator response. Mildly decreased lung volumes. Patient could not hold her breath to do DLCO. Compared to PFT done at Dr. Wang's office on 8/29/20, FVC, FEV1 and TLC are improved. DLCO at that time was 69%. She walked 110 meters on 6MWT 4/8/21 using rolling walker chair, SpO2 100% -> 99%. This is a slightly improved walk distance.\par \par She had LE swelling on prior visit. LE doppler 4/7/21 showed no evidence of deep venous thrombosis in either lower extremity. Prior right common femoral vein DVT has resolved.\par -Labs 3/11/21 show normal RF, D-dimer, CMP, Hgb 11.4. Autoimmune serologies were repeated: normal cardioliopin antibody IgA, IgA and IgM, Beta 2 glycoprotein 1 IgA Ab, IgG Ab, IgM Ab, repeat BRIAN 1:80, Speckled, negative scleroderma antibodies, negative centromere antibody. She does not need to see rheumatology at this time.\par \par Plan:\par -She is wearing compression stockings.\par -We recommend she continue anticoagulation for a total of 1 year, July 2021\par -We will order a low dose chest CT to evaluate for interstitial lung disease\par \par 2. Skin thickening\par On exam today, the skin over the dorsum of her hands appears thickened and less mobile. She has no known history of scleroderma, and no known family history of scleroderma. She also complains of joint and stiffness in the hands. Labs 9/4/20 showed negative SCL70, Positive BRIAN: 1:160, SPECKLED, Normal RF, normal hemolytic complement. Repeat SCL70 and anti centromere ab on 12/8/20 were normal. Repeat labs done 3/11/21 show normal RF, D-dimer, CMP, Hgb 11.4. Autoimmune serologies were repeated: normal cardioliopin antibody IgA, IgA and IgM, Beta 2 glycoprotein 1 IgA Ab, IgG Ab, IgM Ab, repeat BRIAN 1:80, Speckled, negative scleroderma antibodies, negative centromere antibody. She does not need to see rheumatology at this time.\par \par 3. Asthma\par She has been diagnosed with asthma in the past and is taking Symbicort 80-4.5. She also has a remote smoking history.  We reviewed her PFT from Dr. Wang's office done on 8/29/20. Spirometry notable for moderately severe obstructive defect with acute response to BD. FVC: 1.61 L (63%) predicted pre-BD and 1.65 L (65%) post-BD, FEV1: 1/06 L (54%) predicted pre-BD and 1.21 L (62%) predicted post-BD. Lung volumes reveal moderately severe restrictive defect, TLC: 3.17 L (58%) . DLCO slightly reduced DLCO: 69%. We have also discussed with Esequiel that she may continue follow-up of her asthma and her PE with Dr. Sarabia, whom she has seen before, if she would like. She is currently taking Symbicort 80-4.5, 2 puffs BID.\par \par PFT done 4/8/21 show mild restrictive lung defect. No significant bronchodilator response. There is evidence of small airways obstruction, [id number]%: 0.99 L/s (57%)There was elevated RV/TLC (47%) consistent with air trapping. Unfortunately, NIOX was not done. She was recently in the hospital for "low" heart rate and discharged on Dulera 200mcg, 2 puffs twice a day. She doesn't notice a change in her symptoms on higher dose of inhaled corticosteroids.\par \par Plan:\par - She should switch back to Symbicort 80-4.5, 2 puffs BID to reduce the amount of inhaled corticosteroids\par - Use albuterol PRN\par \par 4. R/O sleep apnea\par There is evidence of diastolic dysfunction on Esequiel's recent echo. Sleep study done 12/22/20 showed no significant sleep disordered breathing.\par \par 5. Diastolic Dysfunction\par On 7/26 in-patient echo was done at bedside and RV was not well visualized). Echo done on 10/15/20 shows grade II left ventricular diastolic dysfunction. The pulmonary artery systolic pressure is borderline elevated, 35 mmHg. BNP on 3/11/21 was 400, previously 335. Significant number of b-lines noted on the right side, no a-lines seen on bedside ultrasound. Similarly b-lines noted on the left side. \par \par Plan:\par - We will start her on Lasix 20mg MWF\par - She will see her PCP, Dr. Cueto for bloodwork to monitor her potassium and Creatinine on diuretic\par \par 6. Health maintenance\par Flu vaccine up to date for [id number] season. She states she is up-to-date with her pneumococcal vaccination. \par \par Return to clinic: 3 months

## 2021-05-25 NOTE — DISCUSSION/SUMMARY
[FreeTextEntry1] : 5-20-21:\par -mild pallor, no icterus \par -no cervical adenopathy, no supraclavicular adenopathy\par -no JVD at 45 degrees, no hepatojugular reflux \par -no dullness, good air entry, no wheezing, rhonchi or crackles \par -normal S1, S2, no murmurs, rubs, gallops \par -discoloration of the nails, blackish color more on right hand, no skin thickening, fungal infection of the nails, limitation of pronation and supination of both hands  \par -no pedal edema \par \par Chest ultrasound done

## 2021-06-02 ENCOUNTER — OUTPATIENT (OUTPATIENT)
Dept: OUTPATIENT SERVICES | Facility: HOSPITAL | Age: 76
LOS: 1 days | End: 2021-06-02
Payer: COMMERCIAL

## 2021-06-02 ENCOUNTER — RESULT REVIEW (OUTPATIENT)
Age: 76
End: 2021-06-02

## 2021-06-02 ENCOUNTER — NON-APPOINTMENT (OUTPATIENT)
Age: 76
End: 2021-06-02

## 2021-06-02 ENCOUNTER — APPOINTMENT (OUTPATIENT)
Dept: CT IMAGING | Facility: HOSPITAL | Age: 76
End: 2021-06-02

## 2021-06-02 DIAGNOSIS — Z96.651 PRESENCE OF RIGHT ARTIFICIAL KNEE JOINT: Chronic | ICD-10-CM

## 2021-06-02 DIAGNOSIS — I62.01 NONTRAUMATIC ACUTE SUBDURAL HEMORRHAGE: Chronic | ICD-10-CM

## 2021-06-02 PROCEDURE — 71250 CT THORAX DX C-: CPT | Mod: 26

## 2021-06-02 PROCEDURE — 71250 CT THORAX DX C-: CPT

## 2021-06-07 ENCOUNTER — APPOINTMENT (OUTPATIENT)
Dept: INTERNAL MEDICINE | Facility: CLINIC | Age: 76
End: 2021-06-07
Payer: MEDICARE

## 2021-06-07 VITALS
TEMPERATURE: 97.8 F | WEIGHT: 165 LBS | HEIGHT: 67 IN | SYSTOLIC BLOOD PRESSURE: 139 MMHG | HEART RATE: 77 BPM | OXYGEN SATURATION: 98 % | BODY MASS INDEX: 25.9 KG/M2 | DIASTOLIC BLOOD PRESSURE: 82 MMHG

## 2021-06-07 PROCEDURE — 99214 OFFICE O/P EST MOD 30 MIN: CPT | Mod: 25

## 2021-06-07 PROCEDURE — 36415 COLL VENOUS BLD VENIPUNCTURE: CPT

## 2021-06-08 ENCOUNTER — LABORATORY RESULT (OUTPATIENT)
Age: 76
End: 2021-06-08

## 2021-06-08 LAB
ALBUMIN SERPL ELPH-MCNC: 4.6 G/DL
ALP BLD-CCNC: 86 U/L
ALT SERPL-CCNC: 13 U/L
ANION GAP SERPL CALC-SCNC: 23 MMOL/L
AST SERPL-CCNC: 17 U/L
BILIRUB SERPL-MCNC: 0.3 MG/DL
BUN SERPL-MCNC: 12 MG/DL
CALCIUM SERPL-MCNC: 10 MG/DL
CHLORIDE SERPL-SCNC: 107 MMOL/L
CO2 SERPL-SCNC: 13 MMOL/L
CREAT SERPL-MCNC: 0.95 MG/DL
GLUCOSE SERPL-MCNC: 203 MG/DL
POTASSIUM SERPL-SCNC: 4 MMOL/L
PROT SERPL-MCNC: 7.1 G/DL
SODIUM SERPL-SCNC: 144 MMOL/L

## 2021-06-10 ENCOUNTER — APPOINTMENT (OUTPATIENT)
Dept: INTERNAL MEDICINE | Facility: CLINIC | Age: 76
End: 2021-06-10
Payer: MEDICARE

## 2021-06-10 PROCEDURE — 36415 COLL VENOUS BLD VENIPUNCTURE: CPT

## 2021-06-11 LAB
ANION GAP SERPL CALC-SCNC: 12 MMOL/L
BUN SERPL-MCNC: 11 MG/DL
CALCIUM SERPL-MCNC: 9.7 MG/DL
CHLORIDE SERPL-SCNC: 107 MMOL/L
CO2 SERPL-SCNC: 25 MMOL/L
CREAT SERPL-MCNC: 0.87 MG/DL
GLUCOSE SERPL-MCNC: 133 MG/DL
POTASSIUM SERPL-SCNC: 4.2 MMOL/L
SODIUM SERPL-SCNC: 144 MMOL/L

## 2021-06-15 ENCOUNTER — APPOINTMENT (OUTPATIENT)
Dept: HEMATOLOGY ONCOLOGY | Facility: CLINIC | Age: 76
End: 2021-06-15
Payer: MEDICARE

## 2021-06-15 VITALS
HEART RATE: 61 BPM | OXYGEN SATURATION: 98 % | DIASTOLIC BLOOD PRESSURE: 82 MMHG | HEIGHT: 67 IN | BODY MASS INDEX: 26.84 KG/M2 | WEIGHT: 171 LBS | TEMPERATURE: 95.7 F | SYSTOLIC BLOOD PRESSURE: 171 MMHG

## 2021-06-15 DIAGNOSIS — D51.0 VITAMIN B12 DEFICIENCY ANEMIA DUE TO INTRINSIC FACTOR DEFICIENCY: ICD-10-CM

## 2021-06-15 DIAGNOSIS — R76.8 OTHER SPECIFIED ABNORMAL IMMUNOLOGICAL FINDINGS IN SERUM: ICD-10-CM

## 2021-06-15 PROCEDURE — 36415 COLL VENOUS BLD VENIPUNCTURE: CPT

## 2021-06-15 PROCEDURE — 99214 OFFICE O/P EST MOD 30 MIN: CPT | Mod: 25

## 2021-06-15 PROCEDURE — 96372 THER/PROPH/DIAG INJ SC/IM: CPT

## 2021-06-15 RX ORDER — CYANOCOBALAMIN 1000 UG/ML
1000 INJECTION INTRAMUSCULAR; SUBCUTANEOUS
Qty: 0 | Refills: 0 | Status: COMPLETED | OUTPATIENT
Start: 2021-06-15

## 2021-06-15 RX ADMIN — CYANOCOBALAMIN 1 MCG/ML: 1000 INJECTION INTRAMUSCULAR; SUBCUTANEOUS at 00:00

## 2021-06-16 LAB
ALBUMIN SERPL ELPH-MCNC: 4.2 G/DL
ALP BLD-CCNC: 103 U/L
ALT SERPL-CCNC: 21 U/L
ANION GAP SERPL CALC-SCNC: 18 MMOL/L
APTT 2H P 1:4 NP PPP: NORMAL
APTT 2H P INC PPP: NORMAL
APTT IMM NP/PRE NP PPP: NORMAL
APTT INV RATIO PPP: 32.7 SEC
AST SERPL-CCNC: 24 U/L
BASOPHILS # BLD AUTO: 0.05 K/UL
BASOPHILS NFR BLD AUTO: 0.8 %
BILIRUB SERPL-MCNC: 0.3 MG/DL
BUN SERPL-MCNC: 9 MG/DL
CALCIUM SERPL-MCNC: 9.6 MG/DL
CHLORIDE SERPL-SCNC: 106 MMOL/L
CO2 SERPL-SCNC: 19 MMOL/L
CONFIRM: 48.8 SEC
CREAT SERPL-MCNC: 0.73 MG/DL
DRVVT IMM 1:2 NP PPP: NORMAL
DRVVT SCREEN TO CONFIRM RATIO: 0.61 RATIO
EOSINOPHIL # BLD AUTO: 0.46 K/UL
EOSINOPHIL NFR BLD AUTO: 7.2 %
ERYTHROCYTE [SEDIMENTATION RATE] IN BLOOD BY WESTERGREN METHOD: 2 MM/HR
FERRITIN SERPL-MCNC: 68 NG/ML
GLUCOSE SERPL-MCNC: 117 MG/DL
HAPTOGLOB SERPL-MCNC: 95 MG/DL
HCT VFR BLD CALC: 37.2 %
HGB BLD-MCNC: 12.4 G/DL
IMM GRANULOCYTES NFR BLD AUTO: 0.9 %
IRON SATN MFR SERPL: 33 %
IRON SERPL-MCNC: 95 UG/DL
LDH SERPL-CCNC: 244 U/L
LYMPHOCYTES # BLD AUTO: 1.71 K/UL
LYMPHOCYTES NFR BLD AUTO: 26.7 %
MAN DIFF?: NORMAL
MCHC RBC-ENTMCNC: 31.2 PG
MCHC RBC-ENTMCNC: 33.3 GM/DL
MCV RBC AUTO: 93.7 FL
MONOCYTES # BLD AUTO: 0.41 K/UL
MONOCYTES NFR BLD AUTO: 6.4 %
NEUTROPHILS # BLD AUTO: 3.71 K/UL
NEUTROPHILS NFR BLD AUTO: 58 %
NPP NORMAL POOLED PLASMA: NORMAL SECS
PLATELET # BLD AUTO: 193 K/UL
POTASSIUM SERPL-SCNC: 4.2 MMOL/L
PROT SERPL-MCNC: 6.9 G/DL
RBC # BLD: 3.97 M/UL
RBC # FLD: 13.2 %
SCREEN DRVVT: 40.1 SEC
SILICA CLOTTING TIME INTERPRETATION: NORMAL
SILICA CLOTTING TIME S/C: 0.74 RATIO
SODIUM SERPL-SCNC: 143 MMOL/L
TIBC SERPL-MCNC: 286 UG/DL
TSH SERPL-ACNC: 1.54 UIU/ML
UIBC SERPL-MCNC: 191 UG/DL
VIT B12 SERPL-MCNC: 716 PG/ML
WBC # FLD AUTO: 6.4 K/UL

## 2021-06-16 NOTE — HISTORY OF PRESENT ILLNESS
[de-identified] : 75 years old female history of multiple medical problems as well as subarachnoid hemorrhage requiring evacuation in the past.  Patient was found to have positive COVID antibodies in June 2020... Later on in July she was admitted to [facility] with a large femoral DVT, and she is currently on anticoagulation with Eliquis... She is here to assess the length of anticoagulation therapy...\par \par February 16, 2021 returns for follow-up... Continues to take the Eliquis... Tolerating well... I discussed with daughter, and reviewed patient's meds [de-identified] : October 19, 2020 patient returns for follow-up... Her main complaint is inability to sleep well... Has not seen PCP in a while... 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 No complaints tolerating Eliquis well\par \par Lucille 15, 2021 here for follow-up... Wants to know why she still needs to be on the Eliquis... Discussed with patient the fact that she has an irregular heartbeat, history of DVT, and history of presence of lupus anticoagulants... So therefore at this point the cardiologist hematologist and pulmonologist involved in her care, feel that she is better served by remaining on anticoagulation, however this decision would be revisited in the future.\par

## 2021-06-22 ENCOUNTER — APPOINTMENT (OUTPATIENT)
Dept: HEART AND VASCULAR | Facility: CLINIC | Age: 76
End: 2021-06-22
Payer: MEDICARE

## 2021-06-22 ENCOUNTER — NON-APPOINTMENT (OUTPATIENT)
Age: 76
End: 2021-06-22

## 2021-06-22 PROCEDURE — 93298 REM INTERROG DEV EVAL SCRMS: CPT

## 2021-06-22 PROCEDURE — G2066: CPT

## 2021-06-29 LAB
B2 GLYCOPROT1 IGA SERPL IA-ACNC: <5 SAU
B2 GLYCOPROT1 IGG SER-ACNC: <5 SGU
B2 GLYCOPROT1 IGM SER-ACNC: <5 SMU
CARDIOLIPIN AB SER IA-ACNC: NEGATIVE

## 2021-07-02 ENCOUNTER — NON-APPOINTMENT (OUTPATIENT)
Age: 76
End: 2021-07-02

## 2021-07-02 ENCOUNTER — APPOINTMENT (OUTPATIENT)
Dept: HEART AND VASCULAR | Facility: CLINIC | Age: 76
End: 2021-07-02
Payer: MEDICARE

## 2021-07-02 VITALS
BODY MASS INDEX: 26.99 KG/M2 | OXYGEN SATURATION: 98 % | DIASTOLIC BLOOD PRESSURE: 73 MMHG | HEART RATE: 86 BPM | SYSTOLIC BLOOD PRESSURE: 138 MMHG | HEIGHT: 67 IN | TEMPERATURE: 98.9 F | WEIGHT: 171.98 LBS

## 2021-07-02 PROCEDURE — 93000 ELECTROCARDIOGRAM COMPLETE: CPT

## 2021-07-02 PROCEDURE — 99214 OFFICE O/P EST MOD 30 MIN: CPT

## 2021-07-02 RX ORDER — FUROSEMIDE 20 MG/1
20 TABLET ORAL
Qty: 12 | Refills: 2 | Status: DISCONTINUED | COMMUNITY
Start: 2021-05-20 | End: 2021-07-02

## 2021-07-02 NOTE — ASSESSMENT
[FreeTextEntry1] : atrial tach on toprol xl bid loop recorder \par htn has leg edema likely from amlodipine decrease amlodipine to 5 mg and increase lisinopril to 40 mg once a day she will return in a month to reassess at which time may need to add a thiazide \par on statin due to her history of DM\par called daughter and discussed.\par \par

## 2021-07-02 NOTE — PHYSICAL EXAM
[Well Developed] : well developed [Well Nourished] : well nourished [No Acute Distress] : no acute distress [Normal Venous Pressure] : normal venous pressure [No Carotid Bruit] : no carotid bruit [Normal S1, S2] : normal S1, S2 [No Murmur] : no murmur [No Rub] : no rub [No Gallop] : no gallop [Clear Lung Fields] : clear lung fields [Good Air Entry] : good air entry [No Respiratory Distress] : no respiratory distress  [Soft] : abdomen soft [Non Tender] : non-tender [No Masses/organomegaly] : no masses/organomegaly [Normal Bowel Sounds] : normal bowel sounds [Normal Gait] : normal gait [No Edema] : no edema [No Cyanosis] : no cyanosis [No Clubbing] : no clubbing [No Varicosities] : no varicosities [No Rash] : no rash [No Skin Lesions] : no skin lesions [Moves all extremities] : moves all extremities [No Focal Deficits] : no focal deficits [Normal Speech] : normal speech [Alert and Oriented] : alert and oriented [Normal memory] : normal memory [General Appearance - Well Developed] : well developed [Normal Appearance] : normal appearance [Well Groomed] : well groomed [General Appearance - Well Nourished] : well nourished [No Deformities] : no deformities [General Appearance - In No Acute Distress] : no acute distress [Normal Conjunctiva] : the conjunctiva exhibited no abnormalities [Eyelids - No Xanthelasma] : the eyelids demonstrated no xanthelasmas [Normal Oral Mucosa] : normal oral mucosa [No Oral Pallor] : no oral pallor [No Oral Cyanosis] : no oral cyanosis [Normal Jugular Venous A Waves Present] : normal jugular venous A waves present [Normal Jugular Venous V Waves Present] : normal jugular venous V waves present [No Jugular Venous Marinelli A Waves] : no jugular venous marinelli A waves [Respiration, Rhythm And Depth] : normal respiratory rhythm and effort [Exaggerated Use Of Accessory Muscles For Inspiration] : no accessory muscle use [Auscultation Breath Sounds / Voice Sounds] : lungs were clear to auscultation bilaterally [Heart Rate And Rhythm] : heart rate and rhythm were normal [Heart Sounds] : normal S1 and S2 [Murmurs] : no murmurs present [Abdomen Tenderness] : non-tender [Abdomen Soft] : soft [Abdomen Mass (___ Cm)] : no abdominal mass palpated [Abnormal Walk] : normal gait [Gait - Sufficient For Exercise Testing] : the gait was sufficient for exercise testing [Nail Clubbing] : no clubbing of the fingernails [Cyanosis, Localized] : no localized cyanosis [Petechial Hemorrhages (___cm)] : no petechial hemorrhages [Skin Color & Pigmentation] : normal skin color and pigmentation [] : no rash [No Venous Stasis] : no venous stasis [Skin Lesions] : no skin lesions [No Skin Ulcers] : no skin ulcer [No Xanthoma] : no  xanthoma was observed [Oriented To Time, Place, And Person] : oriented to person, place, and time [Affect] : the affect was normal [Mood] : the mood was normal [No Anxiety] : not feeling anxious [FreeTextEntry1] : right leg edema

## 2021-07-02 NOTE — PROCEDURE
[Lexiscan] : Lexiscan [Tc-99m, sestamibi-Cardiolite, to 40mCi, dose-[employer] code--v.1-Active-Trade] : Tc-99m, sestamibi-Cardiolite, to 40mCi, dose-[employer] code--v.1-Active-Trade [Normal] : no significant [de-identified] : Dr. Willy Santos     Performing Provider: Dr. Willy Santos [de-identified] : Khoi Sun [de-identified] : CP, DM, HTN, Abnormal ECG.     Gender: Female [de-identified] : Given via the IV route.    1 Day Protocol. [de-identified] : 9.5 [de-identified] : Given via the IV route [de-identified] : 28.7 [de-identified] : 61 [de-identified] : 130/70 [de-identified] : 69 [TWNoteComboBox1] : JIM [FreeTextEntry1] : image quality is good\par resting ef 66%\par post stress ef 69%\par prior study NA\par artifacts None\par blood pressure remained stable at 120/70 mmHg heart rate increased to 103 bpm

## 2021-07-02 NOTE — HISTORY OF PRESENT ILLNESS
[FreeTextEntry1] : 76 F ASD repaired surgically IDDM atrial fibrillation s/p ablation by dr kramer  SDH ILR DVT/PE (possibly COVID related) on AC,  atrial tachycardia which manifested as sob controlled with BB two recent hospitalization one for "slow HR" at [facility] and another at [facility] for UTI she feels well today \par \par \par ecg SR septal infarct\par normal nuclear stress test 6/2020\par

## 2021-07-13 NOTE — H&P ADULT - NSHPPOADEEPVENOUSTHROMB_GEN_A_CORE
Hospitalist Discharge Summary     Patient ID:    Leigh Norwood  [id number]  71 y.o.  1951    Admit date: 7/7/2021    Discharge date : 7/13/2021    Chronic Diagnoses:    Problem List as of 7/13/2021 Date Reviewed: 1/24/2021        Codes Class Noted - Resolved    Herpes zoster with nervous system complication BMI-39-RQ: K58.84  ICD-9-CM: 053.10  7/8/2021 - Present        * (Principal) Sepsis ([facility] ICD-10-CM: A41.9  ICD-9-CM: 038.9, 995.91  7/7/2021 - Present        ESRD (end stage renal disease) on dialysis [facility]) ICD-10-CM: N18.6, Z99.2  ICD-9-CM: 585.6, V45.11  7/7/2021 - Present        Line sepsis [facility]) ICD-10-CM: T85.79XA, A41.9  ICD-9-CM: 996.62, 038.9, 995.91  7/7/2021 - Present        End stage renal disease on dialysis [facility]) ICD-10-CM: N18.6, Z99.2  ICD-9-CM: 585.6, V45.11  1/24/2021 - Present        Pneumonia due to COVID-19 virus ICD-10-CM: U07.1, J12.82  ICD-9-CM: 480.8, 079.89  1/24/2021 - Present        Essential hypertension ICD-10-CM: I10  ICD-9-CM: 401.9  1/24/2021 - Present        Cardiomyopathy ([facility] (Chronic) ICD-10-CM: I42.9  ICD-9-CM: 425.4  1/24/2021 - Present        Systolic CHF, acute on chronic (HCC) ICD-10-CM: I50.23  ICD-9-CM: 428.23, 428.0  1/24/2021 - Present        Fluid overload ICD-10-CM: E87.70  ICD-9-CM: 276.69  1/24/2021 - Present        Gastroesophageal reflux disease ICD-10-CM: K21.9  ICD-9-CM: 530.81  1/24/2021 - Present        Asthenia ICD-10-CM: R53.1  ICD-9-CM: 780.79  1/24/2021 - Present        ATN (acute tubular necrosis) ([facility] ICD-10-CM: N17.0  ICD-9-CM: 584.5  1/16/2021 - Present        RESOLVED: Acute hypoxemic respiratory failure ([facility] ICD-10-CM: J96.01  ICD-9-CM: 518.81  1/24/2021 - 1/24/2021          22    Final Diagnoses:   Principal Problem:    Sepsis ([facility] (7/7/2021)    Active Problems:    Essential hypertension (1/24/2021)      Cardiomyopathy ([facility] (1/24/2021)      ESRD (end stage renal disease) on dialysis ([facility] (7/7/2021)      Line sepsis ([facility] (7/7/2021)      Herpes zoster with nervous system complication (9/6/1697)      Hospital Course:   Berhane Gregory a 71 y. o. male who has a history of end-stage renal disease on hemodialysis, congestive heart failure, cardiomyopathy, and diabetes who came to ER with a complaint of generalized weakness and recurrent falls at home.  Patient states that he has been very weak recently. In the ER, patient was found to have fever of 103 °F. Elevated WBC, procalcitonin, and CRP on admission.    Also noted to have facial shingle crustations around left forehead with associated neuropathy pain.  Patient is noticed to have poor care of his dialysis catheter.  Hospitalist service has been requested to admit the patient for further management.  Started on IV vancomycin and cefepime. Consults placed with nephrology and infectious disease.  Started on IV Acyclovir for ophthalmic zoster. Blood cultures returned positive for gram-positive cocci in clusters. Repeat blood cultures also positive. IV Daptomycin added for additional coverage. Repeat blood cultures showing no growth. Patient cleared by ID to continue vancomycin x2 weeks. Powerline placed for continued IV antibiotics. Pharmacy to pulse dose vancomycin at dialysis. Vitals and labs stable. Discharge Medications:   Current Discharge Medication List      CONTINUE these medications which have NOT CHANGED    Details   dextran 70-hypromellose (Artificial Tears,[id number],) ophthalmic solution Administer 1 Drop to both eyes three (3) times daily as needed. acyclovir (ZOVIRAX) 800 mg tablet Take 800 mg by mouth two (2) times a day. hydrALAZINE (APRESOLINE) 100 mg tablet Take 0.5 Tabs by mouth three (3) times daily. For systolic under 217  Qty: 90 Tab, Refills: 0      albuterol (PROVENTIL HFA, VENTOLIN HFA, PROAIR HFA) 90 mcg/actuation inhaler Take 2 Puffs by inhalation every four (4) hours as needed for Wheezing or Shortness of Breath.   Qty: 1 Inhaler, Refills: 0      metoprolol succinate (TOPROL-XL) 50 mg XL tablet Take 50 mg by mouth two (2) times a day. calcitRIOL (ROCALTROL) 0.25 mcg capsule Take 0.25 mcg by mouth daily. atorvastatin (LIPITOR) 40 mg tablet Take 40 mg by mouth daily. Follow up Care:    1. Tatiana Sykes MD in 1-2 weeks. Follow-up Information     Follow up With Specialties Details Why Contact Info    Tatiana Sykes MD Internal Medicine   [facility]  [phone]      Carmine Carter MD Infectious Disease Schedule an appointment as soon as possible for a visit  [address] [facility]  [phone]              Patient Follow Up Instructions: Activity: Activity as tolerated per IRF  Diet:  Renal Diet    Condition at Discharge:  Stable  __________________________________________________________________    Disposition  Rehab Facility  ____________________________________________________________________    Code Status:  Full Code  ___________________________________________________________________    Discharge Exam:  Patient seen and examined by me on discharge day. Pertinent Findings:    Gen:    Not in distress  Skin: Crusted lesions on left side of forehead related to dermatomal zoster infection. Chest: Clear lungs. On room air. CVS:   Regular rate and rhythm. Normal S1/S2. No edema  Abd:  Soft, not distended, not tender  Neuro:  Alert and oriented.     CONSULTATIONS: ID and Nephrology       Significant Diagnostic Studies:   Recent Results (from the past 24 hour(s))   GLUCOSE, POC    Collection Time: 07/12/21 12:14 PM   Result Value Ref Range    Glucose (POC) 101 65 - 117 mg/dL    Performed by Lavonne Kapadia, POC    Collection Time: 07/12/21  4:41 PM   Result Value Ref Range    Glucose (POC) 136 (H) 65 - 117 mg/dL    Performed by Lavonne Kapadia, POC    Collection Time: 07/12/21  7:40 PM   Result Value Ref Range    Glucose (POC) 121 (H) 65 - 117 mg/dL    Performed by Juwan Levy    CREATININE    Collection Time: 07/13/21  6:19 AM   Result Value Ref Range    Creatinine 7.91 (H) 0.70 - 1.30 mg/dL   VANCOMYCIN, RANDOM    Collection Time: 07/13/21  6:19 AM   Result Value Ref Range    Vancomycin, random 19.3 ug/mL    Reported dose date Not provided      Reported dose: Not provided Units   CBC WITH AUTOMATED DIFF    Collection Time: 07/13/21  6:19 AM   Result Value Ref Range    WBC 8.1 4.1 - 11.1 K/uL    RBC 4.85 4.10 - 5.70 M/uL    HGB 12.3 12.1 - 17.0 g/dL    HCT 39.2 36.6 - 50.3 %    MCV 80.8 80.0 - 99.0 FL    MCH 25.4 (L) 26.0 - 34.0 PG    MCHC 31.4 30.0 - 36.5 g/dL    RDW 18.7 (H) 11.5 - 14.5 %    PLATELET 823 394 - 270 K/uL    MPV 10.3 8.9 - 12.9 FL    NRBC 0.0 0.0  WBC    ABSOLUTE NRBC 0.00 0.00 - 0.01 K/uL    NEUTROPHILS 58 32 - 75 %    LYMPHOCYTES 22 12 - 49 %    MONOCYTES 16 (H) 5 - 13 %    EOSINOPHILS 2 0 - 7 %    BASOPHILS 1 0 - 1 %    IMMATURE GRANULOCYTES 1 (H) 0 - 0.5 %    ABS. NEUTROPHILS 4.8 1.8 - 8.0 K/UL    ABS. LYMPHOCYTES 1.8 0.8 - 3.5 K/UL    ABS. MONOCYTES 1.3 (H) 0.0 - 1.0 K/UL    ABS. EOSINOPHILS 0.1 0.0 - 0.4 K/UL    ABS. BASOPHILS 0.1 0.0 - 0.1 K/UL    ABS. IMM.  GRANS. 0.0 0.00 - 0.04 K/UL    DF AUTOMATED     METABOLIC PANEL, BASIC    Collection Time: 07/13/21  6:19 AM   Result Value Ref Range    Sodium 134 (L) 136 - 145 mmol/L    Potassium 4.0 3.5 - 5.1 mmol/L    Chloride 102 97 - 108 mmol/L    CO2 22 21 - 32 mmol/L    Anion gap 10 5 - 15 mmol/L    Glucose 97 65 - 100 mg/dL    BUN 45 (H) 6 - 20 mg/dL    Creatinine 7.91 (H) 0.70 - 1.30 mg/dL    BUN/Creatinine ratio 6 (L) 12 - 20      GFR est AA 8 (L) >60 ml/min/1.73m2    GFR est non-AA 7 (L) >60 ml/min/1.73m2    Calcium 8.2 (L) 8.5 - 10.1 mg/dL   PROCALCITONIN    Collection Time: 07/13/21  6:19 AM   Result Value Ref Range    Procalcitonin 6.79 (H) 0 ng/mL   C REACTIVE PROTEIN, QT    Collection Time: 07/13/21  6:19 AM   Result Value Ref Range    C-Reactive protein 16.00 (H) 0.00 - 0.60 mg/dL     CT ABD PELV WO CONT   Final Result   Pronounced ascites. No evidence of a fever source. No evidence of   hydronephrosis      XR CHEST PORT   Final Result   Mild central pulmonary vascular congestion. XR CHEST PA LAT   Final Result   Lung base opacity likely atelectasis. No focal airspace   consolidation. Evidence of pulmonary vascular congestion without overt edema.                   Signed:  Louisa Lemon PA-C  7/13/2021  12:04 PM no

## 2021-07-26 ENCOUNTER — NON-APPOINTMENT (OUTPATIENT)
Age: 76
End: 2021-07-26

## 2021-07-26 ENCOUNTER — APPOINTMENT (OUTPATIENT)
Dept: HEART AND VASCULAR | Facility: CLINIC | Age: 76
End: 2021-07-26
Payer: MEDICARE

## 2021-07-26 PROCEDURE — G2066: CPT

## 2021-07-26 PROCEDURE — 93298 REM INTERROG DEV EVAL SCRMS: CPT

## 2021-08-06 ENCOUNTER — APPOINTMENT (OUTPATIENT)
Dept: HEART AND VASCULAR | Facility: CLINIC | Age: 76
End: 2021-08-06
Payer: MEDICARE

## 2021-08-06 ENCOUNTER — NON-APPOINTMENT (OUTPATIENT)
Age: 76
End: 2021-08-06

## 2021-08-06 VITALS
WEIGHT: 171.98 LBS | DIASTOLIC BLOOD PRESSURE: 78 MMHG | SYSTOLIC BLOOD PRESSURE: 164 MMHG | TEMPERATURE: 99.3 F | OXYGEN SATURATION: 97 % | HEIGHT: 67 IN | BODY MASS INDEX: 26.99 KG/M2 | HEART RATE: 61 BPM

## 2021-08-06 PROCEDURE — 99214 OFFICE O/P EST MOD 30 MIN: CPT

## 2021-08-06 NOTE — PHYSICAL EXAM
[Well Developed] : well developed [Well Nourished] : well nourished [No Acute Distress] : no acute distress [Normal Venous Pressure] : normal venous pressure [No Carotid Bruit] : no carotid bruit [Normal S1, S2] : normal S1, S2 [No Murmur] : no murmur [No Rub] : no rub [No Gallop] : no gallop [Clear Lung Fields] : clear lung fields [Good Air Entry] : good air entry [No Respiratory Distress] : no respiratory distress  [Soft] : abdomen soft [Non Tender] : non-tender [No Masses/organomegaly] : no masses/organomegaly [Normal Bowel Sounds] : normal bowel sounds [Normal Gait] : normal gait [No Edema] : no edema [No Cyanosis] : no cyanosis [No Clubbing] : no clubbing [No Varicosities] : no varicosities [No Rash] : no rash [No Skin Lesions] : no skin lesions [Moves all extremities] : moves all extremities [No Focal Deficits] : no focal deficits [Normal Speech] : normal speech [Alert and Oriented] : alert and oriented [Normal memory] : normal memory [General Appearance - Well Developed] : well developed [Normal Appearance] : normal appearance [Well Groomed] : well groomed [General Appearance - Well Nourished] : well nourished [No Deformities] : no deformities [General Appearance - In No Acute Distress] : no acute distress [Normal Conjunctiva] : the conjunctiva exhibited no abnormalities [Eyelids - No Xanthelasma] : the eyelids demonstrated no xanthelasmas [Normal Oral Mucosa] : normal oral mucosa [No Oral Pallor] : no oral pallor [No Oral Cyanosis] : no oral cyanosis [Normal Jugular Venous A Waves Present] : normal jugular venous A waves present [Normal Jugular Venous V Waves Present] : normal jugular venous V waves present [No Jugular Venous Marinelli A Waves] : no jugular venous marinelli A waves [Respiration, Rhythm And Depth] : normal respiratory rhythm and effort [Exaggerated Use Of Accessory Muscles For Inspiration] : no accessory muscle use [Auscultation Breath Sounds / Voice Sounds] : lungs were clear to auscultation bilaterally [Heart Rate And Rhythm] : heart rate and rhythm were normal [Heart Sounds] : normal S1 and S2 [Murmurs] : no murmurs present [Abdomen Soft] : soft [Abdomen Tenderness] : non-tender [Abdomen Mass (___ Cm)] : no abdominal mass palpated [Abnormal Walk] : normal gait [Gait - Sufficient For Exercise Testing] : the gait was sufficient for exercise testing [Nail Clubbing] : no clubbing of the fingernails [Cyanosis, Localized] : no localized cyanosis [Petechial Hemorrhages (___cm)] : no petechial hemorrhages [Skin Color & Pigmentation] : normal skin color and pigmentation [] : no rash [No Venous Stasis] : no venous stasis [Skin Lesions] : no skin lesions [No Skin Ulcers] : no skin ulcer [No Xanthoma] : no  xanthoma was observed [Oriented To Time, Place, And Person] : oriented to person, place, and time [Affect] : the affect was normal [Mood] : the mood was normal [No Anxiety] : not feeling anxious [FreeTextEntry1] : right leg edema

## 2021-08-06 NOTE — PROCEDURE
[Lexiscan] : Lexiscan [Tc-99m, sestamibi-Cardiolite, to 40mCi, dose-Radionuclides-[employer]et[employer] code--v.1-Active-Trade] : Tc-99m, sestamibi-Cardiolite, to 40mCi, dose-Radionuclides-Monetate code--v.1-Active-Trade [Normal] : no significant [de-identified] : Dr. Willy Santos     Performing Provider: Dr. Willy Santos [de-identified] : Khoi Sun [de-identified] : CP, DM, HTN, Abnormal ECG.     Gender: Female [de-identified] : Given via the IV route.    1 Day Protocol. [de-identified] : 9.5 [de-identified] : Given via the IV route [de-identified] : 28.7 [de-identified] : 61 [de-identified] : 130/70 [de-identified] : 69 [TWNoteComboBox1] : JIM [FreeTextEntry1] : image quality is good\par resting ef 66%\par post stress ef 69%\par prior study NA\par artifacts None\par blood pressure remained stable at 120/70 mmHg heart rate increased to 103 bpm

## 2021-08-06 NOTE — HISTORY OF PRESENT ILLNESS
[FreeTextEntry1] : 76 F ASD repaired surgically IDDM atrial fibrillation s/p ablation by dr kramer  SDH ILR DVT/PE (possibly COVID related) on AC,  atrial tachycardia which manifested as sob controlled with BB here today for BP management leg swelling is much better on lower dose amlodipine BP high at home in the 170's\par \par \par ecg SR septal infarct\par normal nuclear stress test 6/2020\par

## 2021-08-18 ENCOUNTER — EMERGENCY (EMERGENCY)
Facility: HOSPITAL | Age: 76
LOS: 1 days | Discharge: HOME | End: 2021-08-18
Attending: EMERGENCY MEDICINE | Admitting: EMERGENCY MEDICINE
Payer: COMMERCIAL

## 2021-08-18 VITALS
WEIGHT: 171.96 LBS | OXYGEN SATURATION: 95 % | HEART RATE: 72 BPM | TEMPERATURE: 98 F | SYSTOLIC BLOOD PRESSURE: 133 MMHG | HEIGHT: 67 IN | RESPIRATION RATE: 18 BRPM | DIASTOLIC BLOOD PRESSURE: 87 MMHG

## 2021-08-18 VITALS
TEMPERATURE: 99 F | HEART RATE: 80 BPM | OXYGEN SATURATION: 98 % | SYSTOLIC BLOOD PRESSURE: 135 MMHG | DIASTOLIC BLOOD PRESSURE: 84 MMHG | RESPIRATION RATE: 18 BRPM

## 2021-08-18 DIAGNOSIS — N39.0 URINARY TRACT INFECTION, SITE NOT SPECIFIED: ICD-10-CM

## 2021-08-18 DIAGNOSIS — E11.9 TYPE 2 DIABETES MELLITUS WITHOUT COMPLICATIONS: ICD-10-CM

## 2021-08-18 DIAGNOSIS — I25.10 ATHEROSCLEROTIC HEART DISEASE OF NATIVE CORONARY ARTERY WITHOUT ANGINA PECTORIS: ICD-10-CM

## 2021-08-18 DIAGNOSIS — R33.9 RETENTION OF URINE, UNSPECIFIED: ICD-10-CM

## 2021-08-18 DIAGNOSIS — Z96.651 PRESENCE OF RIGHT ARTIFICIAL KNEE JOINT: Chronic | ICD-10-CM

## 2021-08-18 DIAGNOSIS — I48.91 UNSPECIFIED ATRIAL FIBRILLATION: ICD-10-CM

## 2021-08-18 DIAGNOSIS — J45.909 UNSPECIFIED ASTHMA, UNCOMPLICATED: ICD-10-CM

## 2021-08-18 DIAGNOSIS — Z96.651 PRESENCE OF RIGHT ARTIFICIAL KNEE JOINT: ICD-10-CM

## 2021-08-18 DIAGNOSIS — Z98.51 TUBAL LIGATION STATUS: ICD-10-CM

## 2021-08-18 DIAGNOSIS — F32.9 MAJOR DEPRESSIVE DISORDER, SINGLE EPISODE, UNSPECIFIED: ICD-10-CM

## 2021-08-18 DIAGNOSIS — Z79.4 LONG TERM (CURRENT) USE OF INSULIN: ICD-10-CM

## 2021-08-18 DIAGNOSIS — Z88.8 ALLERGY STATUS TO OTHER DRUGS, MEDICAMENTS AND BIOLOGICAL SUBSTANCES: ICD-10-CM

## 2021-08-18 DIAGNOSIS — Z79.01 LONG TERM (CURRENT) USE OF ANTICOAGULANTS: ICD-10-CM

## 2021-08-18 DIAGNOSIS — I10 ESSENTIAL (PRIMARY) HYPERTENSION: ICD-10-CM

## 2021-08-18 DIAGNOSIS — I62.01 NONTRAUMATIC ACUTE SUBDURAL HEMORRHAGE: Chronic | ICD-10-CM

## 2021-08-18 LAB
ALBUMIN SERPL ELPH-MCNC: 4.4 G/DL — SIGNIFICANT CHANGE UP (ref 3.3–5)
ALP SERPL-CCNC: 90 U/L — SIGNIFICANT CHANGE UP (ref 40–120)
ALT FLD-CCNC: 19 U/L — SIGNIFICANT CHANGE UP (ref 10–45)
ANION GAP SERPL CALC-SCNC: 13 MMOL/L — SIGNIFICANT CHANGE UP (ref 5–17)
APPEARANCE UR: CLEAR — SIGNIFICANT CHANGE UP
AST SERPL-CCNC: 19 U/L — SIGNIFICANT CHANGE UP (ref 10–40)
BACTERIA # UR AUTO: PRESENT /HPF
BASOPHILS # BLD AUTO: 0.06 K/UL — SIGNIFICANT CHANGE UP (ref 0–0.2)
BASOPHILS NFR BLD AUTO: 0.8 % — SIGNIFICANT CHANGE UP (ref 0–2)
BILIRUB SERPL-MCNC: 0.5 MG/DL — SIGNIFICANT CHANGE UP (ref 0.2–1.2)
BILIRUB UR-MCNC: NEGATIVE — SIGNIFICANT CHANGE UP
BUN SERPL-MCNC: 23 MG/DL — SIGNIFICANT CHANGE UP (ref 7–23)
CALCIUM SERPL-MCNC: 9.6 MG/DL — SIGNIFICANT CHANGE UP (ref 8.4–10.5)
CHLORIDE SERPL-SCNC: 99 MMOL/L — SIGNIFICANT CHANGE UP (ref 96–108)
CO2 SERPL-SCNC: 22 MMOL/L — SIGNIFICANT CHANGE UP (ref 22–31)
COLOR SPEC: YELLOW — SIGNIFICANT CHANGE UP
COMMENT - URINE: SIGNIFICANT CHANGE UP
CREAT SERPL-MCNC: 1.21 MG/DL — SIGNIFICANT CHANGE UP (ref 0.5–1.3)
DIFF PNL FLD: NEGATIVE — SIGNIFICANT CHANGE UP
EOSINOPHIL # BLD AUTO: 0.23 K/UL — SIGNIFICANT CHANGE UP (ref 0–0.5)
EOSINOPHIL NFR BLD AUTO: 3.2 % — SIGNIFICANT CHANGE UP (ref 0–6)
EPI CELLS # UR: ABNORMAL /HPF (ref 0–5)
GLUCOSE SERPL-MCNC: 187 MG/DL — HIGH (ref 70–99)
GLUCOSE UR QL: NEGATIVE — SIGNIFICANT CHANGE UP
HCT VFR BLD CALC: 39.2 % — SIGNIFICANT CHANGE UP (ref 34.5–45)
HGB BLD-MCNC: 13.3 G/DL — SIGNIFICANT CHANGE UP (ref 11.5–15.5)
HYALINE CASTS # UR AUTO: SIGNIFICANT CHANGE UP /LPF (ref 0–2)
IMM GRANULOCYTES NFR BLD AUTO: 0.4 % — SIGNIFICANT CHANGE UP (ref 0–1.5)
KETONES UR-MCNC: NEGATIVE — SIGNIFICANT CHANGE UP
LEUKOCYTE ESTERASE UR-ACNC: ABNORMAL
LYMPHOCYTES # BLD AUTO: 2.23 K/UL — SIGNIFICANT CHANGE UP (ref 1–3.3)
LYMPHOCYTES # BLD AUTO: 31 % — SIGNIFICANT CHANGE UP (ref 13–44)
MCHC RBC-ENTMCNC: 31.8 PG — SIGNIFICANT CHANGE UP (ref 27–34)
MCHC RBC-ENTMCNC: 33.9 GM/DL — SIGNIFICANT CHANGE UP (ref 32–36)
MCV RBC AUTO: 93.8 FL — SIGNIFICANT CHANGE UP (ref 80–100)
MONOCYTES # BLD AUTO: 0.59 K/UL — SIGNIFICANT CHANGE UP (ref 0–0.9)
MONOCYTES NFR BLD AUTO: 8.2 % — SIGNIFICANT CHANGE UP (ref 2–14)
NEUTROPHILS # BLD AUTO: 4.06 K/UL — SIGNIFICANT CHANGE UP (ref 1.8–7.4)
NEUTROPHILS NFR BLD AUTO: 56.4 % — SIGNIFICANT CHANGE UP (ref 43–77)
NITRITE UR-MCNC: NEGATIVE — SIGNIFICANT CHANGE UP
NRBC # BLD: 0 /100 WBCS — SIGNIFICANT CHANGE UP (ref 0–0)
PH UR: 5.5 — SIGNIFICANT CHANGE UP (ref 5–8)
PLATELET # BLD AUTO: 188 K/UL — SIGNIFICANT CHANGE UP (ref 150–400)
POTASSIUM SERPL-MCNC: 4.4 MMOL/L — SIGNIFICANT CHANGE UP (ref 3.5–5.3)
POTASSIUM SERPL-SCNC: 4.4 MMOL/L — SIGNIFICANT CHANGE UP (ref 3.5–5.3)
PROT SERPL-MCNC: 7.2 G/DL — SIGNIFICANT CHANGE UP (ref 6–8.3)
PROT UR-MCNC: NEGATIVE MG/DL — SIGNIFICANT CHANGE UP
RBC # BLD: 4.18 M/UL — SIGNIFICANT CHANGE UP (ref 3.8–5.2)
RBC # FLD: 12.8 % — SIGNIFICANT CHANGE UP (ref 10.3–14.5)
RBC CASTS # UR COMP ASSIST: < 5 /HPF — SIGNIFICANT CHANGE UP
SODIUM SERPL-SCNC: 134 MMOL/L — LOW (ref 135–145)
SP GR SPEC: 1.02 — SIGNIFICANT CHANGE UP (ref 1–1.03)
UROBILINOGEN FLD QL: 0.2 E.U./DL — SIGNIFICANT CHANGE UP
WBC # BLD: 7.2 K/UL — SIGNIFICANT CHANGE UP (ref 3.8–10.5)
WBC # FLD AUTO: 7.2 K/UL — SIGNIFICANT CHANGE UP (ref 3.8–10.5)
WBC UR QL: > 10 /HPF

## 2021-08-18 PROCEDURE — 74176 CT ABD & PELVIS W/O CONTRAST: CPT | Mod: MA

## 2021-08-18 PROCEDURE — 87086 URINE CULTURE/COLONY COUNT: CPT

## 2021-08-18 PROCEDURE — 81001 URINALYSIS AUTO W/SCOPE: CPT

## 2021-08-18 PROCEDURE — 80053 COMPREHEN METABOLIC PANEL: CPT

## 2021-08-18 PROCEDURE — 74176 CT ABD & PELVIS W/O CONTRAST: CPT | Mod: 26,MA

## 2021-08-18 PROCEDURE — 99284 EMERGENCY DEPT VISIT MOD MDM: CPT | Mod: 25

## 2021-08-18 PROCEDURE — 99285 EMERGENCY DEPT VISIT HI MDM: CPT

## 2021-08-18 PROCEDURE — 36415 COLL VENOUS BLD VENIPUNCTURE: CPT

## 2021-08-18 PROCEDURE — 85025 COMPLETE CBC W/AUTO DIFF WBC: CPT

## 2021-08-18 RX ORDER — CEPHALEXIN 500 MG
1 CAPSULE ORAL
Qty: 14 | Refills: 0
Start: 2021-08-18 | End: 2021-08-24

## 2021-08-18 RX ORDER — SODIUM CHLORIDE 9 MG/ML
1000 INJECTION INTRAMUSCULAR; INTRAVENOUS; SUBCUTANEOUS ONCE
Refills: 0 | Status: COMPLETED | OUTPATIENT
Start: 2021-08-18 | End: 2021-08-18

## 2021-08-18 RX ORDER — CEPHALEXIN 500 MG
500 CAPSULE ORAL ONCE
Refills: 0 | Status: COMPLETED | OUTPATIENT
Start: 2021-08-18 | End: 2021-08-18

## 2021-08-18 RX ADMIN — SODIUM CHLORIDE 1000 MILLILITER(S): 9 INJECTION INTRAMUSCULAR; INTRAVENOUS; SUBCUTANEOUS at 13:16

## 2021-08-18 RX ADMIN — Medication 500 MILLIGRAM(S): at 16:06

## 2021-08-18 NOTE — ED ADULT NURSE REASSESSMENT NOTE - NS ED NURSE REASSESS COMMENT FT1
Dispo was delay due to pending transportation arrangement. PT was originally to get ambullete but pts daughter will now come to  pt.

## 2021-08-18 NOTE — ED PROVIDER NOTE - CLINICAL SUMMARY MEDICAL DECISION MAKING FREE TEXT BOX
here with reported difficulty urinating x 2 days. no fever. vitals stable, well appearing. abdomen soft/ non tender. labs/ct ordered to eval renal function, infection, stone, obstruction. ua shows mild uti. normal renal function, no leukocytosis, no obstructing stones or hydro. will treat with keflex for uti. po hydration. able to urinate in ED. discussed with daughter Franko on phone [phone]. return precautions discussed

## 2021-08-18 NOTE — ED ADULT NURSE REASSESSMENT NOTE - NS ED NURSE REASSESS COMMENT FT1
Ambulated with steady gait to restroom with cane. +void, clear yellow with normal volume per pt. Pending CT.

## 2021-08-18 NOTE — ED ADULT NURSE NOTE - OBJECTIVE STATEMENT
76y F, A&ox3, presents to ed for urinary s/s. 76y F, A&ox3, presents to ed for difficulty urinating since yesterday. Reports able to urinate but not as much volume when voiding. Reports urine is yellow. no hematuria. denies fevers nor chills, no abd pain nor distention. normal bms. No cva nor suprapubic tenderness.

## 2021-08-18 NOTE — ED PROVIDER NOTE - NSFOLLOWUPINSTRUCTIONS_ED_ALL_ED_FT
Please see your primary care provider in 2-3 days.  Call for appointment.  If you have any problems with followup, please call the ED Referral Coordinator at [phone].  Return to the ER if symptoms worsen or other concerns.    Urinary Tract Infection    A urinary tract infection (UTI) is an infection of any part of the urinary tract, which includes the kidneys, ureters, bladder, and urethra. Risk factors include ignoring your need to urinate, wiping back to front if female, being an uncircumcised male, and having diabetes or a weak immune system. Symptoms include frequent urination, pain or burning with urination, foul smelling urine, cloudy urine, pain in the lower abdomen, blood in the urine, and fever. If you were prescribed an antibiotic medicine, take it as told by your health care provider. Do not stop taking the antibiotic even if you start to feel better.    SEEK IMMEDIATE MEDICAL CARE IF YOU HAVE ANY OF THE FOLLOWING SYMPTOMS: severe back or abdominal pain, fever, inability to keep fluids or medicine down, dizziness/lightheadedness, or a change in mental status.

## 2021-08-18 NOTE — ED PROVIDER NOTE - OBJECTIVE STATEMENT
history of htn, here with decreased urination for past 2 days. Says unless she drinks lots of water not much urine comes out even though she has the urge. Denies dysuria, abdominal pain, n/v/d, fever/chills. No history of kidney problem she knows about. Notes some decreased appetite recently.

## 2021-08-18 NOTE — ED ADULT NURSE REASSESSMENT NOTE - NS ED NURSE REASSESS COMMENT FT1
Ambulated with steady gait with cane, standby assist to restroom. unable to provide specimen at this time. pending provider evaluation.

## 2021-08-18 NOTE — ED PROVIDER NOTE - PATIENT PORTAL LINK FT
You can access the FollowMyHealth Patient Portal offered by [facility] by registering at the following website: [email or web address]. By joining [employer]’s FollowMyHealth portal, you will also be able to view your health information using other applications (apps) compatible with our system.

## 2021-08-18 NOTE — ED ADULT TRIAGE NOTE - OTHER COMPLAINTS
patient BIBEMS from home c/o urinary retention x several days-- patient reports she is able to urinate but "only a little bit comes out"--denies fevers/chills/hematuria

## 2021-08-20 LAB
CULTURE RESULTS: SIGNIFICANT CHANGE UP
SPECIMEN SOURCE: SIGNIFICANT CHANGE UP

## 2021-08-31 ENCOUNTER — APPOINTMENT (OUTPATIENT)
Dept: HEART AND VASCULAR | Facility: CLINIC | Age: 76
End: 2021-08-31
Payer: MEDICARE

## 2021-08-31 ENCOUNTER — NON-APPOINTMENT (OUTPATIENT)
Age: 76
End: 2021-08-31

## 2021-08-31 PROCEDURE — 93298 REM INTERROG DEV EVAL SCRMS: CPT

## 2021-08-31 PROCEDURE — G2066: CPT

## 2021-09-03 ENCOUNTER — APPOINTMENT (OUTPATIENT)
Dept: HEART AND VASCULAR | Facility: CLINIC | Age: 76
End: 2021-09-03

## 2021-09-08 ENCOUNTER — APPOINTMENT (OUTPATIENT)
Dept: INTERNAL MEDICINE | Facility: CLINIC | Age: 76
End: 2021-09-08
Payer: MEDICARE

## 2021-09-08 VITALS
TEMPERATURE: 98.8 F | BODY MASS INDEX: 26.84 KG/M2 | WEIGHT: 171 LBS | OXYGEN SATURATION: 98 % | SYSTOLIC BLOOD PRESSURE: 151 MMHG | HEIGHT: 67 IN | DIASTOLIC BLOOD PRESSURE: 76 MMHG | HEART RATE: 60 BPM

## 2021-09-08 PROCEDURE — 36415 COLL VENOUS BLD VENIPUNCTURE: CPT

## 2021-09-08 PROCEDURE — 99214 OFFICE O/P EST MOD 30 MIN: CPT | Mod: 25

## 2021-09-09 LAB
ANION GAP SERPL CALC-SCNC: 17 MMOL/L
BUN SERPL-MCNC: 21 MG/DL
CALCIUM SERPL-MCNC: 9.5 MG/DL
CHLORIDE SERPL-SCNC: 105 MMOL/L
CO2 SERPL-SCNC: 18 MMOL/L
CREAT SERPL-MCNC: 1.11 MG/DL
ESTIMATED AVERAGE GLUCOSE: 157 MG/DL
GLUCOSE SERPL-MCNC: 179 MG/DL
HBA1C MFR BLD HPLC: 7.1 %
POTASSIUM SERPL-SCNC: 4.5 MMOL/L
SODIUM SERPL-SCNC: 140 MMOL/L

## 2021-09-20 ENCOUNTER — APPOINTMENT (OUTPATIENT)
Dept: HEMATOLOGY ONCOLOGY | Facility: CLINIC | Age: 76
End: 2021-09-20
Payer: MEDICARE

## 2021-09-20 VITALS
BODY MASS INDEX: 25.11 KG/M2 | OXYGEN SATURATION: 98 % | HEIGHT: 67 IN | TEMPERATURE: 97.2 F | DIASTOLIC BLOOD PRESSURE: 80 MMHG | SYSTOLIC BLOOD PRESSURE: 121 MMHG | WEIGHT: 160 LBS | HEART RATE: 79 BPM

## 2021-09-20 PROCEDURE — 99214 OFFICE O/P EST MOD 30 MIN: CPT | Mod: 25

## 2021-09-20 PROCEDURE — 36415 COLL VENOUS BLD VENIPUNCTURE: CPT

## 2021-09-20 NOTE — REASON FOR VISIT
[Initial Consultation] : an initial consultation for [Acute Leukemia] : acute leukemia [Blood Count Assessment] : blood count assessment [FreeTextEntry2] : DVT

## 2021-09-20 NOTE — HISTORY OF PRESENT ILLNESS
[de-identified] : 75 years old female history of multiple medical problems as well as subarachnoid hemorrhage requiring evacuation in the past.  Patient was found to have positive COVID antibodies in June 2020... Later on in July she was admitted to [facility] with a large femoral DVT, and she is currently on anticoagulation with Eliquis... She is here to assess the length of anticoagulation therapy...\par \par February 16, 2021 returns for follow-up... Continues to take the Eliquis... Tolerating well... I discussed with daughter, and reviewed patient's meds [de-identified] : October 19, 2020 patient returns for follow-up... Her main complaint is inability to sleep well... Has not seen PCP in a while... 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 No complaints tolerating Eliquis well\par \par Lucille 15, 2021 here for follow-up... Wants to know why she still needs to be on the Eliquis... Discussed with patient the fact that she has an irregular heartbeat, history of DVT, and history of presence of lupus anticoagulants... So therefore at this point the cardiologist hematologist and pulmonologist involved in her care, feel that she is better served by remaining on anticoagulation, however this decision would be revisited in the future.\par \par \par September 20, 2021 returns for follow-up... No new complaints

## 2021-09-20 NOTE — ASSESSMENT
[FreeTextEntry1] : \par Patient now states that her 60-year-old son had a stroke... Patient to remain on anticoagulation for now...\par \par Repeat blood work done... \par \par Follow-up here 6 months or sooner if needed...\par

## 2021-10-05 ENCOUNTER — NON-APPOINTMENT (OUTPATIENT)
Age: 76
End: 2021-10-05

## 2021-10-05 ENCOUNTER — APPOINTMENT (OUTPATIENT)
Dept: HEART AND VASCULAR | Facility: CLINIC | Age: 76
End: 2021-10-05
Payer: MEDICARE

## 2021-10-05 PROCEDURE — G2066: CPT

## 2021-10-05 PROCEDURE — 93298 REM INTERROG DEV EVAL SCRMS: CPT

## 2021-10-13 ENCOUNTER — APPOINTMENT (OUTPATIENT)
Dept: HEART AND VASCULAR | Facility: CLINIC | Age: 76
End: 2021-10-13
Payer: MEDICARE

## 2021-10-13 ENCOUNTER — NON-APPOINTMENT (OUTPATIENT)
Age: 76
End: 2021-10-13

## 2021-10-13 VITALS
HEIGHT: 67 IN | WEIGHT: 167 LBS | SYSTOLIC BLOOD PRESSURE: 132 MMHG | TEMPERATURE: 98.2 F | HEART RATE: 61 BPM | OXYGEN SATURATION: 98 % | BODY MASS INDEX: 26.21 KG/M2 | DIASTOLIC BLOOD PRESSURE: 75 MMHG

## 2021-10-13 PROCEDURE — 93000 ELECTROCARDIOGRAM COMPLETE: CPT

## 2021-10-13 PROCEDURE — 36415 COLL VENOUS BLD VENIPUNCTURE: CPT

## 2021-10-13 PROCEDURE — 99214 OFFICE O/P EST MOD 30 MIN: CPT

## 2021-10-13 NOTE — ASSESSMENT
[FreeTextEntry1] : junctional rhythm she is asymptomatic \par htn controlled \par DVT on eliquis\par on statin given her history of DM \par fu in three months \par \par

## 2021-10-13 NOTE — PROCEDURE
[Lexiscan] : Lexiscan [Tc-99m, sestamibi-Cardiolite, to 40mCi, dose-Radionuclides-[employer] code--v.1-Active-Trade] : Tc-99m, sestamibi-Cardiolite, to 40mCi, dose-Radionuclides-[employer] code--v.1-Active-Trade [Normal] : no significant [de-identified] : Dr. Willy Santos     Performing Provider: Dr. Willy Santos [de-identified] : Khoi Sun [de-identified] : CP, DM, HTN, Abnormal ECG.     Gender: Female [de-identified] : Given via the IV route.    1 Day Protocol. [de-identified] : 9.5 [de-identified] : Given via the IV route [de-identified] : 28.7 [de-identified] : 61 [de-identified] : 130/70 [de-identified] : 69 [TWNoteComboBox1] : JIM [FreeTextEntry1] : image quality is good\par resting ef 66%\par post stress ef 69%\par prior study NA\par artifacts None\par blood pressure remained stable at 120/70 mmHg heart rate increased to 103 bpm

## 2021-10-13 NOTE — HISTORY OF PRESENT ILLNESS
[FreeTextEntry1] : 76 F ASD repaired surgically IDDM atrial fibrillation s/p ablation by dr kramer  SDH ILR DVT/PE (possibly COVID related) on AC,  atrial tachycardia which manifested as sob controlled with BB here today for BP management she has no complaints\par \par \par ecg junctional rhythm\par normal nuclear stress test 6/2020\par

## 2021-10-14 LAB
ANION GAP SERPL CALC-SCNC: 14 MMOL/L
BUN SERPL-MCNC: 18 MG/DL
CALCIUM SERPL-MCNC: 10.3 MG/DL
CHLORIDE SERPL-SCNC: 104 MMOL/L
CO2 SERPL-SCNC: 23 MMOL/L
CREAT SERPL-MCNC: 0.97 MG/DL
GLUCOSE SERPL-MCNC: 149 MG/DL
POTASSIUM SERPL-SCNC: 4.4 MMOL/L
SODIUM SERPL-SCNC: 142 MMOL/L

## 2021-10-17 LAB
25(OH)D3 SERPL-MCNC: 52 NG/ML
ALBUMIN MFR SERPL ELPH: 58.9 %
ALBUMIN SERPL ELPH-MCNC: 4.9 G/DL
ALBUMIN SERPL-MCNC: 4.3 G/DL
ALBUMIN/GLOB SERPL: 1.4 RATIO
ALP BLD-CCNC: 75 U/L
ALPHA1 GLOB MFR SERPL ELPH: 3.4 %
ALPHA1 GLOB SERPL ELPH-MCNC: 0.2 G/DL
ALPHA2 GLOB MFR SERPL ELPH: 10.3 %
ALPHA2 GLOB SERPL ELPH-MCNC: 0.8 G/DL
ALT SERPL-CCNC: 23 U/L
ANION GAP SERPL CALC-SCNC: 17 MMOL/L
AST SERPL-CCNC: 19 U/L
B-GLOBULIN MFR SERPL ELPH: 12.9 %
B-GLOBULIN SERPL ELPH-MCNC: 0.9 G/DL
BASOPHILS # BLD AUTO: 0.05 K/UL
BASOPHILS NFR BLD AUTO: 0.7 %
BILIRUB SERPL-MCNC: 0.4 MG/DL
BUN SERPL-MCNC: 27 MG/DL
CALCIUM SERPL-MCNC: 9.7 MG/DL
CHLORIDE SERPL-SCNC: 104 MMOL/L
CO2 SERPL-SCNC: 20 MMOL/L
CREAT SERPL-MCNC: 1.34 MG/DL
DEPRECATED KAPPA LC FREE/LAMBDA SER: 0.88 RATIO
EOSINOPHIL # BLD AUTO: 0.18 K/UL
EOSINOPHIL NFR BLD AUTO: 2.4 %
ERYTHROCYTE [SEDIMENTATION RATE] IN BLOOD BY WESTERGREN METHOD: 7 MM/HR
GAMMA GLOB FLD ELPH-MCNC: 1.1 G/DL
GAMMA GLOB MFR SERPL ELPH: 14.5 %
GLUCOSE SERPL-MCNC: 148 MG/DL
HCT VFR BLD CALC: 38.9 %
HGB BLD-MCNC: 12.9 G/DL
IGA SER QL IEP: 279 MG/DL
IGG SER QL IEP: 1110 MG/DL
IGM SER QL IEP: 54 MG/DL
IMM GRANULOCYTES NFR BLD AUTO: 0.4 %
INTERPRETATION SERPL IEP-IMP: NORMAL
KAPPA LC CSF-MCNC: 2.58 MG/DL
KAPPA LC SERPL-MCNC: 2.28 MG/DL
LDH SERPL-CCNC: 217 U/L
LYMPHOCYTES # BLD AUTO: 2.25 K/UL
LYMPHOCYTES NFR BLD AUTO: 30.1 %
M PROTEIN SPEC IFE-MCNC: NORMAL
MAN DIFF?: NORMAL
MCHC RBC-ENTMCNC: 32 PG
MCHC RBC-ENTMCNC: 33.2 GM/DL
MCV RBC AUTO: 96.5 FL
MONOCYTES # BLD AUTO: 0.53 K/UL
MONOCYTES NFR BLD AUTO: 7.1 %
NEUTROPHILS # BLD AUTO: 4.43 K/UL
NEUTROPHILS NFR BLD AUTO: 59.3 %
PLATELET # BLD AUTO: 213 K/UL
POTASSIUM SERPL-SCNC: 4.4 MMOL/L
PROT SERPL-MCNC: 7.3 G/DL
RBC # BLD: 4.03 M/UL
RBC # FLD: 13.5 %
SODIUM SERPL-SCNC: 140 MMOL/L
TSH SERPL-ACNC: 1.43 UIU/ML
VIT B12 SERPL-MCNC: 1288 PG/ML
WBC # FLD AUTO: 7.47 K/UL

## 2021-10-22 ENCOUNTER — NON-APPOINTMENT (OUTPATIENT)
Age: 76
End: 2021-10-22

## 2021-10-22 RX ORDER — INSULIN GLARGINE 100 [IU]/ML
100 INJECTION, SOLUTION SUBCUTANEOUS
Qty: 2 | Refills: 3 | Status: DISCONTINUED | COMMUNITY
Start: 2021-10-22 | End: 2021-10-22

## 2021-10-28 NOTE — ED ADULT NURSE NOTE - EXTENSIONS OF SELF_ADULT
Physical Therapy  Physical Therapy  Discharge Summary    Name:Raven Bashir  PSQ:[id number]  :1942  Treatment Diagnosis: Difficulty walking, impaired endurance and safety. Diagnosis: Covid 19 virus    Restrictions/Precautions  Restrictions/Precautions: Fall Risk, General Precautions           Position Activity Restriction  Other position/activity restrictions: on 4L O2     Goals:                  Short term goals  Time Frame for Short term goals: Patient demonstrates indep in bed mobility. Short term goal 1: 10/26/2021  Short term goal 2: Patient demonstrates 150 feet with SpO2 90% or greater on O2 via nc. with rollator. GOAL MET  10/26/2021 Patient demonstrates S/SBA  150 feet with SpO2 93% on 4L O2 via nc. with rollator. Short term goal 3: Patient demonstrates 5 minutes of standing dynamic balance activities to improve dynamic balance with gait with SPO2 90% or greater. GOAL met 10/21/2021Patient demonstrates 6 minutes of standing dynamic balance activities to improve dynamic balance with gait with SPO2 90% or greater. Short term goal 4: Patient demonstrates indep in core and DBE in sitting and standing. 10/26/2021 needs continued supervision for tanding activity due to need for cues with rollator brakes. 10/27/2021 GOAL met demo indep use of rollator brakes with standing activity  and is indep in PLB in sit and stand. Patient demo side step and heel raises with UE support instanding. Short term goal 5: Patient demonstrates safe indep transfers bed<>chair, car transfer, with use of rollator . GOAL MET 10/25/2021 Patient demonstrates safe indep transfers bed<>chair, car transfer, with use of rollator . Long term goals  Time Frame for Long term goals : 2021  Long term goal 1: Patient demonstrates safe indep stoop to recover shoe from floor wtih use of rollator.  GOAL met 10/28/2021 Patient demonstrates safe indep stoop to recovertissue box (to simulate shoe)  from floor wtih use of rollator. Long term goal 2: Patiend demonstrates gait 150 feet ROSAS with use of rollator and spO2 90% or greater. GOAL MET 10/28/2021 Patiend demonstrates gait 175 feet ROSAS with use of rollator and spO2 90% or greater  Long term goal 3: Pt demo up and down 3-4 steps indep with use of cane and rail to safely enter and egress home. GOAL MET 10/28/2021 Pt demo up and down 4 steps indep with use of cane and rail (rollato at bottom of steps) to safely enter and egress home. Long term goal 4: Pt indep in  hep LE therex to increase LE endurance/functional strength with walking. GOAL met pt demo indep in LE seated and supine therex for strengthening along with standing heel raises and 1 minute of reciprocal side stepping . Pt. Met 5/5 short term goals and 4/4 long term goals. Pt. Currently ambulates 175 feet with rollator walker ROSAS with indep management of O2 tubing and brakes on turf carpet and with L/R turns. Up/down 4 steps with st cane and rail and rollator at bottom of steps. Sit to/from stand with indep  Bed mobility with indep  Recommend HHPT in order to progress to community indep mobility   Pt. Safe to return home  with assistance from family for higher level ADL and community mobility transport. Provided pt. with written hep.   Electronically signed by Bo Krause PT on 10/28/2021 at 5:31 PM None

## 2021-11-02 ENCOUNTER — APPOINTMENT (OUTPATIENT)
Dept: INTERNAL MEDICINE | Facility: CLINIC | Age: 76
End: 2021-11-02
Payer: MEDICARE

## 2021-11-02 VITALS
WEIGHT: 167 LBS | OXYGEN SATURATION: 100 % | DIASTOLIC BLOOD PRESSURE: 76 MMHG | BODY MASS INDEX: 26.21 KG/M2 | HEIGHT: 67 IN | TEMPERATURE: 98.4 F | HEART RATE: 65 BPM | SYSTOLIC BLOOD PRESSURE: 124 MMHG

## 2021-11-02 PROCEDURE — 99214 OFFICE O/P EST MOD 30 MIN: CPT

## 2021-11-08 ENCOUNTER — NON-APPOINTMENT (OUTPATIENT)
Age: 76
End: 2021-11-08

## 2021-11-08 ENCOUNTER — APPOINTMENT (OUTPATIENT)
Dept: HEART AND VASCULAR | Facility: CLINIC | Age: 76
End: 2021-11-08
Payer: MEDICARE

## 2021-11-08 PROCEDURE — G2066: CPT | Mod: NC

## 2021-11-08 PROCEDURE — 93298 REM INTERROG DEV EVAL SCRMS: CPT | Mod: NC

## 2021-12-08 ENCOUNTER — APPOINTMENT (OUTPATIENT)
Dept: INTERNAL MEDICINE | Facility: CLINIC | Age: 76
End: 2021-12-08

## 2021-12-14 ENCOUNTER — NON-APPOINTMENT (OUTPATIENT)
Age: 76
End: 2021-12-14

## 2021-12-14 ENCOUNTER — APPOINTMENT (OUTPATIENT)
Dept: HEART AND VASCULAR | Facility: CLINIC | Age: 76
End: 2021-12-14
Payer: MEDICARE

## 2021-12-14 PROCEDURE — 93298 REM INTERROG DEV EVAL SCRMS: CPT

## 2021-12-14 PROCEDURE — G2066: CPT
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
## 2022-01-01 NOTE — ED PROVIDER NOTE - NSFOLLOWUPINSTRUCTIONS_ED_ALL_ED_FT
98.7 Take full course of antibiotics and apply warm compresses and area of pain/swelling 3 times daily for 10-15 minutes.  Keep area clean and dry.  Follow up with primary doctor in 3-5 days.  Return to ED for fever, worsening pain, discharge from area, extending redness/skin changes area site, vomiting, dizziness, fainting    Abscess    An abscess is an infected area that contains a collection of pus and debris. It can occur in almost any part of the body and occurs when the tissue gets infection. Symptoms include a painful mass that is red, warm, tender that might break open and HAVE drainage. If your health care provider gave you antibiotics make sure to take the full course and do not stop even if feeling better.     SEEK IMMEDIATE MEDICAL CARE IF YOU HAVE ANY OF THE FOLLOWING SYMPTOMS: chills, fever, muscle aches, or red streaking from the area.

## 2022-01-04 ENCOUNTER — APPOINTMENT (OUTPATIENT)
Dept: PULMONOLOGY | Facility: CLINIC | Age: 77
End: 2022-01-04

## 2022-01-04 ENCOUNTER — APPOINTMENT (OUTPATIENT)
Dept: PULMONOLOGY | Facility: CLINIC | Age: 77
End: 2022-01-04
Payer: MEDICARE

## 2022-01-04 PROCEDURE — 99443: CPT

## 2022-01-04 NOTE — REASON FOR VISIT
[Home] : at home, [unfilled] , at the time of the visit. [Medical Office: ([facility])___] : at the medical office located in  [Verbal consent obtained from patient] : the patient, [unfilled] [Follow-Up] : a follow-up visit [Pulmonary Embolism] : pulmonary embolism [Shortness of Breath] : shortness of breath [Other: _____] : [unfilled]

## 2022-01-04 NOTE — PROCEDURE
[FreeTextEntry1] : Chest CT 21\par IMPRESSION: Unchanged subsegmental atelectasis versus scarring in the right middle lobe, lingula and bilateral lower lobes. No evidence of interstitial lung disease.\par \par LE doppler 21\par IMPRESSION:\par No evidence of deep venous thrombosis in either lower extremity. Prior right common femoral vein DVT has resolved.\par \par PFT 21\par FVC: 1.89 L (74%)--> 2.08 L (81%) \par FEV1: 1.43 L (73%)--> 1.53 L (78%) \par FEV1/FVC: 76%--> 74%\par BPG29-61%: 0.95 L/s (55%)--> 0.99 L/s (57%)\par TLC: 3.57 L (66%)\par RV/T%\par Mild restrictive lung defect. No significant bronchodilator response. Mildly decreased lung volumes\par elevated RV/TLC consistent with air trapping\par Patient could not hold her breath to do DLCO\par \par 6MWT 21 done using rolling walker chair\par Patient walked 110 meters during a 6 minute walk test.\par Resting O2 saturation was 100% on RA. Heart rate 103 bpm.\par End test O2 saturation was 99% on RA. Heart rate 111 bpm. Casa scale end of test 4 "somewhat severe"\par This signifies reduced walk distance, no significant desaturation\par \par Labs 10/20/20:\par Positive COVID antibody\par Positive cardiolipin antibody level\par Negative Beta 2 Glycoprotein 1 IgG Ab and IgM ab and IgA ab\par Negative cardiolipin IgG, IgM, IgA\par CBC: Hgb 9.0\par \par Labs 20:\par Negative scleroderma antibodies\par Positive BRIAN: 1:160, SPECKLED\par Normal RF, normal hemolytic complement\par Negative D-dimer  \par Positive cardiolipin antibody level\par \par Echo 10/15/20\par CONCLUSIONS: \par  1. Normal left and right ventricular size and systolic function. \par  2. Mild symmetric left ventricular hypertrophy. \par  3. Grade II left ventricular diastolic dysfunction.\par  4. Biatrial enlargement.\par  5. Aortic sclerosis without significant stenosis. \par  6. No pericardial effusion.\par  7. Pulmonary artery systolic pressure is 35 mmHg. \par \par PFTs from 20 reviewed from Dr. Jony Wang's office. \par FVC: 1.61 L (63%) predicted pre-BD and 1.65 L (65%) post-BD\par FEV1: 1/06 L (54%) predicted pre-BD and 1.21 L (62%) predicted post-BD\par FEV1/FVC: 66% -> 73%\par TLC: 3.17 L (58%) \par DLCO: 69% \par Spirometry notable for moderately severe obstructive defect with acute response to BD. Lung volumes reveal moderately severe restrictive defect. DLCO slightly reduced. \par \par 6MWT 20: Resting O2 saturation 99%, heart rate 79.  Post exercise O2 96%, HR 97. No evidence of oxygen desaturation but does show reduced walking distance of only 88 meters with Casa scale 4.\par \par \par EXAM: ECHO TTE WO CON COMP W DOPP PROCEDURE DATE: 2020 \par CONCLUSIONS: \par  1. Limited study obtained for evaluation of left ventricular function performed by cardiology fellow on call. \par  2. The right ventricle is not well visualized. \par  3. Abnormal septal motion seen due to abnormal conduction. Endocardium not well visualized. Left ventricular ejection fraction is 50-55% in limited views. \par  4. No pericardial effusion. \par  5. Recommend repeat complete study for a comprehensive evaluation of biventricular function and cardiac valve assessment. \par Right Ventricle: The right ventricle is not well visualized. \par \par EXAM: US DPLX LWR EXT VEINS LTD RT PROCEDURE DATE: 2020 \par Thrombus visualized in the right common femoral vein extending to distal femoral vein and profunda femoris. No thrombus identified in the saphenofemoral junction or popliteal vein or calf veins on grayscale and Doppler examination. Contralateral left common femoral vein is patent. Subcutaneous edema in right lower extremity \par IMPRESSION: Since Lucille 10, 2016, new partially occlusive deep venous thrombus in right common femoral vein extending to distal femoral vein and profunda femoris. \par \par EXAM: CT ANGIO CHEST PE PROTOCOL IC PROCEDURE DATE: 2020 \par Pulmonary arteries: Occlusive filling defect in right main pulmonary artery, right upper lobe segmental and subsegmental branches. No thrombus in left pulmonary artery or sagittal embolus. Unchanged main pulmonary artery, 3.0 cm. No evidence of right heart chain. \par Lungs and large airways: No focal consolidation. No evidence of pulmonary infarct. Since May 30, 2019, unchanged scattered bilateral linear opacities in both lower lobes, lingula, and right middle lobe, probably subsegmental atelectasis or fibrosis. \par Pleura: No pleural effusion. \par Mediastinum and hilar regions: No thoracic lymphadenopathy. \par Cardiovascular: Heart size is normal. No thoracic aortic aneurysm. \par Pericardial effusion: No pericardial effusion. \par Chest wall and lower neck: Loop recorder in left upper chest wall. \par Upper abdomen: Normal. \par Bones: Intact sternotomy wires. Mild degenerative changes. \par IMPRESSION: \par 1. Partially occlusive pulmonary thromboemboli in right main pulmonary artery and right upper lobe segmental and subsegmental branches. No evidence of right heart strain or pulmonary infarct. \par 2. Since May 30, 2019, unchanged scattered bilateral linear opacities, possibly subsegmental atelectasis or fibrosis.

## 2022-01-04 NOTE — REVIEW OF SYSTEMS
[SOB on Exertion] : sob on exertion [Arthralgias] : arthralgias [Diabetes] : diabetes [Negative] : Psychiatric [Fever] : no fever [Chills] : no chills [Cough] : no cough [Chest Tightness] : no chest tightness [Pleuritic Pain] : no pleuritic pain [Wheezing] : no wheezing [Chest Discomfort] : no chest discomfort [Palpitations] : no palpitations [Seasonal Allergies] : no seasonal allergies [Thyroid Problem] : no thyroid problem [TextBox_122] : h/o subdural hematoma s/p [place] hole procedure

## 2022-01-04 NOTE — ASSESSMENT
[FreeTextEntry1] : 5-20-21:\par It was a pleasure to see Esequiel today for follow-up. Her respiratory issues are summarized:\par \par 1. Pulmonary embolism/DVT\par Esequiel was diagnosed with DVT in right leg 7/23/20 during visit with Dr. Santos, started on Eliquis. Prior to this, she states she did not have a history of any blood clots. She then presented to ED on 7/25/20 with chest pain.  CTA-PE demonstrated segmental and subsegmental PEs on right. tPA not given due to h/o intracranial bleed. Leg dopplers showed new partially occlusive DVT in right common femoral vein extending to distal femoral vein and profunda femoris. \par \par She was discharged on Eliquis and feels her breathing is at baseline; she will get short of breath walking 1/2 blocks, which is the same as it was prior to her diagnosis of PE. She continues with some soreness in the right leg. She reports no adverse reactions including bleeding on her Eliquis. \par \par She was evaluated by Dr. Payton Contreras on 8-26-20 who ordered a hypercoagulability workup. Of note, her anticardiolipin IgG was mildly elevated to 21. D-dimer was normal on 9/3/20. Repeat Anticardiolipin IgG was normal. This was likely an acute phase reactant. Negative Beta 2 Glycoprotein 1 IgG Ab and IgM ab and IgA ab. PFTs from 8/29/20 reviewed from Dr. Jony Wang's office. FEV1/FVC 66%, FEV1 54% predicted pre-BD and 62% predicted post-BD and FVC 63% predicted pre-BD and 65% post-BD. TLC 58% predicted and DLCO 69% predicted. Spirometry notable for moderately severe obstructive defect with acute response to BD. Lung volumes reveal moderately severe restrictive defect. DLCO slightly reduced. 6MWT without evidence of oxygen desaturation but does show reduced walking distance of only 88 meters with Casa scale 4. \par \par PFT done 4/8/21 shows mild restrictive lung defect. No significant bronchodilator response. Mildly decreased lung volumes. Patient could not hold her breath to do DLCO. Compared to PFT done at Dr. Wang's office on 8/29/20, FVC, FEV1 and TLC are improved. DLCO at that time was 69%. She walked 110 meters on 6MWT 4/8/21 using rolling walker chair, SpO2 100% -> 99%. This is a slightly improved walk distance.\par \par She had LE swelling on prior visit. LE doppler 4/7/21 showed no evidence of deep venous thrombosis in either lower extremity. Prior right common femoral vein DVT has resolved.\par -Labs 3/11/21 show normal RF, D-dimer, CMP, Hgb 11.4. Autoimmune serologies were repeated: normal cardioliopin antibody IgA, IgA and IgM, Beta 2 glycoprotein 1 IgA Ab, IgG Ab, IgM Ab, repeat BRIAN 1:80, Speckled, negative scleroderma antibodies, negative centromere antibody. She does not need to see rheumatology at this time.\par -Chest CT done 6/2021 does not show evidence of interstitial lung disease.\par \par Plan:\par -She is wearing compression stockings.\par -She has been taking anticoagulation for over a year (since July 2020)\par -We will repeat leg dopplers, diffusion capacity, 6MWT d-dimer at this time\par -We will repeat the hypercoagulable labs when she comes for COVID swab prior to PFT\par \par 2. Skin thickening\par On exam today, the skin over the dorsum of her hands appears thickened and less mobile. She has no known history of scleroderma, and no known family history of scleroderma. She also complains of joint and stiffness in the hands. Labs 9/4/20 showed negative SCL70, Positive BRIAN: 1:160, SPECKLED, Normal RF, normal hemolytic complement. Repeat SCL70 and anti centromere ab on 12/8/20 were normal. Repeat labs done 3/11/21 show normal RF, D-dimer, CMP, Hgb 11.4. Autoimmune serologies were repeated: normal cardioliopin antibody IgA, IgA and IgM, Beta 2 glycoprotein 1 IgA Ab, IgG Ab, IgM Ab, repeat BRIAN 1:80, Speckled, negative scleroderma antibodies, negative centromere antibody. She does not need to see rheumatology at this time.\par \par 3. Asthma\par She has been diagnosed with asthma in the past and is taking Symbicort 80-4.5. She also has a remote smoking history.  We reviewed her PFT from Dr. Wang's office done on 8/29/20. Spirometry notable for moderately severe obstructive defect with acute response to BD. FVC: 1.61 L (63%) predicted pre-BD and 1.65 L (65%) post-BD, FEV1: 1/06 L (54%) predicted pre-BD and 1.21 L (62%) predicted post-BD. Lung volumes reveal moderately severe restrictive defect, TLC: 3.17 L (58%) . DLCO slightly reduced DLCO: 69%. We have also discussed with Esequiel that she may continue follow-up of her asthma and her PE with Dr. Sarabia, whom she has seen before, if she would like. She is currently taking Symbicort 80-4.5, 2 puffs BID.\par \par PFT done 4/8/21 show mild restrictive lung defect. No significant bronchodilator response. There is evidence of small airways obstruction, [id number]%: 0.99 L/s (57%)There was elevated RV/TLC (47%) consistent with air trapping. Unfortunately, NIOX was not done. She was recently in the hospital for "low" heart rate and discharged on Dulera 200mcg, 2 puffs twice a day. She doesn't notice a change in her symptoms on higher dose of inhaled corticosteroids.\par \par Plan:\par - She should switch back to Symbicort 80-4.5, 2 puffs BID to reduce the amount of inhaled corticosteroids. She should confirm that she is taking Sybmicort with her daughter.\par - Use albuterol PRN\par \par 4. R/O sleep apnea\par There is evidence of diastolic dysfunction on Esequiel's recent echo. Sleep study done 12/22/20 showed no significant sleep disordered breathing.\par \par 5. Diastolic Dysfunction\par On 7/26 in-patient echo was done at bedside and RV was not well visualized). Echo done on 10/15/20 shows grade II left ventricular diastolic dysfunction. The pulmonary artery systolic pressure is borderline elevated, 35 mmHg. BNP on 3/11/21 was 400, previously 335. Significant number of b-lines noted on the right side, no a-lines seen on bedside ultrasound. Similarly b-lines noted on the left side.  She is not sure if she is taking Lasix 20mg MWF.\par \par Plan:\par - We have asked her to check with her daughter to see if she is taking Lasix 20mg MWF\par - We will order a repeat echocardiogram\par - We will order pro-BNP\par \par 6. Health maintenance\par Flu vaccine up to date. She states she is up-to-date with her pneumococcal vaccination. \par \par Return to clinic: 3 months

## 2022-01-04 NOTE — HISTORY OF PRESENT ILLNESS
[TextBox_4] : Pt is a 76 yo F with PMH asthma/COPD, ASD repair, CAD, Afib (paroxysmal) s/p ablation, subdural hematoma s/p Martelle hole evacuation, IDDM, HTN, HLD. \par \par Initial intake 9/3/20: Found to have DVT in right leg 7/23/20 during visit with Dr. Santos, started on Eliquis. Prior to this, she states she did not have a history of any blood clots. Then presented to ED on 7/25/20 with chest pain on left, worse with inspiration, pain 10/10. Wells score 6. CTA-PE with segmental and subsegmental PEs on right. tPA not given due to h/o intracranial bleed. Leg dopplers showed new partially occlusive DVT in right common femoral vein extending to distal femoral vein and profunda femoris. \par \par Echo 7/26/20: RV not well visualized. PASP was not estimated. \par \par She continues on Eliquis.  Currently denies chest pain, bleeding episodes on Eliquis, new leg pain or leg swelling. She can walk about 1/2 block, limited by SOB. This is stable as compared to before her PE diagnosis as per pt. Overall, she feels that she is at her baseline. \par \par Of note, pt had seen Dr. Sarabia in 03/2020 for evaluation of asthma. On Symbicort 80-4.5 BID. \par Quit smoking 20-30 years ago, one pack would last her one week. \par \par 1-4-22:\par She can still walk half a block\par Before she had a blood clot, she was walking the same distance

## 2022-01-18 ENCOUNTER — APPOINTMENT (OUTPATIENT)
Dept: HEART AND VASCULAR | Facility: CLINIC | Age: 77
End: 2022-01-18
Payer: MEDICARE

## 2022-01-18 ENCOUNTER — NON-APPOINTMENT (OUTPATIENT)
Age: 77
End: 2022-01-18

## 2022-01-18 PROCEDURE — 93298 REM INTERROG DEV EVAL SCRMS: CPT

## 2022-01-18 PROCEDURE — G2066: CPT

## 2022-01-20 ENCOUNTER — NON-APPOINTMENT (OUTPATIENT)
Age: 77
End: 2022-01-20

## 2022-01-20 ENCOUNTER — APPOINTMENT (OUTPATIENT)
Dept: HEART AND VASCULAR | Facility: CLINIC | Age: 77
End: 2022-01-20
Payer: MEDICARE

## 2022-01-20 VITALS
BODY MASS INDEX: 24.64 KG/M2 | WEIGHT: 156.99 LBS | HEIGHT: 67 IN | HEART RATE: 69 BPM | SYSTOLIC BLOOD PRESSURE: 126 MMHG | OXYGEN SATURATION: 97 % | DIASTOLIC BLOOD PRESSURE: 62 MMHG | TEMPERATURE: 99 F

## 2022-01-20 PROCEDURE — 36415 COLL VENOUS BLD VENIPUNCTURE: CPT

## 2022-01-20 NOTE — ASSESSMENT
[FreeTextEntry1] : she has a loop recorder will confirm no afib if no afib should reduce eliquis dose to prophylaxis since it was unprovoked\par htn controlled \par DVT on eliquis\par on statin given her history of DM \par she has lost some weight i will reach out to her pcp\par fu in three months \par \par

## 2022-01-20 NOTE — PHYSICAL EXAM
[Well Developed] : well developed [Well Nourished] : well nourished [No Acute Distress] : no acute distress [Normal Venous Pressure] : normal venous pressure [No Carotid Bruit] : no carotid bruit [Normal S1, S2] : normal S1, S2 [No Murmur] : no murmur [No Rub] : no rub [No Gallop] : no gallop [Clear Lung Fields] : clear lung fields [Good Air Entry] : good air entry [No Respiratory Distress] : no respiratory distress  [Soft] : abdomen soft [Non Tender] : non-tender [No Masses/organomegaly] : no masses/organomegaly [Normal Bowel Sounds] : normal bowel sounds [Normal Gait] : normal gait [No Edema] : no edema [No Cyanosis] : no cyanosis [No Clubbing] : no clubbing [No Varicosities] : no varicosities [No Rash] : no rash [No Skin Lesions] : no skin lesions [Moves all extremities] : moves all extremities [No Focal Deficits] : no focal deficits [Normal Speech] : normal speech [Alert and Oriented] : alert and oriented [Normal memory] : normal memory [General Appearance - Well Developed] : well developed [Normal Appearance] : normal appearance [Well Groomed] : well groomed [General Appearance - Well Nourished] : well nourished [No Deformities] : no deformities [General Appearance - In No Acute Distress] : no acute distress [Normal Conjunctiva] : the conjunctiva exhibited no abnormalities [Eyelids - No Xanthelasma] : the eyelids demonstrated no xanthelasmas [Normal Oral Mucosa] : normal oral mucosa [No Oral Pallor] : no oral pallor [No Oral Cyanosis] : no oral cyanosis [Normal Jugular Venous A Waves Present] : normal jugular venous A waves present [Normal Jugular Venous V Waves Present] : normal jugular venous V waves present [No Jugular Venous Marinelli A Waves] : no jugular venous marinelli A waves [Respiration, Rhythm And Depth] : normal respiratory rhythm and effort [Exaggerated Use Of Accessory Muscles For Inspiration] : no accessory muscle use [Auscultation Breath Sounds / Voice Sounds] : lungs were clear to auscultation bilaterally [Heart Rate And Rhythm] : heart rate and rhythm were normal [Heart Sounds] : normal S1 and S2 [Murmurs] : no murmurs present [Abdomen Soft] : soft [Abdomen Tenderness] : non-tender [Abdomen Mass (___ Cm)] : no abdominal mass palpated [Abnormal Walk] : normal gait [Gait - Sufficient For Exercise Testing] : the gait was sufficient for exercise testing [Nail Clubbing] : no clubbing of the fingernails [Cyanosis, Localized] : no localized cyanosis [Petechial Hemorrhages (___cm)] : no petechial hemorrhages [FreeTextEntry1] : right leg edema [Skin Color & Pigmentation] : normal skin color and pigmentation [] : no rash [No Venous Stasis] : no venous stasis [Skin Lesions] : no skin lesions [No Skin Ulcers] : no skin ulcer [No Xanthoma] : no  xanthoma was observed [Oriented To Time, Place, And Person] : oriented to person, place, and time [Affect] : the affect was normal [Mood] : the mood was normal [No Anxiety] : not feeling anxious

## 2022-01-20 NOTE — PROCEDURE
[Lexiscan] : Lexiscan [Tc-99m, sestamibi-Cardiolite, to 40mCi, dose-Radionuclides-MonoSphere code--v.1-Active-Trade] : Tc-99m, sestamibi-Cardiolite, to 40mCi, dose-Radionuclides-MonoSphere code--v.1-Active-Trade [Normal] : no significant [de-identified] : Dr. Willy Santos     Performing Provider: Dr. Willy Santos [de-identified] : Khoi Sun [de-identified] : CP, DM, HTN, Abnormal ECG.     Gender: Female [de-identified] : Given via the IV route.    1 Day Protocol. [de-identified] : 9.5 [de-identified] : Given via the IV route [de-identified] : 28.7 [de-identified] : 61 [de-identified] : 130/70 [de-identified] : 69 [TWNoteComboBox1] : JIM [FreeTextEntry1] : image quality is good\par resting ef 66%\par post stress ef 69%\par prior study NA\par artifacts None\par blood pressure remained stable at 120/70 mmHg heart rate increased to 103 bpm

## 2022-01-21 LAB
ALBUMIN SERPL ELPH-MCNC: 4.9 G/DL
ALP BLD-CCNC: 65 U/L
ALT SERPL-CCNC: 13 U/L
ANION GAP SERPL CALC-SCNC: 19 MMOL/L
AST SERPL-CCNC: 16 U/L
BASOPHILS # BLD AUTO: 0.05 K/UL
BASOPHILS NFR BLD AUTO: 0.7 %
BILIRUB SERPL-MCNC: 0.2 MG/DL
BUN SERPL-MCNC: 29 MG/DL
CALCIUM SERPL-MCNC: 9.9 MG/DL
CHLORIDE SERPL-SCNC: 107 MMOL/L
CHOLEST SERPL-MCNC: 130 MG/DL
CO2 SERPL-SCNC: 19 MMOL/L
CREAT SERPL-MCNC: 1.24 MG/DL
EOSINOPHIL # BLD AUTO: 0.17 K/UL
EOSINOPHIL NFR BLD AUTO: 2.3 %
ESTIMATED AVERAGE GLUCOSE: 128 MG/DL
GLUCOSE SERPL-MCNC: 97 MG/DL
HBA1C MFR BLD HPLC: 6.1 %
HCT VFR BLD CALC: 37.8 %
HDLC SERPL-MCNC: 81 MG/DL
HGB BLD-MCNC: 12.2 G/DL
IMM GRANULOCYTES NFR BLD AUTO: 0.1 %
LDLC SERPL CALC-MCNC: 37 MG/DL
LYMPHOCYTES # BLD AUTO: 1.86 K/UL
LYMPHOCYTES NFR BLD AUTO: 25.5 %
MAN DIFF?: NORMAL
MCHC RBC-ENTMCNC: 31.9 PG
MCHC RBC-ENTMCNC: 32.3 GM/DL
MCV RBC AUTO: 99 FL
MONOCYTES # BLD AUTO: 0.4 K/UL
MONOCYTES NFR BLD AUTO: 5.5 %
NEUTROPHILS # BLD AUTO: 4.79 K/UL
NEUTROPHILS NFR BLD AUTO: 65.9 %
NONHDLC SERPL-MCNC: 49 MG/DL
NT-PROBNP SERPL-MCNC: 457 PG/ML
PLATELET # BLD AUTO: 202 K/UL
POTASSIUM SERPL-SCNC: 4.4 MMOL/L
PROT SERPL-MCNC: 7.2 G/DL
RBC # BLD: 3.82 M/UL
RBC # FLD: 13.7 %
SODIUM SERPL-SCNC: 145 MMOL/L
T4 FREE SERPL-MCNC: 1.3 NG/DL
TRIGL SERPL-MCNC: 60 MG/DL
TSH SERPL-ACNC: 1.99 UIU/ML
WBC # FLD AUTO: 7.28 K/UL

## 2022-01-26 ENCOUNTER — LABORATORY RESULT (OUTPATIENT)
Age: 77
End: 2022-01-26

## 2022-01-26 ENCOUNTER — NON-APPOINTMENT (OUTPATIENT)
Age: 77
End: 2022-01-26

## 2022-01-27 ENCOUNTER — NON-APPOINTMENT (OUTPATIENT)
Age: 77
End: 2022-01-27

## 2022-01-27 ENCOUNTER — RX RENEWAL (OUTPATIENT)
Age: 77
End: 2022-01-27

## 2022-01-28 ENCOUNTER — NON-APPOINTMENT (OUTPATIENT)
Age: 77
End: 2022-01-28

## 2022-01-31 ENCOUNTER — RX RENEWAL (OUTPATIENT)
Age: 77
End: 2022-01-31

## 2022-01-31 ENCOUNTER — NON-APPOINTMENT (OUTPATIENT)
Age: 77
End: 2022-01-31

## 2022-02-02 ENCOUNTER — APPOINTMENT (OUTPATIENT)
Dept: INTERNAL MEDICINE | Facility: CLINIC | Age: 77
End: 2022-02-02
Payer: MEDICARE

## 2022-02-02 ENCOUNTER — NON-APPOINTMENT (OUTPATIENT)
Age: 77
End: 2022-02-02

## 2022-02-02 VITALS
WEIGHT: 159 LBS | HEIGHT: 67 IN | SYSTOLIC BLOOD PRESSURE: 132 MMHG | HEART RATE: 68 BPM | OXYGEN SATURATION: 100 % | TEMPERATURE: 98.4 F | DIASTOLIC BLOOD PRESSURE: 75 MMHG | BODY MASS INDEX: 24.96 KG/M2

## 2022-02-02 DIAGNOSIS — R32 UNSPECIFIED URINARY INCONTINENCE: ICD-10-CM

## 2022-02-02 DIAGNOSIS — Z23 ENCOUNTER FOR IMMUNIZATION: ICD-10-CM

## 2022-02-02 PROCEDURE — 99214 OFFICE O/P EST MOD 30 MIN: CPT | Mod: 25

## 2022-02-02 PROCEDURE — 90662 IIV NO PRSV INCREASED AG IM: CPT

## 2022-02-02 PROCEDURE — G0008: CPT

## 2022-02-03 ENCOUNTER — APPOINTMENT (OUTPATIENT)
Dept: INTERNAL MEDICINE | Facility: CLINIC | Age: 77
End: 2022-02-03

## 2022-02-03 ENCOUNTER — NON-APPOINTMENT (OUTPATIENT)
Age: 77
End: 2022-02-03

## 2022-02-04 ENCOUNTER — NON-APPOINTMENT (OUTPATIENT)
Age: 77
End: 2022-02-04

## 2022-02-18 ENCOUNTER — NON-APPOINTMENT (OUTPATIENT)
Age: 77
End: 2022-02-18

## 2022-02-21 ENCOUNTER — LABORATORY RESULT (OUTPATIENT)
Age: 77
End: 2022-02-21

## 2022-02-22 ENCOUNTER — NON-APPOINTMENT (OUTPATIENT)
Age: 77
End: 2022-02-22

## 2022-02-22 ENCOUNTER — APPOINTMENT (OUTPATIENT)
Dept: HEART AND VASCULAR | Facility: CLINIC | Age: 77
End: 2022-02-22
Payer: MEDICARE

## 2022-02-22 PROCEDURE — G2066: CPT

## 2022-02-22 PROCEDURE — 93298 REM INTERROG DEV EVAL SCRMS: CPT

## 2022-02-24 ENCOUNTER — APPOINTMENT (OUTPATIENT)
Dept: PULMONOLOGY | Facility: CLINIC | Age: 77
End: 2022-02-24
Payer: MEDICARE

## 2022-02-24 ENCOUNTER — OUTPATIENT (OUTPATIENT)
Dept: OUTPATIENT SERVICES | Facility: HOSPITAL | Age: 77
LOS: 1 days | End: 2022-02-24

## 2022-02-24 DIAGNOSIS — Z96.651 PRESENCE OF RIGHT ARTIFICIAL KNEE JOINT: Chronic | ICD-10-CM

## 2022-02-24 DIAGNOSIS — I50.30 UNSPECIFIED DIASTOLIC (CONGESTIVE) HEART FAILURE: ICD-10-CM

## 2022-02-24 DIAGNOSIS — I62.01 NONTRAUMATIC ACUTE SUBDURAL HEMORRHAGE: Chronic | ICD-10-CM

## 2022-02-24 PROCEDURE — 94060 EVALUATION OF WHEEZING: CPT

## 2022-02-24 PROCEDURE — 94729 DIFFUSING CAPACITY: CPT

## 2022-02-28 ENCOUNTER — RX RENEWAL (OUTPATIENT)
Age: 77
End: 2022-02-28

## 2022-02-28 NOTE — DISCHARGE NOTE ADULT - MEDICATION SUMMARY - MEDICATIONS TO TAKE
Patient I will START or STAY ON the medications listed below when I get home from the hospital:    predniSONE 10 mg oral tablet  -- Take 40mg (4 tablets) once a day  Reduce by 5mg EVERY OTHER day     -- It is very important that you take or use this exactly as directed.  Do not skip doses or discontinue unless directed by your doctor.  Obtain medical advice before taking any non-prescription drugs as some may affect the action of this medication.  Take with food or milk.    -- Indication: For COPD exacerbation    predniSONE 5 mg oral tablet  -- 1 tab(s) by mouth once a day  -- It is very important that you take or use this exactly as directed.  Do not skip doses or discontinue unless directed by your doctor.  Obtain medical advice before taking any non-prescription drugs as some may affect the action of this medication.  Take with food or milk.    -- Indication: For COPD exacerbation    aspirin 81 mg oral delayed release tablet  -- 1 tab(s) by mouth once a day  -- Indication: For CAD    lisinopril 20 mg oral tablet  -- 1 tab(s) by mouth once a day  -- Indication: For HTN    FLUoxetine 20 mg oral capsule  -- 1 cap(s) by mouth once a day  -- Indication: For Nutrition, metabolism, and development symptoms    Lantus 100 units/mL subcutaneous solution  -- 34 unit(s) subcutaneous once a day (at bedtime)  -- Indication: For Type 2 diabetes mellitus    insulin lispro 100 units/mL subcutaneous solution  -- 18 unit(s) subcutaneous 3 times a day (before meals)  -- Indication: For Type 2 diabetes mellitus    atorvastatin 20 mg oral tablet  -- 1 tab(s) by mouth once a day (at bedtime)  -- Indication: For HLD    Ambien 5 mg oral tablet  -- 1 tab(s) by mouth once a day (at bedtime), As Needed Insomnia  -- Indication: For Insomnia (as needed)    fluticasone-salmeterol 100 mcg-50 mcg inhalation powder  -- 1 puff(s) inhaled 2 times a day  -- Indication: For COPD exacerbation    ProAir HFA 90 mcg/inh inhalation aerosol  -- 2 puff(s) inhaled 4 times a day, As Needed  -- Indication: For COPD exacerbation    amLODIPine 10 mg oral tablet  -- 1 tab(s) by mouth once a day  -- Indication: For HTN    Zithromax 500 mg oral tablet  -- 1 tab(s) by mouth once a day  -- Indication: For COPD exacerbation

## 2022-03-01 ENCOUNTER — NON-APPOINTMENT (OUTPATIENT)
Age: 77
End: 2022-03-01

## 2022-03-08 ENCOUNTER — APPOINTMENT (OUTPATIENT)
Dept: HEMATOLOGY ONCOLOGY | Facility: CLINIC | Age: 77
End: 2022-03-08
Payer: MEDICARE

## 2022-03-08 VITALS
SYSTOLIC BLOOD PRESSURE: 155 MMHG | OXYGEN SATURATION: 99 % | BODY MASS INDEX: 24.96 KG/M2 | DIASTOLIC BLOOD PRESSURE: 74 MMHG | WEIGHT: 159 LBS | TEMPERATURE: 97.6 F | HEART RATE: 59 BPM | HEIGHT: 67 IN

## 2022-03-08 PROCEDURE — 99213 OFFICE O/P EST LOW 20 MIN: CPT

## 2022-03-08 NOTE — HISTORY OF PRESENT ILLNESS
[de-identified] : 75 years old female history of multiple medical problems as well as subarachnoid hemorrhage requiring evacuation in the past.  Patient was found to have positive COVID antibodies in June 2020... Later on in July she was admitted to [facility] with a large femoral DVT, and she is currently on anticoagulation with Eliquis... She is here to assess the length of anticoagulation therapy...\par \par February 16, 2021 returns for follow-up... Continues to take the Eliquis... Tolerating well... I discussed with daughter, and reviewed patient's meds [de-identified] : October 19, 2020 patient returns for follow-up... Her main complaint is inability to sleep well... Has not seen PCP in a while... 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 No complaints tolerating Eliquis well\par \par Lucille 15, 2021 here for follow-up... Wants to know why she still needs to be on the Eliquis... Discussed with patient the fact that she has an irregular heartbeat, history of DVT, and history of presence of lupus anticoagulants... So therefore at this point the cardiologist hematologist and pulmonologist involved in her care, feel that she is better served by remaining on anticoagulation, however this decision would be revisited in the future.\par \par \par September 20, 2021 returns for follow-up... No new complaints\par \par March 8, 2022 returns for follow-up... no new complaints... Not even complaining about having to take the Eliquis

## 2022-03-08 NOTE — ASSESSMENT
[FreeTextEntry1] : \par Repeat blood work from Dr. Mustafa's office reviewed...\par \par CBC stable... Discussed results with patient and her home attendant.\par \par Follow-up here in 6 months or sooner if needed.

## 2022-03-09 ENCOUNTER — OUTPATIENT (OUTPATIENT)
Dept: OUTPATIENT SERVICES | Facility: HOSPITAL | Age: 77
LOS: 1 days | End: 2022-03-09
Payer: MEDICARE

## 2022-03-09 ENCOUNTER — APPOINTMENT (OUTPATIENT)
Dept: ULTRASOUND IMAGING | Facility: HOSPITAL | Age: 77
End: 2022-03-09
Payer: MEDICARE

## 2022-03-09 ENCOUNTER — NON-APPOINTMENT (OUTPATIENT)
Age: 77
End: 2022-03-09

## 2022-03-09 DIAGNOSIS — I62.01 NONTRAUMATIC ACUTE SUBDURAL HEMORRHAGE: Chronic | ICD-10-CM

## 2022-03-09 DIAGNOSIS — Z96.651 PRESENCE OF RIGHT ARTIFICIAL KNEE JOINT: Chronic | ICD-10-CM

## 2022-03-09 PROCEDURE — 93970 EXTREMITY STUDY: CPT | Mod: 26

## 2022-03-09 PROCEDURE — 93970 EXTREMITY STUDY: CPT

## 2022-03-15 ENCOUNTER — NON-APPOINTMENT (OUTPATIENT)
Age: 77
End: 2022-03-15

## 2022-03-24 NOTE — ED ADULT NURSE NOTE - HARM RISK FACTORS
Peripheral Block    Start time: 3/24/2022 8:21 AM  End time: 3/24/2022 8:23 AM  Performed by: Don Bamberger, MD  Authorized by: Don Bamberger, MD       Pre-procedure: Indications: at surgeon's request and post-op pain management    Preanesthetic Checklist: patient identified, risks and benefits discussed, site marked, timeout performed, anesthesia consent given and patient being monitored    Timeout Time: 08:20 EDT  Preanesthetic Checklist comment:  Risk of nerve damage discussed. Block Type:   Block Type: Adductor canal  Laterality:  Right  Monitoring:  Standard ASA monitoring, continuous pulse ox, frequent vital sign checks, heart rate, oxygen and responsive to questions  Injection Technique:  Single shot  Procedures: ultrasound guided    Patient Position: supine  Prep: chlorhexidine    Location:  Mid thigh  Needle Type:  Stimuplex (4 inch)  Needle Gauge:  20 G  Needle Localization:  Ultrasound guidance  Medication Injected:  Ropivacaine (NAROPIN) 2 mg/mL (0.2 %) injection, 40 mg  dexamethasone (DECADRON) 4 mg/mL injection, 4 mg  Med Admin Time: 3/24/2022 8:23 AM    Assessment:  Number of attempts:  1  Injection Assessment:  Incremental injection every 5 mL, local visualized surrounding nerve on ultrasound, negative aspiration for blood, no intravascular symptoms, no paresthesia and ultrasound image on chart  Patient tolerance:  Patient tolerated the procedure well with no immediate complications    Needle inserted and placed in close proximity to the nerve under real time ultrasound guidance. Ultrasound was used to visualize the spread of local anesthetic in close proximity to the nerve being blocked. The nerve appeared anatomically normal and there were no abnormal findings. no

## 2022-03-28 ENCOUNTER — NON-APPOINTMENT (OUTPATIENT)
Age: 77
End: 2022-03-28

## 2022-03-28 ENCOUNTER — APPOINTMENT (OUTPATIENT)
Dept: HEART AND VASCULAR | Facility: CLINIC | Age: 77
End: 2022-03-28
Payer: MEDICARE

## 2022-03-28 PROCEDURE — 93298 REM INTERROG DEV EVAL SCRMS: CPT

## 2022-03-28 PROCEDURE — G2066: CPT

## 2022-04-04 ENCOUNTER — FORM ENCOUNTER (OUTPATIENT)
Age: 77
End: 2022-04-04

## 2022-04-04 ENCOUNTER — APPOINTMENT (OUTPATIENT)
Dept: INTERNAL MEDICINE | Facility: CLINIC | Age: 77
End: 2022-04-04
Payer: MEDICARE

## 2022-04-04 VITALS
WEIGHT: 161 LBS | HEART RATE: 63 BPM | DIASTOLIC BLOOD PRESSURE: 78 MMHG | TEMPERATURE: 97.8 F | SYSTOLIC BLOOD PRESSURE: 131 MMHG | HEIGHT: 67 IN | BODY MASS INDEX: 25.27 KG/M2 | OXYGEN SATURATION: 98 %

## 2022-04-04 PROCEDURE — 99214 OFFICE O/P EST MOD 30 MIN: CPT

## 2022-04-05 ENCOUNTER — OUTPATIENT (OUTPATIENT)
Dept: OUTPATIENT SERVICES | Facility: HOSPITAL | Age: 77
LOS: 1 days | End: 2022-04-05
Payer: MEDICARE

## 2022-04-05 DIAGNOSIS — I50.30 UNSPECIFIED DIASTOLIC (CONGESTIVE) HEART FAILURE: ICD-10-CM

## 2022-04-05 DIAGNOSIS — I62.01 NONTRAUMATIC ACUTE SUBDURAL HEMORRHAGE: Chronic | ICD-10-CM

## 2022-04-05 DIAGNOSIS — Z96.651 PRESENCE OF RIGHT ARTIFICIAL KNEE JOINT: Chronic | ICD-10-CM

## 2022-04-05 PROCEDURE — 93306 TTE W/DOPPLER COMPLETE: CPT | Mod: 26

## 2022-04-05 PROCEDURE — 93306 TTE W/DOPPLER COMPLETE: CPT

## 2022-04-06 RX ORDER — INSULIN GLARGINE 100 [IU]/ML
100 INJECTION, SOLUTION SUBCUTANEOUS
Qty: 1 | Refills: 3 | Status: DISCONTINUED | COMMUNITY
Start: 2019-11-07 | End: 2022-04-06

## 2022-04-06 RX ORDER — BLOOD-GLUCOSE METER
70 EACH MISCELLANEOUS
Qty: 1 | Refills: 11 | Status: ACTIVE | COMMUNITY
Start: 2017-04-14 | End: 1900-01-01

## 2022-04-11 PROBLEM — Z11.59 SCREENING FOR VIRAL DISEASE: Status: ACTIVE | Noted: 2020-12-17

## 2022-04-13 ENCOUNTER — APPOINTMENT (OUTPATIENT)
Dept: HEART AND VASCULAR | Facility: CLINIC | Age: 77
End: 2022-04-13
Payer: MEDICARE

## 2022-04-13 VITALS
HEIGHT: 67 IN | WEIGHT: 161 LBS | DIASTOLIC BLOOD PRESSURE: 74 MMHG | BODY MASS INDEX: 25.27 KG/M2 | SYSTOLIC BLOOD PRESSURE: 171 MMHG | HEART RATE: 60 BPM

## 2022-04-13 PROCEDURE — 93291 INTERROG DEV EVAL SCRMS IP: CPT

## 2022-04-13 PROCEDURE — 99213 OFFICE O/P EST LOW 20 MIN: CPT | Mod: 25

## 2022-04-18 NOTE — HISTORY OF PRESENT ILLNESS
[Palpitations] : no palpitations [SOB] : no dyspnea [Syncope] : no syncope [Dizziness] : no dizziness [Chest Pain] : no chest pain or discomfort [Pain at Site] : no pain at device site [Erythema at Site] : no erythema at device site [Swelling at Site] : no swelling at device site [FreeTextEntry1] : Ms. 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  Since her last visit she was started back on Eliquis which she is tolerating.  She was called to come in as device at RRT.  No palpitations, syncope, near syncope or chest pain. No device related complaints.

## 2022-04-18 NOTE — PHYSICAL EXAM
[General Appearance - Well Developed] : well developed [Normal Appearance] : normal appearance [Well Groomed] : well groomed [General Appearance - Well Nourished] : well nourished [No Deformities] : no deformities [General Appearance - In No Acute Distress] : no acute distress [Heart Rate And Rhythm] : heart rate and rhythm were normal [Heart Sounds] : normal S1 and S2 [Edema] : no peripheral edema present [Exaggerated Use Of Accessory Muscles For Inspiration] : no accessory muscle use [Respiration, Rhythm And Depth] : normal respiratory rhythm and effort [Clean] : clean [Dry] : dry [Well-Healed] : well-healed [] : no ischemic changes [Normal Oral Mucosa] : normal oral mucosa [Normal Jugular Venous V Waves Present] : normal jugular venous V waves present [Abnormal Walk] : normal gait [Auscultation Breath Sounds / Voice Sounds] : lungs were clear to auscultation bilaterally [Palpable Crepitus] : no palpable crepitus [Bleeding] : no active bleeding [Foul Odor] : no foul smell [Purulent Drainage] : no purulent drainage [Serosanguineous Drainage] : no serosanquineous drainage [Serous Drainage] : no serous drainage [Erythema] : not erythematous [Warm] : not warm [Tender] : not tender [Indurated] : not indurated [Fluctuant] : not fluctuant [FreeTextEntry1] : mid L chest

## 2022-04-18 NOTE — DISCUSSION/SUMMARY
[FreeTextEntry1] : Ms. 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  ILR at RRT.  She has had some atrial fibrillation but a very low afib burden.  She is maintained on Eliquis which she is tolerating.  Options include removal or keeping it in place.  We also discussed removal and reimplant but given that she is on oral anticoagulation and asymptomatic this is not necessary.  IF she has to come off oral anticoagulation in the future we discussed that reimplantation of ILR to see burden vs LIDYA closure.  She would like to keep her ILR in place.  She knows to call with any questions or concerns.

## 2022-04-18 NOTE — REVIEW OF SYSTEMS
[Negative] : Heme/Lymph [Chills] : no chills [Fever] : no fever [SOB] : no shortness of breath [Chest Discomfort] : no chest discomfort [Palpitations] : no palpitations [Syncope] : no syncope

## 2022-04-18 NOTE — PROCEDURE
[de-identified] : medtronic reveal LINQ [id number]\par 7/10/18\par  battery RRT\par good sensing \par episodes of afib - burden 0% as short and infrequent (total of about 20 minutes since last visit)

## 2022-04-26 ENCOUNTER — APPOINTMENT (OUTPATIENT)
Dept: PULMONOLOGY | Facility: CLINIC | Age: 77
End: 2022-04-26
Payer: MEDICARE

## 2022-04-26 VITALS
WEIGHT: 163 LBS | HEIGHT: 67 IN | SYSTOLIC BLOOD PRESSURE: 149 MMHG | HEART RATE: 56 BPM | DIASTOLIC BLOOD PRESSURE: 74 MMHG | BODY MASS INDEX: 25.58 KG/M2 | RESPIRATION RATE: 12 BRPM | OXYGEN SATURATION: 97 % | TEMPERATURE: 98.2 F

## 2022-04-26 PROCEDURE — 99214 OFFICE O/P EST MOD 30 MIN: CPT

## 2022-04-27 ENCOUNTER — NON-APPOINTMENT (OUTPATIENT)
Age: 77
End: 2022-04-27

## 2022-04-27 ENCOUNTER — APPOINTMENT (OUTPATIENT)
Dept: HEART AND VASCULAR | Facility: CLINIC | Age: 77
End: 2022-04-27
Payer: MEDICARE

## 2022-04-27 VITALS
SYSTOLIC BLOOD PRESSURE: 143 MMHG | DIASTOLIC BLOOD PRESSURE: 80 MMHG | TEMPERATURE: 97.3 F | OXYGEN SATURATION: 98 % | WEIGHT: 164 LBS | HEIGHT: 67 IN | BODY MASS INDEX: 25.74 KG/M2 | HEART RATE: 69 BPM

## 2022-04-27 PROCEDURE — 36415 COLL VENOUS BLD VENIPUNCTURE: CPT

## 2022-04-27 PROCEDURE — 99214 OFFICE O/P EST MOD 30 MIN: CPT

## 2022-04-27 PROCEDURE — 93000 ELECTROCARDIOGRAM COMPLETE: CPT

## 2022-04-27 NOTE — PROCEDURE
[Lexiscan] : Lexiscan [Tc-99m, sestamibi-Cardiolite, to 40mCi, dose-Radionuclides-Acclaimd code--v.1-Active-Trade] : Tc-99m, sestamibi-Cardiolite, to 40mCi, dose-Radionuclides-Acclaimd code--v.1-Active-Trade [Normal] : no significant [de-identified] : Dr. Willy Santos     Performing Provider: Dr. Willy Santos [de-identified] : Khoi Sun [de-identified] : CP, DM, HTN, Abnormal ECG.     Gender: Female [de-identified] : Given via the IV route.    1 Day Protocol. [de-identified] : 9.5 [de-identified] : Given via the IV route [de-identified] : 28.7 [de-identified] : 61 [de-identified] : 130/70 [de-identified] : 69 [TWNoteComboBox1] : JIM [FreeTextEntry1] : image quality is good\par resting ef 66%\par post stress ef 69%\par prior study NA\par artifacts None\par blood pressure remained stable at 120/70 mmHg heart rate increased to 103 bpm

## 2022-04-27 NOTE — HISTORY OF PRESENT ILLNESS
[FreeTextEntry1] : 77 F ASD repaired surgically IDDM atrial fibrillation s/p ablation by dr kramer  SDH ILR DVT/PE (possibly COVID related) on AC,  atrial tachycardia which manifested as sob controlled with BB here today for BP management she has no complaints\par \par \par echo 2/2022 EF LV and RV \par normal nuclear stress test 6/2020\par ecg 4/27/2022 nsr septal infarct \par

## 2022-04-27 NOTE — ASSESSMENT
[FreeTextEntry1] : loop recorder confirms afib stay on eliquis 5 mg bid \par htn controlled she had leg edema on higher doses of amlodipine acceptable at this time \par on statin given her history of DM \par fu in 4 months \par \par

## 2022-04-28 LAB
ALBUMIN SERPL ELPH-MCNC: 5 G/DL
ALP BLD-CCNC: 77 U/L
ALT SERPL-CCNC: 20 U/L
ANION GAP SERPL CALC-SCNC: 20 MMOL/L
AST SERPL-CCNC: 20 U/L
BASOPHILS # BLD AUTO: 0.06 K/UL
BASOPHILS NFR BLD AUTO: 0.8 %
BILIRUB SERPL-MCNC: 0.2 MG/DL
BUN SERPL-MCNC: 23 MG/DL
CALCIUM SERPL-MCNC: 10.2 MG/DL
CHLORIDE SERPL-SCNC: 105 MMOL/L
CHOLEST SERPL-MCNC: 164 MG/DL
CO2 SERPL-SCNC: 19 MMOL/L
CREAT SERPL-MCNC: 1.28 MG/DL
EGFR: 43 ML/MIN/1.73M2
EOSINOPHIL # BLD AUTO: 0.25 K/UL
EOSINOPHIL NFR BLD AUTO: 3.2 %
ESTIMATED AVERAGE GLUCOSE: 143 MG/DL
GLUCOSE SERPL-MCNC: 112 MG/DL
HBA1C MFR BLD HPLC: 6.6 %
HCT VFR BLD CALC: 38.7 %
HDLC SERPL-MCNC: 110 MG/DL
HGB BLD-MCNC: 12.4 G/DL
IMM GRANULOCYTES NFR BLD AUTO: 0.3 %
LDLC SERPL CALC-MCNC: 43 MG/DL
LYMPHOCYTES # BLD AUTO: 2.45 K/UL
LYMPHOCYTES NFR BLD AUTO: 31.8 %
MAN DIFF?: NORMAL
MCHC RBC-ENTMCNC: 31.4 PG
MCHC RBC-ENTMCNC: 32 GM/DL
MCV RBC AUTO: 98 FL
MONOCYTES # BLD AUTO: 0.65 K/UL
MONOCYTES NFR BLD AUTO: 8.4 %
NEUTROPHILS # BLD AUTO: 4.28 K/UL
NEUTROPHILS NFR BLD AUTO: 55.5 %
NONHDLC SERPL-MCNC: 54 MG/DL
NT-PROBNP SERPL-MCNC: 174 PG/ML
PLATELET # BLD AUTO: 205 K/UL
POTASSIUM SERPL-SCNC: 4.6 MMOL/L
PROT SERPL-MCNC: 7.9 G/DL
RBC # BLD: 3.95 M/UL
RBC # FLD: 13.1 %
SODIUM SERPL-SCNC: 143 MMOL/L
TRIGL SERPL-MCNC: 56 MG/DL
WBC # FLD AUTO: 7.71 K/UL

## 2022-04-28 NOTE — DISCUSSION/SUMMARY
[FreeTextEntry1] : ATTENDING'S SUMMARY:\par 4-26-22:\par mild pallor, no icterus\par no JVD, no hepatojugular reflux, no HSM\par no cervical adenopathy, no supraclavicular adenopathy\par normal s1/s2, no murmurs, rubs or gallops\par no dullness, good air entry bilaterally, no wheezing, rhonchi or crackles\par sternotomy incision present\par no cyanosis, grade 1 clubbing, no articular manifestations, Thickening of the skin of the ulnar aspect of both hands, especially the right\par slight discoloration of the fingernails\par no pedal edema\par

## 2022-04-28 NOTE — PHYSICAL EXAM
[No Acute Distress] : no acute distress [Normal Oropharynx] : normal oropharynx [Normal Appearance] : normal appearance [No Neck Mass] : no neck mass [Normal Rate/Rhythm] : normal rate/rhythm [Normal S1, S2] : normal s1, s2 [No Murmurs] : no murmurs [No Resp Distress] : no resp distress [Clear to Auscultation Bilaterally] : clear to auscultation bilaterally [No Abnormalities] : no abnormalities [Benign] : benign [Normal Gait] : normal gait [No Cyanosis] : no cyanosis [No Edema] : no edema [FROM] : FROM [Normal Color/ Pigmentation] : normal color/ pigmentation [No Focal Deficits] : no focal deficits [Oriented x3] : oriented x3 [Normal Affect] : normal affect [TextBox_105] : grade 1 clubbing, no articular manifestations, Thickening of the skin of the ulnar aspect of both hands, especially the right\par slight discoloration of the fingernails

## 2022-04-28 NOTE — REVIEW OF SYSTEMS
[SOB on Exertion] : sob on exertion [Arthralgias] : arthralgias [Diabetes] : diabetes [Negative] : Psychiatric [Fever] : no fever [Chills] : no chills [Cough] : no cough [Chest Tightness] : no chest tightness [Pleuritic Pain] : no pleuritic pain [Wheezing] : no wheezing [Chest Discomfort] : no chest discomfort [Palpitations] : no palpitations [Seasonal Allergies] : no seasonal allergies [Thyroid Problem] : no thyroid problem [TextBox_122] : h/o subdural hematoma s/p Acampo hole procedure

## 2022-04-28 NOTE — PROCEDURE
[FreeTextEntry1] : LE doppler 3/9/22\par IMPRESSION:\par No evidence of deep venous thrombosis in either lower extremity.\par \par PFT 22\par FVC: 1.62 L (64%)--> 1.89 L (75%) \par FEV1: 1.05 L  (54%)--> 1.34 L (69%) \par FEV1/FVC: 65%--> 71%\par ACF23-48%: 0.60 L/s (36%)--> 0.88 L/s (52%)\par could not perform TLC, n2 washout\par DLCO: 9.51 (43%)\par \par Echo 22\par CONCLUSIONS:\par  1. Normal left and right ventricular size and systolic function.\par  2. Mildly dilated left atrium.\par  3. No significant valvular disease.\par  4. No evidence of pulmonary hypertension, pulmonary artery systolic pressure is 27 mmHg.\par  5. No pericardial effusion.\par  6. Compared to the previous TTE performed on 10/15/2020, there have been no significant interval changes.\par \par Chest CT 21\par IMPRESSION: Unchanged subsegmental atelectasis versus scarring in the right middle lobe, lingula and bilateral lower lobes. No evidence of interstitial lung disease.\par \par LE doppler 21\par IMPRESSION:\par No evidence of deep venous thrombosis in either lower extremity. Prior right common femoral vein DVT has resolved.\par \par PFT 21\par FVC: 1.89 L (74%)--> 2.08 L (81%) \par FEV1: 1.43 L (73%)--> 1.53 L (78%) \par FEV1/FVC: 76%--> 74%\par PLH24-16%: 0.95 L/s (55%)--> 0.99 L/s (57%)\par TLC: 3.57 L (66%)\par RV/T%\par Mild restrictive lung defect. No significant bronchodilator response. Mildly decreased lung volumes\par elevated RV/TLC consistent with air trapping\par Patient could not hold her breath to do DLCO\par \par 6MWT 21 done using rolling walker chair\par Patient walked 110 meters during a 6 minute walk test.\par Resting O2 saturation was 100% on RA. Heart rate 103 bpm.\par End test O2 saturation was 99% on RA. Heart rate 111 bpm. Casa scale end of test 4 "somewhat severe"\par This signifies reduced walk distance, no significant desaturation\par \par Labs 10/20/20:\par Positive COVID antibody\par Positive cardiolipin antibody level\par Negative Beta 2 Glycoprotein 1 IgG Ab and IgM ab and IgA ab\par Negative cardiolipin IgG, IgM, IgA\par CBC: Hgb 9.0\par \par Labs 20:\par Negative scleroderma antibodies\par Positive BRIAN: 1:160, SPECKLED\par Normal RF, normal hemolytic complement\par Negative D-dimer  \par Positive cardiolipin antibody level\par \par Echo 10/15/20\par CONCLUSIONS: \par  1. Normal left and right ventricular size and systolic function. \par  2. Mild symmetric left ventricular hypertrophy. \par  3. Grade II left ventricular diastolic dysfunction.\par  4. Biatrial enlargement.\par  5. Aortic sclerosis without significant stenosis. \par  6. No pericardial effusion.\par  7. Pulmonary artery systolic pressure is 35 mmHg. \par \par PFTs from 20 reviewed from Dr. Jony Wang's office. \par FVC: 1.61 L (63%) predicted pre-BD and 1.65 L (65%) post-BD\par FEV1: 1/06 L (54%) predicted pre-BD and 1.21 L (62%) predicted post-BD\par FEV1/FVC: 66% -> 73%\par TLC: 3.17 L (58%) \par DLCO: 69% \par Spirometry notable for moderately severe obstructive defect with acute response to BD. Lung volumes reveal moderately severe restrictive defect. DLCO slightly reduced. \par \par 6MWT 20: Resting O2 saturation 99%, heart rate 79.  Post exercise O2 96%, HR 97. No evidence of oxygen desaturation but does show reduced walking distance of only 88 meters with Casa scale 4.\par \par \par EXAM: ECHO TTE WO CON COMP W DOPP PROCEDURE DATE: 2020 \par CONCLUSIONS: \par  1. Limited study obtained for evaluation of left ventricular function performed by cardiology fellow on call. \par  2. The right ventricle is not well visualized. \par  3. Abnormal septal motion seen due to abnormal conduction. Endocardium not well visualized. Left ventricular ejection fraction is 50-55% in limited views. \par  4. No pericardial effusion. \par  5. Recommend repeat complete study for a comprehensive evaluation of biventricular function and cardiac valve assessment. \par Right Ventricle: The right ventricle is not well visualized. \par \par EXAM: US DPLX LWR EXT VEINS LTD RT PROCEDURE DATE: 2020 \par Thrombus visualized in the right common femoral vein extending to distal femoral vein and profunda femoris. No thrombus identified in the saphenofemoral junction or popliteal vein or calf veins on grayscale and Doppler examination. Contralateral left common femoral vein is patent. Subcutaneous edema in right lower extremity \par IMPRESSION: Since Lucille 10, 2016, new partially occlusive deep venous thrombus in right common femoral vein extending to distal femoral vein and profunda femoris. \par \par EXAM: CT ANGIO CHEST PE PROTOCOL IC PROCEDURE DATE: 2020 \par Pulmonary arteries: Occlusive filling defect in right main pulmonary artery, right upper lobe segmental and subsegmental branches. No thrombus in left pulmonary artery or sagittal embolus. Unchanged main pulmonary artery, 3.0 cm. No evidence of right heart chain. \par Lungs and large airways: No focal consolidation. No evidence of pulmonary infarct. Since May 30, 2019, unchanged scattered bilateral linear opacities in both lower lobes, lingula, and right middle lobe, probably subsegmental atelectasis or fibrosis. \par Pleura: No pleural effusion. \par Mediastinum and hilar regions: No thoracic lymphadenopathy. \par Cardiovascular: Heart size is normal. No thoracic aortic aneurysm. \par Pericardial effusion: No pericardial effusion. \par Chest wall and lower neck: Loop recorder in left upper chest wall. \par Upper abdomen: Normal. \par Bones: Intact sternotomy wires. Mild degenerative changes. \par IMPRESSION: \par 1. Partially occlusive pulmonary thromboemboli in right main pulmonary artery and right upper lobe segmental and subsegmental branches. No evidence of right heart strain or pulmonary infarct. \par 2. Since May 30, 2019, unchanged scattered bilateral linear opacities, possibly subsegmental atelectasis or fibrosis.

## 2022-04-28 NOTE — ASSESSMENT
[FreeTextEntry1] : 4-26-22:\par It was a pleasure to see Esequiel today for follow-up. Her respiratory issues are summarized:\par \par 1. Pulmonary embolism/DVT\par Esequiel was diagnosed with DVT in right leg 7/23/20 during visit with Dr. Santos, started on Eliquis. Prior to this, she states she did not have a history of any blood clots. She then presented to ED on 7/25/20 with chest pain.  CTA-PE demonstrated segmental and subsegmental PEs on right. tPA not given due to h/o intracranial bleed. Leg dopplers showed new partially occlusive DVT in right common femoral vein extending to distal femoral vein and profunda femoris. \par \par She is taking Eliquis. She had LE swelling on prior visit. LE doppler 4/7/21 showed no evidence of deep venous thrombosis in either lower extremity. Prior right common femoral vein DVT has resolved.\par \par She was evaluated by Dr. Payton Contreras on 8-26-20 who ordered a hypercoagulability workup. Of note, her anticardiolipin IgG was mildly elevated to 21. D-dimer was normal on 9/3/20. Repeat Anticardiolipin IgG was normal. This was likely an acute phase reactant. Negative Beta 2 Glycoprotein 1 IgG Ab and IgM ab and IgA ab. 6MWT without evidence of oxygen desaturation but does show reduced walking distance of only 88 meters with Casa scale 4. \par \par PFT done 2/24/22 shows mild obstructive lung defect with significant bronchodilator response. She was unable to perform total lung capacity by n2 washout. Since August 2020, her diffusion capacity has decreased from 14.7 (69% of predicted) to 9.51 (43% of predicted). D-dimer was checked and is negative. There was no evidence of pulmonary hypertension on recent echo. LE doppler was negative on 3/8/22. \par \par -Labs 3/11/21 Autoimmune serologies were repeated: normal RF, cardioliopin antibody IgA, IgA and IgM, Beta 2 glycoprotein 1 IgA Ab, IgG Ab, IgM Ab, repeat BRIAN 1:80, Speckled, negative scleroderma antibodies, negative centromere antibody. She does not need to see rheumatology at this time.\par -Chest CT done 6/2021 does not show evidence of interstitial lung disease. There are ground glass opacities at the bases with linear atelectasis. There are some nodules mainly in the subpleural area of the right upper lobe\par \par Plan:\par -She is wearing compression stockings.\par -She has been taking anticoagulation for over a year (since July 2020)\par -Repeat chest CT in June 2022\par -Ordered 6MWT to be done in June when she comes in for CT\par \par 2. Skin thickening\par On exam today, the skin over the dorsum of her hands appears thickened and less mobile. She has no known history of scleroderma, and no known family history of scleroderma. She also complains of joint and stiffness in the hands. Labs 9/4/20 showed negative SCL70, Positive BRIAN: 1:160, SPECKLED, Normal RF, normal hemolytic complement. Repeat SCL70 and anti centromere ab on 12/8/20 were normal. Repeat labs done 3/11/21 show normal RF, D-dimer, CMP, Hgb 11.4. Autoimmune serologies were repeated: normal cardioliopin antibody IgA, IgA and IgM, Beta 2 glycoprotein 1 IgA Ab, IgG Ab, IgM Ab, repeat BRIAN 1:80, Speckled, negative scleroderma antibodies, negative centromere antibody. She does not need to see rheumatology at this time.\par \par 3. Asthma\par She has been diagnosed with asthma in the past and is taking Symbicort. PFT done 2/24/22 shows mild obstructive lung defect with significant bronchodilator response. Given BD response, we increased Symbicort dosage from 80 to 160. We confirmed with her daughter that she is taking Symbicort 160.\par \par Plan:\par - Continue Symbicort 160\par - Use albuterol PRN\par \par 4. R/O sleep apnea\par There is evidence of diastolic dysfunction on Esequiel's recent echo. Sleep study done 12/22/20 showed no significant sleep disordered breathing.\par \par 5. Diastolic Dysfunction\par On 7/26 in-patient echo was done at bedside and RV was not well visualized). Echo done on 10/15/20 shows grade II left ventricular diastolic dysfunction. The pulmonary artery systolic pressure is borderline elevated, 35 mmHg. BNP on 1/26/22 was 465. B-lines were seen bilaterally on bedside ultrasound. Repeat echo shows normal left ventricular diastolic function.  She is not taking Lasix 20mg MWF.\par \par 6. Health maintenance\par Flu vaccine up to date. She states she is up-to-date with her pneumococcal vaccination. \par She was counseled on getting vaccinated for COVID as soon as possible\par \par Return to clinic: 3 months

## 2022-04-28 NOTE — HISTORY OF PRESENT ILLNESS
[TextBox_4] : Pt is a 76 yo F with PMH asthma/COPD, ASD repair, CAD, Afib (paroxysmal) s/p ablation, subdural hematoma s/p Stanley hole evacuation, IDDM, HTN, HLD. \par \par Initial intake 9/3/20: Found to have DVT in right leg 7/23/20 during visit with Dr. Santos, started on Eliquis. Prior to this, she states she did not have a history of any blood clots. Then presented to ED on 7/25/20 with chest pain on left, worse with inspiration, pain 10/10. Wells score 6. CTA-PE with segmental and subsegmental PEs on right. tPA not given due to h/o intracranial bleed. Leg dopplers showed new partially occlusive DVT in right common femoral vein extending to distal femoral vein and profunda femoris. \par \par Echo 7/26/20: RV not well visualized. PASP was not estimated. \par \par She continues on Eliquis.  Currently denies chest pain, bleeding episodes on Eliquis, new leg pain or leg swelling. She can walk about 1/2 block, limited by SOB. This is stable as compared to before her PE diagnosis as per pt. Overall, she feels that she is at her baseline. \par \par Of note, pt had seen Dr. Sarabia in 03/2020 for evaluation of asthma. On Symbicort 80-4.5 BID. \par Quit smoking 20-30 years ago, one pack would last her one week. \par \par 4-26-22:\par Her breathing is the same\par She isn't sure the dose of symbicort\par she can walk 1 block (last visit said she could only walk 1/2 block)\par She doesn't walk everyday because she doesn't feel like it\par Before she had a blood clot, she was walking the same distance

## 2022-05-02 ENCOUNTER — APPOINTMENT (OUTPATIENT)
Dept: HEART AND VASCULAR | Facility: CLINIC | Age: 77
End: 2022-05-02
Payer: MEDICARE

## 2022-05-02 ENCOUNTER — NON-APPOINTMENT (OUTPATIENT)
Age: 77
End: 2022-05-02

## 2022-05-02 PROCEDURE — G2066: CPT

## 2022-05-02 PROCEDURE — 93298 REM INTERROG DEV EVAL SCRMS: CPT

## 2022-05-10 NOTE — PROVIDER CONTACT NOTE (CRITICAL VALUE NOTIFICATION) - PERSON GIVING RESULT:
DATE OF PROCEDURE: 5/31/2022    SURGEON: Dr. Francisco J Pham    PROCEDURE: Right total hip arthroplasty    FACILITY: [facility]    Ms. Guo is being seen today in consultation at the request of Dr. Pham.  Chief complaint: Patient is here for a Pre-Op Exam. She had CBC, CMP, PT and A1C drawn already on 5/2/22.    REVIEW OF SYSTEMS:  As above per the HPI.  All other systems reviewed and otherwise negative.  Constitutional:  Negative for fever, chills, appetite change, fatigue.  Integument:  Negative for pigmentation or loss of pigmentation, rash, dry skin, scaling, itching, bruising, lumps or bumps, hair changes and nail changes.   HEENT:  Negative for eye drainage, rhinorrhea, ear pain, sore throat or neck pain.  Respiratory:  Negative cough, wheezing or shortness of breath.    Cardiovascular:  Negative for chest pain, chest pressure, palpitations, diaphoresis or edema.   Gastrointestinal:  Negative for nausea, vomiting, diarrhea, abdominal pain, black or tarry stools.  Genitourinary:  Negative for dysuria, urgency, frequency, hematuria or flank pain.  Extremities:  Negative for joint swelling. +chronic right hip pain, osteoarthritis  Neurologic:  Negative for change in sensory or motor function.  Negative for headache, migraine aura. No history of vertigo.  No change in vision or speech.  Endocrine:  Negative for heat or cold intolerance, polydipsia, weight loss or gain.  Hematological:  Negative for bleeding, bruising or lymphadenopathy.  Psychiatric:  Negative for change in affect, anxiety, depression, mentation or sleep disturbance.     ALLERGIES:  Bee sting    PMH:   has a past medical history of Arthritis, Glaucoma, LASIK (2014), Hypertension, Hypothyroid, and Psoriasis.  Patient Active Problem List   Diagnosis   • Hypertension   • Arthritis   • Psoriasis   • Glaucoma   • Thyroid nodule   • History of bunionectomy   • Hyperlipemia   • Hyperparathyroidism (CMS/HCC)   • Pre-op testing   •  Hypothyroidism   • Primary osteoarthritis of right knee       PSH:   has a past surgical history that includes Thyroidectomy (Left, 11/28/2018) and lasik surgery (Left, 2014).    Social History:   reports that she has never smoked. She has never used smokeless tobacco. She reports current alcohol use of about 5.0 standard drinks of alcohol per week. She reports that she does not use drugs.    Home Medications    Medication Directions Start Date End Date   Rocklatan 0.02-0.005 % Solution  3/7/22    levothyroxine 88 MCG tablet TAKE 1 TABLET BY MOUTH DAILY 4/18/22    lisinopril (ZESTRIL) 10 MG tablet Take 1 tablet by mouth daily. 9/10/21    cholecalciferol (VITAMIN D) 25 mcg (1,000 units) tablet Take 1 tablet by mouth daily.     triamcinolone (ARISTOCORT) 0.1 % cream Apply 1 application topically 2 times daily as needed. 5/17/21    Ascorbic Acid (vitamin C) 500 MG tablet Take 1 tablet by mouth daily.     cyanocobalamin (Vitamin B-12) 100 MCG tablet      Calcium Carbonate-Vit D-Min (CALCIUM 1200 PO)      Simbrinza 1-0.2 % ophthalmic suspension  9/14/20    timolol (TIMOPTIC) 0.5 % ophthalmic solution INSTILL 1 DROP OS DAILY IN THE MORNING 8/31/18    Magnesium 400 MG Tab      Zinc 30 MG Cap      atorvastatin (LIPITOR) 10 MG tablet Take 1 tablet by mouth daily. 6/17/21    Flaxseed, Linseed, (Flax Seed Oil) 1000 MG capsule      travoprost, benzalkonium free, (TRAVATAN Z) 0.004 % ophthalmic solution Once daily left eye only 11/8/11      Vitals:  Blood pressure 124/80, pulse 64, temperature 96.5 °F (35.8 °C), temperature source Temporal, height 5' 3.5\" (1.613 m), weight 79.8 kg (176 lb). Body mass index is 30.69 kg/m².    Physical Exam  Constitutional:       Appearance: Normal appearance.   HENT:      Right Ear: Tympanic membrane, ear canal and external ear normal.      Left Ear: Tympanic membrane, ear canal and external ear normal.      Mouth/Throat:      Mouth: Mucous membranes are moist.      Pharynx: Oropharynx is clear.  brenna from hematolog No posterior oropharyngeal erythema.      Neck: Normal range of motion and neck supple.   Eyes:      Conjunctiva/sclera: Conjunctivae normal.   Cardiovascular:      Rate and Rhythm: Normal rate and regular rhythm.      Heart sounds: Normal heart sounds.   Pulmonary:      Effort: Pulmonary effort is normal.      Breath sounds: Normal breath sounds.   Abdominal:      General: Abdomen is flat. Bowel sounds are normal.      Palpations: Abdomen is soft. There is no mass.      Tenderness: There is no abdominal tenderness.      Hernia: No hernia is present.   Musculoskeletal:         General: Tenderness (right hip) present.      Right lower leg: No edema.      Left lower leg: No edema.   Lymphadenopathy:      Cervical: No cervical adenopathy.   Skin:     Findings: No rash.   Neurological:      Mental Status: She is alert and oriented to person, place, and time.   Psychiatric:         Mood and Affect: Mood normal.         Behavior: Behavior normal.         Problem List Items Addressed This Visit        Endocrine and Metabolic    Hypothyroidism    Relevant Orders    THYROID STIMULATING HORMONE       Health Encounters    Pre-op testing - Primary    Relevant Orders    ELECTROCARDIOGRAM 12-LEAD: Sinus bradycardia with heart rate of 59 bpm    URINALYSIS & REFLEX MICROSCOPY    URINE, BACTERIAL CULTURE       Musculoskeletal and Injuries    Primary osteoarthritis of right knee        Patient is medically cleared for surgery.     Follow up: post-operatively, as needed.    Discussed upcoming surgery and risks.    The patient indicates understanding of these issues and agrees with the plan.      Electronically signed: Stefani Bernardo PA-C

## 2022-05-16 ENCOUNTER — OUTPATIENT (OUTPATIENT)
Dept: OUTPATIENT SERVICES | Facility: HOSPITAL | Age: 77
LOS: 1 days | End: 2022-05-16
Payer: MEDICARE

## 2022-05-16 ENCOUNTER — APPOINTMENT (OUTPATIENT)
Dept: CT IMAGING | Facility: HOSPITAL | Age: 77
End: 2022-05-16

## 2022-05-16 ENCOUNTER — RESULT REVIEW (OUTPATIENT)
Age: 77
End: 2022-05-16

## 2022-05-16 DIAGNOSIS — I62.01 NONTRAUMATIC ACUTE SUBDURAL HEMORRHAGE: Chronic | ICD-10-CM

## 2022-05-16 DIAGNOSIS — Z96.651 PRESENCE OF RIGHT ARTIFICIAL KNEE JOINT: Chronic | ICD-10-CM

## 2022-05-16 PROCEDURE — 71250 CT THORAX DX C-: CPT | Mod: 26

## 2022-05-16 PROCEDURE — 71250 CT THORAX DX C-: CPT

## 2022-05-18 ENCOUNTER — NON-APPOINTMENT (OUTPATIENT)
Age: 77
End: 2022-05-18

## 2022-06-01 ENCOUNTER — APPOINTMENT (OUTPATIENT)
Dept: PULMONOLOGY | Facility: CLINIC | Age: 77
End: 2022-06-01
Payer: MEDICARE

## 2022-06-01 PROCEDURE — 94618 PULMONARY STRESS TESTING: CPT

## 2022-06-13 NOTE — DISCHARGE NOTE ADULT - NS AS DC AMI YN
lives at [facility], needs assist with all mobility and ADLs; WC bound, uses [facility] for transfers no

## 2022-07-20 ENCOUNTER — APPOINTMENT (OUTPATIENT)
Dept: HEART AND VASCULAR | Facility: CLINIC | Age: 77
End: 2022-07-20

## 2022-07-20 VITALS
BODY MASS INDEX: 25.74 KG/M2 | HEART RATE: 92 BPM | OXYGEN SATURATION: 98 % | WEIGHT: 164 LBS | HEIGHT: 67 IN | DIASTOLIC BLOOD PRESSURE: 78 MMHG | SYSTOLIC BLOOD PRESSURE: 128 MMHG | TEMPERATURE: 93 F

## 2022-07-20 PROCEDURE — 93291 INTERROG DEV EVAL SCRMS IP: CPT

## 2022-07-20 PROCEDURE — 99213 OFFICE O/P EST LOW 20 MIN: CPT | Mod: 25

## 2022-07-25 ENCOUNTER — RX RENEWAL (OUTPATIENT)
Age: 77
End: 2022-07-25

## 2022-07-25 ENCOUNTER — APPOINTMENT (OUTPATIENT)
Dept: INTERNAL MEDICINE | Facility: CLINIC | Age: 77
End: 2022-07-25

## 2022-07-25 VITALS
WEIGHT: 152 LBS | BODY MASS INDEX: 23.86 KG/M2 | TEMPERATURE: 98 F | HEIGHT: 67 IN | OXYGEN SATURATION: 98 % | SYSTOLIC BLOOD PRESSURE: 124 MMHG | DIASTOLIC BLOOD PRESSURE: 73 MMHG | HEART RATE: 70 BPM

## 2022-07-25 PROCEDURE — 99214 OFFICE O/P EST MOD 30 MIN: CPT | Mod: 25

## 2022-07-25 PROCEDURE — 36415 COLL VENOUS BLD VENIPUNCTURE: CPT

## 2022-07-26 LAB
ALBUMIN SERPL ELPH-MCNC: 4.5 G/DL
ALP BLD-CCNC: 49 U/L
ALT SERPL-CCNC: 18 U/L
ANION GAP SERPL CALC-SCNC: 18 MMOL/L
AST SERPL-CCNC: 19 U/L
BILIRUB SERPL-MCNC: 0.3 MG/DL
BUN SERPL-MCNC: 18 MG/DL
CALCIUM SERPL-MCNC: 9.9 MG/DL
CHLORIDE SERPL-SCNC: 106 MMOL/L
CO2 SERPL-SCNC: 17 MMOL/L
CREAT SERPL-MCNC: 1.43 MG/DL
EGFR: 38 ML/MIN/1.73M2
GLUCOSE SERPL-MCNC: 119 MG/DL
MAGNESIUM SERPL-MCNC: 1.8 MG/DL
POTASSIUM SERPL-SCNC: 4.4 MMOL/L
PROT SERPL-MCNC: 7 G/DL
SODIUM SERPL-SCNC: 141 MMOL/L

## 2022-07-26 NOTE — PHYSICAL EXAM
[General Appearance - Well Developed] : well developed [Normal Appearance] : normal appearance [Well Groomed] : well groomed [General Appearance - Well Nourished] : well nourished [No Deformities] : no deformities [General Appearance - In No Acute Distress] : no acute distress [Heart Rate And Rhythm] : heart rate and rhythm were normal [Heart Sounds] : normal S1 and S2 [Edema] : no peripheral edema present [Respiration, Rhythm And Depth] : normal respiratory rhythm and effort [Exaggerated Use Of Accessory Muscles For Inspiration] : no accessory muscle use [Clean] : clean [Dry] : dry [Well-Healed] : well-healed [] : no ischemic changes [Normal Oral Mucosa] : normal oral mucosa [Abnormal Walk] : normal gait [Palpable Crepitus] : no palpable crepitus [Bleeding] : no active bleeding [Foul Odor] : no foul smell [Purulent Drainage] : no purulent drainage [Serosanguineous Drainage] : no serosanquineous drainage [Serous Drainage] : no serous drainage [Erythema] : not erythematous [Warm] : not warm [Tender] : not tender [Indurated] : not indurated [Fluctuant] : not fluctuant [FreeTextEntry1] : mid L chest

## 2022-07-26 NOTE — HISTORY OF PRESENT ILLNESS
[Palpitations] : no palpitations [SOB] : no dyspnea [Syncope] : no syncope [Dizziness] : no dizziness [Chest Pain] : no chest pain or discomfort [Pain at Site] : no pain at device site [Erythema at Site] : no erythema at device site [Swelling at Site] : no swelling at device site [FreeTextEntry1] : Ms. 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  Since her last visit she was started back on Eliquis which she is tolerating.  Her ILR is known to be at RRT.  No palpitations, syncope, near syncope or chest pain. No device related issues.

## 2022-07-26 NOTE — DISCUSSION/SUMMARY
[FreeTextEntry1] : Ms. 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  ILR at RRT but still able to interrogation.  She has had some atrial fibrillation but a very low afib burden.  She is maintained on Eliquis which she is tolerating.  Options include removal or keeping it in place.  She would like to keep it in place.  We discussed given her history she may benefit from a WATCHMAN and she would like to defer this at this point.  She will follow up with her cardiologist and be referred back to our clinic as needed.  She knows to call with any questions or concerns.

## 2022-07-26 NOTE — PROCEDURE
[de-identified] : medtronic reveal LINQ [id number]\par 7/10/18\par  battery RRT\par good sensing \par episodes of afib - burden 0.1% as short and infrequent

## 2022-07-26 NOTE — REVIEW OF SYSTEMS
[Negative] : Heme/Lymph [Fever] : no fever [Weight Gain (___ Lbs)] : no recent weight gain [Chills] : no chills [Weight Loss (___ Lbs)] : no recent weight loss [SOB] : no shortness of breath [Chest Discomfort] : no chest discomfort [Palpitations] : no palpitations [Syncope] : no syncope

## 2022-07-27 NOTE — ASSESSMENT
----- Message from Gita Wheeler MD sent at 7/27/2022 11:40 AM CDT -----  Abnormal semenalysis with low morphology - recommend referral to [facility]     [FreeTextEntry1] : \par \par \par Repeat blood work done... Stable!\par \par \par Follow-up here 6 months or sooner if needed...\par

## 2022-07-28 ENCOUNTER — RX RENEWAL (OUTPATIENT)
Age: 77
End: 2022-07-28

## 2022-08-08 ENCOUNTER — APPOINTMENT (OUTPATIENT)
Dept: INTERNAL MEDICINE | Facility: CLINIC | Age: 77
End: 2022-08-08

## 2022-08-08 VITALS
DIASTOLIC BLOOD PRESSURE: 80 MMHG | HEIGHT: 67 IN | TEMPERATURE: 97 F | OXYGEN SATURATION: 96 % | HEART RATE: 87 BPM | BODY MASS INDEX: 23.86 KG/M2 | SYSTOLIC BLOOD PRESSURE: 121 MMHG | WEIGHT: 152 LBS

## 2022-08-08 PROCEDURE — 36415 COLL VENOUS BLD VENIPUNCTURE: CPT

## 2022-08-08 PROCEDURE — 99214 OFFICE O/P EST MOD 30 MIN: CPT | Mod: 25

## 2022-08-08 NOTE — PHYSICAL THERAPY INITIAL EVALUATION ADULT - REHAB POTENTIAL, PT EVAL
Problem: Discharge Planning  Goal: Discharge to home or other facility with appropriate resources  Outcome: Progressing     Problem: Safety - Adult  Goal: Free from fall injury  Outcome: Progressing     Problem: Pain  Goal: Verbalizes/displays adequate comfort level or baseline comfort level  Outcome: Progressing     Problem: ABCDS Injury Assessment  Goal: Absence of physical injury  Outcome: Progressing     Problem: Chronic Conditions and Co-morbidities  Goal: Patient's chronic conditions and co-morbidity symptoms are monitored and maintained or improved  Outcome: Progressing good, to achieve stated therapy goals

## 2022-08-09 LAB
ANION GAP SERPL CALC-SCNC: 17 MMOL/L
BUN SERPL-MCNC: 26 MG/DL
CALCIUM SERPL-MCNC: 10.1 MG/DL
CHLORIDE SERPL-SCNC: 108 MMOL/L
CO2 SERPL-SCNC: 19 MMOL/L
CREAT SERPL-MCNC: 1.59 MG/DL
EGFR: 33 ML/MIN/1.73M2
GLUCOSE SERPL-MCNC: 127 MG/DL
POTASSIUM SERPL-SCNC: 4.7 MMOL/L
SODIUM SERPL-SCNC: 144 MMOL/L

## 2022-08-24 ENCOUNTER — APPOINTMENT (OUTPATIENT)
Dept: NEPHROLOGY | Facility: CLINIC | Age: 77
End: 2022-08-24

## 2022-08-24 ENCOUNTER — RESULT REVIEW (OUTPATIENT)
Age: 77
End: 2022-08-24

## 2022-08-24 VITALS
DIASTOLIC BLOOD PRESSURE: 58 MMHG | HEIGHT: 67 IN | HEART RATE: 84 BPM | WEIGHT: 151 LBS | SYSTOLIC BLOOD PRESSURE: 112 MMHG | BODY MASS INDEX: 23.7 KG/M2

## 2022-08-24 DIAGNOSIS — K76.0 FATTY (CHANGE OF) LIVER, NOT ELSEWHERE CLASSIFIED: ICD-10-CM

## 2022-08-24 DIAGNOSIS — Z83.3 FAMILY HISTORY OF DIABETES MELLITUS: ICD-10-CM

## 2022-08-24 DIAGNOSIS — E87.8 OTHER DISORDERS OF ELECTROLYTE AND FLUID BALANCE, NOT ELSEWHERE CLASSIFIED: ICD-10-CM

## 2022-08-24 DIAGNOSIS — Z86.79 PERSONAL HISTORY OF OTHER DISEASES OF THE CIRCULATORY SYSTEM: ICD-10-CM

## 2022-08-24 DIAGNOSIS — Z82.49 FAMILY HISTORY OF ISCHEMIC HEART DISEASE AND OTHER DISEASES OF THE CIRCULATORY SYSTEM: ICD-10-CM

## 2022-08-24 DIAGNOSIS — Z87.891 PERSONAL HISTORY OF NICOTINE DEPENDENCE: ICD-10-CM

## 2022-08-24 PROCEDURE — 99204 OFFICE O/P NEW MOD 45 MIN: CPT

## 2022-08-24 NOTE — HISTORY OF PRESENT ILLNESS
[FreeTextEntry1] : 77-year-old woman referred because of a rising creatinine over the last 10 months : Her creatinine was 0.97 in October 2021, 1.24 in January 2022, and now 1.59 with a GFR of 33, CO2 of 19, K of 4.7, hemoglobin 12.2 this month.  She has type 2 diabetes for years, but well controlled with an A1c of 6.6.  She has a long history of hypertension, treated with amlodipine 5 mg daily, lisinopril 40 mg daily, and chlorthalidone 25 mg daily.  CAT scan done last year showed 2 stones in the lower left kidney.  Her appetite is fair at best and she has lost some weight.  Her BP was 121/80 2 weeks ago when she saw Dr. Cueto.   There is a history of ASD repair, A. fib status post ablation, DVT and pulmonary embolus, and subdural hematoma status post bur holes, with cognitive impairment.  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  She does not have any light chains detected.  She was accompanied by her devoted daughter, Charlie.

## 2022-08-24 NOTE — ASSESSMENT
[FreeTextEntry1] : 77-year-old woman with a steady deterioration of renal function in the last 10 months, etiology uncertain -I do not have a recent urinalysis but she had 1+ protein in 2015.  Her A1c was in the 7-8 range 2 to 3 years ago, and in the 9-10 range in 2015.  So the most likely cause of renal dysfunction is diabetic nephropathy.  But other possibilities including volume depletion by chlorthalidone, leukemic infiltration, obstructive uropathy are all to be considered.  I have ordered sodium bicarbonate to combat her metabolic acidosis, and a dose of 650 mg twice daily.  I ordered a renal ultrasound to assess kidney size, echogenicity, and rule out obstructive uropathy.  I ordered labs to be repeated in 6 weeks, to include U ACR, urinalysis, BMP, Cystatin C, and PTH.  There is never been any evidence of collagen vascular disease.

## 2022-08-30 ENCOUNTER — NON-APPOINTMENT (OUTPATIENT)
Age: 77
End: 2022-08-30

## 2022-09-01 ENCOUNTER — APPOINTMENT (OUTPATIENT)
Dept: ULTRASOUND IMAGING | Facility: HOSPITAL | Age: 77
End: 2022-09-01

## 2022-09-01 ENCOUNTER — OUTPATIENT (OUTPATIENT)
Dept: OUTPATIENT SERVICES | Facility: HOSPITAL | Age: 77
LOS: 1 days | End: 2022-09-01
Payer: MEDICARE

## 2022-09-01 DIAGNOSIS — Z96.651 PRESENCE OF RIGHT ARTIFICIAL KNEE JOINT: Chronic | ICD-10-CM

## 2022-09-01 DIAGNOSIS — I62.01 NONTRAUMATIC ACUTE SUBDURAL HEMORRHAGE: Chronic | ICD-10-CM

## 2022-09-01 PROCEDURE — 76775 US EXAM ABDO BACK WALL LIM: CPT

## 2022-09-01 PROCEDURE — 76775 US EXAM ABDO BACK WALL LIM: CPT | Mod: 26

## 2022-09-06 ENCOUNTER — APPOINTMENT (OUTPATIENT)
Dept: HEMATOLOGY ONCOLOGY | Facility: CLINIC | Age: 77
End: 2022-09-06

## 2022-09-06 VITALS
RESPIRATION RATE: 13 BRPM | DIASTOLIC BLOOD PRESSURE: 75 MMHG | HEART RATE: 60 BPM | SYSTOLIC BLOOD PRESSURE: 134 MMHG | TEMPERATURE: 98.3 F | WEIGHT: 154 LBS | BODY MASS INDEX: 24.17 KG/M2 | HEIGHT: 67 IN | OXYGEN SATURATION: 98 %

## 2022-09-06 PROCEDURE — 99214 OFFICE O/P EST MOD 30 MIN: CPT | Mod: 25

## 2022-09-06 PROCEDURE — 36415 COLL VENOUS BLD VENIPUNCTURE: CPT

## 2022-09-06 RX ORDER — AZITHROMYCIN 500 MG/1
500 TABLET, FILM COATED ORAL DAILY
Qty: 3 | Refills: 0 | Status: DISCONTINUED | COMMUNITY
Start: 2022-07-25 | End: 2022-09-06

## 2022-09-06 RX ORDER — BUDESONIDE AND FORMOTEROL FUMARATE DIHYDRATE 80; 4.5 UG/1; UG/1
80-4.5 AEROSOL RESPIRATORY (INHALATION) TWICE DAILY
Qty: 3 | Refills: 3 | Status: DISCONTINUED | COMMUNITY
Start: 2020-05-20 | End: 2022-09-06

## 2022-09-06 NOTE — ASSESSMENT
[FreeTextEntry1] : \par Repeat blood work done...\par \par ?  Need for continued anticoagulation will discuss with pulmonary and cardiology...\par \par Follow-up here in 6 months or sooner if needed.

## 2022-09-06 NOTE — HISTORY OF PRESENT ILLNESS
[de-identified] : 75 years old female history of multiple medical problems as well as subarachnoid hemorrhage requiring evacuation in the past.  Patient was found to have positive COVID antibodies in June 2020... Later on in July she was admitted to [facility] with a large femoral DVT, and she is currently on anticoagulation with Eliquis... She is here to assess the length of anticoagulation therapy...\par \par February 16, 2021 returns for follow-up... Continues to take the Eliquis... Tolerating well... I discussed with daughter, and reviewed patient's meds [de-identified] : October 19, 2020 patient returns for follow-up... Her main complaint is inability to sleep well... Has not seen PCP in a while... 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 No complaints tolerating Eliquis well\par \par Lucille 15, 2021 here for follow-up... Wants to know why she still needs to be on the Eliquis... Discussed with patient the fact that she has an irregular heartbeat, history of DVT, and history of presence of lupus anticoagulants... So therefore at this point the cardiologist hematologist and pulmonologist involved in her care, feel that she is better served by remaining on anticoagulation, however this decision would be revisited in the future.\par \par \par September 20, 2021 returns for follow-up... No new complaints\par \par March 8, 2022 returns for follow-up... no new complaints... Not even complaining about having to take the Eliquis\par \par September 6, 2022 returns for follow-up... Continues to take the Eliquis... Most recent lupus anticoagulants have been negative

## 2022-09-15 ENCOUNTER — NON-APPOINTMENT (OUTPATIENT)
Age: 77
End: 2022-09-15

## 2022-09-15 ENCOUNTER — APPOINTMENT (OUTPATIENT)
Dept: HEART AND VASCULAR | Facility: CLINIC | Age: 77
End: 2022-09-15

## 2022-09-15 VITALS
DIASTOLIC BLOOD PRESSURE: 68 MMHG | HEIGHT: 67 IN | TEMPERATURE: 98.3 F | BODY MASS INDEX: 24.56 KG/M2 | OXYGEN SATURATION: 98 % | SYSTOLIC BLOOD PRESSURE: 131 MMHG | HEART RATE: 65 BPM | WEIGHT: 156.5 LBS

## 2022-09-15 PROCEDURE — 36415 COLL VENOUS BLD VENIPUNCTURE: CPT

## 2022-09-15 PROCEDURE — 93000 ELECTROCARDIOGRAM COMPLETE: CPT

## 2022-09-15 PROCEDURE — 99214 OFFICE O/P EST MOD 30 MIN: CPT

## 2022-09-16 LAB
ANION GAP SERPL CALC-SCNC: 19 MMOL/L
BASOPHILS # BLD AUTO: 0.06 K/UL
BASOPHILS NFR BLD AUTO: 0.8 %
BUN SERPL-MCNC: 55 MG/DL
CALCIUM SERPL-MCNC: 10.2 MG/DL
CHLORIDE SERPL-SCNC: 104 MMOL/L
CHOLEST SERPL-MCNC: 150 MG/DL
CO2 SERPL-SCNC: 22 MMOL/L
CREAT SERPL-MCNC: 1.04 MG/DL
EGFR: 55 ML/MIN/1.73M2
EOSINOPHIL # BLD AUTO: 0.24 K/UL
EOSINOPHIL NFR BLD AUTO: 3.1 %
ESTIMATED AVERAGE GLUCOSE: 108 MG/DL
GLUCOSE SERPL-MCNC: 103 MG/DL
HBA1C MFR BLD HPLC: 5.4 %
HCT VFR BLD CALC: 36.2 %
HDLC SERPL-MCNC: 84 MG/DL
HGB BLD-MCNC: 11.1 G/DL
IMM GRANULOCYTES NFR BLD AUTO: 0.3 %
LDLC SERPL CALC-MCNC: 51 MG/DL
LYMPHOCYTES # BLD AUTO: 1.56 K/UL
LYMPHOCYTES NFR BLD AUTO: 20.2 %
MAN DIFF?: NORMAL
MCHC RBC-ENTMCNC: 30.7 GM/DL
MCHC RBC-ENTMCNC: 32.1 PG
MCV RBC AUTO: 104.6 FL
MONOCYTES # BLD AUTO: 0.49 K/UL
MONOCYTES NFR BLD AUTO: 6.3 %
NEUTROPHILS # BLD AUTO: 5.35 K/UL
NEUTROPHILS NFR BLD AUTO: 69.3 %
NONHDLC SERPL-MCNC: 66 MG/DL
PLATELET # BLD AUTO: 244 K/UL
POTASSIUM SERPL-SCNC: 5 MMOL/L
RBC # BLD: 3.46 M/UL
RBC # FLD: 13.3 %
SODIUM SERPL-SCNC: 144 MMOL/L
TRIGL SERPL-MCNC: 76 MG/DL
WBC # FLD AUTO: 7.72 K/UL

## 2022-09-16 NOTE — HISTORY OF PRESENT ILLNESS
[FreeTextEntry1] : 77 F ASD repaired surgically IDDM atrial fibrillation s/p ablation by dr kramer  SDH ILR DVT/PE (possibly COVID related) on AC,  atrial tachycardia which manifested as sob controlled with BB, ATrial fibrillation (ILR at RRR low burden) here today for BP management she has no complaints\par \par \par echo 2/2022 EF LV and RV \par normal nuclear stress test 6/2020\par ecg atrial fibrillation junctional escape 9/15/2022\par

## 2022-09-16 NOTE — PROCEDURE
[Lexiscan] : Lexiscan [Tc-99m, sestamibi-Cardiolite, to 40mCi, dose-Radionuclides-[employer] code--v.1-Active-Trade] : Tc-99m, sestamibi-Cardiolite, to 40mCi, dose-Radionuclides-[employer] code--v.1-Active-Trade [Normal] : no significant [de-identified] : Dr. Willy Santos     Performing Provider: Dr. Willy Santos [de-identified] : Khoi Sun [de-identified] : CP, DM, HTN, Abnormal ECG.     Gender: Female [de-identified] : Given via the IV route.    1 Day Protocol. [de-identified] : 9.5 [de-identified] : Given via the IV route [de-identified] : 28.7 [de-identified] : 61 [de-identified] : 130/70 [de-identified] : 69 [TWNoteComboBox1] : JIM [FreeTextEntry1] : image quality is good\par resting ef 66%\par post stress ef 69%\par prior study NA\par artifacts None\par blood pressure remained stable at 120/70 mmHg heart rate increased to 103 bpm

## 2022-09-16 NOTE — ASSESSMENT
[FreeTextEntry1] : loop recorder confirms afib stay on eliquis 5 mg bid  she is afib today with junctional escape narrow qrs she is not having symptoms she will see EPS \par htn controlled she had leg edema on higher doses of amlodipine acceptable at this time \par on statin given her history of DM \par fu in 4 months \par \par

## 2022-09-21 ENCOUNTER — RX RENEWAL (OUTPATIENT)
Age: 77
End: 2022-09-21

## 2022-09-26 ENCOUNTER — APPOINTMENT (OUTPATIENT)
Dept: HEART AND VASCULAR | Facility: CLINIC | Age: 77
End: 2022-09-26

## 2022-09-26 VITALS
HEART RATE: 68 BPM | HEIGHT: 67 IN | WEIGHT: 156 LBS | TEMPERATURE: 96.1 F | SYSTOLIC BLOOD PRESSURE: 156 MMHG | BODY MASS INDEX: 24.48 KG/M2 | DIASTOLIC BLOOD PRESSURE: 68 MMHG

## 2022-09-26 PROCEDURE — 93000 ELECTROCARDIOGRAM COMPLETE: CPT

## 2022-09-26 PROCEDURE — 99214 OFFICE O/P EST MOD 30 MIN: CPT

## 2022-09-27 NOTE — DISCUSSION/SUMMARY
[FreeTextEntry1] : Ms. 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  ILR at EOS.  Historically her AFib burden has been low over time.  She presents in sinus rhythm (sinus katie) with a competing junctional rhythm on ECG today.  She denies presyncope/syncope.  I recommended that she undergo ILR explant and reimplant to monitor for bradyarrhythmias and also monitor her AF burden over time.  She is in agreement with this plan.  Pre and post procedure instructions were reviewed.  She will return for follow-up with Dr. Downing one month post procedure.

## 2022-09-27 NOTE — HISTORY OF PRESENT ILLNESS
[Palpitations] : no palpitations [SOB] : no dyspnea [Syncope] : no syncope [Dizziness] : no dizziness [Chest Pain] : no chest pain or discomfort [Pain at Site] : no pain at device site [Erythema at Site] : no erythema at device site [Swelling at Site] : no swelling at device site [FreeTextEntry1] : Ms. 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 She had positive COVID antibodies in June 2020.  In July 2020 she was admitted to [facility] with a large femoral DVT and she was placed back on Eliquis.  Referred back to EP by Dr. Santos given concern for AFib with slow VR on ECG 9/15/22.  The patient denies dizziness, presyncope/syncope.  She ambulates with a walker.  Her ILR is at EOS.

## 2022-09-29 LAB
25(OH)D3 SERPL-MCNC: 67.3 NG/ML
ALBUMIN MFR SERPL ELPH: 58.9 %
ALBUMIN SERPL ELPH-MCNC: 4.6 G/DL
ALBUMIN SERPL-MCNC: 4.1 G/DL
ALBUMIN/GLOB SERPL: 1.4 RATIO
ALP BLD-CCNC: 63 U/L
ALPHA1 GLOB MFR SERPL ELPH: 3.8 %
ALPHA1 GLOB SERPL ELPH-MCNC: 0.3 G/DL
ALPHA2 GLOB MFR SERPL ELPH: 10.6 %
ALPHA2 GLOB SERPL ELPH-MCNC: 0.7 G/DL
ALT SERPL-CCNC: 14 U/L
ANION GAP SERPL CALC-SCNC: 27 MMOL/L
AST SERPL-CCNC: 16 U/L
B-GLOBULIN MFR SERPL ELPH: 12.6 %
B-GLOBULIN SERPL ELPH-MCNC: 0.9 G/DL
BASOPHILS # BLD AUTO: 0.06 K/UL
BASOPHILS NFR BLD AUTO: 0.9 %
BILIRUB SERPL-MCNC: 0.2 MG/DL
BUN SERPL-MCNC: 53 MG/DL
CALCIUM SERPL-MCNC: 10 MG/DL
CHLORIDE SERPL-SCNC: 101 MMOL/L
CO2 SERPL-SCNC: 13 MMOL/L
CREAT SERPL-MCNC: 1.13 MG/DL
DEPRECATED KAPPA LC FREE/LAMBDA SER: 1.03 RATIO
EGFR: 50 ML/MIN/1.73M2
EOSINOPHIL # BLD AUTO: 0.19 K/UL
EOSINOPHIL NFR BLD AUTO: 2.8 %
ERYTHROCYTE [SEDIMENTATION RATE] IN BLOOD BY WESTERGREN METHOD: 8 MM/HR
FERRITIN SERPL-MCNC: 41 NG/ML
GAMMA GLOB FLD ELPH-MCNC: 1 G/DL
GAMMA GLOB MFR SERPL ELPH: 14.1 %
GLUCOSE SERPL-MCNC: 97 MG/DL
HCT VFR BLD CALC: 33.7 %
HGB BLD-MCNC: 10.9 G/DL
IGA SER QL IEP: 240 MG/DL
IGG SER QL IEP: 1032 MG/DL
IGM SER QL IEP: 49 MG/DL
IMM GRANULOCYTES NFR BLD AUTO: 0.3 %
INTERPRETATION SERPL IEP-IMP: NORMAL
IRON SATN MFR SERPL: 20 %
IRON SERPL-MCNC: 71 UG/DL
KAPPA LC CSF-MCNC: 3.19 MG/DL
KAPPA LC SERPL-MCNC: 3.3 MG/DL
LDH SERPL-CCNC: 306 U/L
LYMPHOCYTES # BLD AUTO: 1.82 K/UL
LYMPHOCYTES NFR BLD AUTO: 27.2 %
M PROTEIN SPEC IFE-MCNC: NORMAL
MAN DIFF?: NORMAL
MCHC RBC-ENTMCNC: 32.3 GM/DL
MCHC RBC-ENTMCNC: 33.1 PG
MCV RBC AUTO: 102.4 FL
MONOCYTES # BLD AUTO: 0.55 K/UL
MONOCYTES NFR BLD AUTO: 8.2 %
NEUTROPHILS # BLD AUTO: 4.06 K/UL
NEUTROPHILS NFR BLD AUTO: 60.6 %
PLATELET # BLD AUTO: 218 K/UL
POTASSIUM SERPL-SCNC: 4.6 MMOL/L
PROT SERPL-MCNC: 7 G/DL
RBC # BLD: 3.29 M/UL
RBC # FLD: 13.4 %
SODIUM SERPL-SCNC: 141 MMOL/L
TIBC SERPL-MCNC: 364 UG/DL
TSH SERPL-ACNC: 1.57 UIU/ML
UIBC SERPL-MCNC: 292 UG/DL
VIT B12 SERPL-MCNC: 689 PG/ML
WBC # FLD AUTO: 6.7 K/UL

## 2022-10-05 ENCOUNTER — APPOINTMENT (OUTPATIENT)
Dept: NEPHROLOGY | Facility: CLINIC | Age: 77
End: 2022-10-05

## 2022-10-05 VITALS
DIASTOLIC BLOOD PRESSURE: 62 MMHG | HEIGHT: 67 IN | BODY MASS INDEX: 24.33 KG/M2 | WEIGHT: 155 LBS | SYSTOLIC BLOOD PRESSURE: 128 MMHG | HEART RATE: 72 BPM

## 2022-10-05 DIAGNOSIS — R79.89 OTHER SPECIFIED ABNORMAL FINDINGS OF BLOOD CHEMISTRY: ICD-10-CM

## 2022-10-05 PROCEDURE — 99214 OFFICE O/P EST MOD 30 MIN: CPT

## 2022-10-05 NOTE — HISTORY OF PRESENT ILLNESS
[FreeTextEntry1] : 77-year-old woman referred because of a rising creatinine over the last year, increasing from 0.97 one year ago up to 1.24 and then 1.59.  She has well-controlled type 2 diabetes and a long history of hypertension which is well controlled as well.  CAT scan done last year showed 2 stones in the lower left kidney.  Her appetite remains somewhat poor.  She had a September 6 visit with Dr. Contreras regarding acute leukemia and her BP was 134/75.  On September 15, she saw Dr. Santos, with a BP of 131/68, and a loop recorder confirmed A. fib.  Edema has been controlled.  She is not apparently taking chlorthalidone at present, but looks extremely prerenal with a creatinine of 1.04, BUN of 55, GFR 55, hemoglobin 11.1, K5.0, A1c 5.4.  Her renal ultrasound a month ago was absolutely normal.

## 2022-10-05 NOTE — ASSESSMENT
[FreeTextEntry1] : 77-year-old woman with what appears to be largely prerenal azotemia with a very high BUN to creatinine ratio of about 50.  Her med list does not include any diuretics, with amlodipine, lisinopril, but no chlorthalidone.  Her creatinine came down to 1.04 last month, unless it is a lab error.  I have ordered future labs to be done to include Cystatin C, BMP, U ACR, and urinalysis.  She will return in 4 months.

## 2022-10-18 LAB — SARS-COV-2 N GENE NPH QL NAA+PROBE: NOT DETECTED

## 2022-10-19 ENCOUNTER — OUTPATIENT (OUTPATIENT)
Dept: OUTPATIENT SERVICES | Facility: HOSPITAL | Age: 77
LOS: 1 days | Discharge: HOME | End: 2022-10-19
Payer: MEDICARE

## 2022-10-19 DIAGNOSIS — Z96.651 PRESENCE OF RIGHT ARTIFICIAL KNEE JOINT: Chronic | ICD-10-CM

## 2022-10-19 DIAGNOSIS — I62.01 NONTRAUMATIC ACUTE SUBDURAL HEMORRHAGE: Chronic | ICD-10-CM

## 2022-10-19 PROCEDURE — 33285 INSJ SUBQ CAR RHYTHM MNTR: CPT

## 2022-10-19 PROCEDURE — C1764: CPT

## 2022-10-19 PROCEDURE — 33286 RMVL SUBQ CAR RHYTHM MNTR: CPT | Mod: 59

## 2022-10-26 ENCOUNTER — APPOINTMENT (OUTPATIENT)
Dept: INTERNAL MEDICINE | Facility: CLINIC | Age: 77
End: 2022-10-26

## 2022-10-26 VITALS
OXYGEN SATURATION: 100 % | TEMPERATURE: 97.3 F | DIASTOLIC BLOOD PRESSURE: 80 MMHG | HEIGHT: 67 IN | HEART RATE: 60 BPM | SYSTOLIC BLOOD PRESSURE: 133 MMHG | WEIGHT: 155 LBS | BODY MASS INDEX: 24.33 KG/M2

## 2022-10-26 PROCEDURE — 90662 IIV NO PRSV INCREASED AG IM: CPT

## 2022-10-26 PROCEDURE — 99214 OFFICE O/P EST MOD 30 MIN: CPT | Mod: 25

## 2022-10-26 PROCEDURE — G0008: CPT

## 2022-11-23 ENCOUNTER — APPOINTMENT (OUTPATIENT)
Dept: HEART AND VASCULAR | Facility: CLINIC | Age: 77
End: 2022-11-23

## 2022-11-23 ENCOUNTER — NON-APPOINTMENT (OUTPATIENT)
Age: 77
End: 2022-11-23

## 2022-11-23 PROCEDURE — 93298 REM INTERROG DEV EVAL SCRMS: CPT

## 2022-11-23 PROCEDURE — G2066: CPT

## 2022-12-12 ENCOUNTER — APPOINTMENT (OUTPATIENT)
Dept: HEART AND VASCULAR | Facility: CLINIC | Age: 77
End: 2022-12-12

## 2022-12-12 VITALS
WEIGHT: 155 LBS | SYSTOLIC BLOOD PRESSURE: 121 MMHG | HEART RATE: 66 BPM | HEIGHT: 67 IN | DIASTOLIC BLOOD PRESSURE: 73 MMHG | BODY MASS INDEX: 24.33 KG/M2

## 2022-12-12 PROCEDURE — 93285 PRGRMG DEV EVAL SCRMS IP: CPT

## 2022-12-12 NOTE — PROCEDURE
[de-identified] : MDT LINQ II \par Implanted 10/19/22\par Battery status Good\par Sensing 0.28 mV\par No auto or symptom events

## 2022-12-12 NOTE — DISCUSSION/SUMMARY
[FreeTextEntry1] : Ms. 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  Historically her AFib burden has been low over time.  She denies presyncope/syncope.  We will continue to monitor for evidence of bradyarrhythmias and also monitor her AF burden over time; no events since reimplant.  She is in agreement with this plan.  Pre and post procedure instructions were reviewed.  She will return for follow-up with Dr. Downing in six months.

## 2022-12-12 NOTE — REVIEW OF SYSTEMS
[Fever] : no fever [Weight Gain (___ Lbs)] : no recent weight gain [Chills] : no chills [Weight Loss (___ Lbs)] : no recent weight loss [SOB] : no shortness of breath [Chest Discomfort] : no chest discomfort [Palpitations] : no palpitations [Syncope] : no syncope [Negative] : Heme/Lymph

## 2022-12-12 NOTE — PHYSICAL EXAM
[General Appearance - Well Developed] : well developed [Normal Appearance] : normal appearance [Well Groomed] : well groomed [General Appearance - Well Nourished] : well nourished [No Deformities] : no deformities [General Appearance - In No Acute Distress] : no acute distress [Heart Rate And Rhythm] : heart rate and rhythm were normal [Heart Sounds] : normal S1 and S2 [Edema] : no peripheral edema present [Respiration, Rhythm And Depth] : normal respiratory rhythm and effort [Exaggerated Use Of Accessory Muscles For Inspiration] : no accessory muscle use [Clean] : clean [Dry] : dry [Well-Healed] : well-healed [Palpable Crepitus] : no palpable crepitus [Bleeding] : no active bleeding [Foul Odor] : no foul smell [Purulent Drainage] : no purulent drainage [Serosanguineous Drainage] : no serosanquineous drainage [Serous Drainage] : no serous drainage [Erythema] : not erythematous [Warm] : not warm [Tender] : not tender [Indurated] : not indurated [Fluctuant] : not fluctuant [FreeTextEntry1] : mid L chest [] : no ischemic changes [Normal Oral Mucosa] : normal oral mucosa [Abnormal Walk] : normal gait

## 2022-12-13 ENCOUNTER — NON-APPOINTMENT (OUTPATIENT)
Age: 77
End: 2022-12-13

## 2022-12-18 ENCOUNTER — EMERGENCY (EMERGENCY)
Facility: HOSPITAL | Age: 77
LOS: 1 days | Discharge: HOME | End: 2022-12-18
Attending: EMERGENCY MEDICINE | Admitting: EMERGENCY MEDICINE
Payer: MEDICARE

## 2022-12-18 VITALS
DIASTOLIC BLOOD PRESSURE: 68 MMHG | SYSTOLIC BLOOD PRESSURE: 124 MMHG | HEART RATE: 70 BPM | RESPIRATION RATE: 18 BRPM | TEMPERATURE: 99 F | HEIGHT: 67 IN | OXYGEN SATURATION: 99 % | WEIGHT: 160.06 LBS

## 2022-12-18 VITALS
TEMPERATURE: 98 F | RESPIRATION RATE: 18 BRPM | SYSTOLIC BLOOD PRESSURE: 137 MMHG | DIASTOLIC BLOOD PRESSURE: 84 MMHG | HEART RATE: 71 BPM | OXYGEN SATURATION: 98 %

## 2022-12-18 DIAGNOSIS — Z96.651 PRESENCE OF RIGHT ARTIFICIAL KNEE JOINT: Chronic | ICD-10-CM

## 2022-12-18 DIAGNOSIS — I62.01 NONTRAUMATIC ACUTE SUBDURAL HEMORRHAGE: Chronic | ICD-10-CM

## 2022-12-18 LAB
ALBUMIN SERPL ELPH-MCNC: 4.1 G/DL — SIGNIFICANT CHANGE UP (ref 3.3–5)
ALP SERPL-CCNC: 73 U/L — SIGNIFICANT CHANGE UP (ref 40–120)
ALT FLD-CCNC: 19 U/L — SIGNIFICANT CHANGE UP (ref 10–45)
ANION GAP SERPL CALC-SCNC: 15 MMOL/L — SIGNIFICANT CHANGE UP (ref 5–17)
APPEARANCE UR: CLEAR — SIGNIFICANT CHANGE UP
AST SERPL-CCNC: 23 U/L — SIGNIFICANT CHANGE UP (ref 10–40)
BACTERIA # UR AUTO: ABNORMAL /HPF
BASOPHILS # BLD AUTO: 0.06 K/UL — SIGNIFICANT CHANGE UP (ref 0–0.2)
BASOPHILS NFR BLD AUTO: 0.7 % — SIGNIFICANT CHANGE UP (ref 0–2)
BILIRUB SERPL-MCNC: 0.4 MG/DL — SIGNIFICANT CHANGE UP (ref 0.2–1.2)
BILIRUB UR-MCNC: NEGATIVE — SIGNIFICANT CHANGE UP
BUN SERPL-MCNC: 23 MG/DL — SIGNIFICANT CHANGE UP (ref 7–23)
CALCIUM SERPL-MCNC: 9.7 MG/DL — SIGNIFICANT CHANGE UP (ref 8.4–10.5)
CHLORIDE SERPL-SCNC: 104 MMOL/L — SIGNIFICANT CHANGE UP (ref 96–108)
CO2 SERPL-SCNC: 24 MMOL/L — SIGNIFICANT CHANGE UP (ref 22–31)
COLOR SPEC: YELLOW — SIGNIFICANT CHANGE UP
CREAT SERPL-MCNC: 1.34 MG/DL — HIGH (ref 0.5–1.3)
DIFF PNL FLD: NEGATIVE — SIGNIFICANT CHANGE UP
EGFR: 41 ML/MIN/1.73M2 — LOW
EOSINOPHIL # BLD AUTO: 0.3 K/UL — SIGNIFICANT CHANGE UP (ref 0–0.5)
EOSINOPHIL NFR BLD AUTO: 3.7 % — SIGNIFICANT CHANGE UP (ref 0–6)
EPI CELLS # UR: SIGNIFICANT CHANGE UP /HPF (ref 0–5)
FLUAV AG NPH QL: SIGNIFICANT CHANGE UP
FLUBV AG NPH QL: SIGNIFICANT CHANGE UP
GLUCOSE SERPL-MCNC: 101 MG/DL — HIGH (ref 70–99)
GLUCOSE UR QL: NEGATIVE — SIGNIFICANT CHANGE UP
HCT VFR BLD CALC: 33.7 % — LOW (ref 34.5–45)
HGB BLD-MCNC: 11.3 G/DL — LOW (ref 11.5–15.5)
IMM GRANULOCYTES NFR BLD AUTO: 0.4 % — SIGNIFICANT CHANGE UP (ref 0–0.9)
KETONES UR-MCNC: ABNORMAL MG/DL
LEUKOCYTE ESTERASE UR-ACNC: ABNORMAL
LYMPHOCYTES # BLD AUTO: 1.9 K/UL — SIGNIFICANT CHANGE UP (ref 1–3.3)
LYMPHOCYTES # BLD AUTO: 23.6 % — SIGNIFICANT CHANGE UP (ref 13–44)
MCHC RBC-ENTMCNC: 31.2 PG — SIGNIFICANT CHANGE UP (ref 27–34)
MCHC RBC-ENTMCNC: 33.5 GM/DL — SIGNIFICANT CHANGE UP (ref 32–36)
MCV RBC AUTO: 93.1 FL — SIGNIFICANT CHANGE UP (ref 80–100)
MONOCYTES # BLD AUTO: 0.72 K/UL — SIGNIFICANT CHANGE UP (ref 0–0.9)
MONOCYTES NFR BLD AUTO: 8.9 % — SIGNIFICANT CHANGE UP (ref 2–14)
NEUTROPHILS # BLD AUTO: 5.05 K/UL — SIGNIFICANT CHANGE UP (ref 1.8–7.4)
NEUTROPHILS NFR BLD AUTO: 62.7 % — SIGNIFICANT CHANGE UP (ref 43–77)
NITRITE UR-MCNC: POSITIVE
NRBC # BLD: 0 /100 WBCS — SIGNIFICANT CHANGE UP (ref 0–0)
PH UR: 6 — SIGNIFICANT CHANGE UP (ref 5–8)
PLATELET # BLD AUTO: 180 K/UL — SIGNIFICANT CHANGE UP (ref 150–400)
POTASSIUM SERPL-MCNC: 4.2 MMOL/L — SIGNIFICANT CHANGE UP (ref 3.5–5.3)
POTASSIUM SERPL-SCNC: 4.2 MMOL/L — SIGNIFICANT CHANGE UP (ref 3.5–5.3)
PROT SERPL-MCNC: 7.8 G/DL — SIGNIFICANT CHANGE UP (ref 6–8.3)
PROT UR-MCNC: NEGATIVE MG/DL — SIGNIFICANT CHANGE UP
RBC # BLD: 3.62 M/UL — LOW (ref 3.8–5.2)
RBC # FLD: 13.3 % — SIGNIFICANT CHANGE UP (ref 10.3–14.5)
RBC CASTS # UR COMP ASSIST: < 5 /HPF — SIGNIFICANT CHANGE UP
RSV RNA NPH QL NAA+NON-PROBE: SIGNIFICANT CHANGE UP
SARS-COV-2 RNA SPEC QL NAA+PROBE: SIGNIFICANT CHANGE UP
SODIUM SERPL-SCNC: 143 MMOL/L — SIGNIFICANT CHANGE UP (ref 135–145)
SP GR SPEC: 1.01 — SIGNIFICANT CHANGE UP (ref 1–1.03)
UROBILINOGEN FLD QL: 0.2 E.U./DL — SIGNIFICANT CHANGE UP
WBC # BLD: 8.06 K/UL — SIGNIFICANT CHANGE UP (ref 3.8–10.5)
WBC # FLD AUTO: 8.06 K/UL — SIGNIFICANT CHANGE UP (ref 3.8–10.5)
WBC UR QL: ABNORMAL /HPF

## 2022-12-18 PROCEDURE — 71045 X-RAY EXAM CHEST 1 VIEW: CPT | Mod: 26

## 2022-12-18 PROCEDURE — 36415 COLL VENOUS BLD VENIPUNCTURE: CPT

## 2022-12-18 PROCEDURE — 71045 X-RAY EXAM CHEST 1 VIEW: CPT

## 2022-12-18 PROCEDURE — 99284 EMERGENCY DEPT VISIT MOD MDM: CPT | Mod: 25

## 2022-12-18 PROCEDURE — 93005 ELECTROCARDIOGRAM TRACING: CPT

## 2022-12-18 PROCEDURE — 94640 AIRWAY INHALATION TREATMENT: CPT

## 2022-12-18 PROCEDURE — 81001 URINALYSIS AUTO W/SCOPE: CPT

## 2022-12-18 PROCEDURE — 85025 COMPLETE CBC W/AUTO DIFF WBC: CPT

## 2022-12-18 PROCEDURE — 87637 SARSCOV2&INF A&B&RSV AMP PRB: CPT

## 2022-12-18 PROCEDURE — 99285 EMERGENCY DEPT VISIT HI MDM: CPT | Mod: 25

## 2022-12-18 PROCEDURE — 80053 COMPREHEN METABOLIC PANEL: CPT

## 2022-12-18 RX ORDER — ACETAMINOPHEN 500 MG
1000 TABLET ORAL ONCE
Refills: 0 | Status: COMPLETED | OUTPATIENT
Start: 2022-12-18 | End: 2022-12-18

## 2022-12-18 RX ORDER — SODIUM CHLORIDE 9 MG/ML
1000 INJECTION INTRAMUSCULAR; INTRAVENOUS; SUBCUTANEOUS ONCE
Refills: 0 | Status: COMPLETED | OUTPATIENT
Start: 2022-12-18 | End: 2022-12-18

## 2022-12-18 RX ORDER — IPRATROPIUM/ALBUTEROL SULFATE 18-103MCG
1 AEROSOL WITH ADAPTER (GRAM) INHALATION
Refills: 0 | Status: DISCONTINUED | OUTPATIENT
Start: 2022-12-18 | End: 2022-12-21

## 2022-12-18 RX ADMIN — Medication 1000 MILLIGRAM(S): at 13:35

## 2022-12-18 RX ADMIN — Medication 1000 MILLIGRAM(S): at 14:15

## 2022-12-18 RX ADMIN — Medication 1 PUFF(S): at 16:42

## 2022-12-18 NOTE — ED ADULT NURSE NOTE - OBJECTIVE STATEMENT
Pt states for the past wk she has a productive cough with green sputum, and diarrhea. Pt denies Fever, chills, body aches, N/V. Pt has only had to use her inhaler twice one day but otherwise normal use of inhaler.

## 2022-12-18 NOTE — ED PROVIDER NOTE - OBJECTIVE STATEMENT
77 F ho afib on apixaban, DM, asd s/p repair w cough- cough prod green sputum x 4 days no sob + gen weakness  denies f/c mod severity- sherif po, but less po intake  mod severity  no sig exac/allev factors  loose stools- 2-3 yesterday, one today

## 2022-12-18 NOTE — ED PROVIDER NOTE - NSFOLLOWUPINSTRUCTIONS_ED_ALL_ED_FT
Cough, Adult      Coughing is a reflex that clears your throat and your airways (respiratory system). Coughing helps to heal and protect your lungs. It is normal to cough occasionally, but a cough that happens with other symptoms or lasts a long time may be a sign of a condition that needs treatment. An acute cough may only last 2–3 weeks, while a chronic cough may last 8 or more weeks.    Coughing is commonly caused by:  •Infection of the respiratory systemby viruses or bacteria.      •Breathing in substances that irritate your lungs.      •Allergies.      •Asthma.      •Mucus that runs down the back of your throat (postnasal drip).      •Smoking.      •Acid backing up from the stomach into the esophagus (gastroesophageal reflux).      •Certain medicines.      •Chronic lung problems.      •Other medical conditions such as heart failure or a blood clot in the lung (pulmonary embolism).        Follow these instructions at home:    Medicines     •Take over-the-counter and prescription medicines only as told by your health care provider.      •Talk with your health care provider before you take a cough suppressant medicine.        Lifestyle      •Avoid cigarette smoke. Do not use any products that contain nicotine or tobacco, such as cigarettes, e-cigarettes, and chewing tobacco. If you need help quitting, ask your health care provider.      •Drink enough fluid to keep your urine pale yellow.      •Avoid caffeine.      • Do not drink alcohol if your health care provider tells you not to drink.        General instructions      •Pay close attention to changes in your cough. Tell your health care provider about them.      •Always cover your mouth when you cough.      •Avoid things that make you cough, such as perfume, candles, cleaning products, or campfire or tobacco smoke.      •If the air is dry, use a cool mist vaporizer or humidifier in your bedroom or your home to help loosen secretions.      •If your cough is worse at night, try to sleep in a semi-upright position.      •Rest as needed.      •Keep all follow-up visits as told by your health care provider. This is important.        Contact a health care provider if you:    •Have new symptoms.      •Cough up pus.      •Have a cough that does not get better after 2–3 weeks or gets worse.      •Cannot control your cough with cough suppressant medicines and you are losing sleep.      •Have pain that gets worse or pain that is not helped with medicine.      •Have a fever.      •Have unexplained weight loss.      •Have night sweats.        Get help right away if:    •You cough up blood.      •You have difficulty breathing.      •Your heartbeat is very fast.      These symptoms may represent a serious problem that is an emergency. Do not wait to see if the symptoms will go away. Get medical help right away. Call your local emergency services (911 in the [place] Do not drive yourself to the hospital.       Summary    •Coughing is a reflex that clears your throat and your airways. It is normal to cough occasionally, but a cough that happens with other symptoms or lasts a long time may be a sign of a condition that needs treatment.      •Take over-the-counter and prescription medicines only as told by your health care provider.      •Always cover your mouth when you cough.      •Contact a health care provider if you have new symptoms or a cough that does not get better after 2–3 weeks or gets worse.      This information is not intended to replace advice given to you by your health care provider. Make sure you discuss any questions you have with your health care provider.      Document Revised: 01/06/2020 Document Reviewed: 01/06/2020    Elsevier Patient Education © 2022 [employer] Inc[employer]

## 2022-12-18 NOTE — ED PROVIDER NOTE - PATIENT PORTAL LINK FT
You can access the FollowMyHealth Patient Portal offered by [facility] by registering at the following website: [email or web address]. By joining [facility]’s FollowMyHealth portal, you will also be able to view your health information using other applications (apps) compatible with our system.

## 2022-12-18 NOTE — ED ADULT TRIAGE NOTE - OTHER COMPLAINTS
patient c/o productive cough x 1 week, denies fevers-- per EMS patient with  hx of asthma, DM, and HTN-- patient also c/o bilat "side" pain, pointing towards abdomen and hip

## 2022-12-18 NOTE — ED PROVIDER NOTE - CLINICAL SUMMARY MEDICAL DECISION MAKING FREE TEXT BOX
cough w sputum gen weakness, well appearing sherif po vss cough w sputum gen weakness, well appearing sherif po vss- CXR neg for pneumonia- well appearing sherif po- inhaler prn cough  si/sx to return d/w px

## 2022-12-18 NOTE — ED ADULT TRIAGE NOTE - GLASGOW COMA SCALE: EYE OPENING, MLM
Chief Complaint  Conjunctivitis    Subjective          Amber Poole presents to [facility]  History of Present Illness  Started with a little crusting the week she was here for her appt.  She then started to notice redness in the eyes about 1 week ago.  She has not changed any eye make up or facial soap.  Doesn't itch but she did have some watery eyes on and off.  She has not put anything in the eyes. She does have some blurred vision since this started but no pressure or pain.  She had her eyes checked in May/June of this year. She was told to get some OTC readers.     No new meds  Her dad has macular deg in the Rt eye.  The left eye he lost his vision years ago.      Past Medical History:   • Allergic   • Allergies   • Arthritis   • Blood clotting disorder (HCC)   • Chronic allergic rhinitis   • Hypercoagulable state (HCC)   • Hypothyroidism   • Seasonal allergies   • Thyroid disorder   • Torn meniscus    LEFT KNEE       Allergies  Penicillins    Past Surgical History:   • COLONOSCOPY   • KNEE CARTILAGE SURGERY    BEEN 08.2020   • LASER ABLATION    UTERINE, [place]   • TONSILLECTOMY   • TUBAL ABDOMINAL LIGATION       Social History     Tobacco Use   • Smoking status: Never Smoker   • Smokeless tobacco: Never Used   Substance Use Topics   • Alcohol use: Never   • Drug use: Never       Family History   Problem Relation Age of Onset   • Diabetes Mother    • Kidney nephrosis Mother    • Heart disease Father    • Vision loss Father    • Macular degeneration Father    • Kidney nephrosis Maternal Grandmother    • Other Maternal Grandmother         BLADDER CALCULUS   • Liver cancer Other         UNSPECIFIED   • Lung cancer Other         UNSPECIFIED   • Cancer Daughter    • Leukemia Daughter    • Leukemia Son    • Macular degeneration Sister         Health Maintenance Due   Topic Date Due   • COLORECTAL CANCER SCREENING  Never done   • ANNUAL PHYSICAL  Never done   • COVID-19 Vaccine  (1) Never done   • TDAP/TD VACCINES (1 - Tdap) Never done   • ZOSTER VACCINE (1 of 2) Never done   • HEPATITIS C SCREENING  Never done   • PAP SMEAR  Never done   • INFLUENZA VACCINE  08/01/2021          Current Outpatient Medications:   •  albuterol sulfate  (90 Base) MCG/ACT inhaler, Inhale 2 puffs Every 4 (Four) Hours As Needed for Wheezing., Disp: 9 g, Rfl: 1  •  ascorbic acid (VITAMIN C) 500 MG tablet, Vitamin C 500 mg oral tablet take 1 tablet by oral route daily   Active, Disp: , Rfl:   •  aspirin (aspirin) 81 MG EC tablet, , Disp: , Rfl:   •  Cholecalciferol (Vitamin D3) 50 MCG (2000 UT) capsule, Take 2,000 Units by mouth Daily., Disp: , Rfl:   •  Diclofenac Sodium (Voltaren) 1 % gel gel, Apply 4 g topically to the appropriate area as directed 4 (Four) Times a Day., Disp: 300 g, Rfl: 2  •  docusate calcium (SURFAK) 240 MG capsule, Take 1 capsule by mouth 2 (Two) Times a Day., Disp: 180 capsule, Rfl: 1  •  escitalopram (Lexapro) 10 MG tablet, Take 1 tablet by mouth Daily., Disp: 30 tablet, Rfl: 1  •  estradiol (ESTRACE) 0.1 MG/GM vaginal cream, APPLY 2 grams EXTERNALLY TO VAGINA AT BEDTIME, Disp: , Rfl:   •  etodolac (LODINE) 400 MG tablet, 1 tab po tid, Disp: 270 tablet, Rfl: 1  •  ferrous sulfate 325 (65 FE) MG tablet, Take 325 mg by mouth Daily With Breakfast., Disp: , Rfl:   •  levothyroxine (SYNTHROID, LEVOTHROID) 200 MCG tablet, Take 1 tablet by mouth Daily., Disp: 90 tablet, Rfl: 1  •  levothyroxine (SYNTHROID, LEVOTHROID) 25 MCG tablet, Take 1 tablet by mouth Daily., Disp: 90 tablet, Rfl: 1  •  lidocaine (XYLOCAINE) 5 % ointment, lidocaine 5 % topical ointment apply to affected area(s) by topical route 1-4 times daily as needed 5/25/2021  Active, Disp: , Rfl:   •  loratadine (CLARITIN) 10 MG tablet, loratadine 10 mg oral tablet take 1 tablet (10 mg) by oral route once daily   Suspended, Disp: , Rfl:   •  melatonin 1 MG tablet, Take  by mouth., Disp: , Rfl:   •  montelukast (Singulair) 10 MG  "tablet, Take 1 tablet by mouth Every Night., Disp: 90 tablet, Rfl: 1  •  triamcinolone (KENALOG) 0.1 % ointment, APPLY 2-3 TIMES DAILY TO THE AFFECTED AREA, Disp: , Rfl:   •  Zinc 50 MG capsule, Take  by mouth., Disp: , Rfl:   •  olopatadine (Pataday) 0.2 % solution ophthalmic solution, Administer 1 drop to both eyes Daily., Disp: 2.5 mL, Rfl: 0    Medications Discontinued During This Encounter   Medication Reason   • predniSONE (DELTASONE) 20 MG tablet *Therapy completed       Immunization History   Administered Date(s) Administered   • Influenza, Unspecified 10/01/2020       Review of Systems   HENT: Negative for congestion and ear pain.    Eyes: Positive for blurred vision and redness. Negative for double vision, pain and itching.        Objective       Vitals:    12/20/21 1004   BP: 130/84   Pulse:    Resp:    Temp:    SpO2:      Body mass index is 32.56 kg/m².     Vitals:    12/20/21 1002 12/20/21 1004   BP: 146/77 130/84   Pulse: 79    Resp: 18    Temp: 97.5 °F (36.4 °C)    SpO2: 98%    Weight: 94.3 kg (207 lb 14.4 oz)    Height: 170.2 cm (67\")          Physical Exam  Eyes:      General: Lids are normal. Lids are everted, no foreign bodies appreciated.      Conjunctiva/sclera:      Right eye: No exudate.     Left eye: No exudate.            Result Review :     The following data was reviewed by: SHAILESH Colvin on 12/20/2021:                     Assessment and Plan      Diagnoses and all orders for this visit:    1. Acute conjunctivitis of both eyes, unspecified acute conjunctivitis type (Primary)  -     olopatadine (Pataday) 0.2 % solution ophthalmic solution; Administer 1 drop to both eyes Daily.  Dispense: 2.5 mL; Refill: 0            Follow Up     Return if symptoms worsen or fail to improve.  We will trial the pataday eye drops and let me know how they look on Wed/THurs.  She goes to the [facility] on Wed.  Call with any concerns or questions.    Patient was given instructions and counseling " regarding her condition or for health maintenance advice. Please see specific information pulled into the AVS if appropriate.   SHAILESH Colvin         (E4) spontaneous

## 2022-12-18 NOTE — ED ADULT NURSE REASSESSMENT NOTE - NS ED NURSE REASSESS COMMENT FT1
Pt coughing up yellow/greenish sputum. Pt ambulated to bathroom without difficulty. Pt unable to provide a urine sample. MD Murphy aware. Pt given a yogurt and applesauce at her request. Pt remains in NAD. Unable to get an IV line 2ndary to poor access to veins.

## 2022-12-21 DIAGNOSIS — R05.9 COUGH, UNSPECIFIED: ICD-10-CM

## 2022-12-21 DIAGNOSIS — Z88.8 ALLERGY STATUS TO OTHER DRUGS, MEDICAMENTS AND BIOLOGICAL SUBSTANCES STATUS: ICD-10-CM

## 2022-12-21 DIAGNOSIS — Z79.01 LONG TERM (CURRENT) USE OF ANTICOAGULANTS: ICD-10-CM

## 2022-12-21 DIAGNOSIS — Z20.822 CONTACT WITH AND (SUSPECTED) EXPOSURE TO COVID-19: ICD-10-CM

## 2022-12-21 DIAGNOSIS — J06.9 ACUTE UPPER RESPIRATORY INFECTION, UNSPECIFIED: ICD-10-CM

## 2022-12-21 DIAGNOSIS — Z79.84 LONG TERM (CURRENT) USE OF ORAL HYPOGLYCEMIC DRUGS: ICD-10-CM

## 2022-12-21 DIAGNOSIS — R53.1 WEAKNESS: ICD-10-CM

## 2022-12-21 DIAGNOSIS — I48.91 UNSPECIFIED ATRIAL FIBRILLATION: ICD-10-CM

## 2022-12-21 DIAGNOSIS — E11.9 TYPE 2 DIABETES MELLITUS WITHOUT COMPLICATIONS: ICD-10-CM

## 2022-12-21 DIAGNOSIS — Z79.4 LONG TERM (CURRENT) USE OF INSULIN: ICD-10-CM

## 2023-01-10 ENCOUNTER — NON-APPOINTMENT (OUTPATIENT)
Age: 78
End: 2023-01-10

## 2023-01-11 ENCOUNTER — APPOINTMENT (OUTPATIENT)
Dept: HEART AND VASCULAR | Facility: CLINIC | Age: 78
End: 2023-01-11

## 2023-01-17 ENCOUNTER — NON-APPOINTMENT (OUTPATIENT)
Age: 78
End: 2023-01-17

## 2023-01-17 ENCOUNTER — APPOINTMENT (OUTPATIENT)
Dept: HEART AND VASCULAR | Facility: CLINIC | Age: 78
End: 2023-01-17
Payer: MEDICARE

## 2023-01-17 PROCEDURE — G2066: CPT

## 2023-01-17 PROCEDURE — 93298 REM INTERROG DEV EVAL SCRMS: CPT

## 2023-01-24 PROBLEM — R05.9 COUGH: Status: ACTIVE | Noted: 2020-01-22

## 2023-01-24 PROBLEM — R94.2 DECREASED DIFFUSION CAPACITY: Status: ACTIVE | Noted: 2022-03-01

## 2023-01-24 PROBLEM — R06.02 SHORTNESS OF BREATH: Status: ACTIVE | Noted: 2020-05-13

## 2023-01-24 NOTE — REVIEW OF SYSTEMS
[SOB on Exertion] : sob on exertion [Arthralgias] : arthralgias [Diabetes] : diabetes [Negative] : Psychiatric [Fever] : no fever [Chills] : no chills [Cough] : no cough [Chest Tightness] : no chest tightness [Pleuritic Pain] : no pleuritic pain [Wheezing] : no wheezing [Chest Discomfort] : no chest discomfort [Palpitations] : no palpitations [Seasonal Allergies] : no seasonal allergies [Thyroid Problem] : no thyroid problem [TextBox_122] : h/o subdural hematoma s/p Pisek hole procedure

## 2023-01-24 NOTE — PATIENT PROFILE ADULT - NSPROMEDSBROUGHTTOHOSP_GEN_A_NUR
Problem: Discharge Planning  Goal: Discharge to home or other facility with appropriate resources  1/23/2023 2305 by Aristeo Wiley RN  Outcome: Progressing     Problem: Pain  Goal: Verbalizes/displays adequate comfort level or baseline comfort level  1/23/2023 2305 by Aristeo Wiley RN  Outcome: Progressing     Problem: Safety - Adult  Goal: Free from fall injury  1/23/2023 2305 by Aristeo Wiley RN  Outcome: Progressing     Problem: Chronic Conditions and Co-morbidities  Goal: Patient's chronic conditions and co-morbidity symptoms are monitored and maintained or improved  1/23/2023 2305 by Aristeo Wiley RN  Outcome: Progressing no

## 2023-01-24 NOTE — PROCEDURE
[FreeTextEntry1] : Chest CT 22\par Impression: Since 2021, there has been no change in areas of atelectasis and scar bilaterally. There are no groundglass opacities. \par \par LE doppler 3/9/22\par IMPRESSION:\par No evidence of deep venous thrombosis in either lower extremity.\par \par PFT 22\par FVC: 1.62 L (64%)--> 1.89 L (75%) \par FEV1: 1.05 L  (54%)--> 1.34 L (69%) \par FEV1/FVC: 65%--> 71%\par ECP32-71%: 0.60 L/s (36%)--> 0.88 L/s (52%)\par could not perform TLC, n2 washout\par DLCO: 9.51 (43%)\par \par Echo 22\par CONCLUSIONS:\par  1. Normal left and right ventricular size and systolic function.\par  2. Mildly dilated left atrium.\par  3. No significant valvular disease.\par  4. No evidence of pulmonary hypertension, pulmonary artery systolic pressure is 27 mmHg.\par  5. No pericardial effusion.\par  6. Compared to the previous TTE performed on 10/15/2020, there have been no significant interval changes.\par \par Chest CT 21\par IMPRESSION: Unchanged subsegmental atelectasis versus scarring in the right middle lobe, lingula and bilateral lower lobes. No evidence of interstitial lung disease.\par \par LE doppler 21\par IMPRESSION:\par No evidence of deep venous thrombosis in either lower extremity. Prior right common femoral vein DVT has resolved.\par \par PFT 21\par FVC: 1.89 L (74%)--> 2.08 L (81%) \par FEV1: 1.43 L (73%)--> 1.53 L (78%) \par FEV1/FVC: 76%--> 74%\par TNH16-82%: 0.95 L/s (55%)--> 0.99 L/s (57%)\par TLC: 3.57 L (66%)\par RV/T%\par Mild restrictive lung defect. No significant bronchodilator response. Mildly decreased lung volumes\par elevated RV/TLC consistent with air trapping\par Patient could not hold her breath to do DLCO\par \par 6MWT 21 done using rolling walker chair\par Patient walked 110 meters during a 6 minute walk test.\par Resting O2 saturation was 100% on RA. Heart rate 103 bpm.\par End test O2 saturation was 99% on RA. Heart rate 111 bpm. Casa scale end of test 4 "somewhat severe"\par This signifies reduced walk distance, no significant desaturation\par \par Labs 10/20/20:\par Positive COVID antibody\par Positive cardiolipin antibody level\par Negative Beta 2 Glycoprotein 1 IgG Ab and IgM ab and IgA ab\par Negative cardiolipin IgG, IgM, IgA\par CBC: Hgb 9.0\par \par Labs 20:\par Negative scleroderma antibodies\par Positive BRIAN: 1:160, SPECKLED\par Normal RF, normal hemolytic complement\par Negative D-dimer  \par Positive cardiolipin antibody level\par \par Echo 10/15/20\par CONCLUSIONS: \par  1. Normal left and right ventricular size and systolic function. \par  2. Mild symmetric left ventricular hypertrophy. \par  3. Grade II left ventricular diastolic dysfunction.\par  4. Biatrial enlargement.\par  5. Aortic sclerosis without significant stenosis. \par  6. No pericardial effusion.\par  7. Pulmonary artery systolic pressure is 35 mmHg. \par \par PFTs from 20 reviewed from Dr. Jony Wang's office. \par FVC: 1.61 L (63%) predicted pre-BD and 1.65 L (65%) post-BD\par FEV1: 1/06 L (54%) predicted pre-BD and 1.21 L (62%) predicted post-BD\par FEV1/FVC: 66% -> 73%\par TLC: 3.17 L (58%) \par DLCO: 69% \par Spirometry notable for moderately severe obstructive defect with acute response to BD. Lung volumes reveal moderately severe restrictive defect. DLCO slightly reduced. \par \par 6MWT 20: Resting O2 saturation 99%, heart rate 79.  Post exercise O2 96%, HR 97. No evidence of oxygen desaturation but does show reduced walking distance of only 88 meters with Casa scale 4.\par \par \par EXAM: ECHO TTE WO CON COMP W DOPP PROCEDURE DATE: 2020 \par CONCLUSIONS: \par  1. Limited study obtained for evaluation of left ventricular function performed by cardiology fellow on call. \par  2. The right ventricle is not well visualized. \par  3. Abnormal septal motion seen due to abnormal conduction. Endocardium not well visualized. Left ventricular ejection fraction is 50-55% in limited views. \par  4. No pericardial effusion. \par  5. Recommend repeat complete study for a comprehensive evaluation of biventricular function and cardiac valve assessment. \par Right Ventricle: The right ventricle is not well visualized. \par \par EXAM: US DPLX LWR EXT VEINS LTD RT PROCEDURE DATE: 2020 \par Thrombus visualized in the right common femoral vein extending to distal femoral vein and profunda femoris. No thrombus identified in the saphenofemoral junction or popliteal vein or calf veins on grayscale and Doppler examination. Contralateral left common femoral vein is patent. Subcutaneous edema in right lower extremity \par IMPRESSION: Since Lucille 10, 2016, new partially occlusive deep venous thrombus in right common femoral vein extending to distal femoral vein and profunda femoris. \par \par EXAM: CT ANGIO CHEST PE PROTOCOL IC PROCEDURE DATE: 2020 \par Pulmonary arteries: Occlusive filling defect in right main pulmonary artery, right upper lobe segmental and subsegmental branches. No thrombus in left pulmonary artery or sagittal embolus. Unchanged main pulmonary artery, 3.0 cm. No evidence of right heart chain. \par Lungs and large airways: No focal consolidation. No evidence of pulmonary infarct. Since May 30, 2019, unchanged scattered bilateral linear opacities in both lower lobes, lingula, and right middle lobe, probably subsegmental atelectasis or fibrosis. \par Pleura: No pleural effusion. \par Mediastinum and hilar regions: No thoracic lymphadenopathy. \par Cardiovascular: Heart size is normal. No thoracic aortic aneurysm. \par Pericardial effusion: No pericardial effusion. \par Chest wall and lower neck: Loop recorder in left upper chest wall. \par Upper abdomen: Normal. \par Bones: Intact sternotomy wires. Mild degenerative changes. \par IMPRESSION: \par 1. Partially occlusive pulmonary thromboemboli in right main pulmonary artery and right upper lobe segmental and subsegmental branches. No evidence of right heart strain or pulmonary infarct. \par 2. Since May 30, 2019, unchanged scattered bilateral linear opacities, possibly subsegmental atelectasis or fibrosis.

## 2023-01-24 NOTE — ASSESSMENT
[FreeTextEntry1] : 1-31-23:\par It was a pleasure to see Esequiel today for follow-up. Her respiratory issues are summarized:\par \par 1. Pulmonary embolism/DVT\par Esequiel was diagnosed with DVT in right leg 7/23/20 during visit with Dr. Santos, started on Eliquis. Prior to this, she states she did not have a history of any blood clots. She then presented to ED on 7/25/20 with chest pain.  CTA-PE demonstrated segmental and subsegmental PEs on right. tPA not given due to h/o intracranial bleed. Leg dopplers showed new partially occlusive DVT in right common femoral vein extending to distal femoral vein and profunda femoris. \par \par She is taking Eliquis. She had LE swelling on prior visit. LE doppler 4/7/21 showed no evidence of deep venous thrombosis in either lower extremity. Prior right common femoral vein DVT has resolved.\par \par She was evaluated by Dr. Payton Contreras on 8-26-20 who ordered a hypercoagulability workup. Of note, her anticardiolipin IgG was mildly elevated to 21. D-dimer was normal on 9/3/20. Repeat Anticardiolipin IgG was normal. This was likely an acute phase reactant. Negative Beta 2 Glycoprotein 1 IgG Ab and IgM ab and IgA ab. 6MWT without evidence of oxygen desaturation but does show reduced walking distance of only 88 meters with Casa scale 4. \par \par PFT done 2/24/22 shows mild obstructive lung defect with significant bronchodilator response. She was unable to perform total lung capacity by n2 washout. Since August 2020, her diffusion capacity has decreased from 14.7 (69% of predicted) to 9.51 (43% of predicted). D-dimer was checked and is negative. There was no evidence of pulmonary hypertension on recent echo. LE doppler was negative on 3/8/22. \par \par -Labs 3/11/21 Autoimmune serologies were repeated: normal RF, cardioliopin antibody IgA, IgA and IgM, Beta 2 glycoprotein 1 IgA Ab, IgG Ab, IgM Ab, repeat BRIAN 1:80, Speckled, negative scleroderma antibodies, negative centromere antibody. She does not need to see rheumatology at this time.\par -Chest CT done 6/2021 does not show evidence of interstitial lung disease. There are ground glass opacities at the bases with linear atelectasis. There are some nodules mainly in the subpleural area of the right upper lobe\par -Chest CT 5/16/22 stable\par -6MWT 6/1/22 showed decreased walk distance (110 meters). There was no desaturation.\par \par Plan:\par -She is wearing compression stockings.\par -She has been taking anticoagulation for over a year (since July 2020)\par -Repeat chest CT____\par -Ordered 6MWT to be done in June when she comes in for CT\par \par 2. Skin thickening\par On exam today, the skin over the dorsum of her hands appears thickened and less mobile. She has no known history of scleroderma, and no known family history of scleroderma. She also complains of joint and stiffness in the hands. Labs 9/4/20 showed negative SCL70, Positive BRIAN: 1:160, SPECKLED, Normal RF, normal hemolytic complement. Repeat SCL70 and anti centromere ab on 12/8/20 were normal. Repeat labs done 3/11/21 show normal RF, D-dimer, CMP, Hgb 11.4. Autoimmune serologies were repeated: normal cardioliopin antibody IgA, IgA and IgM, Beta 2 glycoprotein 1 IgA Ab, IgG Ab, IgM Ab, repeat BRIAN 1:80, Speckled, negative scleroderma antibodies, negative centromere antibody. She does not need to see rheumatology at this time.\par \par 3. Asthma\par She has been diagnosed with asthma in the past and is taking Symbicort. PFT done 2/24/22 shows mild obstructive lung defect with significant bronchodilator response. Given BD response, we increased Symbicort dosage from 80 to 160. We confirmed with her daughter that she is taking Symbicort 160.\par \par Plan:\par - Continue Symbicort 160\par - Use albuterol PRN\par \par 4. R/O sleep apnea\par There is evidence of diastolic dysfunction on Esequiel's recent echo. Sleep study done 12/22/20 showed no significant sleep disordered breathing.\par \par 5. Diastolic Dysfunction\par On 7/26 in-patient echo was done at bedside and RV was not well visualized). Echo done on 10/15/20 shows grade II left ventricular diastolic dysfunction. The pulmonary artery systolic pressure is borderline elevated, 35 mmHg. BNP on 1/26/22 was 465. B-lines were seen bilaterally on bedside ultrasound. Repeat echo shows normal left ventricular diastolic function.  She is not taking Lasix 20mg MWF.\par \par 6. Health maintenance\par Flu vaccine up to date. She states she is up-to-date with her pneumococcal vaccination. \par She was counseled on getting vaccinated for COVID as soon as possible\par \par Return to clinic: 3 months

## 2023-01-24 NOTE — DISCUSSION/SUMMARY
[FreeTextEntry1] : ATTENDING'S SUMMARY:\par 1-31-23:\par mild pallor, no icterus\par no JVD, no hepatojugular reflux, no HSM\par no cervical adenopathy, no supraclavicular adenopathy\par normal s1/s2, no murmurs, rubs or gallops\par no dullness, good air entry bilaterally, no wheezing, rhonchi or crackles\par sternotomy incision present\par no cyanosis, grade 1 clubbing, no articular manifestations, Thickening of the skin of the ulnar aspect of both hands, especially the right\par slight discoloration of the fingernails\par no pedal edema\par

## 2023-01-31 ENCOUNTER — APPOINTMENT (OUTPATIENT)
Dept: PULMONOLOGY | Facility: CLINIC | Age: 78
End: 2023-01-31

## 2023-01-31 DIAGNOSIS — R05.9 COUGH, UNSPECIFIED: ICD-10-CM

## 2023-01-31 DIAGNOSIS — R06.02 SHORTNESS OF BREATH: ICD-10-CM

## 2023-01-31 DIAGNOSIS — R94.2 ABNORMAL RESULTS OF PULMONARY FUNCTION STUDIES: ICD-10-CM

## 2023-02-01 ENCOUNTER — APPOINTMENT (OUTPATIENT)
Dept: INTERNAL MEDICINE | Facility: CLINIC | Age: 78
End: 2023-02-01

## 2023-02-01 NOTE — ED PROVIDER NOTE - PATIENT PORTAL LINK FT
You can access the FollowMyHealth Patient Portal offered by [facility] by registering at the following website: [email or web address]. By joining [employer]’s FollowMyHealth portal, you will also be able to view your health information using other applications (apps) compatible with our system. Mart-1 - Positive Histology Text: MART-1 staining demonstrates areas of higher density and clustering of melanocytes with Pagetoid spread upwards within the epidermis. The surgical margins are positive for tumor cells.

## 2023-02-03 NOTE — PATIENT PROFILE ADULT. - SPIRITUAL CULTURAL, RELIGIOUS PRACTICES/VALUES, PROFILE
nothing A-T Advancement Flap Text: The defect edges were debeveled with a #15 scalpel blade.  Given the location of the defect, shape of the defect and the proximity to free margins an A-T advancement flap was deemed most appropriate.  Using a sterile surgical marker, an appropriate advancement flap was drawn incorporating the defect and placing the expected incisions within the relaxed skin tension lines where possible.    The area thus outlined was incised deep to adipose tissue with a #15 scalpel blade.  The skin margins were undermined to an appropriate distance in all directions utilizing iris scissors.

## 2023-02-06 ENCOUNTER — APPOINTMENT (OUTPATIENT)
Dept: NEPHROLOGY | Facility: CLINIC | Age: 78
End: 2023-02-06

## 2023-02-09 NOTE — ASSESSMENT
[FreeTextEntry1] : atrial tach on toprol xl bid loop recorder \par htn call daughter to make sure she is taking meds as directed\par will add chlorthalidone for HTN \par on statin due to her history of DM\par called daughter and discussed.\par fu in one month \par \par  Heart Failure

## 2023-02-17 ENCOUNTER — APPOINTMENT (OUTPATIENT)
Dept: HEART AND VASCULAR | Facility: CLINIC | Age: 78
End: 2023-02-17

## 2023-02-27 ENCOUNTER — APPOINTMENT (OUTPATIENT)
Dept: INTERNAL MEDICINE | Facility: CLINIC | Age: 78
End: 2023-02-27

## 2023-03-16 ENCOUNTER — NON-APPOINTMENT (OUTPATIENT)
Age: 78
End: 2023-03-16

## 2023-03-21 ENCOUNTER — APPOINTMENT (OUTPATIENT)
Dept: INTERNAL MEDICINE | Facility: CLINIC | Age: 78
End: 2023-03-21
Payer: MEDICARE

## 2023-03-21 VITALS
TEMPERATURE: 97.8 F | HEART RATE: 88 BPM | DIASTOLIC BLOOD PRESSURE: 84 MMHG | WEIGHT: 146 LBS | OXYGEN SATURATION: 99 % | HEIGHT: 67 IN | SYSTOLIC BLOOD PRESSURE: 143 MMHG | BODY MASS INDEX: 22.91 KG/M2

## 2023-03-21 DIAGNOSIS — R21 RASH AND OTHER NONSPECIFIC SKIN ERUPTION: ICD-10-CM

## 2023-03-21 DIAGNOSIS — B35.9 DERMATOPHYTOSIS, UNSPECIFIED: ICD-10-CM

## 2023-03-21 PROCEDURE — 99214 OFFICE O/P EST MOD 30 MIN: CPT | Mod: 25

## 2023-03-21 PROCEDURE — 36415 COLL VENOUS BLD VENIPUNCTURE: CPT

## 2023-03-27 LAB
ALBUMIN SERPL ELPH-MCNC: 4.6 G/DL
ALP BLD-CCNC: 90 U/L
ALT SERPL-CCNC: 15 U/L
ANION GAP SERPL CALC-SCNC: 17 MMOL/L
AST SERPL-CCNC: 20 U/L
BASOPHILS # BLD AUTO: 0.04 K/UL
BASOPHILS NFR BLD AUTO: 0.6 %
BILIRUB SERPL-MCNC: 0.2 MG/DL
BUN SERPL-MCNC: 21 MG/DL
CALCIUM SERPL-MCNC: 10.4 MG/DL
CDIFF BY PCR: NOT DETECTED
CHLORIDE SERPL-SCNC: 107 MMOL/L
CO2 SERPL-SCNC: 19 MMOL/L
CREAT SERPL-MCNC: 1.17 MG/DL
DEPRECATED O AND P PREP STL: NORMAL
EGFR: 48 ML/MIN/1.73M2
EOSINOPHIL # BLD AUTO: 0.19 K/UL
EOSINOPHIL NFR BLD AUTO: 3 %
ESTIMATED AVERAGE GLUCOSE: 111 MG/DL
GI PCR PANEL: NOT DETECTED
GLUCOSE SERPL-MCNC: 76 MG/DL
HBA1C MFR BLD HPLC: 5.5 %
HCT VFR BLD CALC: 36 %
HGB BLD-MCNC: 11.2 G/DL
IMM GRANULOCYTES NFR BLD AUTO: 0.3 %
LYMPHOCYTES # BLD AUTO: 1.67 K/UL
LYMPHOCYTES NFR BLD AUTO: 26.4 %
MAN DIFF?: NORMAL
MCHC RBC-ENTMCNC: 31.1 GM/DL
MCHC RBC-ENTMCNC: 31.5 PG
MCV RBC AUTO: 101.1 FL
MONOCYTES # BLD AUTO: 0.45 K/UL
MONOCYTES NFR BLD AUTO: 7.1 %
NEUTROPHILS # BLD AUTO: 3.95 K/UL
NEUTROPHILS NFR BLD AUTO: 62.6 %
PLATELET # BLD AUTO: 234 K/UL
POTASSIUM SERPL-SCNC: 4.5 MMOL/L
PROT SERPL-MCNC: 7.1 G/DL
RBC # BLD: 3.56 M/UL
RBC # FLD: 14.5 %
SODIUM SERPL-SCNC: 143 MMOL/L
WBC # FLD AUTO: 6.32 K/UL

## 2023-03-29 ENCOUNTER — APPOINTMENT (OUTPATIENT)
Dept: HEMATOLOGY ONCOLOGY | Facility: CLINIC | Age: 78
End: 2023-03-29

## 2023-04-03 ENCOUNTER — APPOINTMENT (OUTPATIENT)
Dept: HEMATOLOGY ONCOLOGY | Facility: CLINIC | Age: 78
End: 2023-04-03

## 2023-04-10 ENCOUNTER — APPOINTMENT (OUTPATIENT)
Dept: INTERNAL MEDICINE | Facility: CLINIC | Age: 78
End: 2023-04-10
Payer: MEDICARE

## 2023-04-10 DIAGNOSIS — R19.7 DIARRHEA, UNSPECIFIED: ICD-10-CM

## 2023-04-10 PROCEDURE — 99442: CPT

## 2023-04-17 ENCOUNTER — APPOINTMENT (OUTPATIENT)
Dept: HEMATOLOGY ONCOLOGY | Facility: CLINIC | Age: 78
End: 2023-04-17
Payer: MEDICARE

## 2023-04-17 VITALS
HEART RATE: 74 BPM | TEMPERATURE: 97.7 F | WEIGHT: 150 LBS | HEIGHT: 67 IN | OXYGEN SATURATION: 99 % | BODY MASS INDEX: 23.54 KG/M2 | SYSTOLIC BLOOD PRESSURE: 160 MMHG | DIASTOLIC BLOOD PRESSURE: 80 MMHG

## 2023-04-17 PROCEDURE — 99213 OFFICE O/P EST LOW 20 MIN: CPT | Mod: 25

## 2023-04-17 NOTE — ASSESSMENT
[FreeTextEntry1] : \par I reviewed repeat blood work done at the end of last month by Dr. Cueto , all WNL...\par \par Patient's Eliquis was renewed by her cardiologist Dr. Francois for the next year.\par \par \par Follow-up with hematology in 4 to 6 months or sooner if needed.

## 2023-04-17 NOTE — HISTORY OF PRESENT ILLNESS
[de-identified] : 75 years old female history of multiple medical problems as well as subarachnoid hemorrhage requiring evacuation in the past.  Patient was found to have positive COVID antibodies in June 2020... Later on in July she was admitted to [facility] with a large femoral DVT, and she is currently on anticoagulation with Eliquis... She is here to assess the length of anticoagulation therapy...\par \par February 16, 2021 returns for follow-up... Continues to take the Eliquis... Tolerating well... I discussed with daughter, and reviewed patient's meds [de-identified] : October 19, 2020 patient returns for follow-up... Her main complaint is inability to sleep well... Has not seen PCP in a while... 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 No complaints tolerating Eliquis well\par \par Lucille 15, 2021 here for follow-up... Wants to know why she still needs to be on the Eliquis... Discussed with patient the fact that she has an irregular heartbeat, history of DVT, and history of presence of lupus anticoagulants... So therefore at this point the cardiologist hematologist and pulmonologist involved in her care, feel that she is better served by remaining on anticoagulation, however this decision would be revisited in the future.\par \par \par September 20, 2021 returns for follow-up... No new complaints\par \par March 8, 2022 returns for follow-up... no new complaints... Not even complaining about having to take the Eliquis\par \par September 6, 2022 returns for follow-up... Continues to take the Eliquis... Most recent lupus anticoagulants have been negative\par \par April 17, 2023 returns for follow-up... She had recent blood work with Dr. Cueto\par \par

## 2023-05-09 ENCOUNTER — EMERGENCY (EMERGENCY)
Facility: HOSPITAL | Age: 78
LOS: 1 days | Discharge: HOME | End: 2023-05-09
Attending: EMERGENCY MEDICINE | Admitting: EMERGENCY MEDICINE
Payer: MEDICARE

## 2023-05-09 VITALS
HEART RATE: 89 BPM | SYSTOLIC BLOOD PRESSURE: 171 MMHG | OXYGEN SATURATION: 96 % | DIASTOLIC BLOOD PRESSURE: 94 MMHG | TEMPERATURE: 98 F | RESPIRATION RATE: 20 BRPM

## 2023-05-09 DIAGNOSIS — Z79.01 LONG TERM (CURRENT) USE OF ANTICOAGULANTS: ICD-10-CM

## 2023-05-09 DIAGNOSIS — M25.562 PAIN IN LEFT KNEE: ICD-10-CM

## 2023-05-09 DIAGNOSIS — Z86.711 PERSONAL HISTORY OF PULMONARY EMBOLISM: ICD-10-CM

## 2023-05-09 DIAGNOSIS — Z79.4 LONG TERM (CURRENT) USE OF INSULIN: ICD-10-CM

## 2023-05-09 DIAGNOSIS — I44.0 ATRIOVENTRICULAR BLOCK, FIRST DEGREE: ICD-10-CM

## 2023-05-09 DIAGNOSIS — I10 ESSENTIAL (PRIMARY) HYPERTENSION: ICD-10-CM

## 2023-05-09 DIAGNOSIS — E78.5 HYPERLIPIDEMIA, UNSPECIFIED: ICD-10-CM

## 2023-05-09 DIAGNOSIS — E11.9 TYPE 2 DIABETES MELLITUS WITHOUT COMPLICATIONS: ICD-10-CM

## 2023-05-09 DIAGNOSIS — Z79.84 LONG TERM (CURRENT) USE OF ORAL HYPOGLYCEMIC DRUGS: ICD-10-CM

## 2023-05-09 DIAGNOSIS — Z86.718 PERSONAL HISTORY OF OTHER VENOUS THROMBOSIS AND EMBOLISM: ICD-10-CM

## 2023-05-09 DIAGNOSIS — Z86.79 PERSONAL HISTORY OF OTHER DISEASES OF THE CIRCULATORY SYSTEM: ICD-10-CM

## 2023-05-09 DIAGNOSIS — Z96.651 PRESENCE OF RIGHT ARTIFICIAL KNEE JOINT: Chronic | ICD-10-CM

## 2023-05-09 DIAGNOSIS — R79.89 OTHER SPECIFIED ABNORMAL FINDINGS OF BLOOD CHEMISTRY: ICD-10-CM

## 2023-05-09 DIAGNOSIS — J44.9 CHRONIC OBSTRUCTIVE PULMONARY DISEASE, UNSPECIFIED: ICD-10-CM

## 2023-05-09 DIAGNOSIS — Z88.8 ALLERGY STATUS TO OTHER DRUGS, MEDICAMENTS AND BIOLOGICAL SUBSTANCES: ICD-10-CM

## 2023-05-09 DIAGNOSIS — I62.01 NONTRAUMATIC ACUTE SUBDURAL HEMORRHAGE: Chronic | ICD-10-CM

## 2023-05-09 DIAGNOSIS — I48.91 UNSPECIFIED ATRIAL FIBRILLATION: ICD-10-CM

## 2023-05-09 DIAGNOSIS — Z91.010 ALLERGY TO PEANUTS: ICD-10-CM

## 2023-05-09 LAB
ALBUMIN SERPL ELPH-MCNC: 4.2 G/DL — SIGNIFICANT CHANGE UP (ref 3.3–5)
ALP SERPL-CCNC: 81 U/L — SIGNIFICANT CHANGE UP (ref 40–120)
ALT FLD-CCNC: 13 U/L — SIGNIFICANT CHANGE UP (ref 10–45)
ANION GAP SERPL CALC-SCNC: 13 MMOL/L — SIGNIFICANT CHANGE UP (ref 5–17)
APTT BLD: 33.2 SEC — SIGNIFICANT CHANGE UP (ref 27.5–35.5)
AST SERPL-CCNC: 18 U/L — SIGNIFICANT CHANGE UP (ref 10–40)
BASOPHILS # BLD AUTO: 0.06 K/UL — SIGNIFICANT CHANGE UP (ref 0–0.2)
BASOPHILS NFR BLD AUTO: 1 % — SIGNIFICANT CHANGE UP (ref 0–2)
BILIRUB SERPL-MCNC: 0.4 MG/DL — SIGNIFICANT CHANGE UP (ref 0.2–1.2)
BUN SERPL-MCNC: 18 MG/DL — SIGNIFICANT CHANGE UP (ref 7–23)
CALCIUM SERPL-MCNC: 9.8 MG/DL — SIGNIFICANT CHANGE UP (ref 8.4–10.5)
CHLORIDE SERPL-SCNC: 108 MMOL/L — SIGNIFICANT CHANGE UP (ref 96–108)
CO2 SERPL-SCNC: 23 MMOL/L — SIGNIFICANT CHANGE UP (ref 22–31)
CREAT SERPL-MCNC: 1.04 MG/DL — SIGNIFICANT CHANGE UP (ref 0.5–1.3)
EGFR: 55 ML/MIN/1.73M2 — LOW
EOSINOPHIL # BLD AUTO: 0.18 K/UL — SIGNIFICANT CHANGE UP (ref 0–0.5)
EOSINOPHIL NFR BLD AUTO: 2.9 % — SIGNIFICANT CHANGE UP (ref 0–6)
GLUCOSE SERPL-MCNC: 98 MG/DL — SIGNIFICANT CHANGE UP (ref 70–99)
HCT VFR BLD CALC: 31.5 % — LOW (ref 34.5–45)
HGB BLD-MCNC: 10.7 G/DL — LOW (ref 11.5–15.5)
IMM GRANULOCYTES NFR BLD AUTO: 0.5 % — SIGNIFICANT CHANGE UP (ref 0–0.9)
INR BLD: 1.61 — HIGH (ref 0.88–1.16)
LYMPHOCYTES # BLD AUTO: 1.79 K/UL — SIGNIFICANT CHANGE UP (ref 1–3.3)
LYMPHOCYTES # BLD AUTO: 28.5 % — SIGNIFICANT CHANGE UP (ref 13–44)
MCHC RBC-ENTMCNC: 32.1 PG — SIGNIFICANT CHANGE UP (ref 27–34)
MCHC RBC-ENTMCNC: 34 GM/DL — SIGNIFICANT CHANGE UP (ref 32–36)
MCV RBC AUTO: 94.6 FL — SIGNIFICANT CHANGE UP (ref 80–100)
MONOCYTES # BLD AUTO: 0.49 K/UL — SIGNIFICANT CHANGE UP (ref 0–0.9)
MONOCYTES NFR BLD AUTO: 7.8 % — SIGNIFICANT CHANGE UP (ref 2–14)
NEUTROPHILS # BLD AUTO: 3.74 K/UL — SIGNIFICANT CHANGE UP (ref 1.8–7.4)
NEUTROPHILS NFR BLD AUTO: 59.3 % — SIGNIFICANT CHANGE UP (ref 43–77)
NRBC # BLD: 0 /100 WBCS — SIGNIFICANT CHANGE UP (ref 0–0)
NT-PROBNP SERPL-SCNC: 608 PG/ML — HIGH (ref 0–300)
PLATELET # BLD AUTO: 198 K/UL — SIGNIFICANT CHANGE UP (ref 150–400)
POTASSIUM SERPL-MCNC: 4 MMOL/L — SIGNIFICANT CHANGE UP (ref 3.5–5.3)
POTASSIUM SERPL-SCNC: 4 MMOL/L — SIGNIFICANT CHANGE UP (ref 3.5–5.3)
PROT SERPL-MCNC: 7.1 G/DL — SIGNIFICANT CHANGE UP (ref 6–8.3)
PROTHROM AB SERPL-ACNC: 19.3 SEC — HIGH (ref 10.5–13.4)
RBC # BLD: 3.33 M/UL — LOW (ref 3.8–5.2)
RBC # FLD: 13.7 % — SIGNIFICANT CHANGE UP (ref 10.3–14.5)
SODIUM SERPL-SCNC: 144 MMOL/L — SIGNIFICANT CHANGE UP (ref 135–145)
TROPONIN T SERPL-MCNC: 0.01 NG/ML — SIGNIFICANT CHANGE UP (ref 0–0.01)
WBC # BLD: 6.29 K/UL — SIGNIFICANT CHANGE UP (ref 3.8–10.5)
WBC # FLD AUTO: 6.29 K/UL — SIGNIFICANT CHANGE UP (ref 3.8–10.5)

## 2023-05-09 PROCEDURE — 93971 EXTREMITY STUDY: CPT | Mod: 26,LT

## 2023-05-09 PROCEDURE — 99285 EMERGENCY DEPT VISIT HI MDM: CPT

## 2023-05-09 NOTE — ED ADULT TRIAGE NOTE - CHIEF COMPLAINT QUOTE
Pt BIB EMS for bilateral lower knee and ankle pain. En route, pt developed CP. Pt denies SOB/lightheadedness.

## 2023-05-10 VITALS
RESPIRATION RATE: 17 BRPM | SYSTOLIC BLOOD PRESSURE: 181 MMHG | DIASTOLIC BLOOD PRESSURE: 88 MMHG | OXYGEN SATURATION: 98 % | HEART RATE: 80 BPM

## 2023-05-10 LAB — MAGNESIUM SERPL-MCNC: 2.3 MG/DL — SIGNIFICANT CHANGE UP (ref 1.6–2.6)

## 2023-05-10 PROCEDURE — 93971 EXTREMITY STUDY: CPT

## 2023-05-10 PROCEDURE — 73562 X-RAY EXAM OF KNEE 3: CPT | Mod: 26,LT

## 2023-05-10 PROCEDURE — 36415 COLL VENOUS BLD VENIPUNCTURE: CPT

## 2023-05-10 PROCEDURE — 96374 THER/PROPH/DIAG INJ IV PUSH: CPT

## 2023-05-10 PROCEDURE — 73562 X-RAY EXAM OF KNEE 3: CPT

## 2023-05-10 PROCEDURE — 85025 COMPLETE CBC W/AUTO DIFF WBC: CPT

## 2023-05-10 PROCEDURE — 71045 X-RAY EXAM CHEST 1 VIEW: CPT | Mod: 26

## 2023-05-10 PROCEDURE — 83735 ASSAY OF MAGNESIUM: CPT

## 2023-05-10 PROCEDURE — 83880 ASSAY OF NATRIURETIC PEPTIDE: CPT

## 2023-05-10 PROCEDURE — 71045 X-RAY EXAM CHEST 1 VIEW: CPT

## 2023-05-10 PROCEDURE — 85610 PROTHROMBIN TIME: CPT

## 2023-05-10 PROCEDURE — 93005 ELECTROCARDIOGRAM TRACING: CPT

## 2023-05-10 PROCEDURE — 80053 COMPREHEN METABOLIC PANEL: CPT

## 2023-05-10 PROCEDURE — 84484 ASSAY OF TROPONIN QUANT: CPT

## 2023-05-10 PROCEDURE — 85730 THROMBOPLASTIN TIME PARTIAL: CPT

## 2023-05-10 PROCEDURE — 99285 EMERGENCY DEPT VISIT HI MDM: CPT | Mod: 25

## 2023-05-10 RX ORDER — ACETAMINOPHEN 500 MG
975 TABLET ORAL ONCE
Refills: 0 | Status: COMPLETED | OUTPATIENT
Start: 2023-05-10 | End: 2023-05-10

## 2023-05-10 RX ORDER — AMLODIPINE BESYLATE 2.5 MG/1
10 TABLET ORAL ONCE
Refills: 0 | Status: COMPLETED | OUTPATIENT
Start: 2023-05-10 | End: 2023-05-10

## 2023-05-10 RX ORDER — MAGNESIUM SULFATE 500 MG/ML
2 VIAL (ML) INJECTION ONCE
Refills: 0 | Status: COMPLETED | OUTPATIENT
Start: 2023-05-10 | End: 2023-05-10

## 2023-05-10 RX ADMIN — Medication 975 MILLIGRAM(S): at 02:23

## 2023-05-10 RX ADMIN — AMLODIPINE BESYLATE 10 MILLIGRAM(S): 2.5 TABLET ORAL at 15:37

## 2023-05-10 RX ADMIN — Medication 50 GRAM(S): at 02:23

## 2023-05-10 NOTE — ED PROVIDER NOTE - OBJECTIVE STATEMENT
78 yr old female, history of htn, hld, DM2, asthma/COPD, dvt / pe (on eliquis), afib s/p ablation, subdural hematoma, presents to the Emergency Department w multiple complaints. pt has gradually developed L knee pain over the course of today. no injury / fall / trauma. ambulates w walker at baseline and has still been able to. this evening thought she noticed her feet were swollen so was concerned the pain / swelling were related. while on her way to the ED w EMS pt reports she had brief episode of chest discomfort. now resolved. has hx of dvt in L leg - compliant w eliquis.   no sob, palpitations, lightheadedness, diaphoresis, n/v, near-syncope / syncope. no fever, abd pain. no headache, vision changes, extremity weakness / numbness / tingling. no recent infectious symptoms.

## 2023-05-10 NOTE — ED PROVIDER NOTE - PATIENT PORTAL LINK FT
You can access the FollowMyHealth Patient Portal offered by [facility] by registering at the following website: [email or web address]. By joining [employer]’s FollowMyHealth portal, you will also be able to view your health information using other applications (apps) compatible with our system. You can access the FollowMyHealth Patient Portal offered by [facility] by registering at the following website: [email or web address]. By joining [employer]’s FollowMyHealth portal, you will also be able to view your health information using other applications (apps) compatible with our system.

## 2023-05-10 NOTE — ED PROVIDER NOTE - PHYSICAL EXAMINATION
Constitutional : Well appearing, non-toxic, no acute distress. awake, alert, oriented to person, place, time/situation.  Head : head normocephalic, atraumatic  EENMT : eyes clear bilaterally, PERRL, EOMI. airway patent. moist mucous membranes. neck supple.  Cardiac : Normal rate, regular rhythm. No murmur appreciated, trace bilateral LE edema. symmetric.  Resp : Breath sounds clear and equal bilaterally. Respirations even and unlabored.   Gastro : abdomen soft, nontender, nondistended. no rebound or guarding. no CVAT.  MSK :  LLE : knee - skin intact, no erythema/ecchymosis/warmth, no evidence of trauma, no effusion. no tenderness over patella; at head of fibula; at tibial tuberosity; to distal femur; no tenderness over joint lines;  slightly limited ROM of knee without pain, FROM hip and ankle without pain, strength 5/5 throughout, sensation intact distally, 2+ DP/PT pulses, cap refill <2 sec   extremities otherwise - range of motion is not limited, no muscle or joint tenderness  Back : No evidence of trauma. No spinal or CVA tenderness.  Vasc : Extremities warm and well perfused. 2+ radial and DP pulses. cap refill <2 seconds  Neuro : Alert and oriented, CNII-XII grossly intact, no focal deficits, no motor or sensory deficits.  Skin : Skin normal color for race, warm, dry and intact. No evidence of rash.  Psych : Alert and oriented to person, place, time/situation. normal mood and affect. no apparent risk to self or others.

## 2023-05-10 NOTE — ED PROVIDER NOTE - CARE PROVIDER_API CALL
Scott Ortega)  Orthopaedic Surgery  [address] [place] [address], [address]  [place] [address]  Phone: [phone]  Fax: [phone]  Follow Up Time:

## 2023-05-10 NOTE — ED PROVIDER NOTE - PROGRESS NOTE DETAILS
ecg nonischemic. xray chest w/o acute findings  US w/o evidence of DVT  BNP slightly elevated w 600s but expected for age. labs otherwise ok.   pt ok for dc and out pt follow up.     All results reviewed with the patient verbally. Discharge plan and return precautions d/w pt who verbalized understanding and agrees with plan. All questions answered. Vitals WNL. Ready for d/c. ecg nonischemic. but prolonged QT. mag low to 1.5.  repleted mag and QTC improved and mag rpt 2.3. recommended outpt cards f/u.    xray chest w/o acute findings  US w/o evidence of DVT  BNP slightly elevated w 600s but expected for age. labs otherwise ok.   pt ok for dc and out pt follow up.     All results reviewed with the patient verbally. Discharge plan and return precautions d/w pt who verbalized understanding and agrees with plan. All questions answered. Vitals WNL. Ready for d/c.

## 2023-05-10 NOTE — ED PROVIDER NOTE - SHIFT CHANGE DETAILS
78F afib s/p ablation, LLE dvt/pe on eliquis, htn, hld, dm, asthma/copd, SDH, c/o L knee pain w/ ambulation. xray w/o acute fx/dislocation. LLE duplex w/o acute dvt. prolonged qtc on ekg. s/p mag. trop wnl. bnp 600s (prior 300s, 7/25/20).    dispo: pending repeat ekg s/p mag and reassessment -- anticipate dc home if no acute abnl

## 2023-05-10 NOTE — ED ADULT NURSE REASSESSMENT NOTE - NSFALLRISKINTERV_ED_ALL_ED
Assistance OOB with selected safe patient handling equipment if applicable/Assistance with ambulation/Communicate fall risk and risk factors to all staff, patient, and family/Monitor gait and stability/Provide visual cue: yellow wristband, yellow gown, etc/Reinforce activity limits and safety measures with patient and family/Call bell, personal items and telephone in reach/Instruct patient to call for assistance before getting out of bed/chair/stretcher/Non-slip footwear applied when patient is off stretcher/[place] to call system/Physically safe environment - no spills, clutter or unnecessary equipment/Purposeful Proactive Rounding/Room/bathroom lighting operational, light cord in reach

## 2023-05-10 NOTE — ED PROVIDER NOTE - CLINICAL SUMMARY MEDICAL DECISION MAKING FREE TEXT BOX
history of htn, hld, DM2, asthma/COPD, dvt / pe (on eliquis), afib s/p ablation, subdural hematoma. here w multiple complaints including atraumatic knee pain. also brief episode of chest discomfort on way to ED.  hx of dvt.   pt well appearing, stable vitals, exam reassuring - no notable swelling, no bony tenderness, neurovascularly intact.   1. L knee pain - atraumatic  r/o dvt given history although on Ac and compliant.  r/o acute bony abnormality low suspicion given no trauma.   2. chest pain   low suspicion acs, r/o trop, ecg. r/o pna.   plan - labs, imaging  analgesia prn  reassess

## 2023-05-10 NOTE — ED ADULT NURSE REASSESSMENT NOTE - NSFALLUNIVINTERV_ED_ALL_ED
Bed/Stretcher in lowest position, wheels locked, appropriate side rails in place/Call bell, personal items and telephone in reach/Instruct patient to call for assistance before getting out of bed/chair/stretcher/Non-slip footwear applied when patient is off stretcher/[place] to call system/Physically safe environment - no spills, clutter or unnecessary equipment/Purposeful proactive rounding/Room/bathroom lighting operational, light cord in reach

## 2023-05-10 NOTE — ED ADULT NURSE REASSESSMENT NOTE - NS ED NURSE REASSESS COMMENT FT1
Pt asked RN to contact Ning at [facility], her [facility] agency. Spoke with Ning, let her know that pt requested me contact her and awaiting transport for pt. Provided this information to pt.
Spoke to CASS Aguilera. CASS stated she met with pt this morning to discuss transportation. Awaiting transportation for d/c, pt in NAD.
Pt awaiting transportation, in NAD, no c/o pain, comfortable in stretcher.
Received pt from night shift. Pt ID'ed, AAOx3, in NAD, no c/o pain, awaiting d/c and would like to speak to SW.

## 2023-05-10 NOTE — ED ADULT NURSE NOTE - CAS ELECT INFOMATION PROVIDED
pt educated on return precautions. pt states she feels safe to go home and her aide will be with her tomorrow. pt educated on home meds and should take them as prescribed./DC instructions

## 2023-05-10 NOTE — ED PROVIDER NOTE - NSFOLLOWUPINSTRUCTIONS_ED_ALL_ED_FT
Ultrasound did not show a blood clot.   Xrays did not show any broken bones.     Continue all medications as previously prescribed.     Rest the leg, avoid heavy lifting, strenuous activity.   Ice the area, 20 minutes 4 times a day   Use ace wrap / compression to help with swelling and pain.  Elevate the leg to decrease swelling and promote healing.   Take over the counter medications (tylneol / motirn) for pain. See below for dosing.     Follow up with your primary care doctor within 1 week for continued evaluation.   Follow up with Orthopedics within 1-2 weeks for further / continued evaluation. See office information listed here.     Return to the Emergency Department if you have any worsening or new symptoms such as inability to walk, numbness/tingling/paresthesias, severe swelling/redness, pain that cannot be controlled with over the counter medication, any chest pain or shortness of breath, or any other serious concerns.

## 2023-05-22 RX ORDER — LANCETS 33 GAUGE
EACH MISCELLANEOUS
Qty: 1 | Refills: 5 | Status: ACTIVE | COMMUNITY
Start: 2017-05-30 | End: 1900-01-01

## 2023-06-06 ENCOUNTER — APPOINTMENT (OUTPATIENT)
Dept: NEPHROLOGY | Facility: CLINIC | Age: 78
End: 2023-06-06
Payer: MEDICARE

## 2023-06-06 VITALS
HEART RATE: 76 BPM | HEIGHT: 67 IN | WEIGHT: 150 LBS | SYSTOLIC BLOOD PRESSURE: 134 MMHG | BODY MASS INDEX: 23.54 KG/M2 | DIASTOLIC BLOOD PRESSURE: 70 MMHG

## 2023-06-06 DIAGNOSIS — N20.0 CALCULUS OF KIDNEY: ICD-10-CM

## 2023-06-06 DIAGNOSIS — E87.1 HYPO-OSMOLALITY AND HYPONATREMIA: ICD-10-CM

## 2023-06-06 PROCEDURE — 99214 OFFICE O/P EST MOD 30 MIN: CPT

## 2023-06-06 NOTE — HISTORY OF PRESENT ILLNESS
[FreeTextEntry1] : 78-year-old woman referred because of a rising creatinine that went from 0.972 years ago up to a high of 1.59 and a very high BUN in the 50s.  She has well-controlled type 2 diabetes and a long history of hypertension which is well controlled also.  She had 2 stones in her left lower pole kidney in 2021.  She has not been on diuretics despite her prerenal azotemia in the past.  In March, her A1c was 5.5, hemoglobin 11.2, creatinine down to 1.17 with a BUN of 21, GFR 48, K4.5, CO2 19, calcium 10.4.  Her appetite has been poor for quite some time.  Her aide makes her a sandwich every day which she seems to eat.

## 2023-06-06 NOTE — ASSESSMENT
[FreeTextEntry1] : 78-year-old woman with controlled hypertension and type 2 diabetes, who is renal function has improved quite dramatically and now has stable CKD stage IIIa.  I have ordered labs to be repeated in 4 to 5 months followed by a visit.

## 2023-06-14 ENCOUNTER — APPOINTMENT (OUTPATIENT)
Dept: HEART AND VASCULAR | Facility: CLINIC | Age: 78
End: 2023-06-14
Payer: MEDICARE

## 2023-06-14 VITALS
TEMPERATURE: 97.7 F | DIASTOLIC BLOOD PRESSURE: 74 MMHG | HEART RATE: 79 BPM | BODY MASS INDEX: 23.54 KG/M2 | SYSTOLIC BLOOD PRESSURE: 152 MMHG | HEIGHT: 67 IN | WEIGHT: 150 LBS

## 2023-06-14 PROCEDURE — 93291 INTERROG DEV EVAL SCRMS IP: CPT

## 2023-06-14 NOTE — PHYSICAL EXAM
[General Appearance - Well Developed] : well developed [Normal Appearance] : normal appearance [Well Groomed] : well groomed [General Appearance - Well Nourished] : well nourished [No Deformities] : no deformities [General Appearance - In No Acute Distress] : no acute distress [Heart Rate And Rhythm] : heart rate and rhythm were normal [Heart Sounds] : normal S1 and S2 [Edema] : no peripheral edema present [Respiration, Rhythm And Depth] : normal respiratory rhythm and effort [Exaggerated Use Of Accessory Muscles For Inspiration] : no accessory muscle use [Clean] : clean [Dry] : dry [Well-Healed] : well-healed [] : no ischemic changes [Abnormal Walk] : normal gait [Palpable Crepitus] : no palpable crepitus [Bleeding] : no active bleeding [Foul Odor] : no foul smell [Purulent Drainage] : no purulent drainage [Serous Drainage] : no serous drainage [Erythema] : not erythematous [Warm] : not warm [Tender] : not tender [Indurated] : not indurated [Fluctuant] : not fluctuant [FreeTextEntry1] : mid L chest

## 2023-06-14 NOTE — DISCUSSION/SUMMARY
[FreeTextEntry1] : Ms. 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  Historically her AFib burden has been low over time.  Short bursts, longest 6 minutes, rate controlled and she is asymptomatic.   She denies presyncope/syncope.  We will continue to monitor for evidence of bradyarrhythmias and also monitor her AF burden over time; no events since reimplant.  She remains on oral anticoagulation.  She will follow up in 6 months or sooner if needed and knows to call with any questions or concerns.

## 2023-06-14 NOTE — ADDENDUM
[FreeTextEntry1] : I, Carlos Hathaway, am scribing for and the presence of Dr. Downing the following sections: HPI, PMH,Family/social history, ROS, Physical Exam, Assessment / Plan.\par  IDonavon, personally performed the services described in the documentation, reviewed the documentation recorded by the scribe in my presence and it accurately and completely records my words and actions.\par

## 2023-06-14 NOTE — HISTORY OF PRESENT ILLNESS
[Palpitations] : no palpitations [SOB] : no dyspnea [Syncope] : no syncope [Dizziness] : no dizziness [Chest Pain] : no chest pain or discomfort [Pain at Site] : no pain at device site [Erythema at Site] : no erythema at device site [Swelling at Site] : no swelling at device site [FreeTextEntry1] : Ms. 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  Now s/p explant and re-implant on 10/19/22.  She presents for post procedure follow-up. \par \par Of note, she had an ILR implanted for surveillance of afib as anticoagulation was being held secondary to history of a subdural hematoma. She had positive COVID antibodies in June 2020.  In July 2020 she was admitted to [facility] with a large femoral DVT and she was placed back on Eliquis.  Referred back to EP by Dr. Santos given concern for AFib with slow VR on ECG 9/15/22.  The patient denies dizziness, presyncope/syncope.  No palpitations, chest pain or edema. No device related issues.  She ambulates with a walker.

## 2023-06-14 NOTE — REVIEW OF SYSTEMS
[Negative] : Heme/Lymph [Fever] : no fever [Chills] : no chills [SOB] : no shortness of breath [Chest Discomfort] : no chest discomfort [Palpitations] : no palpitations [Syncope] : no syncope

## 2023-06-19 ENCOUNTER — APPOINTMENT (OUTPATIENT)
Dept: INTERNAL MEDICINE | Facility: CLINIC | Age: 78
End: 2023-06-19
Payer: MEDICARE

## 2023-06-19 VITALS
WEIGHT: 156 LBS | SYSTOLIC BLOOD PRESSURE: 156 MMHG | HEIGHT: 67 IN | BODY MASS INDEX: 24.48 KG/M2 | OXYGEN SATURATION: 99 % | TEMPERATURE: 98.6 F | DIASTOLIC BLOOD PRESSURE: 72 MMHG | HEART RATE: 84 BPM

## 2023-06-19 PROCEDURE — 36415 COLL VENOUS BLD VENIPUNCTURE: CPT

## 2023-06-19 PROCEDURE — 99214 OFFICE O/P EST MOD 30 MIN: CPT | Mod: 25

## 2023-06-20 NOTE — ED ADULT NURSE NOTE - NS ED NURSE LEVEL OF CONSCIOUSNESS AFFECT
Appropriate/Calm [de-identified] : RIGHT HAND\par skin intact. mild diffuse swelling of SF.\par TTP to SF A1 pulley and PIPJ.\par wrist ROM: good extension, flexion.\par SF: good ext, flex 2-3cm to DPC.\par good flex/ext other digits.\par SILT median, ulnar, radial distributions.\par palpable radial pulse, brisk cap refill all digits.\par no triggering.\par \par \par XRAYS OF RIGHT HAND: no acute displaced fracture or dislocation.

## 2023-06-22 ENCOUNTER — APPOINTMENT (OUTPATIENT)
Dept: HEMATOLOGY ONCOLOGY | Facility: CLINIC | Age: 78
End: 2023-06-22
Payer: MEDICARE

## 2023-06-22 VITALS
HEART RATE: 66 BPM | DIASTOLIC BLOOD PRESSURE: 76 MMHG | HEIGHT: 67 IN | SYSTOLIC BLOOD PRESSURE: 165 MMHG | TEMPERATURE: 98.7 F | RESPIRATION RATE: 18 BRPM | BODY MASS INDEX: 25.11 KG/M2 | OXYGEN SATURATION: 97 % | WEIGHT: 160 LBS

## 2023-06-22 DIAGNOSIS — Z79.01 LONG TERM (CURRENT) USE OF ANTICOAGULANTS: ICD-10-CM

## 2023-06-22 DIAGNOSIS — Z01.818 ENCOUNTER FOR OTHER PREPROCEDURAL EXAMINATION: ICD-10-CM

## 2023-06-22 DIAGNOSIS — Z01.810 ENCOUNTER FOR PREPROCEDURAL CARDIOVASCULAR EXAMINATION: ICD-10-CM

## 2023-06-22 PROCEDURE — 99214 OFFICE O/P EST MOD 30 MIN: CPT | Mod: 25

## 2023-06-22 PROCEDURE — 36415 COLL VENOUS BLD VENIPUNCTURE: CPT

## 2023-06-22 RX ORDER — OXYCODONE AND ACETAMINOPHEN 5; 325 MG/1; MG/1
5-325 TABLET ORAL TWICE DAILY
Refills: 0 | Status: DISCONTINUED | COMMUNITY
Start: 2021-04-22 | End: 2023-06-22

## 2023-06-22 NOTE — PHYSICAL EXAM
[Normal] : affect appropriate [de-identified] : pale mucosae [de-identified] : irregular rhythm, normal HR [de-identified] : + RLE edema [de-identified] : soft, not distended [de-identified] : except uses walker.

## 2023-06-22 NOTE — ASSESSMENT
[FreeTextEntry1] : Esequiel Ambrosio is a 78 year old female here for hematology f/u to establish care after Dr Contreras's skilled nursing.\par \par Plan:\par 1.  hx DVT, PE in 2020 (unprovoked?) \par --indefinite anticoagulation recommended as long as bleeding risk is controlled.  At this time seems to be stable.\par --she has Atrial fibrillation as another indication for AC\par --AC has been prescribed by cardiology\par --could consider reducing eliquis to prophylactic 2.5 mg po BID but I would defer to cardiologist since that might not be adequate from an a fib standpoint.\par \par 2.  hx TOMASA, fatigue\par --check CBC, iron studies today\par \par 3.  anticardiolipin AB positive\par --she had one positive test result for low titer IgG anticardiolipin.  subsequent serial negatives.  does NOT meet criteria for APLAS, does not have antiphospholipid antibody syndrome.\par \par will call w/ lab results\par 1 year follow up, sooner if needed.

## 2023-06-22 NOTE — HISTORY OF PRESENT ILLNESS
[de-identified] : Esequiel Ambrosio is a 78 year old female here to establish care for hx DVT/PE and iron deficiency anemia after Dr Contreras's care home.\par \par Unprovoked DVT/PE in 7/2020\par Anticardiolipin Ab IgG was slightly elevated at 21 x 1 but normal thereafter\par \par Maintained on eliquis.  \par also has hx A fib.  cardiologist has been Rx'ing eliquis.\par \par Pt has hx of memory impairment but is here w/ her HHA today and able to give good history.\par notable has hx of intracranial hemorrhage years ago (unclear if this was SDH or SAH, notes differ in the chart).\par she denies any recent falls \par denies mucosal bleeding, blood in stool/urine.\par NOT on ASA or other antiplatelet\par \par mild fatigue\par \par Unable to reconcile meds as she isn't sure what she is taking - but confirms taking eliquis and denies above, as well as denies iron PO\par hx IV iron 2020

## 2023-06-23 LAB
FERRITIN SERPL-MCNC: 17 NG/ML
IRON SATN MFR SERPL: 17 %
IRON SERPL-MCNC: 71 UG/DL
TIBC SERPL-MCNC: 407 UG/DL
UIBC SERPL-MCNC: 336 UG/DL

## 2023-07-06 ENCOUNTER — APPOINTMENT (OUTPATIENT)
Dept: HEART AND VASCULAR | Facility: CLINIC | Age: 78
End: 2023-07-06
Payer: SELF-PAY

## 2023-07-06 ENCOUNTER — NON-APPOINTMENT (OUTPATIENT)
Age: 78
End: 2023-07-06

## 2023-07-06 PROCEDURE — 93298 REM INTERROG DEV EVAL SCRMS: CPT

## 2023-07-06 PROCEDURE — G2066: CPT

## 2023-07-25 ENCOUNTER — APPOINTMENT (OUTPATIENT)
Dept: INTERNAL MEDICINE | Facility: CLINIC | Age: 78
End: 2023-07-25

## 2023-08-08 ENCOUNTER — NON-APPOINTMENT (OUTPATIENT)
Age: 78
End: 2023-08-08

## 2023-08-08 ENCOUNTER — APPOINTMENT (OUTPATIENT)
Dept: HEART AND VASCULAR | Facility: CLINIC | Age: 78
End: 2023-08-08
Payer: SELF-PAY

## 2023-08-08 PROCEDURE — 93298 REM INTERROG DEV EVAL SCRMS: CPT

## 2023-08-08 PROCEDURE — G2066: CPT

## 2023-08-15 ENCOUNTER — NON-APPOINTMENT (OUTPATIENT)
Age: 78
End: 2023-08-15

## 2023-08-30 NOTE — ED PROVIDER NOTE - PMH
Asthma  Asthma  Atherosclerosis of coronary artery  NOn obstructive  Atrial fibrillation  s/p ablation in 2013  Back pain    Depression    Essential hypertension  HTN (hypertension)  Hyperlipidemia  Hyperlipidemia  Persistent ostium secundum  s/p repair (1989)  Subdural hemorrhage  s/p bakari hole (2013)  Type 2 diabetes mellitus  DM (diabetes mellitus) No

## 2023-09-08 PROBLEM — Z71.89 ADVANCE CARE PLANNING: Status: ACTIVE | Noted: 2023-09-08

## 2023-09-08 PROBLEM — E78.5 HYPERLIPIDEMIA: Status: ACTIVE | Noted: 2017-06-07

## 2023-09-08 PROBLEM — I47.1 ATRIAL TACHYCARDIA: Status: ACTIVE | Noted: 2020-05-15

## 2023-09-08 NOTE — CHRONIC CARE ASSESSMENT
[Limited decision making ability] : limited decision making ability [de-identified] : as tolerated [de-identified] : low sodium

## 2023-09-08 NOTE — HISTORY OF PRESENT ILLNESS
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  Explant and re-implant on 10/19/22.  Patient denies fever, cough, trouble breathing, rash, vomiting and diarrhea. Patient has not been in close contact with someone covid positive.  N95 mask, gloves, eye wear and gown used during visit: [Y]. Total face to face time with patient is ~ 60 ~ min.  Patient/ patient's caregiver reports no weight loss >10lbs in the past 6 months. No changes in dentition or swallowing reported, No changes in hearing or vision reported. No changes in Cognition reported. Patient denies any symptoms of depression or anxiety. Patient is ADL and IADL dependent. No changes in gait or falls reported.  Patient's home environment is safe.

## 2023-09-08 NOTE — COUNSELING
[Normal Weight - ( BMI  <25 )] : normal weight - ( BMI  <25 ) [Hypertension self management education material provided] : hypertension self management education material provided [Non - Smoker] : non-smoker [Use assistive device to avoid falls] : use assistive device to avoid falls [___] : [unfilled] [] : foot exam [Patient not on disease-modifying anti-rheumatic drug due to overall prognosis] : Patient not on disease-modifying anti-rheumatic drug due to overall prognosis [Completed] : Aspirin use discussion completed [Improve mobility] : improve mobility [Decrease hospital use] : decrease hospital use [Discussed disease trajectory with patient/caregiver] : discussed disease trajectory with patient/caregiver [Patient/Caregiver not ready to engage in discussion] : patient/caregiver not ready to engage in discussion [Full Code] : Code Status: Full Code [No Limitations] : Treatment Guidelines: No limitations [Long Term Intubation] : Intubation: Long term intubation [FreeTextEntry4] : Educated patient/legal representative about CCM services and consent. Educated patient/legal representative that this service is available because they have 2 or more chronic conditions, that only one Provider can furnish CCM Services per calendar month and that CCM services are subject to the usual Medicare deductible and coinsurance.  Patient/legal representative understands the right to stop the CCM services at any time by notifying [facility] office. Patient/legal representative has verbally consented (9/2023)

## 2023-09-08 NOTE — REASON FOR VISIT
[Initial Evaluation] : an initial evaluation [Family Member] : family member [Formal Caregiver] : formal caregiver [Pre-Visit Preparation] : pre-visit preparation was done [Intercurrent Specialty/Sub-specialty Visits] : the patient has intercurrent specialty/sub-specialty visits [FreeTextEntry1] : hypertension, DVT, hyperlipidemia, ASD s/p surgical repair, type II DM (insulin-dependent), atrial fibrillation s/p PVI, and spontaneous SDH  [FreeTextEntry2] : JAYME [FreeTextEntry3] : Cardiology

## 2023-09-08 NOTE — PHYSICAL EXAM
[No Acute Distress] : no acute distress [Normal Sclera/Conjunctiva] : normal sclera/conjunctiva [EOMI] : extra ocular movement intact [Normal Outer Ear/Nose] : the ears and nose were normal in appearance [Normal Oropharynx] : the oropharynx was normal [No Respiratory Distress] : no respiratory distress [Clear to Auscultation] : lungs were clear to auscultation bilaterally [No Accessory Muscle Use] : no accessory muscle use [Normal Rate] : heart rate was normal  [Regular Rhythm] : with a regular rhythm [Normal S1, S2] : normal S1 and S2 [No Murmurs] : no murmurs heard [No Edema] : there was no peripheral edema [Normal Bowel Sounds] : normal bowel sounds [Non Tender] : non-tender [Soft] : abdomen soft [Not Distended] : not distended [Normal Post Cervical Nodes] : no posterior cervical lymphadenopathy [Normal Anterior Cervical Nodes] : no anterior cervical lymphadenopathy [No CVA Tenderness] : no ~M costovertebral angle tenderness [No Spinal Tenderness] : no spinal tenderness [Normal Gait] : normal gait [Normal Strength/Tone] : muscle strength and tone were normal [No Rash] : no rash [Oriented x3] : oriented to person, place, and time [Normal Affect] : the affect was normal

## 2023-09-09 ENCOUNTER — NON-APPOINTMENT (OUTPATIENT)
Age: 78
End: 2023-09-09

## 2023-09-11 ENCOUNTER — APPOINTMENT (OUTPATIENT)
Dept: HEART AND VASCULAR | Facility: CLINIC | Age: 78
End: 2023-09-11
Payer: MEDICARE

## 2023-09-11 ENCOUNTER — APPOINTMENT (OUTPATIENT)
Dept: HOME HEALTH SERVICES | Facility: HOME HEALTH | Age: 78
End: 2023-09-11

## 2023-09-11 ENCOUNTER — APPOINTMENT (OUTPATIENT)
Dept: HOME HEALTH SERVICES | Facility: HOME HEALTH | Age: 78
End: 2023-09-11
Payer: MEDICARE

## 2023-09-11 VITALS
DIASTOLIC BLOOD PRESSURE: 60 MMHG | OXYGEN SATURATION: 96 % | HEART RATE: 95 BPM | SYSTOLIC BLOOD PRESSURE: 120 MMHG | RESPIRATION RATE: 18 BRPM | TEMPERATURE: 97.3 F

## 2023-09-11 VITALS
DIASTOLIC BLOOD PRESSURE: 60 MMHG | OXYGEN SATURATION: 96 % | SYSTOLIC BLOOD PRESSURE: 120 MMHG | TEMPERATURE: 97.3 F | RESPIRATION RATE: 18 BRPM | HEART RATE: 95 BPM

## 2023-09-11 DIAGNOSIS — Z71.89 OTHER SPECIFIED COUNSELING: ICD-10-CM

## 2023-09-11 DIAGNOSIS — I47.1 SUPRAVENTRICULAR TACHYCARDIA: ICD-10-CM

## 2023-09-11 DIAGNOSIS — E78.5 HYPERLIPIDEMIA, UNSPECIFIED: ICD-10-CM

## 2023-09-11 PROCEDURE — 99342 HOME/RES VST NEW LOW MDM 30: CPT

## 2023-09-11 PROCEDURE — G0506: CPT

## 2023-09-11 PROCEDURE — 99497 ADVNCD CARE PLAN 30 MIN: CPT

## 2023-09-12 ENCOUNTER — NON-APPOINTMENT (OUTPATIENT)
Age: 78
End: 2023-09-12

## 2023-09-12 PROCEDURE — G2066: CPT

## 2023-09-12 PROCEDURE — 93298 REM INTERROG DEV EVAL SCRMS: CPT

## 2023-09-15 ENCOUNTER — RX RENEWAL (OUTPATIENT)
Age: 78
End: 2023-09-15

## 2023-09-21 NOTE — ED PROVIDER NOTE - QRS
Body Location Override (Optional - Billing Will Still Be Based On Selected Body Map Location If Applicable): right bicep Detail Level: Detailed Depth Of Biopsy: dermis Was A Bandage Applied: Yes Size Of Lesion In Cm: 0.6 X Size Of Lesion In Cm: 0 Biopsy Type: H and E Biopsy Method: Dermablade Anesthesia Type: 1% lidocaine with epinephrine Anesthesia Volume In Cc: 0.5 Hemostasis: Electrocautery Wound Care: Bacitracin Dressing: pressure dressing Type Of Destruction Used: Electrodesiccation Curettage Text: The wound bed was treated with curettage after the biopsy was performed. Cryotherapy Text: The wound bed was treated with cryotherapy after the biopsy was performed. Electrodesiccation Text: The wound bed was treated with electrodesiccation after the biopsy was performed. Electrodesiccation And Curettage Text: The wound bed was treated with electrodesiccation and curettage after the biopsy was performed. Silver Nitrate Text: The wound bed was treated with silver nitrate after the biopsy was performed. Lab: [id number] Lab Facility: [id number] Path Notes (To The Dermatopathologist): Size: 0.6 Render Path Notes In Note?: No Consent: Written consent was obtained and risks were reviewed including but not limited to scarring, infection, bleeding, scabbing, incomplete removal, nerve damage and allergy to anesthesia. Post-Care Instructions: I reviewed with the patient in detail post-care instructions. Patient is to keep the biopsy site dry overnight, and then apply bacitracin twice daily until healed. Patient may apply hydrogen peroxide soaks to remove any crusting. 100 Notification Instructions: Patient will be notified of biopsy results. However, patient instructed to call the office if not contacted within 2 weeks. Billing Type: Third-Party Bill Information: Selecting Yes will display possible errors in your note based on the variables you have selected. This validation is only offered as a suggestion for you. PLEASE NOTE THAT THE VALIDATION TEXT WILL BE REMOVED WHEN YOU FINALIZE YOUR NOTE. IF YOU WANT TO FAX A PRELIMINARY NOTE YOU WILL NEED TO TOGGLE THIS TO 'NO' IF YOU DO NOT WANT IT IN YOUR FAXED NOTE.

## 2023-10-04 ENCOUNTER — APPOINTMENT (OUTPATIENT)
Dept: INTERNAL MEDICINE | Facility: CLINIC | Age: 78
End: 2023-10-04

## 2023-10-08 NOTE — ED PROVIDER NOTE - PMH
Asthma  Asthma  Atherosclerosis of coronary artery  NOn obstructive  Atrial fibrillation  s/p ablation in 2013  Back pain    Depression    Essential hypertension  HTN (hypertension)  Hyperlipidemia  Hyperlipidemia  Persistent ostium secundum  s/p repair (1989)  Subdural hemorrhage  s/p bakari hole (2013)  Type 2 diabetes mellitus  DM (diabetes mellitus)
Statement Selected

## 2023-10-11 ENCOUNTER — EMERGENCY (EMERGENCY)
Facility: HOSPITAL | Age: 78
LOS: 1 days | Discharge: HOME | End: 2023-10-11
Attending: EMERGENCY MEDICINE | Admitting: EMERGENCY MEDICINE
Payer: MEDICARE

## 2023-10-11 ENCOUNTER — RX RENEWAL (OUTPATIENT)
Age: 78
End: 2023-10-11

## 2023-10-11 VITALS
SYSTOLIC BLOOD PRESSURE: 97 MMHG | RESPIRATION RATE: 17 BRPM | TEMPERATURE: 98 F | OXYGEN SATURATION: 100 % | HEART RATE: 55 BPM | DIASTOLIC BLOOD PRESSURE: 63 MMHG

## 2023-10-11 VITALS
SYSTOLIC BLOOD PRESSURE: 119 MMHG | DIASTOLIC BLOOD PRESSURE: 74 MMHG | OXYGEN SATURATION: 98 % | HEIGHT: 67 IN | TEMPERATURE: 98 F | HEART RATE: 64 BPM | RESPIRATION RATE: 20 BRPM | WEIGHT: 154.98 LBS

## 2023-10-11 DIAGNOSIS — Z96.651 PRESENCE OF RIGHT ARTIFICIAL KNEE JOINT: Chronic | ICD-10-CM

## 2023-10-11 DIAGNOSIS — I62.01 NONTRAUMATIC ACUTE SUBDURAL HEMORRHAGE: Chronic | ICD-10-CM

## 2023-10-11 PROCEDURE — 99284 EMERGENCY DEPT VISIT MOD MDM: CPT

## 2023-10-11 PROCEDURE — 73562 X-RAY EXAM OF KNEE 3: CPT

## 2023-10-11 PROCEDURE — 73562 X-RAY EXAM OF KNEE 3: CPT | Mod: 26,LT

## 2023-10-11 RX ORDER — ACETAMINOPHEN 500 MG
975 TABLET ORAL ONCE
Refills: 0 | Status: COMPLETED | OUTPATIENT
Start: 2023-10-11 | End: 2023-10-11

## 2023-10-11 RX ADMIN — Medication 975 MILLIGRAM(S): at 14:09

## 2023-10-11 NOTE — ED PROVIDER NOTE - NSFOLLOWUPINSTRUCTIONS_ED_ALL_ED_FT
[facility]  Family Medicine  [address]  [place], [place] [address]  Phone: [phone]    Ortho  Can call [phone] to schedule appointment.

## 2023-10-11 NOTE — ED PROVIDER NOTE - OBJECTIVE STATEMENT
78-year-old history of hypertension diabetes asthma COPD chronic left knee pain presenting with complaints of worsening knee pain over the last 3 days.  Patient states no recent trauma falls has had similar knee pain in the past.  No swelling no redness no fever chills, streaking.  Patient still able to ambulate and bend and extend knee.  No shortness of breath chest pain calf pain. currently on ac.

## 2023-10-11 NOTE — ED PROVIDER NOTE - CLINICAL SUMMARY MEDICAL DECISION MAKING FREE TEXT BOX
78-year-old history of hypertension diabetes asthma COPD chronic left knee pain presenting with complaints of worsening knee pain over the last 3 days.  Patient states no recent trauma falls has had similar knee pain in the past.  No swelling no redness no fever chills, streaking.  Patient still able to ambulate and bend and extend knee.  No shortness of breath chest pain calf pain. currently on ac.    pt XR negative for fracture, djd present, no signs of septic jt, less likely acute dvt, will dc w cont'd pain meds/pmd/ortho fu

## 2023-10-11 NOTE — ED ADULT TRIAGE NOTE - CHIEF COMPLAINT QUOTE
pt c/o L knee pain x 6 months. states "I have fluid in my knee and I was given pain meds last week but I'm still in pain." ambulates with walker at baseline. denies trauma/injury.

## 2023-10-11 NOTE — ED ADULT TRIAGE NOTE - PATIENT ON (OXYGEN DELIVERY METHOD)
The appearance of the scalp is consistent with seborrheic dermatitis.  Use Head & Shoulder shampoo or Selsun Blue shampoo.  Return if symptoms worsen or any new problems or concerns.  
room air

## 2023-10-11 NOTE — ED ADULT NURSE NOTE - OBJECTIVE STATEMENT
Patient is 78 year old, female came in the ED with a , with a complaint of left knee pain for about 6 months. Pain graded as 8/10, non radiating accompanied with limitation of movement on the affected part and difficulty in ambulation. Patient went to a hospital last Sunday with the same complaint, was diagnosed of presence of fluid in the left knee, discharged home and was given medications for which provided no relief. Past medical history of HPN, DM, Asthma, on medications for which patient is compliant. On PE, A&O x 4, ambulatory with assistance, NICRD, no noted neurologic deficits. Assessment ongoing.

## 2023-10-11 NOTE — ED PROVIDER NOTE - PHYSICAL EXAMINATION
CONSTITUTIONAL: Awake, alert and in no apparent distress.  HEENT: Head is atraumatic. Eyes clear bilaterally, normal EOMI. Airway patent.  CARDIAC: Normal rate, regular rhythm.  Heart sounds S1, S2.   RESPIRATORY: Breath sounds clear and equal bilaterally. no tachypnea, respiratory distress.   GASTROINTESTINAL: Abdomen soft, non-tender, no guarding, distension.  MUSCULOSKELETAL: Spine appears normal, no midline spinal tenderness, range of motion is not limited, no muscle or joint tenderness. no bony tenderness. L knee mild swelling, able to extend and flex knee, raise leg off of stretcher  NEUROLOGICAL: Alert, no focal deficits, no motor or sensory deficits.  SKIN: Skin normal color for race, warm, dry and intact. No evidence of rash.  PSYCHIATRIC: Normal mood and affect. no apparent risk to self or others.

## 2023-10-11 NOTE — ED ADULT NURSE NOTE - NSFALLUNIVINTERV_ED_ALL_ED
Bed/Stretcher in lowest position, wheels locked, appropriate side rails in place/Call bell, personal items and telephone in reach/Instruct patient to call for assistance before getting out of bed/chair/stretcher/Non-slip footwear applied when patient is off stretcher/[place] to call system/Physically safe environment - no spills, clutter or unnecessary equipment/Purposeful proactive rounding/Room/bathroom lighting operational, light cord in reach

## 2023-10-11 NOTE — ED PROVIDER NOTE - PATIENT PORTAL LINK FT
You can access the FollowMyHealth Patient Portal offered by [facility] by registering at the following website: [email or web address]. By joining [employer]’s FollowMyHealth portal, you will also be able to view your health information using other applications (apps) compatible with our system.

## 2023-10-13 DIAGNOSIS — Z79.4 LONG TERM (CURRENT) USE OF INSULIN: ICD-10-CM

## 2023-10-13 DIAGNOSIS — E11.9 TYPE 2 DIABETES MELLITUS WITHOUT COMPLICATIONS: ICD-10-CM

## 2023-10-13 DIAGNOSIS — I10 ESSENTIAL (PRIMARY) HYPERTENSION: ICD-10-CM

## 2023-10-13 DIAGNOSIS — M25.562 PAIN IN LEFT KNEE: ICD-10-CM

## 2023-10-13 DIAGNOSIS — Z79.84 LONG TERM (CURRENT) USE OF ORAL HYPOGLYCEMIC DRUGS: ICD-10-CM

## 2023-10-13 DIAGNOSIS — M17.11 UNILATERAL PRIMARY OSTEOARTHRITIS, RIGHT KNEE: ICD-10-CM

## 2023-10-13 DIAGNOSIS — Z88.8 ALLERGY STATUS TO OTHER DRUGS, MEDICAMENTS AND BIOLOGICAL SUBSTANCES STATUS: ICD-10-CM

## 2023-10-13 DIAGNOSIS — J44.9 CHRONIC OBSTRUCTIVE PULMONARY DISEASE, UNSPECIFIED: ICD-10-CM

## 2023-10-13 DIAGNOSIS — Z79.01 LONG TERM (CURRENT) USE OF ANTICOAGULANTS: ICD-10-CM

## 2023-10-13 DIAGNOSIS — Z91.010 ALLERGY TO PEANUTS: ICD-10-CM

## 2023-10-13 DIAGNOSIS — G89.29 OTHER CHRONIC PAIN: ICD-10-CM

## 2023-10-16 ENCOUNTER — APPOINTMENT (OUTPATIENT)
Dept: INTERNAL MEDICINE | Facility: CLINIC | Age: 78
End: 2023-10-16
Payer: MEDICARE

## 2023-10-17 ENCOUNTER — APPOINTMENT (OUTPATIENT)
Dept: HEART AND VASCULAR | Facility: CLINIC | Age: 78
End: 2023-10-17
Payer: SELF-PAY

## 2023-10-17 ENCOUNTER — NON-APPOINTMENT (OUTPATIENT)
Age: 78
End: 2023-10-17

## 2023-10-18 PROCEDURE — 93298 REM INTERROG DEV EVAL SCRMS: CPT | Mod: NC

## 2023-10-18 PROCEDURE — G2066: CPT

## 2023-10-25 ENCOUNTER — RX RENEWAL (OUTPATIENT)
Age: 78
End: 2023-10-25

## 2023-11-06 ENCOUNTER — APPOINTMENT (OUTPATIENT)
Dept: HOME HEALTH SERVICES | Facility: HOME HEALTH | Age: 78
End: 2023-11-06

## 2023-11-06 VITALS
SYSTOLIC BLOOD PRESSURE: 120 MMHG | RESPIRATION RATE: 17 BRPM | DIASTOLIC BLOOD PRESSURE: 80 MMHG | OXYGEN SATURATION: 96 % | TEMPERATURE: 98.5 F | HEART RATE: 61 BPM

## 2023-11-06 RX ORDER — INSULIN ASPART 100 [IU]/ML
100 INJECTION, SOLUTION INTRAVENOUS; SUBCUTANEOUS
Qty: 2 | Refills: 1 | Status: ACTIVE | COMMUNITY
Start: 2019-05-31

## 2023-11-06 RX ORDER — NYSTATIN 100000 1/G
100000 POWDER TOPICAL
Qty: 1 | Refills: 1 | Status: COMPLETED | COMMUNITY
Start: 2023-03-21 | End: 2023-04-01

## 2023-11-06 RX ORDER — TRAZODONE HYDROCHLORIDE 100 MG/1
100 TABLET ORAL
Qty: 30 | Refills: 0 | Status: DISCONTINUED | COMMUNITY
Start: 2022-09-07 | End: 2023-11-06

## 2023-11-07 ENCOUNTER — APPOINTMENT (OUTPATIENT)
Dept: NEPHROLOGY | Facility: CLINIC | Age: 78
End: 2023-11-07

## 2023-11-08 ENCOUNTER — APPOINTMENT (OUTPATIENT)
Dept: INTERNAL MEDICINE | Facility: CLINIC | Age: 78
End: 2023-11-08
Payer: MEDICARE

## 2023-11-08 VITALS
HEART RATE: 77 BPM | HEIGHT: 67 IN | TEMPERATURE: 98 F | WEIGHT: 166 LBS | SYSTOLIC BLOOD PRESSURE: 144 MMHG | BODY MASS INDEX: 26.06 KG/M2 | OXYGEN SATURATION: 98 % | DIASTOLIC BLOOD PRESSURE: 70 MMHG

## 2023-11-08 DIAGNOSIS — M25.562 PAIN IN LEFT KNEE: ICD-10-CM

## 2023-11-08 PROCEDURE — G0008: CPT

## 2023-11-08 PROCEDURE — 99214 OFFICE O/P EST MOD 30 MIN: CPT | Mod: 25

## 2023-11-08 PROCEDURE — 36415 COLL VENOUS BLD VENIPUNCTURE: CPT

## 2023-11-08 PROCEDURE — 90662 IIV NO PRSV INCREASED AG IM: CPT

## 2023-11-08 RX ORDER — INSULIN DETEMIR 100 [IU]/ML
100 INJECTION, SOLUTION SUBCUTANEOUS
Qty: 1 | Refills: 2 | Status: DISCONTINUED | COMMUNITY
Start: 2022-04-06 | End: 2023-11-08

## 2023-11-09 ENCOUNTER — NON-APPOINTMENT (OUTPATIENT)
Age: 78
End: 2023-11-09

## 2023-11-09 LAB
ANION GAP SERPL CALC-SCNC: 15 MMOL/L
BUN SERPL-MCNC: 22 MG/DL
CALCIUM SERPL-MCNC: 9.5 MG/DL
CHLORIDE SERPL-SCNC: 105 MMOL/L
CO2 SERPL-SCNC: 20 MMOL/L
CREAT SERPL-MCNC: 1.2 MG/DL
EGFR: 46 ML/MIN/1.73M2
ESTIMATED AVERAGE GLUCOSE: 137 MG/DL
GLUCOSE SERPL-MCNC: 170 MG/DL
HBA1C MFR BLD HPLC: 6.4 %
POTASSIUM SERPL-SCNC: 4 MMOL/L
SODIUM SERPL-SCNC: 141 MMOL/L

## 2023-11-14 ENCOUNTER — RX RENEWAL (OUTPATIENT)
Age: 78
End: 2023-11-14

## 2023-11-14 NOTE — ED PROVIDER NOTE - CROS ED EYES ALL NEG
Patient and mom have noticed that patient has been having episodes of SOB over the last month or so. Pt states it will have randomly and he feels that he can't catch his breath. He does not have a history of allergies or asthma. He has been taking care of a neighbors cat. He has been playing soccer.     He denies any dizziness when he is SOB.     Will order pulmonary function testing. Patient provided with inhaler to use when SOB. Also discussed the possibility of anxiety contributing to his SOB. If the inhaler isnt helping and/or the PFTs are normal will consider referral to psychology.      - - -

## 2023-11-21 ENCOUNTER — NON-APPOINTMENT (OUTPATIENT)
Age: 78
End: 2023-11-21

## 2023-11-21 ENCOUNTER — APPOINTMENT (OUTPATIENT)
Dept: HEART AND VASCULAR | Facility: CLINIC | Age: 78
End: 2023-11-21
Payer: MEDICAID

## 2023-11-22 PROCEDURE — G2066: CPT | Mod: NC

## 2023-11-22 PROCEDURE — 93298 REM INTERROG DEV EVAL SCRMS: CPT

## 2023-12-03 ENCOUNTER — EMERGENCY (EMERGENCY)
Facility: HOSPITAL | Age: 78
LOS: 1 days | Discharge: HOME | End: 2023-12-03
Attending: STUDENT IN AN ORGANIZED HEALTH CARE EDUCATION/TRAINING PROGRAM | Admitting: STUDENT IN AN ORGANIZED HEALTH CARE EDUCATION/TRAINING PROGRAM
Payer: MEDICARE

## 2023-12-03 VITALS
WEIGHT: 149.91 LBS | RESPIRATION RATE: 17 BRPM | TEMPERATURE: 99 F | SYSTOLIC BLOOD PRESSURE: 136 MMHG | DIASTOLIC BLOOD PRESSURE: 78 MMHG | OXYGEN SATURATION: 96 % | HEART RATE: 82 BPM | HEIGHT: 67 IN

## 2023-12-03 VITALS
DIASTOLIC BLOOD PRESSURE: 68 MMHG | RESPIRATION RATE: 16 BRPM | SYSTOLIC BLOOD PRESSURE: 111 MMHG | TEMPERATURE: 99 F | HEART RATE: 69 BPM | OXYGEN SATURATION: 97 %

## 2023-12-03 DIAGNOSIS — T46.4X1A POISONING BY ANGIOTENSIN-CONVERTING-ENZYME INHIBITORS, ACCIDENTAL (UNINTENTIONAL), INITIAL ENCOUNTER: ICD-10-CM

## 2023-12-03 DIAGNOSIS — I62.01 NONTRAUMATIC ACUTE SUBDURAL HEMORRHAGE: Chronic | ICD-10-CM

## 2023-12-03 DIAGNOSIS — Z79.01 LONG TERM (CURRENT) USE OF ANTICOAGULANTS: ICD-10-CM

## 2023-12-03 DIAGNOSIS — Z91.010 ALLERGY TO PEANUTS: ICD-10-CM

## 2023-12-03 DIAGNOSIS — I48.91 UNSPECIFIED ATRIAL FIBRILLATION: ICD-10-CM

## 2023-12-03 DIAGNOSIS — Z96.651 PRESENCE OF RIGHT ARTIFICIAL KNEE JOINT: Chronic | ICD-10-CM

## 2023-12-03 DIAGNOSIS — Z03.6 ENCOUNTER FOR OBSERVATION FOR SUSPECTED TOXIC EFFECT FROM INGESTED SUBSTANCE RULED OUT: ICD-10-CM

## 2023-12-03 DIAGNOSIS — T45.511A POISONING BY ANTICOAGULANTS, ACCIDENTAL (UNINTENTIONAL), INITIAL ENCOUNTER: ICD-10-CM

## 2023-12-03 DIAGNOSIS — Z79.84 LONG TERM (CURRENT) USE OF ORAL HYPOGLYCEMIC DRUGS: ICD-10-CM

## 2023-12-03 DIAGNOSIS — Z88.8 ALLERGY STATUS TO OTHER DRUGS, MEDICAMENTS AND BIOLOGICAL SUBSTANCES: ICD-10-CM

## 2023-12-03 DIAGNOSIS — E11.9 TYPE 2 DIABETES MELLITUS WITHOUT COMPLICATIONS: ICD-10-CM

## 2023-12-03 DIAGNOSIS — T38.3X1A POISONING BY INSULIN AND ORAL HYPOGLYCEMIC [ANTIDIABETIC] DRUGS, ACCIDENTAL (UNINTENTIONAL), INITIAL ENCOUNTER: ICD-10-CM

## 2023-12-03 DIAGNOSIS — I10 ESSENTIAL (PRIMARY) HYPERTENSION: ICD-10-CM

## 2023-12-03 LAB
ALBUMIN SERPL ELPH-MCNC: 4.1 G/DL — SIGNIFICANT CHANGE UP (ref 3.3–5)
ALBUMIN SERPL ELPH-MCNC: 4.1 G/DL — SIGNIFICANT CHANGE UP (ref 3.3–5)
ALP SERPL-CCNC: 107 U/L — SIGNIFICANT CHANGE UP (ref 40–120)
ALP SERPL-CCNC: 107 U/L — SIGNIFICANT CHANGE UP (ref 40–120)
ALT FLD-CCNC: 17 U/L — SIGNIFICANT CHANGE UP (ref 10–45)
ALT FLD-CCNC: 17 U/L — SIGNIFICANT CHANGE UP (ref 10–45)
ANION GAP SERPL CALC-SCNC: 16 MMOL/L — SIGNIFICANT CHANGE UP (ref 5–17)
ANION GAP SERPL CALC-SCNC: 16 MMOL/L — SIGNIFICANT CHANGE UP (ref 5–17)
APTT BLD: 32.4 SEC — SIGNIFICANT CHANGE UP (ref 24.5–35.6)
APTT BLD: 32.4 SEC — SIGNIFICANT CHANGE UP (ref 24.5–35.6)
AST SERPL-CCNC: 21 U/L — SIGNIFICANT CHANGE UP (ref 10–40)
AST SERPL-CCNC: 21 U/L — SIGNIFICANT CHANGE UP (ref 10–40)
BASOPHILS # BLD AUTO: 0.06 K/UL — SIGNIFICANT CHANGE UP (ref 0–0.2)
BASOPHILS # BLD AUTO: 0.06 K/UL — SIGNIFICANT CHANGE UP (ref 0–0.2)
BASOPHILS NFR BLD AUTO: 0.8 % — SIGNIFICANT CHANGE UP (ref 0–2)
BASOPHILS NFR BLD AUTO: 0.8 % — SIGNIFICANT CHANGE UP (ref 0–2)
BILIRUB SERPL-MCNC: 0.4 MG/DL — SIGNIFICANT CHANGE UP (ref 0.2–1.2)
BILIRUB SERPL-MCNC: 0.4 MG/DL — SIGNIFICANT CHANGE UP (ref 0.2–1.2)
BUN SERPL-MCNC: 25 MG/DL — HIGH (ref 7–23)
BUN SERPL-MCNC: 25 MG/DL — HIGH (ref 7–23)
CALCIUM SERPL-MCNC: 10 MG/DL — SIGNIFICANT CHANGE UP (ref 8.4–10.5)
CALCIUM SERPL-MCNC: 10 MG/DL — SIGNIFICANT CHANGE UP (ref 8.4–10.5)
CHLORIDE SERPL-SCNC: 100 MMOL/L — SIGNIFICANT CHANGE UP (ref 96–108)
CHLORIDE SERPL-SCNC: 100 MMOL/L — SIGNIFICANT CHANGE UP (ref 96–108)
CO2 SERPL-SCNC: 22 MMOL/L — SIGNIFICANT CHANGE UP (ref 22–31)
CO2 SERPL-SCNC: 22 MMOL/L — SIGNIFICANT CHANGE UP (ref 22–31)
CREAT SERPL-MCNC: 1.25 MG/DL — SIGNIFICANT CHANGE UP (ref 0.5–1.3)
CREAT SERPL-MCNC: 1.25 MG/DL — SIGNIFICANT CHANGE UP (ref 0.5–1.3)
EGFR: 44 ML/MIN/1.73M2 — LOW
EGFR: 44 ML/MIN/1.73M2 — LOW
EOSINOPHIL # BLD AUTO: 0.27 K/UL — SIGNIFICANT CHANGE UP (ref 0–0.5)
EOSINOPHIL # BLD AUTO: 0.27 K/UL — SIGNIFICANT CHANGE UP (ref 0–0.5)
EOSINOPHIL NFR BLD AUTO: 3.7 % — SIGNIFICANT CHANGE UP (ref 0–6)
EOSINOPHIL NFR BLD AUTO: 3.7 % — SIGNIFICANT CHANGE UP (ref 0–6)
GLUCOSE SERPL-MCNC: 281 MG/DL — HIGH (ref 70–99)
GLUCOSE SERPL-MCNC: 281 MG/DL — HIGH (ref 70–99)
HCT VFR BLD CALC: 30.9 % — LOW (ref 34.5–45)
HCT VFR BLD CALC: 30.9 % — LOW (ref 34.5–45)
HGB BLD-MCNC: 10.3 G/DL — LOW (ref 11.5–15.5)
HGB BLD-MCNC: 10.3 G/DL — LOW (ref 11.5–15.5)
IMM GRANULOCYTES NFR BLD AUTO: 0.3 % — SIGNIFICANT CHANGE UP (ref 0–0.9)
IMM GRANULOCYTES NFR BLD AUTO: 0.3 % — SIGNIFICANT CHANGE UP (ref 0–0.9)
INR BLD: 1.68 — HIGH (ref 0.85–1.18)
INR BLD: 1.68 — HIGH (ref 0.85–1.18)
LYMPHOCYTES # BLD AUTO: 1.91 K/UL — SIGNIFICANT CHANGE UP (ref 1–3.3)
LYMPHOCYTES # BLD AUTO: 1.91 K/UL — SIGNIFICANT CHANGE UP (ref 1–3.3)
LYMPHOCYTES # BLD AUTO: 26.2 % — SIGNIFICANT CHANGE UP (ref 13–44)
LYMPHOCYTES # BLD AUTO: 26.2 % — SIGNIFICANT CHANGE UP (ref 13–44)
MCHC RBC-ENTMCNC: 30.5 PG — SIGNIFICANT CHANGE UP (ref 27–34)
MCHC RBC-ENTMCNC: 30.5 PG — SIGNIFICANT CHANGE UP (ref 27–34)
MCHC RBC-ENTMCNC: 33.3 GM/DL — SIGNIFICANT CHANGE UP (ref 32–36)
MCHC RBC-ENTMCNC: 33.3 GM/DL — SIGNIFICANT CHANGE UP (ref 32–36)
MCV RBC AUTO: 91.4 FL — SIGNIFICANT CHANGE UP (ref 80–100)
MCV RBC AUTO: 91.4 FL — SIGNIFICANT CHANGE UP (ref 80–100)
MONOCYTES # BLD AUTO: 0.52 K/UL — SIGNIFICANT CHANGE UP (ref 0–0.9)
MONOCYTES # BLD AUTO: 0.52 K/UL — SIGNIFICANT CHANGE UP (ref 0–0.9)
MONOCYTES NFR BLD AUTO: 7.1 % — SIGNIFICANT CHANGE UP (ref 2–14)
MONOCYTES NFR BLD AUTO: 7.1 % — SIGNIFICANT CHANGE UP (ref 2–14)
NEUTROPHILS # BLD AUTO: 4.5 K/UL — SIGNIFICANT CHANGE UP (ref 1.8–7.4)
NEUTROPHILS # BLD AUTO: 4.5 K/UL — SIGNIFICANT CHANGE UP (ref 1.8–7.4)
NEUTROPHILS NFR BLD AUTO: 61.9 % — SIGNIFICANT CHANGE UP (ref 43–77)
NEUTROPHILS NFR BLD AUTO: 61.9 % — SIGNIFICANT CHANGE UP (ref 43–77)
NRBC # BLD: 0 /100 WBCS — SIGNIFICANT CHANGE UP (ref 0–0)
NRBC # BLD: 0 /100 WBCS — SIGNIFICANT CHANGE UP (ref 0–0)
PLATELET # BLD AUTO: 219 K/UL — SIGNIFICANT CHANGE UP (ref 150–400)
PLATELET # BLD AUTO: 219 K/UL — SIGNIFICANT CHANGE UP (ref 150–400)
POTASSIUM SERPL-MCNC: 4.3 MMOL/L — SIGNIFICANT CHANGE UP (ref 3.5–5.3)
POTASSIUM SERPL-MCNC: 4.3 MMOL/L — SIGNIFICANT CHANGE UP (ref 3.5–5.3)
POTASSIUM SERPL-SCNC: 4.3 MMOL/L — SIGNIFICANT CHANGE UP (ref 3.5–5.3)
POTASSIUM SERPL-SCNC: 4.3 MMOL/L — SIGNIFICANT CHANGE UP (ref 3.5–5.3)
PROT SERPL-MCNC: 7.9 G/DL — SIGNIFICANT CHANGE UP (ref 6–8.3)
PROT SERPL-MCNC: 7.9 G/DL — SIGNIFICANT CHANGE UP (ref 6–8.3)
PROTHROM AB SERPL-ACNC: 18.9 SEC — HIGH (ref 9.5–13)
PROTHROM AB SERPL-ACNC: 18.9 SEC — HIGH (ref 9.5–13)
RBC # BLD: 3.38 M/UL — LOW (ref 3.8–5.2)
RBC # BLD: 3.38 M/UL — LOW (ref 3.8–5.2)
RBC # FLD: 13.2 % — SIGNIFICANT CHANGE UP (ref 10.3–14.5)
RBC # FLD: 13.2 % — SIGNIFICANT CHANGE UP (ref 10.3–14.5)
SODIUM SERPL-SCNC: 138 MMOL/L — SIGNIFICANT CHANGE UP (ref 135–145)
SODIUM SERPL-SCNC: 138 MMOL/L — SIGNIFICANT CHANGE UP (ref 135–145)
WBC # BLD: 7.28 K/UL — SIGNIFICANT CHANGE UP (ref 3.8–10.5)
WBC # BLD: 7.28 K/UL — SIGNIFICANT CHANGE UP (ref 3.8–10.5)
WBC # FLD AUTO: 7.28 K/UL — SIGNIFICANT CHANGE UP (ref 3.8–10.5)
WBC # FLD AUTO: 7.28 K/UL — SIGNIFICANT CHANGE UP (ref 3.8–10.5)

## 2023-12-03 PROCEDURE — 82962 GLUCOSE BLOOD TEST: CPT

## 2023-12-03 PROCEDURE — 99285 EMERGENCY DEPT VISIT HI MDM: CPT | Mod: 25

## 2023-12-03 PROCEDURE — 99284 EMERGENCY DEPT VISIT MOD MDM: CPT

## 2023-12-03 PROCEDURE — 85730 THROMBOPLASTIN TIME PARTIAL: CPT

## 2023-12-03 PROCEDURE — 80053 COMPREHEN METABOLIC PANEL: CPT

## 2023-12-03 PROCEDURE — 36415 COLL VENOUS BLD VENIPUNCTURE: CPT

## 2023-12-03 PROCEDURE — 85025 COMPLETE CBC W/AUTO DIFF WBC: CPT

## 2023-12-03 PROCEDURE — 93005 ELECTROCARDIOGRAM TRACING: CPT

## 2023-12-03 PROCEDURE — 93010 ELECTROCARDIOGRAM REPORT: CPT

## 2023-12-03 PROCEDURE — 85610 PROTHROMBIN TIME: CPT

## 2023-12-03 NOTE — ED ADULT TRIAGE NOTE - CHIEF COMPLAINT QUOTE
"I took my medication twice by mistake" Patient took her lisinopril, eliquis and metformin 1000mg this morning and then 2 hours later took her medications again. Patient is alert, oriented, denies dizziness, weakness.

## 2023-12-03 NOTE — ED PROVIDER NOTE - CLINICAL SUMMARY MEDICAL DECISION MAKING FREE TEXT BOX
79 y/o f hx HTN, DM, afib on eliquis presents after accidentally taking an extra dose of 1000mg metformin, 40mg lisinopril, 5mg eliquis today.  Pt asymptomatic in ED, labs unremarkable, observed for several hours with no drop in BP, stable for d/c 77 y/o f hx HTN, DM, afib on eliquis presents after accidentally taking an extra dose of 1000mg metformin, 40mg lisinopril, 5mg eliquis today.  Pt asymptomatic in ED, labs unremarkable, observed for several hours with no drop in BP, stable for d/c

## 2023-12-03 NOTE — ED ADULT NURSE NOTE - OBJECTIVE STATEMENT
pt received into spot 4 pt received into spot 4 awake alert appears comfortable biba from [facility] with daughter at bedside for eval after accidentally taking two of her morning medications including Metformin lisinopril and Eliquis. Denies SI/HI A/V hallucinations. pt denies cp sob palpitations lightheadedness dizziness weakness fever chills. FS done WNL. Respirations unlabored abd nondistended no N/V no le swelling. 12 lead ekg done iv placed labs sent

## 2023-12-03 NOTE — ED PROVIDER NOTE - PATIENT PORTAL LINK FT
You can access the FollowMyHealth Patient Portal offered by [facility] by registering at the following website: [email or web address]. By joining [employer]’s FollowMyHealth portal, you will also be able to view your health information using other applications (apps) compatible with our system. You can access the FollowMyHealth Patient Portal offered by [facility] by registering at the following website: [email or web address]. By joining [employer]’s FollowMyHealth portal, you will also be able to view your health information using other applications (apps) compatible with our system.

## 2023-12-03 NOTE — ED PROVIDER NOTE - OBJECTIVE STATEMENT
77 y/o f presents after she accidentally took a second dose of her lisinopril, metformin and eliquis today.  Pt was given her medications by her daughter at 9am which is her usual time to take them then thought she forgot to take them and took them again at 12pm.  Pt stating she feels fine, has no complaints, daughters were worried and encouraged her to come to ED.  Denies CP, SOB, n/v, abd pain, all other ROS negative. 79 y/o f presents after she accidentally took a second dose of her lisinopril, metformin and eliquis today.  Pt was given her medications by her daughter at 9am which is her usual time to take them then thought she forgot to take them and took them again at 12pm.  Pt stating she feels fine, has no complaints, daughters were worried and encouraged her to come to ED.  Denies CP, SOB, n/v, abd pain, all other ROS negative.

## 2023-12-03 NOTE — ED ADULT NURSE NOTE - NSFALLHARMRISKINTERV_ED_ALL_ED
Assistance OOB with selected safe patient handling equipment if applicable/Assistance with ambulation/Communicate risk of Fall with Harm to all staff, patient, and family/Monitor gait and stability/Provide patient with walking aids/Provide visual cue: red socks, yellow wristband, yellow gown, etc/Reinforce activity limits and safety measures with patient and family/Bed in lowest position, wheels locked, appropriate side rails in place/Call bell, personal items and telephone in reach/Instruct patient to call for assistance before getting out of bed/chair/stretcher/Non-slip footwear applied when patient is off stretcher/[place] to call system/Physically safe environment - no spills, clutter or unnecessary equipment/Purposeful Proactive Rounding/Room/bathroom lighting operational, light cord in reach Assistance OOB with selected safe patient handling equipment if applicable/Assistance with ambulation/Communicate risk of Fall with Harm to all staff, patient, and family/Monitor gait and stability/Provide patient with walking aids/Provide visual cue: red socks, yellow wristband, yellow gown, etc/Reinforce activity limits and safety measures with patient and family/Bed in lowest position, wheels locked, appropriate side rails in place/Call bell, personal items and telephone in reach/Instruct patient to call for assistance before getting out of bed/chair/stretcher/Non-slip footwear applied when patient is off stretcher/[place] to call system/Physically safe environment - no spills, clutter or unnecessary equipment/Purposeful Proactive Rounding/Room/bathroom lighting operational, light cord in reach

## 2023-12-08 ENCOUNTER — NON-APPOINTMENT (OUTPATIENT)
Age: 78
End: 2023-12-08

## 2023-12-08 ENCOUNTER — APPOINTMENT (OUTPATIENT)
Dept: HEART AND VASCULAR | Facility: CLINIC | Age: 78
End: 2023-12-08
Payer: SELF-PAY

## 2023-12-08 VITALS
HEIGHT: 67.72 IN | BODY MASS INDEX: 24.99 KG/M2 | TEMPERATURE: 98.8 F | HEART RATE: 88 BPM | SYSTOLIC BLOOD PRESSURE: 130 MMHG | OXYGEN SATURATION: 97 % | DIASTOLIC BLOOD PRESSURE: 70 MMHG | WEIGHT: 162.99 LBS

## 2023-12-08 DIAGNOSIS — I10 ESSENTIAL (PRIMARY) HYPERTENSION: ICD-10-CM

## 2023-12-08 PROCEDURE — 93000 ELECTROCARDIOGRAM COMPLETE: CPT

## 2023-12-08 PROCEDURE — 99213 OFFICE O/P EST LOW 20 MIN: CPT

## 2023-12-08 RX ORDER — CHLORTHALIDONE 25 MG/1
25 TABLET ORAL DAILY
Qty: 90 | Refills: 1 | Status: DISCONTINUED | COMMUNITY
Start: 2021-08-06 | End: 2023-12-08

## 2023-12-08 RX ORDER — BENZTROPINE MESYLATE 0.5 MG/1
0.5 TABLET ORAL TWICE DAILY
Refills: 0 | Status: DISCONTINUED | COMMUNITY
Start: 2019-03-07 | End: 2023-12-08

## 2023-12-15 NOTE — ED ADULT TRIAGE NOTE - CCCP TRG CHIEF CMPLNT
REASON FOR VISIT:  Follow up UC    HPI:  Torres Bales is a 22 y.o. male with a past medical history of patient with history of UC pancolitis currently on Entyvio infusions being evaluated in the office for follow up UC.  Doing great on Entyvio.  NO abd pain, diarrhea or rectal bleeding.  In the past on infiximab and azathioprine and sulfasalazine.  Recent colonoscopy in 4/2023 without active colitis with normal biopsies.          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    Curvature of spine    Elevated C-reactive protein (CRP)     Elevated C-reactive protein (CRP)    Elevated erythrocyte sedimentation rate     Elevated erythrocyte sedimentation rate    Encounter for screening for osteoporosis 08/30/2019    Osteoporosis screening    Long term (current) use of immunosuppressive biologic 05/17/2019    Infliximab (Remicade) long-term use    Otitis media, unspecified, left ear 10/24/2017    Left otitis media    Personal history of diseases of the skin and subcutaneous tissue 01/09/2018    History of acne    Personal history of immunosuppression therapy 09/22/2020    History of immunosuppression    Personal history of other diseases of the musculoskeletal system and connective tissue 09/17/2019    History of back pain    Personal history of other diseases of the nervous system and sense organs 09/30/2014    History of otitis media    Personal history of other diseases of the respiratory system     History of sore throat    Personal history of other diseases of the respiratory system 02/26/2020    History of sore throat    Personal history of other endocrine, nutritional and metabolic disease 09/11/2015    History of vitamin D deficiency    Personal history of other specified conditions 11/14/2019    History of diarrhea    Personal history of other specified conditions 02/07/2018    History of abdominal pain    Personal history of  fall other specified conditions 12/13/2017    History of diarrhea    Unspecified otitis externa, left ear 10/24/2017    Left otitis externa       PAST SURGICAL HISTORY  Past Surgical History:   Procedure Laterality Date    OTHER SURGICAL HISTORY  05/17/2019    Colonoscopy    OTHER SURGICAL HISTORY  05/17/2019    Esophagogastroduodenoscopy       FAMILY HISTORY  No family history on file.    SOCIAL HISTORY  Social History     Tobacco Use    Smoking status: Some Days     Types: Cigarettes     Passive exposure: Never    Smokeless tobacco: Never   Substance Use Topics    Alcohol use: Not on file       REVIEW OF SYSTEMS  CONSTITUTIONAL: negative for fever, chills, fatigue, or unintentional weight loss,   HEENT: negative for icteric sclera, eye pain/redness, or changes in vision/hearing  RESPIRATORY: negative for cough, hemoptysis, wheezing, orthopnea, or dyspnea on exertion  CARDIOVASCULAR: negative for chest pain, palpitations, or syncope   GASTROINTESTINAL: as noted per HPI  GENITOURINARY: negative for dysuria, polyuria, incontinence, or hematuria  MUSCULOSKELETAL: negative for arthralgia, myalgia, or joint swelling/stiffness   INTEGUMENTARY/SKIN: negative jaundice, rash, or skin lesion  HEMATOLOGIC/LYMPHATIC: negative for prolonged bleeding, easy bruising, or swollen lymph nodes  ENDOCRINE: negative for cold/heat intolerance, polydipsia, polyuria, or goiter  NEUROLOGIC: negative for headaches, dizziness, tremor, or gait abnormality  PSYCHIATRIC: negative for anxiety, depression, personality changes, or sleep disturbances      A 10 point review of systems was completed and was otherwise negative.    ALLERGIES  Allergies   Allergen Reactions    Aminocaproic Acid Unknown       MEDICATIONS  Current Outpatient Medications   Medication Sig Dispense Refill    0.9 % sodium chloride (sodium chloride 0.9%) solution Infuse 50 mL into a venous catheter if needed (line care).      cetirizine (ZyrTEC) 10 mg tablet Take by mouth.       "omeprazole (PriLOSEC) 40 mg DR capsule Take 1 capsule (40 mg) by mouth early in the morning..      sodium chloride 0.9% (sodium chloride) flush Infuse 10 mL into a venous catheter if needed for line care.      vedolizumab (Entyvio) 300 mg injection Infuse 300 mg into a venous catheter if needed (colitis).       No current facility-administered medications for this visit.       VITALS  Ht 1.746 m (5' 8.75\")   Wt 107 kg (236 lb)   BMI 35.11 kg/m²      PHYSICAL EXAM  Alert and oriented and in no acute distress          ASSESSMENT/ PLAN  UC Pancolitis.  Doing great on Entyvio infusions.  Will continue current infusions.  Will check TB serology.  Follow up in 6 months.  He is in agreement with the plan.      Signature: Speedy Constantino MD    Date: 12/15/2023  Time: 2:39 PM    "

## 2023-12-18 PROBLEM — I26.99 PULMONARY EMBOLISM: Status: ACTIVE | Noted: 2020-07-30

## 2023-12-18 PROBLEM — R41.89 COGNITIVE CHANGES: Status: ACTIVE | Noted: 2017-10-10

## 2023-12-18 PROBLEM — N18.32 STAGE 3B CHRONIC KIDNEY DISEASE: Status: ACTIVE | Noted: 2022-08-24

## 2023-12-18 PROBLEM — I50.30 DIASTOLIC CHF: Status: ACTIVE | Noted: 2021-06-07

## 2023-12-18 PROBLEM — F03.90 DEMENTIA: Status: ACTIVE | Noted: 2018-02-08

## 2023-12-18 PROBLEM — I48.91 AFIB: Status: ACTIVE | Noted: 2017-06-07

## 2023-12-18 PROBLEM — N17.9 AKI (ACUTE KIDNEY INJURY): Status: ACTIVE | Noted: 2022-08-08

## 2023-12-19 ENCOUNTER — APPOINTMENT (OUTPATIENT)
Dept: HOME HEALTH SERVICES | Facility: HOME HEALTH | Age: 78
End: 2023-12-19
Payer: MEDICARE

## 2023-12-19 VITALS
OXYGEN SATURATION: 96 % | DIASTOLIC BLOOD PRESSURE: 70 MMHG | TEMPERATURE: 98 F | SYSTOLIC BLOOD PRESSURE: 130 MMHG | RESPIRATION RATE: 18 BRPM | HEART RATE: 80 BPM

## 2023-12-19 DIAGNOSIS — I26.99 OTHER PULMONARY EMBOLISM W/OUT ACUTE COR PULMONALE: ICD-10-CM

## 2023-12-19 DIAGNOSIS — R41.89 OTHER SYMPTOMS AND SIGNS INVOLVING COGNITIVE FUNCTIONS AND AWARENESS: ICD-10-CM

## 2023-12-19 DIAGNOSIS — I48.91 UNSPECIFIED ATRIAL FIBRILLATION: ICD-10-CM

## 2023-12-19 DIAGNOSIS — N18.32 CHRONIC KIDNEY DISEASE, STAGE 3B: ICD-10-CM

## 2023-12-19 DIAGNOSIS — J45.909 UNSPECIFIED ASTHMA, UNCOMPLICATED: ICD-10-CM

## 2023-12-19 DIAGNOSIS — I50.30 UNSPECIFIED DIASTOLIC (CONGESTIVE) HEART FAILURE: ICD-10-CM

## 2023-12-19 DIAGNOSIS — N17.9 ACUTE KIDNEY FAILURE, UNSPECIFIED: ICD-10-CM

## 2023-12-19 DIAGNOSIS — F03.90 UNSPECIFIED DEMENTIA W/OUT BEHAVIORAL DISTURBANCE: ICD-10-CM

## 2023-12-19 PROCEDURE — 99349 HOME/RES VST EST MOD MDM 40: CPT

## 2023-12-19 NOTE — CHRONIC CARE ASSESSMENT
[Limited decision making ability] : limited decision making ability [PPS Score: ____] : Palliative Performance Scale (PPS) Score: [unfilled] [de-identified] : as tolerated [de-identified] : low sodium 36.2

## 2023-12-19 NOTE — REASON FOR VISIT
[Follow-Up] : a follow-up visit [Family Member] : family member [Formal Caregiver] : formal caregiver [Pre-Visit Preparation] : pre-visit preparation was done [Intercurrent Specialty/Sub-specialty Visits] : the patient has intercurrent specialty/sub-specialty visits [FreeTextEntry1] : hypertension, DVT, hyperlipidemia, ASD s/p surgical repair, type II DM (insulin-dependent), atrial fibrillation s/p PVI, and spontaneous SDH  [FreeTextEntry3] : Cardiology [FreeTextEntry2] : JAYME

## 2023-12-19 NOTE — HEALTH RISK ASSESSMENT
[HRA Reviewed] : Health risk assessment reviewed [Some assistance needed] : managing finances [No falls in past year] : Patient reported no falls in the past year [Yes] : The patient does have visual impairment [TimeGetUpGo] : 18 [de-identified] : back pain impedes ability to walk long distances

## 2023-12-24 NOTE — ED ADULT TRIAGE NOTE - ESI TRIAGE ACUITY LEVEL, MLM
Bedside reporting done, with Josy Euceda RN, and Eliz Romero RN,  NIHSS scale was done early per Charge nurse, [facility] DEBI HILL RN, and will again be done per Nestora Stains and [address] soon, for change of shift sign off, per [employer], and [address]  IV's intact,  INT's  X 2  Pt. Has no complaints at this time  Incentive Almyra at bedside  Stat MRI will be done this evening, at 815 pm, and after the MRI is completed the pt. Will receive the IV rocephin and IV zithromax, this was passed on to the new nurse, Josy Euceda RN, also. HOB up  @  50 degrees  No SOB noted at this time  Telemetry cont. On pt  Side rails up  X 2  Call light within reach  Cont. To measure pt's urine prn, in the pt's restroom.
4
pulse oximetry

## 2023-12-26 ENCOUNTER — APPOINTMENT (OUTPATIENT)
Dept: HEART AND VASCULAR | Facility: CLINIC | Age: 78
End: 2023-12-26
Payer: MEDICARE

## 2023-12-26 ENCOUNTER — NON-APPOINTMENT (OUTPATIENT)
Age: 78
End: 2023-12-26

## 2023-12-26 PROCEDURE — G2066: CPT

## 2023-12-26 PROCEDURE — 93298 REM INTERROG DEV EVAL SCRMS: CPT

## 2023-12-26 NOTE — ED ADULT TRIAGE NOTE - BP NONINVASIVE DIASTOLIC (MM HG)
DVT ppx: ambulate  Dispo: no skilled PT needs
78
DVT ppx: ambulate  Dispo: no skilled PT needs    Likely d/c home today pending repeat TTE    d/c time 36 minutes

## 2024-01-11 ENCOUNTER — NON-APPOINTMENT (OUTPATIENT)
Age: 79
End: 2024-01-11

## 2024-01-11 ENCOUNTER — EMERGENCY (EMERGENCY)
Facility: HOSPITAL | Age: 79
LOS: 1 days | Discharge: HOME | End: 2024-01-11
Attending: STUDENT IN AN ORGANIZED HEALTH CARE EDUCATION/TRAINING PROGRAM | Admitting: STUDENT IN AN ORGANIZED HEALTH CARE EDUCATION/TRAINING PROGRAM
Payer: MEDICARE

## 2024-01-11 VITALS
HEART RATE: 81 BPM | OXYGEN SATURATION: 100 % | SYSTOLIC BLOOD PRESSURE: 116 MMHG | RESPIRATION RATE: 18 BRPM | TEMPERATURE: 99 F | DIASTOLIC BLOOD PRESSURE: 73 MMHG

## 2024-01-11 VITALS
OXYGEN SATURATION: 97 % | DIASTOLIC BLOOD PRESSURE: 72 MMHG | SYSTOLIC BLOOD PRESSURE: 132 MMHG | RESPIRATION RATE: 16 BRPM | HEART RATE: 78 BPM | TEMPERATURE: 98 F

## 2024-01-11 DIAGNOSIS — J44.9 CHRONIC OBSTRUCTIVE PULMONARY DISEASE, UNSPECIFIED: ICD-10-CM

## 2024-01-11 DIAGNOSIS — Z91.010 ALLERGY TO PEANUTS: ICD-10-CM

## 2024-01-11 DIAGNOSIS — E11.9 TYPE 2 DIABETES MELLITUS WITHOUT COMPLICATIONS: ICD-10-CM

## 2024-01-11 DIAGNOSIS — S80.921A UNSPECIFIED SUPERFICIAL INJURY OF RIGHT LOWER LEG, INITIAL ENCOUNTER: ICD-10-CM

## 2024-01-11 DIAGNOSIS — I10 ESSENTIAL (PRIMARY) HYPERTENSION: ICD-10-CM

## 2024-01-11 DIAGNOSIS — I62.01 NONTRAUMATIC ACUTE SUBDURAL HEMORRHAGE: Chronic | ICD-10-CM

## 2024-01-11 DIAGNOSIS — Z88.8 ALLERGY STATUS TO OTHER DRUGS, MEDICAMENTS AND BIOLOGICAL SUBSTANCES: ICD-10-CM
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
## 2024-01-11 DIAGNOSIS — M79.89 OTHER SPECIFIED SOFT TISSUE DISORDERS: ICD-10-CM

## 2024-01-11 DIAGNOSIS — Z96.651 PRESENCE OF RIGHT ARTIFICIAL KNEE JOINT: Chronic | ICD-10-CM

## 2024-01-11 LAB
ANION GAP SERPL CALC-SCNC: 13 MMOL/L — SIGNIFICANT CHANGE UP (ref 5–17)
ANION GAP SERPL CALC-SCNC: 13 MMOL/L — SIGNIFICANT CHANGE UP (ref 5–17)
BUN SERPL-MCNC: 26 MG/DL — HIGH (ref 7–23)
BUN SERPL-MCNC: 26 MG/DL — HIGH (ref 7–23)
CALCIUM SERPL-MCNC: 9.2 MG/DL — SIGNIFICANT CHANGE UP (ref 8.4–10.5)
CALCIUM SERPL-MCNC: 9.2 MG/DL — SIGNIFICANT CHANGE UP (ref 8.4–10.5)
CHLORIDE SERPL-SCNC: 102 MMOL/L — SIGNIFICANT CHANGE UP (ref 96–108)
CHLORIDE SERPL-SCNC: 102 MMOL/L — SIGNIFICANT CHANGE UP (ref 96–108)
CO2 SERPL-SCNC: 23 MMOL/L — SIGNIFICANT CHANGE UP (ref 22–31)
CO2 SERPL-SCNC: 23 MMOL/L — SIGNIFICANT CHANGE UP (ref 22–31)
CREAT SERPL-MCNC: 1.1 MG/DL — SIGNIFICANT CHANGE UP (ref 0.5–1.3)
CREAT SERPL-MCNC: 1.1 MG/DL — SIGNIFICANT CHANGE UP (ref 0.5–1.3)
EGFR: 51 ML/MIN/1.73M2 — LOW
EGFR: 51 ML/MIN/1.73M2 — LOW
GLUCOSE SERPL-MCNC: 293 MG/DL — HIGH (ref 70–99)
GLUCOSE SERPL-MCNC: 293 MG/DL — HIGH (ref 70–99)
HCT VFR BLD CALC: 26.9 % — LOW (ref 34.5–45)
HCT VFR BLD CALC: 26.9 % — LOW (ref 34.5–45)
HGB BLD-MCNC: 9.2 G/DL — LOW (ref 11.5–15.5)
HGB BLD-MCNC: 9.2 G/DL — LOW (ref 11.5–15.5)
MCHC RBC-ENTMCNC: 30.6 PG — SIGNIFICANT CHANGE UP (ref 27–34)
MCHC RBC-ENTMCNC: 30.6 PG — SIGNIFICANT CHANGE UP (ref 27–34)
MCHC RBC-ENTMCNC: 34.2 GM/DL — SIGNIFICANT CHANGE UP (ref 32–36)
MCHC RBC-ENTMCNC: 34.2 GM/DL — SIGNIFICANT CHANGE UP (ref 32–36)
MCV RBC AUTO: 89.4 FL — SIGNIFICANT CHANGE UP (ref 80–100)
MCV RBC AUTO: 89.4 FL — SIGNIFICANT CHANGE UP (ref 80–100)
NRBC # BLD: 0 /100 WBCS — SIGNIFICANT CHANGE UP (ref 0–0)
NRBC # BLD: 0 /100 WBCS — SIGNIFICANT CHANGE UP (ref 0–0)
PLATELET # BLD AUTO: 198 K/UL — SIGNIFICANT CHANGE UP (ref 150–400)
PLATELET # BLD AUTO: 198 K/UL — SIGNIFICANT CHANGE UP (ref 150–400)
POTASSIUM SERPL-MCNC: 4.2 MMOL/L — SIGNIFICANT CHANGE UP (ref 3.5–5.3)
POTASSIUM SERPL-MCNC: 4.2 MMOL/L — SIGNIFICANT CHANGE UP (ref 3.5–5.3)
POTASSIUM SERPL-SCNC: 4.2 MMOL/L — SIGNIFICANT CHANGE UP (ref 3.5–5.3)
POTASSIUM SERPL-SCNC: 4.2 MMOL/L — SIGNIFICANT CHANGE UP (ref 3.5–5.3)
RBC # BLD: 3.01 M/UL — LOW (ref 3.8–5.2)
RBC # BLD: 3.01 M/UL — LOW (ref 3.8–5.2)
RBC # FLD: 13.5 % — SIGNIFICANT CHANGE UP (ref 10.3–14.5)
RBC # FLD: 13.5 % — SIGNIFICANT CHANGE UP (ref 10.3–14.5)
SODIUM SERPL-SCNC: 138 MMOL/L — SIGNIFICANT CHANGE UP (ref 135–145)
SODIUM SERPL-SCNC: 138 MMOL/L — SIGNIFICANT CHANGE UP (ref 135–145)
WBC # BLD: 6.19 K/UL — SIGNIFICANT CHANGE UP (ref 3.8–10.5)
WBC # BLD: 6.19 K/UL — SIGNIFICANT CHANGE UP (ref 3.8–10.5)
WBC # FLD AUTO: 6.19 K/UL — SIGNIFICANT CHANGE UP (ref 3.8–10.5)
WBC # FLD AUTO: 6.19 K/UL — SIGNIFICANT CHANGE UP (ref 3.8–10.5)

## 2024-01-11 PROCEDURE — 80048 BASIC METABOLIC PNL TOTAL CA: CPT

## 2024-01-11 PROCEDURE — 99284 EMERGENCY DEPT VISIT MOD MDM: CPT | Mod: 25

## 2024-01-11 PROCEDURE — 93971 EXTREMITY STUDY: CPT

## 2024-01-11 PROCEDURE — 36415 COLL VENOUS BLD VENIPUNCTURE: CPT

## 2024-01-11 PROCEDURE — 93971 EXTREMITY STUDY: CPT | Mod: 26,RT

## 2024-01-11 PROCEDURE — 85027 COMPLETE CBC AUTOMATED: CPT

## 2024-01-11 PROCEDURE — 99284 EMERGENCY DEPT VISIT MOD MDM: CPT

## 2024-01-11 RX ORDER — CEFUROXIME AXETIL 250 MG
1 TABLET ORAL
Qty: 14 | Refills: 0
Start: 2024-01-11 | End: 2024-01-17

## 2024-01-11 NOTE — ED ADULT NURSE NOTE - OBJECTIVE STATEMENT
77 yo F pmhx HTN, T2DM, Afib on eliquis presents to ED co RLE wound check. Pt reports intermittent itching to R leg x weeks. Daughter who is present @ bedside reports that the HHA has noticed pt itching her leg frequently, and noted skin to be opening over past week or so, with gradually more abrasions surfacing over past few days. Daughter brought pt in today concerned because she is a diabetic and worried about infxn. Pt denies current complaints or itchiness. Denies f/c, drainage from site. 79 yo F pmhx HTN, T2DM, Afib on eliquis presents to ED co RLE wound check. Pt reports intermittent itching to R leg x weeks. Daughter who is present @ bedside reports that the HHA has noticed pt itching her leg frequently, and noted skin to be opening over past week or so, with gradually more abrasions surfacing over past few days. Daughter brought pt in today concerned because she is a diabetic and worried about infxn. Pt denies current complaints or itchiness. Denies f/c, drainage from site.

## 2024-01-11 NOTE — ED ADULT NURSE REASSESSMENT NOTE - NS ED NURSE REASSESS COMMENT FT1
Covering patient for primary RN, patient is awake, sitting up in stretcher, denies any c/o pain or discomfort; breathing spontaneously, chest rise is visible, symmetrical, even and unlabored. Patient's VSS, NAD. Pending disposition.

## 2024-01-11 NOTE — ED PROVIDER NOTE - PHYSICAL EXAMINATION
General: Awake, alert and oriented. No acute distress.   HENMT: head normocephalic and atraumatic  EYES: Conjunctiva clear. nonicteric sclera  Cardiac: well perfused  Respiratory: breathing comfortably on room air  Abdominal: nondistended  MSK: mild swelling to right leg, scattered superficial wounds on erythematous base.

## 2024-01-11 NOTE — ED ADULT NURSE NOTE - NSICDXPASTSURGICALHX_GEN_ALL_CORE_FT
Called patient for CI need FOBT order enter left voice mail to  the kit on the lab in the first floor.    PAST SURGICAL HISTORY:  Acute subdural hematoma     History of knee replacement, total, right     Other postprocedural status S/P atrial septal defect closure, surgical    Status post tubal ligation S/P tubal ligation

## 2024-01-11 NOTE — ED ADULT NURSE NOTE - NSFALLHARMRISKINTERV_ED_ALL_ED
Assistance OOB with selected safe patient handling equipment if applicable/Assistance with ambulation/Communicate risk of Fall with Harm to all staff, patient, and family/Monitor gait and stability/Provide patient with walking aids/Provide visual cue: red socks, yellow wristband, yellow gown, etc/Reinforce activity limits and safety measures with patient and family/Bed in lowest position, wheels locked, appropriate side rails in place/Call bell, personal items and telephone in reach/Instruct patient to call for assistance before getting out of bed/chair/stretcher/Non-slip footwear applied when patient is off stretcher/[place] to call system/Physically safe environment - no spills, clutter or unnecessary equipment/Purposeful Proactive Rounding/Room/bathroom lighting operational, light cord in reach Assistance OOB with selected safe patient handling equipment if applicable/Assistance with ambulation/Communicate risk of Fall with Harm to all staff, patient, and family/Monitor gait and stability/Provide patient with walking aids/Provide visual cue: red socks, yellow wristband, yellow gown, etc/Reinforce activity limits and safety measures with patient and family/Bed in lowest position, wheels locked, appropriate side rails in place/Call bell, personal items and telephone in reach/Instruct patient to call for assistance before getting out of bed/chair/stretcher/Non-slip footwear applied when patient is off stretcher/[place] to call system/Physically safe environment - no spills, clutter or unnecessary equipment/Purposeful Proactive Rounding/Room/bathroom lighting operational, light cord in reach

## 2024-01-11 NOTE — ED PROVIDER NOTE - PATIENT PORTAL LINK FT
You can access the FollowMyHealth Patient Portal offered by [facility] by registering at the following website: [email or web address]. By joining [facility]’s FollowMyHealth portal, you will also be able to view your health information using other applications (apps) compatible with our system. You can access the FollowMyHealth Patient Portal offered by [facility] by registering at the following website: [email or web address]. By joining [facility]’s FollowMyHealth portal, you will also be able to view your health information using other applications (apps) compatible with our system.

## 2024-01-11 NOTE — ED ADULT TRIAGE NOTE - ARRIVAL INFO ADDITIONAL COMMENTS
hx DM, has been stratching legs and has open scratches/wounds. brought in by daughter concerned 2/2 pt is diabetic

## 2024-01-11 NOTE — ED PROVIDER NOTE - OBJECTIVE STATEMENT
78-year-old history of hypertension diabetes asthma COPD chronic left knee pain. for several weeks has been scratching right leg since it itches. now with wounds and swelling to foot. otherwis hien [facility] 78-year-old history of hypertension diabetes asthma COPD chronic left knee pain. for several weeks has been scratching right leg since it itches. now with wounds and swelling to foot. otherwis hien [facility]

## 2024-01-12 ENCOUNTER — APPOINTMENT (OUTPATIENT)
Dept: HOME HEALTH SERVICES | Facility: HOME HEALTH | Age: 79
End: 2024-01-12

## 2024-01-18 RX ORDER — BLOOD-GLUCOSE METER
W/DEVICE KIT MISCELLANEOUS
Qty: 1 | Refills: 0 | Status: ACTIVE | COMMUNITY
Start: 2024-01-18 | End: 1900-01-01

## 2024-01-22 ENCOUNTER — RX RENEWAL (OUTPATIENT)
Age: 79
End: 2024-01-22

## 2024-01-22 RX ORDER — ELECTROLYTES/DEXTROSE
31G X 5 MM SOLUTION, ORAL ORAL
Qty: 100 | Refills: 5 | Status: ACTIVE | COMMUNITY
Start: 2021-09-09 | End: 1900-01-01

## 2024-01-26 ENCOUNTER — APPOINTMENT (OUTPATIENT)
Dept: ORTHOPEDIC SURGERY | Facility: CLINIC | Age: 79
End: 2024-01-26
Payer: MEDICARE

## 2024-01-26 ENCOUNTER — OUTPATIENT (OUTPATIENT)
Dept: OUTPATIENT SERVICES | Facility: HOSPITAL | Age: 79
LOS: 1 days | End: 2024-01-26
Payer: MEDICARE

## 2024-01-26 ENCOUNTER — TRANSCRIPTION ENCOUNTER (OUTPATIENT)
Age: 79
End: 2024-01-26

## 2024-01-26 ENCOUNTER — RESULT REVIEW (OUTPATIENT)
Age: 79
End: 2024-01-26

## 2024-01-26 VITALS
SYSTOLIC BLOOD PRESSURE: 119 MMHG | WEIGHT: 155 LBS | HEIGHT: 67 IN | BODY MASS INDEX: 24.33 KG/M2 | OXYGEN SATURATION: 99 % | HEART RATE: 102 BPM | DIASTOLIC BLOOD PRESSURE: 79 MMHG

## 2024-01-26 DIAGNOSIS — Z96.651 PRESENCE OF RIGHT ARTIFICIAL KNEE JOINT: Chronic | ICD-10-CM

## 2024-01-26 DIAGNOSIS — Z86.39 PERSONAL HISTORY OF OTHER ENDOCRINE, NUTRITIONAL AND METABOLIC DISEASE: ICD-10-CM

## 2024-01-26 DIAGNOSIS — Z86.79 PERSONAL HISTORY OF OTHER DISEASES OF THE CIRCULATORY SYSTEM: ICD-10-CM

## 2024-01-26 DIAGNOSIS — Z96.651 AFTERCARE FOLLOWING JOINT REPLACEMENT SURGERY: ICD-10-CM

## 2024-01-26 DIAGNOSIS — I62.01 NONTRAUMATIC ACUTE SUBDURAL HEMORRHAGE: Chronic | ICD-10-CM

## 2024-01-26 DIAGNOSIS — Z82.61 FAMILY HISTORY OF ARTHRITIS: ICD-10-CM

## 2024-01-26 DIAGNOSIS — Z47.1 AFTERCARE FOLLOWING JOINT REPLACEMENT SURGERY: ICD-10-CM

## 2024-01-26 PROCEDURE — 73564 X-RAY EXAM KNEE 4 OR MORE: CPT

## 2024-01-26 PROCEDURE — 20610 DRAIN/INJ JOINT/BURSA W/O US: CPT | Mod: RT

## 2024-01-26 PROCEDURE — 72170 X-RAY EXAM OF PELVIS: CPT

## 2024-01-26 PROCEDURE — 73564 X-RAY EXAM KNEE 4 OR MORE: CPT | Mod: 26,50

## 2024-01-26 PROCEDURE — 72170 X-RAY EXAM OF PELVIS: CPT | Mod: 26

## 2024-01-26 PROCEDURE — 99205 OFFICE O/P NEW HI 60 MIN: CPT | Mod: 25

## 2024-01-26 NOTE — REASON FOR VISIT
[Initial Visit] : an initial visit for [Knee Pain] : knee pain [Formal Caregiver] : formal caregiver Eyeglasses/Dentures

## 2024-01-26 NOTE — REVIEW OF SYSTEMS
[Arthralgia] : arthralgia [Joint Pain] : joint pain [Joint Stiffness] : joint stiffness [Joint Swelling] : joint swelling [Negative] : Heme/Lymph [Wheezing] : wheezing [Incontinence] : incontinence [Sleep Disturbances] : ~T sleep disturbances [Easy Bleeding] : a tendency for easy bleeding [Easy Bruising] : a tendency for easy bruising

## 2024-01-30 ENCOUNTER — APPOINTMENT (OUTPATIENT)
Dept: HEART AND VASCULAR | Facility: CLINIC | Age: 79
End: 2024-01-30
Payer: MEDICARE

## 2024-01-30 ENCOUNTER — TRANSCRIPTION ENCOUNTER (OUTPATIENT)
Age: 79
End: 2024-01-30

## 2024-01-30 ENCOUNTER — NON-APPOINTMENT (OUTPATIENT)
Age: 79
End: 2024-01-30

## 2024-01-30 LAB
B PERT IGG+IGM PNL SER: NORMAL
COLOR FLD: NORMAL
CRP SERPL-MCNC: 9 MG/L
EOSINOPHIL # FLD MANUAL: 1 %
ERYTHROCYTE [SEDIMENTATION RATE] IN BLOOD BY WESTERGREN METHOD: 18 MM/HR
FLUID INTAKE SUBSTANCE CLASS: NORMAL
HCT VFR BLD CALC: 30.5 %
HGB BLD-MCNC: 10 G/DL
JOINT PCR PANEL: NOT DETECTED
JOINT PCR SOURCE: NORMAL
LYMPHOCYTES # FLD MANUAL: 13 %
MCHC RBC-ENTMCNC: 29.8 PG
MCHC RBC-ENTMCNC: 32.8 GM/DL
MCV RBC AUTO: 90.8 FL
MESOTHL CELL NFR FLD: 3 %
MONOS+MACROS NFR FLD MANUAL: 63 %
NEUTS SEG # FLD MANUAL: 20 %
PLATELET # BLD AUTO: 270 K/UL
RBC # BLD: 3.36 M/UL
RBC # FLD MANUAL: 5000 /UL
RBC # FLD: 14.2 %
SYCRY CLARITY: NORMAL
SYCRY COLOR: NORMAL
SYCRY ID: NORMAL
SYCRY TUBE: NORMAL
TOTAL CELLS COUNTED FLD: 436 /UL
TUBE TYPE: NORMAL
WBC # FLD AUTO: 10.35 K/UL

## 2024-01-30 NOTE — PROCEDURE
[de-identified] : Procedure: Knee joint aspiration Laterality: right Indication: diagnostic - discussed with patient Skin prep: alcohol and chlorhexidine Anesthesia: ethyl chloride spray Needle: 18-gauge Portal: superolateral Aspiration: 5cc of normal appearing synovial fluid which was sent for the usual septic/inflammatory panel. Injectate: none Dressing: Band-aid Complications: None

## 2024-01-30 NOTE — DISCUSSION/SUMMARY
[de-identified] : 1/26/2024 77 y/o F with chronic right anterior knee pain following total knee arthroplasty, as well as evidence of some MCL attenuation. Also: severe left knee osteoarthritis and chronic deconditioning. - The appearance of her right leg open is somewhat concerning given many superficial open wounds with possible entryway for infection; although she does not have clinical cellulitis right now. - We will obtain a serum inflammatory markers and I performed a right knee aspiration to rule out prosthetic joint infection. I will call her back with the results once available with any further recommendations. - I provided her with a referral to outpatient physical therapy for bilateral lower extremity strengthening and I provided her with a home exercise program for the same. - I recommended that she use Tylenol as needed for pain given that she has history of CKD and cannot take NSAIDs. - Regarding the left knee she has evidence of severe osteoarthritis as well as a flexion contracture. I do not think that she is a reasonable candidate for arthroplasty at this time given her medical and cognitive comorbidities and therefore we will continue with conservative management with hyaluronic acid injections in order for left knee HA was placed today to be administered once authorized. If the HA is effective then we can repeat HA injections through six months. - We also provided her with a referral to vascular surgery so that she can receive local wound care, unna boot therapy, etc. - Will discuss plan for followup after review of her infection workup.

## 2024-01-30 NOTE — HISTORY OF PRESENT ILLNESS
[10] : a current pain level of 10/10 [None] : No relieving factors are noted [de-identified] : 1/26/2024 77 y/o F presenting with her home health aide for first evaluation of right knee pain following total knee arthroplasty. She is a very limited historian.  She reports that she underwent her right knee arthroplasty at [facility] approximately 8 years ago and the knee had been functioning relatively well until approximately 2 years ago when she developed progressively worsening anterior knee pain. She reports a high frequency of minor injuries to the right leg particularly bumping her shin into things but no major falls or other severe injuries to her recollection. She is not currently using any pain medications and she ambulates with the use of a rollator at baseline, both indoors and outdoors.   She has a complex past medical history significant for prior pulmonary embolism, asthma, diabetes, chronic kidney disease, hypertension and hypercholesterolemia. She does have chronic bilateral right foot and left venous stasis disease with associated open wounds and episodes of cellulitis in the past. [de-identified] : States pain is throbbing.

## 2024-01-30 NOTE — END OF VISIT
[FreeTextEntry3] : All medical record entries made by the Scribe were at my, Dr. Laron Avila's, direction and personally dictated by me on 01/26/2024. I have reviewed the chart and agree that the record accurately reflects my personal performance of the history, physical exam, assessment and plan. I have also personally directed, reviewed, and agreed with the chart.

## 2024-01-30 NOTE — PHYSICAL EXAM
[de-identified] : General appearance: well nourished and hydrated, pleasant, alert and oriented x 3, cooperative. HEENT: normocephalic, EOM intact, wearing mask, external auditory canal clear. Cardiovascular: 2+ pinning edema affecting lower extremities, no varicosities, dorsalis pedis pulses palpable and symmetric. relative mild venous stasis change at the distal left leg and severe venostasis changes affecting the entire right anterior leg  Lymphatics: no palpable lymphadenopathy, no lymphedema. Neurologic: sensation is normal, no muscle weakness in upper or lower extremities, patella tendon reflexes present and symmetric. Dermatologic: skin moist, warm, no rash. Spine: cervical spine with normal lordosis and painless range of motion, thoracic spine with normal kyphosis and painless range of motion, lumbosacral spine with normal lordosis and painless range of motion. No tenderness to palpation along midline spine and paraspinal musculature. Sacroiliac joints nontender bilaterally. Negative SLR and crossed SLR tests bilaterally. Gait: Presents with the use of a rollator, cautious gait pattern with no specific antalgia.   Left knee: - Focal soft tissue swelling: none - Ecchymosis: none - Erythema: none - Effusion: large, no palpable Baker's cyst - Wounds: There's a large number of superficial ulcerations across the anterior and posterior aspects of the right leg with some active serous drainage. However, there does not appear to be any active cellulitis. - Alignment: varus - Tenderness: none - ROM:  - Collateral laxity: marked pseudolaxity to varus - Cruciate laxity: none - Meniscal signs: negative Xavier and Marshal - Popliteal angle (degrees): 40 - Quad strength: 4+/5   Right knee: - Focal soft tissue swelling: none - Ecchymosis: none - Erythema: none - Effusion: small, no palpable Baker's cyst - Wounds: well-healed midline incision, benign appearing. - Alignment: normal - Tenderness: none - ROM: 3-110 - Collateral laxity: increase laxity to valgus with a soft end point - Cruciate laxity: none - Popliteal angle (degrees): 60 - Quad strength: 4+/5 [de-identified] : AP pelvis and 4 views of the bilateral knees (weightbearing AP, weightbearing Ramirez, weightbearing lateral, and Sunrise) were interpreted by me and reviewed with the patient.   Location of imaging: [facility] Date of exam: 01/26/2024   Pelvic alignment: normal   Right hip -- Arthritis: minimal, age appropriate, osteoarthritis Deformity:none Osteonecrosis: there is no radiographic evidence of primary osteonecrosis. The bones are diffusely demineralized. There do appear to be two vascular stents within the right hemipelvis and right groin.   Left hip -- Arthritis: minimal, age appropriate, osteoarthritis Deformity: none Osteonecrosis: there is no radiographic evidence of primary osteonecrosis. The bones are diffusely demineralized.   Right knee -- TKA in position. All components appear to be well fixed and reasonable alignment without evidence of mechanical complication.  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 Arthritis: tricompartmental osteoarthritis most pronounced medially with nearly bone on bone articulation. Kellgren-Hrenandez 3-4. 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 - On the Ramirez view, there appears to be some bridging between the proximal tibia and proximal fibula suggestive of a proximal tibiofibular synostosis.

## 2024-01-31 PROCEDURE — 93298 REM INTERROG DEV EVAL SCRMS: CPT

## 2024-02-01 ENCOUNTER — NON-APPOINTMENT (OUTPATIENT)
Age: 79
End: 2024-02-01

## 2024-02-01 ENCOUNTER — EMERGENCY (EMERGENCY)
Facility: HOSPITAL | Age: 79
LOS: 1 days | Discharge: HOME | End: 2024-02-01
Attending: EMERGENCY MEDICINE | Admitting: EMERGENCY MEDICINE
Payer: MEDICARE

## 2024-02-01 VITALS
OXYGEN SATURATION: 98 % | HEIGHT: 67 IN | TEMPERATURE: 98 F | DIASTOLIC BLOOD PRESSURE: 77 MMHG | SYSTOLIC BLOOD PRESSURE: 137 MMHG | WEIGHT: 169.98 LBS | RESPIRATION RATE: 17 BRPM | HEART RATE: 86 BPM
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
## 2024-02-01 DIAGNOSIS — I62.01 NONTRAUMATIC ACUTE SUBDURAL HEMORRHAGE: Chronic | ICD-10-CM

## 2024-02-01 DIAGNOSIS — Z96.651 PRESENCE OF RIGHT ARTIFICIAL KNEE JOINT: Chronic | ICD-10-CM

## 2024-02-01 DIAGNOSIS — J44.9 CHRONIC OBSTRUCTIVE PULMONARY DISEASE, UNSPECIFIED: ICD-10-CM

## 2024-02-01 DIAGNOSIS — I10 ESSENTIAL (PRIMARY) HYPERTENSION: ICD-10-CM

## 2024-02-01 DIAGNOSIS — S80.811A ABRASION, RIGHT LOWER LEG, INITIAL ENCOUNTER: ICD-10-CM
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
## 2024-02-01 DIAGNOSIS — Z88.6 ALLERGY STATUS TO ANALGESIC AGENT: ICD-10-CM

## 2024-02-01 DIAGNOSIS — E11.9 TYPE 2 DIABETES MELLITUS WITHOUT COMPLICATIONS: ICD-10-CM

## 2024-02-01 DIAGNOSIS — Z91.010 ALLERGY TO PEANUTS: ICD-10-CM

## 2024-02-01 PROCEDURE — 99282 EMERGENCY DEPT VISIT SF MDM: CPT

## 2024-02-01 PROCEDURE — 99284 EMERGENCY DEPT VISIT MOD MDM: CPT

## 2024-02-01 NOTE — ED PROVIDER NOTE - OBJECTIVE STATEMENT
77 y/o f hx HTN, DM, asthma/COPD presents c/o cat scratch to right leg yesterday.  HHA with pt stating the wound was cleaned and a dressing placed yesterday.  Pt reports mild pain to the area.  Denies fever, chills, all other ROS negative.

## 2024-02-01 NOTE — ED PROVIDER NOTE - NSFOLLOWUPINSTRUCTIONS_ED_ALL_ED_FT
Please wash wounds with gentle soap and warm water, pat area dry with dry gauze or clean towel.  Apply aquafor or bacitracin to the area (aquafor preferred), apply dry gauze and wrap to cover.  Please perform this wound care once per day, take antibiotics as prescribed.

## 2024-02-01 NOTE — ED PROVIDER NOTE - CLINICAL SUMMARY MEDICAL DECISION MAKING FREE TEXT BOX
77 y/o f hx HTN, DM, asthma/COPD presents with cat scratch to right lower ext which happened yesterday.  Wounds do not appear infected on exam, cleaned and dressed, will rx augmentin prophylaxis, continue wound care at home, return to ED if wounds become more painful/swollen

## 2024-02-01 NOTE — ED ADULT NURSE NOTE - NSFALLRISKASMTTYPE_ED_ALL_ED
Please refer to attached ultrasound report for doctor's evaluation of the clinical information obtained by vital signs, ultrasound, and/or non-stress test along with management recommendation. Initial (On Arrival)

## 2024-02-01 NOTE — ED ADULT NURSE NOTE - OBJECTIVE STATEMENT
Patient presents to ER aox3 brought in by A for pain to right lower leg. Per HHA, patient was scratching right leg and created wound. Denies numbness/tingling, fever/chills. Patient ambulatory at home

## 2024-02-01 NOTE — ED PROVIDER NOTE - SKIN, MLM
right lower ext; chronic venous insufficiency skin changes, +scattered superficial breaks in the skin, no erythema, no swelling, no TTP, DP/PT 2+

## 2024-02-01 NOTE — ED ADULT TRIAGE NOTE - CHIEF COMPLAINT QUOTE
"she has a scratch on her right leg so her daughter wrapped it now it hurts and might be infected". HHA present.

## 2024-02-01 NOTE — ED PROVIDER NOTE - PATIENT PORTAL LINK FT
You can access the FollowMyHealth Patient Portal offered by [facility] by registering at the following website: [email or web address]. By joining [facility]’s FollowMyHealth portal, you will also be able to view your health information using other applications (apps) compatible with our system.

## 2024-02-01 NOTE — ED ADULT NURSE NOTE - NSFALLUNIVINTERV_ED_ALL_ED
Bed/Stretcher in lowest position, wheels locked, appropriate side rails in place/Call bell, personal items and telephone in reach/Instruct patient to call for assistance before getting out of bed/chair/stretcher/Non-slip footwear applied when patient is off stretcher/[place] to call system/Physically safe environment - no spills, clutter or unnecessary equipment/Purposeful proactive rounding/Room/bathroom lighting operational, light cord in reach

## 2024-02-01 NOTE — ED PROVIDER NOTE - ATTENDING APP SHARED VISIT CONTRIBUTION OF CARE
77 y/o f hx HTN, DM, asthma/COPD presents c/o cat scratch to right leg yesterday.  HHA with pt stating the wound was cleaned and a dressing placed yesterday.  Pt reports mild pain to the area.  Denies fever, chills, all other ROS negative.    Wounds do not appear infected on exam, cleaned and dressed, will rx augmentin prophylaxis, continue wound care at home, return to ED if wounds become more painful/swollen    Addendum to attending statement: I have reviewed the ACP note and agree with the history, exam, and plan of care. I  was available to PA   as a supervising provider if needed. PA given opportunity to ask questions and request further evaluation / care.

## 2024-02-02 ENCOUNTER — APPOINTMENT (OUTPATIENT)
Dept: HOME HEALTH SERVICES | Facility: HOME HEALTH | Age: 79
End: 2024-02-02

## 2024-02-05 ENCOUNTER — APPOINTMENT (OUTPATIENT)
Dept: VASCULAR SURGERY | Facility: CLINIC | Age: 79
End: 2024-02-05
Payer: MEDICARE

## 2024-02-05 VITALS
WEIGHT: 155 LBS | BODY MASS INDEX: 24.33 KG/M2 | HEIGHT: 67 IN | HEART RATE: 81 BPM | SYSTOLIC BLOOD PRESSURE: 122 MMHG | DIASTOLIC BLOOD PRESSURE: 75 MMHG

## 2024-02-05 PROCEDURE — 99204 OFFICE O/P NEW MOD 45 MIN: CPT

## 2024-02-05 RX ORDER — INSULIN GLARGINE 100 [IU]/ML
100 INJECTION, SOLUTION SUBCUTANEOUS
Qty: 2 | Refills: 0 | Status: DISCONTINUED | COMMUNITY
Start: 2023-11-08 | End: 2024-02-05

## 2024-02-05 RX ORDER — SODIUM BICARBONATE 650 MG/1
650 TABLET ORAL TWICE DAILY
Qty: 180 | Refills: 7 | Status: DISCONTINUED | COMMUNITY
Start: 2022-08-24 | End: 2024-02-05

## 2024-02-05 RX ORDER — DICLOFENAC SODIUM 1% 10 MG/G
1 GEL TOPICAL
Qty: 1 | Refills: 1 | Status: DISCONTINUED | COMMUNITY
Start: 2023-03-21 | End: 2024-02-05

## 2024-02-05 RX ORDER — HYALURONATE SODIUM 20 MG/2 ML
20 SYRINGE (ML) INTRAARTICULAR
Qty: 1 | Refills: 0 | Status: DISCONTINUED | OUTPATIENT
Start: 2024-01-26 | End: 2024-02-05

## 2024-02-05 RX ORDER — MELATONIN 5 MG
5 CAPSULE ORAL
Refills: 0 | Status: DISCONTINUED | COMMUNITY
Start: 2023-11-06 | End: 2024-02-05

## 2024-02-05 RX ORDER — DIAPER,BRIEF,ADULT, DISPOSABLE
EACH MISCELLANEOUS
Qty: 3 | Refills: 5 | Status: DISCONTINUED | COMMUNITY
Start: 2022-02-02 | End: 2024-02-05

## 2024-02-05 RX ORDER — HYDROCORTISONE 1 %
12 CREAM (GRAM) TOPICAL
Refills: 3 | Status: DISCONTINUED | COMMUNITY
Start: 2023-11-06 | End: 2024-02-05

## 2024-02-05 RX ORDER — STANDARDIZED SENNA CONCENTRATE 8.6 MG/1
8.6 TABLET ORAL
Refills: 0 | Status: DISCONTINUED | COMMUNITY
Start: 2023-11-06 | End: 2024-02-05

## 2024-02-06 NOTE — REVIEW OF SYSTEMS
[Arthralgias] : arthralgias [Limb Pain] : limb pain [Limb Swelling] : limb swelling [As Noted in HPI] : as noted in HPI [Skin Lesions] : skin lesion [Itching] : itching [Negative] : Heme/Lymph

## 2024-02-06 NOTE — PROCEDURE
[FreeTextEntry1] : RLE venous doppler from 1/11/24 was reviewed that demonstrated no evidence of right lower extremity deep venous thrombosis.

## 2024-02-06 NOTE — HISTORY OF PRESENT ILLNESS
[FreeTextEntry1] : 78yoF, former smoker with HTN, DM, asthma/COPD, dementia, chronic Right knee pain, s/p TKR who is referred by Dr. Laron Avila to be evaluated for right leg edema and ulcers. Patient was seen in ED on 2/1/24 due to multiple right leg scratches and itching and was started on oral antibiotics. HHA who accompanies the patient states that the patient has a tendency scratching her legs when she is unattended. She denies claudication, rest pain, history of varicose veins, DVT, fever, chills. She ambulates with a walker.

## 2024-02-06 NOTE — ADDENDUM
[FreeTextEntry1] : This note was written by Kimberly FLOWER, acting as a scribe for Dr. Sarwat Jeffries.  I, Dr. Sarwat Jeffries, have read and attest that all the information, medical decision-making, and discharge instructions within are true and accurate.  I, Dr. Sarwat Jeffries, personally performed the evaluation and management (E/M) services for this new patient.  That E/M includes conducting the initial examination, assessing all conditions, and establishing the plan of care.  Today, my ACP, Kimberly FLOWER, was here to observe my evaluation and management services for this patient to be followed going forward.  I spent a total of 42 minutes in this encounter.

## 2024-02-06 NOTE — PHYSICAL EXAM
[Respiratory Effort] : normal respiratory effort [Normal Heart Sounds] : normal heart sounds [2+] : left 2+ [1+] : left 1+ [Ankle Swelling (On Exam)] : present [Ankle Swelling On The Right] : mild [Alert] : alert [Calm] : calm [Abdomen Tenderness] : ~T ~M No abdominal tenderness [de-identified] : WN/WD, NAD [de-identified] : NC/AT [de-identified] : supple [de-identified] : FROM [de-identified] : RLE with well healed knee incision, mild lower leg edema, multiple superficial excoriations noted, no signs of infection, left lower leg with few marks from scratching, no signs of infection

## 2024-02-06 NOTE — ASSESSMENT
[Ulcer Care] : ulcer care [FreeTextEntry1] : 78yoF, former smoker with HTN, DM, asthma/COPD, dementia, chronic Right knee pain, s/p TKR who is referred by Dr. Laron Avila to be evaluated for right leg edema and ulcers. Patient was seen in ED on 2/1/24 due to multiple right leg scratches and itching and was started on oral antibiotics. Denies fever, chills. On exam, both legs are well perfused, RLE with well healed knee incision, mild lower leg edema, multiple superficial excoriations noted, no signs of infection, left lower leg with few marks from scratching, no signs of infection. Palpable peripheral pulses. RLE venous doppler from 1/11/24 was reviewed that demonstrated no evidence of right lower extremity deep venous thrombosis. We discussed the findings and reassured the patient and her HHA that no vascular intervention is needed at this time. We recommend to see a dermatologist, referral was provided. In the meantime, moisturize the skin and c/w antibiotics.

## 2024-02-09 ENCOUNTER — APPOINTMENT (OUTPATIENT)
Dept: DERMATOLOGY | Facility: CLINIC | Age: 79
End: 2024-02-09
Payer: MEDICARE

## 2024-02-09 DIAGNOSIS — L85.3 XEROSIS CUTIS: ICD-10-CM

## 2024-02-09 PROCEDURE — 99204 OFFICE O/P NEW MOD 45 MIN: CPT

## 2024-02-09 NOTE — HISTORY OF PRESENT ILLNESS
[FreeTextEntry1] : NP rash and scratches on legs [de-identified] : TJ MCMANUS is a 78 year old F who presents for evaluation of the following  1. Lower legs with rash and scratches, R>L Very itchy for patient  Referred by vascular surgery  Currently using an OTC anti-biotic  Feels well. No fevers/chills   SH: here with HHA

## 2024-02-09 NOTE — PHYSICAL EXAM
[FreeTextEntry3] : Focused skin exam performed  The relevant portions of the exam were performed today  AAOx3, NAD, well-appearing / pleasant Focused examination within normal limits with the exception of:  B/l legs with hyperpigmented lichenified lower legs with numerous excoriations, R>>L

## 2024-02-09 NOTE — ASSESSMENT
[FreeTextEntry1] : #Dermatitis #Pruritus  #Xerosis -New diagnosis, uncertain prognosis -Favor venous stasis derm vs eczematous rash, right now difficult to differentiate given degree of lichenification -No evidence of infection on today's exam -To open areas, apply mupirocin oint to AA 2-3x/week -Start TAC 0.1% oint to AA on legs bid as needed M-F, break on weekends. SED -At other times, Vaseline to AA to help keep areas moisturized -Rec compression stockings as will help if venous dermatitis and will also help prevent scratching   RTC 4-6 weeks

## 2024-02-12 LAB — JOINT CULTURE: NORMAL

## 2024-02-26 ENCOUNTER — APPOINTMENT (OUTPATIENT)
Dept: HOME HEALTH SERVICES | Facility: HOME HEALTH | Age: 79
End: 2024-02-26

## 2024-02-26 VITALS
DIASTOLIC BLOOD PRESSURE: 78 MMHG | RESPIRATION RATE: 16 BRPM | OXYGEN SATURATION: 99 % | HEART RATE: 87 BPM | SYSTOLIC BLOOD PRESSURE: 130 MMHG | TEMPERATURE: 97.2 F

## 2024-02-26 RX ORDER — CHLORHEXIDINE GLUCONATE 4 %
325 (65 FE) LIQUID (ML) TOPICAL
Qty: 30 | Refills: 3 | Status: ACTIVE | COMMUNITY
Start: 2023-06-23

## 2024-02-26 RX ORDER — METFORMIN HYDROCHLORIDE 1000 MG/1
1000 TABLET, COATED ORAL
Qty: 180 | Refills: 1 | Status: ACTIVE | COMMUNITY
Start: 2020-05-22

## 2024-02-26 RX ORDER — MULTIVITAMIN/IRON/FOLIC ACID 18MG-0.4MG
TABLET ORAL
Refills: 0 | Status: ACTIVE | COMMUNITY

## 2024-02-26 RX ORDER — ARIPIPRAZOLE 300 MG
300 KIT INTRAMUSCULAR
Refills: 0 | Status: ACTIVE | COMMUNITY
Start: 2023-12-08

## 2024-02-26 RX ORDER — TRAZODONE HYDROCHLORIDE 150 MG/1
150 TABLET ORAL
Refills: 0 | Status: ACTIVE | COMMUNITY
Start: 2023-11-06

## 2024-02-26 RX ORDER — BUDESONIDE AND FORMOTEROL FUMARATE DIHYDRATE 160; 4.5 UG/1; UG/1
160-4.5 AEROSOL RESPIRATORY (INHALATION) TWICE DAILY
Qty: 1 | Refills: 3 | Status: ACTIVE | COMMUNITY
Start: 2022-03-01

## 2024-02-26 RX ORDER — AMOXICILLIN AND CLAVULANATE POTASSIUM 875; 125 MG/1; MG/1
875-125 TABLET, COATED ORAL
Qty: 20 | Refills: 0 | Status: COMPLETED | COMMUNITY
Start: 2024-02-01 | End: 2024-02-26

## 2024-02-26 RX ORDER — METOPROLOL SUCCINATE 50 MG/1
50 TABLET, EXTENDED RELEASE ORAL
Qty: 180 | Refills: 1 | Status: ACTIVE | COMMUNITY
Start: 2019-06-07

## 2024-02-26 RX ORDER — MIRTAZAPINE 15 MG/1
15 TABLET, FILM COATED ORAL
Refills: 0 | Status: ACTIVE | COMMUNITY
Start: 2019-03-07

## 2024-02-26 RX ORDER — ACETAMINOPHEN 650 MG/1
650 TABLET, FILM COATED, EXTENDED RELEASE ORAL
Refills: 0 | Status: ACTIVE | COMMUNITY

## 2024-02-26 RX ORDER — TRIAMCINOLONE ACETONIDE 1 MG/G
0.1 OINTMENT TOPICAL
Qty: 1 | Refills: 1 | Status: ACTIVE | COMMUNITY
Start: 2024-02-09

## 2024-02-26 NOTE — ED ADULT NURSE NOTE - OBJECTIVE STATEMENT
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    Patient adherent with taking medications crushed in cookie or liquid in bread. Spoke with mom and she feels comfortable taking patient home. Patient no longer needs inpatient psych hospitalization stay. Patient has intake appointment with [facility] on 3/4/24 and first appointment on 3/7/24. Patient cleared for discharge from psychiatry.    Patient does not appear to have tremor today. No further medications needed for tremor.    Recommendations:  - Continue Abilify 5 mg PO QAM (with risperidone patient developed amenorrhea). Nurse to put in cookie and bread.  - Use Haldol 2mg IV/IM/PO WITH Benadryl 25mg IV/IM/PO [id number] PRN for agitation  - Continue use of CO until discharge for ASD.   76 yo female BIBA s/p fall at home. Pt.'s HHA with pt. on arrival to [facility] ED and reports that pt. lives at home with  and has HHA five hours a day, five days a week and three hours for one weekend day per week. HHA reports that she went to pharmacy and when she returned pt. did not answer door, she then called pt. and pt. answered phone stating that she fell. Pt. then answered door and reported she was able to get up from fall. Denies hitting head, is on blood thinners. Reports soreness to right knee and right hip. Denies chest pain, SOB, fever/chills, n/v/d. 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    Patient adherent with taking medications crushed in cookie or liquid in bread. Spoke with mom and she feels comfortable taking patient home. Patient no longer needs inpatient psych hospitalization stay. Patient has intake appointment with [facility] on 3/4/24 and first appointment on 3/7/24. Patient cleared for discharge from psychiatry.    Patient does not appear to have tremor today. No further medications needed for tremor.    Recommendations:  - Continue Abilify 5 mg PO QAM (with risperidone patient developed amenorrhea). Nurse to put in cookie and bread.  - Use Haldol 2mg IV/IM/PO WITH Benadryl 25mg IV/IM/PO [id number] PRN for agitation  - Continue use of CO until discharge for ASD  - d/c home with close outpatient f/u

## 2024-03-04 ENCOUNTER — APPOINTMENT (OUTPATIENT)
Dept: DERMATOLOGY | Facility: CLINIC | Age: 79
End: 2024-03-04

## 2024-03-05 ENCOUNTER — NON-APPOINTMENT (OUTPATIENT)
Age: 79
End: 2024-03-05

## 2024-03-05 ENCOUNTER — APPOINTMENT (OUTPATIENT)
Dept: HEART AND VASCULAR | Facility: CLINIC | Age: 79
End: 2024-03-05
Payer: MEDICARE

## 2024-03-05 PROCEDURE — 93298 REM INTERROG DEV EVAL SCRMS: CPT

## 2024-03-11 ENCOUNTER — APPOINTMENT (OUTPATIENT)
Dept: ORTHOPEDIC SURGERY | Facility: CLINIC | Age: 79
End: 2024-03-11
Payer: MEDICARE

## 2024-03-11 VITALS
SYSTOLIC BLOOD PRESSURE: 137 MMHG | HEART RATE: 69 BPM | HEIGHT: 67 IN | OXYGEN SATURATION: 97 % | BODY MASS INDEX: 24.33 KG/M2 | DIASTOLIC BLOOD PRESSURE: 83 MMHG | WEIGHT: 155 LBS

## 2024-03-11 PROCEDURE — 20610 DRAIN/INJ JOINT/BURSA W/O US: CPT | Mod: LT

## 2024-03-11 NOTE — PROCEDURE
[de-identified] : orthovisc injection in left knee joint lot: 02/28/2026 exp: [id number] man: [employer] ndc: [id number]   Procedure: Knee joint injection Laterality:  LEFT Indication: Osteoarthritis - discussed with patient Skin prep: alcohol and chlorhexidine Anesthesia: ethyl chloride spray Needle: 20-gauge Portal: superolateral Aspiration: none Injectate: Orthovisc 1/3 Dressing: Band-aid Complications: None   RTC in 1 week to continue left knee ORTHOVISC series

## 2024-03-12 ENCOUNTER — APPOINTMENT (OUTPATIENT)
Dept: INTERNAL MEDICINE | Facility: CLINIC | Age: 79
End: 2024-03-12
Payer: MEDICARE

## 2024-03-12 VITALS
OXYGEN SATURATION: 100 % | WEIGHT: 160 LBS | HEIGHT: 67 IN | DIASTOLIC BLOOD PRESSURE: 82 MMHG | HEART RATE: 75 BPM | BODY MASS INDEX: 25.11 KG/M2 | SYSTOLIC BLOOD PRESSURE: 136 MMHG | TEMPERATURE: 98.1 F

## 2024-03-12 DIAGNOSIS — R06.83 SNORING: ICD-10-CM

## 2024-03-12 DIAGNOSIS — M25.561 PAIN IN RIGHT KNEE: ICD-10-CM

## 2024-03-12 DIAGNOSIS — G47.00 INSOMNIA, UNSPECIFIED: ICD-10-CM

## 2024-03-12 PROCEDURE — G2211 COMPLEX E/M VISIT ADD ON: CPT

## 2024-03-12 PROCEDURE — 99214 OFFICE O/P EST MOD 30 MIN: CPT

## 2024-03-12 NOTE — REVIEW OF SYSTEMS
[Joint Pain] : joint pain [Unsteady Walking] : ataxia [Negative] : Gastrointestinal [de-identified] : sleep issues

## 2024-03-12 NOTE — ASSESSMENT
[FreeTextEntry1] : 78F hx DM2, asthma/COPD, afib s/p ablation, subdural hematoma here for f/u on sleep issues.  Difficulty sleeping - rec more activity during the day, psychiatrist adjusting mirtazapine. Ordered sleep study, but unsure if it'll be covered. Knee pain - seeing ortho, s/p knee injection yesterday d/w dgt over the phone on day of appt.

## 2024-03-12 NOTE — HISTORY OF PRESENT ILLNESS
[FreeTextEntry1] : sleeping issues  [de-identified] : 78F hx DM2, asthma/COPD, afib s/p ablation, subdural hematoma here for f/u on sleep issues.  Pt here today for form as well for new diabetic shoes. For sleep, pt was seeing psych at [facility] for insomnia, given rx for Trazodone 150mg and 45mg to 15mg mirtazapine recently.  Starting to nap in between, but still pacing at night. Pt sleeps a lot during the day per Uzma brito.  D/w dgt over the phone as well, she states the psychiatrist recommended for pt to get a sleep study. Discussed this may be difficult to get financially authorized and pt may need a home study first. Pt has dx of dementia, but dgt concerned lack of sleep is causing cognitive changes in pt.  Per aide, pt went for knee injection yesterday, has 2 more left. And will be starting PT soon for knee pain.

## 2024-03-13 DIAGNOSIS — E11.9 TYPE 2 DIABETES MELLITUS W/OUT COMPLICATIONS: ICD-10-CM

## 2024-03-14 ENCOUNTER — TRANSCRIPTION ENCOUNTER (OUTPATIENT)
Age: 79
End: 2024-03-14

## 2024-03-15 ENCOUNTER — APPOINTMENT (OUTPATIENT)
Dept: DERMATOLOGY | Facility: CLINIC | Age: 79
End: 2024-03-15
Payer: MEDICARE

## 2024-03-15 ENCOUNTER — TRANSCRIPTION ENCOUNTER (OUTPATIENT)
Age: 79
End: 2024-03-15

## 2024-03-15 DIAGNOSIS — L30.9 DERMATITIS, UNSPECIFIED: ICD-10-CM

## 2024-03-15 DIAGNOSIS — T14.8XXA OTHER INJURY OF UNSPECIFIED BODY REGION, INITIAL ENCOUNTER: ICD-10-CM

## 2024-03-15 DIAGNOSIS — L29.9 PRURITUS, UNSPECIFIED: ICD-10-CM

## 2024-03-15 PROCEDURE — 99214 OFFICE O/P EST MOD 30 MIN: CPT

## 2024-03-15 RX ORDER — MUPIROCIN 20 MG/G
2 OINTMENT TOPICAL
Qty: 1 | Refills: 1 | Status: ACTIVE | COMMUNITY
Start: 2024-02-09 | End: 1900-01-01

## 2024-03-15 NOTE — ASSESSMENT
[FreeTextEntry1] : #Dermatitis #Pruritus  #Xerosis -Chronic, significantly improved but not yet at treatment goal -Pain/stinging only localized to area of open excoriation -Favor venous stasis derm  -No evidence of infection on today's exam -To open areas, apply mupirocin oint to AA 2-3x/week and then apply non-stick bandage such as Telfa under wrap. Dry gauze currently being used is not helping wound healing. Dressing demonstrated and Telfa provided to patient today -Use TAC 0.1% oint to AA on legs bid as needed M-F, break on weekends. SED -At other times, Vaseline to AA to help keep areas moisturized -Rec compression stockings as will help if venous dermatitis and will also help prevent scratching  -Return precautions discussed  RTC prn

## 2024-03-15 NOTE — HISTORY OF PRESENT ILLNESS
[FreeTextEntry1] : RP rash and scratches on legs [de-identified] : TJ MCMANUS is a 78 year old F who presents for evaluation of the following  1. Lower legs with rash and scratches, R>L Initially very itchy for patient  Referred by vascular surgery  Since last visit, notes improvement No longer itchy, painful only on right lower leg Unclear what patient has been using- she states she is using both mupirocin and TAC though it is the home health aides that apply the medication for her and the one who accompanied patient today reports she has just been using Vaseline  SH: here with ASHLEY

## 2024-03-15 NOTE — PHYSICAL EXAM
[FreeTextEntry3] : Focused skin exam performed  The relevant portions of the exam were performed today  AAOx3, NAD, well-appearing / pleasant Focused examination within normal limits with the exception of:  B/l legs with hyperpigmented thinly lichenified lower legs with numerous linear scars and three areas of open excoriation on right leg

## 2024-03-18 ENCOUNTER — APPOINTMENT (OUTPATIENT)
Dept: ORTHOPEDIC SURGERY | Facility: CLINIC | Age: 79
End: 2024-03-18
Payer: MEDICARE

## 2024-03-18 ENCOUNTER — TRANSCRIPTION ENCOUNTER (OUTPATIENT)
Age: 79
End: 2024-03-18

## 2024-03-18 ENCOUNTER — NON-APPOINTMENT (OUTPATIENT)
Age: 79
End: 2024-03-18

## 2024-03-18 PROCEDURE — 20610 DRAIN/INJ JOINT/BURSA W/O US: CPT | Mod: LT

## 2024-03-18 NOTE — PROCEDURE
[de-identified] : orthovisc injection in right knee joint. exp: 02/28/2026 lot: [id number] man: [employer] ndc # : [id number]  Procedure: Knee joint injection Laterality: LEFT Indication: Osteoarthritis - discussed with patient Skin prep: alcohol and chlorhexidine Anesthesia: ethyl chloride spray Needle: 20-gauge Portal: superolateral Aspiration: none Injectate: Orthovisc 2/3 Dressing: Band-aid Complications: None   RTC in 1 week to continue left knee ORTHOVISC series. - New script for Home PT

## 2024-03-19 ENCOUNTER — TRANSCRIPTION ENCOUNTER (OUTPATIENT)
Age: 79
End: 2024-03-19

## 2024-03-21 ENCOUNTER — APPOINTMENT (OUTPATIENT)
Dept: SLEEP CENTER | Facility: HOSPITAL | Age: 79
End: 2024-03-21

## 2024-03-21 ENCOUNTER — OUTPATIENT (OUTPATIENT)
Dept: OUTPATIENT SERVICES | Facility: HOSPITAL | Age: 79
LOS: 1 days | End: 2024-03-21
Payer: MEDICARE

## 2024-03-21 DIAGNOSIS — I62.01 NONTRAUMATIC ACUTE SUBDURAL HEMORRHAGE: Chronic | ICD-10-CM

## 2024-03-21 DIAGNOSIS — G47.33 OBSTRUCTIVE SLEEP APNEA (ADULT) (PEDIATRIC): ICD-10-CM

## 2024-03-21 DIAGNOSIS — Z96.651 PRESENCE OF RIGHT ARTIFICIAL KNEE JOINT: Chronic | ICD-10-CM

## 2024-03-21 PROCEDURE — 95810 POLYSOM 6/> YRS 4/> PARAM: CPT

## 2024-03-21 PROCEDURE — 95810 POLYSOM 6/> YRS 4/> PARAM: CPT | Mod: 26

## 2024-03-25 ENCOUNTER — APPOINTMENT (OUTPATIENT)
Dept: ORTHOPEDIC SURGERY | Facility: CLINIC | Age: 79
End: 2024-03-25
Payer: MEDICARE

## 2024-03-25 DIAGNOSIS — M17.12 UNILATERAL PRIMARY OSTEOARTHRITIS, LEFT KNEE: ICD-10-CM

## 2024-03-25 PROCEDURE — 20610 DRAIN/INJ JOINT/BURSA W/O US: CPT | Mod: LT

## 2024-03-25 NOTE — PROCEDURE
[de-identified] : orthovisc injection in left knee joint. 3/3 exp: 02/28/2026 lot: [id number] man: [employer] ndc # : [id number]  Procedure: Knee joint injection Laterality: LEFT Indication: Osteoarthritis - discussed with patient Skin prep: alcohol and chlorhexidine Anesthesia: ethyl chloride spray Needle: 20-gauge Portal: superolateral Aspiration: none Injectate: Orthovisc 3/3 Dressing: Band-aid Complications: None  - Advised patient to contact the office in 5 months to submit authorization for left knee ORTHOVISC series.

## 2024-04-03 ENCOUNTER — TRANSCRIPTION ENCOUNTER (OUTPATIENT)
Age: 79
End: 2024-04-03

## 2024-04-03 DIAGNOSIS — G47.61 PERIODIC LIMB MOVEMENT DISORDER: ICD-10-CM

## 2024-04-04 ENCOUNTER — TRANSCRIPTION ENCOUNTER (OUTPATIENT)
Age: 79
End: 2024-04-04

## 2024-04-09 ENCOUNTER — NON-APPOINTMENT (OUTPATIENT)
Age: 79
End: 2024-04-09

## 2024-04-09 ENCOUNTER — INPATIENT (INPATIENT)
Facility: HOSPITAL | Age: 79
LOS: 5 days | Discharge: NURSING, REHAB OR LONG-TERM CARE | DRG: 683 | End: 2024-04-15
Attending: INTERNAL MEDICINE | Admitting: STUDENT IN AN ORGANIZED HEALTH CARE EDUCATION/TRAINING PROGRAM
Payer: MEDICARE

## 2024-04-09 ENCOUNTER — APPOINTMENT (OUTPATIENT)
Dept: HEART AND VASCULAR | Facility: CLINIC | Age: 79
End: 2024-04-09
Payer: MEDICARE

## 2024-04-09 VITALS
OXYGEN SATURATION: 97 % | WEIGHT: 160.06 LBS | TEMPERATURE: 98 F | RESPIRATION RATE: 18 BRPM | HEIGHT: 67 IN | SYSTOLIC BLOOD PRESSURE: 99 MMHG | DIASTOLIC BLOOD PRESSURE: 60 MMHG | HEART RATE: 51 BPM

## 2024-04-09 DIAGNOSIS — E78.5 HYPERLIPIDEMIA, UNSPECIFIED: ICD-10-CM

## 2024-04-09 DIAGNOSIS — I10 ESSENTIAL (PRIMARY) HYPERTENSION: ICD-10-CM

## 2024-04-09 DIAGNOSIS — N17.9 ACUTE KIDNEY FAILURE, UNSPECIFIED: ICD-10-CM

## 2024-04-09 DIAGNOSIS — E87.20 ACIDOSIS, UNSPECIFIED: ICD-10-CM

## 2024-04-09 DIAGNOSIS — E11.9 TYPE 2 DIABETES MELLITUS WITHOUT COMPLICATIONS: ICD-10-CM

## 2024-04-09 DIAGNOSIS — I62.01 NONTRAUMATIC ACUTE SUBDURAL HEMORRHAGE: Chronic | ICD-10-CM

## 2024-04-09 DIAGNOSIS — I48.91 UNSPECIFIED ATRIAL FIBRILLATION: ICD-10-CM

## 2024-04-09 DIAGNOSIS — Z29.9 ENCOUNTER FOR PROPHYLACTIC MEASURES, UNSPECIFIED: ICD-10-CM

## 2024-04-09 DIAGNOSIS — Z96.651 PRESENCE OF RIGHT ARTIFICIAL KNEE JOINT: Chronic | ICD-10-CM

## 2024-04-09 DIAGNOSIS — D64.9 ANEMIA, UNSPECIFIED: ICD-10-CM

## 2024-04-09 DIAGNOSIS — F32.9 MAJOR DEPRESSIVE DISORDER, SINGLE EPISODE, UNSPECIFIED: ICD-10-CM

## 2024-04-09 DIAGNOSIS — R53.1 WEAKNESS: ICD-10-CM

## 2024-04-09 DIAGNOSIS — M17.12 UNILATERAL PRIMARY OSTEOARTHRITIS, LEFT KNEE: ICD-10-CM

## 2024-04-09 DIAGNOSIS — J45.909 UNSPECIFIED ASTHMA, UNCOMPLICATED: ICD-10-CM

## 2024-04-09 LAB
ALBUMIN SERPL ELPH-MCNC: 4 G/DL — SIGNIFICANT CHANGE UP (ref 3.3–5)
ALBUMIN SERPL ELPH-MCNC: 4.1 G/DL — SIGNIFICANT CHANGE UP (ref 3.3–5)
ALBUMIN SERPL ELPH-MCNC: 4.4 G/DL — SIGNIFICANT CHANGE UP (ref 3.3–5)
ALP SERPL-CCNC: 70 U/L — SIGNIFICANT CHANGE UP (ref 40–120)
ALP SERPL-CCNC: SIGNIFICANT CHANGE UP (ref 40–120)
ALP SERPL-CCNC: SIGNIFICANT CHANGE UP U/L (ref 40–120)
ALT FLD-CCNC: 13 U/L — SIGNIFICANT CHANGE UP (ref 10–45)
ALT FLD-CCNC: SIGNIFICANT CHANGE UP (ref 10–45)
ALT FLD-CCNC: SIGNIFICANT CHANGE UP U/L (ref 10–45)
ANION GAP SERPL CALC-SCNC: 12 MMOL/L — SIGNIFICANT CHANGE UP (ref 5–17)
ANION GAP SERPL CALC-SCNC: 13 MMOL/L — SIGNIFICANT CHANGE UP (ref 5–17)
ANION GAP SERPL CALC-SCNC: 13 MMOL/L — SIGNIFICANT CHANGE UP (ref 5–17)
ANION GAP SERPL CALC-SCNC: 7 MMOL/L — SIGNIFICANT CHANGE UP (ref 5–17)
APPEARANCE UR: CLEAR — SIGNIFICANT CHANGE UP
AST SERPL-CCNC: 19 U/L — SIGNIFICANT CHANGE UP (ref 10–40)
AST SERPL-CCNC: SIGNIFICANT CHANGE UP (ref 10–40)
AST SERPL-CCNC: SIGNIFICANT CHANGE UP U/L (ref 10–40)
BACTERIA # UR AUTO: NEGATIVE /HPF — SIGNIFICANT CHANGE UP
BASOPHILS # BLD AUTO: 0.05 K/UL — SIGNIFICANT CHANGE UP (ref 0–0.2)
BASOPHILS NFR BLD AUTO: 0.9 % — SIGNIFICANT CHANGE UP (ref 0–2)
BILIRUB SERPL-MCNC: 0.4 MG/DL — SIGNIFICANT CHANGE UP (ref 0.2–1.2)
BILIRUB UR-MCNC: NEGATIVE — SIGNIFICANT CHANGE UP
BUN SERPL-MCNC: 35 MG/DL — HIGH (ref 7–23)
BUN SERPL-MCNC: 39 MG/DL — HIGH (ref 7–23)
BUN SERPL-MCNC: 41 MG/DL — HIGH (ref 7–23)
BUN SERPL-MCNC: 43 MG/DL — HIGH (ref 7–23)
CALCIUM SERPL-MCNC: 10.3 MG/DL — SIGNIFICANT CHANGE UP (ref 8.4–10.5)
CALCIUM SERPL-MCNC: 9.6 MG/DL — SIGNIFICANT CHANGE UP (ref 8.4–10.5)
CALCIUM SERPL-MCNC: 9.7 MG/DL — SIGNIFICANT CHANGE UP (ref 8.4–10.5)
CALCIUM SERPL-MCNC: 9.7 MG/DL — SIGNIFICANT CHANGE UP (ref 8.4–10.5)
CAST: 2 /LPF — SIGNIFICANT CHANGE UP (ref 0–4)
CHLORIDE SERPL-SCNC: 104 MMOL/L — SIGNIFICANT CHANGE UP (ref 96–108)
CHLORIDE SERPL-SCNC: 107 MMOL/L — SIGNIFICANT CHANGE UP (ref 96–108)
CHLORIDE SERPL-SCNC: 110 MMOL/L — HIGH (ref 96–108)
CHLORIDE SERPL-SCNC: 110 MMOL/L — HIGH (ref 96–108)
CO2 SERPL-SCNC: 17 MMOL/L — LOW (ref 22–31)
CO2 SERPL-SCNC: 17 MMOL/L — LOW (ref 22–31)
CO2 SERPL-SCNC: 19 MMOL/L — LOW (ref 22–31)
CO2 SERPL-SCNC: 20 MMOL/L — LOW (ref 22–31)
COLOR SPEC: YELLOW — SIGNIFICANT CHANGE UP
CREAT SERPL-MCNC: 1.5 MG/DL — HIGH (ref 0.5–1.3)
CREAT SERPL-MCNC: 1.76 MG/DL — HIGH (ref 0.5–1.3)
CREAT SERPL-MCNC: 1.78 MG/DL — HIGH (ref 0.5–1.3)
CREAT SERPL-MCNC: 1.95 MG/DL — HIGH (ref 0.5–1.3)
DIFF PNL FLD: NEGATIVE — SIGNIFICANT CHANGE UP
EGFR: 26 ML/MIN/1.73M2 — LOW
EGFR: 29 ML/MIN/1.73M2 — LOW
EGFR: 29 ML/MIN/1.73M2 — LOW
EGFR: 35 ML/MIN/1.73M2 — LOW
EOSINOPHIL # BLD AUTO: 0.15 K/UL — SIGNIFICANT CHANGE UP (ref 0–0.5)
EOSINOPHIL NFR BLD AUTO: 2.6 % — SIGNIFICANT CHANGE UP (ref 0–6)
GLUCOSE BLDC GLUCOMTR-MCNC: 189 MG/DL — HIGH (ref 70–99)
GLUCOSE SERPL-MCNC: 128 MG/DL — HIGH (ref 70–99)
GLUCOSE SERPL-MCNC: 139 MG/DL — HIGH (ref 70–99)
GLUCOSE SERPL-MCNC: 139 MG/DL — HIGH (ref 70–99)
GLUCOSE SERPL-MCNC: 186 MG/DL — HIGH (ref 70–99)
GLUCOSE UR QL: NEGATIVE MG/DL — SIGNIFICANT CHANGE UP
HCT VFR BLD CALC: 31.7 % — LOW (ref 34.5–45)
HGB BLD-MCNC: 10.5 G/DL — LOW (ref 11.5–15.5)
HYALINE CASTS # UR AUTO: PRESENT
IMM GRANULOCYTES NFR BLD AUTO: 0.3 % — SIGNIFICANT CHANGE UP (ref 0–0.9)
KETONES UR-MCNC: ABNORMAL MG/DL
LACTATE SERPL-SCNC: 2.3 MMOL/L — HIGH (ref 0.5–2)
LACTATE SERPL-SCNC: 2.9 MMOL/L — HIGH (ref 0.5–2)
LACTATE SERPL-SCNC: 3.2 MMOL/L — HIGH (ref 0.5–2)
LEUKOCYTE ESTERASE UR-ACNC: ABNORMAL
LYMPHOCYTES # BLD AUTO: 1.69 K/UL — SIGNIFICANT CHANGE UP (ref 1–3.3)
LYMPHOCYTES # BLD AUTO: 29.5 % — SIGNIFICANT CHANGE UP (ref 13–44)
MAGNESIUM SERPL-MCNC: 1.5 MG/DL — LOW (ref 1.6–2.6)
MCHC RBC-ENTMCNC: 30.4 PG — SIGNIFICANT CHANGE UP (ref 27–34)
MCHC RBC-ENTMCNC: 33.1 GM/DL — SIGNIFICANT CHANGE UP (ref 32–36)
MCV RBC AUTO: 91.9 FL — SIGNIFICANT CHANGE UP (ref 80–100)
MONOCYTES # BLD AUTO: 0.33 K/UL — SIGNIFICANT CHANGE UP (ref 0–0.9)
MONOCYTES NFR BLD AUTO: 5.8 % — SIGNIFICANT CHANGE UP (ref 2–14)
NEUTROPHILS # BLD AUTO: 3.49 K/UL — SIGNIFICANT CHANGE UP (ref 1.8–7.4)
NEUTROPHILS NFR BLD AUTO: 60.9 % — SIGNIFICANT CHANGE UP (ref 43–77)
NITRITE UR-MCNC: NEGATIVE — SIGNIFICANT CHANGE UP
NRBC # BLD: 0 /100 WBCS — SIGNIFICANT CHANGE UP (ref 0–0)
NT-PROBNP SERPL-SCNC: 1089 PG/ML — HIGH (ref 0–300)
PH UR: 5.5 — SIGNIFICANT CHANGE UP (ref 5–8)
PLATELET # BLD AUTO: 220 K/UL — SIGNIFICANT CHANGE UP (ref 150–400)
POTASSIUM SERPL-MCNC: 5.1 MMOL/L — SIGNIFICANT CHANGE UP (ref 3.5–5.3)
POTASSIUM SERPL-MCNC: 5.6 MMOL/L — HIGH (ref 3.5–5.3)
POTASSIUM SERPL-MCNC: SIGNIFICANT CHANGE UP (ref 3.5–5.3)
POTASSIUM SERPL-MCNC: SIGNIFICANT CHANGE UP MMOL/L (ref 3.5–5.3)
POTASSIUM SERPL-SCNC: 5.1 MMOL/L — SIGNIFICANT CHANGE UP (ref 3.5–5.3)
POTASSIUM SERPL-SCNC: 5.6 MMOL/L — HIGH (ref 3.5–5.3)
POTASSIUM SERPL-SCNC: SIGNIFICANT CHANGE UP (ref 3.5–5.3)
POTASSIUM SERPL-SCNC: SIGNIFICANT CHANGE UP MMOL/L (ref 3.5–5.3)
PROT SERPL-MCNC: 6.6 G/DL — SIGNIFICANT CHANGE UP (ref 6–8.3)
PROT SERPL-MCNC: 7.4 G/DL — SIGNIFICANT CHANGE UP (ref 6–8.3)
PROT SERPL-MCNC: 8 G/DL — SIGNIFICANT CHANGE UP (ref 6–8.3)
PROT UR-MCNC: NEGATIVE MG/DL — SIGNIFICANT CHANGE UP
RBC # BLD: 3.45 M/UL — LOW (ref 3.8–5.2)
RBC # FLD: 15.5 % — HIGH (ref 10.3–14.5)
RBC CASTS # UR COMP ASSIST: 1 /HPF — SIGNIFICANT CHANGE UP (ref 0–4)
SODIUM SERPL-SCNC: 133 MMOL/L — LOW (ref 135–145)
SODIUM SERPL-SCNC: 134 MMOL/L — LOW (ref 135–145)
SODIUM SERPL-SCNC: 140 MMOL/L — SIGNIFICANT CHANGE UP (ref 135–145)
SODIUM SERPL-SCNC: 142 MMOL/L — SIGNIFICANT CHANGE UP (ref 135–145)
SP GR SPEC: 1.02 — SIGNIFICANT CHANGE UP (ref 1–1.03)
SQUAMOUS # UR AUTO: 0 /HPF — SIGNIFICANT CHANGE UP (ref 0–5)
TROPONIN T, HIGH SENSITIVITY RESULT: 17 NG/L — SIGNIFICANT CHANGE UP (ref 0–51)
UROBILINOGEN FLD QL: 0.2 MG/DL — SIGNIFICANT CHANGE UP (ref 0.2–1)
WBC # BLD: 5.73 K/UL — SIGNIFICANT CHANGE UP (ref 3.8–10.5)
WBC # FLD AUTO: 5.73 K/UL — SIGNIFICANT CHANGE UP (ref 3.8–10.5)
WBC UR QL: 4 /HPF — SIGNIFICANT CHANGE UP (ref 0–5)

## 2024-04-09 PROCEDURE — 93298 REM INTERROG DEV EVAL SCRMS: CPT

## 2024-04-09 PROCEDURE — 99285 EMERGENCY DEPT VISIT HI MDM: CPT

## 2024-04-09 PROCEDURE — 70450 CT HEAD/BRAIN W/O DYE: CPT | Mod: 26,MC

## 2024-04-09 PROCEDURE — L9997: CPT

## 2024-04-09 PROCEDURE — 93010 ELECTROCARDIOGRAM REPORT: CPT

## 2024-04-09 PROCEDURE — 99223 1ST HOSP IP/OBS HIGH 75: CPT | Mod: GC

## 2024-04-09 PROCEDURE — 93285 PRGRMG DEV EVAL SCRMS IP: CPT | Mod: 26

## 2024-04-09 PROCEDURE — 71045 X-RAY EXAM CHEST 1 VIEW: CPT | Mod: 26

## 2024-04-09 RX ORDER — SODIUM CHLORIDE 9 MG/ML
1000 INJECTION INTRAMUSCULAR; INTRAVENOUS; SUBCUTANEOUS ONCE
Refills: 0 | Status: COMPLETED | OUTPATIENT
Start: 2024-04-09 | End: 2024-04-09

## 2024-04-09 RX ORDER — IPRATROPIUM/ALBUTEROL SULFATE 18-103MCG
3 AEROSOL WITH ADAPTER (GRAM) INHALATION EVERY 6 HOURS
Refills: 0 | Status: DISCONTINUED | OUTPATIENT
Start: 2024-04-09 | End: 2024-04-15

## 2024-04-09 RX ORDER — SODIUM CHLORIDE 9 MG/ML
1000 INJECTION, SOLUTION INTRAVENOUS
Refills: 0 | Status: DISCONTINUED | OUTPATIENT
Start: 2024-04-09 | End: 2024-04-15

## 2024-04-09 RX ORDER — ACETAMINOPHEN 500 MG
650 TABLET ORAL EVERY 6 HOURS
Refills: 0 | Status: DISCONTINUED | OUTPATIENT
Start: 2024-04-09 | End: 2024-04-15

## 2024-04-09 RX ORDER — INSULIN GLARGINE 100 [IU]/ML
17 INJECTION, SOLUTION SUBCUTANEOUS AT BEDTIME
Refills: 0 | Status: DISCONTINUED | OUTPATIENT
Start: 2024-04-09 | End: 2024-04-15

## 2024-04-09 RX ORDER — ATORVASTATIN CALCIUM 80 MG/1
20 TABLET, FILM COATED ORAL AT BEDTIME
Refills: 0 | Status: DISCONTINUED | OUTPATIENT
Start: 2024-04-09 | End: 2024-04-09

## 2024-04-09 RX ORDER — SODIUM CHLORIDE 9 MG/ML
1000 INJECTION INTRAMUSCULAR; INTRAVENOUS; SUBCUTANEOUS
Refills: 0 | Status: DISCONTINUED | OUTPATIENT
Start: 2024-04-09 | End: 2024-04-15

## 2024-04-09 RX ORDER — DEXTROSE 10 % IN WATER 10 %
125 INTRAVENOUS SOLUTION INTRAVENOUS ONCE
Refills: 0 | Status: DISCONTINUED | OUTPATIENT
Start: 2024-04-09 | End: 2024-04-15

## 2024-04-09 RX ORDER — GLUCAGON INJECTION, SOLUTION 0.5 MG/.1ML
1 INJECTION, SOLUTION SUBCUTANEOUS ONCE
Refills: 0 | Status: DISCONTINUED | OUTPATIENT
Start: 2024-04-09 | End: 2024-04-15

## 2024-04-09 RX ORDER — INSULIN LISPRO 100/ML
VIAL (ML) SUBCUTANEOUS
Refills: 0 | Status: DISCONTINUED | OUTPATIENT
Start: 2024-04-09 | End: 2024-04-15

## 2024-04-09 RX ORDER — MIRTAZAPINE 45 MG/1
45 TABLET, ORALLY DISINTEGRATING ORAL AT BEDTIME
Refills: 0 | Status: DISCONTINUED | OUTPATIENT
Start: 2024-04-09 | End: 2024-04-15

## 2024-04-09 RX ORDER — APIXABAN 2.5 MG/1
5 TABLET, FILM COATED ORAL EVERY 12 HOURS
Refills: 0 | Status: DISCONTINUED | OUTPATIENT
Start: 2024-04-10 | End: 2024-04-15

## 2024-04-09 RX ORDER — MAGNESIUM SULFATE 500 MG/ML
1 VIAL (ML) INJECTION ONCE
Refills: 0 | Status: COMPLETED | OUTPATIENT
Start: 2024-04-09 | End: 2024-04-09

## 2024-04-09 RX ORDER — LANOLIN ALCOHOL/MO/W.PET/CERES
3 CREAM (GRAM) TOPICAL AT BEDTIME
Refills: 0 | Status: DISCONTINUED | OUTPATIENT
Start: 2024-04-09 | End: 2024-04-15

## 2024-04-09 RX ORDER — DEXTROSE 50 % IN WATER 50 %
25 SYRINGE (ML) INTRAVENOUS ONCE
Refills: 0 | Status: DISCONTINUED | OUTPATIENT
Start: 2024-04-09 | End: 2024-04-15

## 2024-04-09 RX ORDER — BUDESONIDE AND FORMOTEROL FUMARATE DIHYDRATE 160; 4.5 UG/1; UG/1
2 AEROSOL RESPIRATORY (INHALATION)
Refills: 0 | Status: DISCONTINUED | OUTPATIENT
Start: 2024-04-09 | End: 2024-04-15

## 2024-04-09 RX ORDER — DEXTROSE 50 % IN WATER 50 %
15 SYRINGE (ML) INTRAVENOUS ONCE
Refills: 0 | Status: DISCONTINUED | OUTPATIENT
Start: 2024-04-09 | End: 2024-04-15

## 2024-04-09 RX ORDER — ONDANSETRON 8 MG/1
4 TABLET, FILM COATED ORAL EVERY 8 HOURS
Refills: 0 | Status: DISCONTINUED | OUTPATIENT
Start: 2024-04-09 | End: 2024-04-15

## 2024-04-09 RX ORDER — SODIUM ZIRCONIUM CYCLOSILICATE 10 G/10G
5 POWDER, FOR SUSPENSION ORAL ONCE
Refills: 0 | Status: COMPLETED | OUTPATIENT
Start: 2024-04-09 | End: 2024-04-09

## 2024-04-09 RX ADMIN — SODIUM CHLORIDE 1000 MILLILITER(S): 9 INJECTION INTRAMUSCULAR; INTRAVENOUS; SUBCUTANEOUS at 11:33

## 2024-04-09 RX ADMIN — Medication 100 GRAM(S): at 11:38

## 2024-04-09 RX ADMIN — INSULIN GLARGINE 17 UNIT(S): 100 INJECTION, SOLUTION SUBCUTANEOUS at 22:36

## 2024-04-09 RX ADMIN — SODIUM CHLORIDE 1000 MILLILITER(S): 9 INJECTION INTRAMUSCULAR; INTRAVENOUS; SUBCUTANEOUS at 13:02

## 2024-04-09 RX ADMIN — SODIUM CHLORIDE 1000 MILLILITER(S): 9 INJECTION INTRAMUSCULAR; INTRAVENOUS; SUBCUTANEOUS at 09:56

## 2024-04-09 RX ADMIN — SODIUM ZIRCONIUM CYCLOSILICATE 5 GRAM(S): 10 POWDER, FOR SUSPENSION ORAL at 14:37

## 2024-04-09 RX ADMIN — SODIUM CHLORIDE 80 MILLILITER(S): 9 INJECTION INTRAMUSCULAR; INTRAVENOUS; SUBCUTANEOUS at 19:47

## 2024-04-09 RX ADMIN — SODIUM CHLORIDE 1000 MILLILITER(S): 9 INJECTION INTRAMUSCULAR; INTRAVENOUS; SUBCUTANEOUS at 10:00

## 2024-04-09 RX ADMIN — Medication 1 GRAM(S): at 12:45

## 2024-04-09 NOTE — H&P ADULT - PROBLEM SELECTOR PLAN 3
The pt presents with normocytic anemia (RBC: 3.45, Hgb 10.5, Hct: 31.7, MCV: 91.9), likely i/s/o chronic disease, but also consider blood loss given weakness and unknown h/o screening colonoscopy (less likely given no report of melena)  Plan:  - Trend CBC  - Maintain active T&S  - F/u iron studies  - Transfuse for Hgb < 7.0 Pt with no known h/o kidney disease p/w BEBE (BUN 39; Crt 1.78, K 5.6). Likely i/s/o pre-renal d/t dehydration from poor PO intake.  - F/u urine studies  -Trend BMP (next at 10pm 4/9)  - Gentle hydration w/ NS @ 80cc/hr

## 2024-04-09 NOTE — H&P ADULT - PROBLEM SELECTOR PLAN 5
The patient has a h/o Afib on Eliquis, and presents with EKG showing sinus bradycardia.  Plan:  - c/w Eliquis 5mg PO BID  - Holding home Toprol ER 50mg PO q24 The patient has a h/o Afib on Eliquis, and presents with EKG showing sinus bradycardia.  Plan:  - c/w Eliquis 5mg PO BID  - Holding home Toprol ER 50mg PO q24 i/s/o bradycardia The patient has a h/o Afib on Eliquis, and presents with EKG showing sinus bradycardia.  Plan:  - c/w Eliquis 5mg PO BID  - c/w cristopher toprol w/ hold parameters

## 2024-04-09 NOTE — ED ADULT NURSE NOTE - OBJECTIVE STATEMENT
Patient aaox4 c/o generalized weakness x 2 day, also with chronic left knee pain. Baseline walks with walker but has been unable to do so today. Accompanied by daughter, states has HHA. Hx cardiac stents, htn, asthma, Labs collected and sent. On cm. Patient aaox4 c/o generalized weakness x 2 day, also with chronic left knee pain. Baseline walks with walker but has been unable to do so today. Accompanied by daughter, states has HHA. Hx cardiac stents, htn, asthma. Wound to RLE with dressing. Labs collected and sent. On cm and primafit. Safety measures initiated, comfort measures rendered. Awaiting XR.

## 2024-04-09 NOTE — H&P ADULT - NSHPSOCIALHISTORY_GEN_ALL_CORE
Patient lives at home alone with A support 6 hours a day M-F and 3 hours on weekend days. She denies tobacco use, alcohol use, and recreational drug use. Patient lives at home alone with A support 6 hours a day M-F and 3 hours on weekend days. She denies current tobacco use, alcohol use, and recreational drug use. Patient admits to smoking cigarrettes for 1 year (1 pack per week) in the remote past.

## 2024-04-09 NOTE — H&P ADULT - PROBLEM SELECTOR PLAN 4
Pt with no known h/o kidney disease p/w BEBE (BUN 39; Crt 1.78). Likely i/s/o pre-renal d/t dehydration from poor PO intake.  - F/u urine studies  -Trend BMP  - Pt with no known h/o kidney disease p/w BEBE (BUN 39; Crt 1.78). Likely i/s/o pre-renal d/t dehydration from poor PO intake.  - F/u urine studies  -Trend BMP  - Gentle hydration w/ NS @ 80cc/hr Pt with no known h/o kidney disease p/w BEBE (BUN 39; Crt 1.78, K 5.6). Likely i/s/o pre-renal d/t dehydration from poor PO intake.  - F/u urine studies  -Trend BMP  - Gentle hydration w/ NS @ 80cc/hr Pt with no known h/o kidney disease p/w BEBE (BUN 39; Crt 1.78, K 5.6). Likely i/s/o pre-renal d/t dehydration from poor PO intake.  - F/u urine studies  -Trend BMP (next at 10pm 4/9)  - Gentle hydration w/ NS @ 80cc/hr The pt presents with normocytic anemia (RBC: 3.45, Hgb 10.5, Hct: 31.7, MCV: 91.9), likely i/s/o chronic disease, but also consider blood loss given weakness and unknown h/o screening colonoscopy (less likely given no report of melena)  Plan:  - Trend CBC  - Maintain active T&S  - F/u iron studies  - Transfuse for Hgb < 7.0

## 2024-04-09 NOTE — H&P ADULT - ASSESSMENT
Ms. Ambrosio is a 79y/o F with a PMHx of HTN, HLD, DM, Afib on Eliquis, knee arthritis, and depression who p/w generalized weakness and difficulty walking since yesterday, and was admitted for weakness. Ms. Ambrosio is a 79y/o F with a PMHx of HTN, HLD, DM, Afib on Eliquis, knee arthritis, and depression who p/w generalized weakness and difficulty walking since yesterday, and was admitted for management of weakness, BEBE and lactic acidosis.. Ms. Ambrosio is a 79y/o F with a PMHx of HTN, HLD, DM, Afib on Eliquis, knee arthritis, asthma, and depression who p/w generalized weakness and difficulty walking since yesterday, and was admitted for management of weakness, BEBE and lactic acidosis..

## 2024-04-09 NOTE — H&P ADULT - PROBLEM SELECTOR PLAN 8
The pt has a h/o major depression and takes mirtazapine 45mg PO q24 at bedtime.  Plan:  - c/w home med The pt has a h/o HLD and takes Atorvastatin 20mg PO q24 at home.  Plan:  - c/w home med The pt has a h/o HLD and takes Atorvastatin 20mg PO q24 at home.  Plan:  - Holding statin i/s/o BEBE The patient has a h/o of type 2 diabetes, on insulin. Home meds include: lantus 22U at bedtime, humalog 6 units q24, and metformin 1000mg PO BID.  - mISS  - A1c in AM labs  - Consistent carb diet  - Monitor FSG The patient has a h/o of type 2 diabetes, on insulin. Home meds include: lantus 22U at bedtime, humalog 6 units q24, and metformin 1000mg PO BID.  - Start Lantus 17U (80% home dose) at bedtime 4/9  - mISS  - A1c in AM labs  - Consistent carb diet  - Monitor FSG

## 2024-04-09 NOTE — H&P ADULT - PROBLEM SELECTOR PLAN 1
The pt  p/w generalized weakness and difficulty walking for one day. She normally ambulates well with a walker, but has been too weak to get out of bed since yesterday (4/8) AM. She states that the weakness is in her whole body; not just her legs. Neuro exam shows decreased strength in the RUE and RLE; sensation intact. Pt denies trauma/fall, and she denies lightheadedness, dizziness, and palpitations. Pt is dry on exam, and chemistry shows BEBE (BUN 39; Crt 1.78) as well as mild normocytic anemia (RBC 3.45, Hgb 10.5, Hct: 31.7). Consider dehydration vs. poor PO intake vs. anemia.  Plan:  - Fall precautions  - PT/OT consult  - Iron studies  - Urine studies  - B12 and folate   - TSH The pt  p/w generalized weakness and difficulty walking for one day. She normally ambulates well with a walker, but has been too weak to get out of bed since yesterday (4/8) AM. She states that the weakness is in her whole body; not just her legs. Neuro exam shows decreased strength in the RUE and RLE; sensation intact. no incontinence, no sciatica/back pain.  Pt denies trauma/fall, and she denies lightheadedness, dizziness, and palpitations. Pt is dry on exam, and chemistry shows BEBE (BUN 39; Crt 1.78) as well as mild normocytic anemia (RBC 3.45, Hgb 10.5, Hct: 31.7). Consider dehydration vs. poor PO intake vs. anemia.  Plan:  - Fall precautions  - PT/OT consult  - Iron studies  - Urine studies  - B12 and folate   - TSH  - L knee xray

## 2024-04-09 NOTE — H&P ADULT - PROBLEM SELECTOR PLAN 9
Fluids: NI  Electrolytes: Replete PRN  K<4.0, Mg<2.0, Phos< 2.5  N: Consistent carb  GI ppx: None  DVT ppx: Eliquis  Code: Full    Dispo: DILMA The pt has a h/o major depression and takes mirtazapine 45mg PO q24 at bedtime.  Plan:  - c/w home med The pt has a h/o HLD and takes Atorvastatin 20mg PO q24 at home.  Plan:  - Holding statin i/s/o BEBE The pt has a h/o HLD and takes Atorvastatin 20mg PO q24 at home.  Plan:  - c/w statin

## 2024-04-09 NOTE — ED PROVIDER NOTE - CARE PLAN
Principal Discharge DX:	Weakness   1 Principal Discharge DX:	Weakness  Secondary Diagnosis:	Dehydration  Secondary Diagnosis:	BEBE (acute kidney injury)  Secondary Diagnosis:	Hyperkalemia

## 2024-04-09 NOTE — ED ADULT TRIAGE NOTE - CHIEF COMPLAINT QUOTE
generalized weakness since last night; last known well at 5 pm yesterday when her daughter last saw her; today reporting generalized weakness and unable to sit up in bed; no focal neuro deficits noted; A&Ox4; denies CP/SOB/dizzness/vision changes; reports chronic left knee pain for which she receives periodic injections;  with EMS

## 2024-04-09 NOTE — PATIENT PROFILE ADULT - FALL HARM RISK - HARM RISK INTERVENTIONS

## 2024-04-09 NOTE — ED PROVIDER NOTE - OBJECTIVE STATEMENT
78F with a h/o HTN, HLD, DM, afrib on eliquis, knee arthritis, depression who p/w generalized weakness and difficulty walking since yesterday. Per daughter at bedside she is normally able to ambulate but since yesterday has been unable due to weakness. pt has been compliant with her meds but daughter did not give them this morning d/t weakness. The patient denies trauma or fall, no cp/sob, no abdominal pain, no f/c, no urinary sx. Request water.

## 2024-04-09 NOTE — ED PROVIDER NOTE - PHYSICAL EXAMINATION
GEN: Elderly, well developed, awake, alert, oriented to person, place, and in no apparent distress. NTAF  ENT: Airway patent, Nasal mucosa clear. Mouth with dry mucosa.  EYES: Clear bilaterally. PERRL, EOMI  RESPIRATORY: Breathing comfortably with normal RR. No W/C/R, no hypoxia or resp distress.  CARDIAC: Regular rhythm, bradycardia, no M/R/G  ABDOMEN: Soft, nontender, +bowel sounds, no rebound, rigidity, or guarding.  MSK: Range of motion is not limited, no deformities noted. Some pain with ROM of knees L>R.  NEURO: Generalized weakness Alert and oriented, no focal deficits.  SKIN: Skin normal color for race, warm, dry and intact. Excoriation to right shin, slight serous drainage, no cellulitis, purulence, crepitus, bleeding, or gangrene.   PSYCH: Alert and oriented to person, place, flat mood and affect. no apparent risk to self or others.

## 2024-04-09 NOTE — H&P ADULT - NSHPLABSRESULTS_GEN_ALL_CORE
.  LABS:                         10.5   5.73  )-----------( 220      ( 09 Apr 2024 09:28 )             31.7     04-09    142  |  110<H>  |  39<H>  ----------------------------<  139<H>  5.6<H>   |  20<L>  |  1.78<H>    Ca    9.7      09 Apr 2024 13:15  Mg     1.5     04-09    TPro  6.6  /  Alb  4.1  /  TBili  0.4  /  DBili  x   /  AST  19  /  ALT  13  /  AlkPhos  70  04-09      Urinalysis Basic - ( 09 Apr 2024 13:15 )    Color: x / Appearance: x / SG: x / pH: x  Gluc: 139 mg/dL / Ketone: x  / Bili: x / Urobili: x   Blood: x / Protein: x / Nitrite: x   Leuk Esterase: x / RBC: x / WBC x   Sq Epi: x / Non Sq Epi: x / Bacteria: x            Lactate, Blood: 2.3 mmol/L (04-09 @ 13:15)  Lactate, Blood: 2.9 mmol/L (04-09 @ 10:58)  Lactate, Blood: 3.2 mmol/L (04-09 @ 09:28)      RADIOLOGY, EKG & ADDITIONAL TESTS: Reviewed.

## 2024-04-09 NOTE — H&P ADULT - PROBLEM SELECTOR PLAN 10
Fluids: NS @ 80cc/hr  Electrolytes: Replete PRN  K<4.0, Mg<2.0, Phos< 2.5  N: Consistent carb  GI ppx: None  DVT ppx: Eliquis  Code: Full    Dispo: [facility] The pt has a h/o major depression and takes mirtazapine 45mg PO q24 at bedtime.  Plan:  - c/w home med

## 2024-04-09 NOTE — H&P ADULT - HISTORY OF PRESENT ILLNESS
Ms. Ambrosio is a 79y/o F with a PMHx of HTN, HLD, DM, Afib on Eliquis, knee arthritis, and depression who p/w generalized weakness and difficulty walking since yesterday. The patient states that she normally ambulates well with a walker, but has been too weak to get out of bed since yesterday (4/8) AM. She states that the weakness is in her whole body; not just her legs. The patient reports eating and drinking as usual without skipping meals, and she denies changes in bowel or bladder habits (last BM yesterday morning). According to the daughter, the pt has been compliant with all of her medications, but the daughter did not give them this morning (4/9) d/t weakness. The patient denies trauma/fall, chest pain, SOB, palpitations, dizziness, lightheadedness, and headache. She also denies chills, sweats, blurry vision, n/v/d. The patient endorses a rash on her RLE for the past two weeks. The LE was wrapped in gauze, but upon inspection, it appears to be dry skin, possibly with venous stasis, with excoriations and scabbing from scratching. The patient has no known sick contacts and no recent travel history.     ED Course:  V/S:  Temp: 98.7, HR: 74, BP: 102/63, RR: 18, SpO2: 98% RA  Pertinent labs:  RBC: 3.45, Hgb: 10.5, Hct: 31.7, K: 5.6, Cl: 110, CO2: 20, BUN: 39, Crt: 1.78, Glu: 139, Lactate: 3.2->2.9->2.3  Urine: Ketone: trace, Leuk est: trace, Hyaline casts: present  EKG:  Sinus bradycardia  Imaging:  CT Head n/c: No significant interval change since CT brain 10/5/2017. No acute intracranial hemorrhage or brain edema. 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 Chronic left lentiform nucleus infarct versus for enlarged perivascular space.  CXR: No acute pathology  Interventions:  - Magnesium 1g IVP x 1  - Lokelma 5g PO x 1  - NS 1L IV Bolus x 2 Ms. Ambrosio is a 77y/o F with a PMHx of HTN, HLD, DM, Afib on Eliquis, knee arthritis, and depression who p/w generalized weakness and difficulty walking since yesterday. The patient states that she normally ambulates well with a walker, but has been too weak to get out of bed since yesterday (4/8) AM. She states that the weakness is in her whole body; not just her legs. The patient reports eating and drinking as usual without skipping meals, and she denies changes in bowel or bladder habits (last BM yesterday morning). According to the daughter, the pt has been compliant with all of her medications, but the daughter did not give them this morning (4/9) d/t weakness. The patient denies trauma/fall, chest pain, SOB, palpitations, dizziness, lightheadedness, and headache. She also denies chills, sweats, blurry vision, n/v/d. The patient endorses a rash on her RLE for the past two weeks. The LE was wrapped in gauze, but upon inspection, it appears to be dry skin, possibly with venous stasis, with excoriations and scabbing from scratching. The patient has no known sick contacts and no recent travel history.     ED Course:  V/S:  Temp: 98.7, HR: 74, BP: 102/63, RR: 18, SpO2: 98% RA  Pertinent labs:  RBC: 3.45, Hgb: 10.5, Hct: 31.7, K: 5.6, Cl: 110, CO2: 20, BUN: 39, Crt: 1.78, Glu: 139, pro-BNP: 1098, Lactate: 3.2->2.9->2.3  Urine: Ketone: trace, Leuk est: trace, Hyaline casts: present  EKG:  Sinus bradycardia  Imaging:  CT Head n/c: No significant interval change since CT brain 10/5/2017. No acute intracranial hemorrhage or brain edema. 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 Chronic left lentiform nucleus infarct versus for enlarged perivascular space.  CXR: No acute pathology  Interventions:  - Magnesium 1g IVP x 1  - Lokelma 5g PO x 1  - NS 1L IV Bolus x 2 Ms. Ambrosio is a 79y/o F with a PMHx of HTN, HLD, DM, Afib on Eliquis, knee arthritis, asthma, and depression who p/w generalized weakness and difficulty walking since yesterday. The patient states that she normally ambulates well with a walker, but has been too weak to get out of bed since yesterday (4/8) AM. She states that the weakness is in her whole body; not just her legs. The patient reports eating and drinking as usual without skipping meals, and she denies changes in bowel or bladder habits (last BM yesterday morning). According to the daughter, the pt has been compliant with all of her medications, but the daughter did not give them this morning (4/9) d/t weakness. The patient denies trauma/fall, chest pain, SOB, palpitations, dizziness, lightheadedness, and headache. She also denies chills, sweats, blurry vision, n/v/d. The patient endorses a rash on her RLE for the past two weeks. The LE was wrapped in gauze, but upon inspection, it appears to be dry skin, possibly with venous stasis, with excoriations and scabbing from scratching. The patient has no known sick contacts and no recent travel history.     ED Course:  V/S:  Temp: 98.7, HR: 74, BP: 102/63, RR: 18, SpO2: 98% RA  Pertinent labs:  RBC: 3.45, Hgb: 10.5, Hct: 31.7, K: 5.6, Cl: 110, CO2: 20, BUN: 39, Crt: 1.78, Glu: 139, pro-BNP: 1098, Lactate: 3.2->2.9->2.3  Urine: Ketone: trace, Leuk est: trace, Hyaline casts: present  EKG:  Sinus bradycardia  Imaging:  CT Head n/c: No significant interval change since CT brain 10/5/2017. No acute intracranial hemorrhage or brain edema. 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 Chronic left lentiform nucleus infarct versus for enlarged perivascular space.  CXR: No acute pathology  Interventions:  - Magnesium 1g IVP x 1  - Lokelma 5g PO x 1  - NS 1L IV Bolus x 2

## 2024-04-09 NOTE — ED ADULT NURSE NOTE - NSFALLHARMRISKINTERV_ED_ALL_ED

## 2024-04-09 NOTE — H&P ADULT - ATTENDING COMMENTS
#weakness: p/w generalized weakness, worse in b/l LE. On exam. 4/5 MS b/l LE, sensation intact. +chronic left knee pain. Denies sciatica, back pain, no numbness, no hx of incontinence. No FND. CTH w/ chronic hygromas, chronic inracts, unchanged. No s/s of infection. +hypovolemic on exam i/s/o decreased po intake. c/w maintenance fluids. Fup tsh, b12/folate. PT/SW. Fall precautions.      #BEBE on CKD: likely prerenal. c/w maintenance fluids. F/up bladder scan, urine lytes, trend cr. Monitor urine output.

## 2024-04-09 NOTE — ED PROVIDER NOTE - CLINICAL SUMMARY MEDICAL DECISION MAKING FREE TEXT BOX
78F with a h/o HTN, HLD, DM, afrib on eliquis, knee arthritis, depression who p/w generalized weakness and difficulty walking since yesterday. Per daughter at bedside she is normally able to ambulate but since yesterday has been unable due to weakness. pt has been compliant with her meds but daughter did not give them this morning d/t weakness. The patient denies trauma or fall, no cp/sob, no abdominal pain, no f/c, no urinary sx. Request water. 78F with a h/o HTN, HLD, DM, afrib on eliquis, knee arthritis, depression who p/w generalized weakness and difficulty walking since yesterday. Per daughter at bedside she is normally able to ambulate but since yesterday has been unable due to weakness. pt has been compliant with her meds but daughter did not give them this morning d/t weakness. The patient denies trauma or fall, no cp/sob, no abdominal pain, no f/c, no urinary sx.    On arrival pt borderline hypotensive, bradycardic, sinus katie to 50 on arrival, no fevers, no resp distress, A&O x 2, generally weak throughout on exam, pt appears dehydrated. Plan for labs, CXR, CT head (pt with hx of SDH in the past), IVFs.   Labs remarkable for Lactate 3.2 (improved with IVFs), Cr. 1.95 (was 1.1 in jan 2024), Mag 1.5 (1g Mag repletion ordered), K 5.6 (no ekg changes, IVFs and 5mg Lokelma ordered), CXR and UA neg for infection, CT head with no acute findings. Will admit for dehydration, BEBE, electrolyte derangement. Pt stable for RMF at this time.

## 2024-04-09 NOTE — H&P ADULT - PROBLEM SELECTOR PLAN 7
The pt has a h/o HLD and takes Atorvastatin 20mg PO q24 at home.  Plan:  - c/w home med The patient has a h/o asthma, well-controlled on home medications and without current exacerbation.  - c/w Home med Symbicort 160-4.5, 2 puffs BID  - Duonebs q6 standing The patient has a h/o asthma, well-controlled on home medications and without current exacerbation.  - c/w Home med Symbicort 160-4.5, 2 puffs BID  - Duonebs q6 prn

## 2024-04-09 NOTE — PROGRESS NOTE ADULT - SUBJECTIVE AND OBJECTIVE BOX
EPS Device interrogation    Indication: weak/dizzy     Device model: MDT Linq 		Implanting Physician: Dr. Salas     Functioning Mode: 			    Underlying Rhythm: VS    Pacemaker dependency:  No    Battery status: Good   Interrogating parameters:   				RA			RV			LV    Sense:                                                                           0.26  mV    Threshold:                                                                   n/a     Pacing Impedance:                                                      n/a     Shock Impedance:                                                         n/a     Events/Alert:  none    Parameter change: 	none    Normal function ILR, patient with pA.Fib last episode of atrial fibrillation 10/2023    [ ]EPS attending: Interrogation reviewed. Agree with above.

## 2024-04-09 NOTE — H&P ADULT - PROBLEM SELECTOR PLAN 2
The patient presents with NAGMA (gap: 12) with a lactate of 3.1 --> 2.9 --> 2.3. Pt is afebrile with a normal WBC count and no other infectious symptoms. Lungs CTA and CXR unremarkable. Consider Metformin use vs. venous insufficiency vs. kidney dysfunction (BEBE on presentation; no known kidney disease).  Plan:  - Continue to trend lactate  - F/u urine studies  - Retroperitoneal U/S  - Monitor for infectious symptoms The patient presents with NAGMA (gap: 12) with a lactate of 3.1 --> 2.9 --> 2.3. Pt is afebrile with a normal WBC count and no other infectious symptoms. Lungs CTA and CXR unremarkable. Consider Metformin use vs. venous insufficiency vs. kidney dysfunction (BEBE on presentation; no known kidney disease) vs. hypoxia from asthma/COPD (unlikely as pt is saturating well on room air and lungs clear).  Plan:  - Continue to trend lactate  - F/u urine studies  - Retroperitoneal U/S  - Monitor for infectious symptoms The patient presents with a lactate of 3.1 --> 2.9 --> 2.3. Pt is afebrile with a normal WBC count and no other infectious symptoms. Lungs CTA and CXR unremarkable. Consider Metformin use vs. venous insufficiency vs. kidney dysfunction (BEBE on presentation; no known kidney disease).  Plan:  - Continue to trend lactate  - F/u urine studies  - Retroperitoneal U/S  - Monitor for infectious symptoms The patient presents with a lactate of 3.1 --> 2.9 --> 2.3. Pt is afebrile with a normal WBC count and no other infectious symptoms. Lungs CTA and CXR unremarkable. Consider Metformin use vs.hypovolemia (BEBE on presentation; no known kidney disease).  Plan:  - Continue to trend lactate  - F/u urine studies  - Retroperitoneal U/S  - Monitor for infectious symptoms  - c/w maintence fluids

## 2024-04-09 NOTE — PATIENT PROFILE ADULT - HAVE YOU RECENTLY LOST WEIGHT WITHOUT TRYING?
Joseph Berrios [facility]        Unsure (2)

## 2024-04-09 NOTE — H&P ADULT - PROBLEM SELECTOR PLAN 11
Fluids: NS @ 80cc/hr  Electrolytes: Replete PRN  K<4.0, Mg<2.0, Phos< 2.5  N: Consistent carb  GI ppx: None  DVT ppx: Eliquis  Code: Full    Dispo: [facility]

## 2024-04-09 NOTE — H&P ADULT - PROBLEM SELECTOR PLAN 6
The pt has a h/o hypertension, but presents with BP of 102/63, likely i/s/o dehydration.  Plan:  - Holding home amlodipine 5mg PO q24   - Holding home Toprol ER 50mg PO q24 The pt has a h/o hypertension, but presents with BP of 102/63, likely i/s/o dehydration.  Plan:  - Holding home amlodipine 5mg PO q24  i/s/o hypotension (from baseline) and bradycardia  - Holding home Toprol ER 50mg PO q24 i/s/o hypotension (from baseline) and bradycardia The pt has a h/o hypertension, but presents with BP of 102/63, likely i/s/o dehydration.  Plan:  - Holding home amlodipine 5mg PO q24  i/s/o hypotension (from baseline) and bradycardia  - cw home Toprol ER 50mg PO q24 w/ hold parameters

## 2024-04-09 NOTE — H&P ADULT - NSHPPHYSICALEXAM_GEN_ALL_CORE
.  VITAL SIGNS:  T(C): 37.1 (04-09-24 @ 13:53), Max: 37.1 (04-09-24 @ 13:53)  T(F): 98.7 (04-09-24 @ 13:53), Max: 98.7 (04-09-24 @ 13:53)  HR: 74 (04-09-24 @ 13:53) (51 - 74)  BP: 102/63 (04-09-24 @ 13:53) (99/60 - 109/64)  BP(mean): --  RR: 18 (04-09-24 @ 13:53) (18 - 18)  SpO2: 98% (04-09-24 @ 13:53) (97% - 100%)  Wt(kg): --    PHYSICAL EXAM:    Constitutional: resting comfortably in bed; NAD  Head: NC/AT  Eyes: PERRL, EOMI, anicteric sclera  ENT: no nasal discharge; uvula midline, no oropharyngeal erythema or exudates; mucous membranes dry  Neck: supple; no JVD or thyromegaly  Respiratory: CTA B/L; no W/R/R, no retractions  Cardiac: +S1/S2; regular rhythm, but bradycardic; no M/R/G;   Gastrointestinal: abdomen soft, NT/ND; no rebound or guarding; +BSx4  Back: spine midline, no bony tenderness or step-offs; no CVAT B/L  Extremities: WWP, no clubbing or cyanosis; no peripheral edema  Musculoskeletal: Tenderness to the right knee joint; no swelling, warmth, or erythema   Vascular: 2+ radial, DP pulses B/L  Dermatologic: skin warm, dry and intact. Right LE with excoriations and scabbing from reported "rash"; ?venous stasis  Lymphatic: no submandibular or cervical LAD  Neurologic: AAOx3; CNII-XII grossly intact; no focal deficits. Strength diminished in LUE and LLE to 3-4/5; 4.5-5/5 on the RUE and RLE  Psychiatric: affect and characteristics of appearance, verbalizations, behaviors are appropriate

## 2024-04-10 LAB
A1C WITH ESTIMATED AVERAGE GLUCOSE RESULT: 8.3 % — HIGH (ref 4–5.6)
ADD ON TEST-SPECIMEN IN LAB: SIGNIFICANT CHANGE UP
ALBUMIN SERPL ELPH-MCNC: 3.6 G/DL — SIGNIFICANT CHANGE UP (ref 3.3–5)
ALP SERPL-CCNC: 77 U/L — SIGNIFICANT CHANGE UP (ref 40–120)
ALT FLD-CCNC: 12 U/L — SIGNIFICANT CHANGE UP (ref 10–45)
ANION GAP SERPL CALC-SCNC: 13 MMOL/L — SIGNIFICANT CHANGE UP (ref 5–17)
AST SERPL-CCNC: 21 U/L — SIGNIFICANT CHANGE UP (ref 10–40)
BASOPHILS # BLD AUTO: 0.06 K/UL — SIGNIFICANT CHANGE UP (ref 0–0.2)
BASOPHILS NFR BLD AUTO: 1.2 % — SIGNIFICANT CHANGE UP (ref 0–2)
BILIRUB SERPL-MCNC: 0.3 MG/DL — SIGNIFICANT CHANGE UP (ref 0.2–1.2)
BUN SERPL-MCNC: 30 MG/DL — HIGH (ref 7–23)
CALCIUM SERPL-MCNC: 9.6 MG/DL — SIGNIFICANT CHANGE UP (ref 8.4–10.5)
CHLORIDE SERPL-SCNC: 111 MMOL/L — HIGH (ref 96–108)
CO2 SERPL-SCNC: 17 MMOL/L — LOW (ref 22–31)
CREAT SERPL-MCNC: 1.32 MG/DL — HIGH (ref 0.5–1.3)
EGFR: 41 ML/MIN/1.73M2 — LOW
EOSINOPHIL # BLD AUTO: 0.22 K/UL — SIGNIFICANT CHANGE UP (ref 0–0.5)
EOSINOPHIL NFR BLD AUTO: 4.3 % — SIGNIFICANT CHANGE UP (ref 0–6)
ESTIMATED AVERAGE GLUCOSE: 192 MG/DL — HIGH (ref 68–114)
FERRITIN SERPL-MCNC: 19 NG/ML — SIGNIFICANT CHANGE UP (ref 13–330)
FOLATE SERPL-MCNC: >20 NG/ML — SIGNIFICANT CHANGE UP
GLUCOSE BLDC GLUCOMTR-MCNC: 148 MG/DL — HIGH (ref 70–99)
GLUCOSE BLDC GLUCOMTR-MCNC: 165 MG/DL — HIGH (ref 70–99)
GLUCOSE BLDC GLUCOMTR-MCNC: 167 MG/DL — HIGH (ref 70–99)
GLUCOSE BLDC GLUCOMTR-MCNC: 238 MG/DL — HIGH (ref 70–99)
GLUCOSE SERPL-MCNC: 205 MG/DL — HIGH (ref 70–99)
HCT VFR BLD CALC: 30.8 % — LOW (ref 34.5–45)
HGB BLD-MCNC: 9.9 G/DL — LOW (ref 11.5–15.5)
IMM GRANULOCYTES NFR BLD AUTO: 0.2 % — SIGNIFICANT CHANGE UP (ref 0–0.9)
IRON SATN MFR SERPL: 20 % — SIGNIFICANT CHANGE UP (ref 14–50)
IRON SATN MFR SERPL: 76 UG/DL — SIGNIFICANT CHANGE UP (ref 30–160)
LACTATE SERPL-SCNC: 2 MMOL/L — SIGNIFICANT CHANGE UP (ref 0.5–2)
LYMPHOCYTES # BLD AUTO: 1.72 K/UL — SIGNIFICANT CHANGE UP (ref 1–3.3)
LYMPHOCYTES # BLD AUTO: 33.5 % — SIGNIFICANT CHANGE UP (ref 13–44)
MAGNESIUM SERPL-MCNC: 1.6 MG/DL — SIGNIFICANT CHANGE UP (ref 1.6–2.6)
MAGNESIUM SERPL-MCNC: 1.6 MG/DL — SIGNIFICANT CHANGE UP (ref 1.6–2.6)
MCHC RBC-ENTMCNC: 29.8 PG — SIGNIFICANT CHANGE UP (ref 27–34)
MCHC RBC-ENTMCNC: 32.1 GM/DL — SIGNIFICANT CHANGE UP (ref 32–36)
MCV RBC AUTO: 92.8 FL — SIGNIFICANT CHANGE UP (ref 80–100)
MONOCYTES # BLD AUTO: 0.27 K/UL — SIGNIFICANT CHANGE UP (ref 0–0.9)
MONOCYTES NFR BLD AUTO: 5.3 % — SIGNIFICANT CHANGE UP (ref 2–14)
NEUTROPHILS # BLD AUTO: 2.85 K/UL — SIGNIFICANT CHANGE UP (ref 1.8–7.4)
NEUTROPHILS NFR BLD AUTO: 55.5 % — SIGNIFICANT CHANGE UP (ref 43–77)
NRBC # BLD: 0 /100 WBCS — SIGNIFICANT CHANGE UP (ref 0–0)
PHOSPHATE SERPL-MCNC: 4.3 MG/DL — SIGNIFICANT CHANGE UP (ref 2.5–4.5)
PLATELET # BLD AUTO: 201 K/UL — SIGNIFICANT CHANGE UP (ref 150–400)
POTASSIUM SERPL-MCNC: 5.2 MMOL/L — SIGNIFICANT CHANGE UP (ref 3.5–5.3)
POTASSIUM SERPL-SCNC: 5.2 MMOL/L — SIGNIFICANT CHANGE UP (ref 3.5–5.3)
PROT SERPL-MCNC: 6.2 G/DL — SIGNIFICANT CHANGE UP (ref 6–8.3)
RBC # BLD: 3.32 M/UL — LOW (ref 3.8–5.2)
RBC # FLD: 15 % — HIGH (ref 10.3–14.5)
SODIUM SERPL-SCNC: 141 MMOL/L — SIGNIFICANT CHANGE UP (ref 135–145)
TIBC SERPL-MCNC: 371 UG/DL — SIGNIFICANT CHANGE UP (ref 220–430)
TSH SERPL-MCNC: 1.2 UIU/ML — SIGNIFICANT CHANGE UP (ref 0.27–4.2)
UIBC SERPL-MCNC: 295 UG/DL — SIGNIFICANT CHANGE UP (ref 110–370)
VIT B12 SERPL-MCNC: 383 PG/ML — SIGNIFICANT CHANGE UP (ref 232–1245)
WBC # BLD: 5.13 K/UL — SIGNIFICANT CHANGE UP (ref 3.8–10.5)
WBC # FLD AUTO: 5.13 K/UL — SIGNIFICANT CHANGE UP (ref 3.8–10.5)

## 2024-04-10 PROCEDURE — 99233 SBSQ HOSP IP/OBS HIGH 50: CPT | Mod: GC

## 2024-04-10 PROCEDURE — 73560 X-RAY EXAM OF KNEE 1 OR 2: CPT | Mod: 26,LT

## 2024-04-10 RX ORDER — ATORVASTATIN CALCIUM 80 MG/1
20 TABLET, FILM COATED ORAL AT BEDTIME
Refills: 0 | Status: DISCONTINUED | OUTPATIENT
Start: 2024-04-10 | End: 2024-04-15

## 2024-04-10 RX ORDER — MAGNESIUM SULFATE 500 MG/ML
2 VIAL (ML) INJECTION ONCE
Refills: 0 | Status: COMPLETED | OUTPATIENT
Start: 2024-04-10 | End: 2024-04-10

## 2024-04-10 RX ADMIN — APIXABAN 5 MILLIGRAM(S): 2.5 TABLET, FILM COATED ORAL at 17:15

## 2024-04-10 RX ADMIN — Medication 2: at 13:16

## 2024-04-10 RX ADMIN — Medication 4: at 22:50

## 2024-04-10 RX ADMIN — BUDESONIDE AND FORMOTEROL FUMARATE DIHYDRATE 2 PUFF(S): 160; 4.5 AEROSOL RESPIRATORY (INHALATION) at 22:49

## 2024-04-10 RX ADMIN — Medication 3 MILLIGRAM(S): at 22:49

## 2024-04-10 RX ADMIN — Medication 3 MILLILITER(S): at 17:15

## 2024-04-10 RX ADMIN — BUDESONIDE AND FORMOTEROL FUMARATE DIHYDRATE 2 PUFF(S): 160; 4.5 AEROSOL RESPIRATORY (INHALATION) at 11:08

## 2024-04-10 RX ADMIN — ATORVASTATIN CALCIUM 20 MILLIGRAM(S): 80 TABLET, FILM COATED ORAL at 22:49

## 2024-04-10 RX ADMIN — Medication 3 MILLILITER(S): at 22:49

## 2024-04-10 RX ADMIN — Medication 25 GRAM(S): at 08:06

## 2024-04-10 RX ADMIN — Medication 3 MILLILITER(S): at 10:05

## 2024-04-10 RX ADMIN — APIXABAN 5 MILLIGRAM(S): 2.5 TABLET, FILM COATED ORAL at 06:13

## 2024-04-10 RX ADMIN — Medication 2: at 09:31

## 2024-04-10 RX ADMIN — MIRTAZAPINE 45 MILLIGRAM(S): 45 TABLET, ORALLY DISINTEGRATING ORAL at 22:49

## 2024-04-10 RX ADMIN — BUDESONIDE AND FORMOTEROL FUMARATE DIHYDRATE 2 PUFF(S): 160; 4.5 AEROSOL RESPIRATORY (INHALATION) at 00:57

## 2024-04-10 RX ADMIN — MIRTAZAPINE 45 MILLIGRAM(S): 45 TABLET, ORALLY DISINTEGRATING ORAL at 01:08

## 2024-04-10 RX ADMIN — Medication 3 MILLILITER(S): at 00:00

## 2024-04-10 RX ADMIN — INSULIN GLARGINE 17 UNIT(S): 100 INJECTION, SOLUTION SUBCUTANEOUS at 22:49

## 2024-04-10 RX ADMIN — Medication 3 MILLILITER(S): at 06:13

## 2024-04-10 NOTE — PROGRESS NOTE ADULT - PROBLEM SELECTOR PLAN 3
Pt with no known h/o kidney disease p/w BEBE (BUN 39; Crt 1.78, K 5.6). Likely i/s/o pre-renal d/t dehydration from poor PO intake.  - F/u urine studies  -Trend BMP (next at 10pm 4/9)  - Gentle hydration w/ NS @ 80cc/hr Pt with no known h/o kidney disease p/w BEBE (BUN 39; Crt 1.78, K 5.6). Likely i/s/o pre-renal d/t dehydration from poor PO intake.  - F/u urine studies  -Trend BMP (next at 10pm 4/9) - K WNL at 10pm and on 4/10 AM labs; Crt downtrending  - Gentle hydration w/ NS @ 80cc/hr

## 2024-04-10 NOTE — CONSULT NOTE ADULT - ASSESSMENT
{\rtf1\pwbsym05825\ansi\qpavdwo8087\ftnbj\uc1\deff0  {\fonttbl{\f0 \fnil Segoe UI;}{\f1 \fnil \fcharset0 Segoe UI;}{\f2 \fnil Times New Donald;}}  {\colortbl ;\qsj183\lyddr295\ojnz126 ;\red0\green0\blue0 ;\red0\green0\hcay553 ;\red0\green0\blue0 ;}  {\stylesheet{\f0\fs20 Normal;}{\cs1 Default Paragraph Font;}{\cs2\f0\fs16 Line Number;}{\cs3\f2\fs24\ul\cf3 Hyperlink;}}  {\*\revtbl{Unknown;}}  \zprmjr05097\asqbpb48579\pvldr4508\cckkk9742\wfcbd0149\ftcve1589\ixlginx639\uindfmw846\nogrowautofit\ersjwa569\formshade\nofeaturethrottle1\dntblnsbdb\fet4\aendnotes\aftnnrlc\pgbrdrhead\pgbrdrfoot  \sectd\bcgxou07282\bqmovr67568\guttersxn0\bbqeubrh1068\nzebvvfg3553\wpdycqlc6635\vqekzlrq9170\wcbwanm759\ydzvrij122\sbkpage\pgncont\pgndec  \plain\plain\f0\fs24\ql\plain\f0\fs24\plain\f0\fs20\clng7109\hich\f0\dbch\f0\loch\f0\fs20\par  I M\par  \par  78 y o F with a PMHx of HTN, HLD, DM, Afib on Eliquis, knee arthritis, asthma, and depression who p/w generalized weakness and difficulty walking since yesterday, and was admitted for management of weakness, BEBE and lactic acidosis..\par  \par  \plain\f1\fs20\jjbw5240\hich\f1\dbch\f1\loch\f1\cf2\fs20\ul{\field{\*\fldinst HYPERLINK [id number] }{\fldrslt Problem/Plan - 1:}}\plain\f0\fs20\ybim7806\hich\f0\dbch\f0\loch\f0\fs20\ql\par  \'b7  {\*\bkmkstart [id number]}{\*\bkmkend ug19727230055}Problem: {\*\bkmkstart jz49861143728}{\*\bkmkend jv62450615816}Weakness. \par  \'b7  {\*\bkmkstart vl04538111396}{\*\bkmkend aa70317106822}Plan: {\*\bkmkstart xz78934774401}{\*\bkmkend ae39090306545}The pt  p/w generalized weakness and difficulty walking for one day. She normally ambulates well with a walker, but has been too weak   to get out of bed since yesterday (4/8) AM. She states that the weakness is in her whole body; not just her legs. Neuro exam shows decreased strength in the RUE and RLE; sensation intact. no incontinence, no sciatica/back pain.  Pt denies trauma/fall,   and she denies lightheadedness, dizziness, and palpitations. Pt is dry on exam, and chemistry shows BEBE (BUN 39; Crt 1.78) as well as mild normocytic anemia (RBC 3.45, Hgb 10.5, Hct: 31.7). Consider dehydration vs. poor PO intake vs. anemia.\par  Plan:\par  - Fall precautions\par  - PT/OT consult\par  - Iron studies\par  - Urine studies\par  - B12 and folate \par  - TSH\par  - L knee xray.\plain\f1\fs20\jvzm6542\hich\f1\dbch\f1\loch\f1\cf2\fs20\strike\plain\f0\fs20\dcol9339\hich\f0\dbch\f0\loch\f0\fs20\par  \par  \plain\f1\fs20\uzjs3672\hich\f1\dbch\f1\loch\f1\cf2\fs20\ul{\field{\*\fldinst HYPERLINK [id number] }{\fldrslt Problem/Plan - 2:}}\plain\f0\fs20\odoh1017\hich\f0\dbch\f0\loch\f0\fs20\ql\par  \'b7  {\*\bkmkstart [id number]}{\*\bkmkend lc66798359436}Problem: {\*\bkmkstart kp71530432487}{\*\bkmkend cr19510073696}Lactic acidosis. \par  \'b7  {\*\bkmkstart at73293153381}{\*\bkmkend fb76932068176}Plan: {\*\bkmkstart fv08138667547}{\*\bkmkend tl88890143898}The patient presents with a lactate of 3.1 --> 2.9 --> 2.3. Pt is afebrile with a normal WBC count and no other infectious symptoms.   Lungs CTA and CXR unremarkable. Consider Metformin use vs.hypovolemia (BEBE on presentation; no known kidney disease).\par  Plan:\par  - Continue to trend lactate\par  - F/u urine studies\par  - Retroperitoneal U/S\par  - Monitor for infectious symptoms\par  - c/w maintence fluids.\plain\f1\fs20\abld8099\hich\f1\dbch\f1\loch\f1\cf2\fs20\strike\plain\f0\fs20\whxe3928\hich\f0\dbch\f0\loch\f0\fs20\par  \par  \plain\f1\fs20\dspq7978\hich\f1\dbch\f1\loch\f1\cf2\fs20\ul{\field{\*\fldinst HYPERLINK [id number] }{\fldrslt Problem/Plan - 3:}}\plain\f0\fs20\ginn2293\hich\f0\dbch\f0\loch\f0\fs20\ql\par  \'b7  {\*\bkmkstart sq96818327736}{\*\bkmkend mk78480590765}Problem: {\*\bkmkstart ew11290879966}{\*\bkmkend vx63975706498}BEBE (acute kidney injury).\plain\f1\fs20\qdgx4481\hich\f1\dbch\f1\loch\f1\cf2\fs20\strike\plain\f0\fs20\ucdv4507\hich\f0\dbch\f0\loch\f0\fs20\par  \'b7  {\*\bkmkstart bi81617135107}{\*\bkmkend qm01930596156}Plan: {\*\bkmkstart pi81562254391}{\*\bkmkend ga30684668013}Pt with no known h/o kidney disease p/w BEBE (BUN 39; Crt 1.78, K 5.6). Likely i/s/o pre-renal d/t dehydration from poor PO intake.\par  - F/u urine studies\par  -Trend BMP (next at 10pm 4/9)\par  - Gentle hydration w/ NS @ 80cc/hr.\plain\f1\fs20\kowm6154\hich\f1\dbch\f1\loch\f1\cf2\fs20\strike\plain\f0\fs20\sosy7173\hich\f0\dbch\f0\loch\f0\fs20\par  \par  \plain\f1\fs20\rdgu3253\hich\f1\dbch\f1\loch\f1\cf2\fs20\ul{\field{\*\fldinst HYPERLINK [id number] }{\fldrslt Problem/Plan - 4:}}\plain\f0\fs20\lmyv1479\hich\f0\dbch\f0\loch\f0\fs20\ql\par  \'b7  {\*\bkmkstart gl80080358375}{\*\bkmkend dk50993703088}Problem: {\*\bkmkstart gp79285017690}{\*\bkmkend hb66908475294}Normocytic anemia.\plain\f1\fs20\qkml9277\hich\f1\dbch\f1\loch\f1\cf2\fs20\strike\plain\f0\fs20\azmd9653\hich\f0\dbch\f0\loch\f0\fs20\par  \'b7  {\*\bkmkstart bj68536884685}{\*\bkmkend fu65490511277}Plan: {\*\bkmkstart fb61047499141}{\*\bkmkend qg05495167215}The pt presents with normocytic anemia (RBC: 3.45, Hgb 10.5, Hct: 31.7, MCV: 91.9), likely i/s/o chronic disease, but also consider   blood loss given weakness and unknown h/o screening colonoscopy (less likely given no report of melena)\par  Plan:\par  - Trend CBC\par  - Maintain active T&S\par  - F/u iron studies\par  - Transfuse for Hgb < 7.0.\plain\f1\fs20\vgom4323\hich\f1\dbch\f1\loch\f1\cf2\fs20\strike\plain\f0\fs20\iybk0793\hich\f0\dbch\f0\loch\f0\fs20\par  \par  \plain\f1\fs20\oyca2090\hich\f1\dbch\f1\loch\f1\cf2\fs20\ul{\field{\*\fldinst HYPERLINK [id number] }{\fldrslt Problem/Plan - 5:}}\plain\f0\fs20\eyxl6038\hich\f0\dbch\f0\loch\f0\fs20\ql\par  \'b7  {\*\bkmkstart cj13996076777}{\*\bkmkend jz69602052253}Problem: {\*\bkmkstart oj27322815407}{\*\bkmkend sz53486713408}Afib. \par  \'b7  {\*\bkmkstart oj97086911126}{\*\bkmkend oa48771441846}Plan: {\*\bkmkstart nc65618392107}{\*\bkmkend yd75748923946}The patient has a h/o Afib on Eliquis, and presents with EKG showing sinus bradycardia.\par  Plan:\par  - c/w Eliquis 5mg PO BID\par  - c/w cristopher toprol w/ hold parameters.\plain\f1\fs20\fenw5268\hich\f1\dbch\f1\loch\f1\cf2\fs20\strike\plain\f0\fs20\frml4591\hich\f0\dbch\f0\loch\f0\fs20\par  \par  \plain\f1\fs20\htnb1865\hich\f1\dbch\f1\loch\f1\cf2\fs20\ul{\field{\*\fldinst HYPERLINK [id number] }{\fldrslt Problem/Plan - 6:}}\plain\f0\fs20\wjpp3494\hich\f0\dbch\f0\loch\f0\fs20\ql\par  \'b7  {\*\bkmkstart qo51555718816}{\*\bkmkend uz16631282394}Problem: {\*\bkmkstart je07814023879}{\*\bkmkend sl25937552988}HTN (hypertension). \par  \'b7  {\*\bkmkstart kp13240099869}{\*\bkmkend fr43716688038}Plan: {\*\bkmkstart mg37724729590}{\*\bkmkend gd27276071259}The pt has a h/o hypertension, but presents with BP of 102/63, likely i/s/o dehydration.\par  Plan:\par  - Holding home amlodipine 5mg PO q24  i/s/o hypotension (from baseline) and bradycardia\par  - cw home Toprol ER 50mg PO q24 w/ hold parameters.\plain\f1\fs20\idlj6141\hich\f1\dbch\f1\loch\f1\cf2\fs20\strike\plain\f0\fs20\gwtn6794\hich\f0\dbch\f0\loch\f0\fs20\par  \par  \plain\f1\fs20\tduq5144\hich\f1\dbch\f1\loch\f1\cf2\fs20\ul{\field{\*\fldinst HYPERLINK [id number] }{\fldrslt Problem/Plan - 7:}}\plain\f0\fs20\uthn9161\hich\f0\dbch\f0\loch\f0\fs20\ql\par  \'b7  {\*\bkmkstart mf70899835376}{\*\bkmkend wg37090279328}Problem: {\*\bkmkstart wq11012231382}{\*\bkmkend zv80429958165}Asthma. \par  \'b7  {\*\bkmkstart kh50120324650}{\*\bkmkend sb06091495951}Plan: {\*\bkmkstart xc29645647177}{\*\bkmkend ei65060965825}The patient has a h/o asthma, well-controlled on home medications and without current exacerbation.\par  - c/w Home med Symbicort 160-4.5, 2 puffs BID\par  - Duonebs q6 prn.\par  \par  \plain\f1\fs20\dygn3782\hich\f1\dbch\f1\loch\f1\cf2\fs20\ul{\field{\*\fldinst HYPERLINK [id number] }{\fldrslt Problem/Plan - 8:}}\plain\f0\fs20\znjy0471\hich\f0\dbch\f0\loch\f0\fs20\ql\par  \'b7  {\*\bkmkstart or98871320808}{\*\bkmkend uh40607399832}Problem: {\*\bkmkstart rm33774251770}{\*\bkmkend zm42637959290}DM (diabetes mellitus). \par  \'b7  {\*\bkmkstart se44386410918}{\*\bkmkend td10515531242}Plan: {\*\bkmkstart wl65142068914}{\*\bkmkend vn62951799894}The patient has a h/o of type 2 diabetes, on insulin. Home meds include: lantus 22U at bedtime, humalog 6 units q24, and metformin   1000mg PO BID.\par  - Start Lantus 17U (80% home dose) at bedtime 4/9\par  - mISS\par  - A1c in AM labs\par  - Consistent carb diet\par  - Monitor FSG.\par  \par  \plain\f1\fs20\ttja0840\hich\f1\dbch\f1\loch\f1\cf2\fs20\ul{\field{\*\fldinst HYPERLINK [id number] }{\fldrslt Problem/Plan - 9:}}\plain\f0\fs20\bctd5986\hich\f0\dbch\f0\loch\f0\fs20\ql\par  \'b7  {\*\bkmkstart ze81998481255}{\*\bkmkend ig43847627329}Problem: {\*\bkmkstart nu21197446199}{\*\bkmkend se69461628523}HLD (hyperlipidemia). \par  \'b7  {\*\bkmkstart hb59276357603}{\*\bkmkend hw77682106135}Plan: {\*\bkmkstart kd02863248288}{\*\bkmkend zg22154000058}The pt has a h/o HLD and takes Atorvastatin 20mg PO q24 at home.\par  Plan:\par  - c/w statin.\plain\f1\fs20\dxcm4952\hich\f1\dbch\f1\loch\f1\cf2\fs20\strike\plain\f0\fs20\xyrf6771\hich\f0\dbch\f0\loch\f0\fs20\par  \par  \plain\f1\fs20\txul7441\hich\f1\dbch\f1\loch\f1\cf2\fs20\ul{\field{\*\fldinst HYPERLINK [id number] }{\fldrslt Problem/Plan - 10:}}\plain\f0\fs20\fble1774\hich\f0\dbch\f0\loch\f0\fs20\ql\par  \'b7  {\*\bkmkstart kt92702448007}{\*\bkmkend ei30462391992}Problem: {\*\bkmkstart mo59086235785}{\*\bkmkend wf08253538418}Depression, major. \par  \'b7  {\*\bkmkstart jm19895852878}{\*\bkmkend np90715558373}Plan; {\*\bkmkstart ee64361688362}{\*\bkmkend lw27452663271}The pt has a h/o major depression and takes mirtazapine 45mg PO q24 at bedtime.\par  Plan:\par  - c/w home med.\par  \par  \plain\f1\fs20\cnho1385\hich\f1\dbch\f1\loch\f1\cf2\fs20\ul{\field{\*\fldinst HYPERLINK [id number] }{\fldrslt Problem/Plan - 11:}}\plain\f0\fs20\rrgg2501\hich\f0\dbch\f0\loch\f0\fs20\ql\par  \'b7  {\*\bkmkstart nh384159118428}{\*\bkmkend sn880157101256}Problem: {\*\bkmkstart kh913978226956}{\*\bkmkend kg675439165158}Prophylactic measure. \par  \'b7  {\*\bkmkstart tn643544568929}{\*\bkmkend rg124519232219}Plan: {\*\bkmkstart jz896910069106}{\*\bkmkend ed885304175138}Fluids: NS @ 80cc/hr\par  Electrolytes: Replete PRN\par  K<4.0, Mg<2.0, Phos< 2.5\par  N: Consistent carb\par  GI ppx: None\par  DVT ppx: Eliquis\par  Code: Full\par  \par  Dispo: RMF.\par  \plain\f1\fs16\djsw2778\hich\f1\dbch\f1\loch\f1\cf2\fs16\par  \plain\f0\fs20\yaxr7452\hich\f0\dbch\f0\loch\f0\fs20\par  }

## 2024-04-10 NOTE — OCCUPATIONAL THERAPY INITIAL EVALUATION ADULT - LEVEL OF INDEPENDENCE: DRESS UPPER BODY, OT EVAL
donning back gown while seated at EOB- assistance for sitting balance/minimum assist (75% patients effort)

## 2024-04-10 NOTE — PHYSICAL THERAPY INITIAL EVALUATION ADULT - PERTINENT HX OF CURRENT PROBLEM, REHAB EVAL
Ms. Ambrosio is a 77y/o F with a PMHx of HTN, HLD, DM, Afib on Eliquis, knee arthritis, asthma, and depression who p/w generalized weakness and difficulty walking since yesterday, and was admitted for management of weakness, BEBE and lactic acidosis..

## 2024-04-10 NOTE — PROGRESS NOTE ADULT - PROBLEM SELECTOR PLAN 6
The pt has a h/o hypertension, but presents with BP of 102/63, likely i/s/o dehydration.  Plan:  - Holding home amlodipine 5mg PO q24  i/s/o hypotension (from baseline) and bradycardia  - cw home Toprol ER 50mg PO q24 w/ hold parameters

## 2024-04-10 NOTE — PROGRESS NOTE ADULT - PROBLEM SELECTOR PLAN 2
The patient presents with a lactate of 3.1 --> 2.9 --> 2.3. Pt is afebrile with a normal WBC count and no other infectious symptoms. Lungs CTA and CXR unremarkable. Consider Metformin use vs.hypovolemia (BEBE on presentation; no known kidney disease).  Plan:  - Continue to trend lactate  - F/u urine studies  - Retroperitoneal U/S  - Monitor for infectious symptoms  - c/w maintence fluids The patient presents with a lactate of 3.1 --> 2.9 --> 2.3. Pt is afebrile with a normal WBC count and no other infectious symptoms. Lungs CTA and CXR unremarkable. Consider Metformin use vs.hypovolemia (BEBE on presentation; no known kidney disease).  Plan:  - Continue to trend lactate - cleared on 4/10 AM las  - F/u urine studies  - Retroperitoneal U/S  - Monitor for infectious symptoms  - c/w maintenance fluids and encourage PO intake

## 2024-04-10 NOTE — PROGRESS NOTE ADULT - PROBLEM SELECTOR PLAN 8
The patient has a h/o of type 2 diabetes, on insulin. Home meds include: lantus 22U at bedtime, humalog 6 units q24, and metformin 1000mg PO BID.  - Start Lantus 17U (80% home dose) at bedtime 4/9  - mISS  - A1c in AM labs  - Consistent carb diet  - Monitor FSG

## 2024-04-10 NOTE — CONSULT NOTE ADULT - SUBJECTIVE AND OBJECTIVE BOX
Patient is a 78y old  Female who presents with a chief complaint of Weakness (10 Apr 2024 06:46)      HPI:  Ms. Ambrosio is a 79y/o F with a PMHx of HTN, HLD, DM, Afib on Eliquis, knee arthritis, asthma, and depression who p/w generalized weakness and difficulty walking since yesterday. The patient states that she normally ambulates well with a walker, but has been too weak to get out of bed since yesterday (4/8) AM. She states that the weakness is in her whole body; not just her legs. The patient reports eating and drinking as usual without skipping meals, and she denies changes in bowel or bladder habits (last BM yesterday morning). According to the daughter, the pt has been compliant with all of her medications, but the daughter did not give them this morning (4/9) d/t weakness. The patient denies trauma/fall, chest pain, SOB, palpitations, dizziness, lightheadedness, and headache. She also denies chills, sweats, blurry vision, n/v/d. The patient endorses a rash on her RLE for the past two weeks. The LE was wrapped in gauze, but upon inspection, it appears to be dry skin, possibly with venous stasis, with excoriations and scabbing from scratching. The patient has no known sick contacts and no recent travel history.     ED Course:  V/S:  Temp: 98.7, HR: 74, BP: 102/63, RR: 18, SpO2: 98% RA  Pertinent labs:  RBC: 3.45, Hgb: 10.5, Hct: 31.7, K: 5.6, Cl: 110, CO2: 20, BUN: 39, Crt: 1.78, Glu: 139, pro-BNP: 1098, Lactate: 3.2->2.9->2.3  Urine: Ketone: trace, Leuk est: trace, Hyaline casts: present  EKG:  Sinus bradycardia  Imaging:  CT Head n/c: No significant interval change since CT brain 10/5/2017. No acute intracranial hemorrhage or brain edema. 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 Chronic left lentiform nucleus infarct versus for enlarged perivascular space.  CXR: No acute pathology  Interventions:  - Magnesium 1g IVP x 1  - Lokelma 5g PO x 1  - NS 1L IV Bolus x 2 (09 Apr 2024 17:20)    PAST MEDICAL & SURGICAL HISTORY:  Subdural hemorrhage  s/p bakari hole (2013)      Atrial fibrillation  s/p ablation in 2013      Atherosclerosis of coronary artery  NOn obstructive      Type 2 diabetes mellitus  DM (diabetes mellitus)      Hyperlipidemia  Hyperlipidemia      Essential hypertension  HTN (hypertension)      Asthma  Asthma      Depression      Status post tubal ligation  S/P tubal ligation      Other postprocedural status  S/P atrial septal defect closure, surgical      History of knee replacement, total, right      Acute subdural hematoma        MEDICATIONS  (STANDING):  albuterol/ipratropium for Nebulization 3 milliLiter(s) Nebulizer every 6 hours  apixaban 5 milliGRAM(s) Oral every 12 hours  atorvastatin 20 milliGRAM(s) Oral at bedtime  budesonide 160 MICROgram(s)/formoterol 4.5 MICROgram(s) Inhaler 2 Puff(s) Inhalation two times a day  dextrose 10% Bolus 125 milliLiter(s) IV Bolus once  dextrose 5%. 1000 milliLiter(s) (50 mL/Hr) IV Continuous <Continuous>  dextrose 50% Injectable 25 Gram(s) IV Push once  glucagon  Injectable 1 milliGRAM(s) IntraMuscular once  insulin glargine Injectable (LANTUS) 17 Unit(s) SubCutaneous at bedtime  insulin lispro (ADMELOG) corrective regimen sliding scale   SubCutaneous Before meals and at bedtime  mirtazapine 45 milliGRAM(s) Oral at bedtime  sodium chloride 0.9%. 1000 milliLiter(s) (80 mL/Hr) IV Continuous <Continuous>    MEDICATIONS  (PRN):  acetaminophen     Tablet .. 650 milliGRAM(s) Oral every 6 hours PRN Temp greater or equal to 38C (100.4F), Mild Pain (1 - 3)  aluminum hydroxide/magnesium hydroxide/simethicone Suspension 30 milliLiter(s) Oral every 4 hours PRN Dyspepsia  dextrose Oral Gel 15 Gram(s) Oral once PRN Blood Glucose LESS THAN 70 milliGRAM(s)/deciliter  melatonin 3 milliGRAM(s) Oral at bedtime PRN Insomnia  ondansetron Injectable 4 milliGRAM(s) IV Push every 8 hours PRN Nausea and/or Vomiting          Home Living Status :  lives alone in an elevator accessible apartment building          -  Home Services : 6 hrs x Mon thru Friday s, 3 hrs on Sat/Sun       Baseline Functional Level Prior to Admission :             - ADL's/ IADL's :  independent          - Ambulatory status prior to admission :   walked with a cane , rolling walker         FAMILY HISTORY:  Family history of ischemic heart disease (Father)  Family history of coronary artery disease in mother    Family history of hypertension (Mother)        CBC Full  -  ( 10 Apr 2024 05:30 )  WBC Count : 5.13 K/uL  RBC Count : 3.32 M/uL  Hemoglobin : 9.9 g/dL  Hematocrit : 30.8 %  Platelet Count - Automated : 201 K/uL  Mean Cell Volume : 92.8 fl  Mean Cell Hemoglobin : 29.8 pg  Mean Cell Hemoglobin Concentration : 32.1 gm/dL  Auto Neutrophil # : 2.85 K/uL  Auto Lymphocyte # : 1.72 K/uL  Auto Monocyte # : 0.27 K/uL  Auto Eosinophil # : 0.22 K/uL  Auto Basophil # : 0.06 K/uL  Auto Neutrophil % : 55.5 %  Auto Lymphocyte % : 33.5 %  Auto Monocyte % : 5.3 %  Auto Eosinophil % : 4.3 %  Auto Basophil % : 1.2 %      04-10    141  |  111<H>  |  30<H>  ----------------------------<  205<H>  5.2   |  17<L>  |  1.32<H>    Ca    9.6      10 Apr 2024 05:30  Phos  4.3     04-10  Mg     1.6     04-10    TPro  6.2  /  Alb  3.6  /  TBili  0.3  /  DBili  x   /  AST  21  /  ALT  12  /  AlkPhos  77  04-10      Urinalysis Basic - ( 10 Apr 2024 05:30 )    Color: x / Appearance: x / SG: x / pH: x  Gluc: 205 mg/dL / Ketone: x  / Bili: x / Urobili: x   Blood: x / Protein: x / Nitrite: x   Leuk Esterase: x / RBC: x / WBC x   Sq Epi: x / Non Sq Epi: x / Bacteria: x        Radiology :     < from: Xray Chest 1 View AP/PA (04.09.24 @ 10:23) >  ACC: [id number] EXAM:  XR CHEST AP OR PA 1V   ORDERED BY: SABRINA KESSLER     PROCEDURE DATE:  04/09/2024          INTERPRETATION:  X-RAY CHEST    HISTORY: Weakness    COMPARISON: Chest x-ray dated 5/10/2023    TECHNIQUE: Portable AP view of the chest.    FINDINGS: The lungs are clear. No pneumothorax. No pleural effusions.   Cardiomediastinal silhouette is within normal limits. Calcified aortic   arch. Post median sternotomy. Cardiac loop recorder left chest.    IMPRESSION: No acute pathology.      < from: CT Head No Cont (04.09.24 @ 11:27) >  ACC: [id number] EXAM:  CT BRAIN   ORDERED BY: SABRINA KESSLER     PROCEDURE DATE:  04/09/2024          INTERPRETATION:  Noncontrast CT of the brain.    CLINICAL INDICATION:  Weakness    TECHNIQUE : Axial CT scanning of the brain was obtained from the skull   base to the vertex without the administration of intravenous contrast.   Sagittal and coronal reformats were provided.    COMPARISON: CT brain 12/30/2017 and 10/5/2017    FINDINGS:    Redemonstration of right frontal bakari hole.    Similar high left lateral convexity low-density subdural collection since   10/5/2017, measuring 1.3 cm in greatest depth.    Similar high right lateral convexity low density subdural collection   measuring 1 cm in greatest depth.    Rightward midline shift of 3 mmis similar. Basal cisterns are   visualized. No hydrocephalus.    No acute intracranial hemorrhage or brain edema.    Chronic left lentiform nucleus infarct versus for enlarged perivascular   space.    The visualized paranasal sinuses and mastoid aircells are clear.    IMPRESSION:    No significant interval change since CT brain 10/5/2017.    No acute intracranial hemorrhage or brain edema.    Similar bilateral lateral convexity low-density subdural collections,   hygromas versus chronic subdural hematomas.    Chronic left lentiform nucleus infarct versus for enlarged perivascular   space.       Review of Systems : per HPI         Vital Signs Last 24 Hrs  T(C): 37.1 (10 Apr 2024 05:01), Max: 37.1 (09 Apr 2024 13:53)  T(F): 98.7 (10 Apr 2024 05:01), Max: 98.7 (09 Apr 2024 13:53)  HR: 58 (10 Apr 2024 05:01) (51 - 74)  BP: 127/76 (10 Apr 2024 05:01) (99/60 - 127/76)  BP(mean): --  RR: 18 (10 Apr 2024 05:01) (18 - 18)  SpO2: 100% (10 Apr 2024 05:01) (97% - 100%)    Parameters below as of 10 Apr 2024 05:01  Patient On (Oxygen Delivery Method): room air            Physical Exam:  78 y o woman lying comfortably in semi Mejia's position , awake , alert , no acute complaints , feels tired     Head: normocephalic , atraumatic    Eyes: PERRLA , EOMI , no nystagmus , sclera anicteric    ENT / FACE: neg nasal discharge , uvula midline , no oropharyngeal erythema / exudate    Neck: supple , negative JVD , negative carotid bruits , no thyromegaly    Chest: CTA bilaterally , neg wheeze / rhonchi / rales / crackles / egophany    Cardiovascular: regular rate and rhythm , neg murmurs / rubs / gallops    Abdomen: soft , non distended , non tender to palpation in all 4 quadrants ,  normal bowel sounds     Extremities:  R leg excoriations    Neurologic Exam:     Alert and oriented x 3        Motor Exam:        > 3+/5 x 4 extremities       Sensation:         intact to light touch x 4 extremities                            no neglect or extinction on double simultaneous testing    DTR:           biceps/brachioradialis: equal                            patella/ankle: equal           neg Babinski         Coordination:            Finger to Nose:  neg dysmetria bilaterally      Gait:  not tested           PM&R Impression: admitted for c/o weakness , gait difficulty    - BEBE, lactic acidosis    - no acute pathology on CT brain imaging     - deconditioned         Recommendations / Plan:       1) Physical / Occupational therapy focusing on therapeutic exercises , equipment evaluation , bed mobility/transfer out of bed evaluation , progressive ambulation with assistive devices prn .    2) Current disposition plan recommendation:    pending functional progress

## 2024-04-10 NOTE — PROGRESS NOTE ADULT - PROBLEM SELECTOR PLAN 4
The pt presents with normocytic anemia (RBC: 3.45, Hgb 10.5, Hct: 31.7, MCV: 91.9), likely i/s/o chronic disease, but also consider blood loss given weakness and unknown h/o screening colonoscopy (less likely given no report of melena)  Plan:  - Trend CBC  - Maintain active T&S  - F/u iron studies  - Transfuse for Hgb < 7.0 The pt presents with normocytic anemia (RBC: 3.45, Hgb 10.5, Hct: 31.7, MCV: 91.9), likely i/s/o chronic disease, but also consider blood loss given weakness and unknown h/o screening colonoscopy (less likely given no report of melena)  Plan:  - Trend CBC  - Maintain active T&S  - F/u iron studies - %iron saturation low  - Transfuse for Hgb < 7.0

## 2024-04-10 NOTE — PROGRESS NOTE ADULT - PROBLEM SELECTOR PLAN 1
The pt  p/w generalized weakness and difficulty walking for one day. She normally ambulates well with a walker, but has been too weak to get out of bed since yesterday (4/8) AM. She states that the weakness is in her whole body; not just her legs. Neuro exam shows decreased strength in the RUE and RLE; sensation intact. no incontinence, no sciatica/back pain.  Pt denies trauma/fall, and she denies lightheadedness, dizziness, and palpitations. Pt is dry on exam, and chemistry shows BEBE (BUN 39; Crt 1.78) as well as mild normocytic anemia (RBC 3.45, Hgb 10.5, Hct: 31.7). Consider dehydration vs. poor PO intake vs. anemia.  Plan:  - Fall precautions  - PT/OT consult  - Iron studies  - Urine studies  - B12 and folate   - TSH  - L knee xray The pt  p/w generalized weakness and difficulty walking for one day. She normally ambulates well with a walker, but has been too weak to get out of bed since yesterday (4/8) AM. She states that the weakness is in her whole body; not just her legs. Neuro exam shows decreased strength in the RUE and RLE; sensation intact. no incontinence, no sciatica/back pain.  Pt denies trauma/fall, and she denies lightheadedness, dizziness, and palpitations. Pt is dry on exam, and chemistry shows BEBE (BUN 39; Crt 1.78) as well as mild normocytic anemia (RBC 3.45, Hgb 10.5, Hct: 31.7). Consider dehydration vs. poor PO intake vs. anemia.  Plan:  - Fall precautions  - PT/OT consult - recommend JESSENIA  - Iron studies - %Iron sat low 4/10  - Urine studies  - B12 and folate   - TSH - WNL 4/10  - L knee xray

## 2024-04-10 NOTE — OCCUPATIONAL THERAPY INITIAL EVALUATION ADULT - DIAGNOSIS, OT EVAL
Patient brought to [facility] 2/2 generalized weakness and difficulty walking, presents with L knee discomfort, decreased overall strength, balance, postural control and activity tolerance impacting independence with functional activities and mobility.

## 2024-04-10 NOTE — OCCUPATIONAL THERAPY INITIAL EVALUATION ADULT - PERTINENT HX OF CURRENT PROBLEM, REHAB EVAL
Ms. Ambrosio is a 79y/o F with a PMHx of HTN, HLD, DM, Afib on Eliquis, knee arthritis, asthma, and depression who p/w generalized weakness and difficulty walking since yesterday. The patient states that she normally ambulates well with a walker, but has been too weak to get out of bed since yesterday (4/8) AM. She states that the weakness is in her whole body; not just her legs. The patient reports eating and drinking as usual without skipping meals, and she denies changes in bowel or bladder habits (last BM yesterday morning). According to the daughter, the pt has been compliant with all of her medications, but the daughter did not give them this morning (4/9) d/t weakness. The patient denies trauma/fall, chest pain, SOB, palpitations, dizziness, lightheadedness, and headache. She also denies chills, sweats, blurry vision, n/v/d. The patient endorses a rash on her RLE for the past two weeks. The LE was wrapped in gauze, but upon inspection, it appears to be dry skin, possibly with venous stasis, with excoriations and scabbing from scratching. The patient has no known sick contacts and no recent travel history.

## 2024-04-10 NOTE — PROGRESS NOTE ADULT - SUBJECTIVE AND OBJECTIVE BOX
OVERNIGHT EVENTS:    SUBJECTIVE / INTERVAL HPI: Patient seen and examined at bedside.     ROS: negative unless otherwise stated above.    VITAL SIGNS:  Vital Signs Last 24 Hrs  T(C): 37.1 (10 Apr 2024 05:01), Max: 37.1 (09 Apr 2024 13:53)  T(F): 98.7 (10 Apr 2024 05:01), Max: 98.7 (09 Apr 2024 13:53)  HR: 58 (10 Apr 2024 05:01) (51 - 74)  BP: 127/76 (10 Apr 2024 05:01) (99/60 - 127/76)  BP(mean): --  RR: 18 (10 Apr 2024 05:01) (18 - 18)  SpO2: 100% (10 Apr 2024 05:01) (97% - 100%)    Parameters below as of 10 Apr 2024 05:01  Patient On (Oxygen Delivery Method): room air        PHYSICAL EXAM:    General: In no apparent distress  HEENT: NC/AT; PERRL, anicteric sclera; MMM  Neck: supple  Cardiovascular: +S1/S2; RRR  Respiratory: CTA B/L; no W/R/R  Gastrointestinal: soft, NT/ND; +BSx4  Extremities: WWP; no edema, clubbing or cyanosis  Vascular: 2+ radial, DP/PT pulses B/L  Neurological: AAOx3; no focal deficits    MEDICATIONS:  MEDICATIONS  (STANDING):  albuterol/ipratropium for Nebulization 3 milliLiter(s) Nebulizer every 6 hours  apixaban 5 milliGRAM(s) Oral every 12 hours  atorvastatin 20 milliGRAM(s) Oral at bedtime  budesonide 160 MICROgram(s)/formoterol 4.5 MICROgram(s) Inhaler 2 Puff(s) Inhalation two times a day  dextrose 10% Bolus 125 milliLiter(s) IV Bolus once  dextrose 5%. 1000 milliLiter(s) (50 mL/Hr) IV Continuous <Continuous>  dextrose 50% Injectable 25 Gram(s) IV Push once  glucagon  Injectable 1 milliGRAM(s) IntraMuscular once  insulin glargine Injectable (LANTUS) 17 Unit(s) SubCutaneous at bedtime  insulin lispro (ADMELOG) corrective regimen sliding scale   SubCutaneous Before meals and at bedtime  mirtazapine 45 milliGRAM(s) Oral at bedtime  sodium chloride 0.9%. 1000 milliLiter(s) (80 mL/Hr) IV Continuous <Continuous>    MEDICATIONS  (PRN):  acetaminophen     Tablet .. 650 milliGRAM(s) Oral every 6 hours PRN Temp greater or equal to 38C (100.4F), Mild Pain (1 - 3)  aluminum hydroxide/magnesium hydroxide/simethicone Suspension 30 milliLiter(s) Oral every 4 hours PRN Dyspepsia  dextrose Oral Gel 15 Gram(s) Oral once PRN Blood Glucose LESS THAN 70 milliGRAM(s)/deciliter  melatonin 3 milliGRAM(s) Oral at bedtime PRN Insomnia  ondansetron Injectable 4 milliGRAM(s) IV Push every 8 hours PRN Nausea and/or Vomiting      ALLERGIES:  Allergies    peanuts (Rash)  adenosine (Unknown)    Intolerances        LABS:                        9.9    5.13  )-----------( 201      ( 10 Apr 2024 05:30 )             30.8     04-09    140  |  110<H>  |  35<H>  ----------------------------<  186<H>  5.1   |  17<L>  |  1.50<H>    Ca    9.7      09 Apr 2024 22:39  Mg     1.6     04-09    TPro  6.6  /  Alb  4.1  /  TBili  0.4  /  DBili  x   /  AST  19  /  ALT  13  /  AlkPhos  70  04-09      Urinalysis Basic - ( 09 Apr 2024 22:39 )    Color: x / Appearance: x / SG: x / pH: x  Gluc: 186 mg/dL / Ketone: x  / Bili: x / Urobili: x   Blood: x / Protein: x / Nitrite: x   Leuk Esterase: x / RBC: x / WBC x   Sq Epi: x / Non Sq Epi: x / Bacteria: x      CAPILLARY BLOOD GLUCOSE      POCT Blood Glucose.: 189 mg/dL (09 Apr 2024 21:42)      RADIOLOGY & ADDITIONAL TESTS: Reviewed. OVERNIGHT EVENTS: AFSANEH    SUBJECTIVE / INTERVAL HPI: The patient was encountered lying in bed, AAOx3, in no acute distress. She endorses feeling better than on admission. She denies any new or acute complaints; specifically, she denies n/v/d and she denies chest pain, SOB, and palpitations. She says that she does not drink much water at home, but that she does drink soda. She drinks approximately 1/2 20oz bottle of water with each meal, but no more. She stated that she does not want to go to a [facility] if recommended; she wants to return home.     ROS: negative unless otherwise stated above.    VITAL SIGNS:  Vital Signs Last 24 Hrs  T(C): 37.1 (10 Apr 2024 05:01), Max: 37.1 (09 Apr 2024 13:53)  T(F): 98.7 (10 Apr 2024 05:01), Max: 98.7 (09 Apr 2024 13:53)  HR: 58 (10 Apr 2024 05:01) (51 - 74)  BP: 127/76 (10 Apr 2024 05:01) (99/60 - 127/76)  BP(mean): --  RR: 18 (10 Apr 2024 05:01) (18 - 18)  SpO2: 100% (10 Apr 2024 05:01) (97% - 100%)    Parameters below as of 10 Apr 2024 05:01  Patient On (Oxygen Delivery Method): room air        PHYSICAL EXAM:    Constitutional: resting comfortably in bed; NAD  Head: NC/AT  Eyes: PERRL, EOMI, anicteric sclera  ENT: no nasal discharge; uvula midline, no oropharyngeal erythema or exudates; mucous membranes dry  Neck: supple; no JVD or thyromegaly  Respiratory: CTA B/L; no W/R/R, no retractions  Cardiac: +S1/S2; regular rhythm, but bradycardic; no M/R/G;   Gastrointestinal: abdomen soft, NT/ND; no rebound or guarding; +BSx4  Back: spine midline, no bony tenderness or step-offs; no CVAT B/L  Extremities: WWP, no clubbing or cyanosis; edema to 1+ RLE only  Musculoskeletal: Tenderness to the left knee joint; no swelling, warmth, or erythema   Vascular: 2+ radial, DP pulses B/L  Dermatologic: skin warm, dry and intact. Right LE with excoriations and scabbing from reported "rash"; ?venous stasis. Hypopigmentation on b/l LE below the knee.  Lymphatic: no submandibular or cervical LAD  Neurologic: AAOx3; CNII-XII grossly intact; no focal deficits. Strength diminished in LUE and LLE to 3-4/5; 4.5-5/5 on the RUE and RLE  Psychiatric: affect and characteristics of appearance, verbalizations, behaviors are appropriate    MEDICATIONS:  MEDICATIONS  (STANDING):  albuterol/ipratropium for Nebulization 3 milliLiter(s) Nebulizer every 6 hours  apixaban 5 milliGRAM(s) Oral every 12 hours  atorvastatin 20 milliGRAM(s) Oral at bedtime  budesonide 160 MICROgram(s)/formoterol 4.5 MICROgram(s) Inhaler 2 Puff(s) Inhalation two times a day  dextrose 10% Bolus 125 milliLiter(s) IV Bolus once  dextrose 5%. 1000 milliLiter(s) (50 mL/Hr) IV Continuous <Continuous>  dextrose 50% Injectable 25 Gram(s) IV Push once  glucagon  Injectable 1 milliGRAM(s) IntraMuscular once  insulin glargine Injectable (LANTUS) 17 Unit(s) SubCutaneous at bedtime  insulin lispro (ADMELOG) corrective regimen sliding scale   SubCutaneous Before meals and at bedtime  mirtazapine 45 milliGRAM(s) Oral at bedtime  sodium chloride 0.9%. 1000 milliLiter(s) (80 mL/Hr) IV Continuous <Continuous>    MEDICATIONS  (PRN):  acetaminophen     Tablet .. 650 milliGRAM(s) Oral every 6 hours PRN Temp greater or equal to 38C (100.4F), Mild Pain (1 - 3)  aluminum hydroxide/magnesium hydroxide/simethicone Suspension 30 milliLiter(s) Oral every 4 hours PRN Dyspepsia  dextrose Oral Gel 15 Gram(s) Oral once PRN Blood Glucose LESS THAN 70 milliGRAM(s)/deciliter  melatonin 3 milliGRAM(s) Oral at bedtime PRN Insomnia  ondansetron Injectable 4 milliGRAM(s) IV Push every 8 hours PRN Nausea and/or Vomiting      ALLERGIES:  Allergies    peanuts (Rash)  adenosine (Unknown)    Intolerances        LABS:                        9.9    5.13  )-----------( 201      ( 10 Apr 2024 05:30 )             30.8     04-09    140  |  110<H>  |  35<H>  ----------------------------<  186<H>  5.1   |  17<L>  |  1.50<H>    Ca    9.7      09 Apr 2024 22:39  Mg     1.6     04-09    TPro  6.6  /  Alb  4.1  /  TBili  0.4  /  DBili  x   /  AST  19  /  ALT  13  /  AlkPhos  70  04-09      Urinalysis Basic - ( 09 Apr 2024 22:39 )    Color: x / Appearance: x / SG: x / pH: x  Gluc: 186 mg/dL / Ketone: x  / Bili: x / Urobili: x   Blood: x / Protein: x / Nitrite: x   Leuk Esterase: x / RBC: x / WBC x   Sq Epi: x / Non Sq Epi: x / Bacteria: x      CAPILLARY BLOOD GLUCOSE      POCT Blood Glucose.: 189 mg/dL (09 Apr 2024 21:42)      RADIOLOGY & ADDITIONAL TESTS: Reviewed.

## 2024-04-10 NOTE — PHYSICAL THERAPY INITIAL EVALUATION ADULT - ADDITIONAL COMMENTS
Household ambulator who lives alone in elevator access apartment, no LJ. Ambulates with SC and RW at baseline. Has HHA services 6 hours/day M-F and 3 hours/day Sat/Sun to assist with all ADLs.

## 2024-04-10 NOTE — PROGRESS NOTE ADULT - PROBLEM SELECTOR PLAN 5
The patient has a h/o Afib on Eliquis, and presents with EKG showing sinus bradycardia.  Plan:  - c/w Eliquis 5mg PO BID  - c/w cristopher toprol w/ hold parameters The patient has a h/o Afib on Eliquis, and presents with EKG showing sinus bradycardia.  Plan:  - c/w Eliquis 5mg PO BID  - c/w home toprol w/ hold parameters

## 2024-04-10 NOTE — OCCUPATIONAL THERAPY INITIAL EVALUATION ADULT - ADDITIONAL COMMENTS
Patient reports living alone in an elevator access apartment building with no LJ. Patient states she has a HHA 6 hours a day from M-F and 3 hours on the weekend to assist with bathing, cleaning, cooking, daughter assist with food shopping. Patient can perform bed mobility, UB dressing, toileting and feeding independently and ambulated with RW. Patient has a walk in shower with shower chair and grab bars. Patient also has a SC at home. Patient is L hand dominant.

## 2024-04-10 NOTE — PHYSICAL THERAPY INITIAL EVALUATION ADULT - PHYSICAL ASSIST/NONPHYSICAL ASSIST: SCOOT/BRIDGE, REHAB EVAL
verbal cues/2 person assist Billing Type: [employer] Performing Laboratory: [phone] Bill For Surgical Tray: no Expected Date Of Service: 10/14/2022 Billing Type: Third-Party Bill

## 2024-04-10 NOTE — OCCUPATIONAL THERAPY INITIAL EVALUATION ADULT - GENERAL OBSERVATIONS, REHAB EVAL
PT Anjum present. Patient A&Ox4, agreeable and tolerated session fairly. Patient received semisupine in bed +IV, room air, NAD.

## 2024-04-10 NOTE — OCCUPATIONAL THERAPY INITIAL EVALUATION ADULT - LEVEL OF INDEPENDENCE: EATING, OT EVAL
holding water pitcher with R hand and cup with L hand to pour water and bring cup to mouth/contact guard

## 2024-04-10 NOTE — PROGRESS NOTE ADULT - PROBLEM SELECTOR PLAN 11
Fluids: NS @ 80cc/hr  Electrolytes: Replete PRN  K<4.0, Mg<2.0, Phos< 2.5  N: Consistent carb  GI ppx: None  DVT ppx: Eliquis  Code: Full    Dispo: [facility]

## 2024-04-11 ENCOUNTER — TRANSCRIPTION ENCOUNTER (OUTPATIENT)
Age: 79
End: 2024-04-11

## 2024-04-11 LAB
ADD ON TEST-SPECIMEN IN LAB: SIGNIFICANT CHANGE UP
ADD ON TEST-SPECIMEN IN LAB: SIGNIFICANT CHANGE UP
ANION GAP SERPL CALC-SCNC: 11 MMOL/L — SIGNIFICANT CHANGE UP (ref 5–17)
BASOPHILS # BLD AUTO: 0.07 K/UL — SIGNIFICANT CHANGE UP (ref 0–0.2)
BASOPHILS NFR BLD AUTO: 1.1 % — SIGNIFICANT CHANGE UP (ref 0–2)
BUN SERPL-MCNC: 18 MG/DL — SIGNIFICANT CHANGE UP (ref 7–23)
CALCIUM SERPL-MCNC: 9.7 MG/DL — SIGNIFICANT CHANGE UP (ref 8.4–10.5)
CHLORIDE SERPL-SCNC: 108 MMOL/L — SIGNIFICANT CHANGE UP (ref 96–108)
CK MB CFR SERPL CALC: 1.5 NG/ML — SIGNIFICANT CHANGE UP (ref 0–6.7)
CK SERPL-CCNC: 66 U/L — SIGNIFICANT CHANGE UP (ref 25–170)
CO2 SERPL-SCNC: 21 MMOL/L — LOW (ref 22–31)
CREAT SERPL-MCNC: 1.16 MG/DL — SIGNIFICANT CHANGE UP (ref 0.5–1.3)
EGFR: 48 ML/MIN/1.73M2 — LOW
EOSINOPHIL # BLD AUTO: 0.23 K/UL — SIGNIFICANT CHANGE UP (ref 0–0.5)
EOSINOPHIL NFR BLD AUTO: 3.6 % — SIGNIFICANT CHANGE UP (ref 0–6)
GLUCOSE BLDC GLUCOMTR-MCNC: 125 MG/DL — HIGH (ref 70–99)
GLUCOSE BLDC GLUCOMTR-MCNC: 132 MG/DL — HIGH (ref 70–99)
GLUCOSE BLDC GLUCOMTR-MCNC: 178 MG/DL — HIGH (ref 70–99)
GLUCOSE BLDC GLUCOMTR-MCNC: 189 MG/DL — HIGH (ref 70–99)
GLUCOSE SERPL-MCNC: 144 MG/DL — HIGH (ref 70–99)
HCT VFR BLD CALC: 31.6 % — LOW (ref 34.5–45)
HGB BLD-MCNC: 10.4 G/DL — LOW (ref 11.5–15.5)
IMM GRANULOCYTES NFR BLD AUTO: 0.3 % — SIGNIFICANT CHANGE UP (ref 0–0.9)
LYMPHOCYTES # BLD AUTO: 1.65 K/UL — SIGNIFICANT CHANGE UP (ref 1–3.3)
LYMPHOCYTES # BLD AUTO: 25.6 % — SIGNIFICANT CHANGE UP (ref 13–44)
MAGNESIUM SERPL-MCNC: 1.5 MG/DL — LOW (ref 1.6–2.6)
MCHC RBC-ENTMCNC: 29.6 PG — SIGNIFICANT CHANGE UP (ref 27–34)
MCHC RBC-ENTMCNC: 32.9 GM/DL — SIGNIFICANT CHANGE UP (ref 32–36)
MCV RBC AUTO: 90 FL — SIGNIFICANT CHANGE UP (ref 80–100)
MONOCYTES # BLD AUTO: 0.41 K/UL — SIGNIFICANT CHANGE UP (ref 0–0.9)
MONOCYTES NFR BLD AUTO: 6.4 % — SIGNIFICANT CHANGE UP (ref 2–14)
NEUTROPHILS # BLD AUTO: 4.07 K/UL — SIGNIFICANT CHANGE UP (ref 1.8–7.4)
NEUTROPHILS NFR BLD AUTO: 63 % — SIGNIFICANT CHANGE UP (ref 43–77)
NRBC # BLD: 0 /100 WBCS — SIGNIFICANT CHANGE UP (ref 0–0)
PHOSPHATE SERPL-MCNC: 5.4 MG/DL — HIGH (ref 2.5–4.5)
PLATELET # BLD AUTO: 206 K/UL — SIGNIFICANT CHANGE UP (ref 150–400)
POTASSIUM SERPL-MCNC: 4.4 MMOL/L — SIGNIFICANT CHANGE UP (ref 3.5–5.3)
POTASSIUM SERPL-SCNC: 4.4 MMOL/L — SIGNIFICANT CHANGE UP (ref 3.5–5.3)
RBC # BLD: 3.51 M/UL — LOW (ref 3.8–5.2)
RBC # FLD: 14.9 % — HIGH (ref 10.3–14.5)
SODIUM SERPL-SCNC: 140 MMOL/L — SIGNIFICANT CHANGE UP (ref 135–145)
TROPONIN T, HIGH SENSITIVITY RESULT: 15 NG/L — SIGNIFICANT CHANGE UP (ref 0–51)
WBC # BLD: 6.45 K/UL — SIGNIFICANT CHANGE UP (ref 3.8–10.5)
WBC # FLD AUTO: 6.45 K/UL — SIGNIFICANT CHANGE UP (ref 3.8–10.5)

## 2024-04-11 PROCEDURE — 99232 SBSQ HOSP IP/OBS MODERATE 35: CPT | Mod: GC

## 2024-04-11 PROCEDURE — 93010 ELECTROCARDIOGRAM REPORT: CPT

## 2024-04-11 RX ORDER — ACETAMINOPHEN 500 MG
1000 TABLET ORAL ONCE
Refills: 0 | Status: COMPLETED | OUTPATIENT
Start: 2024-04-11 | End: 2024-04-11

## 2024-04-11 RX ADMIN — Medication 1000 MILLIGRAM(S): at 18:27

## 2024-04-11 RX ADMIN — APIXABAN 5 MILLIGRAM(S): 2.5 TABLET, FILM COATED ORAL at 18:11

## 2024-04-11 RX ADMIN — APIXABAN 5 MILLIGRAM(S): 2.5 TABLET, FILM COATED ORAL at 06:19

## 2024-04-11 RX ADMIN — Medication 400 MILLIGRAM(S): at 18:11

## 2024-04-11 RX ADMIN — Medication 3 MILLILITER(S): at 16:01

## 2024-04-11 RX ADMIN — Medication 2: at 13:42

## 2024-04-11 RX ADMIN — BUDESONIDE AND FORMOTEROL FUMARATE DIHYDRATE 2 PUFF(S): 160; 4.5 AEROSOL RESPIRATORY (INHALATION) at 08:24

## 2024-04-11 RX ADMIN — Medication 3 MILLILITER(S): at 22:33

## 2024-04-11 RX ADMIN — ATORVASTATIN CALCIUM 20 MILLIGRAM(S): 80 TABLET, FILM COATED ORAL at 22:35

## 2024-04-11 RX ADMIN — Medication 3 MILLIGRAM(S): at 22:33

## 2024-04-11 RX ADMIN — BUDESONIDE AND FORMOTEROL FUMARATE DIHYDRATE 2 PUFF(S): 160; 4.5 AEROSOL RESPIRATORY (INHALATION) at 22:33

## 2024-04-11 RX ADMIN — MIRTAZAPINE 45 MILLIGRAM(S): 45 TABLET, ORALLY DISINTEGRATING ORAL at 22:33

## 2024-04-11 RX ADMIN — Medication 3 MILLILITER(S): at 10:15

## 2024-04-11 RX ADMIN — INSULIN GLARGINE 17 UNIT(S): 100 INJECTION, SOLUTION SUBCUTANEOUS at 22:35

## 2024-04-11 RX ADMIN — Medication 2: at 22:36

## 2024-04-11 NOTE — PROGRESS NOTE ADULT - SUBJECTIVE AND OBJECTIVE BOX
OVERNIGHT EVENTS:    SUBJECTIVE / INTERVAL HPI: Patient seen and examined at bedside.     ROS: negative unless otherwise stated above.    VITAL SIGNS:  Vital Signs Last 24 Hrs  T(C): 36.4 (11 Apr 2024 05:35), Max: 36.8 (10 Apr 2024 08:57)  T(F): 97.5 (11 Apr 2024 05:35), Max: 98.2 (10 Apr 2024 08:57)  HR: 55 (11 Apr 2024 05:35) (55 - 63)  BP: 147/74 (11 Apr 2024 05:35) (125/71 - 147/74)  BP(mean): --  RR: 19 (11 Apr 2024 05:35) (16 - 19)  SpO2: 95% (11 Apr 2024 05:35) (94% - 99%)    Parameters below as of 11 Apr 2024 05:35  Patient On (Oxygen Delivery Method): room air        PHYSICAL EXAM:    General: In no apparent distress  HEENT: NC/AT; PERRL, anicteric sclera; MMM  Neck: supple  Cardiovascular: +S1/S2; RRR  Respiratory: CTA B/L; no W/R/R  Gastrointestinal: soft, NT/ND; +BSx4  Extremities: WWP; no edema, clubbing or cyanosis  Vascular: 2+ radial, DP/PT pulses B/L  Neurological: AAOx3; no focal deficits    MEDICATIONS:  MEDICATIONS  (STANDING):  albuterol/ipratropium for Nebulization 3 milliLiter(s) Nebulizer every 6 hours  apixaban 5 milliGRAM(s) Oral every 12 hours  atorvastatin 20 milliGRAM(s) Oral at bedtime  budesonide 160 MICROgram(s)/formoterol 4.5 MICROgram(s) Inhaler 2 Puff(s) Inhalation two times a day  dextrose 10% Bolus 125 milliLiter(s) IV Bolus once  dextrose 5%. 1000 milliLiter(s) (50 mL/Hr) IV Continuous <Continuous>  dextrose 50% Injectable 25 Gram(s) IV Push once  glucagon  Injectable 1 milliGRAM(s) IntraMuscular once  insulin glargine Injectable (LANTUS) 17 Unit(s) SubCutaneous at bedtime  insulin lispro (ADMELOG) corrective regimen sliding scale   SubCutaneous Before meals and at bedtime  mirtazapine 45 milliGRAM(s) Oral at bedtime  sodium chloride 0.9%. 1000 milliLiter(s) (80 mL/Hr) IV Continuous <Continuous>    MEDICATIONS  (PRN):  acetaminophen     Tablet .. 650 milliGRAM(s) Oral every 6 hours PRN Temp greater or equal to 38C (100.4F), Mild Pain (1 - 3)  aluminum hydroxide/magnesium hydroxide/simethicone Suspension 30 milliLiter(s) Oral every 4 hours PRN Dyspepsia  dextrose Oral Gel 15 Gram(s) Oral once PRN Blood Glucose LESS THAN 70 milliGRAM(s)/deciliter  melatonin 3 milliGRAM(s) Oral at bedtime PRN Insomnia  ondansetron Injectable 4 milliGRAM(s) IV Push every 8 hours PRN Nausea and/or Vomiting      ALLERGIES:  Allergies    peanuts (Rash)  adenosine (Unknown)    Intolerances        LABS:                        9.9    5.13  )-----------( 201      ( 10 Apr 2024 05:30 )             30.8     04-10    141  |  111<H>  |  30<H>  ----------------------------<  205<H>  5.2   |  17<L>  |  1.32<H>    Ca    9.6      10 Apr 2024 05:30  Phos  4.3     04-10  Mg     1.6     04-10    TPro  6.2  /  Alb  3.6  /  TBili  0.3  /  DBili  x   /  AST  21  /  ALT  12  /  AlkPhos  77  04-10      Urinalysis Basic - ( 10 Apr 2024 05:30 )    Color: x / Appearance: x / SG: x / pH: x  Gluc: 205 mg/dL / Ketone: x  / Bili: x / Urobili: x   Blood: x / Protein: x / Nitrite: x   Leuk Esterase: x / RBC: x / WBC x   Sq Epi: x / Non Sq Epi: x / Bacteria: x      CAPILLARY BLOOD GLUCOSE      POCT Blood Glucose.: 238 mg/dL (10 Apr 2024 22:08)      RADIOLOGY & ADDITIONAL TESTS: Reviewed. OVERNIGHT EVENTS: AFSANEH    SUBJECTIVE / INTERVAL HPI: The patient was encountered lying in bed, AAOx3, in no acute distress. She said she slept more last night than she has in a long time (she has refractory insomnia). She noted that she is still not drinking very much water. The patient denied any new or acute complaints, specifically, she denied n/v/d, and she denied chest pain and palpitations.     ROS: negative unless otherwise stated above.    VITAL SIGNS:  Vital Signs Last 24 Hrs  T(C): 36.4 (11 Apr 2024 05:35), Max: 36.8 (10 Apr 2024 08:57)  T(F): 97.5 (11 Apr 2024 05:35), Max: 98.2 (10 Apr 2024 08:57)  HR: 55 (11 Apr 2024 05:35) (55 - 63)  BP: 147/74 (11 Apr 2024 05:35) (125/71 - 147/74)  BP(mean): --  RR: 19 (11 Apr 2024 05:35) (16 - 19)  SpO2: 95% (11 Apr 2024 05:35) (94% - 99%)    Parameters below as of 11 Apr 2024 05:35  Patient On (Oxygen Delivery Method): room air        PHYSICAL EXAM:    Constitutional: resting comfortably in bed; NAD  Head: NC/AT  Eyes: PERRL, EOMI, anicteric sclera  ENT: no nasal discharge; uvula midline, no oropharyngeal erythema or exudates; mucous membranes dry  Neck: supple; no JVD or thyromegaly  Respiratory: CTA B/L; no W/R/R, no retractions  Cardiac: +S1/S2; regular rhythm, but bradycardic; no M/R/G;   Gastrointestinal: abdomen soft, NT/ND; no rebound or guarding; +BSx4  Back: spine midline, no bony tenderness or step-offs; no CVAT B/L  Extremities: WWP, no clubbing or cyanosis; edema to 1+ RLE only  Musculoskeletal: Tenderness to the left knee joint; no swelling, warmth, or erythema   Vascular: 2+ radial, DP pulses B/L  Dermatologic: skin warm, dry and intact. Right LE with healing excoriations and scabbing from reported "rash"; ?venous stasis. Patchy hypopigmentation on b/l LE below the knee.  Lymphatic: no submandibular or cervical LAD  Neurologic: AAOx3; CNII-XII grossly intact; no focal deficits. Strength diminished in LUE and LLE to 3-4/5; 4.5-5/5 on the RUE and RLE  Psychiatric: affect and characteristics of appearance, verbalizations, behaviors are appropriate    MEDICATIONS:  MEDICATIONS  (STANDING):  albuterol/ipratropium for Nebulization 3 milliLiter(s) Nebulizer every 6 hours  apixaban 5 milliGRAM(s) Oral every 12 hours  atorvastatin 20 milliGRAM(s) Oral at bedtime  budesonide 160 MICROgram(s)/formoterol 4.5 MICROgram(s) Inhaler 2 Puff(s) Inhalation two times a day  dextrose 10% Bolus 125 milliLiter(s) IV Bolus once  dextrose 5%. 1000 milliLiter(s) (50 mL/Hr) IV Continuous <Continuous>  dextrose 50% Injectable 25 Gram(s) IV Push once  glucagon  Injectable 1 milliGRAM(s) IntraMuscular once  insulin glargine Injectable (LANTUS) 17 Unit(s) SubCutaneous at bedtime  insulin lispro (ADMELOG) corrective regimen sliding scale   SubCutaneous Before meals and at bedtime  mirtazapine 45 milliGRAM(s) Oral at bedtime  sodium chloride 0.9%. 1000 milliLiter(s) (80 mL/Hr) IV Continuous <Continuous>    MEDICATIONS  (PRN):  acetaminophen     Tablet .. 650 milliGRAM(s) Oral every 6 hours PRN Temp greater or equal to 38C (100.4F), Mild Pain (1 - 3)  aluminum hydroxide/magnesium hydroxide/simethicone Suspension 30 milliLiter(s) Oral every 4 hours PRN Dyspepsia  dextrose Oral Gel 15 Gram(s) Oral once PRN Blood Glucose LESS THAN 70 milliGRAM(s)/deciliter  melatonin 3 milliGRAM(s) Oral at bedtime PRN Insomnia  ondansetron Injectable 4 milliGRAM(s) IV Push every 8 hours PRN Nausea and/or Vomiting      ALLERGIES:  Allergies    peanuts (Rash)  adenosine (Unknown)    Intolerances        LABS:                        9.9    5.13  )-----------( 201      ( 10 Apr 2024 05:30 )             30.8     04-10    141  |  111<H>  |  30<H>  ----------------------------<  205<H>  5.2   |  17<L>  |  1.32<H>    Ca    9.6      10 Apr 2024 05:30  Phos  4.3     04-10  Mg     1.6     04-10    TPro  6.2  /  Alb  3.6  /  TBili  0.3  /  DBili  x   /  AST  21  /  ALT  12  /  AlkPhos  77  04-10      Urinalysis Basic - ( 10 Apr 2024 05:30 )    Color: x / Appearance: x / SG: x / pH: x  Gluc: 205 mg/dL / Ketone: x  / Bili: x / Urobili: x   Blood: x / Protein: x / Nitrite: x   Leuk Esterase: x / RBC: x / WBC x   Sq Epi: x / Non Sq Epi: x / Bacteria: x      CAPILLARY BLOOD GLUCOSE      POCT Blood Glucose.: 238 mg/dL (10 Apr 2024 22:08)      RADIOLOGY & ADDITIONAL TESTS: Reviewed.

## 2024-04-11 NOTE — PROGRESS NOTE ADULT - PROBLEM SELECTOR PLAN 3
Pt with no known h/o kidney disease p/w BEBE (BUN 39; Crt 1.78, K 5.6). Likely i/s/o pre-renal d/t dehydration from poor PO intake.  - F/u urine studies  -Trend BMP (next at 10pm 4/9) - K WNL at 10pm and on 4/10 AM labs; Crt downtrending  - Gentle hydration w/ NS @ 80cc/hr Pt with no known h/o kidney disease p/w BEBE (BUN 39; Crt 1.78, K 5.6). Likely i/s/o pre-renal d/t dehydration from poor PO intake.  - F/u urine studies  - Trend BMP (next at 10pm 4/9) - K WNL at 10pm and on 4/10 AM labs; Crt downtrending  - Gentle hydration w/ NS @ 80cc/hr; encourage PO intake

## 2024-04-11 NOTE — DIETITIAN INITIAL EVALUATION ADULT - ADD RECOMMEND
1. Continue Consistent Carbohydrate diet   2. Encourage and monitor PO intake, honor preferences as able   3. Monitor labs, wt trends, GI function, and skin integrity   4. Pain and bowel regimen per team   5. RD to remain available for additional nutrition interventions and diet edu as needed

## 2024-04-11 NOTE — DIETITIAN INITIAL EVALUATION ADULT - PROBLEM SELECTOR PLAN 2
The patient presents with a lactate of 3.1 --> 2.9 --> 2.3. Pt is afebrile with a normal WBC count and no other infectious symptoms. Lungs CTA and CXR unremarkable. Consider Metformin use vs.hypovolemia (BEBE on presentation; no known kidney disease).  Plan:  - Continue to trend lactate  - F/u urine studies  - Retroperitoneal U/S  - Monitor for infectious symptoms  - c/w maintence fluids

## 2024-04-11 NOTE — DIETITIAN INITIAL EVALUATION ADULT - PROBLEM SELECTOR PLAN 7
The patient has a h/o asthma, well-controlled on home medications and without current exacerbation.  - c/w Home med Symbicort 160-4.5, 2 puffs BID  - Duonebs q6 prn

## 2024-04-11 NOTE — DIETITIAN INITIAL EVALUATION ADULT - PERTINENT LABORATORY DATA
04-10    141  |  111<H>  |  30<H>  ----------------------------<  205<H>  5.2   |  17<L>  |  1.32<H>    Ca    9.6      10 Apr 2024 05:30  Phos  4.3     04-10  Mg     1.6     04-10    TPro  6.2  /  Alb  3.6  /  TBili  0.3  /  DBili  x   /  AST  21  /  ALT  12  /  AlkPhos  77  04-10  POCT Blood Glucose.: 189 mg/dL (04-11-24 @ 13:03)  A1C with Estimated Average Glucose Result: 8.3 % (04-10-24 @ 05:30)

## 2024-04-11 NOTE — DIETITIAN INITIAL EVALUATION ADULT - OTHER CALCULATIONS
Statement Selected Based on Standards of Care pt within % IBW (135#, 119%) thus actual body weight used for all calculations (160#). Needs adjusted for advanced age.

## 2024-04-11 NOTE — PROGRESS NOTE ADULT - PROBLEM SELECTOR PLAN 11
Fluids: NS @ 80cc/hr  Electrolytes: Replete PRN  K<4.0, Mg<2.0, Phos< 2.5  N: Consistent carb  GI ppx: None  DVT ppx: Eliquis  Code: Full    Dispo: [facility]

## 2024-04-11 NOTE — DIETITIAN INITIAL EVALUATION ADULT - OTHER INFO
77y/o F with a PMHx of HTN, HLD, DM, Afib on Eliquis, knee arthritis, asthma, and depression who p/w generalized weakness and difficulty walking since yesterday. The patient states that she normally ambulates well with a walker, but has been too weak to get out of bed since yesterday (4/8) AM. She states that the weakness is in her whole body; not just her legs. The patient reports eating and drinking as usual without skipping meals, and she denies changes in bowel or bladder habits (last BM yesterday morning). According to the daughter, the pt has been compliant with all of her medications, but the daughter did not give them this morning (4/9) d/t weakness. The patient denies trauma/fall, chest pain, SOB, palpitations, dizziness, lightheadedness, and headache. She also denies chills, sweats, blurry vision, n/v/d. The patient endorses a rash on her RLE for the past two weeks. The LE was wrapped in gauze, but upon inspection, it appears to be dry skin, possibly with venous stasis, with excoriations and scabbing from scratching. The patient has no known sick contacts and no recent travel history.     Patient seen at bedside for nutrition assessment. Current diet order: Consistent Carbohydrate. Noted with peanut allergy- documented in EMR/CBORD. No difficulty chewing/swallowing reported. Reports fair to poor appetite, however states she has always been a small eater. Typical intake includes potato salad, macaroni and cheese, cabbage, pork, soup. Consumed ~50-75% of breakfast this AM which consisted of home fries and turkey rodriguez. Dosing weight: 160#, BMI 25.1, reports UBW of 150-160# and denies recent weight loss. No pressure injuries documented, noted with trace edema to bilateral ankle. Noted with abdominal distention, last BM 4/10 per EMR. Labs: BUN 30<H>, Cr 1.32<H>, POCT 132-238 x 24 hours, HgbA1c 8.3%<H>. Meds: 17U lantus at bedtime, zofran, mirtazapine- may have appetite stimulating effects. Observed with no overt signs and symptoms of muscle or fat wasting. Based on ASPEN guidelines, pt does not meet criteria for malnutrition at this time. Nutrition education provided. Encouraged adequate PO intake and reviewed consistent carbohydrate diet recommendations including CHO examples, basic CHO counting, role of protein and fiber. Pt expressed understanding. See nutrition recommendations below.

## 2024-04-11 NOTE — DIETITIAN INITIAL EVALUATION ADULT - PROBLEM SELECTOR PLAN 5
The patient has a h/o Afib on Eliquis, and presents with EKG showing sinus bradycardia.  Plan:  - c/w Eliquis 5mg PO BID  - c/w cristopher toprol w/ hold parameters

## 2024-04-11 NOTE — DIETITIAN INITIAL EVALUATION ADULT - PROBLEM SELECTOR PLAN 1
The pt  p/w generalized weakness and difficulty walking for one day. She normally ambulates well with a walker, but has been too weak to get out of bed since yesterday (4/8) AM. She states that the weakness is in her whole body; not just her legs. Neuro exam shows decreased strength in the RUE and RLE; sensation intact. no incontinence, no sciatica/back pain.  Pt denies trauma/fall, and she denies lightheadedness, dizziness, and palpitations. Pt is dry on exam, and chemistry shows BEBE (BUN 39; Crt 1.78) as well as mild normocytic anemia (RBC 3.45, Hgb 10.5, Hct: 31.7). Consider dehydration vs. poor PO intake vs. anemia.  Plan:  - Fall precautions  - PT/OT consult  - Iron studies  - Urine studies  - B12 and folate   - TSH  - L knee xray

## 2024-04-11 NOTE — DIETITIAN INITIAL EVALUATION ADULT - PERTINENT MEDS FT
MEDICATIONS  (STANDING):  albuterol/ipratropium for Nebulization 3 milliLiter(s) Nebulizer every 6 hours  apixaban 5 milliGRAM(s) Oral every 12 hours  atorvastatin 20 milliGRAM(s) Oral at bedtime  budesonide 160 MICROgram(s)/formoterol 4.5 MICROgram(s) Inhaler 2 Puff(s) Inhalation two times a day  dextrose 10% Bolus 125 milliLiter(s) IV Bolus once  dextrose 5%. 1000 milliLiter(s) (50 mL/Hr) IV Continuous <Continuous>  dextrose 50% Injectable 25 Gram(s) IV Push once  glucagon  Injectable 1 milliGRAM(s) IntraMuscular once  insulin glargine Injectable (LANTUS) 17 Unit(s) SubCutaneous at bedtime  insulin lispro (ADMELOG) corrective regimen sliding scale   SubCutaneous Before meals and at bedtime  mirtazapine 45 milliGRAM(s) Oral at bedtime  sodium chloride 0.9%. 1000 milliLiter(s) (80 mL/Hr) IV Continuous <Continuous>    MEDICATIONS  (PRN):  acetaminophen     Tablet .. 650 milliGRAM(s) Oral every 6 hours PRN Temp greater or equal to 38C (100.4F), Mild Pain (1 - 3)  aluminum hydroxide/magnesium hydroxide/simethicone Suspension 30 milliLiter(s) Oral every 4 hours PRN Dyspepsia  dextrose Oral Gel 15 Gram(s) Oral once PRN Blood Glucose LESS THAN 70 milliGRAM(s)/deciliter  melatonin 3 milliGRAM(s) Oral at bedtime PRN Insomnia  ondansetron Injectable 4 milliGRAM(s) IV Push every 8 hours PRN Nausea and/or Vomiting

## 2024-04-11 NOTE — DISCHARGE NOTE PROVIDER - NSDCCPCAREPLAN_GEN_ALL_CORE_FT
PRINCIPAL DISCHARGE DIAGNOSIS  Diagnosis: Weakness  Assessment and Plan of Treatment:       SECONDARY DISCHARGE DIAGNOSES  Diagnosis: Dehydration  Assessment and Plan of Treatment:     Diagnosis: BEBE (acute kidney injury)  Assessment and Plan of Treatment:     Diagnosis: Hyperkalemia  Assessment and Plan of Treatment:      PRINCIPAL DISCHARGE DIAGNOSIS  Diagnosis: Weakness  Assessment and Plan of Treatment: You were admitted due to weakness. When you arrived, you had notable weakness in your upper and lower body, more on the left than on the right. Imaging of your brain was negative for any bleeding or other abnormality, but your lab results did show evidence of dehydration (you were not drinking enough water).   After giving you fluid through an IV, your symptoms improved, and your lab work normalized. However, you still have some weakness, partly due to arthritis in your left knee, so physical therapy recommended that you be discharged to a rehabilitation facility for strength-building before you are sent home.   PLEASE RETURN TO THE HOSPITAL IMMEDIATELY IF: You experience dizziness, lightheadedness, loss of consciousness, chest pain, or palpitations.      SECONDARY DISCHARGE DIAGNOSES  Diagnosis: Dehydration  Assessment and Plan of Treatment: You were diagnosed with dehydration. When you were admitted to the hospital, your lab results showed that your kidneys were not working well, and your electrolytes were out of balance, all likely due to not drinking enough water at home. Once we gave you fluids through an IV, your symptoms improved, and your lab results improved as well.  After discharge, please continue to pay attention to how much fluid you drink. You should be drinking at least one of the bigger bottles of water that we talked about (20oz) with each meal, three times per day, to maintain hydration.   PLEASE RETURN TO THE HOSPITAL IMMEDIATELY IF: You experience dizziness, lightheadedness, loss of consciousness, chest pain, or palpitations.

## 2024-04-11 NOTE — DISCHARGE NOTE PROVIDER - NSDCCPTREATMENT_GEN_ALL_CORE_FT
PRINCIPAL PROCEDURE  Procedure: CT head  Findings and Treatment: CLINICAL INDICATION: Weakness  TECHNIQUE : Axial CT scanning of the brain was obtained from the skull base to the vertex without the administration of intravenous contrast. Sagittal and coronal reformats were provided.  COMPARISON: CT brain 12/30/2017 and 10/5/2017  FINDINGS:  Redemonstration of right frontal bakari hole.  Similar high left lateral convexity low-density subdural collection since 10/5/2017, measuring 1.3 cm in greatest depth.  Similar high right lateral convexity low density subdural collection measuring 1 cm in greatest depth.  Rightward midline shift of 3 mm is similar. Basal cisterns are visualized. No hydrocephalus.  No acute intracranial hemorrhage or brain edema.  Chronic left lentiform nucleus infarct versus for enlarged perivascular space.  The visualized paranasal sinuses and mastoid air cells are clear.  IMPRESSION:  No significant interval change since CT brain 10/5/2017.  No acute intracranial hemorrhage or brain edema.  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  Chronic left lentiform nucleus infarct versus for enlarged perivascular space.

## 2024-04-11 NOTE — DIETITIAN INITIAL EVALUATION ADULT - PROBLEM SELECTOR PLAN 10
The pt has a h/o major depression and takes mirtazapine 45mg PO q24 at bedtime.  Plan:  - c/w home med

## 2024-04-11 NOTE — PROGRESS NOTE ADULT - PROBLEM SELECTOR PLAN 8
The patient has a h/o of type 2 diabetes, on insulin. Home meds include: lantus 22U at bedtime, humalog 6 units q24, and metformin 1000mg PO BID.  - Start Lantus 17U (80% home dose) at bedtime 4/9  - mISS  - A1c in AM labs  - Consistent carb diet  - Monitor FSG The patient has a h/o of type 2 diabetes, on insulin. Home meds include: lantus 22U at bedtime, humalog 6 units q24, and metformin 1000mg PO BID.  - Start Lantus 17U (80% home dose) at bedtime 4/9  - mISS  - A1c in AM labs  - Consistent carb diet  - Monitor FSG  - Glu readings high on 4/10-11; consider upping insulin to full home dose

## 2024-04-11 NOTE — PROGRESS NOTE ADULT - PROBLEM SELECTOR PLAN 1
The pt  p/w generalized weakness and difficulty walking for one day. She normally ambulates well with a walker, but has been too weak to get out of bed since yesterday (4/8) AM. She states that the weakness is in her whole body; not just her legs. Neuro exam shows decreased strength in the RUE and RLE; sensation intact. no incontinence, no sciatica/back pain.  Pt denies trauma/fall, and she denies lightheadedness, dizziness, and palpitations. Pt is dry on exam, and chemistry shows BEBE (BUN 39; Crt 1.78) as well as mild normocytic anemia (RBC 3.45, Hgb 10.5, Hct: 31.7). Consider dehydration vs. poor PO intake vs. anemia.  Plan:  - Fall precautions  - PT/OT consult - recommend JESSENIA  - Iron studies - %Iron sat low 4/10  - Urine studies  - B12 and folate   - TSH - WNL 4/10  - L knee xray The pt  p/w generalized weakness and difficulty walking for one day. She normally ambulates well with a walker, but has been too weak to get out of bed since yesterday (4/8) AM. She states that the weakness is in her whole body; not just her legs. Neuro exam shows decreased strength in the RUE and RLE; sensation intact. no incontinence, no sciatica/back pain.  Pt denies trauma/fall, and she denies lightheadedness, dizziness, and palpitations. Pt is dry on exam, and chemistry shows BEBE (BUN 39; Crt 1.78) as well as mild normocytic anemia (RBC 3.45, Hgb 10.5, Hct: 31.7). Consider dehydration vs. poor PO intake vs. anemia.  Plan:  - Fall precautions  - PT/OT consult - recommend JESSENIA  - Iron studies - %Iron sat low 4/10  - Urine studies  - B12 and folate   - TSH - WNL 4/10  - L knee xray --> osteoarthritis and bone infarcts

## 2024-04-11 NOTE — DIETITIAN INITIAL EVALUATION ADULT - PERSON TAUGHT/METHOD
consistent carbohydrate diet recommendations including CHO examples, basic CHO counting, role of protein and fiber/verbal instruction/patient instructed

## 2024-04-11 NOTE — DIETITIAN INITIAL EVALUATION ADULT - PROBLEM SELECTOR PLAN 3
Pt with no known h/o kidney disease p/w BEBE (BUN 39; Crt 1.78, K 5.6). Likely i/s/o pre-renal d/t dehydration from poor PO intake.  - F/u urine studies  -Trend BMP (next at 10pm 4/9)  - Gentle hydration w/ NS @ 80cc/hr

## 2024-04-11 NOTE — PROGRESS NOTE ADULT - PROBLEM SELECTOR PLAN 4
The pt presents with normocytic anemia (RBC: 3.45, Hgb 10.5, Hct: 31.7, MCV: 91.9), likely i/s/o chronic disease, but also consider blood loss given weakness and unknown h/o screening colonoscopy (less likely given no report of melena)  Plan:  - Trend CBC  - Maintain active T&S  - F/u iron studies - %iron saturation low  - Transfuse for Hgb < 7.0

## 2024-04-11 NOTE — DIETITIAN INITIAL EVALUATION ADULT - PROBLEM SELECTOR PLAN 4
The pt presents with normocytic anemia (RBC: 3.45, Hgb 10.5, Hct: 31.7, MCV: 91.9), likely i/s/o chronic disease, but also consider blood loss given weakness and unknown h/o screening colonoscopy (less likely given no report of melena)  Plan:  - Trend CBC  - Maintain active T&S  - F/u iron studies  - Transfuse for Hgb < 7.0

## 2024-04-11 NOTE — PROGRESS NOTE ADULT - SUBJECTIVE AND OBJECTIVE BOX
Physical Medicine and Rehabilitation Progress Note :       Patient is a 78y old  Female who presents with a chief complaint of Weakness (11 Apr 2024 06:24)      HPI:  Ms. Ambrosio is a 79y/o F with a PMHx of HTN, HLD, DM, Afib on Eliquis, knee arthritis, asthma, and depression who p/w generalized weakness and difficulty walking since yesterday. The patient states that she normally ambulates well with a walker, but has been too weak to get out of bed since yesterday (4/8) AM. She states that the weakness is in her whole body; not just her legs. The patient reports eating and drinking as usual without skipping meals, and she denies changes in bowel or bladder habits (last BM yesterday morning). According to the daughter, the pt has been compliant with all of her medications, but the daughter did not give them this morning (4/9) d/t weakness. The patient denies trauma/fall, chest pain, SOB, palpitations, dizziness, lightheadedness, and headache. She also denies chills, sweats, blurry vision, n/v/d. The patient endorses a rash on her RLE for the past two weeks. The LE was wrapped in gauze, but upon inspection, it appears to be dry skin, possibly with venous stasis, with excoriations and scabbing from scratching. The patient has no known sick contacts and no recent travel history.     ED Course:  V/S:  Temp: 98.7, HR: 74, BP: 102/63, RR: 18, SpO2: 98% RA  Pertinent labs:  RBC: 3.45, Hgb: 10.5, Hct: 31.7, K: 5.6, Cl: 110, CO2: 20, BUN: 39, Crt: 1.78, Glu: 139, pro-BNP: 1098, Lactate: 3.2->2.9->2.3  Urine: Ketone: trace, Leuk est: trace, Hyaline casts: present  EKG:  Sinus bradycardia  Imaging:  CT Head n/c: No significant interval change since CT brain 10/5/2017. No acute intracranial hemorrhage or brain edema. 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 Chronic left lentiform nucleus infarct versus for enlarged perivascular space.  CXR: No acute pathology  Interventions:  - Magnesium 1g IVP x 1  - Lokelma 5g PO x 1  - NS 1L IV Bolus x 2 (09 Apr 2024 17:20)                            10.4   6.45  )-----------( 206      ( 11 Apr 2024 10:00 )             31.6       04-10    141  |  111<H>  |  30<H>  ----------------------------<  205<H>  5.2   |  17<L>  |  1.32<H>    Ca    9.6      10 Apr 2024 05:30  Phos  4.3     04-10  Mg     1.6     04-10    TPro  6.2  /  Alb  3.6  /  TBili  0.3  /  DBili  x   /  AST  21  /  ALT  12  /  AlkPhos  77  04-10    Vital Signs Last 24 Hrs  T(C): 36.4 (11 Apr 2024 05:35), Max: 36.7 (10 Apr 2024 15:48)  T(F): 97.5 (11 Apr 2024 05:35), Max: 98.1 (10 Apr 2024 15:48)  HR: 55 (11 Apr 2024 05:35) (55 - 62)  BP: 147/74 (11 Apr 2024 05:35) (138/78 - 147/74)  BP(mean): --  RR: 19 (11 Apr 2024 05:35) (18 - 19)  SpO2: 95% (11 Apr 2024 05:35) (94% - 98%)    Parameters below as of 11 Apr 2024 05:35  Patient On (Oxygen Delivery Method): room air        MEDICATIONS  (STANDING):  albuterol/ipratropium for Nebulization 3 milliLiter(s) Nebulizer every 6 hours  apixaban 5 milliGRAM(s) Oral every 12 hours  atorvastatin 20 milliGRAM(s) Oral at bedtime  budesonide 160 MICROgram(s)/formoterol 4.5 MICROgram(s) Inhaler 2 Puff(s) Inhalation two times a day  dextrose 10% Bolus 125 milliLiter(s) IV Bolus once  dextrose 5%. 1000 milliLiter(s) (50 mL/Hr) IV Continuous <Continuous>  dextrose 50% Injectable 25 Gram(s) IV Push once  glucagon  Injectable 1 milliGRAM(s) IntraMuscular once  insulin glargine Injectable (LANTUS) 17 Unit(s) SubCutaneous at bedtime  insulin lispro (ADMELOG) corrective regimen sliding scale   SubCutaneous Before meals and at bedtime  mirtazapine 45 milliGRAM(s) Oral at bedtime  sodium chloride 0.9%. 1000 milliLiter(s) (80 mL/Hr) IV Continuous <Continuous>    MEDICATIONS  (PRN):  acetaminophen     Tablet .. 650 milliGRAM(s) Oral every 6 hours PRN Temp greater or equal to 38C (100.4F), Mild Pain (1 - 3)  aluminum hydroxide/magnesium hydroxide/simethicone Suspension 30 milliLiter(s) Oral every 4 hours PRN Dyspepsia  dextrose Oral Gel 15 Gram(s) Oral once PRN Blood Glucose LESS THAN 70 milliGRAM(s)/deciliter  melatonin 3 milliGRAM(s) Oral at bedtime PRN Insomnia  ondansetron Injectable 4 milliGRAM(s) IV Push every 8 hours PRN Nausea and/or Vomiting      T(C): 36.4 (04-11-24 @ 05:35), Max: 36.7 (04-10-24 @ 15:48)  HR: 55 (04-11-24 @ 05:35) (55 - 62)  BP: 147/74 (04-11-24 @ 05:35) (138/78 - 147/74)  RR: 19 (04-11-24 @ 05:35) (18 - 19)  SpO2: 95% (04-11-24 @ 05:35) (94% - 98%)        Physical Exam:       78 y o woman lying comfortably in semi Mejia's position , awake , alert , no acute complaints      Head: normocephalic , atraumatic    Eyes: PERRLA , EOMI , no nystagmus , sclera anicteric    ENT / FACE: neg nasal discharge , uvula midline , no oropharyngeal erythema / exudate    Neck: supple , negative JVD , negative carotid bruits , no thyromegaly    Chest: CTA bilaterally , neg wheeze / rhonchi / rales / crackles / egophany    Cardiovascular: regular rate and rhythm , neg murmurs / rubs / gallops    Abdomen: soft , non distended , non tender to palpation in all 4 quadrants ,  normal bowel sounds     Extremities:  R leg excoriations    Neurologic Exam:     Alert and oriented x 3        Motor Exam:        > 3+/5 x 4 extremities       Sensation:         intact to light touch x 4 extremities                            no neglect or extinction on double simultaneous testing    DTR:           biceps/brachioradialis: equal                            patella/ankle: equal           neg Babinski         Coordination:            Finger to Nose:  neg dysmetria bilaterally          Functional Status Assessment :         Pain Assessment/Number Scale (0-10) Adult  Presence of Pain: denies pain/discomfort (Rating = 0)  Pain Rating (0-10): Rest: 0 (no pain/absence of nonverbal indicators of pain)  Pain Rating (0-10): Activity: 0 (no pain/absence of nonverbal indicators of pain)    Safety      AM-PAC Functional Assessment: Basic Mobility  Type of Assessment: Daily assessment  Turning from your back to your side while in a flat bed without using bedrails?: 3 = A little assistance  Moving from lying on your back to sitting on the flat side of a flat bed without using bedrails?: 3 = A little assistance  Moving to and from a bed to a chair (including a wheelchair)?: 3 = A little assistance  Standing up from a chair using your arms (e.g. wheelchair or bedside chair)?: 2 = A lot of assistance  Walking in hospital room?: 2 = A lot of assistance  Climbing 3-5 steps with a railing?: 2 = A lot of assistance  Score: 15   Row Comment: Ask the patient "How much help from another person do you currently need? (If the patient hasn't done an activity recently, how much help from another person do you think he/she needs if he/she tried?)    Cognitive/Neuro      Language Assistance  Preferred Language to Address Healthcare Preferred Language to Address Healthcare: English    Therapeutic Interventions      Bed Mobility  Bed Mobility Training Rolling/Turning: minimum assist (75% patient effort);  1 person assist;  verbal cues  Bed Mobility Training Scooting: minimum assist (75% patient effort);  1 person assist;  verbal cues  Bed Mobility Training Sit-to-Supine: minimum assist (75% patient effort);  2 person assist;  verbal cues;  bed rails  Bed Mobility Training Supine-to-Sit: minimum assist (75% patient effort);  2 person assist;  verbal cues;  bed rails  Bed Mobility Training Limitations: decreased ability to use arms for pushing/pulling;  decreased ability to use legs for bridging/pushing;  impaired ability to control trunk for mobility;  decreased flexibility;  decreased ROM;  decreased strength;  impaired balance;  impaired postural control    Sit-Stand Transfer Training  Transfer Training Sit-to-Stand Transfer: minimum assist (75% patient effort);  2 person assist;  verbal cues;  rolling walker  Transfer Training Stand-to-Sit Transfer: minimum assist (75% patient effort);  2 person assist;  verbal cues;  rolling walker  Sit-to-Stand Transfer Training Transfer Safety Analysis: decreased weight-shifting ability;  decreased balance;  Demo fair eccentric control during descend;  decreased flexibility;  decreased ROM;  decreased strength;  impaired balance;  impaired postural control    Gait Training  Gait Training: minimum assist (75% patient effort);  2 person assist;  verbal cues;  rolling walker;  ~10 sidesteps at EOB; 5 feet forward/backward x 1 trial  Gait Analysis: decreased tricia;  increased time in double stance;  decreased hip/knee flexion;  decreased velocity of limb motion;  shuffling;  decreased step length;  decreased stride length;  decreased toe clearance;  decreased weight-shifting ability;  Unsteady gait; No LOB observed.;  decreased flexibility;  decreased ROM;  decreased strength;  impaired balance;  impaired postural control;  ~10 sidesteps at EOB; 5 feet forward/backward x 1 trial;  rolling walker          PM&R Impression : as above    Current disposition plan recommendation :    subacute rehab placement

## 2024-04-11 NOTE — PROGRESS NOTE ADULT - PROBLEM SELECTOR PLAN 2
The patient presents with a lactate of 3.1 --> 2.9 --> 2.3. Pt is afebrile with a normal WBC count and no other infectious symptoms. Lungs CTA and CXR unremarkable. Consider Metformin use vs.hypovolemia (BEBE on presentation; no known kidney disease).  Plan:  - Continue to trend lactate - cleared on 4/10 AM las  - F/u urine studies  - Retroperitoneal U/S  - Monitor for infectious symptoms  - c/w maintenance fluids and encourage PO intake The patient presents with a lactate of 3.1 --> 2.9 --> 2.3. Pt is afebrile with a normal WBC count and no other infectious symptoms. Lungs CTA and CXR unremarkable. Consider Metformin use vs.hypovolemia (BEBE on presentation; no known kidney disease).  Plan:  - Continue to trend lactate - cleared on 4/10 AM labs  - F/u urine studies  - Retroperitoneal U/S --> still pending  - Monitor for infectious symptoms  - c/w maintenance fluids and encourage PO intake

## 2024-04-11 NOTE — PROGRESS NOTE ADULT - PROBLEM SELECTOR PLAN 5
The patient has a h/o Afib on Eliquis, and presents with EKG showing sinus bradycardia.  Plan:  - c/w Eliquis 5mg PO BID  - c/w home toprol w/ hold parameters

## 2024-04-11 NOTE — DISCHARGE NOTE PROVIDER - NSDCFUSCHEDAPPT_GEN_ALL_CORE_FT
Kevin Gillespie  [facility] Physician formerly [facility]  PULMMED [address]  Scheduled Appointment: 05/10/2024    [facility]  HEART[facility] [address]  Scheduled Appointment: 05/14/2024    Donavon Downing  [facility]  HEARTV[facility] [address]  Scheduled Appointment: 06/05/2024    Erasmo Barnes  [facility]  HEARTV[facility] [address]  Scheduled Appointment: 06/10/2024    Jackelyn Pendleton  [facility] Physician formerly [facility]  HEMON[facility] [address]  Scheduled Appointment: 06/21/2024

## 2024-04-11 NOTE — DISCHARGE NOTE PROVIDER - NSDCMRMEDTOKEN_GEN_ALL_CORE_FT
acetaminophen 325 mg oral tablet: 2 tab(s) orally every 4 hours, As needed, Mild Pain (1 - 3)  Albuterol (Eqv-ProAir HFA) 90 mcg/inh inhalation aerosol: 1 puff(s) inhaled every 4 hours  amLODIPine 5 mg oral tablet: 1 tab(s) orally once a day  apixaban 5 mg oral tablet: 2 tab(s) orally every 12 hours  apixaban 5 mg oral tablet: 1 tab(s) orally 2 times a day   atorvastatin 20 mg oral tablet: 1 tab(s) orally once a day (at bedtime)  benztropine 1 mg oral tablet: 1 tab(s) orally 2 times a day  HumaLOG KwikPen 100 units/mL injectable solution: 6 unit(s) injectable once a day  Lantus 100 units/mL subcutaneous solution: 22 unit(s) subcutaneous once a day (at bedtime)  metFORMIN 1000 mg oral tablet: 1 tab(s) orally 2 times a day  mirtazapine 45 mg oral tablet: 1 tab(s) orally once a day (at bedtime)  multivitamin: 1 tab(s) orally once a day  Symbicort 160 mcg-4.5 mcg/inh inhalation aerosol: 2 puff(s) inhaled 2 times a day  Toprol-XL 50 mg oral tablet, extended release: orally 2 times a day  traZODone 100 mg oral tablet: orally once a day (at bedtime)   acetaminophen 325 mg oral tablet: 2 tab(s) orally every 4 hours, As needed, Mild Pain (1 - 3)  Albuterol (Eqv-ProAir HFA) 90 mcg/inh inhalation aerosol: 1 puff(s) inhaled every 4 hours  amLODIPine 5 mg oral tablet: 1 tab(s) orally once a day  apixaban 5 mg oral tablet: 2 tab(s) orally every 12 hours  apixaban 5 mg oral tablet: 1 tab(s) orally 2 times a day   atorvastatin 20 mg oral tablet: 1 tab(s) orally once a day (at bedtime)  benztropine 1 mg oral tablet: 1 tab(s) orally 2 times a day  HumaLOG KwikPen 100 units/mL injectable solution: 6 unit(s) injectable once a day  Lantus 100 units/mL subcutaneous solution: 22 unit(s) subcutaneous once a day (at bedtime)  lidocaine 4% topical film: Apply topically to affected area once a day  metFORMIN 1000 mg oral tablet: 1 tab(s) orally 2 times a day  mirtazapine 45 mg oral tablet: 1 tab(s) orally once a day (at bedtime)  multivitamin: 1 tab(s) orally once a day  polyethylene glycol 3350 oral powder for reconstitution: 17 gram(s) orally every 12 hours  senna leaf extract oral tablet: 1 tab(s) orally once a day (at bedtime)  Symbicort 160 mcg-4.5 mcg/inh inhalation aerosol: 2 puff(s) inhaled 2 times a day  Toprol-XL 50 mg oral tablet, extended release: orally 2 times a day  traZODone 100 mg oral tablet: orally once a day (at bedtime)

## 2024-04-11 NOTE — DISCHARGE NOTE PROVIDER - HOSPITAL COURSE
#Discharge: do not delete    Patient is __ yo M/F with past medical history of _____ presented with _____, found to have _____ (one liner)    Hospital course (by problem):     Patient was discharged to: (home/[facility]/acute rehab/hospice, etc, and with what services – home health PT/RN? Home O2?)    New medications:   Changes to old medications:  Medications that were stopped:    Items to follow up as outpatient:    Physical exam at the time of discharge:       #Discharge: do not delete    Ms. Ambrosio is a 79y/o F with a PMHx of HTN, HLD, DM, Afib on Eliquis, knee arthritis, asthma, and depression who p/w generalized weakness and difficulty walking since yesterday, and was admitted for management of weakness, BEBE and lactic acidosis.    Hospital course (by problem):   #Weakness.   ·  Plan: The pt  p/w generalized weakness and difficulty walking for one day. She normally ambulates well with a walker, but has been too weak to get out of bed since yesterday (4/8) AM. She states that the weakness is in her whole body; not just her legs. Neuro exam shows decreased strength in the RUE and RLE; sensation intact. no incontinence, no sciatica/back pain.  Pt denies trauma/fall, and she denies lightheadedness, dizziness, and palpitations. Pt is dry on exam, and chemistry shows BEBE (BUN 39; Crt 1.78) as well as mild normocytic anemia (RBC 3.45, Hgb 10.5, Hct: 31.7). Consider dehydration vs. poor PO intake vs. anemia.  Plan:  - Fall precautions  - PT/OT consult - recommend [facility]  - Iron studies - %Iron sat low 4/10  - Urine studies  - B12 and folate   - TSH - WNL 4/10  - L knee xray --> osteoarthritis and bone infarcts.    #Lactic acidosis.   ·  Plan: The patient presents with a lactate of 3.1 --> 2.9 --> 2.3. Pt is afebrile with a normal WBC count and no other infectious symptoms. Lungs CTA and CXR unremarkable. Consider Metformin use vs.hypovolemia (BEBE on presentation; no known kidney disease).  Plan:  - Continue to trend lactate - cleared on 4/10 AM labs  - F/u urine studies  - Retroperitoneal U/S --> still pending  - Monitor for infectious symptoms  - Encourage PO fluid intake.    #BEBE (acute kidney injury).   ·  Plan: Pt with no known h/o kidney disease p/w BEBE (BUN 39; Crt 1.78, K 5.6). Likely i/s/o pre-renal d/t dehydration from poor PO intake.  - F/u urine studies  - Trend BMP (next at 10pm 4/9) - K WNL at 10pm and on 4/10 AM labs; Crt downtrending  - encourage PO fluid intake.    #Normocytic anemia.   ·  Plan: The pt presents with normocytic anemia (RBC: 3.45, Hgb 10.5, Hct: 31.7, MCV: 91.9), likely i/s/o chronic disease, but also consider blood loss given weakness and unknown h/o screening colonoscopy (less likely given no report of melena)  Plan:  - Trend CBC  - Maintain active T&S  - F/u iron studies - %iron saturation low  - Transfuse for Hgb < 7.0.    #Afib.   ·  Plan: The patient has a h/o Afib on Eliquis, and presents with EKG showing sinus bradycardia.  Plan:  - c/w Eliquis 5mg PO BID  - c/w home toprol w/ hold parameters.    #HTN (hypertension).   ·  Plan: The pt has a h/o hypertension, but presents with BP of 102/63, likely i/s/o dehydration.  Plan:  - Holding home amlodipine 5mg PO q24  i/s/o hypotension (from baseline) and bradycardia  - cw home Toprol ER 50mg PO q24 w/ hold parameters.    #Asthma.   ·  Plan: The patient has a h/o asthma, well-controlled on home medications and without current exacerbation.  - c/w Home med Symbicort 160-4.5, 2 puffs BID  - Duonebs q6 prn.    #DM (diabetes mellitus).   ·  Plan: The patient has a h/o of type 2 diabetes, on insulin. Home meds include: lantus 22U at bedtime, humalog 6 units q24, and metformin 1000mg PO BID.  - Start Lantus 17U (80% home dose) at bedtime 4/9  - mISS  - A1c in AM labs  - Consistent carb diet  - Monitor FSG  - Glu readings high on 4/10-11; consider upping insulin to full home dose.    #HLD (hyperlipidemia).   ·  Plan: The pt has a h/o HLD and takes Atorvastatin 20mg PO q24 at home.  Plan:  - c/w statin.    #Depression, major.   ·  Plan; The pt has a h/o major depression and takes mirtazapine 45mg PO q24 at bedtime.  Plan:  - c/w home med.    Patient was discharged to: [facility]    New medications:   Changes to old medications:  Medications that were stopped:    Items to follow up as outpatient:    Physical exam at the time of discharge:       #Discharge: do not delete    Ms. Ambrosio is a 79y/o F with a PMHx of HTN, HLD, DM, Afib on Eliquis, knee arthritis, asthma, and depression who p/w generalized weakness and difficulty walking since yesterday, and was admitted for management of weakness, BEBE and lactic acidosis.    Hospital course (by problem):   #Weakness.   ·  Plan: The pt  p/w generalized weakness and difficulty walking for one day. She normally ambulates well with a walker, but has been too weak to get out of bed since yesterday (4/8) AM. She states that the weakness is in her whole body; not just her legs. Neuro exam shows decreased strength in the RUE and RLE; sensation intact. no incontinence, no sciatica/back pain.   Plan:  - Fall precautions  - PT/OT consult - recommend [facility]  - Iron studies - %Iron sat low 4/10  - Urine studies  - B12 and folate   - TSH - WNL 4/10  - L knee xray --> osteoarthritis and bone infarcts.    #Lactic acidosis.   ·  Plan: The patient presents with a lactate of 3.1 --> 2.9 --> 2.3. Pt is afebrile with a normal WBC count and no other infectious symptoms. Lungs CTA and CXR unremarkable. Consider Metformin use vs.hypovolemia (BEBE on presentation; no known kidney disease).  Plan:  - Continue to trend lactate - cleared on 4/10 AM labs  - F/u urine studies  - Retroperitoneal U/S --> still pending  - Monitor for infectious symptoms  - Encourage PO fluid intake.    #BEBE (acute kidney injury).   ·  Plan: Pt with no known h/o kidney disease p/w BEBE (BUN 39; Crt 1.78, K 5.6). Likely i/s/o pre-renal d/t dehydration from poor PO intake.  - F/u urine studies  - Trend BMP (next at 10pm 4/9) - K WNL at 10pm and on 4/10 AM labs; Crt downtrending  - encourage PO fluid intake.    #Normocytic anemia.   ·  Plan: The pt presents with normocytic anemia (RBC: 3.45, Hgb 10.5, Hct: 31.7, MCV: 91.9), likely i/s/o chronic disease, but also consider blood loss given weakness and unknown h/o screening colonoscopy (less likely given no report of melena)  Plan:  - Trend CBC  - Maintain active T&S  - F/u iron studies - %iron saturation low  - Transfuse for Hgb < 7.0.    #Afib.   ·  Plan: The patient has a h/o Afib on Eliquis, and presents with EKG showing sinus bradycardia.  Plan:  - c/w Eliquis 5mg PO BID  - c/w home toprol w/ hold parameters.    #HTN (hypertension).   ·  Plan: The pt has a h/o hypertension, but presents with BP of 102/63, likely i/s/o dehydration.  Plan:  - Holding home amlodipine 5mg PO q24  i/s/o hypotension (from baseline) and bradycardia  - cw home Toprol ER 50mg PO q24 w/ hold parameters.    #Asthma.   ·  Plan: The patient has a h/o asthma, well-controlled on home medications and without current exacerbation.  - c/w Home med Symbicort 160-4.5, 2 puffs BID  - Duonebs q6 prn.    #DM (diabetes mellitus).   ·  Plan: The patient has a h/o of type 2 diabetes, on insulin. Home meds include: lantus 22U at bedtime, humalog 6 units q24, and metformin 1000mg PO BID.  - Start Lantus 17U (80% home dose) at bedtime 4/9  - mISS  - A1c in AM labs  - Consistent carb diet    #HLD (hyperlipidemia).   ·  Plan: The pt has a h/o HLD and takes Atorvastatin 20mg PO q24 at home.  Plan:  - c/w statin.    Patient was discharged to: [facility]    New medications: None  Changes to old medications: None  Medications that were stopped: None    Items to follow up as outpatient: PCP f/u    Physical exam at the time of discharge:  Constitutional: resting comfortably in bed; NAD  Head: NC/AT  Eyes: PERRL, EOMI, anicteric sclera  ENT: no nasal discharge; uvula midline, no oropharyngeal erythema or exudates, mucous membranes dry  Neck: supple; no JVD or thyromegaly  Respiratory: CTA B/L; no W/R/R, no retractions  Cardiac: +S1/S2; regular rhythm, but bradycardic; no M/R/G;   Gastrointestinal: abdomen soft, NT/ND; no rebound or guarding; +BSx4  Back: spine midline, no bony tenderness or step-offs; no CVAT B/L  Extremities: WWP, no clubbing or cyanosis; edema to 1+ RLE only  Musculoskeletal: Tenderness to the left knee joint; no swelling, warmth, or erythema   Vascular: 2+ radial, DP pulses B/L  Dermatologic: skin warm, dry and intact. Right LE with healing excoriations and scabbing from reported "rash"; ?venous stasis. Patchy hypopigmentation on b/l LE below the knee.  Lymphatic: no submandibular or cervical LAD  Neurologic: AAOx3; CNII-XII grossly intact; no focal deficits. Strength diminished in LUE and LLE to 3-4/5; 4.5-5/5 on the RUE and RLE     #Discharge: do not delete    Ms. Ambrosio is a 79y/o F with a PMHx of HTN, HLD, DM, Afib on Eliquis, knee arthritis, asthma, and depression who p/w generalized weakness and difficulty walking since yesterday, and was admitted for management of weakness, BEBE and lactic acidosis.    Hospital course (by problem):   #BEBE with ATN  (acute kidney injury).   ·  Plan: Pt with no known h/o kidney disease p/w BEBE (BUN 39; Crt 1.78, K 5.6). Likely i/s/o pre-renal d/t dehydration from poor PO intake.  - F/u urine studies  - Trend BMP (next at 10pm 4/9) - K WNL at 10pm and on 4/10 AM labs; Crt downtrending    #Weakness.   ·  Plan: The pt  p/w generalized weakness and difficulty walking for one day. She normally ambulates well with a walker, but has been too weak to get out of bed since yesterday (4/8) AM. She states that the weakness is in her whole body; not just her legs. Neuro exam shows decreased strength in the RUE and RLE; sensation intact. no incontinence, no sciatica/back pain.   Plan:  - Fall precautions  - PT/OT consult - recommend [facility]  - L knee xray --> osteoarthritis and bone infarcts.    #Lactic acidosis.   ·  Plan: The patient presents with a lactate of 3.1 --> 2.9 --> 2.3. Pt is afebrile with a normal WBC count and no other infectious symptoms. Lungs CTA and CXR unremarkable. Consider Metformin use vs.hypovolemia (BEBE on presentation; no known kidney disease).  Plan:  - Continue to trend lactate - cleared on 4/10 AM labs  - F/u urine studies  - Retroperitoneal U/S --> still pending  - Monitor for infectious symptoms  - Encourage PO fluid intake.        #Normocytic anemia.   ·  Plan: The pt presents with normocytic anemia (RBC: 3.45, Hgb 10.5, Hct: 31.7, MCV: 91.9), likely i/s/o chronic disease, but also consider blood loss given weakness and unknown h/o screening colonoscopy (less likely given no report of melena)  Plan:  - Trend CBC  - Maintain active T&S  - F/u iron studies - %iron saturation low  - Transfuse for Hgb < 7.0.    #Chronic Afib.   ·  Plan: The patient has a h/o Afib on Eliquis, and presents with EKG showing sinus bradycardia.  Plan:  - c/w Eliquis 5mg PO BID  - c/w home toprol w/ hold parameters.    #HTN (hypertension).   ·  Plan: The pt has a h/o hypertension, but presents with BP of 102/63, likely i/s/o dehydration.  Plan:  - Holding home amlodipine 5mg PO q24  i/s/o hypotension (from baseline) and bradycardia  - cw home Toprol ER 50mg PO q24 w/ hold parameters.    #Asthma.   ·  Plan: The patient has a h/o asthma, well-controlled on home medications and without current exacerbation.  - c/w Home med Symbicort 160-4.5, 2 puffs BID  - Duonebs q6 prn.    #DM (diabetes mellitus).   ·  Plan: The patient has a h/o of type 2 diabetes, on insulin. Home meds include: lantus 22U at bedtime, humalog 6 units q24, and metformin 1000mg PO BID.  - Start Lantus 17U (80% home dose) at bedtime 4/9  - mISS  - A1c in AM labs  - Consistent carb diet    #HLD (hyperlipidemia).   ·  Plan: The pt has a h/o HLD and takes Atorvastatin 20mg PO q24 at home.  Plan:  - c/w statin.    Patient was discharged to: [facility]    New medications: None  Changes to old medications: None  Medications that were stopped: None    Items to follow up as outpatient: PCP f/u    Physical exam at the time of discharge:  Constitutional: resting comfortably in bed; NAD  Head: NC/AT  Eyes: PERRL, EOMI, anicteric sclera  ENT: no nasal discharge; uvula midline, no oropharyngeal erythema or exudates, mucous membranes dry  Neck: supple; no JVD or thyromegaly  Respiratory: CTA B/L; no W/R/R, no retractions  Cardiac: +S1/S2; regular rhythm, but bradycardic; no M/R/G;   Gastrointestinal: abdomen soft, NT/ND; no rebound or guarding; +BSx4  Back: spine midline, no bony tenderness or step-offs; no CVAT B/L  Extremities: WWP, no clubbing or cyanosis; edema to 1+ RLE only  Musculoskeletal: Tenderness to the left knee joint; no swelling, warmth, or erythema   Vascular: 2+ radial, DP pulses B/L  Dermatologic: skin warm, dry and intact. Right LE with healing excoriations and scabbing from reported "rash"; ?venous stasis. Patchy hypopigmentation on b/l LE below the knee.  Lymphatic: no submandibular or cervical LAD  Neurologic: AAOx3; CNII-XII grossly intact; no focal deficits. Strength diminished in LUE and LLE to 3-4/5; 4.5-5/5 on the RUE and RLE

## 2024-04-12 DIAGNOSIS — I48.20 CHRONIC ATRIAL FIBRILLATION, UNSPECIFIED: ICD-10-CM

## 2024-04-12 DIAGNOSIS — N17.0 ACUTE KIDNEY FAILURE WITH TUBULAR NECROSIS: ICD-10-CM

## 2024-04-12 LAB
ANION GAP SERPL CALC-SCNC: 11 MMOL/L — SIGNIFICANT CHANGE UP (ref 5–17)
BASOPHILS # BLD AUTO: 0.05 K/UL — SIGNIFICANT CHANGE UP (ref 0–0.2)
BASOPHILS NFR BLD AUTO: 0.9 % — SIGNIFICANT CHANGE UP (ref 0–2)
BUN SERPL-MCNC: 17 MG/DL — SIGNIFICANT CHANGE UP (ref 7–23)
CALCIUM SERPL-MCNC: 9.9 MG/DL — SIGNIFICANT CHANGE UP (ref 8.4–10.5)
CHLORIDE SERPL-SCNC: 109 MMOL/L — HIGH (ref 96–108)
CO2 SERPL-SCNC: 22 MMOL/L — SIGNIFICANT CHANGE UP (ref 22–31)
CREAT SERPL-MCNC: 1.19 MG/DL — SIGNIFICANT CHANGE UP (ref 0.5–1.3)
EGFR: 47 ML/MIN/1.73M2 — LOW
EOSINOPHIL # BLD AUTO: 0.2 K/UL — SIGNIFICANT CHANGE UP (ref 0–0.5)
EOSINOPHIL NFR BLD AUTO: 3.6 % — SIGNIFICANT CHANGE UP (ref 0–6)
GLUCOSE BLDC GLUCOMTR-MCNC: 121 MG/DL — HIGH (ref 70–99)
GLUCOSE BLDC GLUCOMTR-MCNC: 131 MG/DL — HIGH (ref 70–99)
GLUCOSE BLDC GLUCOMTR-MCNC: 234 MG/DL — HIGH (ref 70–99)
GLUCOSE BLDC GLUCOMTR-MCNC: 257 MG/DL — HIGH (ref 70–99)
GLUCOSE SERPL-MCNC: 123 MG/DL — HIGH (ref 70–99)
HCT VFR BLD CALC: 29.1 % — LOW (ref 34.5–45)
HGB BLD-MCNC: 9.8 G/DL — LOW (ref 11.5–15.5)
IMM GRANULOCYTES NFR BLD AUTO: 0.2 % — SIGNIFICANT CHANGE UP (ref 0–0.9)
LYMPHOCYTES # BLD AUTO: 1.7 K/UL — SIGNIFICANT CHANGE UP (ref 1–3.3)
LYMPHOCYTES # BLD AUTO: 30.9 % — SIGNIFICANT CHANGE UP (ref 13–44)
MAGNESIUM SERPL-MCNC: 1.6 MG/DL — SIGNIFICANT CHANGE UP (ref 1.6–2.6)
MCHC RBC-ENTMCNC: 29.9 PG — SIGNIFICANT CHANGE UP (ref 27–34)
MCHC RBC-ENTMCNC: 33.7 GM/DL — SIGNIFICANT CHANGE UP (ref 32–36)
MCV RBC AUTO: 88.7 FL — SIGNIFICANT CHANGE UP (ref 80–100)
MONOCYTES # BLD AUTO: 0.43 K/UL — SIGNIFICANT CHANGE UP (ref 0–0.9)
MONOCYTES NFR BLD AUTO: 7.8 % — SIGNIFICANT CHANGE UP (ref 2–14)
NEUTROPHILS # BLD AUTO: 3.11 K/UL — SIGNIFICANT CHANGE UP (ref 1.8–7.4)
NEUTROPHILS NFR BLD AUTO: 56.6 % — SIGNIFICANT CHANGE UP (ref 43–77)
NRBC # BLD: 0 /100 WBCS — SIGNIFICANT CHANGE UP (ref 0–0)
PHOSPHATE SERPL-MCNC: 6 MG/DL — HIGH (ref 2.5–4.5)
PLATELET # BLD AUTO: 199 K/UL — SIGNIFICANT CHANGE UP (ref 150–400)
POTASSIUM SERPL-MCNC: 3.9 MMOL/L — SIGNIFICANT CHANGE UP (ref 3.5–5.3)
POTASSIUM SERPL-SCNC: 3.9 MMOL/L — SIGNIFICANT CHANGE UP (ref 3.5–5.3)
RBC # BLD: 3.28 M/UL — LOW (ref 3.8–5.2)
RBC # FLD: 14.9 % — HIGH (ref 10.3–14.5)
SODIUM SERPL-SCNC: 142 MMOL/L — SIGNIFICANT CHANGE UP (ref 135–145)
WBC # BLD: 5.5 K/UL — SIGNIFICANT CHANGE UP (ref 3.8–10.5)
WBC # FLD AUTO: 5.5 K/UL — SIGNIFICANT CHANGE UP (ref 3.8–10.5)

## 2024-04-12 PROCEDURE — 93970 EXTREMITY STUDY: CPT | Mod: 26

## 2024-04-12 PROCEDURE — 99233 SBSQ HOSP IP/OBS HIGH 50: CPT | Mod: GC

## 2024-04-12 PROCEDURE — 76775 US EXAM ABDO BACK WALL LIM: CPT | Mod: 26

## 2024-04-12 PROCEDURE — 73560 X-RAY EXAM OF KNEE 1 OR 2: CPT | Mod: 26,LT

## 2024-04-12 RX ORDER — ACETAMINOPHEN 500 MG
1000 TABLET ORAL ONCE
Refills: 0 | Status: COMPLETED | OUTPATIENT
Start: 2024-04-12 | End: 2024-04-12

## 2024-04-12 RX ORDER — MAGNESIUM SULFATE 500 MG/ML
2 VIAL (ML) INJECTION ONCE
Refills: 0 | Status: COMPLETED | OUTPATIENT
Start: 2024-04-12 | End: 2024-04-12

## 2024-04-12 RX ADMIN — APIXABAN 5 MILLIGRAM(S): 2.5 TABLET, FILM COATED ORAL at 06:03

## 2024-04-12 RX ADMIN — Medication 1000 MILLIGRAM(S): at 15:40

## 2024-04-12 RX ADMIN — BUDESONIDE AND FORMOTEROL FUMARATE DIHYDRATE 2 PUFF(S): 160; 4.5 AEROSOL RESPIRATORY (INHALATION) at 08:56

## 2024-04-12 RX ADMIN — Medication 650 MILLIGRAM(S): at 23:06

## 2024-04-12 RX ADMIN — Medication 25 GRAM(S): at 12:11

## 2024-04-12 RX ADMIN — BUDESONIDE AND FORMOTEROL FUMARATE DIHYDRATE 2 PUFF(S): 160; 4.5 AEROSOL RESPIRATORY (INHALATION) at 22:04

## 2024-04-12 RX ADMIN — Medication 650 MILLIGRAM(S): at 13:07

## 2024-04-12 RX ADMIN — ATORVASTATIN CALCIUM 20 MILLIGRAM(S): 80 TABLET, FILM COATED ORAL at 22:05

## 2024-04-12 RX ADMIN — Medication 650 MILLIGRAM(S): at 01:47

## 2024-04-12 RX ADMIN — APIXABAN 5 MILLIGRAM(S): 2.5 TABLET, FILM COATED ORAL at 18:33

## 2024-04-12 RX ADMIN — INSULIN GLARGINE 17 UNIT(S): 100 INJECTION, SOLUTION SUBCUTANEOUS at 22:05

## 2024-04-12 RX ADMIN — MIRTAZAPINE 45 MILLIGRAM(S): 45 TABLET, ORALLY DISINTEGRATING ORAL at 22:05

## 2024-04-12 RX ADMIN — Medication 3 MILLILITER(S): at 16:10

## 2024-04-12 RX ADMIN — Medication 3 MILLILITER(S): at 22:04

## 2024-04-12 RX ADMIN — Medication 650 MILLIGRAM(S): at 12:07

## 2024-04-12 RX ADMIN — Medication 6: at 13:28

## 2024-04-12 RX ADMIN — Medication 4: at 22:04

## 2024-04-12 RX ADMIN — Medication 400 MILLIGRAM(S): at 15:25

## 2024-04-12 NOTE — PROGRESS NOTE ADULT - PROBLEM SELECTOR PLAN 3
Pt with no known h/o kidney disease p/w BEBE (BUN 39; Crt 1.78, K 5.6). Likely i/s/o pre-renal d/t dehydration from poor PO intake.  - F/u urine studies  - Trend BMP (next at 10pm 4/9) - K WNL at 10pm and on 4/10 AM labs; Crt downtrending  - Gentle hydration w/ NS @ 80cc/hr; encourage PO intake Pt with no known h/o kidney disease p/w BEBE (BUN 39; Crt 1.78, K 5.6). Likely i/s/o pre-renal d/t dehydration from poor PO intake.  - F/u urine studies  - Trend BMP (next at 10pm 4/9) - K WNL at 10pm and on 4/10 AM labs; Crt downtrending  - encourage PO fluid intake The patient presents with a lactate of 3.1 --> 2.9 --> 2.3. Pt is afebrile with a normal WBC count and no other infectious symptoms. Lungs CTA and CXR unremarkable. Consider Metformin use vs.hypovolemia (BEBE on presentation; no known kidney disease).  Plan:  - Continue to trend lactate - cleared on 4/10 AM labs  - F/u urine studies  - Retroperitoneal U/S --> still pending  - Monitor for infectious symptoms  - Encourage PO fluid intake

## 2024-04-12 NOTE — PROGRESS NOTE ADULT - PROBLEM SELECTOR PLAN 1
The pt  p/w generalized weakness and difficulty walking for one day. She normally ambulates well with a walker, but has been too weak to get out of bed since yesterday (4/8) AM. She states that the weakness is in her whole body; not just her legs. Neuro exam shows decreased strength in the RUE and RLE; sensation intact. no incontinence, no sciatica/back pain.  Pt denies trauma/fall, and she denies lightheadedness, dizziness, and palpitations. Pt is dry on exam, and chemistry shows BEBE (BUN 39; Crt 1.78) as well as mild normocytic anemia (RBC 3.45, Hgb 10.5, Hct: 31.7). Consider dehydration vs. poor PO intake vs. anemia.  Plan:  - Fall precautions  - PT/OT consult - recommend [facility]  - Iron studies - %Iron sat low 4/10  - Urine studies  - B12 and folate   - TSH - WNL 4/10  - L knee xray --> osteoarthritis and bone infarcts Pt with no known h/o kidney disease p/w BEBE (BUN 39; Crt 1.78, K 5.6). Likely i/s/o pre-renal d/t dehydration from poor PO intake.  - F/u urine studies  - Trend BMP (next at 10pm 4/9) - K WNL at 10pm and on 4/10 AM labs; Crt downtrending  - encourage PO fluid intake

## 2024-04-12 NOTE — PROGRESS NOTE ADULT - PROBLEM SELECTOR PLAN 8
The patient has a h/o of type 2 diabetes, on insulin. Home meds include: lantus 22U at bedtime, humalog 6 units q24, and metformin 1000mg PO BID.  - Start Lantus 17U (80% home dose) at bedtime 4/9  - mISS  - A1c in AM labs  - Consistent carb diet  - Monitor FSG  - Glu readings high on 4/10-11; consider upping insulin to full home dose

## 2024-04-12 NOTE — PROGRESS NOTE ADULT - SUBJECTIVE AND OBJECTIVE BOX
OVERNIGHT EVENTS:    SUBJECTIVE / INTERVAL HPI: Patient seen and examined at bedside.     ROS: negative unless otherwise stated above.    VITAL SIGNS:  Vital Signs Last 24 Hrs  T(C): 36.8 (12 Apr 2024 06:15), Max: 36.8 (12 Apr 2024 06:15)  T(F): 98.2 (12 Apr 2024 06:15), Max: 98.2 (12 Apr 2024 06:15)  HR: 45 (12 Apr 2024 06:15) (45 - 49)  BP: 165/77 (12 Apr 2024 06:15) (133/77 - 165/77)  BP(mean): --  RR: 18 (12 Apr 2024 06:15) (18 - 18)  SpO2: 97% (12 Apr 2024 06:15) (95% - 97%)    Parameters below as of 12 Apr 2024 06:15  Patient On (Oxygen Delivery Method): room air        PHYSICAL EXAM:    General: In no apparent distress  HEENT: NC/AT; PERRL, anicteric sclera; MMM  Neck: supple  Cardiovascular: +S1/S2; RRR  Respiratory: CTA B/L; no W/R/R  Gastrointestinal: soft, NT/ND; +BSx4  Extremities: WWP; no edema, clubbing or cyanosis  Vascular: 2+ radial, DP/PT pulses B/L  Neurological: AAOx3; no focal deficits    MEDICATIONS:  MEDICATIONS  (STANDING):  albuterol/ipratropium for Nebulization 3 milliLiter(s) Nebulizer every 6 hours  apixaban 5 milliGRAM(s) Oral every 12 hours  atorvastatin 20 milliGRAM(s) Oral at bedtime  budesonide 160 MICROgram(s)/formoterol 4.5 MICROgram(s) Inhaler 2 Puff(s) Inhalation two times a day  dextrose 10% Bolus 125 milliLiter(s) IV Bolus once  dextrose 5%. 1000 milliLiter(s) (50 mL/Hr) IV Continuous <Continuous>  dextrose 50% Injectable 25 Gram(s) IV Push once  glucagon  Injectable 1 milliGRAM(s) IntraMuscular once  insulin glargine Injectable (LANTUS) 17 Unit(s) SubCutaneous at bedtime  insulin lispro (ADMELOG) corrective regimen sliding scale   SubCutaneous Before meals and at bedtime  mirtazapine 45 milliGRAM(s) Oral at bedtime  sodium chloride 0.9%. 1000 milliLiter(s) (80 mL/Hr) IV Continuous <Continuous>    MEDICATIONS  (PRN):  acetaminophen     Tablet .. 650 milliGRAM(s) Oral every 6 hours PRN Temp greater or equal to 38C (100.4F), Mild Pain (1 - 3)  aluminum hydroxide/magnesium hydroxide/simethicone Suspension 30 milliLiter(s) Oral every 4 hours PRN Dyspepsia  dextrose Oral Gel 15 Gram(s) Oral once PRN Blood Glucose LESS THAN 70 milliGRAM(s)/deciliter  melatonin 3 milliGRAM(s) Oral at bedtime PRN Insomnia  ondansetron Injectable 4 milliGRAM(s) IV Push every 8 hours PRN Nausea and/or Vomiting      ALLERGIES:  Allergies    peanuts (Rash)  adenosine (Unknown)    Intolerances        LABS:                        10.4   6.45  )-----------( 206      ( 11 Apr 2024 10:00 )             31.6     04-11    140  |  108  |  18  ----------------------------<  144<H>  4.4   |  21<L>  |  1.16    Ca    9.7      11 Apr 2024 16:35  Phos  5.4     04-11  Mg     1.5     04-11        Urinalysis Basic - ( 11 Apr 2024 16:35 )    Color: x / Appearance: x / SG: x / pH: x  Gluc: 144 mg/dL / Ketone: x  / Bili: x / Urobili: x   Blood: x / Protein: x / Nitrite: x   Leuk Esterase: x / RBC: x / WBC x   Sq Epi: x / Non Sq Epi: x / Bacteria: x      CAPILLARY BLOOD GLUCOSE      POCT Blood Glucose.: 178 mg/dL (11 Apr 2024 22:12)      RADIOLOGY & ADDITIONAL TESTS: Reviewed. OVERNIGHT EVENTS: AFSANEH    SUBJECTIVE / INTERVAL HPI: The patient was encountered lying in bed, AAOx3, in no acute distress. She said that she felt a little sleepy, but otherwise denied new or acute concerns. Specifically, she denied n/v/d, and she denied SOB, chest pain, and palpitations. The pt says she feels much better than on admission and that her weakness has resolved.     ROS: negative unless otherwise stated above.    VITAL SIGNS:  Vital Signs Last 24 Hrs  T(C): 36.8 (12 Apr 2024 06:15), Max: 36.8 (12 Apr 2024 06:15)  T(F): 98.2 (12 Apr 2024 06:15), Max: 98.2 (12 Apr 2024 06:15)  HR: 45 (12 Apr 2024 06:15) (45 - 49)  BP: 165/77 (12 Apr 2024 06:15) (133/77 - 165/77)  BP(mean): --  RR: 18 (12 Apr 2024 06:15) (18 - 18)  SpO2: 97% (12 Apr 2024 06:15) (95% - 97%)    Parameters below as of 12 Apr 2024 06:15  Patient On (Oxygen Delivery Method): room air        PHYSICAL EXAM:    Constitutional: resting comfortably in bed; NAD  Head: NC/AT  Eyes: PERRL, EOMI, anicteric sclera  ENT: no nasal discharge; uvula midline, no oropharyngeal erythema or exudates, mucous membranes dry  Neck: supple; no JVD or thyromegaly  Respiratory: CTA B/L; no W/R/R, no retractions  Cardiac: +S1/S2; regular rhythm, but bradycardic; no M/R/G;   Gastrointestinal: abdomen soft, NT/ND; no rebound or guarding; +BSx4  Back: spine midline, no bony tenderness or step-offs; no CVAT B/L  Extremities: WWP, no clubbing or cyanosis; edema to 1+ RLE only  Musculoskeletal: Tenderness to the left knee joint; no swelling, warmth, or erythema   Vascular: 2+ radial, DP pulses B/L  Dermatologic: skin warm, dry and intact. Right LE with healing excoriations and scabbing from reported "rash"; ?venous stasis. Patchy hypopigmentation on b/l LE below the knee.  Lymphatic: no submandibular or cervical LAD  Neurologic: AAOx3; CNII-XII grossly intact; no focal deficits. Strength diminished in LUE and LLE to 3-4/5; 4.5-5/5 on the RUE and RLE  Psychiatric: affect and characteristics of appearance, verbalizations, behaviors are appropriate    MEDICATIONS:  MEDICATIONS  (STANDING):  albuterol/ipratropium for Nebulization 3 milliLiter(s) Nebulizer every 6 hours  apixaban 5 milliGRAM(s) Oral every 12 hours  atorvastatin 20 milliGRAM(s) Oral at bedtime  budesonide 160 MICROgram(s)/formoterol 4.5 MICROgram(s) Inhaler 2 Puff(s) Inhalation two times a day  dextrose 10% Bolus 125 milliLiter(s) IV Bolus once  dextrose 5%. 1000 milliLiter(s) (50 mL/Hr) IV Continuous <Continuous>  dextrose 50% Injectable 25 Gram(s) IV Push once  glucagon  Injectable 1 milliGRAM(s) IntraMuscular once  insulin glargine Injectable (LANTUS) 17 Unit(s) SubCutaneous at bedtime  insulin lispro (ADMELOG) corrective regimen sliding scale   SubCutaneous Before meals and at bedtime  mirtazapine 45 milliGRAM(s) Oral at bedtime  sodium chloride 0.9%. 1000 milliLiter(s) (80 mL/Hr) IV Continuous <Continuous>    MEDICATIONS  (PRN):  acetaminophen     Tablet .. 650 milliGRAM(s) Oral every 6 hours PRN Temp greater or equal to 38C (100.4F), Mild Pain (1 - 3)  aluminum hydroxide/magnesium hydroxide/simethicone Suspension 30 milliLiter(s) Oral every 4 hours PRN Dyspepsia  dextrose Oral Gel 15 Gram(s) Oral once PRN Blood Glucose LESS THAN 70 milliGRAM(s)/deciliter  melatonin 3 milliGRAM(s) Oral at bedtime PRN Insomnia  ondansetron Injectable 4 milliGRAM(s) IV Push every 8 hours PRN Nausea and/or Vomiting      ALLERGIES:  Allergies    peanuts (Rash)  adenosine (Unknown)    Intolerances        LABS:                        10.4   6.45  )-----------( 206      ( 11 Apr 2024 10:00 )             31.6     04-11    140  |  108  |  18  ----------------------------<  144<H>  4.4   |  21<L>  |  1.16    Ca    9.7      11 Apr 2024 16:35  Phos  5.4     04-11  Mg     1.5     04-11        Urinalysis Basic - ( 11 Apr 2024 16:35 )    Color: x / Appearance: x / SG: x / pH: x  Gluc: 144 mg/dL / Ketone: x  / Bili: x / Urobili: x   Blood: x / Protein: x / Nitrite: x   Leuk Esterase: x / RBC: x / WBC x   Sq Epi: x / Non Sq Epi: x / Bacteria: x      CAPILLARY BLOOD GLUCOSE      POCT Blood Glucose.: 178 mg/dL (11 Apr 2024 22:12)      RADIOLOGY & ADDITIONAL TESTS: Reviewed.

## 2024-04-12 NOTE — PROGRESS NOTE ADULT - PROBLEM SELECTOR PLAN 2
The patient presents with a lactate of 3.1 --> 2.9 --> 2.3. Pt is afebrile with a normal WBC count and no other infectious symptoms. Lungs CTA and CXR unremarkable. Consider Metformin use vs.hypovolemia (BEBE on presentation; no known kidney disease).  Plan:  - Continue to trend lactate - cleared on 4/10 AM labs  - F/u urine studies  - Retroperitoneal U/S --> still pending  - Monitor for infectious symptoms  - c/w maintenance fluids and encourage PO intake The patient presents with a lactate of 3.1 --> 2.9 --> 2.3. Pt is afebrile with a normal WBC count and no other infectious symptoms. Lungs CTA and CXR unremarkable. Consider Metformin use vs.hypovolemia (BEBE on presentation; no known kidney disease).  Plan:  - Continue to trend lactate - cleared on 4/10 AM labs  - F/u urine studies  - Retroperitoneal U/S --> still pending  - Monitor for infectious symptoms  - Encourage PO fluid intake The pt  p/w generalized weakness and difficulty walking for one day. She normally ambulates well with a walker, but has been too weak to get out of bed since yesterday (4/8) AM. She states that the weakness is in her whole body; not just her legs. Neuro exam shows decreased strength in the RUE and RLE; sensation intact. no incontinence, no sciatica/back pain.  Pt denies trauma/fall, and she denies lightheadedness, dizziness, and palpitations. Pt is dry on exam, and chemistry shows BEBE (BUN 39; Crt 1.78) as well as mild normocytic anemia (RBC 3.45, Hgb 10.5, Hct: 31.7). Consider dehydration vs. poor PO intake vs. anemia.  Plan:  - Fall precautions  - PT/OT consult - recommend [facility]  - Iron studies - %Iron sat low 4/10  - Urine studies  - B12 and folate   - TSH - WNL 4/10  - L knee xray --> osteoarthritis and bone infarcts

## 2024-04-12 NOTE — PROGRESS NOTE ADULT - PROBLEM SELECTOR PLAN 11
Fluids: NS @ 80cc/hr  Electrolytes: Replete PRN  K<4.0, Mg<2.0, Phos< 2.5  N: Consistent carb  GI ppx: None  DVT ppx: Eliquis  Code: Full    Dispo: [facility] Fluids: NI  Electrolytes: Replete PRN  K<4.0, Mg<2.0, Phos< 2.5  N: Consistent carb  GI ppx: None  DVT ppx: Eliquis  Code: Full    Dispo: [facility]

## 2024-04-13 LAB
ANION GAP SERPL CALC-SCNC: 11 MMOL/L — SIGNIFICANT CHANGE UP (ref 5–17)
BASOPHILS # BLD AUTO: 0.05 K/UL — SIGNIFICANT CHANGE UP (ref 0–0.2)
BASOPHILS NFR BLD AUTO: 1 % — SIGNIFICANT CHANGE UP (ref 0–2)
BUN SERPL-MCNC: 16 MG/DL — SIGNIFICANT CHANGE UP (ref 7–23)
CALCIUM SERPL-MCNC: 10.1 MG/DL — SIGNIFICANT CHANGE UP (ref 8.4–10.5)
CHLORIDE SERPL-SCNC: 108 MMOL/L — SIGNIFICANT CHANGE UP (ref 96–108)
CO2 SERPL-SCNC: 22 MMOL/L — SIGNIFICANT CHANGE UP (ref 22–31)
CREAT SERPL-MCNC: 1.34 MG/DL — HIGH (ref 0.5–1.3)
EGFR: 41 ML/MIN/1.73M2 — LOW
EOSINOPHIL # BLD AUTO: 0.25 K/UL — SIGNIFICANT CHANGE UP (ref 0–0.5)
EOSINOPHIL NFR BLD AUTO: 4.8 % — SIGNIFICANT CHANGE UP (ref 0–6)
GLUCOSE BLDC GLUCOMTR-MCNC: 146 MG/DL — HIGH (ref 70–99)
GLUCOSE BLDC GLUCOMTR-MCNC: 205 MG/DL — HIGH (ref 70–99)
GLUCOSE BLDC GLUCOMTR-MCNC: 220 MG/DL — HIGH (ref 70–99)
GLUCOSE BLDC GLUCOMTR-MCNC: 235 MG/DL — HIGH (ref 70–99)
GLUCOSE SERPL-MCNC: 196 MG/DL — HIGH (ref 70–99)
HCT VFR BLD CALC: 29.5 % — LOW (ref 34.5–45)
HGB BLD-MCNC: 9.9 G/DL — LOW (ref 11.5–15.5)
IMM GRANULOCYTES NFR BLD AUTO: 0.6 % — SIGNIFICANT CHANGE UP (ref 0–0.9)
LYMPHOCYTES # BLD AUTO: 1.69 K/UL — SIGNIFICANT CHANGE UP (ref 1–3.3)
LYMPHOCYTES # BLD AUTO: 32.5 % — SIGNIFICANT CHANGE UP (ref 13–44)
MAGNESIUM SERPL-MCNC: 1.8 MG/DL — SIGNIFICANT CHANGE UP (ref 1.6–2.6)
MCHC RBC-ENTMCNC: 29.7 PG — SIGNIFICANT CHANGE UP (ref 27–34)
MCHC RBC-ENTMCNC: 33.6 GM/DL — SIGNIFICANT CHANGE UP (ref 32–36)
MCV RBC AUTO: 88.6 FL — SIGNIFICANT CHANGE UP (ref 80–100)
MONOCYTES # BLD AUTO: 0.41 K/UL — SIGNIFICANT CHANGE UP (ref 0–0.9)
MONOCYTES NFR BLD AUTO: 7.9 % — SIGNIFICANT CHANGE UP (ref 2–14)
NEUTROPHILS # BLD AUTO: 2.77 K/UL — SIGNIFICANT CHANGE UP (ref 1.8–7.4)
NEUTROPHILS NFR BLD AUTO: 53.2 % — SIGNIFICANT CHANGE UP (ref 43–77)
NRBC # BLD: 0 /100 WBCS — SIGNIFICANT CHANGE UP (ref 0–0)
PHOSPHATE SERPL-MCNC: 4.6 MG/DL — HIGH (ref 2.5–4.5)
PLATELET # BLD AUTO: 207 K/UL — SIGNIFICANT CHANGE UP (ref 150–400)
POTASSIUM SERPL-MCNC: 4.3 MMOL/L — SIGNIFICANT CHANGE UP (ref 3.5–5.3)
POTASSIUM SERPL-SCNC: 4.3 MMOL/L — SIGNIFICANT CHANGE UP (ref 3.5–5.3)
RBC # BLD: 3.33 M/UL — LOW (ref 3.8–5.2)
RBC # FLD: 14.8 % — HIGH (ref 10.3–14.5)
SODIUM SERPL-SCNC: 141 MMOL/L — SIGNIFICANT CHANGE UP (ref 135–145)
WBC # BLD: 5.2 K/UL — SIGNIFICANT CHANGE UP (ref 3.8–10.5)
WBC # FLD AUTO: 5.2 K/UL — SIGNIFICANT CHANGE UP (ref 3.8–10.5)

## 2024-04-13 PROCEDURE — 73501 X-RAY EXAM HIP UNI 1 VIEW: CPT | Mod: 26,LT

## 2024-04-13 PROCEDURE — 99233 SBSQ HOSP IP/OBS HIGH 50: CPT | Mod: GC

## 2024-04-13 RX ORDER — SODIUM CHLORIDE 9 MG/ML
500 INJECTION, SOLUTION INTRAVENOUS
Refills: 0 | Status: DISCONTINUED | OUTPATIENT
Start: 2024-04-13 | End: 2024-04-14

## 2024-04-13 RX ORDER — LIDOCAINE 4 G/100G
1 CREAM TOPICAL EVERY 24 HOURS
Refills: 0 | Status: DISCONTINUED | OUTPATIENT
Start: 2024-04-13 | End: 2024-04-15

## 2024-04-13 RX ORDER — AMLODIPINE BESYLATE 2.5 MG/1
5 TABLET ORAL EVERY 24 HOURS
Refills: 0 | Status: DISCONTINUED | OUTPATIENT
Start: 2024-04-13 | End: 2024-04-14

## 2024-04-13 RX ORDER — AMLODIPINE BESYLATE 2.5 MG/1
5 TABLET ORAL EVERY 24 HOURS
Refills: 0 | Status: DISCONTINUED | OUTPATIENT
Start: 2024-04-13 | End: 2024-04-13

## 2024-04-13 RX ORDER — LIDOCAINE 4 G/100G
1 CREAM TOPICAL DAILY
Refills: 0 | Status: DISCONTINUED | OUTPATIENT
Start: 2024-04-13 | End: 2024-04-15

## 2024-04-13 RX ORDER — OXYCODONE HYDROCHLORIDE 5 MG/1
2.5 TABLET ORAL ONCE
Refills: 0 | Status: DISCONTINUED | OUTPATIENT
Start: 2024-04-13 | End: 2024-04-13

## 2024-04-13 RX ORDER — ACETAMINOPHEN 500 MG
1000 TABLET ORAL ONCE
Refills: 0 | Status: COMPLETED | OUTPATIENT
Start: 2024-04-13 | End: 2024-04-13

## 2024-04-13 RX ORDER — MAGNESIUM SULFATE 500 MG/ML
2 VIAL (ML) INJECTION ONCE
Refills: 0 | Status: COMPLETED | OUTPATIENT
Start: 2024-04-13 | End: 2024-04-13

## 2024-04-13 RX ORDER — SODIUM CHLORIDE 9 MG/ML
1000 INJECTION, SOLUTION INTRAVENOUS
Refills: 0 | Status: DISCONTINUED | OUTPATIENT
Start: 2024-04-13 | End: 2024-04-13

## 2024-04-13 RX ADMIN — LIDOCAINE 1 PATCH: 4 CREAM TOPICAL at 19:20

## 2024-04-13 RX ADMIN — LIDOCAINE 1 PATCH: 4 CREAM TOPICAL at 21:16

## 2024-04-13 RX ADMIN — Medication 3 MILLILITER(S): at 22:22

## 2024-04-13 RX ADMIN — SODIUM CHLORIDE 250 MILLILITER(S): 9 INJECTION, SOLUTION INTRAVENOUS at 09:18

## 2024-04-13 RX ADMIN — SODIUM CHLORIDE 80 MILLILITER(S): 9 INJECTION, SOLUTION INTRAVENOUS at 08:36

## 2024-04-13 RX ADMIN — INSULIN GLARGINE 17 UNIT(S): 100 INJECTION, SOLUTION SUBCUTANEOUS at 22:39

## 2024-04-13 RX ADMIN — Medication 400 MILLIGRAM(S): at 19:29

## 2024-04-13 RX ADMIN — APIXABAN 5 MILLIGRAM(S): 2.5 TABLET, FILM COATED ORAL at 05:20

## 2024-04-13 RX ADMIN — ATORVASTATIN CALCIUM 20 MILLIGRAM(S): 80 TABLET, FILM COATED ORAL at 22:22

## 2024-04-13 RX ADMIN — BUDESONIDE AND FORMOTEROL FUMARATE DIHYDRATE 2 PUFF(S): 160; 4.5 AEROSOL RESPIRATORY (INHALATION) at 22:23

## 2024-04-13 RX ADMIN — Medication 4: at 22:39

## 2024-04-13 RX ADMIN — AMLODIPINE BESYLATE 5 MILLIGRAM(S): 2.5 TABLET ORAL at 09:46

## 2024-04-13 RX ADMIN — Medication 4: at 18:36

## 2024-04-13 RX ADMIN — OXYCODONE HYDROCHLORIDE 2.5 MILLIGRAM(S): 5 TABLET ORAL at 23:21

## 2024-04-13 RX ADMIN — Medication 650 MILLIGRAM(S): at 18:36

## 2024-04-13 RX ADMIN — OXYCODONE HYDROCHLORIDE 2.5 MILLIGRAM(S): 5 TABLET ORAL at 22:21

## 2024-04-13 RX ADMIN — Medication 3 MILLILITER(S): at 05:20

## 2024-04-13 RX ADMIN — BUDESONIDE AND FORMOTEROL FUMARATE DIHYDRATE 2 PUFF(S): 160; 4.5 AEROSOL RESPIRATORY (INHALATION) at 10:52

## 2024-04-13 RX ADMIN — Medication 3 MILLILITER(S): at 16:13

## 2024-04-13 RX ADMIN — Medication 4: at 10:01

## 2024-04-13 RX ADMIN — MIRTAZAPINE 45 MILLIGRAM(S): 45 TABLET, ORALLY DISINTEGRATING ORAL at 22:22

## 2024-04-13 RX ADMIN — Medication 3 MILLILITER(S): at 09:46

## 2024-04-13 RX ADMIN — APIXABAN 5 MILLIGRAM(S): 2.5 TABLET, FILM COATED ORAL at 18:20

## 2024-04-13 RX ADMIN — Medication 25 GRAM(S): at 07:34

## 2024-04-13 RX ADMIN — LIDOCAINE 1 PATCH: 4 CREAM TOPICAL at 18:35

## 2024-04-13 RX ADMIN — Medication 650 MILLIGRAM(S): at 17:36

## 2024-04-13 NOTE — PROGRESS NOTE ADULT - PROBLEM SELECTOR PLAN 2
The pt  p/w generalized weakness and difficulty walking for one day. She normally ambulates well with a walker, but has been too weak to get out of bed since yesterday (4/8) AM. She states that the weakness is in her whole body; not just her legs. Neuro exam shows decreased strength in the RUE and RLE; sensation intact. no incontinence, no sciatica/back pain.  Pt denies trauma/fall, and she denies lightheadedness, dizziness, and palpitations. Pt is dry on exam, and chemistry shows BEBE (BUN 39; Crt 1.78) as well as mild normocytic anemia (RBC 3.45, Hgb 10.5, Hct: 31.7). Consider dehydration vs. poor PO intake vs. anemia.  Plan:  - Fall precautions  - PT/OT consult - recommend [facility]  - Iron studies - %Iron sat low 4/10  - Urine studies  - B12 and folate   - TSH - WNL 4/10  - L knee xray --> osteoarthritis and bone infarcts The pt  p/w generalized weakness and difficulty walking for one day. She normally ambulates well with a walker, but has been too weak to get out of bed since yesterday (4/8) AM. She states that the weakness is in her whole body; not just her legs. Neuro exam shows decreased strength in the RUE and RLE; sensation intact. no incontinence, no sciatica/back pain.  Pt denies trauma/fall, and she denies lightheadedness, dizziness, and palpitations. Pt is dry on exam, and chemistry shows BEBE (BUN 39; Crt 1.78) as well as mild normocytic anemia (RBC 3.45, Hgb 10.5, Hct: 31.7). Consider dehydration vs. poor PO intake vs. anemia.  Plan:  - Fall precautions  - PT/OT consult - recommend [facility]  - Iron studies - %Iron sat low 4/10  - Urine studies  - B12 and folate   - TSH - WNL 4/10  - L knee xray --> osteoarthritis and bone infarcts; moderate effusion (4/13 repeat x-ray)

## 2024-04-13 NOTE — PROGRESS NOTE ADULT - PROBLEM SELECTOR PLAN 3
The patient presents with a lactate of 3.1 --> 2.9 --> 2.3. Pt is afebrile with a normal WBC count and no other infectious symptoms. Lungs CTA and CXR unremarkable. Consider Metformin use vs.hypovolemia (BEBE on presentation; no known kidney disease).  Plan:  - Continue to trend lactate - cleared on 4/10 AM labs  - F/u urine studies  - Retroperitoneal U/S --> still pending  - Monitor for infectious symptoms  - Encourage PO fluid intake RESOLVED  The patient presents with a lactate of 3.1 --> 2.9 --> 2.3. Pt is afebrile with a normal WBC count and no other infectious symptoms. Lungs CTA and CXR unremarkable. Consider Metformin use vs.hypovolemia (BEBE on presentation; no known kidney disease).  Plan:  - Continue to trend lactate - cleared on 4/10 AM labs  - F/u urine studies  - Retroperitoneal U/S --> still pending  - Monitor for infectious symptoms  - Encourage PO fluid intake

## 2024-04-13 NOTE — PROGRESS NOTE ADULT - PROBLEM SELECTOR PLAN 6
The pt has a h/o hypertension, but presents with BP of 102/63, likely i/s/o dehydration.  Plan:  - Holding home amlodipine 5mg PO q24  i/s/o hypotension (from baseline) and bradycardia  - cw home Toprol ER 50mg PO q24 w/ hold parameters The pt has a h/o hypertension, but presents with BP of 102/63, likely i/s/o dehydration.  Plan:  - Restarted home amlodipine 4/13 AM for HTN  - cw home Toprol ER 50mg PO q24 w/ hold parameters

## 2024-04-13 NOTE — PROGRESS NOTE ADULT - SUBJECTIVE AND OBJECTIVE BOX
OVERNIGHT EVENTS:    SUBJECTIVE / INTERVAL HPI: Patient seen and examined at bedside.     ROS: negative unless otherwise stated above.    VITAL SIGNS:  Vital Signs Last 24 Hrs  T(C): 36.4 (13 Apr 2024 05:25), Max: 37.1 (12 Apr 2024 13:11)  T(F): 97.5 (13 Apr 2024 05:25), Max: 98.7 (12 Apr 2024 13:11)  HR: 68 (13 Apr 2024 05:25) (48 - 68)  BP: 170/89 (13 Apr 2024 05:25) (133/71 - 170/89)  BP(mean): --  RR: 17 (13 Apr 2024 05:25) (16 - 18)  SpO2: 99% (13 Apr 2024 05:25) (96% - 99%)    Parameters below as of 12 Apr 2024 20:49  Patient On (Oxygen Delivery Method): room air        PHYSICAL EXAM:    General: In no apparent distress  HEENT: NC/AT; PERRL, anicteric sclera; MMM  Neck: supple  Cardiovascular: +S1/S2; RRR  Respiratory: CTA B/L; no W/R/R  Gastrointestinal: soft, NT/ND; +BSx4  Extremities: WWP; no edema, clubbing or cyanosis  Vascular: 2+ radial, DP/PT pulses B/L  Neurological: AAOx3; no focal deficits    MEDICATIONS:  MEDICATIONS  (STANDING):  albuterol/ipratropium for Nebulization 3 milliLiter(s) Nebulizer every 6 hours  apixaban 5 milliGRAM(s) Oral every 12 hours  atorvastatin 20 milliGRAM(s) Oral at bedtime  budesonide 160 MICROgram(s)/formoterol 4.5 MICROgram(s) Inhaler 2 Puff(s) Inhalation two times a day  dextrose 10% Bolus 125 milliLiter(s) IV Bolus once  dextrose 5%. 1000 milliLiter(s) (50 mL/Hr) IV Continuous <Continuous>  dextrose 50% Injectable 25 Gram(s) IV Push once  glucagon  Injectable 1 milliGRAM(s) IntraMuscular once  insulin glargine Injectable (LANTUS) 17 Unit(s) SubCutaneous at bedtime  insulin lispro (ADMELOG) corrective regimen sliding scale   SubCutaneous Before meals and at bedtime  mirtazapine 45 milliGRAM(s) Oral at bedtime  sodium chloride 0.9%. 1000 milliLiter(s) (80 mL/Hr) IV Continuous <Continuous>    MEDICATIONS  (PRN):  acetaminophen     Tablet .. 650 milliGRAM(s) Oral every 6 hours PRN Temp greater or equal to 38C (100.4F), Mild Pain (1 - 3)  aluminum hydroxide/magnesium hydroxide/simethicone Suspension 30 milliLiter(s) Oral every 4 hours PRN Dyspepsia  dextrose Oral Gel 15 Gram(s) Oral once PRN Blood Glucose LESS THAN 70 milliGRAM(s)/deciliter  melatonin 3 milliGRAM(s) Oral at bedtime PRN Insomnia  ondansetron Injectable 4 milliGRAM(s) IV Push every 8 hours PRN Nausea and/or Vomiting      ALLERGIES:  Allergies    peanuts (Rash)  adenosine (Unknown)    Intolerances        LABS:                        9.8    5.50  )-----------( 199      ( 12 Apr 2024 05:30 )             29.1     04-12    142  |  109<H>  |  17  ----------------------------<  123<H>  3.9   |  22  |  1.19    Ca    9.9      12 Apr 2024 05:30  Phos  6.0     04-12  Mg     1.6     04-12        Urinalysis Basic - ( 12 Apr 2024 05:30 )    Color: x / Appearance: x / SG: x / pH: x  Gluc: 123 mg/dL / Ketone: x  / Bili: x / Urobili: x   Blood: x / Protein: x / Nitrite: x   Leuk Esterase: x / RBC: x / WBC x   Sq Epi: x / Non Sq Epi: x / Bacteria: x      CAPILLARY BLOOD GLUCOSE      POCT Blood Glucose.: 234 mg/dL (12 Apr 2024 21:48)      RADIOLOGY & ADDITIONAL TESTS: Reviewed. OVERNIGHT EVENTS: X-ray left knee showed moderate effusion; no plan to drain the fluid.     SUBJECTIVE / INTERVAL HPI: The patient was encountered lying in bed, AAOx3, in no acute distress. She reported resolution of the pain in her knee with Tylenol, and had no new or acute complaints this morning. Specifically, the pt denied chest pain, palpitations, headache, and cough. She also denied n/v/d. The patient was encouraged to continue PO fluid intake, as labs showed an elevation in creatinine (previously resolved). Fluids also ordered.     ROS: negative unless otherwise stated above.    VITAL SIGNS:  Vital Signs Last 24 Hrs  T(C): 36.4 (13 Apr 2024 05:25), Max: 37.1 (12 Apr 2024 13:11)  T(F): 97.5 (13 Apr 2024 05:25), Max: 98.7 (12 Apr 2024 13:11)  HR: 68 (13 Apr 2024 05:25) (48 - 68)  BP: 170/89 (13 Apr 2024 05:25) (133/71 - 170/89)  BP(mean): --  RR: 17 (13 Apr 2024 05:25) (16 - 18)  SpO2: 99% (13 Apr 2024 05:25) (96% - 99%)    Parameters below as of 12 Apr 2024 20:49  Patient On (Oxygen Delivery Method): room air        PHYSICAL EXAM:    Constitutional: resting comfortably in bed; NAD  Head: NC/AT  Eyes: PERRL, EOMI, anicteric sclera  ENT: no nasal discharge; uvula midline, no oropharyngeal erythema or exudates, mucous membranes dry  Neck: supple; no JVD or thyromegaly  Respiratory: CTA B/L; no W/R/R, no retractions  Cardiac: +S1/S2; regular rhythm, but bradycardic; no M/R/G  Gastrointestinal: abdomen obese, soft, NT/ND; no rebound or guarding; +BSx4  Extremities: WWP, no clubbing or cyanosis; edema to 1+ RLE only, but resolving  Musculoskeletal: Tenderness to the left knee joint with mild swelling and warmth  Vascular: 2+ radial, DP pulses B/L  Dermatologic: skin warm, dry and intact. Right LE with healing excoriations and scabbing from reported "rash"; ?venous stasis. Patchy hypopigmentation on b/l LE below the knee.  Neurologic: AAOx3; CNII-XII grossly intact; no focal deficits. Strength diminished in LUE and LLE to 3-4/5; 4.5-5/5 on the RUE and RLE    MEDICATIONS:  MEDICATIONS  (STANDING):  albuterol/ipratropium for Nebulization 3 milliLiter(s) Nebulizer every 6 hours  apixaban 5 milliGRAM(s) Oral every 12 hours  atorvastatin 20 milliGRAM(s) Oral at bedtime  budesonide 160 MICROgram(s)/formoterol 4.5 MICROgram(s) Inhaler 2 Puff(s) Inhalation two times a day  dextrose 10% Bolus 125 milliLiter(s) IV Bolus once  dextrose 5%. 1000 milliLiter(s) (50 mL/Hr) IV Continuous <Continuous>  dextrose 50% Injectable 25 Gram(s) IV Push once  glucagon  Injectable 1 milliGRAM(s) IntraMuscular once  insulin glargine Injectable (LANTUS) 17 Unit(s) SubCutaneous at bedtime  insulin lispro (ADMELOG) corrective regimen sliding scale   SubCutaneous Before meals and at bedtime  mirtazapine 45 milliGRAM(s) Oral at bedtime  sodium chloride 0.9%. 1000 milliLiter(s) (80 mL/Hr) IV Continuous <Continuous>    MEDICATIONS  (PRN):  acetaminophen     Tablet .. 650 milliGRAM(s) Oral every 6 hours PRN Temp greater or equal to 38C (100.4F), Mild Pain (1 - 3)  aluminum hydroxide/magnesium hydroxide/simethicone Suspension 30 milliLiter(s) Oral every 4 hours PRN Dyspepsia  dextrose Oral Gel 15 Gram(s) Oral once PRN Blood Glucose LESS THAN 70 milliGRAM(s)/deciliter  melatonin 3 milliGRAM(s) Oral at bedtime PRN Insomnia  ondansetron Injectable 4 milliGRAM(s) IV Push every 8 hours PRN Nausea and/or Vomiting      ALLERGIES:  Allergies    peanuts (Rash)  adenosine (Unknown)    Intolerances        LABS:                        9.8    5.50  )-----------( 199      ( 12 Apr 2024 05:30 )             29.1     04-12    142  |  109<H>  |  17  ----------------------------<  123<H>  3.9   |  22  |  1.19    Ca    9.9      12 Apr 2024 05:30  Phos  6.0     04-12  Mg     1.6     04-12        Urinalysis Basic - ( 12 Apr 2024 05:30 )    Color: x / Appearance: x / SG: x / pH: x  Gluc: 123 mg/dL / Ketone: x  / Bili: x / Urobili: x   Blood: x / Protein: x / Nitrite: x   Leuk Esterase: x / RBC: x / WBC x   Sq Epi: x / Non Sq Epi: x / Bacteria: x      CAPILLARY BLOOD GLUCOSE      POCT Blood Glucose.: 234 mg/dL (12 Apr 2024 21:48)      RADIOLOGY & ADDITIONAL TESTS: Reviewed.

## 2024-04-13 NOTE — PROGRESS NOTE ADULT - ATTENDING COMMENTS
78 year old woman admitted with generalized weakness, she lives at home and has been feeling weak for past few days     on exam bilateral upper ext prox and distal strength equal , Lower Ext strength equal,   Right lower leg with patchy hypopigmentation + swelling > LLE     Impression and Plan   # Pre Renal BEBE + ATN   # Suspected Type B lactic acidosis , metformin and bebe   # Chronic Afib ( 1/3) criteria for eliquis dose reduction , can consider 2.5 mg bid given nearly meeting advanced age  criteria )   # IDDM - continue lantus + lipro    Dispo : [facility]  Pending Auth
78 year old woman admitted with generalized weakness, she lives at home and has been feeling weak for past few days, found to have BEBE    Impression and Plan   # Pre Renal BEBE + ATN -- will give additional fluids today given recurrent BEBE with poor PO intake   # Suspected Type B lactic acidosis , metformin and bebe   # Chronic Afib ( 1/3) criteria for eliquis dose reduction , can consider 2.5 mg bid given nearly meeting advanced age  criteria )   # IDDM - continue lantus + lipro    Dispo : [facility]  Pending Auth.
Pt is 77 yo woman with OA of the knees, chronic venous stasis R>L, admitted after a fall and recommended for [facility]. Pt is pending auth. All parties are in agreement with dispo plan.   ---BEBE noted on admission resolved.
78 year old woman admitted with generalized weakness, she lives at home and has been feeling weak for past few days     on exam bilateral upper ext prox and distal strength equal , Lower Ext strength equal,   Right lower leg with patchy hypopigmentation + swelling > LLE     Impression and Plan   # Pre Renal BEBE + ATN   # Suspected Type B lactic acidosis , metformin and bebe   # Chronic Afib ( 1/3) criteria for eliquis dose reduction , can consider 2.5 mg bid given nearly meeting advanced age  criteria )   # IDDM - continue lantus + lipro    Dispo : [facility] Started on 4/9

## 2024-04-13 NOTE — PROGRESS NOTE ADULT - PROBLEM SELECTOR PLAN 11
Fluids: NI  Electrolytes: Replete PRN  K<4.0, Mg<2.0, Phos< 2.5  N: Consistent carb  GI ppx: None  DVT ppx: Eliquis  Code: Full    Dispo: [facility]

## 2024-04-13 NOTE — PROGRESS NOTE ADULT - SUBJECTIVE AND OBJECTIVE BOX
Physical Medicine and Rehabilitation Progress Note :       Patient is a 78y old  Female who presents with a chief complaint of Weakness (13 Apr 2024 06:27)      HPI:  Ms. Ambrosio is a 77y/o F with a PMHx of HTN, HLD, DM, Afib on Eliquis, knee arthritis, asthma, and depression who p/w generalized weakness and difficulty walking since yesterday. The patient states that she normally ambulates well with a walker, but has been too weak to get out of bed since yesterday (4/8) AM. She states that the weakness is in her whole body; not just her legs. The patient reports eating and drinking as usual without skipping meals, and she denies changes in bowel or bladder habits (last BM yesterday morning). According to the daughter, the pt has been compliant with all of her medications, but the daughter did not give them this morning (4/9) d/t weakness. The patient denies trauma/fall, chest pain, SOB, palpitations, dizziness, lightheadedness, and headache. She also denies chills, sweats, blurry vision, n/v/d. The patient endorses a rash on her RLE for the past two weeks. The LE was wrapped in gauze, but upon inspection, it appears to be dry skin, possibly with venous stasis, with excoriations and scabbing from scratching. The patient has no known sick contacts and no recent travel history.     ED Course:  V/S:  Temp: 98.7, HR: 74, BP: 102/63, RR: 18, SpO2: 98% RA  Pertinent labs:  RBC: 3.45, Hgb: 10.5, Hct: 31.7, K: 5.6, Cl: 110, CO2: 20, BUN: 39, Crt: 1.78, Glu: 139, pro-BNP: 1098, Lactate: 3.2->2.9->2.3  Urine: Ketone: trace, Leuk est: trace, Hyaline casts: present  EKG:  Sinus bradycardia  Imaging:  CT Head n/c: No significant interval change since CT brain 10/5/2017. No acute intracranial hemorrhage or brain edema. 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 Chronic left lentiform nucleus infarct versus for enlarged perivascular space.  CXR: No acute pathology  Interventions:  - Magnesium 1g IVP x 1  - Lokelma 5g PO x 1  - NS 1L IV Bolus x 2 (09 Apr 2024 17:20)                            9.9    5.20  )-----------( 207      ( 13 Apr 2024 05:30 )             29.5       04-13    141  |  108  |  16  ----------------------------<  196<H>  4.3   |  22  |  1.34<H>    Ca    10.1      13 Apr 2024 05:30  Phos  4.6     04-13  Mg     1.8     04-13      Vital Signs Last 24 Hrs  T(C): 36.7 (13 Apr 2024 09:19), Max: 37.1 (12 Apr 2024 13:11)  T(F): 98.1 (13 Apr 2024 09:19), Max: 98.7 (12 Apr 2024 13:11)  HR: 46 (13 Apr 2024 09:19) (46 - 68)  BP: 158/70 (13 Apr 2024 09:19) (133/71 - 174/81)  BP(mean): --  RR: 18 (13 Apr 2024 09:19) (16 - 18)  SpO2: 99% (13 Apr 2024 09:19) (96% - 99%)    Parameters below as of 13 Apr 2024 09:19  Patient On (Oxygen Delivery Method): room air        MEDICATIONS  (STANDING):  albuterol/ipratropium for Nebulization 3 milliLiter(s) Nebulizer every 6 hours  amLODIPine   Tablet 5 milliGRAM(s) Oral every 24 hours  apixaban 5 milliGRAM(s) Oral every 12 hours  atorvastatin 20 milliGRAM(s) Oral at bedtime  budesonide 160 MICROgram(s)/formoterol 4.5 MICROgram(s) Inhaler 2 Puff(s) Inhalation two times a day  dextrose 10% Bolus 125 milliLiter(s) IV Bolus once  dextrose 5%. 1000 milliLiter(s) (50 mL/Hr) IV Continuous <Continuous>  dextrose 50% Injectable 25 Gram(s) IV Push once  glucagon  Injectable 1 milliGRAM(s) IntraMuscular once  insulin glargine Injectable (LANTUS) 17 Unit(s) SubCutaneous at bedtime  insulin lispro (ADMELOG) corrective regimen sliding scale   SubCutaneous Before meals and at bedtime  lactated ringers. 500 milliLiter(s) (250 mL/Hr) IV Continuous <Continuous>  mirtazapine 45 milliGRAM(s) Oral at bedtime  sodium chloride 0.9%. 1000 milliLiter(s) (80 mL/Hr) IV Continuous <Continuous>    MEDICATIONS  (PRN):  acetaminophen     Tablet .. 650 milliGRAM(s) Oral every 6 hours PRN Temp greater or equal to 38C (100.4F), Mild Pain (1 - 3)  aluminum hydroxide/magnesium hydroxide/simethicone Suspension 30 milliLiter(s) Oral every 4 hours PRN Dyspepsia  dextrose Oral Gel 15 Gram(s) Oral once PRN Blood Glucose LESS THAN 70 milliGRAM(s)/deciliter  melatonin 3 milliGRAM(s) Oral at bedtime PRN Insomnia  ondansetron Injectable 4 milliGRAM(s) IV Push every 8 hours PRN Nausea and/or Vomiting      T(C): 36.7 (04-13-24 @ 09:19), Max: 37.1 (04-12-24 @ 13:11)  HR: 46 (04-13-24 @ 09:19) (46 - 68)  BP: 158/70 (04-13-24 @ 09:19) (133/71 - 174/81)  RR: 18 (04-13-24 @ 09:19) (16 - 18)  SpO2: 99% (04-13-24 @ 09:19) (96% - 99%)        Physical Exam:       Constitutional: resting comfortably in bed; NAD    Head: NC/AT    Eyes: PERRL, EOMI, anicteric sclera    ENT: no nasal discharge; uvula midline, no oropharyngeal erythema or exudates, mucous membranes dry    Neck: supple; no JVD or thyromegaly    Respiratory: CTA B/L; no W/R/R, no retractions    Cardiac: +S1/S2; regular rhythm, but bradycardic; no M/R/G    Gastrointestinal: abdomen soft, NT/ND; no rebound or guarding; +BSx4    Back: spine midline, no bony tenderness or step-offs; no CVAT B/L    Extremities: WWP, no clubbing or cyanosis; edema to 1+ RLE only    Musculoskeletal: Tenderness to the left knee joint; no swelling, warmth, or erythema     Vascular: 2+ radial, DP pulses B/L    Dermatologic: skin warm, dry and intact. Right LE with healing excoriations and scabbing from reported "rash"; ?venous stasis. Patchy hypopigmentation on b/l LE below the knee    Lymphatic: no submandibular or cervical LAD    Neurologic: AAOx3; CNII-XII grossly intact; no focal deficits. Strength diminished in LUE and LLE to 3-4/5; 4.5-5/5 on the RUE and RLE    Psychiatric: affect and characteristics of appearance, verbalizations, behaviors are appropriate      4/12/2024 Functional Status Assessment :         Pain Assessment/Number Scale (0-10) Adult  Presence of Pain: complains of pain/discomfort  Body Location: L knee  Pain Rating (0-10): Rest: 5 (moderate pain)  Pain Rating (0-10): Activity: 7 (severe pain)    Safety      AM-PAC Functional Assessment: Basic Mobility  Type of Assessment: Daily assessment  Turning from your back to your side while in a flat bed without using bedrails?: 3 = A little assistance  Moving from lying on your back to sitting on the flat side of a flat bed without using bedrails?: 3 = A little assistance  Moving to and from a bed to a chair (including a wheelchair)?: 2 = A lot of assistance  Standing up from a chair using your arms (e.g. wheelchair or bedside chair)?: 2 = A lot of assistance  Walking in hospital room?: 2 = A lot of assistance  Climbing 3-5 steps with a railing?: 2-calculated by average   Score: 14   Row Comment: Ask the patient "How much help from another person do you currently need? (If the patient hasn't done an activity recently, how much help from another person do you think he/she needs if he/she tried?)    Cognitive/Neuro      Cognitive/Neuro/Behavioral  Cognitive/Neuro/Behavioral [WDL Definition: Alert; opens eyes spontaneously; arouses to voice or touch; oriented x 4; follows commands; speech spontaneous, logical; purposeful motor response; behavior appropriate to situation]: WDL    Language Assistance  Preferred Language to Address Healthcare Preferred Language to Address Healthcare: English    Therapeutic Interventions      Bed Mobility  Bed Mobility Training Rolling/Turning: minimum assist (75% patient effort);  verbal cues;  1 person assist  Bed Mobility Training Sit-to-Supine: minimum assist (75% patient effort);  verbal cues;  1 person assist  Bed Mobility Training Supine-to-Sit: minimum assist (75% patient effort);  verbal cues;  1 person assist  Bed Mobility Training Limitations: impaired ability to control trunk for mobility;  decreased ability to use legs for bridging/pushing;  impaired balance;  decreased strength;  impaired postural control    Sit-Stand Transfer Training  Transfer Training Sit-to-Stand Transfer: minimum assist (75% patient effort);  verbal cues;  rolling walker;  2 person assist  Transfer Training Stand-to-Sit Transfer: minimum assist (75% patient effort);  verbal cues;  2 person assist;  rolling walker  Sit-to-Stand Transfer Training Transfer Safety Analysis: decreased balance;  decreased strength;  impaired balance;  impaired postural control    Gait Training  Gait Training: minimum assist (75% patient effort);  verbal cues;  1 person assist;  rolling walker;  5 side steps to the eright and left and forward and back   Gait Analysis: 3-point gait   crouch;  decreased tricia;  shuffling;  decreased step length;  decreased stride length;  decreased strength;  impaired balance;  impaired postural control  Gait Number of Times:: x 1      AM-PAC Functional Assessment: Daily Activity  Type of Assessment: Daily assessment  Putting on and taking off regular lower body clothing?: 2 = A lot of assistance  Bathing (including washing, rinsing, drying)?: 2 = A lot of assistance  Toileting, which includes using toilet, bedpan or urinal?: 2 = A lot of assistance  Putting on and taking off regular upper body clothing?: 3 = A little assistance  Take care of personal grooming such as brushing teeth?: 3 = A little assistance  Eating meals?: 4 = No assist / stand by assistance  Score: 16   Row Comment: Ask the patient "How much help from another person do you currently need? (If the patient hasn't done an activity recently, how much help from another person do you think he/she needs if he/she tried?)    Cognitive/Neuro      Cognitive/Neuro/Behavioral  Cognitive/Neuro/Behavioral [WDL Definition: Alert; opens eyes spontaneously; arouses to voice or touch; oriented x 4; follows commands; speech spontaneous, logical; purposeful motor response; behavior appropriate to situation]: WDL    Language Assistance  Preferred Language to Address Healthcare Preferred Language to Address Healthcare: English    Therapeutic Interventions      Bed Mobility  Bed Mobility Training Rolling/Turning: minimum assist (75% patient effort);  1 person assist;  verbal cues  Bed Mobility Training Scooting: minimum assist (75% patient effort);  1 person assist;  verbal cues  Bed Mobility Training Sit-to-Supine: minimum assist (75% patient effort);  2 person assist;  verbal cues  Bed Mobility Training Supine-to-Sit: minimum assist (75% patient effort);  2 person assist;  verbal cues  Bed Mobility Training Limitations: impaired postural control;  decreased strength;  impaired balance;  decreased flexibility    Sit-Stand Transfer Training  Transfer Training Sit-to-Stand Transfer: minimum assist (75% patient effort);  2 person assist;  verbal cues;  rolling walker  Transfer Training Stand-to-Sit Transfer: minimum assist (75% patient effort);  2 person assist;  verbal cues;  rolling walker  Sit-to-Stand Transfer Training Transfer Safety Analysis: impaired balance;  decreased strength;  decreased flexibility;  impaired postural control    Therapeutic Exercise  Therapeutic Exercise Detail: functional mobility training with pt able to ambulate ~8ft in room, performing 5 R side steps and 4 fwd/bwd steps, requiring Min Ax2 using RW, demo impaired ability to weight shift 2/2 L knee pain and impaired BLE strength (L weaker than R).     Upper Body Dressing Training  Upper Body Dressing Training Assistance: minimum assist (75% patient effort);  1 person assist;  verbal cues;  don/doff back gown while sitting EOB;  decreased strength;  impaired balance;  decreased flexibility;  impaired postural control          PM&R Impression : as above    Current disposition plan recommendation :    subacute rehab placement

## 2024-04-13 NOTE — PROGRESS NOTE ADULT - PROBLEM SELECTOR PLAN 1
Pt with no known h/o kidney disease p/w BEBE (BUN 39; Crt 1.78, K 5.6). Likely i/s/o pre-renal d/t dehydration from poor PO intake.  - F/u urine studies  - Trend BMP (next at 10pm 4/9) - K WNL at 10pm and on 4/10 AM labs; Crt downtrending  - encourage PO fluid intake Pt with no known h/o kidney disease p/w BEBE (BUN 39; Crt 1.78, K 5.6). Likely i/s/o pre-renal d/t dehydration from poor PO intake.  - F/u urine studies  - Trend BMP (next at 10pm 4/9) - K WNL at 10pm and on 4/10 AM labs; Crt downtrending. Uptrended again on 4/13 AM; fluids administered.  - encourage PO fluid intake

## 2024-04-14 LAB
ANION GAP SERPL CALC-SCNC: 13 MMOL/L — SIGNIFICANT CHANGE UP (ref 5–17)
BASOPHILS # BLD AUTO: 0.05 K/UL — SIGNIFICANT CHANGE UP (ref 0–0.2)
BASOPHILS NFR BLD AUTO: 0.8 % — SIGNIFICANT CHANGE UP (ref 0–2)
BUN SERPL-MCNC: 14 MG/DL — SIGNIFICANT CHANGE UP (ref 7–23)
CALCIUM SERPL-MCNC: 10.3 MG/DL — SIGNIFICANT CHANGE UP (ref 8.4–10.5)
CHLORIDE SERPL-SCNC: 108 MMOL/L — SIGNIFICANT CHANGE UP (ref 96–108)
CO2 SERPL-SCNC: 23 MMOL/L — SIGNIFICANT CHANGE UP (ref 22–31)
CREAT SERPL-MCNC: 1.08 MG/DL — SIGNIFICANT CHANGE UP (ref 0.5–1.3)
CULTURE RESULTS: SIGNIFICANT CHANGE UP
CULTURE RESULTS: SIGNIFICANT CHANGE UP
EGFR: 53 ML/MIN/1.73M2 — LOW
EOSINOPHIL # BLD AUTO: 0.2 K/UL — SIGNIFICANT CHANGE UP (ref 0–0.5)
EOSINOPHIL NFR BLD AUTO: 3.3 % — SIGNIFICANT CHANGE UP (ref 0–6)
GLUCOSE BLDC GLUCOMTR-MCNC: 143 MG/DL — HIGH (ref 70–99)
GLUCOSE BLDC GLUCOMTR-MCNC: 200 MG/DL — HIGH (ref 70–99)
GLUCOSE BLDC GLUCOMTR-MCNC: 270 MG/DL — HIGH (ref 70–99)
GLUCOSE BLDC GLUCOMTR-MCNC: 320 MG/DL — HIGH (ref 70–99)
GLUCOSE SERPL-MCNC: 140 MG/DL — HIGH (ref 70–99)
HCT VFR BLD CALC: 32.3 % — LOW (ref 34.5–45)
HGB BLD-MCNC: 10.6 G/DL — LOW (ref 11.5–15.5)
IMM GRANULOCYTES NFR BLD AUTO: 0.3 % — SIGNIFICANT CHANGE UP (ref 0–0.9)
LYMPHOCYTES # BLD AUTO: 1.64 K/UL — SIGNIFICANT CHANGE UP (ref 1–3.3)
LYMPHOCYTES # BLD AUTO: 26.7 % — SIGNIFICANT CHANGE UP (ref 13–44)
MAGNESIUM SERPL-MCNC: 1.7 MG/DL — SIGNIFICANT CHANGE UP (ref 1.6–2.6)
MCHC RBC-ENTMCNC: 29.9 PG — SIGNIFICANT CHANGE UP (ref 27–34)
MCHC RBC-ENTMCNC: 32.8 GM/DL — SIGNIFICANT CHANGE UP (ref 32–36)
MCV RBC AUTO: 91.2 FL — SIGNIFICANT CHANGE UP (ref 80–100)
MONOCYTES # BLD AUTO: 0.46 K/UL — SIGNIFICANT CHANGE UP (ref 0–0.9)
MONOCYTES NFR BLD AUTO: 7.5 % — SIGNIFICANT CHANGE UP (ref 2–14)
NEUTROPHILS # BLD AUTO: 3.78 K/UL — SIGNIFICANT CHANGE UP (ref 1.8–7.4)
NEUTROPHILS NFR BLD AUTO: 61.4 % — SIGNIFICANT CHANGE UP (ref 43–77)
NRBC # BLD: 0 /100 WBCS — SIGNIFICANT CHANGE UP (ref 0–0)
PHOSPHATE SERPL-MCNC: 4.4 MG/DL — SIGNIFICANT CHANGE UP (ref 2.5–4.5)
PLATELET # BLD AUTO: 221 K/UL — SIGNIFICANT CHANGE UP (ref 150–400)
POTASSIUM SERPL-MCNC: 4.3 MMOL/L — SIGNIFICANT CHANGE UP (ref 3.5–5.3)
POTASSIUM SERPL-SCNC: 4.3 MMOL/L — SIGNIFICANT CHANGE UP (ref 3.5–5.3)
RBC # BLD: 3.54 M/UL — LOW (ref 3.8–5.2)
RBC # FLD: 15.1 % — HIGH (ref 10.3–14.5)
SODIUM SERPL-SCNC: 144 MMOL/L — SIGNIFICANT CHANGE UP (ref 135–145)
SPECIMEN SOURCE: SIGNIFICANT CHANGE UP
SPECIMEN SOURCE: SIGNIFICANT CHANGE UP
WBC # BLD: 6.15 K/UL — SIGNIFICANT CHANGE UP (ref 3.8–10.5)
WBC # FLD AUTO: 6.15 K/UL — SIGNIFICANT CHANGE UP (ref 3.8–10.5)

## 2024-04-14 PROCEDURE — 99233 SBSQ HOSP IP/OBS HIGH 50: CPT | Mod: GC

## 2024-04-14 RX ORDER — MAGNESIUM SULFATE 500 MG/ML
1 VIAL (ML) INJECTION ONCE
Refills: 0 | Status: COMPLETED | OUTPATIENT
Start: 2024-04-14 | End: 2024-04-14

## 2024-04-14 RX ORDER — SENNA PLUS 8.6 MG/1
1 TABLET ORAL AT BEDTIME
Refills: 0 | Status: DISCONTINUED | OUTPATIENT
Start: 2024-04-14 | End: 2024-04-15

## 2024-04-14 RX ORDER — POLYETHYLENE GLYCOL 3350 17 G/17G
17 POWDER, FOR SOLUTION ORAL EVERY 12 HOURS
Refills: 0 | Status: DISCONTINUED | OUTPATIENT
Start: 2024-04-14 | End: 2024-04-15

## 2024-04-14 RX ORDER — AMLODIPINE BESYLATE 2.5 MG/1
5 TABLET ORAL EVERY 24 HOURS
Refills: 0 | Status: DISCONTINUED | OUTPATIENT
Start: 2024-04-14 | End: 2024-04-15

## 2024-04-14 RX ADMIN — Medication 3 MILLILITER(S): at 09:45

## 2024-04-14 RX ADMIN — Medication 3 MILLILITER(S): at 22:43

## 2024-04-14 RX ADMIN — MIRTAZAPINE 45 MILLIGRAM(S): 45 TABLET, ORALLY DISINTEGRATING ORAL at 22:44

## 2024-04-14 RX ADMIN — AMLODIPINE BESYLATE 5 MILLIGRAM(S): 2.5 TABLET ORAL at 10:05

## 2024-04-14 RX ADMIN — LIDOCAINE 1 PATCH: 4 CREAM TOPICAL at 12:15

## 2024-04-14 RX ADMIN — Medication 6: at 22:44

## 2024-04-14 RX ADMIN — LIDOCAINE 1 PATCH: 4 CREAM TOPICAL at 18:01

## 2024-04-14 RX ADMIN — LIDOCAINE 1 PATCH: 4 CREAM TOPICAL at 10:27

## 2024-04-14 RX ADMIN — APIXABAN 5 MILLIGRAM(S): 2.5 TABLET, FILM COATED ORAL at 17:57

## 2024-04-14 RX ADMIN — SENNA PLUS 1 TABLET(S): 8.6 TABLET ORAL at 22:44

## 2024-04-14 RX ADMIN — Medication 3 MILLILITER(S): at 16:22

## 2024-04-14 RX ADMIN — ATORVASTATIN CALCIUM 20 MILLIGRAM(S): 80 TABLET, FILM COATED ORAL at 22:43

## 2024-04-14 RX ADMIN — Medication 8: at 13:50

## 2024-04-14 RX ADMIN — Medication 2: at 18:37

## 2024-04-14 RX ADMIN — INSULIN GLARGINE 17 UNIT(S): 100 INJECTION, SOLUTION SUBCUTANEOUS at 22:44

## 2024-04-14 RX ADMIN — BUDESONIDE AND FORMOTEROL FUMARATE DIHYDRATE 2 PUFF(S): 160; 4.5 AEROSOL RESPIRATORY (INHALATION) at 09:45

## 2024-04-14 RX ADMIN — BUDESONIDE AND FORMOTEROL FUMARATE DIHYDRATE 2 PUFF(S): 160; 4.5 AEROSOL RESPIRATORY (INHALATION) at 21:46

## 2024-04-14 RX ADMIN — Medication 100 GRAM(S): at 09:45

## 2024-04-14 RX ADMIN — POLYETHYLENE GLYCOL 3350 17 GRAM(S): 17 POWDER, FOR SOLUTION ORAL at 17:57

## 2024-04-14 RX ADMIN — APIXABAN 5 MILLIGRAM(S): 2.5 TABLET, FILM COATED ORAL at 06:06

## 2024-04-14 RX ADMIN — LIDOCAINE 1 PATCH: 4 CREAM TOPICAL at 17:57

## 2024-04-14 NOTE — PROGRESS NOTE ADULT - PROBLEM SELECTOR PLAN 2
The pt  p/w generalized weakness and difficulty walking for one day. She normally ambulates well with a walker, but has been too weak to get out of bed since yesterday (4/8) AM. She states that the weakness is in her whole body; not just her legs. Neuro exam shows decreased strength in the RUE and RLE; sensation intact. no incontinence, no sciatica/back pain.  Pt denies trauma/fall, and she denies lightheadedness, dizziness, and palpitations. Pt is dry on exam, and chemistry shows BEBE (BUN 39; Crt 1.78) as well as mild normocytic anemia (RBC 3.45, Hgb 10.5, Hct: 31.7). Consider dehydration vs. poor PO intake vs. anemia.  Plan:  - Fall precautions  - PT/OT consult - recommend JESSENIA  - Iron studies - %Iron sat low 4/10  - Urine studies  - B12 and folate   - TSH - WNL 4/10  - L knee xray --> osteoarthritis and bone infarcts; moderate effusion (4/13 repeat x-ray)

## 2024-04-14 NOTE — PROGRESS NOTE ADULT - PROBLEM SELECTOR PLAN 6
The pt has a h/o hypertension, but presents with BP of 102/63, likely i/s/o dehydration.  Plan:  - continue home amlodipine   - cw home Toprol ER 50mg PO q24 w/ hold parameters

## 2024-04-14 NOTE — PROGRESS NOTE ADULT - PROBLEM SELECTOR PLAN 1
Pt with no known h/o kidney disease p/w BEBE (BUN 39; Crt 1.78, K 5.6). Likely i/s/o pre-renal d/t dehydration from poor PO intake.  - resolved with IVFs, encourage PO intake

## 2024-04-14 NOTE — PROGRESS NOTE ADULT - SUBJECTIVE AND OBJECTIVE BOX
Subjective:  No acute events overnight.  Patient is doing well today without new complaints.  Right knee pain improved with lidocaine patch.  Denies HA, CP, SOB, abdominal pain, nausea, vomiting, fever, chills or diarrhea.     Objective:   Vital Signs Last 24 Hrs  T(C): 36.6 (14 Apr 2024 10:04), Max: 37.2 (13 Apr 2024 21:33)  T(F): 97.8 (14 Apr 2024 10:04), Max: 99 (13 Apr 2024 21:33)  HR: 57 (14 Apr 2024 10:04) (54 - 97)  BP: 148/75 (14 Apr 2024 10:04) (137/87 - 180/92)  BP(mean): --  RR: 18 (14 Apr 2024 10:04) (18 - 18)  SpO2: 96% (14 Apr 2024 10:04) (96% - 100%)    Parameters below as of 14 Apr 2024 10:04  Patient On (Oxygen Delivery Method): room air    Physical Exam:   Constitutional: resting comfortably in bed; NAD  Head: NC/AT  Eyes: PERRL, EOMI, anicteric sclera  ENT: no nasal discharge; uvula midline, no oropharyngeal erythema or exudates, mucous membranes dry  Neck: supple; no JVD or thyromegaly  Respiratory: CTA B/L; no W/R/R, no retractions  Cardiac: +S1/S2; regular rhythm, but bradycardic; no M/R/G  Gastrointestinal: abdomen obese, soft, NT/ND; no rebound or guarding; +BSx4  Extremities: WWP, no clubbing or cyanosis; edema to 1+ RLE only, but resolving  Musculoskeletal: Tenderness to the left knee joint with mild swelling and warmth  Vascular: 2+ radial, DP pulses B/L  Dermatologic: skin warm, dry and intact. Right LE with healing excoriations and scabbing from reported "rash"; ?venous stasis. Patchy hypopigmentation on b/l LE below the knee.  Neurologic: AAOx3; CNII-XII grossly intact; no focal deficits. Strength diminished in LUE and LLE to 3-4/5; 4.5-5/5 on the RUE and RLE    Labs:                        10.6   6.15  )-----------( 221      ( 14 Apr 2024 05:30 )             32.3       04-14    144  |  108  |  14  ----------------------------<  140<H>  4.3   |  23  |  1.08    Ca    10.3      14 Apr 2024 05:30  Phos  4.4     04-14  Mg     1.7     04-14    Microbiology:     Culture - Blood (collected 04-09-24 @ 14:35)  Source: .Blood Blood-Peripheral  Preliminary Report (04-13-24 @ 16:00):    No growth at 4 days.    Culture - Blood (collected 04-09-24 @ 14:35)  Source: .Blood Blood-Peripheral  Preliminary Report (04-13-24 @ 16:00):    No growth at 4 days.     Medications:  MEDICATIONS  (STANDING):  albuterol/ipratropium for Nebulization 3 milliLiter(s) Nebulizer every 6 hours  amLODIPine   Tablet 5 milliGRAM(s) Oral every 24 hours  apixaban 5 milliGRAM(s) Oral every 12 hours  atorvastatin 20 milliGRAM(s) Oral at bedtime  budesonide 160 MICROgram(s)/formoterol 4.5 MICROgram(s) Inhaler 2 Puff(s) Inhalation two times a day  dextrose 10% Bolus 125 milliLiter(s) IV Bolus once  dextrose 5%. 1000 milliLiter(s) (50 mL/Hr) IV Continuous <Continuous>  dextrose 50% Injectable 25 Gram(s) IV Push once  glucagon  Injectable 1 milliGRAM(s) IntraMuscular once  insulin glargine Injectable (LANTUS) 17 Unit(s) SubCutaneous at bedtime  insulin lispro (ADMELOG) corrective regimen sliding scale   SubCutaneous Before meals and at bedtime  lidocaine   4% Patch 1 Patch Transdermal every 24 hours  lidocaine   4% Patch 1 Patch Transdermal daily  mirtazapine 45 milliGRAM(s) Oral at bedtime  polyethylene glycol 3350 17 Gram(s) Oral every 12 hours  senna 1 Tablet(s) Oral at bedtime  sodium chloride 0.9%. 1000 milliLiter(s) (80 mL/Hr) IV Continuous <Continuous>    MEDICATIONS  (PRN):  acetaminophen     Tablet .. 650 milliGRAM(s) Oral every 6 hours PRN Temp greater or equal to 38C (100.4F), Mild Pain (1 - 3)  aluminum hydroxide/magnesium hydroxide/simethicone Suspension 30 milliLiter(s) Oral every 4 hours PRN Dyspepsia  dextrose Oral Gel 15 Gram(s) Oral once PRN Blood Glucose LESS THAN 70 milliGRAM(s)/deciliter  melatonin 3 milliGRAM(s) Oral at bedtime PRN Insomnia  ondansetron Injectable 4 milliGRAM(s) IV Push every 8 hours PRN Nausea and/or Vomiting

## 2024-04-14 NOTE — PROGRESS NOTE ADULT - PROBLEM SELECTOR PLAN 3
RESOLVED  The patient presents with a lactate of 3.1 --> 2.9 --> 2.3. Pt is afebrile with a normal WBC count and no other infectious symptoms. Lungs CTA and CXR unremarkable. Consider Metformin use vs.hypovolemia (BEBE on presentation; no known kidney disease).  Plan:  - Continue to trend lactate - cleared on 4/10 AM labs  - F/u urine studies  - Retroperitoneal U/S --> still pending  - Monitor for infectious symptoms  - Encourage PO fluid intake

## 2024-04-15 ENCOUNTER — TRANSCRIPTION ENCOUNTER (OUTPATIENT)
Age: 79
End: 2024-04-15

## 2024-04-15 VITALS
OXYGEN SATURATION: 96 % | RESPIRATION RATE: 17 BRPM | SYSTOLIC BLOOD PRESSURE: 117 MMHG | DIASTOLIC BLOOD PRESSURE: 72 MMHG | HEART RATE: 82 BPM | TEMPERATURE: 98 F

## 2024-04-15 LAB
GLUCOSE BLDC GLUCOMTR-MCNC: 189 MG/DL — HIGH (ref 70–99)
GLUCOSE BLDC GLUCOMTR-MCNC: 204 MG/DL — HIGH (ref 70–99)

## 2024-04-15 PROCEDURE — 83880 ASSAY OF NATRIURETIC PEPTIDE: CPT

## 2024-04-15 PROCEDURE — 99285 EMERGENCY DEPT VISIT HI MDM: CPT | Mod: 25

## 2024-04-15 PROCEDURE — 83036 HEMOGLOBIN GLYCOSYLATED A1C: CPT

## 2024-04-15 PROCEDURE — 70450 CT HEAD/BRAIN W/O DYE: CPT | Mod: MC

## 2024-04-15 PROCEDURE — 94640 AIRWAY INHALATION TREATMENT: CPT

## 2024-04-15 PROCEDURE — 84484 ASSAY OF TROPONIN QUANT: CPT

## 2024-04-15 PROCEDURE — 84443 ASSAY THYROID STIM HORMONE: CPT

## 2024-04-15 PROCEDURE — 85025 COMPLETE CBC W/AUTO DIFF WBC: CPT

## 2024-04-15 PROCEDURE — 82728 ASSAY OF FERRITIN: CPT

## 2024-04-15 PROCEDURE — 83605 ASSAY OF LACTIC ACID: CPT

## 2024-04-15 PROCEDURE — 93970 EXTREMITY STUDY: CPT

## 2024-04-15 PROCEDURE — 82607 VITAMIN B-12: CPT

## 2024-04-15 PROCEDURE — 82746 ASSAY OF FOLIC ACID SERUM: CPT

## 2024-04-15 PROCEDURE — 73501 X-RAY EXAM HIP UNI 1 VIEW: CPT

## 2024-04-15 PROCEDURE — 73560 X-RAY EXAM OF KNEE 1 OR 2: CPT

## 2024-04-15 PROCEDURE — 99239 HOSP IP/OBS DSCHRG MGMT >30: CPT | Mod: GC

## 2024-04-15 PROCEDURE — 97165 OT EVAL LOW COMPLEX 30 MIN: CPT

## 2024-04-15 PROCEDURE — 84100 ASSAY OF PHOSPHORUS: CPT

## 2024-04-15 PROCEDURE — 51798 US URINE CAPACITY MEASURE: CPT

## 2024-04-15 PROCEDURE — 81001 URINALYSIS AUTO W/SCOPE: CPT

## 2024-04-15 PROCEDURE — 82553 CREATINE MB FRACTION: CPT

## 2024-04-15 PROCEDURE — 83540 ASSAY OF IRON: CPT

## 2024-04-15 PROCEDURE — 97116 GAIT TRAINING THERAPY: CPT

## 2024-04-15 PROCEDURE — 76775 US EXAM ABDO BACK WALL LIM: CPT

## 2024-04-15 PROCEDURE — 96365 THER/PROPH/DIAG IV INF INIT: CPT

## 2024-04-15 PROCEDURE — 83735 ASSAY OF MAGNESIUM: CPT

## 2024-04-15 PROCEDURE — 83550 IRON BINDING TEST: CPT

## 2024-04-15 PROCEDURE — 82550 ASSAY OF CK (CPK): CPT

## 2024-04-15 PROCEDURE — 36415 COLL VENOUS BLD VENIPUNCTURE: CPT

## 2024-04-15 PROCEDURE — 87040 BLOOD CULTURE FOR BACTERIA: CPT

## 2024-04-15 PROCEDURE — 97161 PT EVAL LOW COMPLEX 20 MIN: CPT

## 2024-04-15 PROCEDURE — 96361 HYDRATE IV INFUSION ADD-ON: CPT

## 2024-04-15 PROCEDURE — 82962 GLUCOSE BLOOD TEST: CPT

## 2024-04-15 PROCEDURE — 97535 SELF CARE MNGMENT TRAINING: CPT

## 2024-04-15 PROCEDURE — 71045 X-RAY EXAM CHEST 1 VIEW: CPT

## 2024-04-15 PROCEDURE — 80053 COMPREHEN METABOLIC PANEL: CPT

## 2024-04-15 PROCEDURE — 80048 BASIC METABOLIC PNL TOTAL CA: CPT

## 2024-04-15 PROCEDURE — 97110 THERAPEUTIC EXERCISES: CPT

## 2024-04-15 PROCEDURE — 93005 ELECTROCARDIOGRAM TRACING: CPT

## 2024-04-15 RX ORDER — SENNA PLUS 8.6 MG/1
1 TABLET ORAL
Qty: 0 | Refills: 0 | DISCHARGE
Start: 2024-04-15

## 2024-04-15 RX ORDER — POLYETHYLENE GLYCOL 3350 17 G/17G
17 POWDER, FOR SOLUTION ORAL
Qty: 0 | Refills: 0 | DISCHARGE
Start: 2024-04-15

## 2024-04-15 RX ORDER — INSULIN GLARGINE 100 [IU]/ML
22 INJECTION, SOLUTION SUBCUTANEOUS AT BEDTIME
Refills: 0 | Status: DISCONTINUED | OUTPATIENT
Start: 2024-04-15 | End: 2024-04-15

## 2024-04-15 RX ORDER — LIDOCAINE 4 G/100G
1 CREAM TOPICAL
Qty: 0 | Refills: 0 | DISCHARGE
Start: 2024-04-15

## 2024-04-15 RX ADMIN — LIDOCAINE 1 PATCH: 4 CREAM TOPICAL at 05:30

## 2024-04-15 RX ADMIN — Medication 4: at 13:28

## 2024-04-15 RX ADMIN — BUDESONIDE AND FORMOTEROL FUMARATE DIHYDRATE 2 PUFF(S): 160; 4.5 AEROSOL RESPIRATORY (INHALATION) at 08:50

## 2024-04-15 RX ADMIN — AMLODIPINE BESYLATE 5 MILLIGRAM(S): 2.5 TABLET ORAL at 09:02

## 2024-04-15 RX ADMIN — Medication 3 MILLILITER(S): at 05:50

## 2024-04-15 RX ADMIN — Medication 3 MILLILITER(S): at 12:43

## 2024-04-15 RX ADMIN — LIDOCAINE 1 PATCH: 4 CREAM TOPICAL at 12:43

## 2024-04-15 RX ADMIN — APIXABAN 5 MILLIGRAM(S): 2.5 TABLET, FILM COATED ORAL at 06:18

## 2024-04-15 RX ADMIN — Medication 2: at 09:47

## 2024-04-15 RX ADMIN — Medication 650 MILLIGRAM(S): at 09:49

## 2024-04-15 RX ADMIN — POLYETHYLENE GLYCOL 3350 17 GRAM(S): 17 POWDER, FOR SOLUTION ORAL at 06:19

## 2024-04-15 RX ADMIN — Medication 650 MILLIGRAM(S): at 08:49

## 2024-04-15 NOTE — DISCHARGE NOTE NURSING/CASE MANAGEMENT/SOCIAL WORK - PATIENT PORTAL LINK FT
You can access the FollowMyHealth Patient Portal offered by [facility] by registering at the following website: [email or web address]. By joining [facility]’s FollowMyHealth portal, you will also be able to view your health information using other applications (apps) compatible with our system.

## 2024-04-15 NOTE — PROGRESS NOTE ADULT - SUBJECTIVE AND OBJECTIVE BOX
OVERNIGHT EVENTS:    SUBJECTIVE / INTERVAL HPI: Patient seen and examined at bedside.     ROS: negative unless otherwise stated above.    VITAL SIGNS:  Vital Signs Last 24 Hrs  T(C): 36.8 (15 Apr 2024 06:14), Max: 37.2 (14 Apr 2024 20:42)  T(F): 98.2 (15 Apr 2024 06:14), Max: 98.9 (14 Apr 2024 20:42)  HR: 71 (15 Apr 2024 06:14) (57 - 91)  BP: 130/79 (15 Apr 2024 06:14) (115/70 - 148/75)  BP(mean): --  RR: 17 (14 Apr 2024 20:42) (17 - 18)  SpO2: 98% (15 Apr 2024 06:14) (96% - 98%)    Parameters below as of 15 Apr 2024 06:14  Patient On (Oxygen Delivery Method): room air        PHYSICAL EXAM:    General: In no apparent distress  HEENT: NC/AT; PERRL, anicteric sclera; MMM  Neck: supple  Cardiovascular: +S1/S2; RRR  Respiratory: CTA B/L; no W/R/R  Gastrointestinal: soft, NT/ND; +BSx4  Extremities: WWP; no edema, clubbing or cyanosis  Vascular: 2+ radial, DP/PT pulses B/L  Neurological: AAOx3; no focal deficits    MEDICATIONS:  MEDICATIONS  (STANDING):  albuterol/ipratropium for Nebulization 3 milliLiter(s) Nebulizer every 6 hours  amLODIPine   Tablet 5 milliGRAM(s) Oral every 24 hours  apixaban 5 milliGRAM(s) Oral every 12 hours  atorvastatin 20 milliGRAM(s) Oral at bedtime  budesonide 160 MICROgram(s)/formoterol 4.5 MICROgram(s) Inhaler 2 Puff(s) Inhalation two times a day  dextrose 10% Bolus 125 milliLiter(s) IV Bolus once  dextrose 5%. 1000 milliLiter(s) (50 mL/Hr) IV Continuous <Continuous>  dextrose 50% Injectable 25 Gram(s) IV Push once  glucagon  Injectable 1 milliGRAM(s) IntraMuscular once  insulin glargine Injectable (LANTUS) 17 Unit(s) SubCutaneous at bedtime  insulin lispro (ADMELOG) corrective regimen sliding scale   SubCutaneous Before meals and at bedtime  lidocaine   4% Patch 1 Patch Transdermal every 24 hours  lidocaine   4% Patch 1 Patch Transdermal daily  mirtazapine 45 milliGRAM(s) Oral at bedtime  polyethylene glycol 3350 17 Gram(s) Oral every 12 hours  senna 1 Tablet(s) Oral at bedtime  sodium chloride 0.9%. 1000 milliLiter(s) (80 mL/Hr) IV Continuous <Continuous>    MEDICATIONS  (PRN):  acetaminophen     Tablet .. 650 milliGRAM(s) Oral every 6 hours PRN Temp greater or equal to 38C (100.4F), Mild Pain (1 - 3)  aluminum hydroxide/magnesium hydroxide/simethicone Suspension 30 milliLiter(s) Oral every 4 hours PRN Dyspepsia  dextrose Oral Gel 15 Gram(s) Oral once PRN Blood Glucose LESS THAN 70 milliGRAM(s)/deciliter  melatonin 3 milliGRAM(s) Oral at bedtime PRN Insomnia  ondansetron Injectable 4 milliGRAM(s) IV Push every 8 hours PRN Nausea and/or Vomiting      ALLERGIES:  Allergies    peanuts (Rash)  adenosine (Unknown)    Intolerances        LABS:                        10.6   6.15  )-----------( 221      ( 14 Apr 2024 05:30 )             32.3     04-14    144  |  108  |  14  ----------------------------<  140<H>  4.3   |  23  |  1.08    Ca    10.3      14 Apr 2024 05:30  Phos  4.4     04-14  Mg     1.7     04-14        Urinalysis Basic - ( 14 Apr 2024 05:30 )    Color: x / Appearance: x / SG: x / pH: x  Gluc: 140 mg/dL / Ketone: x  / Bili: x / Urobili: x   Blood: x / Protein: x / Nitrite: x   Leuk Esterase: x / RBC: x / WBC x   Sq Epi: x / Non Sq Epi: x / Bacteria: x      CAPILLARY BLOOD GLUCOSE      POCT Blood Glucose.: 270 mg/dL (14 Apr 2024 22:02)      RADIOLOGY & ADDITIONAL TESTS: Reviewed. OVERNIGHT EVENTS: AFSANEH    SUBJECTIVE / INTERVAL HPI: The patient was encountered lying in bed, AAOx3, in no acute distress. She noted that her pain was improved and well-controlled this morning, and she had no new or acute complaints. Specifically, she denied n/v/d, and she denied SOB, chest pain, and palpitations. She endorsed constipation, and Miralax dose was increased in frequency.     ROS: negative unless otherwise stated above.    VITAL SIGNS:  Vital Signs Last 24 Hrs  T(C): 36.8 (15 Apr 2024 06:14), Max: 37.2 (14 Apr 2024 20:42)  T(F): 98.2 (15 Apr 2024 06:14), Max: 98.9 (14 Apr 2024 20:42)  HR: 71 (15 Apr 2024 06:14) (57 - 91)  BP: 130/79 (15 Apr 2024 06:14) (115/70 - 148/75)  BP(mean): --  RR: 17 (14 Apr 2024 20:42) (17 - 18)  SpO2: 98% (15 Apr 2024 06:14) (96% - 98%)    Parameters below as of 15 Apr 2024 06:14  Patient On (Oxygen Delivery Method): room air        PHYSICAL EXAM:  Constitutional: resting comfortably in bed; NAD  Head: NC/AT  Eyes: PERRL, EOMI, anicteric sclera  ENT: no nasal discharge; uvula midline, no oropharyngeal erythema or exudates, mucous membranes dry  Neck: supple; no JVD or thyromegaly  Respiratory: CTA B/L; no W/R/R, no retractions  Cardiac: +S1/S2; regular rhythm, but bradycardic; no M/R/G  Gastrointestinal: abdomen obese, soft, NT/ND; no rebound or guarding; +BSx4  Extremities: WWP, no clubbing or cyanosis; edema to 1+ RLE only, but resolving  Musculoskeletal: Tenderness to the left knee joint with mild swelling and warmth  Vascular: 2+ radial, DP pulses B/L  Dermatologic: skin warm, dry and intact. Right LE with healing excoriations and scabbing from reported "rash"; ?venous stasis. Patchy hypopigmentation on b/l LE below the knee.  Neurologic: AAOx3; CNII-XII grossly intact; no focal deficits. Strength diminished in LUE and LLE to 3-4/5; 4.5-5/5 on the RUE and RLE    MEDICATIONS:  MEDICATIONS  (STANDING):  albuterol/ipratropium for Nebulization 3 milliLiter(s) Nebulizer every 6 hours  amLODIPine   Tablet 5 milliGRAM(s) Oral every 24 hours  apixaban 5 milliGRAM(s) Oral every 12 hours  atorvastatin 20 milliGRAM(s) Oral at bedtime  budesonide 160 MICROgram(s)/formoterol 4.5 MICROgram(s) Inhaler 2 Puff(s) Inhalation two times a day  dextrose 10% Bolus 125 milliLiter(s) IV Bolus once  dextrose 5%. 1000 milliLiter(s) (50 mL/Hr) IV Continuous <Continuous>  dextrose 50% Injectable 25 Gram(s) IV Push once  glucagon  Injectable 1 milliGRAM(s) IntraMuscular once  insulin glargine Injectable (LANTUS) 17 Unit(s) SubCutaneous at bedtime  insulin lispro (ADMELOG) corrective regimen sliding scale   SubCutaneous Before meals and at bedtime  lidocaine   4% Patch 1 Patch Transdermal every 24 hours  lidocaine   4% Patch 1 Patch Transdermal daily  mirtazapine 45 milliGRAM(s) Oral at bedtime  polyethylene glycol 3350 17 Gram(s) Oral every 12 hours  senna 1 Tablet(s) Oral at bedtime  sodium chloride 0.9%. 1000 milliLiter(s) (80 mL/Hr) IV Continuous <Continuous>    MEDICATIONS  (PRN):  acetaminophen     Tablet .. 650 milliGRAM(s) Oral every 6 hours PRN Temp greater or equal to 38C (100.4F), Mild Pain (1 - 3)  aluminum hydroxide/magnesium hydroxide/simethicone Suspension 30 milliLiter(s) Oral every 4 hours PRN Dyspepsia  dextrose Oral Gel 15 Gram(s) Oral once PRN Blood Glucose LESS THAN 70 milliGRAM(s)/deciliter  melatonin 3 milliGRAM(s) Oral at bedtime PRN Insomnia  ondansetron Injectable 4 milliGRAM(s) IV Push every 8 hours PRN Nausea and/or Vomiting      ALLERGIES:  Allergies    peanuts (Rash)  adenosine (Unknown)    Intolerances        LABS:                        10.6   6.15  )-----------( 221      ( 14 Apr 2024 05:30 )             32.3     04-14    144  |  108  |  14  ----------------------------<  140<H>  4.3   |  23  |  1.08    Ca    10.3      14 Apr 2024 05:30  Phos  4.4     04-14  Mg     1.7     04-14        Urinalysis Basic - ( 14 Apr 2024 05:30 )    Color: x / Appearance: x / SG: x / pH: x  Gluc: 140 mg/dL / Ketone: x  / Bili: x / Urobili: x   Blood: x / Protein: x / Nitrite: x   Leuk Esterase: x / RBC: x / WBC x   Sq Epi: x / Non Sq Epi: x / Bacteria: x      CAPILLARY BLOOD GLUCOSE      POCT Blood Glucose.: 270 mg/dL (14 Apr 2024 22:02)      RADIOLOGY & ADDITIONAL TESTS: Reviewed.

## 2024-04-15 NOTE — PROGRESS NOTE ADULT - PROVIDER SPECIALTY LIST ADULT
Electrophysiology
Rehab Medicine
Rehab Medicine
Internal Medicine

## 2024-04-15 NOTE — PROGRESS NOTE ADULT - ASSESSMENT
I M    78 y o F with a PMHx of HTN, HLD, DM, Afib on Eliquis, knee arthritis, asthma, and depression who p/w generalized weakness and difficulty walking since yesterday, and was admitted for management of weakness, BEBE and lactic acidosis..    Problem/Plan - 1:  ·  Problem: Weakness.   ·  Plan: The pt  p/w generalized weakness and difficulty walking for one day. She normally ambulates well with a walker, but has been too weak to get out of bed since yesterday (4/8) AM. She states that the weakness is in her whole body; not just her legs. Neuro exam shows decreased strength in the RUE and RLE; sensation intact. no incontinence, no sciatica/back pain.  Pt denies trauma/fall, and she denies lightheadedness, dizziness, and palpitations. Pt is dry on exam, and chemistry shows BEBE (BUN 39; Crt 1.78) as well as mild normocytic anemia (RBC 3.45, Hgb 10.5, Hct: 31.7). Consider dehydration vs. poor PO intake vs. anemia.  Plan:  - Fall precautions  - PT/OT consult - recommend JESSENIA  - Iron studies - %Iron sat low 4/10  - Urine studies  - B12 and folate   - TSH - WNL 4/10  - L knee xray --> osteoarthritis and bone infarcts.    Problem/Plan - 2:  ·  Problem: Lactic acidosis.   ·  Plan: The patient presents with a lactate of 3.1 --> 2.9 --> 2.3. Pt is afebrile with a normal WBC count and no other infectious symptoms. Lungs CTA and CXR unremarkable. Consider Metformin use vs.hypovolemia (BEBE on presentation; no known kidney disease).  Plan:  - Continue to trend lactate - cleared on 4/10 AM labs  - F/u urine studies  - Retroperitoneal U/S --> still pending  - Monitor for infectious symptoms  - c/w maintenance fluids and encourage PO intake.    Problem/Plan - 3:  ·  Problem: BEBE (acute kidney injury).   ·  Plan: Pt with no known h/o kidney disease p/w BEBE (BUN 39; Crt 1.78, K 5.6). Likely i/s/o pre-renal d/t dehydration from poor PO intake.  - F/u urine studies  - Trend BMP (next at 10pm 4/9) - K WNL at 10pm and on 4/10 AM labs; Crt downtrending  - Gentle hydration w/ NS @ 80cc/hr; encourage PO intake.    Problem/Plan - 4:  ·  Problem: Normocytic anemia.   ·  Plan: The pt presents with normocytic anemia (RBC: 3.45, Hgb 10.5, Hct: 31.7, MCV: 91.9), likely i/s/o chronic disease, but also consider blood loss given weakness and unknown h/o screening colonoscopy (less likely given no report of melena)  Plan:  - Trend CBC  - Maintain active T&S  - F/u iron studies - %iron saturation low  - Transfuse for Hgb < 7.0.    Problem/Plan - 5:  ·  Problem: Afib.   ·  Plan: The patient has a h/o Afib on Eliquis, and presents with EKG showing sinus bradycardia.  Plan:  - c/w Eliquis 5mg PO BID  - c/w home toprol w/ hold parameters.    Problem/Plan - 6:  ·  Problem: HTN (hypertension).   ·  Plan: The pt has a h/o hypertension, but presents with BP of 102/63, likely i/s/o dehydration.  Plan:  - Holding home amlodipine 5mg PO q24  i/s/o hypotension (from baseline) and bradycardia  - cw home Toprol ER 50mg PO q24 w/ hold parameters.    Problem/Plan - 7:  ·  Problem: Asthma.   ·  Plan: The patient has a h/o asthma, well-controlled on home medications and without current exacerbation.  - c/w Home med Symbicort 160-4.5, 2 puffs BID  - Duonebs q6 prn.    Problem/Plan - 8:  ·  Problem: DM (diabetes mellitus).   ·  Plan: The patient has a h/o of type 2 diabetes, on insulin. Home meds include: lantus 22U at bedtime, humalog 6 units q24, and metformin 1000mg PO BID.  - Start Lantus 17U (80% home dose) at bedtime 4/9  - mISS  - A1c in AM labs  - Consistent carb diet  - Monitor FSG  - Glu readings high on 4/10-11; consider upping insulin to full home dose.    Problem/Plan - 9:  ·  Problem: HLD (hyperlipidemia).   ·  Plan: The pt has a h/o HLD and takes Atorvastatin 20mg PO q24 at home.  Plan:  - c/w statin.    Problem/Plan - 10:  ·  Problem: Depression, major.   ·  Plan; The pt has a h/o major depression and takes mirtazapine 45mg PO q24 at bedtime.  Plan:  - c/w home med.    Problem/Plan - 11:  ·  Problem: Prophylactic measure.   ·  Plan: Fluids: NS @ 80cc/hr  Electrolytes: Replete PRN  K<4.0, Mg<2.0, Phos< 2.5  N: Consistent carb  GI ppx: None  DVT ppx: Eliquis  Code: Full    Dispo: RMF.                              
{\rtf1\zjfben05719\ansi\ibfmixb0455\ftnbj\uc1\deff0  {\fonttbl{\f0 \fnil Segoe UI;}{\f1 \fnil \fcharset0 Segoe UI;}{\f2 \fnil Times New Doanld;}}  {\colortbl ;\wsd373\cyqqf521\yiyn875 ;\red0\green0\blue0 ;\red0\green0\kitq496 ;\red0\green0\blue0 ;}  {\stylesheet{\f0\fs20 Normal;}{\cs1 Default Paragraph Font;}{\cs2\f0\fs16 Line Number;}{\cs3\f2\fs24\ul\cf3 Hyperlink;}}  {\*\revtbl{Unknown;}}  \zvpila95766\owiauy07861\wnjfn4356\vgvcl2486\jgupj2879\vzoyt5436\kvczizm776\zqerodt903\nogrowautofit\wahfea901\formshade\nofeaturethrottle1\dntblnsbdb\fet4\aendnotes\aftnnrlc\pgbrdrhead\pgbrdrfoot  \sectd\nkfxzf51246\ycmjei80886\guttersxn0\esjnqtsc3482\tnmfpntf2000\fuqlhveb3147\uqgntsro1418\pbareqx855\fqhdppm752\sbkpage\pgncont\pgndec  \plain\plain\f0\fs24\ql\plain\f0\fs24\plain\f0\fs20\gkdg3360\hich\f0\dbch\f0\loch\f0\fs20\par  I M\par  \par   78 y o F with a PMHx of HTN, HLD, DM, Afib on Eliquis, knee arthritis, asthma, and depression who p/w generalized weakness and difficulty walking since yesterday, and was admitted for management of weakness, BEBE and lactic acidosis..\par  \par  \plain\f1\fs20\uabz8474\hich\f1\dbch\f1\loch\f1\cf2\fs20\ul{\field{\*\fldinst HYPERLINK [id number] }{\fldrslt Problem/Plan - 1:}}\plain\f0\fs20\ixor2006\hich\f0\dbch\f0\loch\f0\fs20\ql\par  \'b7  {\*\bkmkstart [id number]}{\*\bkmkend ed61183019959}Problem: {\*\bkmkstart [id number]}{\*\bkmkend ig06955671170}Acute kidney injury with acute tubular necrosis.\plain\f1\fs20\qhmy8292\hich\f1\dbch\f1\loch\f1\cf2\fs20\strike\plain\f0\fs20\ovvb9848\hich\f0\dbch\f0\loch\f0\fs20\par  \'b7  {\*\bkmkstart ky82855599017}{\*\bkmkend of48210367934}Plan: {\*\bkmkstart mv70707921811}{\*\bkmkend lt46233739187}Pt with no known h/o kidney disease p/w BEBE (BUN 39; Crt 1.78, K 5.6). Likely i/s/o pre-renal d/t dehydration from poor PO intake.\par  - F/u urine studies\par  - Trend BMP (next at 10pm 4/9) - K WNL at 10pm and on 4/10 AM labs; Crt downtrending\par  - encourage PO fluid intake.\plain\f1\fs20\matx0594\hich\f1\dbch\f1\loch\f1\cf2\fs20\strike\plain\f0\fs20\hhci4111\hich\f0\dbch\f0\loch\f0\fs20\par  \par  \plain\f1\fs20\kvek1928\hich\f1\dbch\f1\loch\f1\cf2\fs20\ul{\field{\*\fldinst HYPERLINK [id number] }{\fldrslt Problem/Plan - 2:}}\plain\f0\fs20\ojpy3518\hich\f0\dbch\f0\loch\f0\fs20\ql\par  \'b7  {\*\bkmkstart ex86917836749}{\*\bkmkend va80322035850}Problem: {\*\bkmkstart ol95286391034}{\*\bkmkend ig25378836855}Weakness.\plain\f1\fs20\mghh6304\hich\f1\dbch\f1\loch\f1\cf2\fs20\strike\plain\f0\fs20\rssz3479\hich\f0\dbch\f0\loch\f0\fs20\par  \'b7  {\*\bkmkstart xx26252490559}{\*\bkmkend rm14479137052}Plan: {\*\bkmkstart ak54961855653}{\*\bkmkend gk91628365638}The pt  p/w generalized weakness and difficulty walking for one day. She normally ambulates well with a walker, but has been too weak   to get out of bed since yesterday (4/8) AM. She states that the weakness is in her whole body; not just her legs. Neuro exam shows decreased strength in the RUE and RLE; sensation intact. no incontinence, no sciatica/back pain.  Pt denies trauma/fall,   and she denies lightheadedness, dizziness, and palpitations. Pt is dry on exam, and chemistry shows BEBE (BUN 39; Crt 1.78) as well as mild normocytic anemia (RBC 3.45, Hgb 10.5, Hct: 31.7). Consider dehydration vs. poor PO intake vs. anemia.\par  Plan:\par  - Fall precautions\par  - PT/OT consult - recommend JESSENIA\par  - Iron studies - %Iron sat low 4/10\par  - Urine studies\par  - B12 and folate \par  - TSH - WNL 4/10\par  - L knee xray --> osteoarthritis and bone infarcts.\plain\f1\fs20\lcbr6981\hich\f1\dbch\f1\loch\f1\cf2\fs20\strike\plain\f0\fs20\flog2588\hich\f0\dbch\f0\loch\f0\fs20\par  \par  \plain\f1\fs20\kphx2461\hich\f1\dbch\f1\loch\f1\cf2\fs20\ul{\field{\*\fldinst HYPERLINK [id number] }{\fldrslt Problem/Plan - 3:}}\plain\f0\fs20\adix3983\hich\f0\dbch\f0\loch\f0\fs20\ql\par  \'b7  {\*\bkmkstart ok73081827020}{\*\bkmkend pi86379531252}Problem: {\*\bkmkstart wg91094686716}{\*\bkmkend xa37940097523}Lactic acidosis.\plain\f1\fs20\ipfz3431\hich\f1\dbch\f1\loch\f1\cf2\fs20\strike\plain\f0\fs20\oomo5413\hich\f0\dbch\f0\loch\f0\fs20\par  \'b7  {\*\bkmkstart sp82618565606}{\*\bkmkend gx75146921832}Plan: {\*\bkmkstart cf59504219978}{\*\bkmkend ap62069244285}The patient presents with a lactate of 3.1 --> 2.9 --> 2.3. Pt is afebrile with a normal WBC count and no other infectious symptoms.   Lungs CTA and CXR unremarkable. Consider Metformin use vs.hypovolemia (BEBE on presentation; no known kidney disease).\par  Plan:\par  - Continue to trend lactate - cleared on 4/10 AM labs\par  - F/u urine studies\par  - Retroperitoneal U/S --> still pending\par  - Monitor for infectious symptoms\par  - Encourage PO fluid intake.\plain\f1\fs20\udtj0776\hich\f1\dbch\f1\loch\f1\cf2\fs20\strike\plain\f0\fs20\qkga3888\hich\f0\dbch\f0\loch\f0\fs20\par  \par  \plain\f1\fs20\tddv6780\hich\f1\dbch\f1\loch\f1\cf2\fs20\ul{\field{\*\fldinst HYPERLINK [id number] }{\fldrslt Problem/Plan - 4:}}\plain\f0\fs20\nusm0127\hich\f0\dbch\f0\loch\f0\fs20\ql\par  \'b7  {\*\bkmkstart fa10225711112}{\*\bkmkend kk43545967092}Problem: {\*\bkmkstart gw56293000738}{\*\bkmkend yi93394520044}Normocytic anemia. \par  \'b7  {\*\bkmkstart ua79143677509}{\*\bkmkend ff80902903445}Plan: {\*\bkmkstart ys64344993770}{\*\bkmkend fq82655976329}The pt presents with normocytic anemia (RBC: 3.45, Hgb 10.5, Hct: 31.7, MCV: 91.9), likely i/s/o chronic disease, but also consider   blood loss given weakness and unknown h/o screening colonoscopy (less likely given no report of melena)\par  Plan:\par  - Trend CBC\par  - Maintain active T&S\par  - F/u iron studies - %iron saturation low\par  - Transfuse for Hgb < 7.0.\par  \par  \plain\f1\fs20\dgun0721\hich\f1\dbch\f1\loch\f1\cf2\fs20\ul{\field{\*\fldinst HYPERLINK [id number] }{\fldrslt Problem/Plan - 5:}}\plain\f0\fs20\yvcy4296\hich\f0\dbch\f0\loch\f0\fs20\ql\par  \'b7  {\*\bkmkstart jh06787232400}{\*\bkmkend ui77980113073}Problem: {\*\bkmkstart bf20883982153}{\*\bkmkend um70866895446}Chronic atrial fibrillation.\plain\f1\fs20\mmiq7095\hich\f1\dbch\f1\loch\f1\cf2\fs20\strike\plain\f0\fs20\esvs2287\hich\f0\dbch\f0\loch\f0\fs20\par  \'b7  {\*\bkmkstart mm38160127787}{\*\bkmkend ln07504624924}Plan: {\*\bkmkstart ee37433257486}{\*\bkmkend ve96987615095}The patient has a h/o Afib on Eliquis, and presents with EKG showing sinus bradycardia.\par  Plan:\par  - c/w Eliquis 5mg PO BID\par  - c/w home toprol w/ hold parameters.\par  \par  \plain\f1\fs20\kiih8671\hich\f1\dbch\f1\loch\f1\cf2\fs20\ul{\field{\*\fldinst HYPERLINK [id number] }{\fldrslt Problem/Plan - 6:}}\plain\f0\fs20\abtp1920\hich\f0\dbch\f0\loch\f0\fs20\ql\par  \'b7  {\*\bkmkstart um31556832598}{\*\bkmkend nk08571762193}Problem: {\*\bkmkstart hf64111802665}{\*\bkmkend es75233949832}HTN (hypertension). \par  \'b7  {\*\bkmkstart sl45666089599}{\*\bkmkend gg80179936554}Plan: {\*\bkmkstart vj40564373107}{\*\bkmkend nl98799024797}The pt has a h/o hypertension, but presents with BP of 102/63, likely i/s/o dehydration.\par  Plan:\par  - Holding home amlodipine 5mg PO q24  i/s/o hypotension (from baseline) and bradycardia\par  - cw home Toprol ER 50mg PO q24 w/ hold parameters.\par  \par  \plain\f1\fs20\emll2601\hich\f1\dbch\f1\loch\f1\cf2\fs20\ul{\field{\*\fldinst HYPERLINK [id number] }{\fldrslt Problem/Plan - 7:}}\plain\f0\fs20\bjsp9599\hich\f0\dbch\f0\loch\f0\fs20\ql\par  \'b7  {\*\bkmkstart lh37417357327}{\*\bkmkend cs01026286988}Problem: {\*\bkmkstart dt07951803974}{\*\bkmkend dx33225398030}Asthma. \par  \'b7  {\*\bkmkstart mi83012357713}{\*\bkmkend gz61815406727}Plan: {\*\bkmkstart zl64677325667}{\*\bkmkend tn90518254274}The patient has a h/o asthma, well-controlled on home medications and without current exacerbation.\par  - c/w Home med Symbicort 160-4.5, 2 puffs BID\par  - Duonebs q6 prn.\par  \par  \plain\f1\fs20\zzkl8026\hich\f1\dbch\f1\loch\f1\cf2\fs20\ul{\field{\*\fldinst HYPERLINK [id number] }{\fldrslt Problem/Plan - 8:}}\plain\f0\fs20\pcik2704\hich\f0\dbch\f0\loch\f0\fs20\ql\par  \'b7  {\*\bkmkstart fk46018692197}{\*\bkmkend dk16841906741}Problem: {\*\bkmkstart hx84146622069}{\*\bkmkend bs42979343985}DM (diabetes mellitus). \par  \'b7  {\*\bkmkstart dv62104149853}{\*\bkmkend fj70471567151}Plan: {\*\bkmkstart bd79582996866}{\*\bkmkend vd06268952249}The patient has a h/o of type 2 diabetes, on insulin. Home meds include: lantus 22U at bedtime, humalog 6 units q24, and metformin   1000mg PO BID.\par  - Start Lantus 17U (80% home dose) at bedtime 4/9\par  - mISS\par  - A1c in AM labs\par  - Consistent carb diet\par  - Monitor FSG\par  - Glu readings high on 4/10-11; consider upping insulin to full home dose.\par  \par  \plain\f1\fs20\dpci6035\hich\f1\dbch\f1\loch\f1\cf2\fs20\ul{\field{\*\fldinst HYPERLINK [id number] }{\fldrslt Problem/Plan - 9:}}\plain\f0\fs20\dzsd7620\hich\f0\dbch\f0\loch\f0\fs20\ql\par  \'b7  {\*\bkmkstart yf45337662346}{\*\bkmkend mx27267509284}Problem: {\*\bkmkstart bi40611815318}{\*\bkmkend mm94278802975}HLD (hyperlipidemia). \par  \'b7  {\*\bkmkstart gc82105281174}{\*\bkmkend dw55864427871}Plan: {\*\bkmkstart cz94090559372}{\*\bkmkend ff90435367607}The pt has a h/o HLD and takes Atorvastatin 20mg PO q24 at home.\par  Plan:\par  - c/w statin.\par  \par  \plain\f1\fs20\iegn6011\hich\f1\dbch\f1\loch\f1\cf2\fs20\ul{\field{\*\fldinst HYPERLINK [id number] }{\fldrslt Problem/Plan - 10:}}\plain\f0\fs20\geyq8078\hich\f0\dbch\f0\loch\f0\fs20\ql\par  \'b7  {\*\bkmkstart hh49081299853}{\*\bkmkend mj47415060126}Problem: {\*\bkmkstart dw60441442807}{\*\bkmkend ne45770266147}Depression, major. \par  \'b7  {\*\bkmkstart ri81681805003}{\*\bkmkend pm99519651203}Plan; {\*\bkmkstart zd93601511151}{\*\bkmkend np63007262714}The pt has a h/o major depression and takes mirtazapine 45mg PO q24 at bedtime.\par  Plan:\par  - c/w home med.\par  \par  \plain\f1\fs20\epqi5122\hich\f1\dbch\f1\loch\f1\cf2\fs20\ul{\field{\*\fldinst HYPERLINK [id number] }{\fldrslt Problem/Plan - 11:}}\plain\f0\fs20\zqxm5191\hich\f0\dbch\f0\loch\f0\fs20\ql\par  \'b7  {\*\bkmkstart bd075542648298}{\*\bkmkend qz448522494090}Problem: {\*\bkmkstart eo145874240798}{\*\bkmkend kl441953771661}Prophylactic measure. \par  \'b7  {\*\bkmkstart pd981846290994}{\*\bkmkend dw146134283537}Plan: {\*\bkmkstart oq073787882515}{\*\bkmkend ui198441827865}Fluids: NI\par  Electrolytes: Replete PRN\par  K<4.0, Mg<2.0, Phos< 2.5\par  N: Consistent carb\par  GI ppx: None\par  DVT ppx: Eliquis\par  Code: Full\par  \par  Dispo: RMF.\par  }  
Ms. Ambrosio is a 77y/o F with a PMHx of HTN, HLD, DM, Afib on Eliquis, knee arthritis, asthma, and depression who p/w generalized weakness and difficulty walking since yesterday, and was admitted for management of weakness, BEBE and lactic acidosis..
Ms. Ambrosio is a 79y/o F with a PMHx of HTN, HLD, DM, Afib on Eliquis, knee arthritis, asthma, and depression who p/w generalized weakness and difficulty walking since yesterday, and was admitted for management of weakness, BEBE and lactic acidosis..
Ms. Ambrosio is a 79y/o F with a PMHx of HTN, HLD, DM, Afib on Eliquis, knee arthritis, asthma, and depression who p/w generalized weakness and difficulty walking since yesterday, and was admitted for management of weakness, BEBE and lactic acidosis..
Ms. Ambrosio is a 77y/o F with a PMHx of HTN, HLD, DM, Afib on Eliquis, knee arthritis, asthma, and depression who p/w generalized weakness and difficulty walking since yesterday, and was admitted for management of weakness, BEBE and lactic acidosis..
Ms. Ambrosio is a 79y/o F with a PMHx of HTN, HLD, DM, Afib on Eliquis, knee arthritis, asthma, and depression who p/w generalized weakness and difficulty walking since yesterday, and was admitted for management of weakness, BEBE and lactic acidosis..
Ms. Ambrosio is a 77y/o F with a PMHx of HTN, HLD, DM, Afib on Eliquis, knee arthritis, asthma, and depression who p/w generalized weakness and difficulty walking since yesterday, and was admitted for management of weakness, BEBE and lactic acidosis..

## 2024-04-15 NOTE — PROGRESS NOTE ADULT - PROBLEM SELECTOR PLAN 2
The pt  p/w generalized weakness and difficulty walking for one day. She normally ambulates well with a walker, but has been too weak to get out of bed since yesterday (4/8) AM. She states that the weakness is in her whole body; not just her legs. Neuro exam shows decreased strength in the RUE and RLE; sensation intact. no incontinence, no sciatica/back pain.  Pt denies trauma/fall, and she denies lightheadedness, dizziness, and palpitations. Pt is dry on exam, and chemistry shows BEBE (BUN 39; Crt 1.78) as well as mild normocytic anemia (RBC 3.45, Hgb 10.5, Hct: 31.7). Consider dehydration vs. poor PO intake vs. anemia.  Plan:  - Fall precautions  - PT/OT consult - recommend JESSENIA  - Iron studies - %Iron sat low 4/10  - Urine studies  - B12 and folate   - TSH - WNL 4/10  - L knee xray --> osteoarthritis and bone infarcts; moderate effusion (4/13 repeat x-ray) The pt  p/w generalized weakness and difficulty walking for one day. She normally ambulates well with a walker, but has been too weak to get out of bed since yesterday (4/8) AM. She states that the weakness is in her whole body; not just her legs. Neuro exam shows decreased strength in the RUE and RLE; sensation intact. no incontinence, no sciatica/back pain.  Pt denies trauma/fall, and she denies lightheadedness, dizziness, and palpitations. Pt is dry on exam, and chemistry shows BEBE (BUN 39; Crt 1.78) as well as mild normocytic anemia (RBC 3.45, Hgb 10.5, Hct: 31.7). Consider dehydration vs. poor PO intake vs. anemia.  Plan:  - Fall precautions  - PT/OT consult - recommend JESSENIA; pt pending auth  - Iron studies - %Iron sat low 4/10  - Urine studies  - B12 and folate   - TSH - WNL 4/10  - L knee xray --> osteoarthritis and bone infarcts; moderate effusion (4/13 repeat x-ray)

## 2024-04-15 NOTE — PROGRESS NOTE ADULT - PROBLEM SELECTOR PROBLEM 10
Depression, major

## 2024-04-15 NOTE — PHARMACY EDUCATION NOTE - EDUCATION SUMMARY
Spoke with pt to see if pt had any specific questions on medications (pt said no). Did speak to patient about anticoagulant ( Apixaban)- indication, dosing, and side effects discussed. Pt was able to verbalize understanding in some but not all aspects.

## 2024-04-15 NOTE — PROGRESS NOTE ADULT - PROBLEM SELECTOR PLAN 9
The pt has a h/o HLD and takes Atorvastatin 20mg PO q24 at home.  Plan:  - c/w statin

## 2024-04-16 ENCOUNTER — NON-APPOINTMENT (OUTPATIENT)
Age: 79
End: 2024-04-16

## 2024-04-23 DIAGNOSIS — E11.9 TYPE 2 DIABETES MELLITUS WITHOUT COMPLICATIONS: ICD-10-CM

## 2024-04-23 DIAGNOSIS — R53.1 WEAKNESS: ICD-10-CM

## 2024-04-23 DIAGNOSIS — E86.0 DEHYDRATION: ICD-10-CM

## 2024-04-23 DIAGNOSIS — I12.9 HYPERTENSIVE CHRONIC KIDNEY DISEASE WITH STAGE 1 THROUGH STAGE 4 CHRONIC KIDNEY DISEASE, OR UNSPECIFIED CHRONIC KIDNEY DISEASE: ICD-10-CM

## 2024-04-23 DIAGNOSIS — F32.9 MAJOR DEPRESSIVE DISORDER, SINGLE EPISODE, UNSPECIFIED: ICD-10-CM

## 2024-04-23 DIAGNOSIS — R26.2 DIFFICULTY IN WALKING, NOT ELSEWHERE CLASSIFIED: ICD-10-CM

## 2024-04-23 DIAGNOSIS — M17.10 UNILATERAL PRIMARY OSTEOARTHRITIS, UNSPECIFIED KNEE: ICD-10-CM

## 2024-04-23 DIAGNOSIS — E87.5 HYPERKALEMIA: ICD-10-CM

## 2024-04-23 DIAGNOSIS — N18.9 CHRONIC KIDNEY DISEASE, UNSPECIFIED: ICD-10-CM

## 2024-04-23 DIAGNOSIS — D18.1 LYMPHANGIOMA, ANY SITE: ICD-10-CM

## 2024-04-23 DIAGNOSIS — R00.1 BRADYCARDIA, UNSPECIFIED: ICD-10-CM

## 2024-04-23 DIAGNOSIS — Z88.8 ALLERGY STATUS TO OTHER DRUGS, MEDICAMENTS AND BIOLOGICAL SUBSTANCES: ICD-10-CM

## 2024-04-23 DIAGNOSIS — Z79.01 LONG TERM (CURRENT) USE OF ANTICOAGULANTS: ICD-10-CM

## 2024-04-23 DIAGNOSIS — E78.5 HYPERLIPIDEMIA, UNSPECIFIED: ICD-10-CM

## 2024-04-23 DIAGNOSIS — N17.0 ACUTE KIDNEY FAILURE WITH TUBULAR NECROSIS: ICD-10-CM

## 2024-04-23 DIAGNOSIS — Z91.010 ALLERGY TO PEANUTS: ICD-10-CM

## 2024-04-23 DIAGNOSIS — I48.20 CHRONIC ATRIAL FIBRILLATION, UNSPECIFIED: ICD-10-CM

## 2024-04-23 DIAGNOSIS — I87.8 OTHER SPECIFIED DISORDERS OF VEINS: ICD-10-CM

## 2024-04-23 DIAGNOSIS — J45.909 UNSPECIFIED ASTHMA, UNCOMPLICATED: ICD-10-CM

## 2024-04-23 DIAGNOSIS — E87.20 ACIDOSIS, UNSPECIFIED: ICD-10-CM

## 2024-04-23 DIAGNOSIS — D64.9 ANEMIA, UNSPECIFIED: ICD-10-CM

## 2024-04-23 DIAGNOSIS — Z79.4 LONG TERM (CURRENT) USE OF INSULIN: ICD-10-CM

## 2024-04-25 ENCOUNTER — RX RENEWAL (OUTPATIENT)
Age: 79
End: 2024-04-25

## 2024-05-01 ENCOUNTER — APPOINTMENT (OUTPATIENT)
Dept: HOME HEALTH SERVICES | Facility: HOME HEALTH | Age: 79
End: 2024-05-01

## 2024-05-09 ENCOUNTER — TRANSCRIPTION ENCOUNTER (OUTPATIENT)
Age: 79
End: 2024-05-09

## 2024-05-10 ENCOUNTER — APPOINTMENT (OUTPATIENT)
Dept: PULMONOLOGY | Facility: CLINIC | Age: 79
End: 2024-05-10

## 2024-05-14 ENCOUNTER — NON-APPOINTMENT (OUTPATIENT)
Age: 79
End: 2024-05-14

## 2024-05-14 ENCOUNTER — APPOINTMENT (OUTPATIENT)
Dept: HEART AND VASCULAR | Facility: CLINIC | Age: 79
End: 2024-05-14
Payer: MEDICARE

## 2024-05-14 PROCEDURE — 93298 REM INTERROG DEV EVAL SCRMS: CPT | Mod: 26

## 2024-06-05 ENCOUNTER — APPOINTMENT (OUTPATIENT)
Dept: HEART AND VASCULAR | Facility: CLINIC | Age: 79
End: 2024-06-05

## 2024-06-10 ENCOUNTER — APPOINTMENT (OUTPATIENT)
Dept: HEART AND VASCULAR | Facility: CLINIC | Age: 79
End: 2024-06-10

## 2024-06-18 ENCOUNTER — APPOINTMENT (OUTPATIENT)
Dept: HEART AND VASCULAR | Facility: CLINIC | Age: 79
End: 2024-06-18

## 2024-06-21 ENCOUNTER — APPOINTMENT (OUTPATIENT)
Dept: HOME HEALTH SERVICES | Facility: HOME HEALTH | Age: 79
End: 2024-06-21

## 2024-06-21 ENCOUNTER — APPOINTMENT (OUTPATIENT)
Dept: HEMATOLOGY ONCOLOGY | Facility: CLINIC | Age: 79
End: 2024-06-21
Payer: MEDICARE

## 2024-06-21 VITALS
RESPIRATION RATE: 18 BRPM | WEIGHT: 146 LBS | HEART RATE: 56 BPM | BODY MASS INDEX: 22.91 KG/M2 | SYSTOLIC BLOOD PRESSURE: 147 MMHG | DIASTOLIC BLOOD PRESSURE: 63 MMHG | OXYGEN SATURATION: 98 % | TEMPERATURE: 98.4 F | HEIGHT: 67 IN

## 2024-06-21 DIAGNOSIS — D50.0 IRON DEFICIENCY ANEMIA SECONDARY TO BLOOD LOSS (CHRONIC): ICD-10-CM

## 2024-06-21 DIAGNOSIS — I82.409 ACUTE EMBOLISM AND THROMBOSIS OF UNSPECIFIED DEEP VEINS OF UNSPECIFIED LOWER EXTREMITY: ICD-10-CM

## 2024-06-21 PROCEDURE — 99213 OFFICE O/P EST LOW 20 MIN: CPT

## 2024-06-21 RX ORDER — INSULIN GLARGINE 100 [IU]/ML
INJECTION, SOLUTION SUBCUTANEOUS
Refills: 0 | Status: ACTIVE | COMMUNITY

## 2024-06-21 RX ORDER — APIXABAN 5 MG/1
5 TABLET, FILM COATED ORAL
Qty: 180 | Refills: 1 | Status: DISCONTINUED | COMMUNITY
Start: 2020-07-22 | End: 2024-06-21

## 2024-06-21 RX ORDER — BENZTROPINE MESYLATE 1 MG/1
1 TABLET ORAL
Refills: 0 | Status: ACTIVE | COMMUNITY

## 2024-06-21 RX ORDER — INSULIN GLARGINE-YFGN 100 [IU]/ML
100 INJECTION, SOLUTION SUBCUTANEOUS
Qty: 2 | Refills: 1 | Status: DISCONTINUED | COMMUNITY
Start: 2023-11-09 | End: 2024-06-21

## 2024-06-21 RX ORDER — DABIGATRAN ETEXILATE MESYLATE 150 MG/1
150 CAPSULE ORAL
Refills: 0 | Status: ACTIVE | COMMUNITY

## 2024-06-21 RX ORDER — LISINOPRIL 40 MG/1
40 TABLET ORAL
Qty: 90 | Refills: 1 | Status: DISCONTINUED | COMMUNITY
Start: 2020-12-02 | End: 2024-06-21

## 2024-06-21 NOTE — HISTORY OF PRESENT ILLNESS
[de-identified] : Esequiel Ambrosio is a 79 year old female here for hematology follow up.  Has hx DVT/PE and iron deficiency anemia Previously followed by Dr Contreras  Unprovoked DVT/PE in 7/2020 Anticardiolipin Ab IgG was slightly elevated at 21 x 1 but normal thereafter  Has been anticoagulated.  previously eliquis - now in nursing home and med has been switched to dabigatran/pradaxa.    Pt has hx of memory impairment but is here w/ her HHA today and able to give good history. notable has hx of intracranial hemorrhage years ago (unclear if this was SDH or SAH, notes differ in the chart). she denies any recent falls  denies mucosal bleeding, blood in stool/urine. NOT on ASA or other antiplatelet c/o fatigue c/o bilateral knee pain, worse now on Left.   hx IV iron 2020

## 2024-06-21 NOTE — PHYSICAL EXAM
[de-identified] : pale mucosae [Normal] : PERRL, EOMI, no conjunctival infection, anicteric [de-identified] : irregular rhythm, normal HR [de-identified] : chronic venous stasis skin changes [de-identified] : soft, not distended, nontender. [de-identified] : L knee is not warm, swollen or red.  [de-identified] : except in wheelchair

## 2024-06-21 NOTE — ASSESSMENT
[FreeTextEntry1] : Esequiel Ambrosio is a 79 year old female here for hematology f/u.  Plan: 1.  hx DVT, PE in 2020 (unprovoked?)  --indefinite anticoagulation recommended as long as bleeding risk is controlled.  At this time seems to be stable. --she has Atrial fibrillation as another indication for AC --AC has been prescribed by cardiology --currently on dabigatran 150 mg po BID.  she was switched to this med from Eliquis when she entered a nursing facility where she is currently residing.   2.  hx TOMASA, fatigue --had recent CBC at [facility], hgb is 9.8 which is similar to the result from when she was admitted to [place].  recommend resuming oral iron, ferrous sulfate 325 mg po TIW.  3.  anticardiolipin AB positive --she had one positive test result for low titer IgG anticardiolipin.  subsequent serial negatives.  does NOT meet criteria for APLAS, does not have antiphospholipid antibody syndrome.  4.  L knee pain - wrote note to nursing home to schedule orthopedics consult.  RTC PRN

## 2024-06-26 ENCOUNTER — APPOINTMENT (OUTPATIENT)
Dept: INTERNAL MEDICINE | Facility: CLINIC | Age: 79
End: 2024-06-26

## 2024-06-26 ENCOUNTER — NON-APPOINTMENT (OUTPATIENT)
Age: 79
End: 2024-06-26

## 2024-07-16 ENCOUNTER — APPOINTMENT (OUTPATIENT)
Dept: INTERNAL MEDICINE | Facility: CLINIC | Age: 79
End: 2024-07-16
Payer: MEDICARE

## 2024-07-16 VITALS
HEART RATE: 82 BPM | DIASTOLIC BLOOD PRESSURE: 71 MMHG | WEIGHT: 146 LBS | SYSTOLIC BLOOD PRESSURE: 129 MMHG | HEIGHT: 67 IN | OXYGEN SATURATION: 97 % | BODY MASS INDEX: 22.91 KG/M2 | TEMPERATURE: 98 F

## 2024-07-16 DIAGNOSIS — I10 ESSENTIAL (PRIMARY) HYPERTENSION: ICD-10-CM

## 2024-07-16 DIAGNOSIS — I48.91 UNSPECIFIED ATRIAL FIBRILLATION: ICD-10-CM

## 2024-07-16 DIAGNOSIS — E11.9 TYPE 2 DIABETES MELLITUS W/OUT COMPLICATIONS: ICD-10-CM

## 2024-07-16 DIAGNOSIS — D50.0 IRON DEFICIENCY ANEMIA SECONDARY TO BLOOD LOSS (CHRONIC): ICD-10-CM

## 2024-07-16 PROCEDURE — 99214 OFFICE O/P EST MOD 30 MIN: CPT

## 2024-07-16 PROCEDURE — 36415 COLL VENOUS BLD VENIPUNCTURE: CPT

## 2024-07-16 PROCEDURE — G2211 COMPLEX E/M VISIT ADD ON: CPT

## 2024-07-17 ENCOUNTER — NON-APPOINTMENT (OUTPATIENT)
Age: 79
End: 2024-07-17

## 2024-07-17 LAB
ALBUMIN SERPL ELPH-MCNC: 4.5 G/DL
ALP BLD-CCNC: 71 U/L
ALT SERPL-CCNC: 14 U/L
ANION GAP SERPL CALC-SCNC: 20 MMOL/L
AST SERPL-CCNC: 18 U/L
BASOPHILS # BLD AUTO: 0.06 K/UL
BASOPHILS NFR BLD AUTO: 1 %
BILIRUB SERPL-MCNC: 0.3 MG/DL
BUN SERPL-MCNC: 31 MG/DL
CALCIUM SERPL-MCNC: 9.8 MG/DL
CHLORIDE SERPL-SCNC: 107 MMOL/L
CO2 SERPL-SCNC: 13 MMOL/L
CREAT SERPL-MCNC: 1.07 MG/DL
EGFR: 53 ML/MIN/1.73M2
EOSINOPHIL # BLD AUTO: 0.36 K/UL
EOSINOPHIL NFR BLD AUTO: 6 %
ESTIMATED AVERAGE GLUCOSE: 100 MG/DL
GLUCOSE SERPL-MCNC: 91 MG/DL
HBA1C MFR BLD HPLC: 5.1 %
HCT VFR BLD CALC: 32.4 %
HGB BLD-MCNC: 9.8 G/DL
IMM GRANULOCYTES NFR BLD AUTO: 0.3 %
LYMPHOCYTES # BLD AUTO: 1.74 K/UL
LYMPHOCYTES NFR BLD AUTO: 28.9 %
MAN DIFF?: NORMAL
MCHC RBC-ENTMCNC: 29.2 PG
MCHC RBC-ENTMCNC: 30.2 GM/DL
MCV RBC AUTO: 96.4 FL
MONOCYTES # BLD AUTO: 0.38 K/UL
MONOCYTES NFR BLD AUTO: 6.3 %
NEUTROPHILS # BLD AUTO: 3.46 K/UL
NEUTROPHILS NFR BLD AUTO: 57.5 %
PLATELET # BLD AUTO: 235 K/UL
POTASSIUM SERPL-SCNC: 4.7 MMOL/L
PROT SERPL-MCNC: 7.6 G/DL
RBC # BLD: 3.36 M/UL
RBC # FLD: 15.4 %
SODIUM SERPL-SCNC: 141 MMOL/L
WBC # FLD AUTO: 6.02 K/UL

## 2024-07-23 ENCOUNTER — APPOINTMENT (OUTPATIENT)
Dept: HEART AND VASCULAR | Facility: CLINIC | Age: 79
End: 2024-07-23

## 2024-07-23 PROCEDURE — 93298 REM INTERROG DEV EVAL SCRMS: CPT

## 2024-07-25 ENCOUNTER — APPOINTMENT (OUTPATIENT)
Dept: INTERNAL MEDICINE | Facility: CLINIC | Age: 79
End: 2024-07-25

## 2024-07-30 NOTE — ED PROVIDER NOTE - NS ED NOTE AC HIGH RISK COUNTRIES
No Operative Note     Name:  Myrtle Vaz  PCP:  Leila Philip  Procedure Date:  7/28/2024 - 7/29/2024        Pre-Operative Diagnosis:  Left minimally displaced valgus impacted femoral neck fracture     Post-Operative Diagnosis:    Left minimally displaced valgus impacted femoral neck fracture     Procedure:   1.  Left hip in situ internal fixation using Synthes Femoral Neck System     Surgeon: Janak Tate MD  Assistant: Leena Pena PA-C     A skilled assistant was necessary for this case to help with patient positioning, manipulation and prep of the operative extremity, soft tissue retraction, and safe/timely progression of the procedure.      Circulator: Tereza Ren RN  Scrub Person: Sandy Newman      Anesthesia Type: General anesthesia     Estimated Blood Loss: 50 mL     Specimens: None     Complications: None apparent     Findings:   Minimally displaced impacted valgus femoral neck fracture, alignment remained stable compared to injury films and during placement of femoral neck system. Noted bone demineralization during instrumentation.      Indication for Procedure:   Natasha Vaz is a 77-year-old female with a past medical history notable for dementia, hypertension, A-fib, hypothyroidism, GERD, depression who sustained a fall approximately 2 weeks ago after slipping off the edge of her bathtub, for which she was admitted to [facility] and diagnosed at that time with a stable subarachnoid hemorrhage.  She was discharged to home with assist.  In speaking with her and her daughters, she has been ambulating, but experiencing increasing left hip pain.  She was then seen in orthopedic urgent care yesterday for progressive hip pain, and per report did have another fall.  Workup included x-rays and CT scan, demonstrating minimally displaced valgus impacted femoral neck fracture, and she was subsequently admitted to the hospital for further management.  She has been evaluated by  medicine team, and has been deemed medically optimized for surgery today.     I was able to meet with Natasha and her daughters in the preoperative holding area.  I discussed the nature of her left valgus impacted femoral neck fracture.  Preoperatively, the nature of the procedure, risks and benefits, as well as alternatives including nonsurgical management were discussed in detail with the patient. I reviewed and discussed the patient's condition and relevant images with the patient. We discussed options for further evaluation and treatment, including conservative non-operative management versus surgical intervention.       From a conservative standpoint, the patient can pursue non-operative management, which would include protected weight bearing to the lower extremity, which carries a high risk of morbidity and mortality due to prolonged and diminished mobilization/ambulation and it's associated complications, which include but are not limited to blood clots (DVT/PE), skin ulcerations and pressure sores, and pneumonia. In the long term, there is also the risk of chronic pain, fracture nonunion, fracture malunion, and avascular necrosis of the femoral head.     From a surgical standpoint, we discussed closed reduction and internal fixation with the Synthes Femoral Neck System. Given her fracture morphology, this is a reasonable treatment strategy to stabilize her fracture and allow for early mobilization. This procedure would likely be quicker, requiring less time under anesthesia and blood loss when compared to hip arthroplasty.Given, the patient's generalized bone demineralization seen on imaging, internal fixation would carry the risks of fracture displacement, implant cut out, fracture nonunion, fracture malunion, and need for potential future surgery.       We also discussed what hip hemiarthroplasty involves, as well as the postoperative recovery and rehabilitation. Similarly, this procedure has a high  likelihood of providing immediate post-operative mobilization. It would likely involve longer time under anesthesia, greater surgical dissection, and potentially increased blood loss. We also discussed at length the risks and benefits of arthroplasty surgery.     We reviewed in detail the risks of surgical management included but was not limited to the risk of pain, bleeding, infection, blood clots (DVT, PE), wound issues, continued chronic pain in the hip, post-operative joint stiffness, painful arc of motion, difficulty with ambulation, iatrogenic fracture, damage to nearby neurovascular structures, need for potential future surgery, the possibility of intra-operative and/or post-operative medical complications such as anesthesia complications or reactions, respiratory and cardiovascular events, stroke, heart attack and/or death were also discussed. In the case of infection, they understand that this will require prolonged IV antibiotic therapy and possible multiple operative procedures in the future.      The patient and her family demonstrated an understanding of these risks as well as the potential benefits of closed reduction and pinning versus arthroplasty surgery. Again risks and benefits of both surgical options were reviewed. Specific details of the surgical procedure, hospitalization, recovery, rehabilitation, and long-term precautions were also presented. Ultimately, they wished to proceed with left hip in situ internal fixation with the femoral neck system despite the risks involved. All of patient's and her family's questions were answered preoperatively at which time written informed consent was obtained from her daughter (POA).  The left hip was marked myself     Description of Procedure:   The patient was met in the preoperative holding area. The correct operative site was identified and marked with indelible ink. After being informed of the risks, benefits, and alternatives of the procedure as  above, the patient desired to proceed with surgery, and written informed consent was obtained. The patient met with the anesthesia team in the preoperative holding area. The patient was then brought to the operating suite, where she underwent general anesthesia without complication. Bilateral feet and ankles were well padded with cast padding and she was placed into the traction boots.  She was then placed in the supine position on the fracture table. All bony prominences were well padded. The patient received antibiotics preoperatively. C-arm was used to confirm adequate visualization of the proximal femur and femoral head on both AP and lateral planes. The left lower extremity was then prepped and draped in the typical sterile fashion. A timeout  was performed by all operating room staff to confirm patient, procedure to be performed, laterality, and equipment needs. All were in agreement without safety concerns.      Attention was turned to the left hip.  A longitudinal incision was created at the lateral proximal thigh using 10 blade scalpel through skin and subcutaneous tissue.  Electrocautery was used to achieve hemostasis.  Dissection was carried down to the IT band.  This was longitudinally split in line with the incision.  The vastus lateralis was carefully elevated at its posterior border down to the lateral aspect of the femur.  A Juan A retractor was placed over the anterior femur, allowing for visualization of the lateral femur at the vastus ridge.  Next, under fluoroscopic guidance, using the Synthes femoral neck system guide, guide pin was placed.  Both on AP and lateral views, guidepin was ensured to be centered within the femoral head on both the initial guidepin was noted to be slightly more inferior than desired, and therefore using parallel guide, a second guidepin was advanced.  AP and lateral views were obtained to ensure appropriate position centered within the femoral neck and head on both  views.  Once satisfied with guidepin position, length was then measured.  A 85 mm bolt and 85 mm antirotation screw were felt to be most appropriate.  Next, the reamer was then set to a 85 mm, and placed over the guidewire and reamed under fluoroscopic guidance.  Guidewire was then removed.  A 1 hole Synthes femoral neck system plate was then opened, and the 85 mm bolt was then placed into the plate and assembled onto the insertion handle on the back table.  This was then advanced into the proximal femur under fluoroscopic guidance, with fracture alignment remaining stable.  With care taken to ensure the plate was well positioned on the lateral femoral cortex, as well as well centered in line with the axis of the femur using fluoroscopy, the single locking screw was then drilled and placed.  A final 40 mm locking screw was then placed with appropriate positioning and security within the plate.  Next, the antirotation screw was then drilled with the drill set to 85 mm.  The final 85 mm antirotation screw was then placed under fluoroscopic guidance.  Fracture alignment remained stable throughout the entirety of implant positioning.  Final AP and lateral fluoroscopic images were obtained ensuring appropriate implant positioning and maintenance of fracture alignment.     The wounds were then copiously irrigated with normal sterile saline. Hemostasis was ensured using electrocautery.  1 g of vancomycin powder was placed in the wound.  Closure was then performed using 0 Vicryl for IT band closure and deep subcutaneous/adipose layer closure. 2-0 Vicryl was used for deep dermal closure.  Running subcuticular 3-0 Monocryl was used for skin closure.  Sterile waterproof dressing was then placed.  The patient was then awakened from anesthesia without complication and transferred to a regular bed. All sponge and needle counts were correct. The patient was then taken from the operating room to the PACU in stable condition. There  were no apparent complications identified.     Post Operative Plan:   Activity: Up with assist and assistive device (walker) at all times.  Weight bearing status: WBAT LLE.  Pain management: Transition from IV to PO as tolerated. Judicious narcotic use    Antibiotics: Ancef x 24 hours.  Diet: Begin with clear fluids and progress diet as tolerated.   DVT prophylaxis: ASA 81 mg BID to begin on POD#1 and mechanical while in the hospital  Imaging: complete.  Labs: Hgb POD#1 and 2, check as needed thereafter. Transfuse for Hbg <7.0   Dressings: Keep clean, dry and intact until follow up.   Elevation: Elevate LLE on pillows to keep above the level of the heart as much as possible.   Physical Therapy/Occupational Therapy: Eval and treat, appreciate assistance and recommendations.  Consults: PT/OT, care coordination for disposition planning.   --Recommend outpatient bone health evaluation including DEXA scan, with medical management as indicated. Tentative plan to coordinate follow with Dr. June at [facility]. Vitamin D and Ca supplementation for the time being pending work up.   Follow-up: Clinic in 2 weeks for incision check, suture removal, and repeat x-rays needed.   Disposition:Pending progress with therapies, pain control on orals, and medical stability, anticipate discharge to TCU.    Implant Name Type Inv. Item Serial No.  Lot No. LRB No. Used Action   PLATE BN TI 1 HL STRL FEM NK - [id number] Metallic Hardware/[place] PLATE BN TI 1 HL STRL FEM NK  [employer]- [id number] Left 1 Implanted   SCREW BN 85MM ANTIROTATE STRL FEM NK SYS - [id number] Metallic Hardware/[place] SCREW BN 85MM ANTIROTATE STRL FEM NK SYS  [employer]- [id number] Left 1 Implanted   BOLT ORTH 85MM TI JOSE ANTONIO FEM NK SYS STRL - [id number] Metallic Hardware/[place] BOLT ORTH 85MM TI JOSE ANTONIO FEM NK SYS STRL  [employer]- [id number] Left 1 Implanted   SCREW BN 40MM 5MM TI ST LCK STRDR T25 STRL 412.214S - [id number] Metallic  Hardware/[place] SCREW BN 40MM 5MM TI ST LCK STRDR T25 STRL [id number]  [employer] [facility]- [id number] Left 1 Implanted

## 2024-08-08 ENCOUNTER — TRANSCRIPTION ENCOUNTER (OUTPATIENT)
Age: 79
End: 2024-08-08

## 2024-08-09 ENCOUNTER — TRANSCRIPTION ENCOUNTER (OUTPATIENT)
Age: 79
End: 2024-08-09

## 2024-08-12 ENCOUNTER — TRANSCRIPTION ENCOUNTER (OUTPATIENT)
Age: 79
End: 2024-08-12

## 2024-08-12 RX ORDER — DABIGATRAN ETEXILATE 150 MG/1
150 CAPSULE ORAL TWICE DAILY
Qty: 60 | Refills: 3 | Status: ACTIVE | COMMUNITY
Start: 2024-08-12 | End: 1900-01-01

## 2024-08-13 ENCOUNTER — TRANSCRIPTION ENCOUNTER (OUTPATIENT)
Age: 79
End: 2024-08-13

## 2024-08-23 ENCOUNTER — TRANSCRIPTION ENCOUNTER (OUTPATIENT)
Age: 79
End: 2024-08-23

## 2024-08-27 ENCOUNTER — APPOINTMENT (OUTPATIENT)
Dept: HEART AND VASCULAR | Facility: CLINIC | Age: 79
End: 2024-08-27

## 2024-08-27 PROCEDURE — 93298 REM INTERROG DEV EVAL SCRMS: CPT

## 2024-08-30 ENCOUNTER — EMERGENCY (EMERGENCY)
Facility: HOSPITAL | Age: 79
LOS: 1 days | Discharge: HOME | End: 2024-08-30
Attending: EMERGENCY MEDICINE | Admitting: STUDENT IN AN ORGANIZED HEALTH CARE EDUCATION/TRAINING PROGRAM
Payer: MEDICARE

## 2024-08-30 ENCOUNTER — TRANSCRIPTION ENCOUNTER (OUTPATIENT)
Age: 79
End: 2024-08-30

## 2024-08-30 VITALS
SYSTOLIC BLOOD PRESSURE: 101 MMHG | DIASTOLIC BLOOD PRESSURE: 60 MMHG | RESPIRATION RATE: 18 BRPM | HEART RATE: 60 BPM | TEMPERATURE: 99 F | OXYGEN SATURATION: 99 %

## 2024-08-30 DIAGNOSIS — Z88.6 ALLERGY STATUS TO ANALGESIC AGENT: ICD-10-CM

## 2024-08-30 DIAGNOSIS — Z20.822 CONTACT WITH AND (SUSPECTED) EXPOSURE TO COVID-19: ICD-10-CM

## 2024-08-30 DIAGNOSIS — E11.9 TYPE 2 DIABETES MELLITUS WITHOUT COMPLICATIONS: ICD-10-CM

## 2024-08-30 DIAGNOSIS — M17.12 UNILATERAL PRIMARY OSTEOARTHRITIS, LEFT KNEE: ICD-10-CM

## 2024-08-30 DIAGNOSIS — Z96.651 PRESENCE OF RIGHT ARTIFICIAL KNEE JOINT: Chronic | ICD-10-CM

## 2024-08-30 DIAGNOSIS — J45.909 UNSPECIFIED ASTHMA, UNCOMPLICATED: ICD-10-CM

## 2024-08-30 DIAGNOSIS — E78.5 HYPERLIPIDEMIA, UNSPECIFIED: ICD-10-CM

## 2024-08-30 DIAGNOSIS — Z79.01 LONG TERM (CURRENT) USE OF ANTICOAGULANTS: ICD-10-CM

## 2024-08-30 DIAGNOSIS — R06.00 DYSPNEA, UNSPECIFIED: ICD-10-CM

## 2024-08-30 DIAGNOSIS — R06.02 SHORTNESS OF BREATH: ICD-10-CM

## 2024-08-30 DIAGNOSIS — I10 ESSENTIAL (PRIMARY) HYPERTENSION: ICD-10-CM

## 2024-08-30 DIAGNOSIS — I62.01 NONTRAUMATIC ACUTE SUBDURAL HEMORRHAGE: Chronic | ICD-10-CM

## 2024-08-30 DIAGNOSIS — I48.91 UNSPECIFIED ATRIAL FIBRILLATION: ICD-10-CM

## 2024-08-30 DIAGNOSIS — Z91.010 ALLERGY TO PEANUTS: ICD-10-CM

## 2024-08-30 PROCEDURE — 99284 EMERGENCY DEPT VISIT MOD MDM: CPT

## 2024-08-30 PROCEDURE — 71045 X-RAY EXAM CHEST 1 VIEW: CPT | Mod: 26

## 2024-08-30 NOTE — ED PROVIDER NOTE - NSFOLLOWUPINSTRUCTIONS_ED_ALL_ED_FT
Please return to the ER for worsening symptoms, shortness of breath, fever, cough, leg pain or leg swelling. Please stay well hydrated. Please follow up closely with your primary physician.

## 2024-08-30 NOTE — ED PROVIDER NOTE - OBJECTIVE STATEMENT
79-year-old female came by ambulance met here by daughter for an episode of shortness of breath.  Symptoms started approximately 9:00 while patient was lying down.  She reports that she suddenly felt short of breath.  She used her Symbicort inhaler and thinks that it helped.  Overall the symptoms lasted 20 minutes and she feels back to baseline.  Daughter reports that she has not needed to use her inhaler for years and so wanted her to be checked to see what precipitated this event.  There is no chest pain at the time the patient's had a little bit of a cough today but no fever she has had no leg pain or swelling no nausea or vomiting.  She had some diarrhea today the daughter thinks it is due to dietary noncompliance she has had this before under similar circumstances.  been admitted and then sent to rehab over the past few months.  She was discharged from rehab July 4 and has been home where she lives alone.  She had some home PT after her discharge from rehab but has been discharged from PT and since then the daughter states she has not been doing much in the way of physical activity in general. 79-year-old PMHx of HTN, HLD, DM, Afib on Eliquis, left knee arthritis, asthma, and depression came by ambulance met here by daughter for an episode of shortness of breath.  Symptoms started approximately 9:00 while patient was lying down.  She reports that she suddenly felt short of breath.  She used her Symbicort inhaler and thinks that it helped.  Overall the symptoms lasted 20 minutes and she feels back to baseline.  Daughter reports that she has not needed to use her inhaler for years and so wanted her to be checked to see what precipitated this event.  There is no chest pain at the time the patient's had a little bit of a cough today but no fever she has had no leg pain or swelling no nausea or vomiting.  She had some diarrhea today the daughter thinks it is due to dietary noncompliance she has had this before under similar circumstances.  been admitted and then sent to rehab over the past few months.  She was discharged from rehab July 4 and has been home where she lives alone.  She had some home PT after her discharge from rehab but has been discharged from PT and since then the daughter states she has not been doing much in the way of physical activity in general.

## 2024-08-30 NOTE — ED ADULT NURSE NOTE - OBJECTIVE STATEMENT
pt stated that  she felt sob all day, was laying in bed and unable breath, she used her inhaler, felt better after use, but called 911 because she was worried.  Patient is awake alert oriented, no signs of distress, endorses that she feels fine.

## 2024-08-30 NOTE — ED PROVIDER NOTE - PATIENT PORTAL LINK FT
You can access the FollowMyHealth Patient Portal offered by [facility] by registering at the following website: [email or web address]. By joining [employer]’s FollowMyHealth portal, you will also be able to view your health information using other applications (apps) compatible with our system.

## 2024-08-30 NOTE — ED PROVIDER NOTE - AVIAN FLU SYMPTOMS
[facility]  [facility]    TEL [phone]        FAX [phone]      LIZANDRO UPTON MD, [facility]                YOSHI TUCKER MD [facility]                  JUSTIN MENDOZA MD, [facility]                SAQIB ALMARAZ MD, [facility]                                                        JOSE ROBERTO CASTANEDA M.D.[facility]                   MD CHANCE SWANN MD CINDY SWANSON, [facility]-BC                                                JENNIFER GARCIA, FNP-BC                                                MARKOS COSTELLO [facility]-BC            DATE OF CONSULTATION: 2018 9:18    REFERRING PHYSICIAN:  Rivas    REASON FOR CONSULT:  NSTEMI    HISTORY OF PRESENT ILLNESS:    87-year-old male who does not really have significant past medical history except I assume precancerous or early stages of cancer in his esophagus treated about 5 years ago conservatively and he remains cancer free for 5 years.  He says that he usually has reflux precautions but now recently when having some food in the morning and laying down he could not he has chest pains.  He is not sure when chest pains only at rest or also with exertion.  He says that during the day he does not have problems with walking around being limited by pains.  Currently he is pain-free being on nitro paste and beta blockers.  When he came to hospital his troponins are minimally elevated with nonspecific ST changes on EKG.  He is familiar with invasive treatments his brother who  in mid 80s had cardiology procedures and also his other brother who  in his 90s had cardiovascular procedures and one of them even had bypass.  At this point patient really does not want any procedures and he would like to proceed with medical therapy.    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 treated 5 years ago conservatively per patient remains cancer free  BPH  GERD  OA        MEDICATIONS:   Patient History: Home Medications  calcium carbonate (calcium carbonate 500 mg oral tablet) 1 tab, PO, Tablet, TID, PRN Heartburn, # 270 tab  09/16/2018 20:44  famotidine (Pepcid) 40 mg =, PO, qDay, PRN Heartburn  09/16/2018 18:47  melatonin (Melatonin) 5 mg =, PO, qHS  09/16/2018 18:47  ranitidine (Zantac 150 oral tablet) 150 mg = 1 tab, PO, Tablet, qDay, PRN Heartburn, # 180 tab  09/16/2018 18:47  acetaminophen 1,300 mg =, PO, q8hr, PRN Arthritis  09/16/2018 17:37  omeprazole (omeprazole 40 mg DR oral capsule) 40 mg = 1 cap, PO, EC Capsule, qAM, # 30 cap  09/16/2018 17:37  oxybutynin (oxybutynin 10 mg oral tablet, extended release) 10 mg = 1 tab, PO, qHS  09/16/2018 17:37  silodosin (Rapaflo) 8 mg =, PO, qHS  09/16/2018 17:37  psyllium (Metamucil 3.4 g/5.2 g oral powder for reconstitution) 3.4 g =, PO, qDay  02/11/2014 08:09    SOCIAL HISTORY: nonsmoker lives with his wife fairly independent walking with a walker his daughter who is a nurse in [employer] system lives in the neighborhood.    ALLERGIES:  Allergies (1) Active Reaction  NKA None Documented      FAMILY HISTORY: no premature CAD or sudden cardiac death.    REVIEW OF SYSTEMS:    No weight gain.  No fever or chills.  Otherwise, 10 of 14 systems reviewed are negative.    PHYSICAL EXAMINATION:    Vital Signs (last 24 hrs)_____ Last Charted___________Minimum____________ Maximum____________  Temp    98.2  (SEP 17 07:24) L 97.6 (SEP 17 03:43) 98.2  (SEP 16 19:38)  Heart Rate   97  (SEP 17 07:24) 63  (SEP 16 23:03) 97  (SEP 17 07:24)  Resp Rate       18  (SEP 17 07:24) L 12 (SEP 16 12:26) 18  (SEP 16 11:40)  SBP    H 148 (SEP 17 07:24) H 143 (SEP 17 03:43) H 193 (SEP 16 16:32)  DBP    81  (SEP 17 07:24) L 48 (SEP 16 16:32) H 103 (SEP 16 12:10)      GENERAL:  He is alert and oriented, not in  acute distress.  Mucous membranes are moist.  Oropharynx clear.     NECK:  Supple.  No thyromegaly.   HEART: Regular S1S2 without murmur.   LUNGS:  Clear to auscultation.   ABDOMEN:  Soft, NT/ND, BS+  EXTREMITIES:  nl ROM, no cyanosis, no clubbing, no edema.   VASCUALR: femoral +2; radial +2  SKIN:  Pale.   PSYCH:  Normal mood and affect.   NEURO:  Grossly intact bilaterally.  Nonfocal bilaterally.   MUSCULOSKELETAL:  No joint effusion.   LYMPH:  No lymphadenopathy.   EYES:  Anicteric.    LAB/STUDIES:    Reviewed.    Labs (Last four charted values)  WBC                  5.6 (SEP 16)   Hgb                  12.7 (SEP 16)   Hct                  38 (SEP 16)   Plt                  269 (SEP 16)   Na                   L 134 (SEP 16)   K                    4.6 (SEP 16)   CO2                  29 (SEP 16)   Cl                   98 (SEP 16)   Cr                   0.72 (SEP 16)   BUN                  11 (SEP 16)   Glucose              H 110 (SEP 16)   Ca                   9.6 (SEP 16)   Troponin             H 0.08 (SEP 16) H 0.07 (SEP 16) H 0.05 (SEP 16)     EKG: personally reviewed and interpreted which reveals sinus rhythm with nonspecific ST changes    CXR: personally reviewed films and interpreted and shows clear lungs with no CHF and normal cardiac silhouette    Telemetry: personally reviewed and interpreted and reveals normal sinus rhythm    Echo: personally reviewed and interpreted images which show        ASSESSMENT AND PLAN:      87-year-old male with no significant cardiovascular problems living independently with his wife who is presenting to [facility] with symptoms suggestive of angina and findings consistent with NSTEMI.      CAD        I had lengthy discussion with the patient and he definitely is refusing invasive strategy.  He said he would try medications first.  He states he would rather try medical therapy and probably then get out of the hospital as soon as possible.  The only think I could not make  him understand that with noninvasive strategy actually he is committed to longer hospital stay than with invasive therapy.  He will require probably 2 days of medical therapy and then he would need predischarge risk certification stress test as he is refusing it.  As I said we had a very lengthy discussion and he is really insistent on doing just medical therapy.  He states his 2 brothers had cardiovascular procedures and he would rather try conservative management.  For now he remains symptoms free so change him to oral nitrates continue beta-blocker start statin and up with him on the wound platelet therapy and heparin for 24 hours.   Echocardiogram ordered results pending  I spoke with his wife Franchesca [phone].  Left message with his son Thiago [phone] and I also try to reach his daughter who is a nurse at [employer] [phone]    HTN        blood pressures mildly elevated I will increase dose of beta-blocker and he will start oral nitrates    HL           check lipids in a.m. and start atorvastatin 40 mg      Follow up in am  Thank you for allowing me to participate in the care of your patient.  Please do not hesitate to call with any further questions.        JUSTIN GASTON?MD BRYCE, [facility]   Cardiology & Interventional cardiology   Peripheral Arterial & Venous Disease   Director, [facility] Cardiac Cath Lab    CM - cardiomyopathy, MARTA - carotid artery stenosis, DAPT - dual antiplatelet therapy, CCB - calcium zack blocker, HFpEF - CHF with preserved LVEF, NICM - nonischemic cardiomyopathy, PPM - permament pacemaker, SBP - systolic BP, SHD - stractural heart diseae, TAT -  triple antiocoagulant therapy (DAPT + oral anticoagulant), TTE  - transthoracic echocardiogram, WMA - wall motion abnormalities                  60 minutes spent in patient care and trying to reach his family.       No

## 2024-08-30 NOTE — ED PROVIDER NOTE - PHYSICAL EXAMINATION
General:  Generally weak, unable to sit up without assistance   HEENT:  No conjunctival injection, neck supple, no congestion   Chest:  Non-tender, no crepitance  Lungs:  Clear to auscultation bilaterally   Heart:  s1s2 normal  Abdomen:  soft, non-tender, non-distended, morbidly obese  :  Deferred  Rectal:  Deferred  Extremities: No edema, normal perfusion, no joint swelling or tenderness  Neuro:  Alert, conversant, motor/sensory grossly intact

## 2024-08-30 NOTE — REVIEW OF SYSTEMS
GENERAL PRE-PROCEDURE:   Procedure:  Cholangiogram with possible drain exchange with image guidance  Date/Time:  8/30/2024 9:01 AM    Written consent obtained?: Yes    Risks and benefits: Risks, benefits and alternatives were discussed    Consent given by:  Patient  Patient states understanding of procedure being performed: Yes    Patient's understanding of procedure matches consent: Yes    Procedure consent matches procedure scheduled: Yes    Expected level of sedation:  Minimal  Appropriately NPO:  Yes  ASA Class:  2  History & Physical reviewed:  History and physical reviewed and no updates needed  Statement of review:  I have reviewed the lab findings, diagnostic data, medications, and the plan for sedation    Bile in collection bag, site c/d/I    Pola Salter PA-C  Interventional Radiology  [phone] (IR)  *[phone] ([facility] Office)    
[Negative] : Allergic/Immunologic

## 2024-08-30 NOTE — ED PROVIDER NOTE - ATTENDING APP SHARED VISIT CONTRIBUTION OF CARE
Patient signed out pending results, reevaluation and disposition.  Night MD responsible for PA supervision.

## 2024-08-30 NOTE — ED ADULT NURSE NOTE - NSFALLUNIVINTERV_ED_ALL_ED
Bed/Stretcher in lowest position, wheels locked, appropriate side rails in place/Call bell, personal items and telephone in reach/Instruct patient to call for assistance before getting out of bed/chair/stretcher/Non-slip footwear applied when patient is off stretcher/Faith to call system/Physically safe environment - no spills, clutter or unnecessary equipment/Purposeful proactive rounding/Room/bathroom lighting operational, light cord in reach

## 2024-08-30 NOTE — ED PROVIDER NOTE - CLINICAL SUMMARY MEDICAL DECISION MAKING FREE TEXT BOX
79-year-old female past medical history includes diabetes, hypertension, A-fib on Eliquis, asthma presents after an episode of shortness of breath that started while she was lying down.  Patient also reports mild cough today.  At present, she is at her baseline but daughter is concerned because she has not needed her inhaler for "years".  Patient is overall generally weak and deconditioned as well which is additional concern.  Will workup further with labs x-ray and EKG.  Patient could have viral illness pneumonia or may have had an asthma exacerbation.

## 2024-08-30 NOTE — ED ADULT TRIAGE NOTE - CHIEF COMPLAINT QUOTE
pt states she felt sob all day and used her inhaler then felt better but called 911 because she was worried.   no sob or cough during triage

## 2024-08-30 NOTE — ED ADULT NURSE NOTE - NSFALLLASTSIX_ED_ALL_ED
No. Erivedge Counseling- I discussed with the patient the risks of Erivedge including but not limited to nausea, vomiting, diarrhea, constipation, weight loss, changes in the sense of taste, decreased appetite, muscle spasms, and hair loss.  The patient verbalized understanding of the proper use and possible adverse effects of Erivedge.  All of the patient's questions and concerns were addressed.

## 2024-08-31 LAB
ALBUMIN SERPL ELPH-MCNC: 4.1 G/DL — SIGNIFICANT CHANGE UP (ref 3.3–5)
ALP SERPL-CCNC: 61 U/L — SIGNIFICANT CHANGE UP (ref 40–120)
ALT FLD-CCNC: 9 U/L — LOW (ref 10–45)
ANION GAP SERPL CALC-SCNC: 10 MMOL/L — SIGNIFICANT CHANGE UP (ref 5–17)
AST SERPL-CCNC: 13 U/L — SIGNIFICANT CHANGE UP (ref 10–40)
BASOPHILS # BLD AUTO: 0.06 K/UL — SIGNIFICANT CHANGE UP (ref 0–0.2)
BASOPHILS NFR BLD AUTO: 0.8 % — SIGNIFICANT CHANGE UP (ref 0–2)
BILIRUB SERPL-MCNC: 0.4 MG/DL — SIGNIFICANT CHANGE UP (ref 0.2–1.2)
BUN SERPL-MCNC: 30 MG/DL — HIGH (ref 7–23)
CALCIUM SERPL-MCNC: 9.1 MG/DL — SIGNIFICANT CHANGE UP (ref 8.4–10.5)
CHLORIDE SERPL-SCNC: 107 MMOL/L — SIGNIFICANT CHANGE UP (ref 96–108)
CO2 SERPL-SCNC: 20 MMOL/L — LOW (ref 22–31)
CREAT SERPL-MCNC: 1.26 MG/DL — SIGNIFICANT CHANGE UP (ref 0.5–1.3)
EGFR: 43 ML/MIN/1.73M2 — LOW
EOSINOPHIL # BLD AUTO: 0.28 K/UL — SIGNIFICANT CHANGE UP (ref 0–0.5)
EOSINOPHIL NFR BLD AUTO: 3.7 % — SIGNIFICANT CHANGE UP (ref 0–6)
GLUCOSE SERPL-MCNC: 88 MG/DL — SIGNIFICANT CHANGE UP (ref 70–99)
HCT VFR BLD CALC: 28.4 % — LOW (ref 34.5–45)
HGB BLD-MCNC: 9.1 G/DL — LOW (ref 11.5–15.5)
IMM GRANULOCYTES NFR BLD AUTO: 0.3 % — SIGNIFICANT CHANGE UP (ref 0–0.9)
LYMPHOCYTES # BLD AUTO: 1.51 K/UL — SIGNIFICANT CHANGE UP (ref 1–3.3)
LYMPHOCYTES # BLD AUTO: 19.8 % — SIGNIFICANT CHANGE UP (ref 13–44)
MCHC RBC-ENTMCNC: 28.1 PG — SIGNIFICANT CHANGE UP (ref 27–34)
MCHC RBC-ENTMCNC: 32 GM/DL — SIGNIFICANT CHANGE UP (ref 32–36)
MCV RBC AUTO: 87.7 FL — SIGNIFICANT CHANGE UP (ref 80–100)
MONOCYTES # BLD AUTO: 0.65 K/UL — SIGNIFICANT CHANGE UP (ref 0–0.9)
MONOCYTES NFR BLD AUTO: 8.5 % — SIGNIFICANT CHANGE UP (ref 2–14)
NEUTROPHILS # BLD AUTO: 5.09 K/UL — SIGNIFICANT CHANGE UP (ref 1.8–7.4)
NEUTROPHILS NFR BLD AUTO: 66.9 % — SIGNIFICANT CHANGE UP (ref 43–77)
NRBC # BLD: 0 /100 WBCS — SIGNIFICANT CHANGE UP (ref 0–0)
NT-PROBNP SERPL-SCNC: 772 PG/ML — HIGH (ref 0–300)
PLATELET # BLD AUTO: 184 K/UL — SIGNIFICANT CHANGE UP (ref 150–400)
POTASSIUM SERPL-MCNC: 4.7 MMOL/L — SIGNIFICANT CHANGE UP (ref 3.5–5.3)
POTASSIUM SERPL-SCNC: 4.7 MMOL/L — SIGNIFICANT CHANGE UP (ref 3.5–5.3)
PROT SERPL-MCNC: 7.4 G/DL — SIGNIFICANT CHANGE UP (ref 6–8.3)
RAPID RVP RESULT: SIGNIFICANT CHANGE UP
RBC # BLD: 3.24 M/UL — LOW (ref 3.8–5.2)
RBC # FLD: 13.9 % — SIGNIFICANT CHANGE UP (ref 10.3–14.5)
SARS-COV-2 RNA SPEC QL NAA+PROBE: SIGNIFICANT CHANGE UP
SODIUM SERPL-SCNC: 137 MMOL/L — SIGNIFICANT CHANGE UP (ref 135–145)
WBC # BLD: 7.61 K/UL — SIGNIFICANT CHANGE UP (ref 3.8–10.5)
WBC # FLD AUTO: 7.61 K/UL — SIGNIFICANT CHANGE UP (ref 3.8–10.5)

## 2024-08-31 PROCEDURE — 93005 ELECTROCARDIOGRAM TRACING: CPT

## 2024-08-31 PROCEDURE — 83880 ASSAY OF NATRIURETIC PEPTIDE: CPT

## 2024-08-31 PROCEDURE — 85025 COMPLETE CBC W/AUTO DIFF WBC: CPT

## 2024-08-31 PROCEDURE — 80053 COMPREHEN METABOLIC PANEL: CPT

## 2024-08-31 PROCEDURE — 99285 EMERGENCY DEPT VISIT HI MDM: CPT | Mod: 25

## 2024-08-31 PROCEDURE — 73503 X-RAY EXAM HIP UNI 4/> VIEWS: CPT | Mod: 26,LT

## 2024-08-31 PROCEDURE — 93010 ELECTROCARDIOGRAM REPORT: CPT

## 2024-08-31 PROCEDURE — 0225U NFCT DS DNA&RNA 21 SARSCOV2: CPT

## 2024-08-31 PROCEDURE — 71045 X-RAY EXAM CHEST 1 VIEW: CPT

## 2024-08-31 PROCEDURE — 73503 X-RAY EXAM HIP UNI 4/> VIEWS: CPT

## 2024-08-31 PROCEDURE — 36415 COLL VENOUS BLD VENIPUNCTURE: CPT

## 2024-09-16 NOTE — SURGICAL HISTORY
[facility] 75  coding opportunities       Chart reviewed, no opportunity found: CHART REVIEWED, NO OPPORTUNITY FOUND        Patients Insurance        Commercial Insurance: Rahul Shannon
[PSH Reviewed and Updated] : past surgical history reviewed and updated
[PSH Reviewed and Updated] : past surgical history reviewed and updated

## 2024-09-16 NOTE — CURRENT MEDS
Statement Selected
[Medication and Allergies Reconciled] : medication and allergies reconciled
[Medication and Allergies Reconciled] : medication and allergies reconciled

## 2024-09-17 ENCOUNTER — APPOINTMENT (OUTPATIENT)
Dept: HOME HEALTH SERVICES | Facility: HOME HEALTH | Age: 79
End: 2024-09-17
Payer: MEDICARE

## 2024-09-17 VITALS
HEART RATE: 80 BPM | TEMPERATURE: 98 F | SYSTOLIC BLOOD PRESSURE: 120 MMHG | DIASTOLIC BLOOD PRESSURE: 70 MMHG | RESPIRATION RATE: 18 BRPM | OXYGEN SATURATION: 97 %

## 2024-09-17 DIAGNOSIS — E78.00 PURE HYPERCHOLESTEROLEMIA, UNSPECIFIED: ICD-10-CM

## 2024-09-17 DIAGNOSIS — N17.9 ACUTE KIDNEY FAILURE, UNSPECIFIED: ICD-10-CM

## 2024-09-17 DIAGNOSIS — F03.90 UNSPECIFIED DEMENTIA W/OUT BEHAVIORAL DISTURBANCE: ICD-10-CM

## 2024-09-17 DIAGNOSIS — I10 ESSENTIAL (PRIMARY) HYPERTENSION: ICD-10-CM

## 2024-09-17 DIAGNOSIS — I26.99 OTHER PULMONARY EMBOLISM W/OUT ACUTE COR PULMONALE: ICD-10-CM

## 2024-09-17 DIAGNOSIS — I82.409 ACUTE EMBOLISM AND THROMBOSIS OF UNSPECIFIED DEEP VEINS OF UNSPECIFIED LOWER EXTREMITY: ICD-10-CM

## 2024-09-17 DIAGNOSIS — I47.19 OTHER SUPRAVENTRICULAR TACHYCARDIA: ICD-10-CM

## 2024-09-17 DIAGNOSIS — F32.A DEPRESSION, UNSPECIFIED: ICD-10-CM

## 2024-09-17 DIAGNOSIS — I48.91 UNSPECIFIED ATRIAL FIBRILLATION: ICD-10-CM

## 2024-09-17 DIAGNOSIS — I50.30 UNSPECIFIED DIASTOLIC (CONGESTIVE) HEART FAILURE: ICD-10-CM

## 2024-09-17 DIAGNOSIS — N18.32 CHRONIC KIDNEY DISEASE, STAGE 3B: ICD-10-CM

## 2024-09-17 DIAGNOSIS — M48.02 SPINAL STENOSIS, CERVICAL REGION: ICD-10-CM

## 2024-09-17 DIAGNOSIS — E11.9 TYPE 2 DIABETES MELLITUS W/OUT COMPLICATIONS: ICD-10-CM

## 2024-09-17 DIAGNOSIS — J45.909 UNSPECIFIED ASTHMA, UNCOMPLICATED: ICD-10-CM

## 2024-09-17 PROCEDURE — 99349 HOME/RES VST EST MOD MDM 40: CPT

## 2024-09-17 NOTE — CHRONIC CARE ASSESSMENT
[Limited decision making ability] : limited decision making ability [PPS Score: ____] : Palliative Performance Scale (PPS) Score: [unfilled] [de-identified] : as tolerated [de-identified] : low sodium

## 2024-09-17 NOTE — HEALTH RISK ASSESSMENT
[HRA Reviewed] : Health risk assessment reviewed [Some assistance needed] : managing finances [No falls in past year] : Patient reported no falls in the past year [Yes] : The patient does have visual impairment [TimeGetUpGo] : 18 [de-identified] : back pain impedes ability to walk long distances

## 2024-09-17 NOTE — HEALTH RISK ASSESSMENT
[HRA Reviewed] : Health risk assessment reviewed [Some assistance needed] : managing finances [No falls in past year] : Patient reported no falls in the past year [Yes] : The patient does have visual impairment [TimeGetUpGo] : 18 [de-identified] : back pain impedes ability to walk long distances

## 2024-09-17 NOTE — REASON FOR VISIT
[Follow-Up] : a follow-up visit [Family Member] : family member [Formal Caregiver] : formal caregiver [Pre-Visit Preparation] : pre-visit preparation was done [Intercurrent Specialty/Sub-specialty Visits] : the patient has intercurrent specialty/sub-specialty visits [FreeTextEntry1] : hypertension, DVT, hyperlipidemia, ASD s/p surgical repair, type II DM (insulin-dependent), atrial fibrillation s/p PVI, and spontaneous SDH  [FreeTextEntry2] : JAYME [FreeTextEntry3] : Cardiology

## 2024-09-17 NOTE — CHRONIC CARE ASSESSMENT
[Limited decision making ability] : limited decision making ability [PPS Score: ____] : Palliative Performance Scale (PPS) Score: [unfilled] [de-identified] : as tolerated [de-identified] : low sodium

## 2024-09-17 NOTE — HISTORY OF PRESENT ILLNESS
[House Calls Co-Management Patient] : [unfilled] is a House Calls co-management patient [Education provided] : education provided [Family Member] : family member [Formal Caregiver] : formal caregiver [LastPCPVisitDate] : 6/19/23 [FreeTextEntry1] : hypertension, DVT, hyperlipidemia, ASD s/p surgical repair, type II DM (insulin-dependent), atrial fibrillation s/p PVI, and spontaneous SDH  [FreeTextEntry2] : 79 year-old female with hypertension, DVT, hyperlipidemia, ASD s/p surgical repair, type II DM, atrial fibrillation s/p PVI, and spontaneous SDH (now back on Eliquis) s/p ILR which reached ROMAN.  Explant and re-implant on 10/19/22.  Ms Ambrosio was hospitalized in May 2024 due to weakness, found to have BEBE due to dehydration/poor PO intake. Subsequently went to rehab.  Seen at home doing well.  Appetite fair.  Told to hold her insulin as see tolerating her PO medications.  Feels uncomfortable self-injecting insulin and checking her finger sticks.  Will order labs including A1C.  Explained to daughter labs can be sent to the PCP or viewed on the EMR.    Patient denies fever, cough, trouble breathing, rash, vomiting and diarrhea. Patient has not been in close contact with someone covid positive.  N95 mask, gloves, eye wear and gown used during visit: [Y]. Total face to face time with patient is ~ 60 ~ min.  Patient/ patient's caregiver reports no weight loss >10lbs in the past 6 months. No changes in dentition or swallowing reported, No changes in hearing or vision reported. No changes in Cognition reported. Patient denies any symptoms of depression or anxiety. Patient is ADL and IADL dependent. No changes in gait or falls reported.  Patient's home environment is safe.

## 2024-09-24 NOTE — ED ADULT NURSE NOTE - PRO INTERPRETER NEED 2
Pt arrived via EMS from [facility] for reported back pain.  Pt arrived a very lethargic and hard arouse complaining of shortness of breath, abdominal pain, right sided back pain radiating down right leg and blurred vision in her left eye since she woke up yesterday morning.  Pt RA sats 45 percent and 79 percent on NC 4 liters. Pt placed on NRB mask and Dr. Zee Notified.     English

## 2024-09-30 ENCOUNTER — NON-APPOINTMENT (OUTPATIENT)
Age: 79
End: 2024-09-30

## 2024-09-30 ENCOUNTER — APPOINTMENT (OUTPATIENT)
Dept: INTERNAL MEDICINE | Facility: CLINIC | Age: 79
End: 2024-09-30
Payer: MEDICARE

## 2024-09-30 ENCOUNTER — APPOINTMENT (OUTPATIENT)
Dept: HEART AND VASCULAR | Facility: CLINIC | Age: 79
End: 2024-09-30
Payer: MEDICARE

## 2024-09-30 DIAGNOSIS — E11.9 TYPE 2 DIABETES MELLITUS W/OUT COMPLICATIONS: ICD-10-CM

## 2024-09-30 DIAGNOSIS — R53.81 OTHER MALAISE: ICD-10-CM

## 2024-09-30 PROCEDURE — 99213 OFFICE O/P EST LOW 20 MIN: CPT

## 2024-09-30 PROCEDURE — 93298 REM INTERROG DEV EVAL SCRMS: CPT

## 2024-09-30 PROCEDURE — G2211 COMPLEX E/M VISIT ADD ON: CPT

## 2024-09-30 NOTE — HISTORY OF PRESENT ILLNESS
[Home] : at home, [unfilled] , at the time of the visit. [Medical Office: ([facility])___] : at the medical office located in  [Verbal consent obtained from patient] : the patient, [unfilled] [de-identified] : Discussed with patient: You have chosen to receive care through the use of tele-media. Tele-media enables health care providers at different locations to provide safe, effective, and convenient care through the use of technology. Please note this is a billable encounter. As with any health care service, there are risks associated with the use of tele-media, including equipment failure, poor image and/or resolution, and  issues. You understand that I cannot physically examine you and that you may need to come to the clinic to complete the assessment. Patient agreed verbally to understanding the risks and benefits of tele-media as explained. All questions regarding tele-media encounters were answered.    80 yo female with hx DM2, asthma/ COPD, afib sp ablation, subdural hematoma, anemia for TEB for increased needs at home.  Pt states she is feeling OK.  Per daughter, she had fall and was in rehab recently.  Now home but has increased needs. She has not been taking her meds on time, having increased difficulty toileting.  Has HHA but only during day and not full time.  Having trouble qualifying for increased hours. Daughter would like to take time off work to help with care, likely intermittently but possibly continuous.

## 2024-09-30 NOTE — ASSESSMENT
[FreeTextEntry1] : 78 yo female with hx DM2, asthma/ COPD, afib sp ablation, subdural hematoma, anemia for TEB for increased needs at home.  1) failure to thrive - likely 2/2 age related, encouraged healthy diet, mobility.  Discussed FMLA with daughter, she will obtain and send paperwork.  2) DM2 - chronic, discussed nutrition, encouraged less chocolate milk and increase lean protein, vegetables.

## 2024-09-30 NOTE — PHYSICAL EXAM
[No Acute Distress] : no acute distress [No Respiratory Distress] : no respiratory distress  [de-identified] : awake and alert

## 2024-09-30 NOTE — HEALTH RISK ASSESSMENT
[0] : 2) Feeling down, depressed, or hopeless: Not at all (0) [PHQ-2 Negative - No further assessment needed] : PHQ-2 Negative - No further assessment needed [Never] : Never [[id number]] : 0

## 2024-10-09 ENCOUNTER — APPOINTMENT (OUTPATIENT)
Dept: HOME HEALTH SERVICES | Facility: HOME HEALTH | Age: 79
End: 2024-10-09

## 2024-10-16 ENCOUNTER — APPOINTMENT (OUTPATIENT)
Dept: INTERNAL MEDICINE | Facility: CLINIC | Age: 79
End: 2024-10-16
Payer: MEDICARE

## 2024-10-16 VITALS
WEIGHT: 146 LBS | TEMPERATURE: 98.2 F | SYSTOLIC BLOOD PRESSURE: 139 MMHG | HEART RATE: 68 BPM | OXYGEN SATURATION: 99 % | DIASTOLIC BLOOD PRESSURE: 74 MMHG | HEIGHT: 67 IN | BODY MASS INDEX: 22.91 KG/M2

## 2024-10-16 DIAGNOSIS — R41.89 OTHER SYMPTOMS AND SIGNS INVOLVING COGNITIVE FUNCTIONS AND AWARENESS: ICD-10-CM

## 2024-10-16 DIAGNOSIS — I48.91 UNSPECIFIED ATRIAL FIBRILLATION: ICD-10-CM

## 2024-10-16 DIAGNOSIS — E11.9 TYPE 2 DIABETES MELLITUS W/OUT COMPLICATIONS: ICD-10-CM

## 2024-10-16 PROCEDURE — G2211 COMPLEX E/M VISIT ADD ON: CPT

## 2024-10-16 PROCEDURE — 99214 OFFICE O/P EST MOD 30 MIN: CPT

## 2024-10-16 PROCEDURE — 36415 COLL VENOUS BLD VENIPUNCTURE: CPT

## 2024-10-16 PROCEDURE — G0008: CPT

## 2024-10-16 PROCEDURE — 90662 IIV NO PRSV INCREASED AG IM: CPT

## 2024-10-17 ENCOUNTER — NON-APPOINTMENT (OUTPATIENT)
Age: 79
End: 2024-10-17

## 2024-10-17 DIAGNOSIS — M54.50 LOW BACK PAIN, UNSPECIFIED: ICD-10-CM

## 2024-10-17 DIAGNOSIS — G89.29 LOW BACK PAIN, UNSPECIFIED: ICD-10-CM

## 2024-10-17 LAB
ANION GAP SERPL CALC-SCNC: 15 MMOL/L
BUN SERPL-MCNC: 29 MG/DL
CALCIUM SERPL-MCNC: 9.5 MG/DL
CHLORIDE SERPL-SCNC: 107 MMOL/L
CO2 SERPL-SCNC: 18 MMOL/L
CREAT SERPL-MCNC: 1.32 MG/DL
EGFR: 41 ML/MIN/1.73M2
ESTIMATED AVERAGE GLUCOSE: 134 MG/DL
GLUCOSE SERPL-MCNC: 179 MG/DL
HBA1C MFR BLD HPLC: 6.3 %
POTASSIUM SERPL-SCNC: 4.8 MMOL/L
SODIUM SERPL-SCNC: 140 MMOL/L

## 2024-10-18 NOTE — ED PROVIDER NOTE - NEURO NEGATIVE STATEMENT, MLM
No OV needed for this infection/empiric tx - sent to [facility] [address]      no loss of consciousness, no gait abnormality, no headache, no sensory deficits, and no weakness.

## 2024-11-04 ENCOUNTER — NON-APPOINTMENT (OUTPATIENT)
Age: 79
End: 2024-11-04

## 2024-11-04 ENCOUNTER — APPOINTMENT (OUTPATIENT)
Dept: HEART AND VASCULAR | Facility: CLINIC | Age: 79
End: 2024-11-04
Payer: MEDICARE

## 2024-11-04 PROCEDURE — 93298 REM INTERROG DEV EVAL SCRMS: CPT

## 2024-11-26 ENCOUNTER — APPOINTMENT (OUTPATIENT)
Dept: HOME HEALTH SERVICES | Facility: HOME HEALTH | Age: 79
End: 2024-11-26
Payer: MEDICARE

## 2024-11-26 VITALS
TEMPERATURE: 97 F | RESPIRATION RATE: 18 BRPM | OXYGEN SATURATION: 97 % | HEART RATE: 70 BPM | DIASTOLIC BLOOD PRESSURE: 70 MMHG | SYSTOLIC BLOOD PRESSURE: 130 MMHG

## 2024-11-26 DIAGNOSIS — E11.9 TYPE 2 DIABETES MELLITUS W/OUT COMPLICATIONS: ICD-10-CM

## 2024-11-26 DIAGNOSIS — F03.90 UNSPECIFIED DEMENTIA W/OUT BEHAVIORAL DISTURBANCE: ICD-10-CM

## 2024-11-26 DIAGNOSIS — N17.9 ACUTE KIDNEY FAILURE, UNSPECIFIED: ICD-10-CM

## 2024-11-26 DIAGNOSIS — I48.91 UNSPECIFIED ATRIAL FIBRILLATION: ICD-10-CM
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
## 2024-11-26 DIAGNOSIS — J45.909 UNSPECIFIED ASTHMA, UNCOMPLICATED: ICD-10-CM

## 2024-11-26 DIAGNOSIS — E78.5 HYPERLIPIDEMIA, UNSPECIFIED: ICD-10-CM

## 2024-11-26 DIAGNOSIS — I10 ESSENTIAL (PRIMARY) HYPERTENSION: ICD-10-CM

## 2024-11-26 DIAGNOSIS — I82.409 ACUTE EMBOLISM AND THROMBOSIS OF UNSPECIFIED DEEP VEINS OF UNSPECIFIED LOWER EXTREMITY: ICD-10-CM

## 2024-11-26 DIAGNOSIS — I47.19 OTHER SUPRAVENTRICULAR TACHYCARDIA: ICD-10-CM

## 2024-11-26 PROCEDURE — 99349 HOME/RES VST EST MOD MDM 40: CPT

## 2024-12-04 ENCOUNTER — RX RENEWAL (OUTPATIENT)
Age: 79
End: 2024-12-04

## 2024-12-09 ENCOUNTER — APPOINTMENT (OUTPATIENT)
Dept: HEART AND VASCULAR | Facility: CLINIC | Age: 79
End: 2024-12-09

## 2024-12-09 PROCEDURE — 93298 REM INTERROG DEV EVAL SCRMS: CPT

## 2024-12-26 ENCOUNTER — APPOINTMENT (OUTPATIENT)
Dept: HOME HEALTH SERVICES | Facility: HOME HEALTH | Age: 79
End: 2024-12-26

## 2024-12-30 ENCOUNTER — RX RENEWAL (OUTPATIENT)
Age: 79
End: 2024-12-30

## 2025-01-06 ENCOUNTER — RX RENEWAL (OUTPATIENT)
Age: 80
End: 2025-01-06

## 2025-01-06 ENCOUNTER — APPOINTMENT (OUTPATIENT)
Dept: INTERNAL MEDICINE | Facility: CLINIC | Age: 80
End: 2025-01-06
Payer: MEDICARE

## 2025-01-06 VITALS
HEIGHT: 67 IN | TEMPERATURE: 98.6 F | BODY MASS INDEX: 24.17 KG/M2 | DIASTOLIC BLOOD PRESSURE: 93 MMHG | SYSTOLIC BLOOD PRESSURE: 172 MMHG | OXYGEN SATURATION: 99 % | HEART RATE: 88 BPM | WEIGHT: 154 LBS

## 2025-01-06 DIAGNOSIS — E11.9 TYPE 2 DIABETES MELLITUS W/OUT COMPLICATIONS: ICD-10-CM

## 2025-01-06 DIAGNOSIS — N17.9 ACUTE KIDNEY FAILURE, UNSPECIFIED: ICD-10-CM

## 2025-01-06 DIAGNOSIS — Z23 ENCOUNTER FOR IMMUNIZATION: ICD-10-CM

## 2025-01-06 DIAGNOSIS — R42 DIZZINESS AND GIDDINESS: ICD-10-CM

## 2025-01-06 PROCEDURE — 99214 OFFICE O/P EST MOD 30 MIN: CPT

## 2025-01-06 PROCEDURE — 36415 COLL VENOUS BLD VENIPUNCTURE: CPT

## 2025-01-06 PROCEDURE — G0008: CPT

## 2025-01-06 PROCEDURE — 90662 IIV NO PRSV INCREASED AG IM: CPT

## 2025-01-06 PROCEDURE — G2211 COMPLEX E/M VISIT ADD ON: CPT

## 2025-01-07 ENCOUNTER — NON-APPOINTMENT (OUTPATIENT)
Age: 80
End: 2025-01-07

## 2025-01-07 LAB
ANION GAP SERPL CALC-SCNC: 18 MMOL/L
BASOPHILS # BLD AUTO: 0.04 K/UL
BASOPHILS NFR BLD AUTO: 0.6 %
BUN SERPL-MCNC: 28 MG/DL
CALCIUM SERPL-MCNC: 9.6 MG/DL
CHLORIDE SERPL-SCNC: 104 MMOL/L
CO2 SERPL-SCNC: 17 MMOL/L
CREAT SERPL-MCNC: 1.16 MG/DL
EGFR: 48 ML/MIN/1.73M2
EOSINOPHIL # BLD AUTO: 0.53 K/UL
EOSINOPHIL NFR BLD AUTO: 8 %
ESTIMATED AVERAGE GLUCOSE: 154 MG/DL
GLUCOSE SERPL-MCNC: 228 MG/DL
HBA1C MFR BLD HPLC: 7 %
HCT VFR BLD CALC: 33.3 %
HGB BLD-MCNC: 10.3 G/DL
IMM GRANULOCYTES NFR BLD AUTO: 0.3 %
LYMPHOCYTES # BLD AUTO: 1.58 K/UL
LYMPHOCYTES NFR BLD AUTO: 24 %
MAN DIFF?: NORMAL
MCHC RBC-ENTMCNC: 28.3 PG
MCHC RBC-ENTMCNC: 30.9 G/DL
MCV RBC AUTO: 91.5 FL
MONOCYTES # BLD AUTO: 0.44 K/UL
MONOCYTES NFR BLD AUTO: 6.7 %
NEUTROPHILS # BLD AUTO: 3.98 K/UL
NEUTROPHILS NFR BLD AUTO: 60.4 %
PLATELET # BLD AUTO: 259 K/UL
POTASSIUM SERPL-SCNC: 4.9 MMOL/L
RBC # BLD: 3.64 M/UL
RBC # FLD: 15.1 %
SODIUM SERPL-SCNC: 139 MMOL/L
WBC # FLD AUTO: 6.59 K/UL

## 2025-01-08 ENCOUNTER — MED ADMIN CHARGE (OUTPATIENT)
Age: 80
End: 2025-01-08

## 2025-01-08 NOTE — ED PROVIDER NOTE - OBJECTIVE STATEMENT
71 y/o female with a PMHx of HTN and DM2 is present with swelling on her left 3rd finger. Pt states that she noticed the swelling occurring about 3 days ago and has increased in size. Pt states the swelling is located under her nail. She denies any fever, draining, discharge. Pt reports the finger is painful but denies numbing/tingling. immune

## 2025-01-10 ENCOUNTER — APPOINTMENT (OUTPATIENT)
Dept: HEART AND VASCULAR | Facility: CLINIC | Age: 80
End: 2025-01-10
Payer: MEDICARE

## 2025-01-10 ENCOUNTER — NON-APPOINTMENT (OUTPATIENT)
Age: 80
End: 2025-01-10

## 2025-01-10 PROCEDURE — 93298 REM INTERROG DEV EVAL SCRMS: CPT

## 2025-02-14 ENCOUNTER — APPOINTMENT (OUTPATIENT)
Dept: HEART AND VASCULAR | Facility: CLINIC | Age: 80
End: 2025-02-14

## 2025-02-14 ENCOUNTER — RX RENEWAL (OUTPATIENT)
Age: 80
End: 2025-02-14

## 2025-02-14 PROCEDURE — 93298 REM INTERROG DEV EVAL SCRMS: CPT

## 2025-02-19 NOTE — PATIENT PROFILE ADULT - FUNCTIONAL SCREEN CURRENT LEVEL: BATHING, MLM
Detail Level: Generalized Detail Level: Simple Detail Level: Zone Detail Level: Detailed 0 = independent

## 2025-02-24 ENCOUNTER — TRANSCRIPTION ENCOUNTER (OUTPATIENT)
Age: 80
End: 2025-02-24

## 2025-03-06 DIAGNOSIS — R29.898 OTHER SYMPTOMS AND SIGNS INVOLVING THE MUSCULOSKELETAL SYSTEM: ICD-10-CM

## 2025-03-10 ENCOUNTER — INPATIENT (INPATIENT)
Facility: HOSPITAL | Age: 80
LOS: 3 days | Discharge: NURSING, REHAB OR LONG-TERM CARE | End: 2025-03-14
Attending: STUDENT IN AN ORGANIZED HEALTH CARE EDUCATION/TRAINING PROGRAM | Admitting: STUDENT IN AN ORGANIZED HEALTH CARE EDUCATION/TRAINING PROGRAM
Payer: MEDICARE

## 2025-03-10 ENCOUNTER — NON-APPOINTMENT (OUTPATIENT)
Age: 80
End: 2025-03-10

## 2025-03-10 VITALS
HEART RATE: 71 BPM | DIASTOLIC BLOOD PRESSURE: 73 MMHG | TEMPERATURE: 99 F | SYSTOLIC BLOOD PRESSURE: 118 MMHG | RESPIRATION RATE: 16 BRPM | HEIGHT: 67 IN | OXYGEN SATURATION: 98 % | WEIGHT: 160.06 LBS

## 2025-03-10 DIAGNOSIS — E87.5 HYPERKALEMIA: ICD-10-CM

## 2025-03-10 DIAGNOSIS — Z29.9 ENCOUNTER FOR PROPHYLACTIC MEASURES, UNSPECIFIED: ICD-10-CM

## 2025-03-10 DIAGNOSIS — I10 ESSENTIAL (PRIMARY) HYPERTENSION: ICD-10-CM

## 2025-03-10 DIAGNOSIS — F32.9 MAJOR DEPRESSIVE DISORDER, SINGLE EPISODE, UNSPECIFIED: ICD-10-CM

## 2025-03-10 DIAGNOSIS — Z96.651 PRESENCE OF RIGHT ARTIFICIAL KNEE JOINT: Chronic | ICD-10-CM

## 2025-03-10 DIAGNOSIS — E11.9 TYPE 2 DIABETES MELLITUS WITHOUT COMPLICATIONS: ICD-10-CM

## 2025-03-10 DIAGNOSIS — D64.9 ANEMIA, UNSPECIFIED: ICD-10-CM
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
## 2025-03-10 DIAGNOSIS — N17.9 ACUTE KIDNEY FAILURE, UNSPECIFIED: ICD-10-CM

## 2025-03-10 DIAGNOSIS — I62.01 NONTRAUMATIC ACUTE SUBDURAL HEMORRHAGE: Chronic | ICD-10-CM

## 2025-03-10 DIAGNOSIS — E78.5 HYPERLIPIDEMIA, UNSPECIFIED: ICD-10-CM

## 2025-03-10 DIAGNOSIS — J45.909 UNSPECIFIED ASTHMA, UNCOMPLICATED: ICD-10-CM

## 2025-03-10 DIAGNOSIS — I48.91 UNSPECIFIED ATRIAL FIBRILLATION: ICD-10-CM

## 2025-03-10 LAB
A1C WITH ESTIMATED AVERAGE GLUCOSE RESULT: 7.7 % — HIGH (ref 4–5.6)
ADD ON TEST-SPECIMEN IN LAB: SIGNIFICANT CHANGE UP
ANION GAP SERPL CALC-SCNC: 13 MMOL/L — SIGNIFICANT CHANGE UP (ref 5–17)
ANION GAP SERPL CALC-SCNC: 15 MMOL/L — SIGNIFICANT CHANGE UP (ref 5–17)
APPEARANCE UR: CLEAR — SIGNIFICANT CHANGE UP
BASOPHILS # BLD AUTO: 0.05 K/UL — SIGNIFICANT CHANGE UP (ref 0–0.2)
BASOPHILS # BLD AUTO: 0.06 K/UL — SIGNIFICANT CHANGE UP (ref 0–0.2)
BASOPHILS NFR BLD AUTO: 0.8 % — SIGNIFICANT CHANGE UP (ref 0–2)
BASOPHILS NFR BLD AUTO: 0.8 % — SIGNIFICANT CHANGE UP (ref 0–2)
BILIRUB UR-MCNC: NEGATIVE — SIGNIFICANT CHANGE UP
BLD GP AB SCN SERPL QL: NEGATIVE — SIGNIFICANT CHANGE UP
BUN SERPL-MCNC: 18 MG/DL — SIGNIFICANT CHANGE UP (ref 7–23)
BUN SERPL-MCNC: 18 MG/DL — SIGNIFICANT CHANGE UP (ref 7–23)
CALCIUM SERPL-MCNC: 9.2 MG/DL — SIGNIFICANT CHANGE UP (ref 8.4–10.5)
CALCIUM SERPL-MCNC: 9.2 MG/DL — SIGNIFICANT CHANGE UP (ref 8.4–10.5)
CHLORIDE SERPL-SCNC: 106 MMOL/L — SIGNIFICANT CHANGE UP (ref 96–108)
CHLORIDE SERPL-SCNC: 108 MMOL/L — SIGNIFICANT CHANGE UP (ref 96–108)
CK SERPL-CCNC: 103 U/L — SIGNIFICANT CHANGE UP (ref 25–170)
CO2 SERPL-SCNC: 18 MMOL/L — LOW (ref 22–31)
CO2 SERPL-SCNC: 21 MMOL/L — LOW (ref 22–31)
COLOR SPEC: YELLOW — SIGNIFICANT CHANGE UP
CREAT ?TM UR-MCNC: 53 MG/DL — SIGNIFICANT CHANGE UP
CREAT SERPL-MCNC: 1.56 MG/DL — HIGH (ref 0.5–1.3)
CREAT SERPL-MCNC: 1.58 MG/DL — HIGH (ref 0.5–1.3)
DIFF PNL FLD: NEGATIVE — SIGNIFICANT CHANGE UP
EGFR: 33 ML/MIN/1.73M2 — LOW
EGFR: 33 ML/MIN/1.73M2 — LOW
EGFR: 34 ML/MIN/1.73M2 — LOW
EGFR: 34 ML/MIN/1.73M2 — LOW
EOSINOPHIL # BLD AUTO: 0.24 K/UL — SIGNIFICANT CHANGE UP (ref 0–0.5)
EOSINOPHIL # BLD AUTO: 0.27 K/UL — SIGNIFICANT CHANGE UP (ref 0–0.5)
EOSINOPHIL NFR BLD AUTO: 3.8 % — SIGNIFICANT CHANGE UP (ref 0–6)
EOSINOPHIL NFR BLD AUTO: 4.1 % — SIGNIFICANT CHANGE UP (ref 0–6)
ESTIMATED AVERAGE GLUCOSE: 174 MG/DL — HIGH (ref 68–114)
GLUCOSE SERPL-MCNC: 207 MG/DL — HIGH (ref 70–99)
GLUCOSE SERPL-MCNC: 229 MG/DL — HIGH (ref 70–99)
GLUCOSE UR QL: NEGATIVE MG/DL — SIGNIFICANT CHANGE UP
HCT VFR BLD CALC: 28 % — LOW (ref 34.5–45)
HCT VFR BLD CALC: 30 % — LOW (ref 34.5–45)
HGB BLD-MCNC: 8.7 G/DL — LOW (ref 11.5–15.5)
HGB BLD-MCNC: 9.5 G/DL — LOW (ref 11.5–15.5)
IMM GRANULOCYTES NFR BLD AUTO: 0.3 % — SIGNIFICANT CHANGE UP (ref 0–0.9)
IMM GRANULOCYTES NFR BLD AUTO: 0.3 % — SIGNIFICANT CHANGE UP (ref 0–0.9)
KETONES UR-MCNC: NEGATIVE MG/DL — SIGNIFICANT CHANGE UP
LEUKOCYTE ESTERASE UR-ACNC: ABNORMAL
LYMPHOCYTES # BLD AUTO: 1.32 K/UL — SIGNIFICANT CHANGE UP (ref 1–3.3)
LYMPHOCYTES # BLD AUTO: 1.66 K/UL — SIGNIFICANT CHANGE UP (ref 1–3.3)
LYMPHOCYTES # BLD AUTO: 18.5 % — SIGNIFICANT CHANGE UP (ref 13–44)
LYMPHOCYTES # BLD AUTO: 28.2 % — SIGNIFICANT CHANGE UP (ref 13–44)
MAGNESIUM SERPL-MCNC: 1.8 MG/DL — SIGNIFICANT CHANGE UP (ref 1.6–2.6)
MAGNESIUM SERPL-MCNC: 1.8 MG/DL — SIGNIFICANT CHANGE UP (ref 1.6–2.6)
MCHC RBC-ENTMCNC: 29.2 PG — SIGNIFICANT CHANGE UP (ref 27–34)
MCHC RBC-ENTMCNC: 29.6 PG — SIGNIFICANT CHANGE UP (ref 27–34)
MCHC RBC-ENTMCNC: 31.1 G/DL — LOW (ref 32–36)
MCHC RBC-ENTMCNC: 31.7 G/DL — LOW (ref 32–36)
MCV RBC AUTO: 93.5 FL — SIGNIFICANT CHANGE UP (ref 80–100)
MCV RBC AUTO: 94 FL — SIGNIFICANT CHANGE UP (ref 80–100)
MONOCYTES # BLD AUTO: 0.53 K/UL — SIGNIFICANT CHANGE UP (ref 0–0.9)
MONOCYTES # BLD AUTO: 0.53 K/UL — SIGNIFICANT CHANGE UP (ref 0–0.9)
MONOCYTES NFR BLD AUTO: 7.4 % — SIGNIFICANT CHANGE UP (ref 2–14)
MONOCYTES NFR BLD AUTO: 9 % — SIGNIFICANT CHANGE UP (ref 2–14)
NEUTROPHILS # BLD AUTO: 3.39 K/UL — SIGNIFICANT CHANGE UP (ref 1.8–7.4)
NEUTROPHILS # BLD AUTO: 4.93 K/UL — SIGNIFICANT CHANGE UP (ref 1.8–7.4)
NEUTROPHILS NFR BLD AUTO: 57.6 % — SIGNIFICANT CHANGE UP (ref 43–77)
NEUTROPHILS NFR BLD AUTO: 69.2 % — SIGNIFICANT CHANGE UP (ref 43–77)
NITRITE UR-MCNC: NEGATIVE — SIGNIFICANT CHANGE UP
NRBC BLD AUTO-RTO: 0 /100 WBCS — SIGNIFICANT CHANGE UP (ref 0–0)
NRBC BLD AUTO-RTO: 0 /100 WBCS — SIGNIFICANT CHANGE UP (ref 0–0)
OSMOLALITY UR: 514 MOSM/KG — SIGNIFICANT CHANGE UP (ref 300–900)
PH UR: 7.5 — SIGNIFICANT CHANGE UP (ref 5–8)
PHOSPHATE SERPL-MCNC: 4 MG/DL — SIGNIFICANT CHANGE UP (ref 2.5–4.5)
PLATELET # BLD AUTO: 208 K/UL — SIGNIFICANT CHANGE UP (ref 150–400)
PLATELET # BLD AUTO: 236 K/UL — SIGNIFICANT CHANGE UP (ref 150–400)
POTASSIUM SERPL-MCNC: 4.1 MMOL/L — SIGNIFICANT CHANGE UP (ref 3.5–5.3)
POTASSIUM SERPL-MCNC: 5.6 MMOL/L — HIGH (ref 3.5–5.3)
POTASSIUM SERPL-MCNC: 5.8 MMOL/L — HIGH (ref 3.5–5.3)
POTASSIUM SERPL-MCNC: SIGNIFICANT CHANGE UP MMOL/L (ref 3.5–5.3)
POTASSIUM SERPL-SCNC: 4.1 MMOL/L — SIGNIFICANT CHANGE UP (ref 3.5–5.3)
POTASSIUM SERPL-SCNC: 5.6 MMOL/L — HIGH (ref 3.5–5.3)
POTASSIUM SERPL-SCNC: 5.8 MMOL/L — HIGH (ref 3.5–5.3)
POTASSIUM SERPL-SCNC: SIGNIFICANT CHANGE UP MMOL/L (ref 3.5–5.3)
POTASSIUM UR-SCNC: 46 MMOL/L — SIGNIFICANT CHANGE UP
PROT ?TM UR-MCNC: 11 MG/DL — SIGNIFICANT CHANGE UP (ref 0–12)
PROT UR-MCNC: NEGATIVE MG/DL — SIGNIFICANT CHANGE UP
PROT/CREAT UR-RTO: 0.2 RATIO — SIGNIFICANT CHANGE UP (ref 0–0.2)
RBC # BLD: 2.98 M/UL — LOW (ref 3.8–5.2)
RBC # BLD: 3.21 M/UL — LOW (ref 3.8–5.2)
RBC # FLD: 14.7 % — HIGH (ref 10.3–14.5)
RBC # FLD: 14.8 % — HIGH (ref 10.3–14.5)
RH IG SCN BLD-IMP: POSITIVE — SIGNIFICANT CHANGE UP
SODIUM SERPL-SCNC: 139 MMOL/L — SIGNIFICANT CHANGE UP (ref 135–145)
SODIUM SERPL-SCNC: 142 MMOL/L — SIGNIFICANT CHANGE UP (ref 135–145)
SODIUM UR-SCNC: 171 MMOL/L — SIGNIFICANT CHANGE UP
SP GR SPEC: 1.01 — SIGNIFICANT CHANGE UP (ref 1–1.03)
UROBILINOGEN FLD QL: 0.2 MG/DL — SIGNIFICANT CHANGE UP (ref 0.2–1)
UUN UR-MCNC: 266 MG/DL — SIGNIFICANT CHANGE UP
WBC # BLD: 5.89 K/UL — SIGNIFICANT CHANGE UP (ref 3.8–10.5)
WBC # BLD: 7.13 K/UL — SIGNIFICANT CHANGE UP (ref 3.8–10.5)
WBC # FLD AUTO: 5.89 K/UL — SIGNIFICANT CHANGE UP (ref 3.8–10.5)
WBC # FLD AUTO: 7.13 K/UL — SIGNIFICANT CHANGE UP (ref 3.8–10.5)

## 2025-03-10 PROCEDURE — 73060 X-RAY EXAM OF HUMERUS: CPT | Mod: 26,LT

## 2025-03-10 PROCEDURE — 99053 MED SERV 10PM-8AM 24 HR FAC: CPT

## 2025-03-10 PROCEDURE — 99285 EMERGENCY DEPT VISIT HI MDM: CPT

## 2025-03-10 PROCEDURE — 99223 1ST HOSP IP/OBS HIGH 75: CPT | Mod: GC

## 2025-03-10 PROCEDURE — 93010 ELECTROCARDIOGRAM REPORT: CPT

## 2025-03-10 PROCEDURE — 73564 X-RAY EXAM KNEE 4 OR MORE: CPT | Mod: 26,50

## 2025-03-10 RX ORDER — MAGNESIUM, ALUMINUM HYDROXIDE 200-200 MG
30 TABLET,CHEWABLE ORAL EVERY 4 HOURS
Refills: 0 | Status: DISCONTINUED | OUTPATIENT
Start: 2025-03-10 | End: 2025-03-14

## 2025-03-10 RX ORDER — TRAZODONE HCL 100 MG
100 TABLET ORAL AT BEDTIME
Refills: 0 | Status: DISCONTINUED | OUTPATIENT
Start: 2025-03-10 | End: 2025-03-14

## 2025-03-10 RX ORDER — ACETAMINOPHEN 500 MG/5ML
650 LIQUID (ML) ORAL EVERY 6 HOURS
Refills: 0 | Status: DISCONTINUED | OUTPATIENT
Start: 2025-03-10 | End: 2025-03-10

## 2025-03-10 RX ORDER — SODIUM CHLORIDE 9 G/1000ML
1000 INJECTION, SOLUTION INTRAVENOUS
Refills: 0 | Status: DISCONTINUED | OUTPATIENT
Start: 2025-03-10 | End: 2025-03-10

## 2025-03-10 RX ORDER — SODIUM CHLORIDE 9 G/1000ML
1000 INJECTION, SOLUTION INTRAVENOUS
Refills: 0 | Status: DISCONTINUED | OUTPATIENT
Start: 2025-03-10 | End: 2025-03-14

## 2025-03-10 RX ORDER — ACETAMINOPHEN 500 MG/5ML
650 LIQUID (ML) ORAL EVERY 12 HOURS
Refills: 0 | Status: COMPLETED | OUTPATIENT
Start: 2025-03-10 | End: 2025-03-13

## 2025-03-10 RX ORDER — ONDANSETRON HCL/PF 4 MG/2 ML
4 VIAL (ML) INJECTION EVERY 8 HOURS
Refills: 0 | Status: DISCONTINUED | OUTPATIENT
Start: 2025-03-10 | End: 2025-03-14

## 2025-03-10 RX ORDER — INSULIN LISPRO 100 U/ML
INJECTION, SOLUTION INTRAVENOUS; SUBCUTANEOUS
Refills: 0 | Status: DISCONTINUED | OUTPATIENT
Start: 2025-03-10 | End: 2025-03-14

## 2025-03-10 RX ORDER — LISINOPRIL 5 MG/1
40 TABLET ORAL EVERY 24 HOURS
Refills: 0 | Status: DISCONTINUED | OUTPATIENT
Start: 2025-03-10 | End: 2025-03-10

## 2025-03-10 RX ORDER — GLUCAGON 3 MG/1
1 POWDER NASAL ONCE
Refills: 0 | Status: DISCONTINUED | OUTPATIENT
Start: 2025-03-10 | End: 2025-03-14

## 2025-03-10 RX ORDER — MIRTAZAPINE 30 MG/1
30 TABLET, FILM COATED ORAL AT BEDTIME
Refills: 0 | Status: DISCONTINUED | OUTPATIENT
Start: 2025-03-10 | End: 2025-03-14

## 2025-03-10 RX ORDER — DEXTROSE 50 % IN WATER 50 %
25 SYRINGE (ML) INTRAVENOUS ONCE
Refills: 0 | Status: DISCONTINUED | OUTPATIENT
Start: 2025-03-10 | End: 2025-03-14

## 2025-03-10 RX ORDER — METOPROLOL SUCCINATE 50 MG/1
50 TABLET, EXTENDED RELEASE ORAL EVERY 12 HOURS
Refills: 0 | Status: DISCONTINUED | OUTPATIENT
Start: 2025-03-10 | End: 2025-03-14

## 2025-03-10 RX ORDER — AMLODIPINE BESYLATE 10 MG/1
5 TABLET ORAL EVERY 24 HOURS
Refills: 0 | Status: DISCONTINUED | OUTPATIENT
Start: 2025-03-10 | End: 2025-03-14

## 2025-03-10 RX ORDER — MELATONIN 5 MG
3 TABLET ORAL AT BEDTIME
Refills: 0 | Status: DISCONTINUED | OUTPATIENT
Start: 2025-03-10 | End: 2025-03-14

## 2025-03-10 RX ORDER — DEXTROSE 50 % IN WATER 50 %
15 SYRINGE (ML) INTRAVENOUS ONCE
Refills: 0 | Status: DISCONTINUED | OUTPATIENT
Start: 2025-03-10 | End: 2025-03-14

## 2025-03-10 RX ORDER — DABIGATRAN ETEXILATE MESYLATE 30 MG/1
150 PELLET ORAL EVERY 12 HOURS
Refills: 0 | Status: DISCONTINUED | OUTPATIENT
Start: 2025-03-10 | End: 2025-03-14

## 2025-03-10 RX ORDER — ACETAMINOPHEN 500 MG/5ML
650 LIQUID (ML) ORAL ONCE
Refills: 0 | Status: COMPLETED | OUTPATIENT
Start: 2025-03-10 | End: 2025-03-10

## 2025-03-10 RX ORDER — SODIUM ZIRCONIUM CYCLOSILICATE 5 G/5G
5 POWDER, FOR SUSPENSION ORAL ONCE
Refills: 0 | Status: COMPLETED | OUTPATIENT
Start: 2025-03-10 | End: 2025-03-10

## 2025-03-10 RX ORDER — DEXTROSE 50 % IN WATER 50 %
12.5 SYRINGE (ML) INTRAVENOUS ONCE
Refills: 0 | Status: DISCONTINUED | OUTPATIENT
Start: 2025-03-10 | End: 2025-03-14

## 2025-03-10 RX ORDER — ATORVASTATIN CALCIUM 80 MG/1
40 TABLET, FILM COATED ORAL AT BEDTIME
Refills: 0 | Status: DISCONTINUED | OUTPATIENT
Start: 2025-03-10 | End: 2025-03-14

## 2025-03-10 RX ADMIN — Medication 650 MILLIGRAM(S): at 01:34

## 2025-03-10 RX ADMIN — DABIGATRAN ETEXILATE MESYLATE 150 MILLIGRAM(S): 30 PELLET ORAL at 18:12

## 2025-03-10 RX ADMIN — INSULIN LISPRO 3: 100 INJECTION, SOLUTION INTRAVENOUS; SUBCUTANEOUS at 22:31

## 2025-03-10 RX ADMIN — Medication 100 MILLILITER(S): at 03:35

## 2025-03-10 RX ADMIN — AMLODIPINE BESYLATE 5 MILLIGRAM(S): 10 TABLET ORAL at 06:19

## 2025-03-10 RX ADMIN — SODIUM ZIRCONIUM CYCLOSILICATE 5 GRAM(S): 5 POWDER, FOR SUSPENSION ORAL at 06:19

## 2025-03-10 RX ADMIN — Medication 100 MILLIGRAM(S): at 21:25

## 2025-03-10 RX ADMIN — SODIUM CHLORIDE 250 MILLILITER(S): 9 INJECTION, SOLUTION INTRAVENOUS at 02:24

## 2025-03-10 RX ADMIN — SODIUM CHLORIDE 250 MILLILITER(S): 9 INJECTION, SOLUTION INTRAVENOUS at 03:10

## 2025-03-10 RX ADMIN — INSULIN LISPRO 2: 100 INJECTION, SOLUTION INTRAVENOUS; SUBCUTANEOUS at 13:19

## 2025-03-10 RX ADMIN — METOPROLOL SUCCINATE 50 MILLIGRAM(S): 50 TABLET, EXTENDED RELEASE ORAL at 07:34

## 2025-03-10 RX ADMIN — Medication 650 MILLIGRAM(S): at 07:00

## 2025-03-10 RX ADMIN — Medication 650 MILLIGRAM(S): at 06:19

## 2025-03-10 RX ADMIN — MIRTAZAPINE 30 MILLIGRAM(S): 30 TABLET, FILM COATED ORAL at 21:25

## 2025-03-10 RX ADMIN — ATORVASTATIN CALCIUM 40 MILLIGRAM(S): 80 TABLET, FILM COATED ORAL at 21:25

## 2025-03-10 RX ADMIN — Medication 650 MILLIGRAM(S): at 18:12

## 2025-03-10 RX ADMIN — DABIGATRAN ETEXILATE MESYLATE 150 MILLIGRAM(S): 30 PELLET ORAL at 07:34

## 2025-03-10 RX ADMIN — METOPROLOL SUCCINATE 50 MILLIGRAM(S): 50 TABLET, EXTENDED RELEASE ORAL at 18:12

## 2025-03-10 NOTE — H&P ADULT - PROBLEM SELECTOR PLAN 8
Mirtazapine 15 or 30mg qd Pt with anemia previously on Ferous Sulfate 325mg M/W/F. Per family pt no longer taking. On admission Hgb 9.5 (baseline 9-10). On exam no signs of active bleeding. Will continue to monitor.     - Active T&S  - 2 Large IVs  - Transfuse for Hgb <7

## 2025-03-10 NOTE — ED ADULT NURSE NOTE - NS ED NOTE  TALK SOMEONE YN
RT Inhaler-Nebulizer Bronchodilator Protocol Note    There is a bronchodilator order in the chart from a provider indicating to follow the RT Bronchodilator Protocol and there is an Initiate RT Inhaler-Nebulizer Bronchodilator Protocol order as well (see protocol at bottom of note). CXR Findings:  No results found. The findings from the last RT Protocol Assessment were as follows:   History Pulmonary Disease: (P) Chronic pulmonary disease  Respiratory Pattern: (P) Dyspnea on exertion or RR 21-25 bpm  Breath Sounds: (P) Inspiratory and expiratory or bilateral wheezing and/or rhonchi  Cough: (P) Strong, productive  Indication for Bronchodilator Therapy: (P) On home bronchodilators  Bronchodilator Assessment Score: (P) 11    Aerosolized bronchodilator medication orders have been revised according to the RT Inhaler-Nebulizer Bronchodilator Protocol below. Respiratory Therapist to perform RT Therapy Protocol Assessment initially then follow the protocol. Repeat RT Therapy Protocol Assessment PRN for score 0-3 or on second treatment, BID, and PRN for scores above 3. No Indications - adjust the frequency to every 6 hours PRN wheezing or bronchospasm, if no treatments needed after 48 hours then discontinue using Per Protocol order mode. If indication present, adjust the RT bronchodilator orders based on the Bronchodilator Assessment Score as indicated below. Use Inhaler orders unless patient has one or more of the following: on home nebulizer, not able to hold breath for 10 seconds, is not alert and oriented, cannot activate and use MDI correctly, or respiratory rate 25 breaths per minute or more, then use the equivalent nebulizer order(s) with same Frequency and PRN reasons based on the score. If a patient is on this medication at home then do not decrease Frequency below that used at home.     0-3 - enter or revise RT bronchodilator order(s) to equivalent RT Bronchodilator order with Frequency of every 4 hours PRN for wheezing or increased work of breathing using Per Protocol order mode. 4-6 - enter or revise RT Bronchodilator order(s) to two equivalent RT bronchodilator orders with one order with BID Frequency and one order with Frequency of every 4 hours PRN wheezing or increased work of breathing using Per Protocol order mode. 7-10 - enter or revise RT Bronchodilator order(s) to two equivalent RT bronchodilator orders with one order with TID Frequency and one order with Frequency of every 4 hours PRN wheezing or increased work of breathing using Per Protocol order mode. 11-13 - enter or revise RT Bronchodilator order(s) to one equivalent RT bronchodilator order with QID Frequency and an Albuterol order with Frequency of every 4 hours PRN wheezing or increased work of breathing using Per Protocol order mode. Greater than 13 - enter or revise RT Bronchodilator order(s) to one equivalent RT bronchodilator order with every 4 hours Frequency and an Albuterol order with Frequency of every 2 hours PRN wheezing or increased work of breathing using Per Protocol order mode.          Electronically signed by Rojelio Brenner RCP on 7/2/2022 at 8:41 AM No

## 2025-03-10 NOTE — ED PROVIDER NOTE - NS ED MD DISPO SPECIAL CONSIDERATION1
[de-identified] : Carmen is a pleasant 75-year-old female who returns the office today for follow-up regarding her left distal fibula fracture.  The patient states she is doing well and her pain is well controlled.  She does not take any medication for pain.  She has been nonweightbearing in a boot.  She has been using crutches for ambulation.  The patient denies any fevers, chills, sweats, recent illnesses, numbness, tingling, weakness, or pain elsewhere at this time. None

## 2025-03-10 NOTE — OCCUPATIONAL THERAPY INITIAL EVALUATION ADULT - PERTINENT HX OF CURRENT PROBLEM, REHAB EVAL
Pt is a 79F with PMHx of DM2, Asthma, COPD, Afib s/p ablation, Subdural hematoma, Anemia, presenting from home after a fall from bed, At baseline pt ambulates with walker, has part time HHA, Family reports that they are trying to increase HHH hours however have not been able to yet and that previously pt had good results from going to [facility]. Per family pt was trying to get into bed when she fell off, no head strike or LOC. Pt reports falling off bed due to weakness and reports b/l knee pain and L shoulder pain since event as well as chronic knee pain which she take Tylenol 650mg BID with good relief. Pt reports normal BM, Last today. Otherwise pt denies CP, SOB, Fevers, Chills, N/V/D, dysuria.

## 2025-03-10 NOTE — ED PROVIDER NOTE - CLINICAL SUMMARY MEDICAL DECISION MAKING FREE TEXT BOX
Triage VS normal  doubt traumatic injury, will get Xrays of affected extremities  discussed w/ daughters who are concerned about pt physical deconditioning and would like her going to rehab, they state she went in the past and did well there  will send basic labs and admit for PT eval

## 2025-03-10 NOTE — ED PROVIDER NOTE - CARE PLAN
Principal Discharge DX:	Physical deconditioning  Secondary Diagnosis:	Fall  Secondary Diagnosis:	Bilateral knee pain   1

## 2025-03-10 NOTE — PHYSICAL THERAPY INITIAL EVALUATION ADULT - PERTINENT HX OF CURRENT PROBLEM, REHAB EVAL
79F with PMHx of DM2, Asthma, COPD, Afib s/p ablation, Subdural hematoma, Anemia, presenting from home after a mechanical fall from bed found to have BEBE admitted for further workup.

## 2025-03-10 NOTE — ED PROVIDER NOTE - PATIENT'S SEXUAL ORIENTATION
----- Message from Adin Verduzco MD sent at 3/28/2024  4:09 PM CDT -----  Please notify patient with result. Gastritis, neg HP. PPI.      Heterosexual

## 2025-03-10 NOTE — H&P ADULT - PROBLEM SELECTOR PLAN 4
Amlodipine 5mg qd, Lisinopril 40mg qd Pt with DM, A1c 7 on 1/6/25, was on premeal 8u TID, but discontinued due to pts inability to titrate. Pt currently on  Metformin 1000mg BID    - start low ISS with FSG  - CC  - f/u A1c

## 2025-03-10 NOTE — H&P ADULT - PROBLEM SELECTOR PLAN 1
Pt presenting from home after a fall from bed, At baseline pt ambulates with walker, has part time HHA, Family reports pt trying to get into bed when she fell off, no head strike or LOC. Pt reports b/l knee pain and L shoulder pain since event as well as chronic knee pain. Xray obtained in ED. On exam b/l knee with 4/5 strength and limited ROM, no effusions. Left shoulder with 5/5 strength ROM intact.      - f/u Xray knee and Shoulder   - PT   - Fall precautions Pt presenting from home after a fall from bed, At baseline pt ambulates with walker, has part time HHA, Family reports pt trying to get into bed when she fell off, no head strike or LOC. Pt reports b/l knee pain and L shoulder pain since event as well as chronic knee pain which she take Tylenol 650mg BID with good relief. Xray obtained in ED. On exam b/l knee with 4/5 strength and limited ROM, no effusions. Left shoulder with 5/5 strength ROM intact.      - c/w Tylenol 650mg BID   - f/u Xray knee and Shoulder   - PT   - Fall precautions Pt presenting from home after a fall from bed, At baseline pt ambulates with walker, has part time HHA, Family reports pt trying to get into bed when she fell off, no head strike or LOC. Pt reports b/l knee pain and L shoulder pain since event as well as chronic knee pain which she take Tylenol 650mg BID with good relief. Xray obtained in ED. On exam b/l knee with 4/5 strength and limited ROM, no effusions. Left shoulder with 5/5 strength ROM intact.      - c/w Tylenol 650mg BID   - f/u Xray knee and Shoulder   - PT/OT  - Fall precautions

## 2025-03-10 NOTE — H&P ADULT - PROBLEM SELECTOR PLAN 3
A1c 7 on 1/6/25, was on premeal 8u TID, Metformin 1000mg BID On admission SCr 1.58 (Baseline SCr 1.16 1/6/25) Pt reports poor PO intake at home for past few days , Was on LR @ 250ml/h in ED. Repeat K 5.8, Switched to NS @ 100ml/h.     - Stop LR IVF  - f/u EKG and repeat BMP   - NS at 100ml/h x 10 hours  - f/u Urine studies   - Monitor I/O   - trend renal function   - Renally dose meds, avoid nephrotoxins On admission SCr 1.58 (Baseline SCr 1.16 1/6/25) , Was on LR @ 250ml/h in ED. Repeat K 5.8, Switched to NS @ 100ml/h. Pt reports poor PO intake at home for past few days, BUN/Cr ration 11. Will obtain urine studies and repeat BMP.     - Stop LR IVF  - f/u EKG and repeat BMP   - NS at 100ml/h x 10 hours  - f/u Urine studies   - Monitor I/O   - trend renal function   - Renally dose meds, avoid nephrotoxins On admission SCr 1.58 (Baseline SCr 1.16 1/6/25) , Was on LR @ 250ml/h in ED. Repeat K 5.8. Pt reports poor PO intake at home for past few days, BUN/Cr ration 11. Per pt and RN pt with good UOP. Unclear if Pre renal vs intra renal BEBE. Will give NS @ 100ml/h x10 hours and follow up  urine studies and repeat BMP to help determine prerenal vs intrarenal.     - NS at 100ml/h x 10 hours  - f/u  repeat BMP   - f/u Urine studies   - Monitor I/O   - trend renal function   - Renally dose meds, avoid nephrotoxins

## 2025-03-10 NOTE — H&P ADULT - TIME BILLING
Is This A New Presentation, Or A Follow-Up?: Phototherapy Treatment Bedside exam and interview   Reviewed vitals, labs   Discussed patient's plan of care with housestaff   Documentation of encounter  Excludes teaching and separately reported services

## 2025-03-10 NOTE — H&P ADULT - ATTENDING COMMENTS
Seen and examined bedside, no acute complaints, pain well controlled.  Ptn denies CP, palpations, SOB, F/C, also states no lightheadedness, vision changes, CP, palpitations per-fall yesterday, thinks stumbled getting out of bed.  On exam, CV S1S2 no murmurs, lungs clear, abd no tenderness, LE no edema    -potassium 5.6 this AM, ptn no symptoms, sp lokelma, EKG yesterday no hyperkalemic changes  -Cr 1.5s, baseline 1.1s  -trial /hr and repeat BMP this PM  -send Suni, UCr for FeNa  -if no improvement in Cr, can obtain renal US  -cw home dabigatran, metop, hold home lisinopril given BEBE and hyperkalemia  -appreciate PT recs

## 2025-03-10 NOTE — H&P ADULT - HISTORY OF PRESENT ILLNESS
Pt is a 79F with PMHx of DM2, Asthma, COPD, Afib s/p ablation, Subdural hematoma, Anemia           ED Course   Vitals T 98, HR 71, /73, RR 16, SpO2 98% RA  Labs: WBC 7, Hbg 9.5, Plt 236, K 4.1, Na 139, Bicarb 18, BUN/Cr 18/1.58  EKG   Images  - Xray L Humerus   - Xray b/l Knee  Interventions: Tylenol 650mg, LR @250ml/hr   Consults: None    Pt is a 79F with PMHx of DM2, Asthma, COPD, Afib s/p ablation, Subdural hematoma, Anemia, presenting from home after a fall from bed, At baseline pt ambulates with walker, has part time HHA, Family reports that they are trying to increase HHH hours however have not been able to yet and that previously pt had good results from going to [facility]. Per family pt was trying to get into bed when she fell off, no head strike or LOC. Pt reports falling off bed due to weakness and reports b/l knee pain and L shoulder pain since event as well as chronic knee pain which she take Tylenol 650mg BID with good relief. Pt reports normal BM, Last today. Otherwise pt denies CP, SOB, Fevers, Chills, N/V/D, dysuria.     ED Course   Vitals T 98, HR 71, /73, RR 16, SpO2 98% RA  Labs: WBC 7, Hbg 9.5, Plt 236, K 4.1, Na 139, Bicarb 18, BUN/Cr 18/1.58  EKG - None   Images  - Xray L Humerus   - Xray b/l Knee  Interventions: Tylenol 650mg, LR @250ml/hr   Consults: None

## 2025-03-10 NOTE — PHYSICAL THERAPY INITIAL EVALUATION ADULT - ADDITIONAL COMMENTS
Pt reports living in apartment, without LJ, alone. Reports using rollator for ambulation. Reports having HHA for 6 hours per day to assist with bathing and dressing. Reports history of falls.

## 2025-03-10 NOTE — OCCUPATIONAL THERAPY INITIAL EVALUATION ADULT - GENERAL OBSERVATIONS, REHAB EVAL
Pt received semi-supine in bed, +IV line, NAD, and agreeable to OT. PT Luke present for evaluation. Cleared by SILVIA Montemayor to see.

## 2025-03-10 NOTE — OCCUPATIONAL THERAPY INITIAL EVALUATION ADULT - LOWER BODY DRESSING, PREVIOUS LEVEL OF FUNCTION, OT EVAL
[facility]  HEARING AID EVALUATION      Name:  Fannie Hoang  YOB: 1942  Today's date:  08/30/24      PATIENT LISTENING NEEDS  Patient states that struggles in background noise.    TECHNOLOGY DISCUSSION AND RECOMMENDATION  Amplification was demonstrated.  Options and pricing were discussed.  Patient has an [place] Medicare Advantage plan that may cover hearing aids using a third party vendor.    FOLLOW UP   The patient would like to check with her insurance regarding possible hearing aid coverage.  She will contact the office if she decides to purchase amplification here.    APPOINTMENT TIME  1:00-2:00pm    Yamel Modi  Doctor of Audiology  Senior Audiologist   needed assist

## 2025-03-10 NOTE — H&P ADULT - NSHPPHYSICALEXAM_GEN_ALL_CORE
Constitutional: NAD resting in bed  HEENT: EOMI	  Pulm: No respiratory distress, nonlabored breathing, on room air  Cardiovascular: HDS  Gastrointestinal: soft, TN/ND  Extremities: No edema  Derm: No obvious rashes  CNS: AOx3, No obvious focal deficits Constitutional: NAD resting in bed  HEENT: EOMI, MM dry 	  Pulm: No respiratory distress, nonlabored breathing, on room air  Cardiovascular: HDS, S1,S2,   Gastrointestinal: soft, TN/ND  Extremities: b/l LE 4/5 strength with limited ROM 2/2 pain, no effusions noted. B/l UE 5/5 strength with intact ROM.   CNS: AOx3, No obvious focal deficits

## 2025-03-10 NOTE — OCCUPATIONAL THERAPY INITIAL EVALUATION ADULT - LEVEL OF CONSCIOUSNESS, OT EVAL
device 1x to select colors.    Therapist modeled: COLORS, more    Spontaneous: That's my toy.    Pt requesting help closing cookie shapes by reaching for therapist hands.  LAMP WFL on clinic ipad. Pt explored device. Activated device 2x to select colors, 5x to label animals, and 6x to request more with max models and cues.    Pt scripting youtube videos during session.    Spontaneous: orange LAMP WFL on clinic ipad. Pt using therapist finger to explore. Activated device to select animals 1x, colors 4x, and shapes 5x.    Pt singing 5 little monkeys with song.    Spontaneous: 5 little monkeys, brown    Good participation in shared book reading (turning pages, holding therapist hand while therapist pointed to words/pictures)   2. [place] will imitate actions with toys (e.g. driving cars, throwing ball) or hand/body movements (e.g. clapping, waving) x10 during a 30 minute treatment session given direct modeling.      New Goal: Amy will identify simple vocabulary (animals, shapes, foods, clothing, etc) from field (2-5) with 75% accuracy for two sessions.    Pt imitating coloring with dot markers 8x, opening/closing cookie shapes 10x.    Pt accurately matched pictures in [employer] book 4x.  Pt did not demonstrate consistent ID of colors: 1/6 Pt did not demonstrate ID of colors.    Demonstrated ID of animals frog, pig, chicken using therapist finger during shared book reading.   3. Caregivers will verbalize understanding of home programming, tx planning, and progress at the end of each tx session.   Mom verbalized understanding of tx session.  Mom verbalized understanding of tx session. Reports pt doing well in school and has recently started following more routines. Mom verbalized understanding of tx session.     Progress related to goals:  Goal:  1 -[]  Met [] Progress Noted [] Not Met [] Defer Goals [x] Continue  2 -[]  Met [] Progress Noted [] Not Met [] Defer Goals [x] Continue  3 -[]  Met [] Progress  alert

## 2025-03-10 NOTE — H&P ADULT - PROBLEM SELECTOR PLAN 5
Pradaxa 150BID, Metoprolol Succinate 50mg qd Pt with Afib s/p ablation on Pradaxa 150mg BID, Metoprolol Succinate 50mg BID    - c/w Pradaxa 150mg BID  - c/w Metoprolol Succinate 50mg BID

## 2025-03-10 NOTE — H&P ADULT - NSHPLABSRESULTS_GEN_ALL_CORE
Complete Blood Count + Automated Diff (03.10.25 @ 01:11)   Auto NRBC: 0 /100 WBCs  WBC Count: 7.13 K/uL  RBC Count: 3.21 M/uL  Hemoglobin: 9.5 g/dL  Hematocrit: 30.0 %  Mean Cell Volume: 93.5 fl  Mean Cell Hemoglobin: 29.6 pg  Mean Cell Hemoglobin Conc: 31.7 g/dL  Red Cell Distrib Width: 14.8 %  Platelet Count - Automated: 236 K/uL  Neutrophil #: 4.93 K/uL  Lymphocyte #: 1.32 K/uL  Monocyte #: 0.53 K/uL  Eosinophil #: 0.27 K/uL  Basophil #: 0.06 K/uL  Neutrophil %: 69.2: Differential percentages must be correlated with absolute numbers for   clinical significance. %  Lymphocyte %: 18.5 %  Monocyte %: 7.4 %  Eosinophil %: 3.8 %  Basophil %: 0.8 %  Auto Immature Granulocyte %: 0.3: (Includes meta, myelo and promyelocytes). Mild elevations in immature   granulocytes may be seen with many inflammatory processes and pregnancy;   clinical correlation suggested. %Complete Blood Count + Automated Diff (08.30.24 @ 23:49)   WBC Count: 7.61 K/uL  RBC Count: 3.24 M/uL  Hemoglobin: 9.1 g/dL  Hematocrit: 28.4 %  Mean Cell Volume: 87.7 fl  Mean Cell Hemoglobin: 28.1 pg  Mean Cell Hemoglobin Conc: 32.0 gm/dL  Red Cell Distrib Width: 13.9 %  Platelet Count - Automated: 184 K/uL  Auto Neutrophil #: 5.09 K/uL  Auto Lymphocyte #: 1.51 K/uL  Auto Monocyte #: 0.65 K/uL  Auto Eosinophil #: 0.28 K/uL  Auto Basophil #: 0.06 K/uL  Auto Neutrophil %: 66.9: Differential percentages must be correlated with absolute numbers for   clinical significance. %

## 2025-03-10 NOTE — PHYSICAL THERAPY INITIAL EVALUATION ADULT - THERAPY FREQUENCY, PT EVAL
Patient educated on frequency of inpatient physical therapy at [facility], patient verbalized understanding./2-3x/week

## 2025-03-10 NOTE — ED ADULT NURSE NOTE - NSFALLHARMRISKINTERV_ED_ALL_ED

## 2025-03-10 NOTE — OCCUPATIONAL THERAPY INITIAL EVALUATION ADULT - DIAGNOSIS, OT EVAL
Pt admitted for mechanical fall, presents with impaired balance, strength deficits, and decreased activity tolerance.

## 2025-03-10 NOTE — H&P ADULT - PROBLEM SELECTOR PLAN 7
Ferous Sulfate 325mg M/W/F Symbicort 160-4.5 2 puffs BID Pt on home Symbicort 160-4.5 2 puffs BID PRN. Per family pt dose not use often.     - c/w Symbicort 160-4.5 2 puffs BID PRN

## 2025-03-10 NOTE — H&P ADULT - PROBLEM SELECTOR PLAN 6
Symbicort 160-4.5 2 puffs BID Pt with HTN on home Amlodipine 5mg qd, Lisinopril 40mg qd      c/w Amlodipine 5mg qd, Lisinopril 40mg qd

## 2025-03-10 NOTE — H&P ADULT - ASSESSMENT
Pt is a 79F with PMHx of DM2, Asthma, COPD, Afib s/p ablation, Subdural hematoma, Anemia, presenting from home after a mechanical fall from bed found to have BEBE admitted for further workup.

## 2025-03-10 NOTE — ED PROVIDER NOTE - PHYSICAL EXAMINATION
CONST: nontoxic NAD speaking in full sentences  HEAD: atraumatic  EYES: conjunctivae clear  NECK: supple  CARD: regular rate  CHEST: breathing comfortably, no stridor/retractions/tripoding  ABD: soft nontender nondistended  EXT: pelvis stable. B/l UE no gross deformity no ecchymosis no bony tenderness. B/l knees- no effusion no tenderness able to flex and extend. Surgical scar to R knee from prior surgery. Able to plantarflex/dorsiflex b/l  SKIN: warm, dry  NEURO: awake alert answering questions following commands moving all extremities gait deferred

## 2025-03-10 NOTE — ED ADULT TRIAGE NOTE - CHIEF COMPLAINT QUOTE
Pt reports fall while trying to get into bed tonight. pt c/o b/l leg pain. Per EMS pt was assisted up from ground and walked with walker to EMS stretcher. Pt denies any LOC, no head strike. AOx4.

## 2025-03-10 NOTE — PHYSICAL THERAPY INITIAL EVALUATION ADULT - GENERAL OBSERVATIONS, REHAB EVAL
PT IE completed. Chart reviewed. Pt received semi-supine, NAD, +IV. SILVIA Montemayor cleared pt for PT.

## 2025-03-10 NOTE — OCCUPATIONAL THERAPY INITIAL EVALUATION ADULT - MODIFIED CLINICAL TEST OF SENSORY INTEGRATION IN BALANCE TEST
Pt able to ambulate 15 feet with CGA using RW, demonstrating fairly steady gait, difficulty weight-shifting, and decreased step length.

## 2025-03-10 NOTE — H&P ADULT - PROBLEM SELECTOR PLAN 2
Baseline SCr 1.16 1/6/25 On admission K 4.1, with repeat labs showing 5.8. Pt with BEBE and was receiving LR IVF at 250ml/hr in ED. Will stop LR, start NS IVF as pt appears dry with likely pre renal BEBE as below.     - f/u EKG and Repeat BMP   - f/u FSG and need for ISS, if euglycemic will determine based on repeat BMP and EKG regarding hyperK treatment On admission K 4.1, with repeat labs 40 min later showing 5.8. Pt with BEBE as bellow. and was receiving LR IVF at 250ml/hr in ED. Will start NS IVF as pt appears dry with likely pre renal BEBE as below.  EKG without peaked T waves.    - f/u Repeat BMP   - Trend Renal function and UOP On admission K 4.1, with repeat labs 40 min later showing 5.8, CK wnl,  received LR IVF at 250ml/hr in ED.  EKG without peaked T waves (TWI V4-5, rest similar to prior 2023), Pt asymptomatic. Will obtain early AM labs to repeat K and eval renal response to IVF as below.     - f/u Repeat BMP   - Trend Renal function and UOP

## 2025-03-10 NOTE — ED ADULT NURSE NOTE - OBJECTIVE STATEMENT
Patient is a 79y/F came in via EMS from home with h/o mechanical fall while trying to get into bed tonight. pt denies LOC, no head injury, reports of bilateral leg pain. pt denies any chest pain, no SOB. pt is awake,alert,ox4 on arrival. Patient is a 79y/F came in via EMS from home with h/o mechanical fall while trying to get into bed tonight. pt denies LOC, no head injury, reports of bilateral leg pain. pt denies any chest pain, no SOB. pt is awake,alert,ox4 on arrival. pt is taking blood thinner.

## 2025-03-10 NOTE — OCCUPATIONAL THERAPY INITIAL EVALUATION ADULT - ADDITIONAL COMMENTS
Pt states she lives alone in an apartment, no LJ. Pt reports requiring assistance for some ADLs and IADLs and uses a rollator for ambulation. Pt has a HHA for 6 hours M-F and 4 hours on the weekend. Pt has a history of falls.

## 2025-03-10 NOTE — H&P ADULT - PROBLEM SELECTOR PLAN 10
Pt on Mirtazapine 30mg qd and Abilify monthly injection     - c/w Mirtazapine 30mg qd   - Outpt Abilify monthly injection (per family recently had injection) Pt on Mirtazapine 30mg qd, Trazadone 100mg qhs, and Abilify monthly injection     - c/w Mirtazapine 30mg qd   - c/w Trazadone 100mg qhs  - Outpt Abilify monthly injection (per family recently had injection)

## 2025-03-10 NOTE — ED PROVIDER NOTE - OBJECTIVE STATEMENT
79yF w/ DM HTN HLD Afib on Eliquis, knee arthritis, asthma, brought in by EMS for b/l knee pain after fall out of bed. They state pt has been declining and getting weaker, she lives alone and walks w/ walker has part time HHA but they don't think shes doing well at home. Tonight pt was climbing in bed and her knee caught in the blankets and she slipped off the bed landed on her knee. No head strike or LOC. They have camera in the house and showed me video of the incident. EMS helped pt up and she walked w/ walker after the fall. At this time complaining of pain to b/l knees and to L upper arm.

## 2025-03-10 NOTE — H&P ADULT - PROBLEM/PLAN-7
This is a 21month old male with no past medical history admitted for Diabetic ketoacidosis without coma type 1 diabetes with new diagnosis. This is a 21month old male with no past medical history admitted for Diabetic ketoacidosis without coma type 1 diabetes with new diagnosis. Also with Rhino-enteroviral infection.    Plan:  Insulin changed to subcutaneous route as per Endocrinologist's recommendation: Lantus 4 units SQ at night plus:  Premeal D-stick and follow sliding scale below (calculated based on a correction of 225)  - Please start SQ insulin sliding scale as follows   If glucose is 100-249 mg/dL: give 0.5 units   If glucose is 250-374 mg/dL: give 1 unit   If glucose is 375-499 mg/dL: 1.5 units  If glucose >500: 2 units  Diabetic diet  Education in Diabetes/management for Parents  Stable for transfer to [facility] DISPLAY PLAN FREE TEXT

## 2025-03-10 NOTE — ED ADULT TRIAGE NOTE - TEMP AT ED ARRIVAL (C)
[place] [facility]  [address], [address]  [facility] [address]  Phone: [phone]  Fax: [phone]  [employer]: [email or web address]    Pt Name: Frank Cerrato  Injury Date: 04/18/2022   Employee ID: [id number] Date of First Treatment: 04/26/2022   Company: [employer]      Appointment Time: 09:30 AM Arrived: 930   Provider: Curtis Barker MD Time Out:1145     Office Treatment:   1. Acute bilateral low back pain with left-sided sciatica    2. Encounter related to worker's compensation claim      Medications Ordered This Encounter   Medications    naproxen (NAPROSYN) 500 MG tablet      Patient Instructions: Attention not to aggravate affected area, PT to be scheduled once authorized, Begin Physical Therapy, Daily home exercises/warm soaks    Restrictions:  (light duty)     Return Appointment: 5/17 at 9am       You have a work related injury. Medical care and treatment required as a result of a work-related injury  should be focused on restoring functional ability required to meet the patient's daily and work activities and return to work, while striving to restore the patient's health to its pre-injury status in so far as is feasible.  Some OTC measures to help in recovery(if no allergies to or renal issues):  Tylenol 325mg 3x per day  Take Pepcid 20mg BID  Magnesium OTC daily  Massage area if possible  Resting of the injured area  Ice for ankle, wrist or elbow injury  Elevation of the injured area if applicable  Heating pad for muscle injury  Stretching/ROM exercises as described in clinic.      37.1

## 2025-03-11 LAB
ANION GAP SERPL CALC-SCNC: 12 MMOL/L — SIGNIFICANT CHANGE UP (ref 5–17)
BUN SERPL-MCNC: 13 MG/DL — SIGNIFICANT CHANGE UP (ref 7–23)
CALCIUM SERPL-MCNC: 8.7 MG/DL — SIGNIFICANT CHANGE UP (ref 8.4–10.5)
CHLORIDE SERPL-SCNC: 106 MMOL/L — SIGNIFICANT CHANGE UP (ref 96–108)
CO2 SERPL-SCNC: 21 MMOL/L — LOW (ref 22–31)
CREAT SERPL-MCNC: 1.18 MG/DL — SIGNIFICANT CHANGE UP (ref 0.5–1.3)
EGFR: 47 ML/MIN/1.73M2 — LOW
EGFR: 47 ML/MIN/1.73M2 — LOW
GLUCOSE SERPL-MCNC: 218 MG/DL — HIGH (ref 70–99)
HCT VFR BLD CALC: 28.2 % — LOW (ref 34.5–45)
HGB BLD-MCNC: 9.3 G/DL — LOW (ref 11.5–15.5)
MAGNESIUM SERPL-MCNC: 1.7 MG/DL — SIGNIFICANT CHANGE UP (ref 1.6–2.6)
MCHC RBC-ENTMCNC: 30.5 PG — SIGNIFICANT CHANGE UP (ref 27–34)
MCHC RBC-ENTMCNC: 33 G/DL — SIGNIFICANT CHANGE UP (ref 32–36)
MCV RBC AUTO: 92.5 FL — SIGNIFICANT CHANGE UP (ref 80–100)
NRBC BLD AUTO-RTO: 0 /100 WBCS — SIGNIFICANT CHANGE UP (ref 0–0)
PHOSPHATE SERPL-MCNC: 4.9 MG/DL — HIGH (ref 2.5–4.5)
PLATELET # BLD AUTO: 208 K/UL — SIGNIFICANT CHANGE UP (ref 150–400)
POTASSIUM SERPL-MCNC: 4.8 MMOL/L — SIGNIFICANT CHANGE UP (ref 3.5–5.3)
POTASSIUM SERPL-SCNC: 4.8 MMOL/L — SIGNIFICANT CHANGE UP (ref 3.5–5.3)
RBC # BLD: 3.05 M/UL — LOW (ref 3.8–5.2)
RBC # FLD: 14.6 % — HIGH (ref 10.3–14.5)
SODIUM SERPL-SCNC: 139 MMOL/L — SIGNIFICANT CHANGE UP (ref 135–145)
WBC # BLD: 4.68 K/UL — SIGNIFICANT CHANGE UP (ref 3.8–10.5)
WBC # FLD AUTO: 4.68 K/UL — SIGNIFICANT CHANGE UP (ref 3.8–10.5)

## 2025-03-11 PROCEDURE — 99233 SBSQ HOSP IP/OBS HIGH 50: CPT | Mod: GC

## 2025-03-11 RX ORDER — MAGNESIUM SULFATE 500 MG/ML
2 SYRINGE (ML) INJECTION ONCE
Refills: 0 | Status: COMPLETED | OUTPATIENT
Start: 2025-03-11 | End: 2025-03-11

## 2025-03-11 RX ADMIN — Medication 650 MILLIGRAM(S): at 19:28

## 2025-03-11 RX ADMIN — DABIGATRAN ETEXILATE MESYLATE 150 MILLIGRAM(S): 30 PELLET ORAL at 07:15

## 2025-03-11 RX ADMIN — INSULIN LISPRO 2: 100 INJECTION, SOLUTION INTRAVENOUS; SUBCUTANEOUS at 22:36

## 2025-03-11 RX ADMIN — INSULIN LISPRO 5: 100 INJECTION, SOLUTION INTRAVENOUS; SUBCUTANEOUS at 13:15

## 2025-03-11 RX ADMIN — MIRTAZAPINE 30 MILLIGRAM(S): 30 TABLET, FILM COATED ORAL at 22:36

## 2025-03-11 RX ADMIN — METOPROLOL SUCCINATE 50 MILLIGRAM(S): 50 TABLET, EXTENDED RELEASE ORAL at 19:27

## 2025-03-11 RX ADMIN — Medication 650 MILLIGRAM(S): at 07:15

## 2025-03-11 RX ADMIN — Medication 650 MILLIGRAM(S): at 20:28

## 2025-03-11 RX ADMIN — Medication 25 GRAM(S): at 11:14

## 2025-03-11 RX ADMIN — INSULIN LISPRO 2: 100 INJECTION, SOLUTION INTRAVENOUS; SUBCUTANEOUS at 09:55

## 2025-03-11 RX ADMIN — DABIGATRAN ETEXILATE MESYLATE 150 MILLIGRAM(S): 30 PELLET ORAL at 19:27

## 2025-03-11 RX ADMIN — Medication 100 MILLIGRAM(S): at 22:36

## 2025-03-11 RX ADMIN — ATORVASTATIN CALCIUM 40 MILLIGRAM(S): 80 TABLET, FILM COATED ORAL at 22:36

## 2025-03-11 RX ADMIN — INSULIN LISPRO 4: 100 INJECTION, SOLUTION INTRAVENOUS; SUBCUTANEOUS at 18:41

## 2025-03-11 RX ADMIN — Medication 650 MILLIGRAM(S): at 07:59

## 2025-03-11 NOTE — PROGRESS NOTE ADULT - PROBLEM SELECTOR PLAN 11
F - NS @ 100ml/h   E - PRN   N - CC  D - Pradaxa   D - RMF F - PRN  E - PRN   N - CC  D - Pradaxa   D - RMF

## 2025-03-11 NOTE — PROGRESS NOTE ADULT - PROBLEM SELECTOR PLAN 3
On admission SCr 1.58 (Baseline SCr 1.16 1/6/25) , Was on LR @ 250ml/h in ED. Repeat K 5.8. Pt reports poor PO intake at home for past few days, BUN/Cr ration 11. Per pt and RN pt with good UOP. Unclear if Pre renal vs intra renal BEBE. Will give NS @ 100ml/h x10 hours and follow up  urine studies and repeat BMP to help determine prerenal vs intrarenal.     - NS at 100ml/h x 10 hours  - f/u  repeat BMP   - f/u Urine studies   - Monitor I/O   - trend renal function   - Renally dose meds, avoid nephrotoxins resolved, was likely prerenal. intrinsic FENa likely 2/2 fluids.   On admission SCr 1.58 (Baseline SCr 1.16 1/6/25) , Was on LR @ 250ml/h in ED. Repeat K 5.8. Pt reports poor PO intake at home for past few days, BUN/Cr ration 11. Per pt and RN pt with good UOP. Unclear if Pre renal vs intra renal BEBE. Will give NS @ 100ml/h x10 hours and follow up  urine studies and repeat BMP to help determine prerenal vs intrarenal.     - NS at 100ml/h x 10 hours  - f/u repeat BMP   - f/u Urine studies   - Monitor I/O   - trend renal function   - Renally dose meds, avoid nephrotoxins

## 2025-03-11 NOTE — PROGRESS NOTE ADULT - PROBLEM SELECTOR PLAN 6
Pt with HTN on home Amlodipine 5mg qd, Lisinopril 40mg qd      c/w Amlodipine 5mg qd, Lisinopril 40mg qd

## 2025-03-11 NOTE — PROGRESS NOTE ADULT - PROBLEM SELECTOR PLAN 1
Pt presenting from home after a fall from bed, At baseline pt ambulates with walker, has part time HHA, Family reports pt trying to get into bed when she fell off, no head strike or LOC. Pt reports b/l knee pain and L shoulder pain since event as well as chronic knee pain which she take Tylenol 650mg BID with good relief. Xray obtained in ED. On exam b/l knee with 4/5 strength and limited ROM, no effusions. Left shoulder with 5/5 strength ROM intact.      - c/w Tylenol 650mg BID   - f/u Xray knee and Shoulder   - PT/OT  - Fall precautions Pt presenting from home after a fall from bed, At baseline pt ambulates with walker, has part time HHA, Family reports pt trying to get into bed when she fell off, no head strike or LOC. Pt reports b/l knee pain and L shoulder pain since event as well as chronic knee pain which she take Tylenol 650mg BID with good relief. Xray obtained in ED. On exam b/l knee with 4/5 strength and limited ROM, no effusions. Left shoulder with 5/5 strength ROM intact.  - [facility] x-rays unremarkable  - JESSENIA per PT    - c/w Tylenol 650mg BID   - f/u Xray knee and Shoulder   - Fall precautions

## 2025-03-11 NOTE — PROGRESS NOTE ADULT - PROBLEM SELECTOR PLAN 4
Pt with DM, A1c 7 on 1/6/25, was on premeal 8u TID, but discontinued due to pts inability to titrate. Pt currently on  Metformin 1000mg BID    - start low ISS with FSG  - CC  - f/u A1c Pt with DM, A1c 7 on 1/6/25, was on premeal 8u TID, but discontinued due to pts inability to titrate. Pt currently on  Metformin 1000mg BID, A1c 7.7.     - c/w low ISS with FSG  - CC

## 2025-03-11 NOTE — PROGRESS NOTE ADULT - SUBJECTIVE AND OBJECTIVE BOX
Patient is a 79y old  Female who presents with a chief complaint of Fall (10 Mar 2025 02:29)      HOSPITAL COURSE:     OVERNIGHT EVENTS:    SUBJECTIVE:     ROS: otherwise negative      T(C): 36.8 (03-11-25 @ 07:09), Max: 36.9 (03-10-25 @ 20:48)  HR: 59 (03-11-25 @ 07:09) (53 - 83)  BP: 146/78 (03-11-25 @ 07:09) (117/72 - 146/78)  RR: 18 (03-11-25 @ 07:09) (16 - 18)  SpO2: 99% (03-11-25 @ 07:09) (96% - 100%)  Wt(kg): --Vital Signs Last 24 Hrs  T(C): 36.8 (11 Mar 2025 07:09), Max: 36.9 (10 Mar 2025 20:48)  T(F): 98.2 (11 Mar 2025 07:09), Max: 98.4 (10 Mar 2025 20:48)  HR: 59 (11 Mar 2025 07:09) (53 - 83)  BP: 146/78 (11 Mar 2025 07:09) (117/72 - 146/78)  BP(mean): 87 (10 Mar 2025 20:48) (87 - 87)  RR: 18 (11 Mar 2025 07:09) (16 - 18)  SpO2: 99% (11 Mar 2025 07:09) (96% - 100%)    Parameters below as of 10 Mar 2025 20:48  Patient On (Oxygen Delivery Method): room air        PHYSICAL EXAM:  Constitutional: resting comfortably in bed; NAD  Head: NC/AT  Eyes: PERRL, EOMI, anicteric sclera  ENT: no nasal discharge; MMM  Neck: supple; no JVD or thyromegaly  Respiratory: CTA B/L; no W/R/R, no retractions  Cardiac: +S1/S2; RRR; no M/R/G  Gastrointestinal: soft, NT/ND; no rebound or guarding; +BSx4  Back: spine midline, no bony tenderness or step-offs; no CVAT B/L  Extremities: WWP, no clubbing or cyanosis; no peripheral edema. Capillary refill <2 sec  Musculoskeletal: NROM x4; no joint swelling, tenderness or erythema  Vascular: 2+ radial, DP/PT pulses B/L  Dermatologic: skin warm, dry and intact; no rashes, wounds, or scars  Lymphatic: no submandibular or cervical LAD  Neurologic: AAOx3; CNII-XII grossly intact; no focal deficits  Psychiatric: affect and characteristics of appearance, verbalizations, behaviors are appropriate    LABS:                        9.3    4.68  )-----------( 208      ( 11 Mar 2025 05:30 )             28.2     03-11    139  |  106  |  13  ----------------------------<  218[H]  4.8   |  21[L]  |  1.18    Ca    8.7      11 Mar 2025 05:30  Phos  4.9     03-11  Mg     1.7     03-11          Urinalysis Basic - ( 11 Mar 2025 05:30 )    Color: x / Appearance: x / SG: x / pH: x  Gluc: 218 mg/dL / Ketone: x  / Bili: x / Urobili: x   Blood: x / Protein: x / Nitrite: x   Leuk Esterase: x / RBC: x / WBC x   Sq Epi: x / Non Sq Epi: x / Bacteria: x      CAPILLARY BLOOD GLUCOSE      POCT Blood Glucose.: 261 mg/dL (10 Mar 2025 21:55)  POCT Blood Glucose.: 144 mg/dL (10 Mar 2025 17:50)  POCT Blood Glucose.: 238 mg/dL (10 Mar 2025 13:07)  POCT Blood Glucose.: 180 mg/dL (10 Mar 2025 09:34)        Urinalysis Basic - ( 11 Mar 2025 05:30 )    Color: x / Appearance: x / SG: x / pH: x  Gluc: 218 mg/dL / Ketone: x  / Bili: x / Urobili: x   Blood: x / Protein: x / Nitrite: x   Leuk Esterase: x / RBC: x / WBC x   Sq Epi: x / Non Sq Epi: x / Bacteria: x        MEDICATIONS  (STANDING):  acetaminophen     Tablet .. 650 milliGRAM(s) Oral every 12 hours  amLODIPine   Tablet 5 milliGRAM(s) Oral every 24 hours  atorvastatin 40 milliGRAM(s) Oral at bedtime  dabigatran 150 milliGRAM(s) Oral every 12 hours  dextrose 5%. 1000 milliLiter(s) (100 mL/Hr) IV Continuous <Continuous>  dextrose 5%. 1000 milliLiter(s) (50 mL/Hr) IV Continuous <Continuous>  dextrose 50% Injectable 25 Gram(s) IV Push once  dextrose 50% Injectable 12.5 Gram(s) IV Push once  dextrose 50% Injectable 25 Gram(s) IV Push once  glucagon  Injectable 1 milliGRAM(s) IntraMuscular once  insulin lispro (ADMELOG) corrective regimen sliding scale   SubCutaneous Before meals and at bedtime  metoprolol succinate ER 50 milliGRAM(s) Oral every 12 hours  mirtazapine 30 milliGRAM(s) Oral at bedtime  sodium chloride 0.9%. 1000 milliLiter(s) (100 mL/Hr) IV Continuous <Continuous>  traZODone 100 milliGRAM(s) Oral at bedtime    MEDICATIONS  (PRN):  aluminum hydroxide/magnesium hydroxide/simethicone Suspension 30 milliLiter(s) Oral every 4 hours PRN Dyspepsia  dextrose Oral Gel 15 Gram(s) Oral once PRN Blood Glucose LESS THAN 70 milliGRAM(s)/deciliter  fluticasone propionate/ salmeterol 250-50 MICROgram(s) Diskus 1 Dose(s) Inhalation two times a day PRN Wheezing of SOB  melatonin 3 milliGRAM(s) Oral at bedtime PRN Insomnia  ondansetron Injectable 4 milliGRAM(s) IV Push every 8 hours PRN Nausea and/or Vomiting      RADIOLOGY & ADDITIONAL TESTS: Reviewed   Patient is a 79y old  Female who presents with a chief complaint of Fall (10 Mar 2025 02:29)    OVERNIGHT EVENTS: srikanth    SUBJECTIVE: seen bedside. no acute complaints. discussed improved renal function.     ROS: otherwise negative      T(C): 36.8 (03-11-25 @ 07:09), Max: 36.9 (03-10-25 @ 20:48)  HR: 59 (03-11-25 @ 07:09) (53 - 83)  BP: 146/78 (03-11-25 @ 07:09) (117/72 - 146/78)  RR: 18 (03-11-25 @ 07:09) (16 - 18)  SpO2: 99% (03-11-25 @ 07:09) (96% - 100%)  Wt(kg): --Vital Signs Last 24 Hrs  T(C): 36.8 (11 Mar 2025 07:09), Max: 36.9 (10 Mar 2025 20:48)  T(F): 98.2 (11 Mar 2025 07:09), Max: 98.4 (10 Mar 2025 20:48)  HR: 59 (11 Mar 2025 07:09) (53 - 83)  BP: 146/78 (11 Mar 2025 07:09) (117/72 - 146/78)  BP(mean): 87 (10 Mar 2025 20:48) (87 - 87)  RR: 18 (11 Mar 2025 07:09) (16 - 18)  SpO2: 99% (11 Mar 2025 07:09) (96% - 100%)    Parameters below as of 10 Mar 2025 20:48  Patient On (Oxygen Delivery Method): room air        PHYSICAL EXAM:  Constitutional: resting comfortably in bed; NAD  Head: NC/AT  Eyes: PERRL, EOMI, anicteric sclera  Respiratory: CTA B/L; no W/R/R, no retractions  Cardiac: +S1/S2; RRR; no M/R/G  Gastrointestinal: soft, NT/ND; no rebound or guarding; +BSx4  Extremities: WWP, no clubbing or cyanosis; no peripheral edema. Capillary refill <2 sec  Musculoskeletal: NROM x4; no joint swelling, tenderness or erythema  Neurologic: AAOx3; CNII-XII grossly intact; no focal deficits  Psychiatric: affect and characteristics of appearance, verbalizations, behaviors are appropriate    LABS:                        9.3    4.68  )-----------( 208      ( 11 Mar 2025 05:30 )             28.2     03-11    139  |  106  |  13  ----------------------------<  218[H]  4.8   |  21[L]  |  1.18    Ca    8.7      11 Mar 2025 05:30  Phos  4.9     03-11  Mg     1.7     03-11          Urinalysis Basic - ( 11 Mar 2025 05:30 )    Color: x / Appearance: x / SG: x / pH: x  Gluc: 218 mg/dL / Ketone: x  / Bili: x / Urobili: x   Blood: x / Protein: x / Nitrite: x   Leuk Esterase: x / RBC: x / WBC x   Sq Epi: x / Non Sq Epi: x / Bacteria: x      CAPILLARY BLOOD GLUCOSE      POCT Blood Glucose.: 261 mg/dL (10 Mar 2025 21:55)  POCT Blood Glucose.: 144 mg/dL (10 Mar 2025 17:50)  POCT Blood Glucose.: 238 mg/dL (10 Mar 2025 13:07)  POCT Blood Glucose.: 180 mg/dL (10 Mar 2025 09:34)        Urinalysis Basic - ( 11 Mar 2025 05:30 )    Color: x / Appearance: x / SG: x / pH: x  Gluc: 218 mg/dL / Ketone: x  / Bili: x / Urobili: x   Blood: x / Protein: x / Nitrite: x   Leuk Esterase: x / RBC: x / WBC x   Sq Epi: x / Non Sq Epi: x / Bacteria: x        MEDICATIONS  (STANDING):  acetaminophen     Tablet .. 650 milliGRAM(s) Oral every 12 hours  amLODIPine   Tablet 5 milliGRAM(s) Oral every 24 hours  atorvastatin 40 milliGRAM(s) Oral at bedtime  dabigatran 150 milliGRAM(s) Oral every 12 hours  dextrose 5%. 1000 milliLiter(s) (100 mL/Hr) IV Continuous <Continuous>  dextrose 5%. 1000 milliLiter(s) (50 mL/Hr) IV Continuous <Continuous>  dextrose 50% Injectable 25 Gram(s) IV Push once  dextrose 50% Injectable 12.5 Gram(s) IV Push once  dextrose 50% Injectable 25 Gram(s) IV Push once  glucagon  Injectable 1 milliGRAM(s) IntraMuscular once  insulin lispro (ADMELOG) corrective regimen sliding scale   SubCutaneous Before meals and at bedtime  metoprolol succinate ER 50 milliGRAM(s) Oral every 12 hours  mirtazapine 30 milliGRAM(s) Oral at bedtime  sodium chloride 0.9%. 1000 milliLiter(s) (100 mL/Hr) IV Continuous <Continuous>  traZODone 100 milliGRAM(s) Oral at bedtime    MEDICATIONS  (PRN):  aluminum hydroxide/magnesium hydroxide/simethicone Suspension 30 milliLiter(s) Oral every 4 hours PRN Dyspepsia  dextrose Oral Gel 15 Gram(s) Oral once PRN Blood Glucose LESS THAN 70 milliGRAM(s)/deciliter  fluticasone propionate/ salmeterol 250-50 MICROgram(s) Diskus 1 Dose(s) Inhalation two times a day PRN Wheezing of SOB  melatonin 3 milliGRAM(s) Oral at bedtime PRN Insomnia  ondansetron Injectable 4 milliGRAM(s) IV Push every 8 hours PRN Nausea and/or Vomiting      RADIOLOGY & ADDITIONAL TESTS: Reviewed

## 2025-03-11 NOTE — PROGRESS NOTE ADULT - ATTENDING COMMENTS
Seen and examined bedside, no acute complaints, pain well controlled.  Ptn denies CP, palpations, SOB  On exam, CV S1S2 no murmurs, abd no tenderness, LE no edema    -potassium normalized to 4.8 this AM  -Cr improved to 1.18 sp fluids, now at baseline  -can stop IVF today  -cw home dabigatran, metop, hold home lisinopril given BEBE and hyperkalemia on presentation, continue to hold on discharge, ptn to f/up BP management w PCP outpatient  -PT rec JESSENIA Seen and examined bedside, no acute complaints, pain well controlled.  Ptn denies CP, palpations, SOB  On exam, CV S1S2 no murmurs, abd no tenderness, LE no edema    -potassium normalized to 4.8 this AM  -Cr improved to 1.18 sp fluids, now at baseline  -can stop IVF today  -cw home dabigatran, metop, hold home lisinopril given BEBE and hyperkalemia on presentation, continue to hold on discharge, ptn to f/up BP management w PCP outpatient.  -PT rec JESSENIA

## 2025-03-11 NOTE — PROGRESS NOTE ADULT - PROBLEM SELECTOR PLAN 2
On admission K 4.1, with repeat labs 40 min later showing 5.8, CK wnl,  received LR IVF at 250ml/hr in ED.  EKG without peaked T waves (TWI V4-5, rest similar to prior 2023), Pt asymptomatic. Will obtain early AM labs to repeat K and eval renal response to IVF as below.     - f/u Repeat BMP   - Trend Renal function and UOP resolved  On admission K 4.1, with repeat labs 40 min later showing 5.8, CK wnl,  received LR IVF at 250ml/hr in ED.  EKG without peaked T waves (TWI V4-5, rest similar to prior 2023), Pt asymptomatic. Will obtain early AM labs to repeat K and eval renal response to IVF as below.     - f/u Repeat BMP   - Trend Renal function and UOP

## 2025-03-12 ENCOUNTER — TRANSCRIPTION ENCOUNTER (OUTPATIENT)
Age: 80
End: 2025-03-12

## 2025-03-12 LAB
ANION GAP SERPL CALC-SCNC: 11 MMOL/L — SIGNIFICANT CHANGE UP (ref 5–17)
ANION GAP SERPL CALC-SCNC: 13 MMOL/L — SIGNIFICANT CHANGE UP (ref 5–17)
BUN SERPL-MCNC: 18 MG/DL — SIGNIFICANT CHANGE UP (ref 7–23)
BUN SERPL-MCNC: 20 MG/DL — SIGNIFICANT CHANGE UP (ref 7–23)
CALCIUM SERPL-MCNC: 8.4 MG/DL — SIGNIFICANT CHANGE UP (ref 8.4–10.5)
CALCIUM SERPL-MCNC: 8.8 MG/DL — SIGNIFICANT CHANGE UP (ref 8.4–10.5)
CHLORIDE SERPL-SCNC: 102 MMOL/L — SIGNIFICANT CHANGE UP (ref 96–108)
CHLORIDE SERPL-SCNC: 104 MMOL/L — SIGNIFICANT CHANGE UP (ref 96–108)
CO2 SERPL-SCNC: 22 MMOL/L — SIGNIFICANT CHANGE UP (ref 22–31)
CO2 SERPL-SCNC: 22 MMOL/L — SIGNIFICANT CHANGE UP (ref 22–31)
CREAT SERPL-MCNC: 1.56 MG/DL — HIGH (ref 0.5–1.3)
CREAT SERPL-MCNC: 1.76 MG/DL — HIGH (ref 0.5–1.3)
EGFR: 29 ML/MIN/1.73M2 — LOW
EGFR: 29 ML/MIN/1.73M2 — LOW
EGFR: 34 ML/MIN/1.73M2 — LOW
EGFR: 34 ML/MIN/1.73M2 — LOW
GLUCOSE SERPL-MCNC: 246 MG/DL — HIGH (ref 70–99)
GLUCOSE SERPL-MCNC: 310 MG/DL — HIGH (ref 70–99)
HCT VFR BLD CALC: 27.6 % — LOW (ref 34.5–45)
HGB BLD-MCNC: 9 G/DL — LOW (ref 11.5–15.5)
MAGNESIUM SERPL-MCNC: 2 MG/DL — SIGNIFICANT CHANGE UP (ref 1.6–2.6)
MCHC RBC-ENTMCNC: 30.4 PG — SIGNIFICANT CHANGE UP (ref 27–34)
MCHC RBC-ENTMCNC: 32.6 G/DL — SIGNIFICANT CHANGE UP (ref 32–36)
MCV RBC AUTO: 93.2 FL — SIGNIFICANT CHANGE UP (ref 80–100)
NRBC BLD AUTO-RTO: 0 /100 WBCS — SIGNIFICANT CHANGE UP (ref 0–0)
PHOSPHATE SERPL-MCNC: 4.6 MG/DL — HIGH (ref 2.5–4.5)
PLATELET # BLD AUTO: 210 K/UL — SIGNIFICANT CHANGE UP (ref 150–400)
POTASSIUM SERPL-MCNC: 4.6 MMOL/L — SIGNIFICANT CHANGE UP (ref 3.5–5.3)
POTASSIUM SERPL-MCNC: 4.7 MMOL/L — SIGNIFICANT CHANGE UP (ref 3.5–5.3)
POTASSIUM SERPL-SCNC: 4.6 MMOL/L — SIGNIFICANT CHANGE UP (ref 3.5–5.3)
POTASSIUM SERPL-SCNC: 4.7 MMOL/L — SIGNIFICANT CHANGE UP (ref 3.5–5.3)
RBC # BLD: 2.96 M/UL — LOW (ref 3.8–5.2)
RBC # FLD: 14.5 % — SIGNIFICANT CHANGE UP (ref 10.3–14.5)
SODIUM SERPL-SCNC: 137 MMOL/L — SIGNIFICANT CHANGE UP (ref 135–145)
SODIUM SERPL-SCNC: 137 MMOL/L — SIGNIFICANT CHANGE UP (ref 135–145)
WBC # BLD: 4.5 K/UL — SIGNIFICANT CHANGE UP (ref 3.8–10.5)
WBC # FLD AUTO: 4.5 K/UL — SIGNIFICANT CHANGE UP (ref 3.8–10.5)

## 2025-03-12 PROCEDURE — 99233 SBSQ HOSP IP/OBS HIGH 50: CPT | Mod: GC

## 2025-03-12 RX ADMIN — Medication 650 MILLIGRAM(S): at 06:22

## 2025-03-12 RX ADMIN — Medication 1000 MILLILITER(S): at 10:06

## 2025-03-12 RX ADMIN — INSULIN LISPRO 4: 100 INJECTION, SOLUTION INTRAVENOUS; SUBCUTANEOUS at 13:26

## 2025-03-12 RX ADMIN — DABIGATRAN ETEXILATE MESYLATE 150 MILLIGRAM(S): 30 PELLET ORAL at 06:22

## 2025-03-12 RX ADMIN — AMLODIPINE BESYLATE 5 MILLIGRAM(S): 10 TABLET ORAL at 06:22

## 2025-03-12 RX ADMIN — INSULIN LISPRO 3: 100 INJECTION, SOLUTION INTRAVENOUS; SUBCUTANEOUS at 18:20

## 2025-03-12 RX ADMIN — ATORVASTATIN CALCIUM 40 MILLIGRAM(S): 80 TABLET, FILM COATED ORAL at 22:15

## 2025-03-12 RX ADMIN — Medication 100 MILLILITER(S): at 18:19

## 2025-03-12 RX ADMIN — DABIGATRAN ETEXILATE MESYLATE 150 MILLIGRAM(S): 30 PELLET ORAL at 18:19

## 2025-03-12 RX ADMIN — INSULIN LISPRO 4: 100 INJECTION, SOLUTION INTRAVENOUS; SUBCUTANEOUS at 22:19

## 2025-03-12 RX ADMIN — MIRTAZAPINE 30 MILLIGRAM(S): 30 TABLET, FILM COATED ORAL at 22:15

## 2025-03-12 RX ADMIN — Medication 100 MILLIGRAM(S): at 22:16

## 2025-03-12 RX ADMIN — Medication 650 MILLIGRAM(S): at 18:20

## 2025-03-12 RX ADMIN — Medication 650 MILLIGRAM(S): at 18:19

## 2025-03-12 RX ADMIN — METOPROLOL SUCCINATE 50 MILLIGRAM(S): 50 TABLET, EXTENDED RELEASE ORAL at 09:41

## 2025-03-12 RX ADMIN — INSULIN LISPRO 3: 100 INJECTION, SOLUTION INTRAVENOUS; SUBCUTANEOUS at 09:29

## 2025-03-12 NOTE — PROGRESS NOTE ADULT - PROBLEM SELECTOR PLAN 3
resolved, was likely prerenal. intrinsic FENa likely 2/2 fluids.   On admission SCr 1.58 (Baseline SCr 1.16 1/6/25) , Was on LR @ 250ml/h in ED. Repeat K 5.8. Pt reports poor PO intake at home for past few days, BUN/Cr ration 11. Per pt and RN pt with good UOP. Unclear if Pre renal vs intra renal BEBE. Will give NS @ 100ml/h x10 hours and follow up  urine studies and repeat BMP to help determine prerenal vs intrarenal.     Plan  - bolus NS 1L for uptrended Cr  - f/u repeat BMP   - trend renal function   - Renally dose meds, avoid nephrotoxins

## 2025-03-12 NOTE — DISCHARGE NOTE PROVIDER - NSDCMRMEDTOKEN_GEN_ALL_CORE_FT
acetaminophen 325 mg oral tablet: 2 tab(s) orally every 4 hours, As needed, Mild Pain (1 - 3)  amLODIPine 5 mg oral tablet: 1 tab(s) orally once a day  atorvastatin 40 mg oral tablet: 1 tab(s) orally once a day (at bedtime)  lisinopril 40 mg oral tablet: 1 tab(s) orally once a day  metFORMIN 1000 mg oral tablet: 1 tab(s) orally 2 times a day  mirtazapine 30 mg oral tablet: 1 tab(s) orally once a day (at bedtime)  Pradaxa 150 mg oral granule: 170 milligram(s) orally every 12 hours  Symbicort 160 mcg-4.5 mcg/inh inhalation aerosol: 2 puff(s) inhaled 2 times a day  Toprol-XL 50 mg oral tablet, extended release: orally 2 times a day  traZODone 100 mg oral tablet: orally once a day (at bedtime)   acetaminophen 325 mg oral tablet: 2 tab(s) orally every 4 hours, As needed, Mild Pain (1 - 3)  amLODIPine 5 mg oral tablet: 1 tab(s) orally every 24 hours  atorvastatin 40 mg oral tablet: 1 tab(s) orally once a day (at bedtime)  dabigatran 150 mg oral capsule: 1 cap(s) orally every 12 hours  melatonin 3 mg oral tablet: 1 tab(s) orally once a day (at bedtime) As needed Insomnia  metFORMIN 1000 mg oral tablet: 1 tab(s) orally 2 times a day  metoprolol succinate 50 mg oral tablet, extended release: 1 tab(s) orally every 12 hours  mirtazapine 30 mg oral tablet: 1 tab(s) orally once a day (at bedtime)  polyethylene glycol 3350 oral powder for reconstitution: 17 gram(s) orally once a day  senna leaf extract oral tablet: 2 tab(s) orally once a day (at bedtime)  Symbicort 160 mcg-4.5 mcg/inh inhalation aerosol: 2 puff(s) inhaled 2 times a day  traZODone 100 mg oral tablet: 1 tab(s) orally once a day (at bedtime)

## 2025-03-12 NOTE — PROGRESS NOTE ADULT - PROBLEM SELECTOR PLAN 2
resolved  On admission K 4.1, with repeat labs 40 min later showing 5.8, CK wnl,  received LR IVF at 250ml/hr in ED.  EKG without peaked T waves (TWI V4-5, rest similar to prior 2023), Pt asymptomatic. Will obtain early AM labs to repeat K and eval renal response to IVF as below.     - Trend Renal function and UOP

## 2025-03-12 NOTE — DISCHARGE NOTE PROVIDER - ATTENDING DISCHARGE PHYSICAL EXAMINATION:
Gen: NAD in bed at time of exam  HEENT: NCAT, MMM, clear OP  Neck: supple, trachea at midline  CV: RRR, +S1/S2  Pulm: adequate respiratory effort, no increased work of breathing  Abd: soft, NTND  Skin: warm and dry, no new rashes vs prior report  Ext: WWP  Neuro: AOx3, no gross focal neurological deficits  Psych: affect and behavior appropriate Constitutional: resting comfortably in bed; NAD  Respiratory: CTA B/L; no W/R/R, no retractions  Cardiac: +S1/S2; RRR; no M/R/G  Gastrointestinal: abdomen soft, NT/ND; no rebound or guarding; +BSx4  Extremities: WWP, no clubbing or cyanosis; no peripheral edema  Musculoskeletal: NROM x4; no joint swelling, tenderness or erythema  Neurologic: AAOx3; CNII-XII grossly intact; no focal deficits  Psychiatric: affect and characteristics of appearance, verbalizations, behaviors are appropriate

## 2025-03-12 NOTE — DISCHARGE NOTE PROVIDER - HOSPITAL COURSE
#Discharge: do not delete    79F with PMHx of DM2, Asthma, COPD, Afib s/p ablation, Subdural hematoma, Anemia, presenting from home after a mechanical fall from bed found to have BEBE admitted for further workup.     Hospital Course  Presented to ED 3/10 and admitted to medicine for mechanical fall, likely prerenal BEBE, hyperkalemia w/o EKG changes. Home meds restarted. L humerus, b/l knee xray normal. Hyperkalemia rapidly resolved, and BEBE improved w/ fluids, but on day 3 worsened, likely 2/2 poor PO intake, started on fluids.     Plan  - hold lisinopril on discharge given BEBE, hyperkalemia    Hospital course (by problem):     ·  Problem: Fall at home.   ·  Plan: Pt presenting from home after a fall from bed, At baseline pt ambulates with walker, has part time HHA, Family reports pt trying to get into bed when she fell off, no head strike or LOC. Pt reports b/l knee pain and L shoulder pain since event as well as chronic knee pain which she take Tylenol 650mg BID with good relief. Xray obtained in ED. On exam b/l knee with 4/5 strength and limited ROM, no effusions. Left shoulder with 5/5 strength ROM intact.  - [facility] x-rays unremarkable  - JESSENIA per PT  - c/w Tylenol 650mg BID   - Fall precautions.    ·  Problem: Hyperkalemia.   ·  Plan: resolved  On admission K 4.1, with repeat labs 40 min later showing 5.8, CK wnl,  received LR IVF at 250ml/hr in ED.  EKG without peaked T waves (TWI V4-5, rest similar to prior 2023), Pt asymptomatic. Will obtain early AM labs to repeat K and eval renal response to IVF as below.   - Trend Renal function and UOP.    ·  Problem: BEBE (acute kidney injury).   ·  Plan: resolved, was likely prerenal. intrinsic FENa likely 2/2 fluids.   On admission SCr 1.58 (Baseline SCr 1.16 1/6/25) , Was on LR @ 250ml/h in ED. Repeat K 5.8. Pt reports poor PO intake at home for past few days, BUN/Cr ration 11. Per pt and RN pt with good UOP. Unclear if Pre renal vs intra renal BEBE. Will give NS @ 100ml/h x10 hours and follow up  urine studies and repeat BMP to help determine prerenal vs intrarenal.   Plan  - bolus NS 1L for uptrended Cr  - f/u repeat BMP   - trend renal function   - Renally dose meds, avoid nephrotoxins.    ·  Problem: DM (diabetes mellitus).   ·  Plan: Pt with DM, A1c 7 on 1/6/25, was on premeal 8u TID, but discontinued due to pts inability to titrate. Pt currently on  Metformin 1000mg BID, A1c 7.7.   - c/w low ISS with FSG  - CC.    ·  Problem: Afib.   ·  Plan: Pt with Afib s/p ablation on Pradaxa 150mg BID, Metoprolol Succinate 50mg BID  - c/w Pradaxa 150mg BID  - c/w Metoprolol Succinate 50mg BID.    ·  Problem: HTN (hypertension).   ·  Plan: Pt with HTN on home Amlodipine 5mg qd, Lisinopril 40mg qd  c/w Amlodipine 5mg qd, Lisinopril 40mg qd.    ·  Problem: Asthma.   ·  Plan: Pt on home Symbicort 160-4.5 2 puffs BID PRN. Per family pt dose not use often.   - c/w Symbicort 160-4.5 2 puffs BID PRN.    ·  Problem: Anemia.   ·  Plan: Pt with anemia previously on Ferous Sulfate 325mg M/W/F. Per family pt no longer taking. On admission Hgb 9.5 (baseline 9-10). On exam no signs of active bleeding. Will continue to monitor.   - Active T&S 2 Large IVs Transfuse for Hgb <7.    ·  Problem: HLD (hyperlipidemia).   ·  Plan: Pt on Atorvastatin 40mg qhs   - c/w Atorvastatin 40mg qhs.    ·  Problem: Depression, major.   ·  Plan; Pt on Mirtazapine 30mg qd, Trazadone 100mg qhs, and Abilify monthly injection   - c/w Mirtazapine 30mg qd   - c/w Trazadone 100mg qhs  - Outpt Abilify monthly injection (per family recently had injection).    Patient was discharged to: (home/JESSENIA/acute rehab/hospice, etc, and with what services – home health PT/RN? Home O2?)    New medications: none  Changes to old medications: none  Medications that were stopped: lisinopril     Items to follow up as outpatient: outpatient PCP    Physical exam at the time of discharge:    Constitutional: resting comfortably in bed; NAD  Head: NC/AT  Eyes: PERRL, EOMI, anicteric sclera  ENT: no nasal discharge; MMM  Neck: supple; no JVD or thyromegaly  Respiratory: CTA B/L; no W/R/R, no retractions  Cardiac: +S1/S2; RRR; no M/R/G  Gastrointestinal: abdomen soft, NT/ND; no rebound or guarding; +BSx4  Back: spine midline, no bony tenderness or step-offs; no CVAT B/L  Extremities: WWP, no clubbing or cyanosis; no peripheral edema  Musculoskeletal: NROM x4; no joint swelling, tenderness or erythema  Vascular: 2+ radial, DP/PT pulses B/L  Dermatologic: skin warm, dry and intact; no rashes, wounds, or scars  Lymphatic: no submandibular or cervical LAD  Neurologic: AAOx3; CNII-XII grossly intact; no focal deficits  Psychiatric: affect and characteristics of appearance, verbalizations, behaviors are appropriate #Discharge: do not delete  79F with PMHx of DM2, Asthma, COPD, Afib s/p ablation, Subdural hematoma, Anemia, presenting from home after a mechanical fall from bed found to have BEBE admitted for further workup.     Hospital course (by problem):     #Fall at home.   Pt presenting from home after a fall from bed, At baseline pt ambulates with walker, has part time HHA, Family reports pt trying to get into bed when she fell off, no head strike or LOC. Pt reports b/l knee pain and L shoulder pain since event as well as chronic knee pain which she take Tylenol 650mg BID with good relief. Xray obtained in ED. On exam b/l knee with 4/5 strength and limited ROM, no effusions. Left shoulder with 5/5 strength ROM intact.  [facility] x-rays unremarkable  - JESSENIA per PT  - c/w Tylenol 650mg BID PRN    # BEBE (acute kidney injury). - resolved  #Hyperkalemia. - resolved  On admission K 4.1, with repeat labs 40 min later showing 5.8, CK wnl, received LR IVF at 250ml/hr in ED.  EKG without peaked T waves (TWI V4-5, rest similar to prior 2023). Pt asymptomatic. Hyperkalemia resolved with fluids and lokelma.  Cr 1/16 on 1/6/25, p/w Cr 1.58. Suspect pre-renal iso poor po intake. BEBE improved with IV fluids.  - CTM Cr and electrolytes  - encourage po intake    #HTN (hypertension).   - HOLDING home Lisinopril 40mg qd iso hyperkalemia and BEBE  - c/w home Amlodipine 5mg qd    #DM (diabetes mellitus).   A1c 7.7, was on premeal 8u TID, but discontinued due to pts inability to titrate. Pt currently on  Metformin 1000mg BID, A1c 7.7.   - CC diet  - c/w home metformin  - CTM blood glucose, can consider insulin sliding scale    #Afib.   s/p ablation, on Pradaxa 150mg BID, Metoprolol Succinate 50mg BID  - c/w Pradaxa 150mg BID  - c/w Metoprolol Succinate 50mg BID.    #Anemia.   Pt with anemia previously on Ferous Sulfate 325mg M/W/F. Per family pt no longer taking. On admission Hgb 9.5 (baseline 9-10). On exam no signs of active bleeding. Will continue to monitor.     #HLD (hyperlipidemia). - c/w home Atorvastatin 40mg qhs   #Depression, major. - c/w home Mirtazapine 30mg qd, Trazadone 100mg qhs, and Abilify monthly injection   #Asthma. - c/w home Symbicort 160-4.5 2 puffs BID PRN    Patient was discharged to: [facility]    New medications: none  Changes to old medications: none  Medications that were stopped: lisinopril     Items to follow up as outpatient: hyperkalemia, HTN     Physical exam at the time of discharge:  Constitutional: resting comfortably in bed; NAD  Head: NC/AT  Eyes: PERRL, EOMI, anicteric sclera  ENT: no nasal discharge; MMM  Neck: supple; no JVD or thyromegaly  Respiratory: CTA B/L; no W/R/R, no retractions  Cardiac: +S1/S2; RRR; no M/R/G  Gastrointestinal: abdomen soft, NT/ND; no rebound or guarding; +BSx4  Back: spine midline, no bony tenderness or step-offs; no CVAT B/L  Extremities: WWP, no clubbing or cyanosis; no peripheral edema  Musculoskeletal: NROM x4; no joint swelling, tenderness or erythema  Vascular: 2+ radial, DP/PT pulses B/L  Dermatologic: skin warm, dry and intact; no rashes, wounds, or scars  Lymphatic: no submandibular or cervical LAD  Neurologic: AAOx3; CNII-XII grossly intact; no focal deficits  Psychiatric: affect and characteristics of appearance, verbalizations, behaviors are appropriate #Discharge: do not delete  79F with PMHx of DM2, Asthma, COPD, Afib s/p ablation, Subdural hematoma, Anemia, presenting from home after a mechanical fall from bed found to have BEBE admitted for further workup.     Hospital course (by problem):     #Fall at home.   Pt presenting from home after a fall from bed, At baseline pt ambulates with walker, has part time HHA, Family reports pt trying to get into bed when she fell off, no head strike or LOC. Pt reports b/l knee pain and L shoulder pain since event as well as chronic knee pain which she take Tylenol 650mg BID with good relief. Xray obtained in ED. On exam b/l knee with 4/5 strength and limited ROM, no effusions. Left shoulder with 5/5 strength ROM intact.  [facility] x-rays unremarkable  - JESSENIA per PT  - c/w Tylenol 650mg BID PRN    # BEBE (acute kidney injury). - resolving  #Hyperkalemia. - resolved  On admission K 4.1, with repeat labs 40 min later showing 5.8, CK wnl, received LR IVF at 250ml/hr in ED.  EKG without peaked T waves (TWI V4-5, rest similar to prior 2023). Pt asymptomatic. Hyperkalemia resolved with fluids and lokelma.  Cr 1/16 on 1/6/25, p/w Cr 1.58. Suspect pre-renal iso poor po intake. BEBE improved with IV fluids.  - CTM Cr and electrolytes  - encourage po intake    #HTN (hypertension).   - HOLDING home Lisinopril 40mg qd iso hyperkalemia and BEBE  - c/w home Amlodipine 5mg qd    #DM (diabetes mellitus).   A1c 7.7, was on premeal 8u TID, but discontinued due to pts inability to titrate. Pt currently on  Metformin 1000mg BID, A1c 7.7.   - CC diet  - c/w home metformin  - CTM blood glucose, can consider insulin sliding scale    #Afib.   s/p ablation, on Pradaxa 150mg BID, Metoprolol Succinate 50mg BID  - c/w Pradaxa 150mg BID  - c/w Metoprolol Succinate 50mg BID.    #Anemia.   Pt with anemia previously on Ferous Sulfate 325mg M/W/F. Per family pt no longer taking. On admission Hgb 9.5 (baseline 9-10). On exam no signs of active bleeding. Will continue to monitor.     #HLD (hyperlipidemia). - c/w home Atorvastatin 40mg qhs   #Depression, major. - c/w home Mirtazapine 30mg qd, Trazadone 100mg qhs, and Abilify monthly injection   #Asthma. - c/w home Symbicort 160-4.5 2 puffs BID PRN    Patient was discharged to: [facility]    New medications: none  Changes to old medications: none  Medications that were stopped: lisinopril     Items to follow up as outpatient: hyperkalemia, HTN     Physical exam at the time of discharge:  Constitutional: resting comfortably in bed; NAD  Respiratory: CTA B/L; no W/R/R, no retractions  Cardiac: +S1/S2; RRR; no M/R/G  Gastrointestinal: abdomen soft, NT/ND; no rebound or guarding; +BSx4  Extremities: WWP, no clubbing or cyanosis; no peripheral edema  Musculoskeletal: NROM x4; no joint swelling, tenderness or erythema  Neurologic: AAOx3; CNII-XII grossly intact; no focal deficits  Psychiatric: affect and characteristics of appearance, verbalizations, behaviors are appropriate

## 2025-03-12 NOTE — PROGRESS NOTE ADULT - PROBLEM SELECTOR PLAN 1
Pt presenting from home after a fall from bed, At baseline pt ambulates with walker, has part time HHA, Family reports pt trying to get into bed when she fell off, no head strike or LOC. Pt reports b/l knee pain and L shoulder pain since event as well as chronic knee pain which she take Tylenol 650mg BID with good relief. Xray obtained in ED. On exam b/l knee with 4/5 strength and limited ROM, no effusions. Left shoulder with 5/5 strength ROM intact.  - [facility] x-rays unremarkable  - JESSENIA per PT    - c/w Tylenol 650mg BID   - Fall precautions

## 2025-03-12 NOTE — PROGRESS NOTE ADULT - ATTENDING COMMENTS
Seen and examined bedside, no acute complaints, pain well controlled.  Ptn denies CP, palpations, SOB  On exam, CV S1S2 no murmurs, abd no tenderness, LE no edema    -potassium wnl this AM  -Cr bumped again to 1.56, likely pre-renal iso ptn hypovolemic on exam, will given more IVF  -cw home dabigatran, metop, hold home lisinopril given BEBE and hyperkalemia on presentation, continue to hold on discharge, ptn to f/up BP management w PCP outpatient  -PT rec JESSENIA . Seen and examined bedside, no acute complaints, pain well controlled.  Ptn denies CP, palpations, SOB  On exam, CV S1S2 no murmurs, abd no tenderness, LE no edema    -potassium wnl this AM  -Cr bumped again to 1.56, likely pre-renal iso ptn hypovolemic on exam, will given more IVF  -cw home dabigatran, metop, hold home lisinopril given BEBE and hyperkalemia on presentation, continue to hold on discharge, ptn to f/up BP management w PCP outpatient  -PT rec JESSENIA .    ***attending addendum: ptn w chronic atrial fibrillation***

## 2025-03-12 NOTE — DISCHARGE NOTE PROVIDER - NSDCFUSCHEDAPPT_GEN_ALL_CORE_FT
[facility]  [address] [address]  Scheduled Appointment: 03/21/2025

## 2025-03-12 NOTE — DISCHARGE NOTE PROVIDER - NSDCCPCAREPLAN_GEN_ALL_CORE_FT
PRINCIPAL DISCHARGE DIAGNOSIS  Diagnosis: Fall at home  Assessment and Plan of Treatment: You were admitted to the hospital for a fall. Your xray imaging was normal, meaning you had no fractures. Your kidney function when you first arrived here was poor, but improved after fluids. Please be sure to follow up with your primary care doctor for further care.     PRINCIPAL DISCHARGE DIAGNOSIS  Diagnosis: Fall at home  Assessment and Plan of Treatment: You were admitted to the hospital for a fall. Your xray imaging was normal, meaning you had no fractures. Your kidney function when you first arrived here was poor, but improved after fluids. Please be sure to follow up with your primary care doctor for further care.      SECONDARY DISCHARGE DIAGNOSES  Diagnosis: BEBE (acute kidney injury)  Assessment and Plan of Treatment:

## 2025-03-12 NOTE — PROGRESS NOTE ADULT - PROBLEM SELECTOR PLAN 4
Pt with DM, A1c 7 on 1/6/25, was on premeal 8u TID, but discontinued due to pts inability to titrate. Pt currently on  Metformin 1000mg BID, A1c 7.7.     - c/w low ISS with FSG  - CC

## 2025-03-12 NOTE — DISCHARGE NOTE PROVIDER - CARE PROVIDER_API CALL
Chuyita Cueto  Internal Medicine  [address] [place] [id number]  Phone: [phone]  Fax: [phone]  Follow Up Time: 1 week

## 2025-03-12 NOTE — PROGRESS NOTE ADULT - SUBJECTIVE AND OBJECTIVE BOX
Patient is a 79y old  Female who presents with a chief complaint of Fall (12 Mar 2025 08:16)    OVERNIGHT EVENTS: srikanth    SUBJECTIVE: seen bedside. no acute complaints. able to tolerate foods liquids better.     ROS: otherwise negative      T(C): 36.6 (03-12-25 @ 09:05), Max: 36.7 (03-12-25 @ 05:49)  HR: 60 (03-12-25 @ 09:05) (53 - 63)  BP: 153/88 (03-12-25 @ 09:05) (122/73 - 153/88)  RR: 16 (03-12-25 @ 09:05) (16 - 18)  SpO2: 100% (03-12-25 @ 09:05) (97% - 100%)  Wt(kg): --Vital Signs Last 24 Hrs  T(C): 36.6 (12 Mar 2025 09:05), Max: 36.7 (12 Mar 2025 05:49)  T(F): 97.8 (12 Mar 2025 09:05), Max: 98.1 (12 Mar 2025 05:49)  HR: 60 (12 Mar 2025 09:05) (53 - 63)  BP: 153/88 (12 Mar 2025 09:05) (122/73 - 153/88)  BP(mean): 90 (11 Mar 2025 21:32) (90 - 90)  RR: 16 (12 Mar 2025 09:05) (16 - 18)  SpO2: 100% (12 Mar 2025 09:05) (97% - 100%)    Parameters below as of 11 Mar 2025 21:32  Patient On (Oxygen Delivery Method): room air        PHYSICAL EXAM:  Constitutional: resting comfortably in bed; NAD  Head: NC/AT  Neck: supple; no JVD or thyromegaly  Respiratory: CTA B/L; no W/R/R, no retractions  Cardiac: +S1/S2; RRR; no M/R/G  Gastrointestinal: soft, NT/ND; no rebound or guarding; +BSx4  Extremities: WWP, no clubbing or cyanosis; no peripheral edema. Capillary refill <2 sec  Musculoskeletal: NROM x4; no joint swelling, tenderness or erythema  Neurologic: AAOx3; CNII-XII grossly intact; no focal deficits  Psychiatric: affect and characteristics of appearance, verbalizations, behaviors are appropriate    LABS:                        9.0    4.50  )-----------( 210      ( 12 Mar 2025 05:30 )             27.6     03-12    137  |  104  |  18  ----------------------------<  246[H]  4.6   |  22  |  1.56[H]    Ca    8.8      12 Mar 2025 05:30  Phos  4.6     03-12  Mg     2.0     03-12          Urinalysis Basic - ( 12 Mar 2025 05:30 )    Color: x / Appearance: x / SG: x / pH: x  Gluc: 246 mg/dL / Ketone: x  / Bili: x / Urobili: x   Blood: x / Protein: x / Nitrite: x   Leuk Esterase: x / RBC: x / WBC x   Sq Epi: x / Non Sq Epi: x / Bacteria: x      CAPILLARY BLOOD GLUCOSE      POCT Blood Glucose.: 285 mg/dL (12 Mar 2025 09:27)  POCT Blood Glucose.: 247 mg/dL (11 Mar 2025 22:09)  POCT Blood Glucose.: 307 mg/dL (11 Mar 2025 18:06)  POCT Blood Glucose.: 352 mg/dL (11 Mar 2025 12:58)  POCT Blood Glucose.: 308 mg/dL (11 Mar 2025 12:39)        Urinalysis Basic - ( 12 Mar 2025 05:30 )    Color: x / Appearance: x / SG: x / pH: x  Gluc: 246 mg/dL / Ketone: x  / Bili: x / Urobili: x   Blood: x / Protein: x / Nitrite: x   Leuk Esterase: x / RBC: x / WBC x   Sq Epi: x / Non Sq Epi: x / Bacteria: x        MEDICATIONS  (STANDING):  acetaminophen     Tablet .. 650 milliGRAM(s) Oral every 12 hours  amLODIPine   Tablet 5 milliGRAM(s) Oral every 24 hours  atorvastatin 40 milliGRAM(s) Oral at bedtime  dabigatran 150 milliGRAM(s) Oral every 12 hours  dextrose 5%. 1000 milliLiter(s) (100 mL/Hr) IV Continuous <Continuous>  dextrose 5%. 1000 milliLiter(s) (50 mL/Hr) IV Continuous <Continuous>  dextrose 50% Injectable 25 Gram(s) IV Push once  dextrose 50% Injectable 12.5 Gram(s) IV Push once  dextrose 50% Injectable 25 Gram(s) IV Push once  glucagon  Injectable 1 milliGRAM(s) IntraMuscular once  insulin lispro (ADMELOG) corrective regimen sliding scale   SubCutaneous Before meals and at bedtime  metoprolol succinate ER 50 milliGRAM(s) Oral every 12 hours  mirtazapine 30 milliGRAM(s) Oral at bedtime  traZODone 100 milliGRAM(s) Oral at bedtime    MEDICATIONS  (PRN):  aluminum hydroxide/magnesium hydroxide/simethicone Suspension 30 milliLiter(s) Oral every 4 hours PRN Dyspepsia  dextrose Oral Gel 15 Gram(s) Oral once PRN Blood Glucose LESS THAN 70 milliGRAM(s)/deciliter  fluticasone propionate/ salmeterol 250-50 MICROgram(s) Diskus 1 Dose(s) Inhalation two times a day PRN Wheezing of SOB  melatonin 3 milliGRAM(s) Oral at bedtime PRN Insomnia  ondansetron Injectable 4 milliGRAM(s) IV Push every 8 hours PRN Nausea and/or Vomiting      RADIOLOGY & ADDITIONAL TESTS: Reviewed

## 2025-03-12 NOTE — DISCHARGE NOTE PROVIDER - CARE PROVIDERS DIRECT ADDRESSES
[email or web address] of [email or web address]

## 2025-03-13 LAB
ANION GAP SERPL CALC-SCNC: 13 MMOL/L — SIGNIFICANT CHANGE UP (ref 5–17)
BUN SERPL-MCNC: 22 MG/DL — SIGNIFICANT CHANGE UP (ref 7–23)
CALCIUM SERPL-MCNC: 8.5 MG/DL — SIGNIFICANT CHANGE UP (ref 8.4–10.5)
CHLORIDE SERPL-SCNC: 103 MMOL/L — SIGNIFICANT CHANGE UP (ref 96–108)
CO2 SERPL-SCNC: 22 MMOL/L — SIGNIFICANT CHANGE UP (ref 22–31)
CREAT ?TM UR-MCNC: 47 MG/DL — SIGNIFICANT CHANGE UP
CREAT SERPL-MCNC: 1.46 MG/DL — HIGH (ref 0.5–1.3)
EGFR: 36 ML/MIN/1.73M2 — LOW
EGFR: 36 ML/MIN/1.73M2 — LOW
GLUCOSE SERPL-MCNC: 295 MG/DL — HIGH (ref 70–99)
OSMOLALITY UR: 518 MOSM/KG — SIGNIFICANT CHANGE UP (ref 300–900)
POTASSIUM SERPL-MCNC: 4.6 MMOL/L — SIGNIFICANT CHANGE UP (ref 3.5–5.3)
POTASSIUM SERPL-SCNC: 4.6 MMOL/L — SIGNIFICANT CHANGE UP (ref 3.5–5.3)
PROT ?TM UR-MCNC: 21 MG/DL — HIGH (ref 0–12)
PROT/CREAT UR-RTO: 0.4 RATIO — HIGH (ref 0–0.2)
SODIUM SERPL-SCNC: 138 MMOL/L — SIGNIFICANT CHANGE UP (ref 135–145)
SODIUM UR-SCNC: 120 MMOL/L — SIGNIFICANT CHANGE UP
UUN UR-MCNC: 400 MG/DL — SIGNIFICANT CHANGE UP

## 2025-03-13 PROCEDURE — 99232 SBSQ HOSP IP/OBS MODERATE 35: CPT | Mod: GC

## 2025-03-13 PROCEDURE — 76775 US EXAM ABDO BACK WALL LIM: CPT | Mod: 26

## 2025-03-13 PROCEDURE — 99239 HOSP IP/OBS DSCHRG MGMT >30: CPT | Mod: GC

## 2025-03-13 RX ORDER — MIRTAZAPINE 30 MG/1
1 TABLET, FILM COATED ORAL
Qty: 0 | Refills: 0 | DISCHARGE
Start: 2025-03-13

## 2025-03-13 RX ORDER — TRAZODONE HCL 100 MG
1 TABLET ORAL
Qty: 0 | Refills: 0 | DISCHARGE
Start: 2025-03-13

## 2025-03-13 RX ORDER — POLYETHYLENE GLYCOL 3350 17 G/17G
17 POWDER, FOR SOLUTION ORAL DAILY
Refills: 0 | Status: DISCONTINUED | OUTPATIENT
Start: 2025-03-13 | End: 2025-03-14

## 2025-03-13 RX ORDER — METOPROLOL SUCCINATE 50 MG/1
1 TABLET, EXTENDED RELEASE ORAL
Qty: 0 | Refills: 0 | DISCHARGE
Start: 2025-03-13

## 2025-03-13 RX ORDER — DABIGATRAN ETEXILATE MESYLATE 30 MG/1
1 PELLET ORAL
Qty: 0 | Refills: 0 | DISCHARGE
Start: 2025-03-13

## 2025-03-13 RX ORDER — MIRTAZAPINE 30 MG/1
1 TABLET, FILM COATED ORAL
Refills: 0 | DISCHARGE

## 2025-03-13 RX ORDER — INSULIN LISPRO 100 U/ML
3 INJECTION, SOLUTION INTRAVENOUS; SUBCUTANEOUS ONCE
Refills: 0 | Status: COMPLETED | OUTPATIENT
Start: 2025-03-13 | End: 2025-03-13

## 2025-03-13 RX ORDER — LISINOPRIL 5 MG/1
1 TABLET ORAL
Refills: 0 | DISCHARGE

## 2025-03-13 RX ORDER — AMLODIPINE BESYLATE 10 MG/1
1 TABLET ORAL
Qty: 0 | Refills: 0 | DISCHARGE
Start: 2025-03-13

## 2025-03-13 RX ORDER — ATORVASTATIN CALCIUM 80 MG/1
1 TABLET, FILM COATED ORAL
Refills: 0 | DISCHARGE

## 2025-03-13 RX ORDER — POLYETHYLENE GLYCOL 3350 17 G/17G
17 POWDER, FOR SOLUTION ORAL
Qty: 0 | Refills: 0 | DISCHARGE
Start: 2025-03-13

## 2025-03-13 RX ORDER — ATORVASTATIN CALCIUM 80 MG/1
1 TABLET, FILM COATED ORAL
Qty: 0 | Refills: 0 | DISCHARGE
Start: 2025-03-13

## 2025-03-13 RX ORDER — SENNA 187 MG
2 TABLET ORAL
Qty: 0 | Refills: 0 | DISCHARGE
Start: 2025-03-13

## 2025-03-13 RX ORDER — MELATONIN 5 MG
1 TABLET ORAL
Qty: 0 | Refills: 0 | DISCHARGE
Start: 2025-03-13

## 2025-03-13 RX ORDER — SENNA 187 MG
2 TABLET ORAL AT BEDTIME
Refills: 0 | Status: DISCONTINUED | OUTPATIENT
Start: 2025-03-13 | End: 2025-03-14

## 2025-03-13 RX ORDER — SODIUM CHLORIDE 9 G/1000ML
500 INJECTION, SOLUTION INTRAVENOUS
Refills: 0 | Status: DISCONTINUED | OUTPATIENT
Start: 2025-03-13 | End: 2025-03-14

## 2025-03-13 RX ORDER — DABIGATRAN ETEXILATE MESYLATE 30 MG/1
150 PELLET ORAL
Refills: 0 | DISCHARGE

## 2025-03-13 RX ORDER — INSULIN LISPRO 100 U/ML
3 INJECTION, SOLUTION INTRAVENOUS; SUBCUTANEOUS
Refills: 0 | Status: DISCONTINUED | OUTPATIENT
Start: 2025-03-13 | End: 2025-03-14

## 2025-03-13 RX ADMIN — METOPROLOL SUCCINATE 50 MILLIGRAM(S): 50 TABLET, EXTENDED RELEASE ORAL at 07:59

## 2025-03-13 RX ADMIN — METOPROLOL SUCCINATE 50 MILLIGRAM(S): 50 TABLET, EXTENDED RELEASE ORAL at 18:42

## 2025-03-13 RX ADMIN — MIRTAZAPINE 30 MILLIGRAM(S): 30 TABLET, FILM COATED ORAL at 21:28

## 2025-03-13 RX ADMIN — INSULIN LISPRO 3 UNIT(S): 100 INJECTION, SOLUTION INTRAVENOUS; SUBCUTANEOUS at 14:23

## 2025-03-13 RX ADMIN — Medication 650 MILLIGRAM(S): at 06:30

## 2025-03-13 RX ADMIN — INSULIN LISPRO 3 UNIT(S): 100 INJECTION, SOLUTION INTRAVENOUS; SUBCUTANEOUS at 18:40

## 2025-03-13 RX ADMIN — INSULIN LISPRO 3: 100 INJECTION, SOLUTION INTRAVENOUS; SUBCUTANEOUS at 14:22

## 2025-03-13 RX ADMIN — ATORVASTATIN CALCIUM 40 MILLIGRAM(S): 80 TABLET, FILM COATED ORAL at 21:28

## 2025-03-13 RX ADMIN — AMLODIPINE BESYLATE 5 MILLIGRAM(S): 10 TABLET ORAL at 06:25

## 2025-03-13 RX ADMIN — INSULIN LISPRO 2: 100 INJECTION, SOLUTION INTRAVENOUS; SUBCUTANEOUS at 18:39

## 2025-03-13 RX ADMIN — DABIGATRAN ETEXILATE MESYLATE 150 MILLIGRAM(S): 30 PELLET ORAL at 18:40

## 2025-03-13 RX ADMIN — DABIGATRAN ETEXILATE MESYLATE 150 MILLIGRAM(S): 30 PELLET ORAL at 06:25

## 2025-03-13 RX ADMIN — Medication 650 MILLIGRAM(S): at 18:40

## 2025-03-13 RX ADMIN — Medication 100 MILLIGRAM(S): at 21:28

## 2025-03-13 RX ADMIN — SODIUM CHLORIDE 250 MILLILITER(S): 9 INJECTION, SOLUTION INTRAVENOUS at 10:15

## 2025-03-13 RX ADMIN — INSULIN LISPRO 4: 100 INJECTION, SOLUTION INTRAVENOUS; SUBCUTANEOUS at 22:17

## 2025-03-13 RX ADMIN — INSULIN LISPRO 3 UNIT(S): 100 INJECTION, SOLUTION INTRAVENOUS; SUBCUTANEOUS at 09:49

## 2025-03-13 RX ADMIN — Medication 650 MILLIGRAM(S): at 07:30

## 2025-03-13 RX ADMIN — INSULIN LISPRO 4: 100 INJECTION, SOLUTION INTRAVENOUS; SUBCUTANEOUS at 09:49

## 2025-03-13 RX ADMIN — POLYETHYLENE GLYCOL 3350 17 GRAM(S): 17 POWDER, FOR SOLUTION ORAL at 11:36

## 2025-03-13 NOTE — PROGRESS NOTE ADULT - PROBLEM SELECTOR PLAN 2
resolved  On admission K 4.1, with repeat labs 40 min later showing 5.8, CK wnl,  received LR IVF at 250ml/hr in ED.  EKG without peaked T waves (TWI V4-5, rest similar to prior 2023), Pt asymptomatic. Will obtain early AM labs to repeat K and eval renal response to IVF as below.     - Trend Renal function and UOP Resolved.  On admission K 4.1, with repeat labs 40 min later showing 5.8, CK wnl,  received LR IVF at 250ml/hr in ED.  EKG without peaked T waves (TWI V4-5, rest similar to prior 2023), Pt asymptomatic. Will obtain early AM labs to repeat K and eval renal response to IVF as below.

## 2025-03-13 NOTE — PROGRESS NOTE ADULT - SUBJECTIVE AND OBJECTIVE BOX
Patient is a 79y old  Female who presents with a chief complaint of Fall (12 Mar 2025 08:16)    OVERNIGHT EVENTS: srikanth    SUBJECTIVE: seen bedside. no acute complaints. able to tolerate foods liquids better.     ROS: otherwise negative      PHYSICAL EXAM:  Constitutional: resting comfortably in bed; NAD  Head: NC/AT  Neck: supple; no JVD or thyromegaly  Respiratory: CTA B/L; no W/R/R, no retractions  Cardiac: +S1/S2; RRR; no M/R/G  Gastrointestinal: soft, NT/ND; no rebound or guarding; +BSx4  Extremities: WWP, no clubbing or cyanosis; no peripheral edema. Capillary refill <2 sec  Musculoskeletal: NROM x4; no joint swelling, tenderness or erythema  Neurologic: AAOx3; CNII-XII grossly intact; no focal deficits  Psychiatric: affect and characteristics of appearance, verbalizations, behaviors are appropriate    VITAL SIGNS:  T(C): 36.3 (03-13-25 @ 05:37), Max: 36.6 (03-12-25 @ 09:05)  HR: 66 (03-13-25 @ 05:37) (55 - 66)  BP: 136/76 (03-13-25 @ 05:37) (115/70 - 153/88)  RR: 18 (03-13-25 @ 05:37) (16 - 18)  SpO2: 98% (03-13-25 @ 05:37) (96% - 100%)    acetaminophen     Tablet .. 650 milliGRAM(s) Oral every 12 hours  aluminum hydroxide/magnesium hydroxide/simethicone Suspension 30 milliLiter(s) Oral every 4 hours PRN  amLODIPine   Tablet 5 milliGRAM(s) Oral every 24 hours  atorvastatin 40 milliGRAM(s) Oral at bedtime  dabigatran 150 milliGRAM(s) Oral every 12 hours  dextrose 5%. 1000 milliLiter(s) IV Continuous <Continuous>  dextrose 5%. 1000 milliLiter(s) IV Continuous <Continuous>  dextrose 50% Injectable 25 Gram(s) IV Push once  dextrose 50% Injectable 12.5 Gram(s) IV Push once  dextrose 50% Injectable 25 Gram(s) IV Push once  dextrose Oral Gel 15 Gram(s) Oral once PRN  fluticasone propionate/ salmeterol 250-50 MICROgram(s) Diskus 1 Dose(s) Inhalation two times a day PRN  glucagon  Injectable 1 milliGRAM(s) IntraMuscular once  insulin lispro (ADMELOG) corrective regimen sliding scale   SubCutaneous Before meals and at bedtime  melatonin 3 milliGRAM(s) Oral at bedtime PRN  metoprolol succinate ER 50 milliGRAM(s) Oral every 12 hours  mirtazapine 30 milliGRAM(s) Oral at bedtime  ondansetron Injectable 4 milliGRAM(s) IV Push every 8 hours PRN  sodium chloride 0.9%. 1000 milliLiter(s) IV Continuous <Continuous>  traZODone 100 milliGRAM(s) Oral at bedtime      Consultant(s) Notes Reviewed:  [x] YES  [ ] NO  Care Discussed with Consultants/Other Providers [x] YES  [ ] NO    LABS:                        9.0    4.50  )-----------( 210      ( 12 Mar 2025 05:30 )             27.6     03-12    137  |  102  |  20  ----------------------------<  310[H]  4.7   |  22  |  1.76[H]    Ca    8.4      12 Mar 2025 16:11  Phos  4.6     03-12  Mg     2.0     03-12        Urinalysis Basic - ( 12 Mar 2025 16:11 )    Color: x / Appearance: x / SG: x / pH: x  Gluc: 310 mg/dL / Ketone: x  / Bili: x / Urobili: x   Blood: x / Protein: x / Nitrite: x   Leuk Esterase: x / RBC: x / WBC x   Sq Epi: x / Non Sq Epi: x / Bacteria: x      CAPILLARY BLOOD GLUCOSE      POCT Blood Glucose.: 341 mg/dL (12 Mar 2025 22:04)  POCT Blood Glucose.: 274 mg/dL (12 Mar 2025 18:18)  POCT Blood Glucose.: 333 mg/dL (12 Mar 2025 13:23)  POCT Blood Glucose.: 285 mg/dL (12 Mar 2025 09:27)        Urinalysis Basic - ( 12 Mar 2025 16:11 )    Color: x / Appearance: x / SG: x / pH: x  Gluc: 310 mg/dL / Ketone: x  / Bili: x / Urobili: x   Blood: x / Protein: x / Nitrite: x   Leuk Esterase: x / RBC: x / WBC x   Sq Epi: x / Non Sq Epi: x / Bacteria: x        RADIOLOGY, EKG, & ADDITIONAL TESTS:      Imaging Personally Reviewed:  [x] YES  [ ] NO   OVERNIGHT EVENTS: srikanth    SUBJECTIVE: seen bedside. no acute complaints, has not had BM in a few days.     ROS: otherwise negative    PHYSICAL EXAM:  Constitutional: resting comfortably in bed; NAD  Head: NC/AT  Neck: supple; no JVD or thyromegaly  Respiratory: CTA B/L; no W/R/R, no retractions  Cardiac: +S1/S2; RRR; no M/R/G  Gastrointestinal: soft, NT/ND; no rebound or guarding; +BSx4  Extremities: WWP, no clubbing or cyanosis; no peripheral edema. Capillary refill <2 sec  Musculoskeletal: NROM x4; no joint swelling, tenderness or erythema  Neurologic: AAOx3; CNII-XII grossly intact; no focal deficits  Psychiatric: affect and characteristics of appearance, verbalizations, behaviors are appropriate    VITAL SIGNS:  T(C): 36.6 (03-13-25 @ 09:15), Max: 36.6 (03-12-25 @ 21:08)  HR: 55 (03-13-25 @ 09:15) (55 - 66)  BP: 155/75 (03-13-25 @ 09:15) (122/73 - 155/75)  RR: 18 (03-13-25 @ 09:15) (18 - 18)  SpO2: 99% (03-13-25 @ 09:15) (96% - 99%)    acetaminophen     Tablet .. 650 milliGRAM(s) Oral every 12 hours  aluminum hydroxide/magnesium hydroxide/simethicone Suspension 30 milliLiter(s) Oral every 4 hours PRN  amLODIPine   Tablet 5 milliGRAM(s) Oral every 24 hours  atorvastatin 40 milliGRAM(s) Oral at bedtime  dabigatran 150 milliGRAM(s) Oral every 12 hours  dextrose 5%. 1000 milliLiter(s) IV Continuous <Continuous>  dextrose 5%. 1000 milliLiter(s) IV Continuous <Continuous>  dextrose 50% Injectable 25 Gram(s) IV Push once  dextrose 50% Injectable 12.5 Gram(s) IV Push once  dextrose 50% Injectable 25 Gram(s) IV Push once  dextrose Oral Gel 15 Gram(s) Oral once PRN  fluticasone propionate/ salmeterol 250-50 MICROgram(s) Diskus 1 Dose(s) Inhalation two times a day PRN  glucagon  Injectable 1 milliGRAM(s) IntraMuscular once  insulin lispro (ADMELOG) corrective regimen sliding scale   SubCutaneous Before meals and at bedtime  insulin lispro Injectable (ADMELOG) 3 Unit(s) SubCutaneous three times a day before meals  lactated ringers. 500 milliLiter(s) IV Continuous <Continuous>  melatonin 3 milliGRAM(s) Oral at bedtime PRN  metoprolol succinate ER 50 milliGRAM(s) Oral every 12 hours  mirtazapine 30 milliGRAM(s) Oral at bedtime  ondansetron Injectable 4 milliGRAM(s) IV Push every 8 hours PRN  polyethylene glycol 3350 17 Gram(s) Oral daily  senna 2 Tablet(s) Oral at bedtime  sodium chloride 0.9%. 1000 milliLiter(s) IV Continuous <Continuous>  traZODone 100 milliGRAM(s) Oral at bedtime      Consultant(s) Notes Reviewed:  [x] YES  [ ] NO  Care Discussed with Consultants/Other Providers [x] YES  [ ] NO    LABS:                        9.0    4.50  )-----------( 210      ( 12 Mar 2025 05:30 )             27.6     03-13    138  |  103  |  22  ----------------------------<  295[H]  4.6   |  22  |  1.46[H]    Ca    8.5      13 Mar 2025 05:30  Phos  4.6     03-12  Mg     2.0     03-12        Urinalysis Basic - ( 13 Mar 2025 05:30 )    Color: x / Appearance: x / SG: x / pH: x  Gluc: 295 mg/dL / Ketone: x  / Bili: x / Urobili: x   Blood: x / Protein: x / Nitrite: x   Leuk Esterase: x / RBC: x / WBC x   Sq Epi: x / Non Sq Epi: x / Bacteria: x      CAPILLARY BLOOD GLUCOSE      POCT Blood Glucose.: 286 mg/dL (13 Mar 2025 13:45)  POCT Blood Glucose.: 323 mg/dL (13 Mar 2025 09:23)  POCT Blood Glucose.: 341 mg/dL (12 Mar 2025 22:04)  POCT Blood Glucose.: 274 mg/dL (12 Mar 2025 18:18)        Urinalysis Basic - ( 13 Mar 2025 05:30 )    Color: x / Appearance: x / SG: x / pH: x  Gluc: 295 mg/dL / Ketone: x  / Bili: x / Urobili: x   Blood: x / Protein: x / Nitrite: x   Leuk Esterase: x / RBC: x / WBC x   Sq Epi: x / Non Sq Epi: x / Bacteria: x        RADIOLOGY, EKG, & ADDITIONAL TESTS:      Imaging Personally Reviewed:  [x] YES  [ ] NO

## 2025-03-13 NOTE — PROGRESS NOTE ADULT - PROBLEM SELECTOR PLAN 6
Pt with HTN on home Amlodipine 5mg qd, Lisinopril 40mg qd      c/w Amlodipine 5mg qd, Lisinopril 40mg qd Pt with HTN on home Amlodipine 5mg qd, Lisinopril 40mg qd    - c/w Amlodipine 5mg qd  - holding Lisinopril 40mg qd

## 2025-03-13 NOTE — PROGRESS NOTE ADULT - PROBLEM SELECTOR PLAN 10
Pt on Mirtazapine 30mg qd, Trazadone 100mg qhs, and Abilify monthly injection     - c/w Mirtazapine 30mg qd   - c/w Trazadone 100mg qhs  - Outpt Abilify monthly injection (per family recently had injection)

## 2025-03-13 NOTE — PROGRESS NOTE ADULT - PROBLEM SELECTOR PLAN 5
Pt with Afib s/p ablation on Pradaxa 150mg BID, Metoprolol Succinate 50mg BID    - c/w Pradaxa 150mg BID  - c/w Metoprolol Succinate 50mg BID

## 2025-03-13 NOTE — PROGRESS NOTE ADULT - PROBLEM SELECTOR PLAN 8
Pt with anemia previously on Ferous Sulfate 325mg M/W/F. Per family pt no longer taking. On admission Hgb 9.5 (baseline 9-10). On exam no signs of active bleeding. Will continue to monitor.     - Active T&S  - 2 Large IVs  - Transfuse for Hgb <7

## 2025-03-13 NOTE — PROGRESS NOTE ADULT - PROBLEM SELECTOR PLAN 3
resolved, was likely prerenal. intrinsic FENa likely 2/2 fluids.   On admission SCr 1.58 (Baseline SCr 1.16 1/6/25) , Was on LR @ 250ml/h in ED. Repeat K 5.8. Pt reports poor PO intake at home for past few days, BUN/Cr ration 11. Per pt and RN pt with good UOP. Unclear if Pre renal vs intra renal BEBE. Will give NS @ 100ml/h x10 hours and follow up  urine studies and repeat BMP to help determine prerenal vs intrarenal.     Plan  - bolus NS 1L for uptrended Cr  - f/u repeat BMP   - trend renal function   - Renally dose meds, avoid nephrotoxins Resolved, was likely prerenal. intrinsic FENa likely 2/2 fluids.   On admission SCr 1.58 (Baseline SCr 1.16 1/6/25) , Was on LR @ 250ml/h in ED. Repeat K 5.8. Pt reports poor PO intake at home for past few days, BUN/Cr ration 11. Per pt and RN pt with good UOP. Unclear if Pre renal vs intra renal BEBE. Will give NS @ 100ml/h x10 hours and follow up  urine studies and repeat BMP to help determine prerenal vs intrarenal.     - CTM Cr   - Renally dose meds, avoid nephrotoxins

## 2025-03-13 NOTE — PROGRESS NOTE ADULT - PROBLEM SELECTOR PLAN 7
Pt on home Symbicort 160-4.5 2 puffs BID PRN. Per family pt dose not use often.     - c/w Symbicort 160-4.5 2 puffs BID PRN

## 2025-03-13 NOTE — PROGRESS NOTE ADULT - PROBLEM SELECTOR PLAN 9
Pt on Atorvastatin 40mg qhs     - c/w Atorvastatin 40mg qhs

## 2025-03-14 ENCOUNTER — NON-APPOINTMENT (OUTPATIENT)
Age: 80
End: 2025-03-14

## 2025-03-14 ENCOUNTER — TRANSCRIPTION ENCOUNTER (OUTPATIENT)
Age: 80
End: 2025-03-14

## 2025-03-14 VITALS — WEIGHT: 160.06 LBS

## 2025-03-14 PROCEDURE — 97535 SELF CARE MNGMENT TRAINING: CPT

## 2025-03-14 PROCEDURE — 84133 ASSAY OF URINE POTASSIUM: CPT

## 2025-03-14 PROCEDURE — 97530 THERAPEUTIC ACTIVITIES: CPT

## 2025-03-14 PROCEDURE — 73060 X-RAY EXAM OF HUMERUS: CPT

## 2025-03-14 PROCEDURE — 86850 RBC ANTIBODY SCREEN: CPT

## 2025-03-14 PROCEDURE — 84100 ASSAY OF PHOSPHORUS: CPT

## 2025-03-14 PROCEDURE — 84156 ASSAY OF PROTEIN URINE: CPT

## 2025-03-14 PROCEDURE — 84132 ASSAY OF SERUM POTASSIUM: CPT

## 2025-03-14 PROCEDURE — 82570 ASSAY OF URINE CREATININE: CPT

## 2025-03-14 PROCEDURE — 93005 ELECTROCARDIOGRAM TRACING: CPT

## 2025-03-14 PROCEDURE — 97161 PT EVAL LOW COMPLEX 20 MIN: CPT

## 2025-03-14 PROCEDURE — 94640 AIRWAY INHALATION TREATMENT: CPT

## 2025-03-14 PROCEDURE — 84540 ASSAY OF URINE/UREA-N: CPT

## 2025-03-14 PROCEDURE — 81001 URINALYSIS AUTO W/SCOPE: CPT

## 2025-03-14 PROCEDURE — 84300 ASSAY OF URINE SODIUM: CPT

## 2025-03-14 PROCEDURE — 83036 HEMOGLOBIN GLYCOSYLATED A1C: CPT

## 2025-03-14 PROCEDURE — 85027 COMPLETE CBC AUTOMATED: CPT

## 2025-03-14 PROCEDURE — 86900 BLOOD TYPING SEROLOGIC ABO: CPT

## 2025-03-14 PROCEDURE — 99232 SBSQ HOSP IP/OBS MODERATE 35: CPT | Mod: GC

## 2025-03-14 PROCEDURE — 82550 ASSAY OF CK (CPK): CPT

## 2025-03-14 PROCEDURE — 97116 GAIT TRAINING THERAPY: CPT

## 2025-03-14 PROCEDURE — 80048 BASIC METABOLIC PNL TOTAL CA: CPT

## 2025-03-14 PROCEDURE — 76775 US EXAM ABDO BACK WALL LIM: CPT

## 2025-03-14 PROCEDURE — 83935 ASSAY OF URINE OSMOLALITY: CPT

## 2025-03-14 PROCEDURE — 83735 ASSAY OF MAGNESIUM: CPT

## 2025-03-14 PROCEDURE — 85025 COMPLETE CBC W/AUTO DIFF WBC: CPT

## 2025-03-14 PROCEDURE — 99285 EMERGENCY DEPT VISIT HI MDM: CPT

## 2025-03-14 PROCEDURE — 36415 COLL VENOUS BLD VENIPUNCTURE: CPT

## 2025-03-14 PROCEDURE — 97110 THERAPEUTIC EXERCISES: CPT

## 2025-03-14 PROCEDURE — 82962 GLUCOSE BLOOD TEST: CPT

## 2025-03-14 PROCEDURE — 86901 BLOOD TYPING SEROLOGIC RH(D): CPT

## 2025-03-14 PROCEDURE — 73564 X-RAY EXAM KNEE 4 OR MORE: CPT

## 2025-03-14 PROCEDURE — 97165 OT EVAL LOW COMPLEX 30 MIN: CPT

## 2025-03-14 RX ORDER — METOPROLOL SUCCINATE 50 MG/1
1 TABLET, EXTENDED RELEASE ORAL
Qty: 100 | Refills: 0
Start: 2025-03-14

## 2025-03-14 RX ORDER — TRAZODONE HCL 100 MG
1 TABLET ORAL
Qty: 0 | Refills: 0 | DISCHARGE
Start: 2025-03-14

## 2025-03-14 RX ORDER — AMLODIPINE BESYLATE 10 MG/1
1 TABLET ORAL
Qty: 0 | Refills: 0 | DISCHARGE
Start: 2025-03-14

## 2025-03-14 RX ORDER — ATORVASTATIN CALCIUM 80 MG/1
1 TABLET, FILM COATED ORAL
Qty: 0 | Refills: 0 | DISCHARGE
Start: 2025-03-14

## 2025-03-14 RX ORDER — DABIGATRAN ETEXILATE MESYLATE 30 MG/1
1 PELLET ORAL
Qty: 0 | Refills: 0 | DISCHARGE
Start: 2025-03-14

## 2025-03-14 RX ORDER — METFORMIN HYDROCHLORIDE 850 MG/1
1 TABLET ORAL
Qty: 100 | Refills: 0
Start: 2025-03-14

## 2025-03-14 RX ORDER — MIRTAZAPINE 30 MG/1
1 TABLET, FILM COATED ORAL
Qty: 0 | Refills: 0 | DISCHARGE
Start: 2025-03-14

## 2025-03-14 RX ADMIN — POLYETHYLENE GLYCOL 3350 17 GRAM(S): 17 POWDER, FOR SOLUTION ORAL at 12:10

## 2025-03-14 RX ADMIN — Medication 1 DOSE(S): at 00:29

## 2025-03-14 RX ADMIN — INSULIN LISPRO 4: 100 INJECTION, SOLUTION INTRAVENOUS; SUBCUTANEOUS at 07:18

## 2025-03-14 RX ADMIN — INSULIN LISPRO 3 UNIT(S): 100 INJECTION, SOLUTION INTRAVENOUS; SUBCUTANEOUS at 07:18

## 2025-03-14 RX ADMIN — INSULIN LISPRO 3 UNIT(S): 100 INJECTION, SOLUTION INTRAVENOUS; SUBCUTANEOUS at 13:18

## 2025-03-14 RX ADMIN — INSULIN LISPRO 5: 100 INJECTION, SOLUTION INTRAVENOUS; SUBCUTANEOUS at 13:17

## 2025-03-14 RX ADMIN — DABIGATRAN ETEXILATE MESYLATE 150 MILLIGRAM(S): 30 PELLET ORAL at 06:33

## 2025-03-14 NOTE — DIETITIAN INITIAL EVALUATION ADULT - ADD RECOMMEND
1. Continue wit Consistent Carbohydrate diet  - RD will continue to monitor POCT BG   - Insulin per team  - Honor food preferences, as medically able  2. Appreciate weekly weight trends  3. Continue with current bowel regimen, prn  4. Continue with remeron as an appetite stimulant

## 2025-03-14 NOTE — DIETITIAN INITIAL EVALUATION ADULT - NSFNSGIIOFT_GEN_A_CORE
Pt denied nausea/vomiting/diarrhea, however endorsed constipation, stated last BM 3/10 (last BM 3/12 per RN flowsheets).

## 2025-03-14 NOTE — DIETITIAN INITIAL EVALUATION ADULT - OTHER INFO
Per H&P: Pt is a 79F with PMHx of DM2, Asthma, COPD, Afib s/p ablation, Subdural hematoma, Anemia, presenting from home after a fall from bed, At baseline pt ambulates with walker, has part time HHA, Family reports that they are trying to increase HHH hours however have not been able to yet and that previously pt had good results from going to [facility]. Per family pt was trying to get into bed when she fell off, no head strike or LOC. Pt reports falling off bed due to weakness and reports b/l knee pain and L shoulder pain since event as well as chronic knee pain which she take Tylenol 650mg BID with good relief. Pt reports normal BM, Last today. Otherwise pt denies CP, SOB, Fevers, Chills, N/V/D, dysuria.     Met with pt this AM at bedside. Pt was seated upright and able to articulate her nutrition hx well. Peanut allergy confirmed. Pt reported fair appetite at baseline, denied following any specific diet, however stated she typically eats 3 meals/day. Pt reported current appetite and PO intake is good - RD observed >75% breakfast tray eaten. Pt denied chewing/swallowing difficulties and nausea/vomiting/diarrhea, however endorsed constipation, stated last BM 3/10 (last BM 3/12 per RN flowsheets). RD encouraged adequate fluids + fiber to assist with BMs. Pt reported usual body weight 160 pounds (consistent with current weight on file) and denied any recent weight loss/gain. RD reviewed Carbohydrate Consistent diet including sources of carbohydrates, portion sizes, pairing protein with carbohydrates, limiting sugar sweetened beverages in diet and the importance of consistent eating pattern to help optimize glycemic control. Pt was accepting to RD education and verbalized understanding. nutrition focused physical exam revealed mild wasting, likely consistent with age-related sarcopenia.    *Note: Pt with hx T1DM. A1C 7.7% (3/10).  - POCT BG x 24 hours:  POCT Blood Glucose.: 330 mg/dL (14 Mar 2025 07:17)  POCT Blood Glucose.: 314 mg/dL (13 Mar 2025 22:03)  POCT Blood Glucose.: 247 mg/dL (13 Mar 2025 18:14)  POCT Blood Glucose.: 286 mg/dL (13 Mar 2025 13:45)

## 2025-03-14 NOTE — DISCHARGE NOTE NURSING/CASE MANAGEMENT/SOCIAL WORK - PATIENT PORTAL LINK FT
You can access the FollowMyHealth Patient Portal offered by [facility] by registering at the following website: [email or web address]. By joining [employer]’s FollowMyHealth portal, you will also be able to view your health information using other applications (apps) compatible with our system.

## 2025-03-14 NOTE — DIETITIAN INITIAL EVALUATION ADULT - PERTINENT LABORATORY DATA
03-13    138  |  103  |  22  ----------------------------<  295[H]  4.6   |  22  |  1.46[H]    Ca    8.5      13 Mar 2025 05:30    POCT Blood Glucose.: 330 mg/dL (03-14-25 @ 07:17)  A1C with Estimated Average Glucose Result: 7.7 % (03-10-25 @ 05:16)  A1C with Estimated Average Glucose Result: 8.3 % (04-10-24 @ 05:30)

## 2025-03-14 NOTE — DIETITIAN INITIAL EVALUATION ADULT - PERTINENT MEDS FT
MEDICATIONS  (STANDING):  amLODIPine   Tablet 5 milliGRAM(s) Oral every 24 hours  atorvastatin 40 milliGRAM(s) Oral at bedtime  dabigatran 150 milliGRAM(s) Oral every 12 hours  dextrose 5%. 1000 milliLiter(s) (100 mL/Hr) IV Continuous <Continuous>  dextrose 5%. 1000 milliLiter(s) (50 mL/Hr) IV Continuous <Continuous>  dextrose 50% Injectable 25 Gram(s) IV Push once  dextrose 50% Injectable 12.5 Gram(s) IV Push once  dextrose 50% Injectable 25 Gram(s) IV Push once  glucagon  Injectable 1 milliGRAM(s) IntraMuscular once  insulin lispro (ADMELOG) corrective regimen sliding scale   SubCutaneous Before meals and at bedtime  insulin lispro Injectable (ADMELOG) 3 Unit(s) SubCutaneous three times a day before meals  lactated ringers. 500 milliLiter(s) (250 mL/Hr) IV Continuous <Continuous>  metoprolol succinate ER 50 milliGRAM(s) Oral every 12 hours  mirtazapine 30 milliGRAM(s) Oral at bedtime  polyethylene glycol 3350 17 Gram(s) Oral daily  senna 2 Tablet(s) Oral at bedtime  traZODone 100 milliGRAM(s) Oral at bedtime    MEDICATIONS  (PRN):  aluminum hydroxide/magnesium hydroxide/simethicone Suspension 30 milliLiter(s) Oral every 4 hours PRN Dyspepsia  dextrose Oral Gel 15 Gram(s) Oral once PRN Blood Glucose LESS THAN 70 milliGRAM(s)/deciliter  fluticasone propionate/ salmeterol 250-50 MICROgram(s) Diskus 1 Dose(s) Inhalation two times a day PRN Wheezing of SOB  melatonin 3 milliGRAM(s) Oral at bedtime PRN Insomnia  ondansetron Injectable 4 milliGRAM(s) IV Push every 8 hours PRN Nausea and/or Vomiting

## 2025-03-14 NOTE — DIETITIAN INITIAL EVALUATION ADULT - PROBLEM SELECTOR PLAN 3
On admission SCr 1.58 (Baseline SCr 1.16 1/6/25) , Was on LR @ 250ml/h in ED. Repeat K 5.8. Pt reports poor PO intake at home for past few days, BUN/Cr ration 11. Per pt and RN pt with good UOP. Unclear if Pre renal vs intra renal BEBE. Will give NS @ 100ml/h x10 hours and follow up  urine studies and repeat BMP to help determine prerenal vs intrarenal.     - NS at 100ml/h x 10 hours  - f/u  repeat BMP   - f/u Urine studies   - Monitor I/O   - trend renal function   - Renally dose meds, avoid nephrotoxins

## 2025-03-14 NOTE — DIETITIAN INITIAL EVALUATION ADULT - PROBLEM SELECTOR PLAN 1
Pt presenting from home after a fall from bed, At baseline pt ambulates with walker, has part time HHA, Family reports pt trying to get into bed when she fell off, no head strike or LOC. Pt reports b/l knee pain and L shoulder pain since event as well as chronic knee pain which she take Tylenol 650mg BID with good relief. Xray obtained in ED. On exam b/l knee with 4/5 strength and limited ROM, no effusions. Left shoulder with 5/5 strength ROM intact.      - c/w Tylenol 650mg BID   - f/u Xray knee and Shoulder   - PT/OT  - Fall precautions

## 2025-03-14 NOTE — DIETITIAN INITIAL EVALUATION ADULT - PROBLEM SELECTOR PLAN 4
Pt with DM, A1c 7 on 1/6/25, was on premeal 8u TID, but discontinued due to pts inability to titrate. Pt currently on  Metformin 1000mg BID    - start low ISS with FSG  - CC  - f/u A1c

## 2025-03-14 NOTE — DISCHARGE NOTE NURSING/CASE MANAGEMENT/SOCIAL WORK - CAREGIVER RELATION TO PATIENT
Past Medical History:   Diagnosis Date    COPD (chronic obstructive pulmonary disease)     Diabetes mellitus     Diverticulosis     GI bleed     associated w/ diverticulosis    Hypertension      Past Surgical History:   Procedure Laterality Date    CATARACT EXTRACTION       Review of patient's allergies indicates:  No Known Allergies    No current facility-administered medications on file prior to encounter.      Current Outpatient Medications on File Prior to Encounter   Medication Sig    aspirin (ECOTRIN) 81 MG EC tablet Take 81 mg by mouth once daily.    hydroCHLOROthiazide (HYDRODIURIL) 25 MG tablet Take 25 mg by mouth once daily.    metFORMIN (GLUCOPHAGE) 1000 MG tablet Take 1,000 mg by mouth 2 (two) times daily with meals.    pantoprazole (PROTONIX) 40 MG tablet Take 40 mg by mouth once daily.     Family History     None        Tobacco Use    Smoking status: Not on file   Substance and Sexual Activity    Alcohol use: Not on file    Drug use: Not on file    Sexual activity: Not on file     Review of Systems   Reason unable to perform ROS: HARD OF HEARING.     Objective:     Vital Signs (Most Recent):  Temp: 98.5 °F (36.9 °C) (10/08/20 0413)  Pulse: 71 (10/08/20 0421)  Resp: 20 (10/08/20 0413)  BP: (!) 141/69 (10/08/20 0413)  SpO2: 96 % (10/08/20 0421) Vital Signs (24h Range):  Temp:  [98.5 °F (36.9 °C)] 98.5 °F (36.9 °C)  Pulse:  [69-82] 71  Resp:  [17-20] 20  SpO2:  [88 %-96 %] 96 %  BP: (116-141)/(63-69) 141/69        There is no height or weight on file to calculate BMI.    Physical Exam  Constitutional:       General: He is not in acute distress.     Appearance: Normal appearance. He is obese. He is not ill-appearing, toxic-appearing or diaphoretic.   HENT:      Head: Normocephalic and atraumatic.      Nose: No congestion.      Mouth/Throat:      Pharynx: No oropharyngeal exudate or posterior oropharyngeal erythema.   Eyes:      General: No scleral icterus.     Extraocular Movements:  Extraocular movements intact.      Conjunctiva/sclera: Conjunctivae normal.      Pupils: Pupils are equal, round, and reactive to light.   Cardiovascular:      Rate and Rhythm: Normal rate and regular rhythm.   Pulmonary:      Effort: Pulmonary effort is normal.      Breath sounds: Wheezing (bilateral expiratory wheezes) present.      Comments: Sitting upright, speaking outloud in clear and complete sentences, in no acute distress  Abdominal:      General: Abdomen is flat. Bowel sounds are normal. There is no distension.      Tenderness: There is no abdominal tenderness. There is no guarding.   Musculoskeletal:         General: Swelling (trace pedal edema extending to knees bilaterally) present.   Skin:     General: Skin is warm.      Findings: No bruising, erythema or rash.   Neurological:      General: No focal deficit present.      Mental Status: He is alert.      Comments: Unable to assess orientation because pt is hard of hearing and could not communicate with me  -However, he is moving all four extremities with adequate strength           CRANIAL NERVES     CN III, IV, VI   Pupils are equal, round, and reactive to light.    Significant Labs: labs from the outside facility (located on paper chart) reviewed. no labs drawn yet at [facility].     Significant Imaging: imaging reads (particularly CXR) from OSH reviewed. No formal imaging CDs or actual images upon transfer.    daughters

## 2025-03-14 NOTE — DIETITIAN INITIAL EVALUATION ADULT - PROBLEM SELECTOR PLAN 2
On admission K 4.1, with repeat labs 40 min later showing 5.8, CK wnl,  received LR IVF at 250ml/hr in ED.  EKG without peaked T waves (TWI V4-5, rest similar to prior 2023), Pt asymptomatic. Will obtain early AM labs to repeat K and eval renal response to IVF as below.     - f/u Repeat BMP   - Trend Renal function and UOP

## 2025-03-14 NOTE — DIETITIAN INITIAL EVALUATION ADULT - ORAL INTAKE PTA/DIET HISTORY
Peanut allergy confirmed. Pt reported fair appetite at baseline, denied following any specific diet, however stated she typically eats 3 meals/day.

## 2025-03-14 NOTE — DISCHARGE NOTE NURSING/CASE MANAGEMENT/SOCIAL WORK - FINANCIAL ASSISTANCE
[facility] provides services at a reduced cost to those who are determined to be eligible through [facility]’s financial assistance program. Information regarding [facility]’s financial assistance program can be found by going to [email or web address] or by calling [phone].

## 2025-03-14 NOTE — DIETITIAN INITIAL EVALUATION ADULT - OTHER CALCULATIONS
*Using current weight as it is within 80- 120% ideal body weight. Needs adjusted for COPD, healing s/p fall, advanced age. ideal body weight: 135 pounds; % ideal body weight: 119%

## 2025-03-19 DIAGNOSIS — Z79.01 LONG TERM (CURRENT) USE OF ANTICOAGULANTS: ICD-10-CM

## 2025-03-19 DIAGNOSIS — I48.20 CHRONIC ATRIAL FIBRILLATION, UNSPECIFIED: ICD-10-CM

## 2025-03-19 DIAGNOSIS — Z88.8 ALLERGY STATUS TO OTHER DRUGS, MEDICAMENTS AND BIOLOGICAL SUBSTANCES: ICD-10-CM

## 2025-03-19 DIAGNOSIS — Z91.81 HISTORY OF FALLING: ICD-10-CM

## 2025-03-19 DIAGNOSIS — Z79.84 LONG TERM (CURRENT) USE OF ORAL HYPOGLYCEMIC DRUGS: ICD-10-CM

## 2025-03-19 DIAGNOSIS — I10 ESSENTIAL (PRIMARY) HYPERTENSION: ICD-10-CM

## 2025-03-19 DIAGNOSIS — Z79.51 LONG TERM (CURRENT) USE OF INHALED STEROIDS: ICD-10-CM

## 2025-03-19 DIAGNOSIS — N17.9 ACUTE KIDNEY FAILURE, UNSPECIFIED: ICD-10-CM

## 2025-03-19 DIAGNOSIS — F32.9 MAJOR DEPRESSIVE DISORDER, SINGLE EPISODE, UNSPECIFIED: ICD-10-CM

## 2025-03-19 DIAGNOSIS — E78.5 HYPERLIPIDEMIA, UNSPECIFIED: ICD-10-CM

## 2025-03-19 DIAGNOSIS — Z91.010 ALLERGY TO PEANUTS: ICD-10-CM

## 2025-03-19 DIAGNOSIS — E11.9 TYPE 2 DIABETES MELLITUS WITHOUT COMPLICATIONS: ICD-10-CM

## 2025-03-19 DIAGNOSIS — E87.5 HYPERKALEMIA: ICD-10-CM

## 2025-03-19 DIAGNOSIS — J45.909 UNSPECIFIED ASTHMA, UNCOMPLICATED: ICD-10-CM

## 2025-03-21 ENCOUNTER — APPOINTMENT (OUTPATIENT)
Dept: HEART AND VASCULAR | Facility: CLINIC | Age: 80
End: 2025-03-21

## 2025-03-21 ENCOUNTER — NON-APPOINTMENT (OUTPATIENT)
Age: 80
End: 2025-03-21

## 2025-03-21 PROCEDURE — 93298 REM INTERROG DEV EVAL SCRMS: CPT

## 2025-04-04 ENCOUNTER — TRANSCRIPTION ENCOUNTER (OUTPATIENT)
Age: 80
End: 2025-04-04

## 2025-04-08 ENCOUNTER — TRANSCRIPTION ENCOUNTER (OUTPATIENT)
Age: 80
End: 2025-04-08

## 2025-04-08 NOTE — PATIENT PROFILE ADULT - NSPROHMSYMPCOND_GEN_A_NUR
Airway patent, TM normal bilaterally, normal appearing mouth, nose, throat, neck supple with full range of motion, no cervical adenopathy. diabetes/cardiovascular/respiratory

## 2025-04-21 ENCOUNTER — NON-APPOINTMENT (OUTPATIENT)
Age: 80
End: 2025-04-21

## 2025-04-23 ENCOUNTER — APPOINTMENT (OUTPATIENT)
Dept: HEART AND VASCULAR | Facility: CLINIC | Age: 80
End: 2025-04-23
Payer: MEDICARE

## 2025-04-23 ENCOUNTER — NON-APPOINTMENT (OUTPATIENT)
Age: 80
End: 2025-04-23

## 2025-04-23 ENCOUNTER — APPOINTMENT (OUTPATIENT)
Dept: INTERNAL MEDICINE | Facility: CLINIC | Age: 80
End: 2025-04-23
Payer: MEDICARE

## 2025-04-23 VITALS
TEMPERATURE: 98.7 F | OXYGEN SATURATION: 99 % | DIASTOLIC BLOOD PRESSURE: 84 MMHG | HEART RATE: 81 BPM | SYSTOLIC BLOOD PRESSURE: 152 MMHG

## 2025-04-23 VITALS — BODY MASS INDEX: 25.74 KG/M2 | HEIGHT: 67 IN | WEIGHT: 164 LBS

## 2025-04-23 VITALS — SYSTOLIC BLOOD PRESSURE: 171 MMHG | DIASTOLIC BLOOD PRESSURE: 78 MMHG

## 2025-04-23 DIAGNOSIS — Z92.89 PERSONAL HISTORY OF OTHER MEDICAL TREATMENT: ICD-10-CM

## 2025-04-23 DIAGNOSIS — N17.9 ACUTE KIDNEY FAILURE, UNSPECIFIED: ICD-10-CM
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
## 2025-04-23 PROCEDURE — 93298 REM INTERROG DEV EVAL SCRMS: CPT

## 2025-04-23 PROCEDURE — 99496 TRANSJ CARE MGMT HIGH F2F 7D: CPT

## 2025-04-24 NOTE — PATIENT PROFILE ADULT. - NS PRO OT REFERRAL QUES 2 YN
Patient called back regarding ostomy supplies.   Author advised RN, stated patient's PCP placing order for bags.    Advised patient to follow up with her PCP for status of supplies.   
no

## 2025-04-25 ENCOUNTER — TRANSCRIPTION ENCOUNTER (OUTPATIENT)
Age: 80
End: 2025-04-25

## 2025-04-28 ENCOUNTER — TRANSCRIPTION ENCOUNTER (OUTPATIENT)
Age: 80
End: 2025-04-28

## 2025-05-02 ENCOUNTER — TRANSCRIPTION ENCOUNTER (OUTPATIENT)
Age: 80
End: 2025-05-02

## 2025-05-03 ENCOUNTER — NON-APPOINTMENT (OUTPATIENT)
Age: 80
End: 2025-05-03

## 2025-05-07 ENCOUNTER — NON-APPOINTMENT (OUTPATIENT)
Age: 80
End: 2025-05-07

## 2025-05-13 NOTE — ED PROVIDER NOTE - NSTIMEPROVIDERCAREINITIATE_GEN_ER
Procedure:  Level of Consciousness: [x]Alert [x]Oriented []Disoriented []Lethargic  Anxiety Level: [x]Calm []Anxious []Depressed []Other  Skin: [x]Warm [x]Dry []Cool []Moist []Intact []Other  Cardiovascular: [x]Palpitations: [x]Never []Occasionally []Frequently  Chest Pain: [x]No []Yes  Respiratory:  [x]Unlabored []Labored []Cough ([] Productive []Unproductive)  HCG Required: [x]No []Yes   Results: []Negative []Positive  Knowledge Level:        [x]Patient/Other verbalized understanding of pre-procedure instructions.        [x]Assessment of post-op care needs (transportation, responsible caregiver)        [x]Able to discuss health care problems and how to deal with it.  Factors that Affect Teaching:        Language Barrier: [x]No []Yes - why:        Hearing Loss:        [x]No []Yes            Corrective Device:  []Yes [x]No        Vision Loss:           []No [x]Yes            Corrective Device:  [x]Yes []No        Memory Loss:       [x]No []Yes            []Short Term []Long Term  Motivational Level:  [x]Asks Questions                  []Extremely Anxious       [x]Seems Interested               []Seems Uninterested                  []Denies need for Education  Risk for Injury:  []Patient oriented to person, place and time  []History of frequent falls/loss of balance  Nutritional:  []Change in appetite   []Weight Gain   []Weight Loss  Functional:  []Requires assistance with ADL's   12-Oct-2017 12:47

## 2025-05-28 ENCOUNTER — APPOINTMENT (OUTPATIENT)
Dept: HEART AND VASCULAR | Facility: CLINIC | Age: 80
End: 2025-05-28
Payer: MEDICARE

## 2025-05-28 ENCOUNTER — NON-APPOINTMENT (OUTPATIENT)
Age: 80
End: 2025-05-28

## 2025-05-28 PROCEDURE — 93298 REM INTERROG DEV EVAL SCRMS: CPT

## 2025-06-02 NOTE — PATIENT PROFILE ADULT. - NS PRO OT REFERRAL QUES 2 YN
Disp Refills Start End    pegfilgrastim-cbqv (Udenyca) 6 MG/0.6ML injection 4 each 0 6/1/2025 6/1/2025    Sig - Route: Inject 0.6 mLs into the skin 1 time for 1 dose. - Subcutaneous    Sent to pharmacy as: Udenyca 6 MG/0.6ML Subcutaneous Solution Prefilled Syringe (pegfilgrastim-cbqv)    Class: Eprescribe      Dose: 6 mg Route: Subcutaneous Frequency: ONCE   Start Date: 06/01/25 End Date: 06/01/25 after 1 doses    Written Date: 06/01/25 Expiration Date: 06/01/26       Order Status: Ended Sun Jun 01, 2025 2962     Order Pended By: Rena Hamm RN Pended Date/Time: Fri May 30, 2025 1050   Ordering User: Jose Crenshaw MD Ordering Date/Time: Sun Jun 1, 2025 0909   Ordering Provider: Jose Crenshaw MD Authorizing Provider: Jose Crenshaw MD   Rx #: [id number]         Date Type Origin Status Quantity Day Supply Patient Charge Product Pharmacy Fill Number    6/2/2025 First Fill Electronic Pending Fill 4 each -- -- Udenyca 6 MG/0.6ML injection [facility]        Script was sent yesterday. Writer sees that it has a start and end date for 6/1/25, the sig states 1 time for 1 does however 4 each as quantity. Is this why it is still pending fill? Does another script need to be sent? Thank you    no

## 2025-06-30 ENCOUNTER — APPOINTMENT (OUTPATIENT)
Dept: HEART AND VASCULAR | Facility: CLINIC | Age: 80
End: 2025-06-30

## 2025-06-30 NOTE — ED ADULT TRIAGE NOTE - HEIGHT IN CM
Outpatient Infusion Center  [facility]     Venofer Visit    NAME:  Maribel Singleton  YOB: 1987  MEDICAL RECORD NUMBER:  [id number]  Episode Date:  6/30/2025    Patient arrived to Outpatient Infusion Center   [] per wheelchair   [x] ambulatory     Alert and oriented x 4. Patient with a history of iron-deficiency anemia and is here to receive 4th of 5 Venofer infusions. Patient reports that she is feeling much better since starting Venofer infusions.   Patient denies side effects from previous Venofer infusions.    Has the patient had a previous problem with Venofer Infusion? No  Any current infection or illness? No   Patient on antibiotics? No   History of Hypertension? No     /66   Pulse 64   Temp 98 °F (36.7 °C) (Oral)   Resp 16   SpO2 99%   No data found.      Is the patient experiencing any:  Fatigue:   []   None  [x]   Increase over baseline but not altering normal activities - gets tired a little more easily but is able to perform her usual activities  []   Moderate of causing difficulty performing some activities  []   Severe or loss of ability to perform some activities  []   Bedridden or disabling     Dizziness or Lightheadedness:   []   None  []   No Interference  [x]   Interferes with functioning but not activities of daily living - reports that her \"black out spells\" have improved. States that she has gotten a little dizzy a few times in the last week but has not felt like she was going to \"black out\".  []   Interferes with daily activies  []   Bedridden or disabling    Shortness of Breath:   [x]   None - patient denies  []   Dyspneic on exertion   []   Dyspnea with normal activities  []   Dyspnea at rest    Edema: None noted    Tachycardia: No    Heart Palpitations: No      Chest Pain: No      /66   Pulse 64   Temp 98 °F (36.7 °C) (Oral)   Resp 16   SpO2 99%   No data found.      Current Lab Data:  Hemoglobin/Hematocrit:    Lab Results   Component Value  Date/Time    HGB 8.5 06/05/2025 01:40 PM    HCT 27.7 06/05/2025 01:40 PM     IRON:    Lab Results   Component Value Date/Time    IRON 15 05/16/2025 10:30 AM     TIBC:    Lab Results   Component Value Date/Time    TIBC 441 05/16/2025 10:30 AM     FERRITIN:    Lab Results   Component Value Date/Time    FERRITIN 9.8 05/16/2025 10:30 AM       Dose Administered: 200 mg  Todays dose is number 4 out of 5 ordered for this patient.    Response to treatment:  Well tolerated by patient.    Education: Verbal and written discharge instructions reviewed with patient. Verbalized understanding. Written copy given via AVS      Follow up: Scheduled to return for next dose of Venofer on July 2, 2025.     Electronically signed by Cate Dinh RN on 6/30/2025 at 4:57 PM                        170.18

## 2025-07-09 NOTE — ED PROVIDER NOTE - HISTORY ATTESTATION, MLM
For information on Fall & Injury Prevention, visit: [email or web address] OR  [email or web address] OR  https://www.cdc.gov/steadi/patient.html I have reviewed and confirmed nurses' notes...

## 2025-07-21 ENCOUNTER — EMERGENCY (EMERGENCY)
Facility: HOSPITAL | Age: 80
LOS: 1 days | End: 2025-07-21
Attending: EMERGENCY MEDICINE | Admitting: EMERGENCY MEDICINE
Payer: MEDICARE

## 2025-07-21 VITALS
HEART RATE: 77 BPM | WEIGHT: 250 LBS | OXYGEN SATURATION: 97 % | RESPIRATION RATE: 18 BRPM | TEMPERATURE: 98 F | DIASTOLIC BLOOD PRESSURE: 78 MMHG | SYSTOLIC BLOOD PRESSURE: 135 MMHG

## 2025-07-21 DIAGNOSIS — I62.01 NONTRAUMATIC ACUTE SUBDURAL HEMORRHAGE: Chronic | ICD-10-CM

## 2025-07-21 DIAGNOSIS — Z96.651 PRESENCE OF RIGHT ARTIFICIAL KNEE JOINT: Chronic | ICD-10-CM

## 2025-07-21 LAB
ANION GAP SERPL CALC-SCNC: 15 MMOL/L — SIGNIFICANT CHANGE UP (ref 5–17)
APPEARANCE UR: CLEAR — SIGNIFICANT CHANGE UP
BASOPHILS # BLD AUTO: 0.07 K/UL — SIGNIFICANT CHANGE UP (ref 0–0.2)
BASOPHILS NFR BLD AUTO: 0.9 % — SIGNIFICANT CHANGE UP (ref 0–2)
BILIRUB UR-MCNC: NEGATIVE — SIGNIFICANT CHANGE UP
BUN SERPL-MCNC: 26 MG/DL — HIGH (ref 7–23)
CALCIUM SERPL-MCNC: 9.4 MG/DL — SIGNIFICANT CHANGE UP (ref 8.4–10.5)
CHLORIDE SERPL-SCNC: 99 MMOL/L — SIGNIFICANT CHANGE UP (ref 96–108)
CO2 SERPL-SCNC: 22 MMOL/L — SIGNIFICANT CHANGE UP (ref 22–31)
COLOR SPEC: YELLOW — SIGNIFICANT CHANGE UP
CREAT SERPL-MCNC: 1.55 MG/DL — HIGH (ref 0.5–1.3)
DIFF PNL FLD: NEGATIVE — SIGNIFICANT CHANGE UP
EGFR: 34 ML/MIN/1.73M2 — LOW
EGFR: 34 ML/MIN/1.73M2 — LOW
EOSINOPHIL # BLD AUTO: 0.81 K/UL — HIGH (ref 0–0.5)
EOSINOPHIL NFR BLD AUTO: 10.8 % — HIGH (ref 0–6)
GLUCOSE SERPL-MCNC: 271 MG/DL — HIGH (ref 70–99)
GLUCOSE UR QL: >=1000 MG/DL
HCT VFR BLD CALC: 30.6 % — LOW (ref 34.5–45)
HGB BLD-MCNC: 9.5 G/DL — LOW (ref 11.5–15.5)
IMM GRANULOCYTES # BLD AUTO: 0.03 K/UL — SIGNIFICANT CHANGE UP (ref 0–0.07)
IMM GRANULOCYTES NFR BLD AUTO: 0.4 % — SIGNIFICANT CHANGE UP (ref 0–0.9)
KETONES UR QL: NEGATIVE MG/DL — SIGNIFICANT CHANGE UP
LACTATE SERPL-SCNC: 2.4 MMOL/L — HIGH (ref 0.5–2)
LACTATE SERPL-SCNC: 2.6 MMOL/L — HIGH (ref 0.5–2)
LEUKOCYTE ESTERASE UR-ACNC: ABNORMAL
LYMPHOCYTES # BLD AUTO: 1.94 K/UL — SIGNIFICANT CHANGE UP (ref 1–3.3)
LYMPHOCYTES NFR BLD AUTO: 25.9 % — SIGNIFICANT CHANGE UP (ref 13–44)
MCHC RBC-ENTMCNC: 26.5 PG — LOW (ref 27–34)
MCHC RBC-ENTMCNC: 31 G/DL — LOW (ref 32–36)
MCV RBC AUTO: 85.5 FL — SIGNIFICANT CHANGE UP (ref 80–100)
MONOCYTES # BLD AUTO: 0.73 K/UL — SIGNIFICANT CHANGE UP (ref 0–0.9)
MONOCYTES NFR BLD AUTO: 9.7 % — SIGNIFICANT CHANGE UP (ref 2–14)
NEUTROPHILS # BLD AUTO: 3.92 K/UL — SIGNIFICANT CHANGE UP (ref 1.8–7.4)
NEUTROPHILS NFR BLD AUTO: 52.3 % — SIGNIFICANT CHANGE UP (ref 43–77)
NITRITE UR-MCNC: POSITIVE
NRBC # BLD AUTO: 0 K/UL — SIGNIFICANT CHANGE UP (ref 0–0)
NRBC # FLD: 0 K/UL — SIGNIFICANT CHANGE UP (ref 0–0)
NRBC BLD AUTO-RTO: 0 /100 WBCS — SIGNIFICANT CHANGE UP (ref 0–0)
PH UR: 7 — SIGNIFICANT CHANGE UP (ref 5–8)
PLATELET # BLD AUTO: 225 K/UL — SIGNIFICANT CHANGE UP (ref 150–400)
PMV BLD: 9.4 FL — SIGNIFICANT CHANGE UP (ref 7–13)
POTASSIUM SERPL-MCNC: 4.6 MMOL/L — SIGNIFICANT CHANGE UP (ref 3.5–5.3)
POTASSIUM SERPL-SCNC: 4.6 MMOL/L — SIGNIFICANT CHANGE UP (ref 3.5–5.3)
PROT UR-MCNC: NEGATIVE MG/DL — SIGNIFICANT CHANGE UP
RBC # BLD: 3.58 M/UL — LOW (ref 3.8–5.2)
RBC # FLD: 14.7 % — HIGH (ref 10.3–14.5)
SODIUM SERPL-SCNC: 136 MMOL/L — SIGNIFICANT CHANGE UP (ref 135–145)
SP GR SPEC: 1.02 — SIGNIFICANT CHANGE UP (ref 1–1.03)
UROBILINOGEN FLD QL: 0.2 MG/DL — SIGNIFICANT CHANGE UP (ref 0.2–1)
WBC # BLD: 7.5 K/UL — SIGNIFICANT CHANGE UP (ref 3.8–10.5)
WBC # FLD AUTO: 7.5 K/UL — SIGNIFICANT CHANGE UP (ref 3.8–10.5)

## 2025-07-21 PROCEDURE — 99285 EMERGENCY DEPT VISIT HI MDM: CPT

## 2025-07-21 RX ORDER — ATORVASTATIN CALCIUM 80 MG/1
40 TABLET, FILM COATED ORAL ONCE
Refills: 0 | Status: COMPLETED | OUTPATIENT
Start: 2025-07-21 | End: 2025-07-21

## 2025-07-21 RX ORDER — INSULIN GLARGINE-YFGN 100 [IU]/ML
10 INJECTION, SOLUTION SUBCUTANEOUS ONCE
Refills: 0 | Status: COMPLETED | OUTPATIENT
Start: 2025-07-21 | End: 2025-07-21

## 2025-07-21 RX ORDER — ACETAMINOPHEN 500 MG/5ML
650 LIQUID (ML) ORAL ONCE
Refills: 0 | Status: COMPLETED | OUTPATIENT
Start: 2025-07-21 | End: 2025-07-21

## 2025-07-21 RX ORDER — SODIUM CHLORIDE 9 G/1000ML
1000 INJECTION, SOLUTION INTRAVENOUS ONCE
Refills: 0 | Status: COMPLETED | OUTPATIENT
Start: 2025-07-21 | End: 2025-07-21

## 2025-07-21 RX ORDER — METOPROLOL SUCCINATE 50 MG/1
50 TABLET, EXTENDED RELEASE ORAL ONCE
Refills: 0 | Status: COMPLETED | OUTPATIENT
Start: 2025-07-21 | End: 2025-07-21

## 2025-07-21 RX ORDER — DAPAGLIFLOZIN 5 MG/1
10 TABLET, FILM COATED ORAL ONCE
Refills: 0 | Status: COMPLETED | OUTPATIENT
Start: 2025-07-21 | End: 2025-07-21

## 2025-07-21 RX ORDER — MIRTAZAPINE 30 MG/1
30 TABLET, FILM COATED ORAL ONCE
Refills: 0 | Status: COMPLETED | OUTPATIENT
Start: 2025-07-21 | End: 2025-07-21

## 2025-07-21 RX ADMIN — ATORVASTATIN CALCIUM 40 MILLIGRAM(S): 80 TABLET, FILM COATED ORAL at 22:59

## 2025-07-21 RX ADMIN — SODIUM CHLORIDE 1000 MILLILITER(S): 9 INJECTION, SOLUTION INTRAVENOUS at 20:43

## 2025-07-21 RX ADMIN — INSULIN GLARGINE-YFGN 10 UNIT(S): 100 INJECTION, SOLUTION SUBCUTANEOUS at 22:30

## 2025-07-21 RX ADMIN — Medication 5 UNIT(S): at 22:59

## 2025-07-21 RX ADMIN — Medication 650 MILLIGRAM(S): at 23:28

## 2025-07-21 NOTE — HISTORY OF PRESENT ILLNESS
No effusion noted at the end.  [Palpitations] : no palpitations [SOB] : no dyspnea [Syncope] : no syncope [Dizziness] : no dizziness [Chest Pain] : no chest pain or discomfort [Pain at Site] : no pain at device site [Erythema at Site] : no erythema at device site [Swelling at Site] : no swelling at device site [FreeTextEntry1] : Ms. 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  Now s/p explant and re-implant on 10/19/22.  She presents for post procedure follow-up. \par \par Of note, she had an ILR implanted for surveillance of afib as anticoagulation was being held secondary to history of a subdural hematoma. She had positive COVID antibodies in June 2020.  In July 2020 she was admitted to [facility] with a large femoral DVT and she was placed back on Eliquis.  Referred back to EP by Dr. Santos given concern for AFib with slow VR on ECG 9/15/22.  The patient denies dizziness, presyncope/syncope.  She ambulates with a walker.

## 2025-07-21 NOTE — ED ADULT NURSE NOTE - OBJECTIVE STATEMENT
80F BIBEMS for hyperglycemia, per family member, sugars noted to be elevated ranging from over 500s to 200s despite compliance with medications. Patient A&Ox4, able to verbalize needs, follows commands. On RA with adequate chest rise and fall. No s/s of acute distress noted. IV noted to left forearm, 20G. Noted empty 500cc of LR noted to be attached to patient which has been detached from patient. Patient denies any cp, sob.

## 2025-07-21 NOTE — ED ADULT TRIAGE NOTE - CHIEF COMPLAINT QUOTE
Patient to the ED via EMS c/o increased blood sugars since recent rehab dc x a few weeks ago. Endorses compliance with insulin and oral medications. AAOx4, NAD.

## 2025-07-21 NOTE — ED ADULT NURSE NOTE - IN THE PAST 12 MONTHS HAVE YOU USED DRUGS OTHER THAN THOSE REQUIRED FOR MEDICAL REASON?
No

## 2025-07-22 ENCOUNTER — NON-APPOINTMENT (OUTPATIENT)
Age: 80
End: 2025-07-22

## 2025-07-22 VITALS
RESPIRATION RATE: 18 BRPM | OXYGEN SATURATION: 97 % | TEMPERATURE: 98 F | HEART RATE: 76 BPM | SYSTOLIC BLOOD PRESSURE: 151 MMHG | DIASTOLIC BLOOD PRESSURE: 70 MMHG

## 2025-07-22 PROCEDURE — 36415 COLL VENOUS BLD VENIPUNCTURE: CPT

## 2025-07-22 PROCEDURE — 74177 CT ABD & PELVIS W/CONTRAST: CPT | Mod: 26

## 2025-07-22 PROCEDURE — 83605 ASSAY OF LACTIC ACID: CPT

## 2025-07-22 PROCEDURE — 80048 BASIC METABOLIC PNL TOTAL CA: CPT

## 2025-07-22 PROCEDURE — 87077 CULTURE AEROBIC IDENTIFY: CPT

## 2025-07-22 PROCEDURE — 87086 URINE CULTURE/COLONY COUNT: CPT

## 2025-07-22 PROCEDURE — 96372 THER/PROPH/DIAG INJ SC/IM: CPT

## 2025-07-22 PROCEDURE — 85025 COMPLETE CBC W/AUTO DIFF WBC: CPT

## 2025-07-22 PROCEDURE — 74177 CT ABD & PELVIS W/CONTRAST: CPT

## 2025-07-22 PROCEDURE — 96375 TX/PRO/DX INJ NEW DRUG ADDON: CPT

## 2025-07-22 PROCEDURE — 81001 URINALYSIS AUTO W/SCOPE: CPT

## 2025-07-22 PROCEDURE — 96374 THER/PROPH/DIAG INJ IV PUSH: CPT | Mod: XU

## 2025-07-22 PROCEDURE — 82962 GLUCOSE BLOOD TEST: CPT

## 2025-07-22 PROCEDURE — 99284 EMERGENCY DEPT VISIT MOD MDM: CPT | Mod: 25

## 2025-07-22 RX ORDER — CEFPODOXIME PROXETIL 200 MG/1
1 TABLET, FILM COATED ORAL
Qty: 14 | Refills: 0
Start: 2025-07-22 | End: 2025-07-28

## 2025-07-22 RX ORDER — CEFTRIAXONE 500 MG/1
1000 INJECTION, POWDER, FOR SOLUTION INTRAMUSCULAR; INTRAVENOUS ONCE
Refills: 0 | Status: COMPLETED | OUTPATIENT
Start: 2025-07-22 | End: 2025-07-22

## 2025-07-22 RX ADMIN — MIRTAZAPINE 30 MILLIGRAM(S): 30 TABLET, FILM COATED ORAL at 01:32

## 2025-07-22 RX ADMIN — METOPROLOL SUCCINATE 50 MILLIGRAM(S): 50 TABLET, EXTENDED RELEASE ORAL at 01:34

## 2025-07-22 RX ADMIN — DAPAGLIFLOZIN 10 MILLIGRAM(S): 5 TABLET, FILM COATED ORAL at 01:32

## 2025-07-22 RX ADMIN — Medication 200 MILLILITER(S): at 02:41

## 2025-07-22 RX ADMIN — CEFTRIAXONE 100 MILLIGRAM(S): 500 INJECTION, POWDER, FOR SOLUTION INTRAMUSCULAR; INTRAVENOUS at 02:41

## 2025-07-22 NOTE — ED PROVIDER NOTE - CLINICAL SUMMARY MEDICAL DECISION MAKING FREE TEXT BOX
Triage VS unactionable  on exam pt awake alert answering questions following commands breathing comfortably on room air w/ equal BS, abdomen soft nontender nondistended, normal S1S2 regular rate, b/l LE no edema or asymmetry equal DP pulses    fingerstick 280  plan to check labs, low suspicion DKA  r/o UTI or intraabdominal process (low suspicion but transient abdominal pain and pt elderly puts her at higher risk)  will give night meds since didn't take them tonight    plan for labs, urine, CTAP, IV fluids, reassess

## 2025-07-22 NOTE — ED PROVIDER NOTE - PATIENT PORTAL LINK FT
You can access the FollowMyHealth Patient Portal offered by [facility] by registering at the following website: [email or web address]. By joining [employer]’s FollowMyHealth portal, you will also be able to view your health information using other applications (apps) compatible with our system.

## 2025-07-22 NOTE — ED ADULT NURSE REASSESSMENT NOTE - NS ED NURSE REASSESS COMMENT FT1
Asiya arrived to transport patient home. Unable to get in contact with either child on patient's chart, despite multiple calls. Unsure what family member night shift confirmed patient's discharge home with. Patient has keys on self and states they are to her apartment. Patient is AAOX4 and able to answer questions appropriately. Both children's number provided to transporters as well. DC instructions reviewed with patient and paperwork provided.

## 2025-07-22 NOTE — ED PROVIDER NOTE - PROGRESS NOTE DETAILS
nani - received on sign out pending CT results.   thus far labs w baseline anemia, creatinine 1.55 (baseline). lactate 2.6 -> 2.4. hyperglycemia w/o DKA.   UA softly positive and plan to treat for UTI    CT "IMPRESSION:  Increased colonic stool burden. Correlate clinically for constipation.  A 2.1 cm cystic structure in the body of the pancreas, interval increase   in size. Normal main pancreatic duct. Nonemergent contrast-enhanced MRI   of the abdomen/MRCP recommended for further evaluation.  A 1.9 cm left ovarian cyst, interval increase in size, which is   unexpected in a postmenopausal female. Nonemergent pelvic ultrasound is   recommended for further evaluation. Next  Other findings, as above."    imaging as above. reviewed findings and need for outpt follow up for further imaging ie ultrasound / mri.   resting comfortably, stable vitals, abd exam benign. ok for dc, as discussed w daughter will set up transport for AM arrival to home when aide is there. abx for uti sent.     All results reviewed with the patient verbally. Discharge plan and return precautions d/w pt who verbalized understanding and agrees with plan. All questions answered. Vitals WNL. Ready for d/c.

## 2025-07-22 NOTE — ED PROVIDER NOTE - NSFOLLOWUPINSTRUCTIONS_ED_ALL_ED_FT
CALL YOUR DOCTOR IN THE MORNING TO DISCUSS YOUR DIABETES MEDICATIONS  MAKE SURE TO STICK TO A LOW SUGAR DIET    Hyperglycemia    Hyperglycemia occurs when the glucose (sugar) level in your blood is too high. Symptoms include increased urination, increased thirst, a dry mouth, or changes in appetite. If started on a medication, take exactly as prescribed by your health care professional. Maintain a healthy lifestyle and follow up with your primary care physician.    SEEK IMMEDIATE MEDICAL CARE IF YOU HAVE ANY OF THE FOLLOWING SYMPTOMS: shortness of breath, change in mental status, nausea or vomiting, fruity smell to your breath, or any signs of dehydration. CALL YOUR DOCTOR IN THE MORNING TO DISCUSS YOUR DIABETES MEDICATIONS  MAKE SURE TO STICK TO A LOW SUGAR DIET    Take antibiotics (CEFPODOXIME) as prescribed for UTI.      Hyperglycemia    Hyperglycemia occurs when the glucose (sugar) level in your blood is too high. Symptoms include increased urination, increased thirst, a dry mouth, or changes in appetite. If started on a medication, take exactly as prescribed by your health care professional. Maintain a healthy lifestyle and follow up with your primary care physician.    SEEK IMMEDIATE MEDICAL CARE IF YOU HAVE ANY OF THE FOLLOWING SYMPTOMS: shortness of breath, change in mental status, nausea or vomiting, fruity smell to your breath, or any signs of dehydration.

## 2025-07-22 NOTE — ED PROVIDER NOTE - OBJECTIVE STATEMENT
80yF w/ asthma/COPD, Afib s/p ablation on Pradaxa, DM, brought in from home for hyperglycemia. Accompanied by daughter. States pt was admitted March 2025 for fall, was dc to [facility] and was dc home from [facility] 1.5 weeks ago. Now lives alone and has HHA daily (not 24 hours), also has outpatient PT/OT. States patient's blood sugars have been labile and uncontrolled for 2 months oscillating between 200-500. Daughter states visiting nurse came and said go to ED to r/o DKA. Daughter states pt is compliant w/ meds, reports used to be on different medications that controlled her sugars. Denies fevers/chills/urinary symptoms/chest pain/SOB. Pt endorses abdominal pain earlier tonight that has since resolved.

## 2025-07-23 ENCOUNTER — NON-APPOINTMENT (OUTPATIENT)
Age: 80
End: 2025-07-23

## 2025-07-23 DIAGNOSIS — K59.00 CONSTIPATION, UNSPECIFIED: ICD-10-CM

## 2025-07-23 DIAGNOSIS — Z79.01 LONG TERM (CURRENT) USE OF ANTICOAGULANTS: ICD-10-CM

## 2025-07-23 DIAGNOSIS — J44.89 OTHER SPECIFIED CHRONIC OBSTRUCTIVE PULMONARY DISEASE: ICD-10-CM

## 2025-07-23 DIAGNOSIS — Z91.010 ALLERGY TO PEANUTS: ICD-10-CM

## 2025-07-23 DIAGNOSIS — I48.91 UNSPECIFIED ATRIAL FIBRILLATION: ICD-10-CM

## 2025-07-23 DIAGNOSIS — N83.202 UNSPECIFIED OVARIAN CYST, LEFT SIDE: ICD-10-CM

## 2025-07-23 DIAGNOSIS — K86.2 CYST OF PANCREAS: ICD-10-CM

## 2025-07-23 DIAGNOSIS — Z88.8 ALLERGY STATUS TO OTHER DRUGS, MEDICAMENTS AND BIOLOGICAL SUBSTANCES: ICD-10-CM

## 2025-07-23 DIAGNOSIS — E11.65 TYPE 2 DIABETES MELLITUS WITH HYPERGLYCEMIA: ICD-10-CM

## 2025-07-28 ENCOUNTER — APPOINTMENT (OUTPATIENT)
Dept: INTERNAL MEDICINE | Facility: CLINIC | Age: 80
End: 2025-07-28
Payer: MEDICARE

## 2025-07-28 VITALS
DIASTOLIC BLOOD PRESSURE: 73 MMHG | HEART RATE: 71 BPM | TEMPERATURE: 98.1 F | WEIGHT: 168 LBS | SYSTOLIC BLOOD PRESSURE: 134 MMHG | OXYGEN SATURATION: 99 % | HEIGHT: 67 IN | BODY MASS INDEX: 26.37 KG/M2

## 2025-07-28 DIAGNOSIS — N83.209 UNSPECIFIED OVARIAN CYST, UNSPECIFIED SIDE: ICD-10-CM

## 2025-07-28 DIAGNOSIS — R73.9 HYPERGLYCEMIA, UNSPECIFIED: ICD-10-CM

## 2025-07-28 DIAGNOSIS — K86.2 CYST OF PANCREAS: ICD-10-CM

## 2025-07-28 DIAGNOSIS — I10 ESSENTIAL (PRIMARY) HYPERTENSION: ICD-10-CM

## 2025-07-28 PROCEDURE — 36415 COLL VENOUS BLD VENIPUNCTURE: CPT

## 2025-07-28 PROCEDURE — G2211 COMPLEX E/M VISIT ADD ON: CPT

## 2025-07-28 PROCEDURE — 99215 OFFICE O/P EST HI 40 MIN: CPT

## 2025-07-30 ENCOUNTER — APPOINTMENT (OUTPATIENT)
Dept: ENDOCRINOLOGY | Facility: CLINIC | Age: 80
End: 2025-07-30
Payer: MEDICARE

## 2025-07-30 VITALS
BODY MASS INDEX: 26.37 KG/M2 | DIASTOLIC BLOOD PRESSURE: 72 MMHG | HEIGHT: 67 IN | SYSTOLIC BLOOD PRESSURE: 124 MMHG | TEMPERATURE: 97.2 F | HEART RATE: 72 BPM | WEIGHT: 168 LBS | OXYGEN SATURATION: 97 %

## 2025-07-30 DIAGNOSIS — Z79.4 TYPE 2 DIABETES MELLITUS WITH HYPERGLYCEMIA: ICD-10-CM

## 2025-07-30 DIAGNOSIS — E11.65 TYPE 2 DIABETES MELLITUS WITH HYPERGLYCEMIA: ICD-10-CM

## 2025-07-30 PROCEDURE — 82962 GLUCOSE BLOOD TEST: CPT

## 2025-07-30 PROCEDURE — 99204 OFFICE O/P NEW MOD 45 MIN: CPT

## 2025-07-30 PROCEDURE — G2211 COMPLEX E/M VISIT ADD ON: CPT

## 2025-07-30 RX ORDER — SITAGLIPTIN 25 MG/1
25 TABLET, FILM COATED ORAL DAILY
Qty: 90 | Refills: 2 | Status: DISCONTINUED | COMMUNITY
Start: 2025-07-28 | End: 2025-07-30

## 2025-07-30 RX ORDER — TIRZEPATIDE 2.5 MG/.5ML
2.5 INJECTION, SOLUTION SUBCUTANEOUS
Qty: 1 | Refills: 3 | Status: ACTIVE | COMMUNITY
Start: 2025-07-30 | End: 1900-01-01

## 2025-07-30 RX ORDER — BLOOD-GLUCOSE SENSOR
EACH MISCELLANEOUS
Qty: 6 | Refills: 3 | Status: ACTIVE | COMMUNITY
Start: 2025-07-30 | End: 1900-01-01

## 2025-07-30 RX ORDER — BLOOD-GLUCOSE,RECEIVER,CONT
EACH MISCELLANEOUS
Qty: 1 | Refills: 0 | Status: ACTIVE | COMMUNITY
Start: 2025-07-30 | End: 1900-01-01

## 2025-07-31 LAB
ALBUMIN SERPL ELPH-MCNC: 4.2 G/DL
ALP BLD-CCNC: 138 U/L
ALT SERPL-CCNC: 14 U/L
ANION GAP SERPL CALC-SCNC: 18 MMOL/L
AST SERPL-CCNC: 21 U/L
BILIRUB SERPL-MCNC: 0.3 MG/DL
BUN SERPL-MCNC: 28 MG/DL
CALCIUM SERPL-MCNC: 9.5 MG/DL
CHLORIDE SERPL-SCNC: 103 MMOL/L
CO2 SERPL-SCNC: 18 MMOL/L
CREAT SERPL-MCNC: 1.53 MG/DL
EGFRCR SERPLBLD CKD-EPI 2021: 34 ML/MIN/1.73M2
ESTIMATED AVERAGE GLUCOSE: 301 MG/DL
GLUCOSE BLDC GLUCOMTR-MCNC: 344
GLUCOSE SERPL-MCNC: 346 MG/DL
HBA1C MFR BLD HPLC: 12.1 %
POTASSIUM SERPL-SCNC: 4.5 MMOL/L
PROT SERPL-MCNC: 7.3 G/DL
SODIUM SERPL-SCNC: 139 MMOL/L

## 2025-08-06 ENCOUNTER — TRANSCRIPTION ENCOUNTER (OUTPATIENT)
Age: 80
End: 2025-08-06

## 2025-08-07 ENCOUNTER — NON-APPOINTMENT (OUTPATIENT)
Age: 80
End: 2025-08-07

## 2025-08-07 ENCOUNTER — APPOINTMENT (OUTPATIENT)
Dept: HEART AND VASCULAR | Facility: CLINIC | Age: 80
End: 2025-08-07
Payer: MEDICARE

## 2025-08-07 PROCEDURE — 93298 REM INTERROG DEV EVAL SCRMS: CPT

## 2025-08-11 ENCOUNTER — TRANSCRIPTION ENCOUNTER (OUTPATIENT)
Age: 80
End: 2025-08-11

## 2025-08-11 RX ORDER — INSULIN GLARGINE 100 [IU]/ML
100 INJECTION, SOLUTION SUBCUTANEOUS
Qty: 2 | Refills: 1 | Status: DISCONTINUED | COMMUNITY
Start: 2025-08-11 | End: 2025-08-11

## 2025-08-11 RX ORDER — SYRINGE-NEEDLE,INSULIN,0.5 ML 31 GX5/16"
31G X 5/16" SYRINGE, EMPTY DISPOSABLE MISCELLANEOUS
Qty: 1 | Refills: 1 | Status: ACTIVE | COMMUNITY
Start: 2025-08-11 | End: 1900-01-01

## 2025-08-12 ENCOUNTER — TRANSCRIPTION ENCOUNTER (OUTPATIENT)
Age: 80
End: 2025-08-12

## 2025-08-12 LAB
ALBUMIN, RANDOM URINE: 1.6 MG/DL
C PEPTIDE SERPL-MCNC: 7.7 NG/ML
CHOLEST SERPL-MCNC: 120 MG/DL
CREAT SPEC-SCNC: 45 MG/DL
HDLC SERPL-MCNC: 62 MG/DL
LDLC SERPL-MCNC: 38 MG/DL
MICROALBUMIN/CREAT 24H UR-RTO: 36 MG/G
NONHDLC SERPL-MCNC: 58 MG/DL
PANC ISLET CELL AB SER QL: NORMAL
TRIGL SERPL-MCNC: 111 MG/DL

## 2025-08-12 RX ORDER — INSULIN GLARGINE-YFGN 100 [IU]/ML
100 INJECTION, SOLUTION SUBCUTANEOUS
Qty: 2 | Refills: 1 | Status: ACTIVE | COMMUNITY
Start: 2025-08-12 | End: 1900-01-01

## 2025-08-12 RX ORDER — INSULIN GLARGINE 100 [IU]/ML
100 INJECTION, SOLUTION SUBCUTANEOUS
Qty: 2 | Refills: 0 | Status: DISCONTINUED | COMMUNITY
Start: 2025-08-11 | End: 2025-08-12

## 2025-08-13 ENCOUNTER — TRANSCRIPTION ENCOUNTER (OUTPATIENT)
Age: 80
End: 2025-08-13

## 2025-08-13 RX ORDER — DAPAGLIFLOZIN 5 MG/1
5 TABLET, FILM COATED ORAL
Qty: 90 | Refills: 1 | Status: DISCONTINUED | COMMUNITY
Start: 2025-07-28 | End: 2025-08-13

## 2025-08-14 ENCOUNTER — TRANSCRIPTION ENCOUNTER (OUTPATIENT)
Age: 80
End: 2025-08-14

## 2025-08-14 LAB
GAD65 AB SER-MCNC: 0 NMOL/L
ISLET CELL512 AB SER-SCNC: 0 NMOL/L

## 2025-08-15 ENCOUNTER — TRANSCRIPTION ENCOUNTER (OUTPATIENT)
Age: 80
End: 2025-08-15

## 2025-08-18 LAB — ZINC TRANSPORTER 8 AB: <15 U/ML

## 2025-08-27 ENCOUNTER — APPOINTMENT (OUTPATIENT)
Dept: ENDOCRINOLOGY | Facility: CLINIC | Age: 80
End: 2025-08-27

## 2025-08-27 VITALS
HEART RATE: 44 BPM | OXYGEN SATURATION: 97 % | WEIGHT: 167 LBS | HEIGHT: 67 IN | TEMPERATURE: 98.8 F | SYSTOLIC BLOOD PRESSURE: 139 MMHG | DIASTOLIC BLOOD PRESSURE: 61 MMHG | BODY MASS INDEX: 26.21 KG/M2

## 2025-08-27 LAB — GLUCOSE BLDC GLUCOMTR-MCNC: 195

## 2025-08-27 PROCEDURE — 99214 OFFICE O/P EST MOD 30 MIN: CPT

## 2025-08-27 PROCEDURE — 82962 GLUCOSE BLOOD TEST: CPT

## 2025-08-27 PROCEDURE — G2211 COMPLEX E/M VISIT ADD ON: CPT

## 2025-08-27 PROCEDURE — 36415 COLL VENOUS BLD VENIPUNCTURE: CPT

## 2025-08-27 RX ORDER — TIRZEPATIDE 5 MG/.5ML
5 INJECTION, SOLUTION SUBCUTANEOUS
Qty: 1 | Refills: 3 | Status: ACTIVE | COMMUNITY
Start: 2025-08-27 | End: 1900-01-01

## 2025-08-28 LAB
ANION GAP SERPL CALC-SCNC: 16 MMOL/L
BUN SERPL-MCNC: 26 MG/DL
CALCIUM SERPL-MCNC: 9.3 MG/DL
CHLORIDE SERPL-SCNC: 106 MMOL/L
CO2 SERPL-SCNC: 19 MMOL/L
CREAT SERPL-MCNC: 1.29 MG/DL
EGFRCR SERPLBLD CKD-EPI 2021: 42 ML/MIN/1.73M2
GLUCOSE SERPL-MCNC: 193 MG/DL
POTASSIUM SERPL-SCNC: 4.9 MMOL/L
SODIUM SERPL-SCNC: 141 MMOL/L

## 2025-09-08 ENCOUNTER — APPOINTMENT (OUTPATIENT)
Dept: INTERNAL MEDICINE | Facility: CLINIC | Age: 80
End: 2025-09-08
Payer: MEDICARE

## 2025-09-08 VITALS
DIASTOLIC BLOOD PRESSURE: 76 MMHG | HEIGHT: 67 IN | TEMPERATURE: 98 F | HEART RATE: 52 BPM | BODY MASS INDEX: 26.21 KG/M2 | SYSTOLIC BLOOD PRESSURE: 148 MMHG | OXYGEN SATURATION: 97 % | WEIGHT: 167 LBS

## 2025-09-08 DIAGNOSIS — I10 ESSENTIAL (PRIMARY) HYPERTENSION: ICD-10-CM

## 2025-09-08 DIAGNOSIS — E11.65 TYPE 2 DIABETES MELLITUS WITH HYPERGLYCEMIA: ICD-10-CM

## 2025-09-08 DIAGNOSIS — Z79.4 TYPE 2 DIABETES MELLITUS WITH HYPERGLYCEMIA: ICD-10-CM

## 2025-09-08 PROCEDURE — G2211 COMPLEX E/M VISIT ADD ON: CPT

## 2025-09-08 PROCEDURE — 99214 OFFICE O/P EST MOD 30 MIN: CPT

## 2025-09-11 ENCOUNTER — APPOINTMENT (OUTPATIENT)
Dept: HEART AND VASCULAR | Facility: CLINIC | Age: 80
End: 2025-09-11
Payer: MEDICARE

## 2025-09-11 ENCOUNTER — NON-APPOINTMENT (OUTPATIENT)
Age: 80
End: 2025-09-11

## 2025-09-11 PROCEDURE — 93298 REM INTERROG DEV EVAL SCRMS: CPT
